# Patient Record
Sex: MALE | Race: BLACK OR AFRICAN AMERICAN | NOT HISPANIC OR LATINO | Employment: FULL TIME | URBAN - METROPOLITAN AREA
[De-identification: names, ages, dates, MRNs, and addresses within clinical notes are randomized per-mention and may not be internally consistent; named-entity substitution may affect disease eponyms.]

---

## 2019-09-05 ENCOUNTER — HOSPITAL ENCOUNTER (EMERGENCY)
Facility: HOSPITAL | Age: 24
Discharge: HOME | End: 2019-09-05
Attending: SURGERY
Payer: MEDICAID

## 2019-09-05 VITALS
OXYGEN SATURATION: 99 % | HEART RATE: 98 BPM | RESPIRATION RATE: 19 BRPM | TEMPERATURE: 98 F | WEIGHT: 130 LBS | HEIGHT: 66 IN | BODY MASS INDEX: 20.89 KG/M2 | SYSTOLIC BLOOD PRESSURE: 142 MMHG | DIASTOLIC BLOOD PRESSURE: 91 MMHG

## 2019-09-05 DIAGNOSIS — I47.10 SVT (SUPRAVENTRICULAR TACHYCARDIA): ICD-10-CM

## 2019-09-05 DIAGNOSIS — I45.6 WPW SYNDROME: Primary | ICD-10-CM

## 2019-09-05 DIAGNOSIS — R00.2 PALPITATION: ICD-10-CM

## 2019-09-05 LAB
ALBUMIN SERPL BCP-MCNC: 4.4 G/DL (ref 3.5–5.2)
ALP SERPL-CCNC: 53 U/L (ref 38–126)
ALT SERPL W/O P-5'-P-CCNC: 24 U/L (ref 10–44)
AMPHET+METHAMPHET UR QL: NEGATIVE
ANION GAP SERPL CALC-SCNC: 7 MMOL/L (ref 8–16)
AST SERPL-CCNC: 24 U/L (ref 15–46)
BARBITURATES UR QL SCN>200 NG/ML: NEGATIVE
BASOPHILS # BLD AUTO: 0.02 K/UL (ref 0–0.2)
BASOPHILS NFR BLD: 0.3 % (ref 0–1.9)
BENZODIAZ UR QL SCN>200 NG/ML: NEGATIVE
BILIRUB SERPL-MCNC: 0.7 MG/DL (ref 0.1–1)
BUN SERPL-MCNC: 9 MG/DL (ref 2–20)
BZE UR QL SCN: NEGATIVE
CALCIUM SERPL-MCNC: 9.2 MG/DL (ref 8.7–10.5)
CANNABINOIDS UR QL SCN: NEGATIVE
CHLORIDE SERPL-SCNC: 109 MMOL/L (ref 95–110)
CO2 SERPL-SCNC: 25 MMOL/L (ref 23–29)
CREAT SERPL-MCNC: 0.85 MG/DL (ref 0.5–1.4)
CREAT UR-MCNC: 137.1 MG/DL (ref 23–375)
DIFFERENTIAL METHOD: NORMAL
EOSINOPHIL # BLD AUTO: 0 K/UL (ref 0–0.5)
EOSINOPHIL NFR BLD: 0.3 % (ref 0–8)
ERYTHROCYTE [DISTWIDTH] IN BLOOD BY AUTOMATED COUNT: 14.5 % (ref 11.5–14.5)
EST. GFR  (AFRICAN AMERICAN): >60 ML/MIN/1.73 M^2
EST. GFR  (NON AFRICAN AMERICAN): >60 ML/MIN/1.73 M^2
GLUCOSE SERPL-MCNC: 121 MG/DL (ref 70–110)
HCT VFR BLD AUTO: 46.7 % (ref 40–54)
HGB BLD-MCNC: 15 G/DL (ref 14–18)
INR PPP: 1.2 (ref 0.8–1.2)
LYMPHOCYTES # BLD AUTO: 1.9 K/UL (ref 1–4.8)
LYMPHOCYTES NFR BLD: 32.6 % (ref 18–48)
MCH RBC QN AUTO: 29.6 PG (ref 27–31)
MCHC RBC AUTO-ENTMCNC: 32.1 G/DL (ref 32–36)
MCV RBC AUTO: 92 FL (ref 82–98)
METHADONE UR QL SCN>300 NG/ML: NEGATIVE
MONOCYTES # BLD AUTO: 0.5 K/UL (ref 0.3–1)
MONOCYTES NFR BLD: 8.7 % (ref 4–15)
NEUTROPHILS # BLD AUTO: 3.4 K/UL (ref 1.8–7.7)
NEUTROPHILS NFR BLD: 58.1 % (ref 38–73)
NT-PROBNP: 458 PG/ML (ref 5–450)
OPIATES UR QL SCN: NEGATIVE
PCP UR QL SCN>25 NG/ML: NEGATIVE
PLATELET # BLD AUTO: 152 K/UL (ref 150–350)
PMV BLD AUTO: 10.4 FL (ref 9.2–12.9)
POTASSIUM SERPL-SCNC: 3.8 MMOL/L (ref 3.5–5.1)
PROT SERPL-MCNC: 7.5 G/DL (ref 6–8.4)
PROTHROMBIN TIME: 12.5 SEC (ref 9–12.5)
RBC # BLD AUTO: 5.06 M/UL (ref 4.6–6.2)
SODIUM SERPL-SCNC: 141 MMOL/L (ref 136–145)
TOXICOLOGY INFORMATION: NORMAL
TROPONIN I SERPL DL<=0.01 NG/ML-MCNC: 0.04 NG/ML (ref 0.01–0.03)
WBC # BLD AUTO: 5.89 K/UL (ref 3.9–12.7)

## 2019-09-05 PROCEDURE — 80053 COMPREHEN METABOLIC PANEL: CPT | Mod: ER

## 2019-09-05 PROCEDURE — 93010 EKG 12-LEAD: ICD-10-PCS | Mod: ,,, | Performed by: INTERNAL MEDICINE

## 2019-09-05 PROCEDURE — 93010 ELECTROCARDIOGRAM REPORT: CPT | Mod: ,,, | Performed by: INTERNAL MEDICINE

## 2019-09-05 PROCEDURE — 93005 ELECTROCARDIOGRAM TRACING: CPT | Mod: ER

## 2019-09-05 PROCEDURE — 85610 PROTHROMBIN TIME: CPT | Mod: ER

## 2019-09-05 PROCEDURE — 80307 DRUG TEST PRSMV CHEM ANLYZR: CPT | Mod: ER

## 2019-09-05 PROCEDURE — 84484 ASSAY OF TROPONIN QUANT: CPT | Mod: ER

## 2019-09-05 PROCEDURE — 27000221 HC OXYGEN, UP TO 24 HOURS: Mod: ER

## 2019-09-05 PROCEDURE — 96361 HYDRATE IV INFUSION ADD-ON: CPT | Mod: ER

## 2019-09-05 PROCEDURE — 83880 ASSAY OF NATRIURETIC PEPTIDE: CPT | Mod: ER

## 2019-09-05 PROCEDURE — 63600175 PHARM REV CODE 636 W HCPCS: Mod: ER | Performed by: SURGERY

## 2019-09-05 PROCEDURE — 25000003 PHARM REV CODE 250: Mod: ER | Performed by: SURGERY

## 2019-09-05 PROCEDURE — 63600175 PHARM REV CODE 636 W HCPCS: Mod: ER

## 2019-09-05 PROCEDURE — 96374 THER/PROPH/DIAG INJ IV PUSH: CPT | Mod: ER

## 2019-09-05 PROCEDURE — 85025 COMPLETE CBC W/AUTO DIFF WBC: CPT | Mod: ER

## 2019-09-05 PROCEDURE — 99291 CRITICAL CARE FIRST HOUR: CPT | Mod: 25,ER

## 2019-09-05 PROCEDURE — 99284 EMERGENCY DEPT VISIT MOD MDM: CPT | Mod: 25,ER

## 2019-09-05 RX ORDER — METOPROLOL TARTRATE 25 MG/1
25 TABLET, FILM COATED ORAL 2 TIMES DAILY
Qty: 60 TABLET | Refills: 11 | Status: SHIPPED | OUTPATIENT
Start: 2019-09-05 | End: 2020-01-02

## 2019-09-05 RX ORDER — ADENOSINE 3 MG/ML
INJECTION, SOLUTION INTRAVENOUS
Status: COMPLETED
Start: 2019-09-05 | End: 2019-09-05

## 2019-09-05 RX ORDER — METOPROLOL TARTRATE 25 MG/1
25 TABLET, FILM COATED ORAL
Status: COMPLETED | OUTPATIENT
Start: 2019-09-05 | End: 2019-09-05

## 2019-09-05 RX ORDER — ONDANSETRON 2 MG/ML
4 INJECTION INTRAMUSCULAR; INTRAVENOUS
Status: DISCONTINUED | OUTPATIENT
Start: 2019-09-05 | End: 2019-09-05 | Stop reason: HOSPADM

## 2019-09-05 RX ORDER — SODIUM CHLORIDE 9 MG/ML
1000 INJECTION, SOLUTION INTRAVENOUS
Status: COMPLETED | OUTPATIENT
Start: 2019-09-05 | End: 2019-09-05

## 2019-09-05 RX ORDER — ADENOSINE 3 MG/ML
12 INJECTION, SOLUTION INTRAVENOUS
Status: COMPLETED | OUTPATIENT
Start: 2019-09-05 | End: 2019-09-05

## 2019-09-05 RX ADMIN — ADENOSINE 12 MG: 3 INJECTION, SOLUTION INTRAVENOUS at 02:09

## 2019-09-05 RX ADMIN — SODIUM CHLORIDE 1000 ML: 0.9 INJECTION, SOLUTION INTRAVENOUS at 02:09

## 2019-09-05 RX ADMIN — METOPROLOL TARTRATE 25 MG: 25 TABLET ORAL at 04:09

## 2019-09-05 NOTE — ED NOTES
Pt came in through lobby for heart palpitation. After placing pt on cardiac monitor and obtaining ekg shows pt in SVT. Dr. Artis in the room. 18g saline lock started to right forearm. Pt placed on zoll defribrillator and continued on heart monitor in room. 1L NS bolus started and 12mg adenosine iv push administered. Pt converted to normal sinus rhythm and vss repeat ekg performed. Pt remains on cardiac monitor and will continue to monitor.

## 2019-09-05 NOTE — ED PROVIDER NOTES
Encounter Date: 9/5/2019       History     Chief Complaint   Patient presents with    Palpitations     PT reports palpitations, sob and nause that started this morning     Sudden onset of SOB nausea and tachycardia 10 min prior to admission.  No chest pain. 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   Palpitations    This is a new problem. The current episode started today. The problem has been unchanged. On average, each episode lasts 15 minutes. Associated symptoms include chest pressure, irregular heartbeat, nausea and shortness of breath. Pertinent negatives include no diaphoresis, no chest pain and no back pain. He has tried nothing for the symptoms. The treatment provided no relief. His past medical history is significant for heart disease.     Review of patient's allergies indicates:  No Known Allergies  Past Medical History:   Diagnosis Date    History of heart surgery      History reviewed. No pertinent surgical history.  History reviewed. No pertinent family history.  Social History     Tobacco Use    Smoking status: Unknown If Ever Smoked    Smokeless tobacco: Never Used   Substance Use Topics    Alcohol use: No    Drug use: Yes     Types: Marijuana     Comment: Mojo today- pt reports a while back     Review of Systems   Constitutional: Negative.  Negative for diaphoresis.   HENT: Negative.    Eyes: Negative.    Respiratory: Positive for shortness of breath.    Cardiovascular: Positive for palpitations. Negative for chest pain.   Gastrointestinal: Positive for nausea.   Endocrine: Negative.    Genitourinary: Negative.    Musculoskeletal: Negative.  Negative for back pain.   Allergic/Immunologic: Negative.    Neurological: Negative.    Hematological: Negative.    Psychiatric/Behavioral: Negative.        Physical Exam     Initial Vitals [09/05/19 1414]   BP Pulse Resp Temp SpO2   (!) 141/90 (!) 188 18 97.6 °F (36.4 °C) (!) 94 %       MAP       --         Physical Exam    Nursing note and vitals reviewed.  Constitutional: He appears well-developed and well-nourished. He appears distressed.   Looks anxious   HENT:   Head: Normocephalic.   Eyes: Conjunctivae are normal.   Neck: Normal range of motion.   Cardiovascular: Intact distal pulses.   Tachycardia 180   Pulmonary/Chest: Breath sounds normal.   Abdominal: Soft.   Musculoskeletal: Normal range of motion.   Neurological: He is alert and oriented to person, place, and time. He has normal strength. GCS score is 15. GCS eye subscore is 4. GCS verbal subscore is 5. GCS motor subscore is 6.   Psychiatric: He has a normal mood and affect.         ED Course   Critical Care  Date/Time: 9/5/2019 4:43 PM  Performed by: LISA Artis III, MD  Authorized by: LISA Artis III, MD   Direct patient critical care time: 45 minutes  Total critical care time (exclusive of procedural time) : 45 minutes        Labs Reviewed   COMPREHENSIVE METABOLIC PANEL - Abnormal; Notable for the following components:       Result Value    Glucose 121 (*)     Anion Gap 7 (*)     All other components within normal limits   TROPONIN I - Abnormal; Notable for the following components:    Troponin I 0.036 (*)     All other components within normal limits   NT-PRO NATRIURETIC PEPTIDE - Abnormal; Notable for the following components:    NT-proBNP 458 (*)     All other components within normal limits   CBC W/ AUTO DIFFERENTIAL   DRUG SCREEN PANEL, URINE EMERGENCY   PROTIME-INR     EKG Readings: (Independently Interpreted)   Rhythm: Supraventricular Tachycardia (SVT). Heart Rate: 180.   Other EKG Interpretations: wpW with a rate of100     ECG Results          Repeat EKG 12-lead (In process)  Result time 09/05/19 15:17:49    In process by Interface, Lab In [facility] (09/05/19 15:17:49)                 Narrative:    Test Reason : I47.1,    Vent. Rate : 100 BPM     Atrial Rate : 100 BPM     P-R Int : 152 ms          QRS Dur :  100 ms      QT Int : 348 ms       P-R-T Axes : 056 015 108 degrees     QTc Int : 448 ms    Normal sinus rhythm  Otis-Parkinson-White  Abnormal ECG  When compared with ECG of 05-SEP-2019 14:20,  Vent. rate has decreased BY  87 BPM  Otis-Parkinson-White is now Present    Referred By: [email or web address]   SELF           Confirmed By:                              EKG 12-lead (In process)  Result time 09/05/19 15:17:41    In process by Interface, Lab In [facility] (09/05/19 15:17:41)                 Narrative:    Test Reason : R00.2,    Vent. Rate : 187 BPM     Atrial Rate : 192 BPM     P-R Int : 000 ms          QRS Dur : 098 ms      QT Int : 266 ms       P-R-T Axes : 000 011 186 degrees     QTc Int : 469 ms    Supraventricular tachycardia  LVH with repolarization abnormality  Abnormal ECG  No previous ECGs available    Referred By: [email or web address]   SELF           Confirmed By:                             Imaging Results    None          Medical Decision Making:   ED Management:  Sudden onset of .  Patient was given 12 mg of [place] with restoration  to a normal sinus rhythm and he has Otis-Parkinson-White syndrome.  Patient had mild bump in his troponin secondary to the tachycardia perfusion mismatch.  He was monitored in the ED for 90 min with no more tachycardia he was chest pain-free throughout his entire time discussion with cardiologist on call Dr. Stover and he recommends metoprolol 25 mg p.o. b.i.d. and referral to Dr. Ferguson at [facility] for catheter ablation.  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 Palpitation R00.2 785.1   3. SVT (supraventricular tachycardia) I47.1 427.89                                LISA Artis III, MD  09/05/19 1648       LISA Artis III, MD  09/05/19 1650

## 2019-09-26 ENCOUNTER — TELEPHONE (OUTPATIENT)
Dept: ELECTROPHYSIOLOGY | Facility: CLINIC | Age: 24
End: 2019-09-26

## 2019-09-26 DIAGNOSIS — I47.10 SVT (SUPRAVENTRICULAR TACHYCARDIA): Primary | ICD-10-CM

## 2019-09-26 NOTE — TELEPHONE ENCOUNTER
Received call from pt's family member requesting consult visit per Dr. Stover. Discussed with Dr Ferguson.     Called grandmother back and offered apt on 10/1/19 at 2 PM for EKG and MD visit. Pt's grandmother agrees and verbalized understanding. She denies patient has device or outside records. She states patient had fast heart rates when younger (1 y/o) but does not have records and has not been treated since then for SVT or cardiac issues.

## 2019-10-01 ENCOUNTER — INITIAL CONSULT (OUTPATIENT)
Dept: ELECTROPHYSIOLOGY | Facility: CLINIC | Age: 24
End: 2019-10-01
Payer: MEDICAID

## 2019-10-01 VITALS
WEIGHT: 127 LBS | SYSTOLIC BLOOD PRESSURE: 136 MMHG | DIASTOLIC BLOOD PRESSURE: 69 MMHG | HEART RATE: 75 BPM | BODY MASS INDEX: 19.25 KG/M2 | HEIGHT: 68 IN

## 2019-10-01 DIAGNOSIS — I45.6 WPW SYNDROME: Primary | ICD-10-CM

## 2019-10-01 DIAGNOSIS — I47.10 SVT (SUPRAVENTRICULAR TACHYCARDIA): ICD-10-CM

## 2019-10-01 DIAGNOSIS — Q24.9 CONGENITAL HEART DEFECT: Primary | ICD-10-CM

## 2019-10-01 DIAGNOSIS — I47.10 SVT (SUPRAVENTRICULAR TACHYCARDIA): Chronic | ICD-10-CM

## 2019-10-01 DIAGNOSIS — I45.6 WPW SYNDROME: Chronic | ICD-10-CM

## 2019-10-01 PROCEDURE — 99999 PR PBB SHADOW E&M-EST. PATIENT-LVL III: ICD-10-PCS | Mod: PBBFAC,,, | Performed by: INTERNAL MEDICINE

## 2019-10-01 PROCEDURE — 99213 OFFICE O/P EST LOW 20 MIN: CPT | Mod: PBBFAC | Performed by: INTERNAL MEDICINE

## 2019-10-01 PROCEDURE — 99204 PR OFFICE/OUTPT VISIT, NEW, LEVL IV, 45-59 MIN: ICD-10-PCS | Mod: S$PBB,,, | Performed by: INTERNAL MEDICINE

## 2019-10-01 PROCEDURE — 93010 ELECTROCARDIOGRAM REPORT: CPT | Mod: S$PBB,,, | Performed by: INTERNAL MEDICINE

## 2019-10-01 PROCEDURE — 99204 OFFICE O/P NEW MOD 45 MIN: CPT | Mod: S$PBB,,, | Performed by: INTERNAL MEDICINE

## 2019-10-01 PROCEDURE — 99999 PR PBB SHADOW E&M-EST. PATIENT-LVL III: CPT | Mod: PBBFAC,,, | Performed by: INTERNAL MEDICINE

## 2019-10-01 PROCEDURE — 93005 ELECTROCARDIOGRAM TRACING: CPT | Mod: PBBFAC | Performed by: INTERNAL MEDICINE

## 2019-10-01 PROCEDURE — 93010 EKG 12-LEAD: ICD-10-PCS | Mod: S$PBB,,, | Performed by: INTERNAL MEDICINE

## 2019-10-01 NOTE — LETTER
October 1, 2019      Dexter Stover MD  [address]  [address]  [place] [address]           [facility] - Arrhythmia  [address]  [facility] [address]  Phone: [phone]  Fax: [phone]          Patient: Juani Reilly   MR Number: [id number]   YOB: 1995   Date of Visit: 10/1/2019       Dear Dr. Dexter Stover:    Thank you for referring Juani Reilly to me for evaluation. Attached you will find relevant portions of my assessment and plan of care.    If you have questions, please do not hesitate to call me. I look forward to following Juani Reilly along with you.    Sincerely,    Abebe Ferguson MD    Enclosure  CC:  No Recipients    If you would like to receive this communication electronically, please contact [email or web address] or [phone] to request more information on [employer] Link access.    For providers and/or their staff who would like to refer a patient to [facility], please contact us through our one-stop-shop provider referral line, [facility], at [phone].    If you feel you have received this communication in error or would no longer like to receive these types of communications, please e-mail [email or web address]          Spoke with , notified of lab results.

## 2019-10-01 NOTE — PROGRESS NOTES
Subjective:    Patient ID:  Juani Reilly is a 24 y.o. male who presents for evaluation of SVT      HPI  I had the pleasure of seeing Mr. Reilly today in our electrophysiology clinic in consultation for his SVT. As you are aware he is a pleasant 24 year-old man with congenital heart disease. I have no records regarding this. 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 He was doing well until he presented to the [facility] ER on 9/5/2019 with palpitations. He was in a regular tachycardia that terminated with IV adenosine. He reports 1 month of shortness of breath at rest and with exertion. He denies any orthopnea or PND. No leg edema. He denies any history of syncope. He reports he has not seen a cardiologist ([facility]) for 7-8 years.    I reviewed all available ECGs in Epic which note sinus rhythm with mild pre-excitation (isoelectric V1, +V2=6, +inf leads). ECG from 9/5/2019 ER visit notes a regular long RP tachycardia with similar QRS morphology but not ART.    My interpretation of today's in clinic ECG is sinus rhythm with pre-excitation as described above.    Review of Systems   Constitution: Negative for fever and malaise/fatigue.   HENT: Negative for congestion and sore throat.    Eyes: Negative for blurred vision and visual disturbance.   Cardiovascular: Positive for palpitations. Negative for chest pain, dyspnea on exertion and irregular heartbeat.   Respiratory: Negative for cough and shortness of breath.    Hematologic/Lymphatic: Negative for bleeding problem. Does not bruise/bleed easily.   Skin: Negative.    Musculoskeletal: Negative.    Gastrointestinal: Negative for bloating, abdominal pain, dysphagia, heartburn, melena, nausea and vomiting.   Neurological: Negative for dizziness, focal weakness and weakness.        Objective:    Physical Exam    Constitutional: He is oriented to person, place, and time. He appears well-developed and well-nourished. No distress.   HENT:   Head: Normocephalic and atraumatic.   Eyes: Conjunctivae are normal. Right eye exhibits no discharge. Left eye exhibits no discharge.   Neck: Neck supple. No JVD present.   Cardiovascular: Normal rate and regular rhythm. Exam reveals no gallop and no friction rub.   No murmur heard.  Pulmonary/Chest: Effort normal and breath sounds normal. No respiratory distress. He has no wheezes. He has no rales.   Abdominal: Soft. Bowel sounds are normal. He exhibits no distension. There is no tenderness. There is no rebound.   Musculoskeletal: He exhibits no edema.   Neurological: He is alert and oriented to person, place, and time.   Skin: Skin is warm and dry. He is not diaphoretic.   Psychiatric: He has a normal mood and affect. His behavior is normal. Judgment and thought content normal.         Assessment:       1. Congenital heart defect    2. SVT (supraventricular tachycardia)    3. WPW syndrome         Plan:       In summary, Mr. 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 We discussed the etiology and pathophysiology of SVT. His likely involves an accessory pathway. Discussed treatment options including suppression with medical therapy versus EPS/ablation for more definitive therapy. For now recommend he be seen in Adult Congenital Heart Clinic and once he is assessed and deemed stable would recommend EPS/ablation. For now continue metoprolol 25mg bid. I spoke with Dr. Barrera who can see him Thursday.    RTC in 3 months, can see me or Edyta Washburn    Thank you for allowing me to participate in the care of this patient. Please do not hesitate to call me with any questions or concerns.    Abebe Ferguson MD, PhD  Cardiac Electrophysiology

## 2019-10-03 ENCOUNTER — CLINICAL SUPPORT (OUTPATIENT)
Dept: PEDIATRIC CARDIOLOGY | Facility: CLINIC | Age: 24
End: 2019-10-03
Attending: INTERNAL MEDICINE
Payer: MEDICAID

## 2019-10-03 ENCOUNTER — HOSPITAL ENCOUNTER (OUTPATIENT)
Dept: CARDIOLOGY | Facility: CLINIC | Age: 24
Discharge: HOME | End: 2019-10-03
Attending: INTERNAL MEDICINE
Payer: MEDICAID

## 2019-10-03 ENCOUNTER — INITIAL CONSULT (OUTPATIENT)
Dept: CARDIOLOGY | Facility: CLINIC | Age: 24
End: 2019-10-03
Payer: MEDICAID

## 2019-10-03 ENCOUNTER — HOSPITAL ENCOUNTER (OUTPATIENT)
Dept: RADIOLOGY | Facility: HOSPITAL | Age: 24
Discharge: HOME | End: 2019-10-03
Attending: INTERNAL MEDICINE
Payer: MEDICAID

## 2019-10-03 ENCOUNTER — HOSPITAL ENCOUNTER (OUTPATIENT)
Dept: CARDIOLOGY | Facility: CLINIC | Age: 24
Discharge: HOME | End: 2019-10-03
Payer: MEDICAID

## 2019-10-03 VITALS
HEIGHT: 68 IN | SYSTOLIC BLOOD PRESSURE: 156 MMHG | WEIGHT: 127 LBS | BODY MASS INDEX: 19.25 KG/M2 | OXYGEN SATURATION: 95 % | HEIGHT: 68 IN | HEART RATE: 74 BPM | HEART RATE: 85 BPM | DIASTOLIC BLOOD PRESSURE: 104 MMHG | WEIGHT: 127 LBS | BODY MASS INDEX: 19.25 KG/M2

## 2019-10-03 DIAGNOSIS — I47.10 SVT (SUPRAVENTRICULAR TACHYCARDIA): Chronic | ICD-10-CM

## 2019-10-03 DIAGNOSIS — I47.10 SVT (SUPRAVENTRICULAR TACHYCARDIA): ICD-10-CM

## 2019-10-03 DIAGNOSIS — I45.6 WPW SYNDROME: ICD-10-CM

## 2019-10-03 DIAGNOSIS — Q24.9 ADULT CONGENITAL HEART DISEASE: ICD-10-CM

## 2019-10-03 DIAGNOSIS — Q24.9 CONGENITAL HEART DISEASE: ICD-10-CM

## 2019-10-03 DIAGNOSIS — Q20.4 SINGLE VENTRICLE WITH TRICUSPID ATRESIA: ICD-10-CM

## 2019-10-03 DIAGNOSIS — Z98.890 S/P FONTAN PROCEDURE: Primary | ICD-10-CM

## 2019-10-03 DIAGNOSIS — Z98.890 S/P FONTAN PROCEDURE: ICD-10-CM

## 2019-10-03 DIAGNOSIS — Q22.4 SINGLE VENTRICLE WITH TRICUSPID ATRESIA: ICD-10-CM

## 2019-10-03 PROBLEM — Z87.74 S/P FONTAN PROCEDURE: Status: ACTIVE | Noted: 2019-10-03

## 2019-10-03 LAB
ASCENDING AORTA: 3.32 CM
AV INDEX (PROSTH): 0.76
AV MEAN GRADIENT: 1 MMHG
AV PEAK GRADIENT: 2 MMHG
AV VALVE AREA: 5.15 CM2
AV VELOCITY RATIO: 0.81
BSA FOR ECHO PROCEDURE: 1.66 M2
CV ECHO LV RWT: 0.27 CM
DOP CALC AO PEAK VEL: 0.77 M/S
DOP CALC AO VTI: 16.96 CM
DOP CALC LVOT AREA: 6.7 CM2
DOP CALC LVOT DIAMETER: 2.93 CM
DOP CALC LVOT PEAK VEL: 0.62 M/S
DOP CALC LVOT STROKE VOLUME: 87.41 CM3
DOP CALCLVOT PEAK VEL VTI: 12.97 CM
E WAVE DECELERATION TIME: 186.39 MSEC
E/A RATIO: 0.84
ECHO LV POSTERIOR WALL: 0.87 CM (ref 0.6–1.1)
FRACTIONAL SHORTENING: 15 % (ref 28–44)
INTERVENTRICULAR SEPTUM: 1.19 CM (ref 0.6–1.1)
LEFT ATRIUM SIZE: 3.2 CM
LEFT INTERNAL DIMENSION IN SYSTOLE: 5.49 CM (ref 2.1–4)
LEFT VENTRICLE DIASTOLIC VOLUME INDEX: 125.99 ML/M2
LEFT VENTRICLE DIASTOLIC VOLUME: 212.3 ML
LEFT VENTRICLE MASS INDEX: 172 G/M2
LEFT VENTRICLE SYSTOLIC VOLUME INDEX: 87.3 ML/M2
LEFT VENTRICLE SYSTOLIC VOLUME: 147.08 ML
LEFT VENTRICULAR INTERNAL DIMENSION IN DIASTOLE: 6.45 CM (ref 3.5–6)
LEFT VENTRICULAR MASS: 290.1 G
MV PEAK A VEL: 0.49 M/S
MV PEAK E VEL: 0.41 M/S
SINUS: 3.61 CM
STJ: 3.3 CM

## 2019-10-03 PROCEDURE — 99999 PR PBB SHADOW E&M-EST. PATIENT-LVL V: ICD-10-PCS | Mod: PBBFAC,,, | Performed by: INTERNAL MEDICINE

## 2019-10-03 PROCEDURE — 0298T: CPT | Mod: S$PBB,,, | Performed by: PEDIATRICS

## 2019-10-03 PROCEDURE — 71046 X-RAY EXAM CHEST 2 VIEWS: CPT | Mod: TC,FY

## 2019-10-03 PROCEDURE — 71046 XR CHEST PA AND LATERAL: ICD-10-PCS | Mod: 26,,, | Performed by: RADIOLOGY

## 2019-10-03 PROCEDURE — 93010 EKG 12-LEAD: ICD-10-PCS | Mod: S$PBB,,, | Performed by: INTERNAL MEDICINE

## 2019-10-03 PROCEDURE — 93303 ECHO (CUPID ONLY): ICD-10-PCS | Mod: 26,S$PBB,, | Performed by: PEDIATRICS

## 2019-10-03 PROCEDURE — 99999 PR PBB SHADOW E&M-EST. PATIENT-LVL V: CPT | Mod: PBBFAC,,, | Performed by: INTERNAL MEDICINE

## 2019-10-03 PROCEDURE — 71046 X-RAY EXAM CHEST 2 VIEWS: CPT | Mod: 26,,, | Performed by: RADIOLOGY

## 2019-10-03 PROCEDURE — 0298T: ICD-10-PCS | Mod: S$PBB,,, | Performed by: PEDIATRICS

## 2019-10-03 PROCEDURE — 99215 OFFICE O/P EST HI 40 MIN: CPT | Mod: PBBFAC,25 | Performed by: INTERNAL MEDICINE

## 2019-10-03 PROCEDURE — 99214 PR OFFICE/OUTPT VISIT, EST, LEVL IV, 30-39 MIN: ICD-10-PCS | Mod: S$PBB,,, | Performed by: INTERNAL MEDICINE

## 2019-10-03 PROCEDURE — 93303 ECHO TRANSTHORACIC: CPT | Mod: PBBFAC | Performed by: PEDIATRICS

## 2019-10-03 PROCEDURE — 93010 ELECTROCARDIOGRAM REPORT: CPT | Mod: S$PBB,,, | Performed by: INTERNAL MEDICINE

## 2019-10-03 PROCEDURE — 99214 OFFICE O/P EST MOD 30 MIN: CPT | Mod: S$PBB,,, | Performed by: INTERNAL MEDICINE

## 2019-10-03 PROCEDURE — 0297T: CPT | Mod: PBBFAC | Performed by: PEDIATRICS

## 2019-10-03 PROCEDURE — 93005 ELECTROCARDIOGRAM TRACING: CPT | Mod: PBBFAC,59 | Performed by: INTERNAL MEDICINE

## 2019-10-03 NOTE — LETTER
October 3, 2019      Abebe Ferguson MD  [address] [facility] [address]           [facility]  [address]  [place] [address]  Phone: [phone]          Patient: Juani Reilly   MR Number: [id number]   YOB: 1995   Date of Visit: 10/3/2019       Dear Dr. Abebe Ferguson:    Thank you for referring Juani Reilly to me for evaluation. Attached you will find relevant portions of my assessment and plan of care.    If you have questions, please do not hesitate to call me. I look forward to following Juani Reilly along with you.    Sincerely,    Meli Barrera MD    Enclosure  CC:  No Recipients    If you would like to receive this communication electronically, please contact [email or web address] or [phone] to request more information on [employer] Link access.    For providers and/or their staff who would like to refer a patient to [facility], please contact us through our one-stop-shop provider referral line, [facility], at [phone].    If you feel you have received this communication in error or would no longer like to receive these types of communications, please e-mail [email or web address]

## 2019-10-03 NOTE — PROGRESS NOTES
Referring Provider: Marty    Subjective:   Patient ID:  Juani Reilly is a 24 y.o. male who presents for evaluation of Adult congenital heart disease      HPIPatient is seen in our Adult Congenital Heart Defect Clinic.  He works at [employer] and does not have any limitations in his job. He had two surgeries within the first year of his life at [facility]. Mother and him do not know much about his follow up but he has not seen a cardiologist for many years.  He has chest pains occasionally, he had an episode in this chest 3 days ago, not too bad which last 1 minute, mid sternal. He becomes short of breath with moderate exertion.  He states he use to play football with his friends but was a little slower    He finished the 9th grade and is going to take his GED.    As per his records his name use to be TANK. Records from 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 modified Fontan age 1  On some notes there is a record of Baffle leak      Other Medical Problems  WPW  SVT    Cardiac Testing:  Echo cfd 10/3/2019  Imaging consistent with diagnosis of tricuspid atresia S/P extracardiac conduit Fontan-  Very difficult to demonstrate pulmonary circulation with significant chest deformity associated with median sternotomy.  Mildly dilated inferior vena cava connecting to extracardiac conduit passing posterior to the right atrium for completion of Fontan physiology.  Right superior vena cava identified with no obvious stenosis.  Unable to demonstrate Fontan or Jesus anastomosis to the pulmonary arteries.  At least two pulmonary veins demonstrated returning to the left atrium with no evidence for obstruction.  There is a small right atrium with tissue remaining in from eustachian valve and no obvious obstruction  of right atrial flow to the left atrium.  There is no obvious tricuspid valve tissue present and no right ventricular cavity could be demonstrated.  There is no evidence for pulmonary valve.  At least mild dilation of the left atrium.  Mildly dilated mitral valve with color Doppler demonstrating mild insufficiency.  Single ventricle consistent with left ventricular morphology.  There is at least mild dilation of the left ventricle.  Left ventricular systolic function qualitatively good with ejection fraction estimated 50-55%.  There is a large aortic annulus with trileaflet aortic valve and trivial central jet of insufficiency.  Qualitative impression of at least mild dilation of the ascending aorta.  Branchin/g pattern consistent with left aortic arch and no evidence for coarctation.  No obvious pericardial effusion.   Review of Systems   Constitution: Negative for weight gain and weight loss.   Eyes: Negative for blurred vision.   Cardiovascular: Positive for chest pain. Negative for claudication, cyanosis, dyspnea on exertion, irregular heartbeat, leg swelling, near-syncope, orthopnea, palpitations, paroxysmal nocturnal dyspnea and syncope.   Respiratory: Positive for shortness of breath. Negative for cough.    Endocrine: Negative for polydipsia and polyuria.   Hematologic/Lymphatic: Does not bruise/bleed easily.   Skin: Negative for color change and dry skin.   Musculoskeletal: Negative for falls, muscle cramps, muscle weakness and myalgias.   Gastrointestinal: Negative for abdominal pain, change in bowel habit, heartburn, hematemesis and hematochezia.   Neurological: Negative for brief paralysis, focal weakness, headaches, light-headedness and paresthesias.   Psychiatric/Behavioral: Negative for altered mental status.   Allergic/Immunologic: Negative for environmental allergies.         Allergies and current medications updated and reviewed:  Review of patient's allergies indicates:  No Known Allergies  Current  "Outpatient Medications   Medication Sig Dispense Refill    metoprolol tartrate (LOPRESSOR) 25 MG tablet Take 1 tablet (25 mg total) by mouth 2 (two) times daily. 60 tablet 11     No current facility-administered medications for this visit.        Objective:   Right Arm BP - Sittin/93 (10/03/19 1027)  Left Arm BP - Sittin/104 (10/03/19 1027)  BP (!) 156/104 (BP Location: Left arm, Patient Position: Sitting, BP Method: Large (Automatic))   Pulse 85   Ht 5' 8" (1.727 m)   Wt 57.6 kg (126 lb 15.8 oz)   SpO2 95%   BMI 19.31 kg/m²     Body surface area is 1.66 meters squared.  Physical Exam   Constitutional: He is oriented to person, place, and time. He appears well-developed and well-nourished.   HENT:   Mouth/Throat: Mucous membranes are normal.   Neck: No hepatojugular reflux and no JVD present.   Cardiovascular: Normal rate and regular rhythm.   Pulses:       Radial pulses are 2+ on the right side, and 2+ on the left side.        Posterior tibial pulses are 2+ on the right side, and 2+ on the left side.   Pulmonary/Chest: Breath sounds normal.   No Scoliosis or Kyphosis   Abdominal: Soft. Normal appearance and bowel sounds are normal. There is no hepatomegaly. There is no tenderness.   Musculoskeletal:        Right lower leg: He exhibits no edema.        Left lower leg: He exhibits no edema.   No limitations for exercise  No varicose veins   Neurological: He is alert and oriented to person, place, and time.   Skin: Skin is warm and dry. No cyanosis. Nails show no clubbing.        Psychiatric: He has a normal mood and affect. His behavior is normal. Judgment and thought content normal. His speech is delayed. Cognition and memory are impaired.       Chemistry        Component Value Date/Time     2019 1425    K 3.8 2019 1425     2019 1425    CO2 25 2019 1425    BUN 9 2019 1425    CREATININE 0.85 2019 1425     (H) 2019 1425        Component " Value Date/Time    CALCIUM 9.2 09/05/2019 1425    ALKPHOS 53 09/05/2019 1425    AST 24 09/05/2019 1425    ALT 24 09/05/2019 1425    BILITOT 0.7 09/05/2019 1425    ESTGFRAFRICA >60.0 09/05/2019 1425    EGFRNONAA >60.0 09/05/2019 1425            Recent Labs   Lab 09/05/19  1425   WBC 5.89   Hemoglobin 15.0   Hematocrit 46.7   Mean Corpuscular Volume 92   Platelets 152       Recent Labs   Lab 09/05/19  1425   INR 1.2          Test(s) Reviewed  I have reviewed the following in detail:  [] Stress test   [] Angiography   [x] Echocardiogram   [x] Labs   [] Other:         Assessment:   S/P Fontan procedure  Fontan Circulation: hemodynamic- none recently.     Nutritional status: Body mass index is 19.31 kg/m². 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 No weight loss.  A-1 aT level will order    Venous and lymphatic circulation: no varicosities    Liver function:normal     Anticoagulation:On no anticoagulation - he has had SVT ; will need coumadin given Fontan.      SVT (supraventricular tachycardia)  zio placed today    Adult congenital heart disease  Working on his GED  Has been to the dentist  SBEP prophylaxis    Single ventricle with tricuspid atresia  EF of 50-55%. No signs of heart failure. BP is elevated today- which would be unusual. We will need to reassess. Continue metoprolol for now. On follow up we will decide if an ACE/ARB/CCB is needed.     Plan:   Follow up in about 4 weeks (around 10/31/2019).   1. Need to evaluate PA, Fontan and Gabriel- by cardiac MRI  2. Follow up elevated BP -   3. Evaluation by hepatology for cirrohsis  4. Zio for arrhythmia  5. Stool for protein -losing enteropathy      Orders Placed This Encounter   Procedures    US Liver with Doppler (xpd)    X-Ray Chest PA And Lateral    MRI Cardiac Morphology Function W WO    Alpha-1-antitrypsin, stool    Hepatitis panel, acute    Ambulatory Referral to Hepatology    Cardiac Monitor - 3-14 Day Pediatrics  (Cupid Only)    POCT INR    Cardiac MRI Morphology

## 2019-10-03 NOTE — ASSESSMENT & PLAN NOTE
Fontan Circulation: hemodynamic- none recently.     Nutritional status: Body mass index is 19.31 kg/m². 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 No weight loss.  A-1 aT level will order    Venous and lymphatic circulation: no varicosities    Liver function:normal     Anticoagulation:On no anticoagulation - he has had SVT ; will need coumadin given Fontan.

## 2019-10-03 NOTE — ASSESSMENT & PLAN NOTE
EF of 50-55%. No signs of heart failure. BP is elevated today- which would be unusual. We will need to reassess. Continue metoprolol for now. On follow up we will decide if an ACE/ARB/CCB is needed.

## 2019-10-07 ENCOUNTER — TELEPHONE (OUTPATIENT)
Dept: CARDIOLOGY | Facility: CLINIC | Age: 24
End: 2019-10-07

## 2019-10-07 NOTE — TELEPHONE ENCOUNTER
----- Message from Meli Barrera MD sent at 10/6/2019  8:59 PM CDT -----  No hepatitis. CXR looks fine

## 2019-10-11 LAB
OHS CV EVENT MONITOR DAY: 3
OHS CV HOLTER LENGTH DECIMAL HOURS: 72
OHS CV HOLTER LENGTH HOURS: 0
OHS CV HOLTER LENGTH MINUTES: 0

## 2019-10-21 ENCOUNTER — TELEPHONE (OUTPATIENT)
Dept: CARDIOLOGY | Facility: CLINIC | Age: 24
End: 2019-10-21

## 2019-10-21 NOTE — TELEPHONE ENCOUNTER
Patient was sen by   10-.  I called Medical Records  Department and spoke to them about getting Patient Past Medical Records from [id number].  Patient old Medical records from that time  no longer exist.    Medical Records Dept. [phone]      Bri ROSADO

## 2019-11-05 ENCOUNTER — TELEPHONE (OUTPATIENT)
Dept: HEPATOLOGY | Facility: CLINIC | Age: 24
End: 2019-11-05

## 2019-11-05 NOTE — TELEPHONE ENCOUNTER
MA spoke to the pt. mother and schedule his consult with KANDIS Bryan on 1/2/2020. Mailed the reminder for all appointments that day.

## 2019-11-05 NOTE — TELEPHONE ENCOUNTER
----- Message from Lo Leal RN sent at 11/5/2019  2:00 PM CST -----  Regarding: Reschedule  Hello,  This patient has rescheduled his appointments from 10/31 to January 2nd.  Is it possible to add him to NP schedule on 1/2/20?  He is a Medicaid patient and we cannot schedule in any of the open slots.  Thanks  HILLARY Blanchard

## 2019-11-06 ENCOUNTER — HOSPITAL ENCOUNTER (EMERGENCY)
Facility: HOSPITAL | Age: 24
Discharge: HOME | End: 2019-11-07
Attending: EMERGENCY MEDICINE
Payer: MEDICAID

## 2019-11-06 ENCOUNTER — HOSPITAL ENCOUNTER (EMERGENCY)
Facility: HOSPITAL | Age: 24
Discharge: LEFT BEFORE DISCHARGE | End: 2019-11-06
Attending: EMERGENCY MEDICINE
Payer: MEDICAID

## 2019-11-06 VITALS
HEART RATE: 119 BPM | DIASTOLIC BLOOD PRESSURE: 109 MMHG | TEMPERATURE: 98 F | OXYGEN SATURATION: 94 % | WEIGHT: 140 LBS | BODY MASS INDEX: 20.73 KG/M2 | HEIGHT: 69 IN | SYSTOLIC BLOOD PRESSURE: 162 MMHG | RESPIRATION RATE: 22 BRPM

## 2019-11-06 DIAGNOSIS — R07.9 CHEST PAIN: ICD-10-CM

## 2019-11-06 DIAGNOSIS — R00.0 TACHYCARDIA: ICD-10-CM

## 2019-11-06 DIAGNOSIS — R07.9 CHEST PAIN: Primary | ICD-10-CM

## 2019-11-06 LAB
ALBUMIN SERPL BCP-MCNC: 5.1 G/DL (ref 3.5–5.2)
ALP SERPL-CCNC: 64 U/L (ref 38–126)
ALT SERPL W/O P-5'-P-CCNC: 29 U/L (ref 10–44)
ANION GAP SERPL CALC-SCNC: 14 MMOL/L (ref 8–16)
AST SERPL-CCNC: 28 U/L (ref 15–46)
BASOPHILS # BLD AUTO: 0.01 K/UL (ref 0–0.2)
BASOPHILS NFR BLD: 0.1 % (ref 0–1.9)
BILIRUB SERPL-MCNC: 1.3 MG/DL (ref 0.1–1)
BUN SERPL-MCNC: 17 MG/DL (ref 2–20)
CALCIUM SERPL-MCNC: 10.1 MG/DL (ref 8.7–10.5)
CHLORIDE SERPL-SCNC: 105 MMOL/L (ref 95–110)
CO2 SERPL-SCNC: 22 MMOL/L (ref 23–29)
CREAT SERPL-MCNC: 0.98 MG/DL (ref 0.5–1.4)
DIFFERENTIAL METHOD: NORMAL
EOSINOPHIL # BLD AUTO: 0 K/UL (ref 0–0.5)
EOSINOPHIL NFR BLD: 0.3 % (ref 0–8)
ERYTHROCYTE [DISTWIDTH] IN BLOOD BY AUTOMATED COUNT: 13 % (ref 11.5–14.5)
EST. GFR  (AFRICAN AMERICAN): >60 ML/MIN/1.73 M^2
EST. GFR  (NON AFRICAN AMERICAN): >60 ML/MIN/1.73 M^2
GLUCOSE SERPL-MCNC: 121 MG/DL (ref 70–110)
HCT VFR BLD AUTO: 51.7 % (ref 40–54)
HGB BLD-MCNC: 17.1 G/DL (ref 14–18)
INR PPP: 1.3 (ref 0.8–1.2)
LYMPHOCYTES # BLD AUTO: 2.5 K/UL (ref 1–4.8)
LYMPHOCYTES NFR BLD: 37.1 % (ref 18–48)
MCH RBC QN AUTO: 29 PG (ref 27–31)
MCHC RBC AUTO-ENTMCNC: 33.1 G/DL (ref 32–36)
MCV RBC AUTO: 88 FL (ref 82–98)
MONOCYTES # BLD AUTO: 0.6 K/UL (ref 0.3–1)
MONOCYTES NFR BLD: 8.9 % (ref 4–15)
NEUTROPHILS # BLD AUTO: 3.6 K/UL (ref 1.8–7.7)
NEUTROPHILS NFR BLD: 53.6 % (ref 38–73)
NT-PROBNP: 73 PG/ML (ref 5–450)
PLATELET # BLD AUTO: 165 K/UL (ref 150–350)
PMV BLD AUTO: 10.3 FL (ref 9.2–12.9)
POTASSIUM SERPL-SCNC: 3.2 MMOL/L (ref 3.5–5.1)
PROT SERPL-MCNC: 9 G/DL (ref 6–8.4)
PROTHROMBIN TIME: 14.4 SEC (ref 9–12.5)
RBC # BLD AUTO: 5.89 M/UL (ref 4.6–6.2)
SODIUM SERPL-SCNC: 141 MMOL/L (ref 136–145)
TROPONIN I SERPL DL<=0.01 NG/ML-MCNC: <0.012 NG/ML (ref 0.01–0.03)
WBC # BLD AUTO: 6.74 K/UL (ref 3.9–12.7)

## 2019-11-06 PROCEDURE — 93010 EKG 12-LEAD: ICD-10-PCS | Mod: ,,, | Performed by: INTERNAL MEDICINE

## 2019-11-06 PROCEDURE — 83880 ASSAY OF NATRIURETIC PEPTIDE: CPT | Mod: ER

## 2019-11-06 PROCEDURE — 96374 THER/PROPH/DIAG INJ IV PUSH: CPT

## 2019-11-06 PROCEDURE — 99285 EMERGENCY DEPT VISIT HI MDM: CPT | Mod: 25

## 2019-11-06 PROCEDURE — 93010 ELECTROCARDIOGRAM REPORT: CPT | Mod: ,,, | Performed by: INTERNAL MEDICINE

## 2019-11-06 PROCEDURE — 99900041 HC LEFT WITHOUT BEING SEEN- EMERGENCY: Mod: ER

## 2019-11-06 PROCEDURE — 84484 ASSAY OF TROPONIN QUANT: CPT | Mod: ER

## 2019-11-06 PROCEDURE — 85610 PROTHROMBIN TIME: CPT | Mod: ER

## 2019-11-06 PROCEDURE — 93005 ELECTROCARDIOGRAM TRACING: CPT

## 2019-11-06 PROCEDURE — 85025 COMPLETE CBC W/AUTO DIFF WBC: CPT | Mod: ER

## 2019-11-06 PROCEDURE — 99284 EMERGENCY DEPT VISIT MOD MDM: CPT | Mod: ,,, | Performed by: EMERGENCY MEDICINE

## 2019-11-06 PROCEDURE — 80053 COMPREHEN METABOLIC PANEL: CPT | Mod: ER

## 2019-11-06 PROCEDURE — 93005 ELECTROCARDIOGRAM TRACING: CPT | Mod: ER

## 2019-11-06 PROCEDURE — 99284 PR EMERGENCY DEPT VISIT,LEVEL IV: ICD-10-PCS | Mod: ,,, | Performed by: EMERGENCY MEDICINE

## 2019-11-06 RX ORDER — KETOROLAC TROMETHAMINE 30 MG/ML
10 INJECTION, SOLUTION INTRAMUSCULAR; INTRAVENOUS
Status: COMPLETED | OUTPATIENT
Start: 2019-11-07 | End: 2019-11-07

## 2019-11-06 RX ORDER — POTASSIUM CHLORIDE 20 MEQ/1
40 TABLET, EXTENDED RELEASE ORAL ONCE
Status: COMPLETED | OUTPATIENT
Start: 2019-11-07 | End: 2019-11-07

## 2019-11-07 VITALS
HEART RATE: 84 BPM | RESPIRATION RATE: 19 BRPM | BODY MASS INDEX: 21.97 KG/M2 | OXYGEN SATURATION: 94 % | SYSTOLIC BLOOD PRESSURE: 124 MMHG | DIASTOLIC BLOOD PRESSURE: 84 MMHG | TEMPERATURE: 97 F | HEIGHT: 67 IN | WEIGHT: 140 LBS

## 2019-11-07 LAB — D DIMER PPP IA.FEU-MCNC: 0.23 MG/L FEU

## 2019-11-07 PROCEDURE — 63600175 PHARM REV CODE 636 W HCPCS: Performed by: STUDENT IN AN ORGANIZED HEALTH CARE EDUCATION/TRAINING PROGRAM

## 2019-11-07 PROCEDURE — 93005 ELECTROCARDIOGRAM TRACING: CPT

## 2019-11-07 PROCEDURE — 93010 ELECTROCARDIOGRAM REPORT: CPT | Mod: ,,, | Performed by: INTERNAL MEDICINE

## 2019-11-07 PROCEDURE — 25000003 PHARM REV CODE 250: Performed by: STUDENT IN AN ORGANIZED HEALTH CARE EDUCATION/TRAINING PROGRAM

## 2019-11-07 PROCEDURE — 93010 EKG 12-LEAD: ICD-10-PCS | Mod: ,,, | Performed by: INTERNAL MEDICINE

## 2019-11-07 PROCEDURE — 85379 FIBRIN DEGRADATION QUANT: CPT

## 2019-11-07 RX ORDER — METOPROLOL SUCCINATE 25 MG/1
25 TABLET, EXTENDED RELEASE ORAL
Status: COMPLETED | OUTPATIENT
Start: 2019-11-07 | End: 2019-11-07

## 2019-11-07 RX ADMIN — METOPROLOL SUCCINATE 25 MG: 25 TABLET, EXTENDED RELEASE ORAL at 12:11

## 2019-11-07 RX ADMIN — POTASSIUM CHLORIDE 40 MEQ: 1500 TABLET, EXTENDED RELEASE ORAL at 12:11

## 2019-11-07 RX ADMIN — KETOROLAC TROMETHAMINE 10 MG: 30 INJECTION, SOLUTION INTRAMUSCULAR; INTRAVENOUS at 12:11

## 2019-11-07 NOTE — ED PROVIDER NOTES
Encounter Date: 11/6/2019       History     Chief Complaint   Patient presents with    Tachycardia     pt left [facility] AMA before test results came back for CP workup. HX SVT.     Juani Reilly is a 24 year old male with a history of hypoplastic left heart syndrome s/p Fontan, hx SVT, hx WPW, and HTN who presents to the ED for chest pain that started the afternoon of 11/6/19. Patient states that his chest pain began suddenly while he was at work and has not remitted since. Pain is associated with dyspnea that is unchanged with exertion. Pain does not change with respiration. He describes the chest pain as left-sided and better with sitting up and moving around, worse when laying flat. He denies any associated diaphoresis, nausea, vomiting, abdominal pain. Denies any recent fever, chills, night sweats, cough, nasal congestion, diarrhea, dysuria. Denies any history of heartburn. Denies any right sided chest or abdominal pain or post-prandial pain. Patient denies sick contacts however he works as a . Patient has had chest pain in this area of his chest before however it has never been this severe and usually resolves with tylenol. He did not take his metoprolol today. Patient recently saw outpatient cardiologist who recommended warfarin as it is indicated in patients s/p Fontan with atrial arrhythmias however he was not started on it.        Review of patient's allergies indicates:  No Known Allergies  Past Medical History:   Diagnosis Date    History of heart surgery     SVT (supraventricular tachycardia)     WPW syndrome      Past Surgical History:   Procedure Laterality Date    FONTAN PROCEDURE, EXTRACARDIAC       Family History   Problem Relation Age of Onset    No Known Problems Mother     No Known Problems Father     No Known Problems Sister     No Known Problems Brother     No Known Problems Maternal Aunt     No Known Problems Maternal Uncle     No Known Problems Paternal  Aunt     No Known Problems Paternal Uncle     No Known Problems Maternal Grandmother     Heart disease Maternal Grandfather     No Known Problems Paternal Grandmother     No Known Problems Paternal Grandfather     Anemia Neg Hx     Arrhythmia Neg Hx     Asthma Neg Hx     Clotting disorder Neg Hx     Fainting Neg Hx     Heart attack Neg Hx     Heart failure Neg Hx     Hyperlipidemia Neg Hx     Hypertension Neg Hx     Stroke Neg Hx     Atrial Septal Defect Neg Hx      Social History     Tobacco Use    Smoking status: Unknown If Ever Smoked    Smokeless tobacco: Never Used   Substance Use Topics    Alcohol use: No    Drug use: Yes     Types: Marijuana     Comment: Mojo today- pt reports a while back     Review of Systems   Constitutional: Negative for chills, fatigue and fever.   HENT: Negative for congestion, ear pain, sinus pain and sore throat.    Eyes: Negative for visual disturbance.   Respiratory: Positive for shortness of breath. Negative for cough.    Cardiovascular: Positive for chest pain. Negative for leg swelling.   Gastrointestinal: Negative for abdominal pain, constipation, diarrhea, nausea and vomiting.   Genitourinary: Negative for decreased urine volume, difficulty urinating and dysuria.   Musculoskeletal: Negative for back pain and neck pain.   Skin: Negative for rash.   Neurological: Negative for dizziness, syncope and headaches.   Psychiatric/Behavioral: Negative for confusion and decreased concentration.       Physical Exam     Initial Vitals [11/06/19 2307]   BP Pulse Resp Temp SpO2   139/89 (!) 119 16 97.4 °F (36.3 °C) (!) 93 %      MAP       --         Physical Exam    Nursing note and vitals reviewed.  Constitutional: He appears well-developed and well-nourished. He is not diaphoretic. No distress.   Anxious appearing   HENT:   Head: Normocephalic and atraumatic.   Eyes: Conjunctivae and EOM are normal.   Neck: Normal range of motion. No tracheal deviation present.    Cardiovascular: Regular rhythm. Exam reveals no friction rub.    No murmur heard.  Tachycardic   Pulmonary/Chest: Breath sounds normal. No stridor. No respiratory distress. He has no wheezes. He has no rales.   Left anterior chest wall tenderness   Abdominal: Soft. Bowel sounds are normal. He exhibits no distension. There is no tenderness. There is no rebound.   Musculoskeletal: Normal range of motion. He exhibits no edema or tenderness.   Neurological: He is alert and oriented to person, place, and time. He has normal strength. No cranial nerve deficit.   Skin: Skin is warm and dry. No erythema. No pallor.   Midline sternotomy scar         ED Course   Procedures  Labs Reviewed   D DIMER, QUANTITATIVE          Imaging Results          X-Ray Chest AP Portable (Final result)  Result time 11/07/19 01:15:41    Final result by Gómez Moser MD (11/07/19 01:15:41)                 Impression:      No acute findings.  No significant change from prior.    Electronically signed by resident: Tima Krishnamurthy  Date:    11/07/2019  Time:    01:09    Electronically signed by: Gómez Moser MD  Date:    11/07/2019  Time:    01:15             Narrative:    EXAMINATION:  XR CHEST AP PORTABLE    CLINICAL HISTORY:  Chest pain, unspecified    TECHNIQUE:  Single frontal view of the chest was performed.    COMPARISON:  Chest radiograph 10/03/2019..    FINDINGS:  Postsurgical changes of median sternotomy.  No consolidation, pleural effusion or pneumothorax.  Cardiomediastinal silhouette is unremarkable.                                 Medical Decision Making:   History:   Old Medical Records: I decided to obtain old medical records.  Old Records Summarized: records from clinic visits.       <> Summary of Records: Patient has history of likely hypoplastic left heart s/p Fontan procedure.  Initial Assessment:   Differential diagnosis includes but is not limited to ACS, musculoskeletal chest pain, GERD, SVT or other arrhythmia, PE,  gallbladder colic, anxiety, PNA. Unlikely PNA as he is afebrile, denies cough or infectious symptoms. Unlikely ACS considering his negative troponin and EKG without ST changes and overall atypical character of the pain. EKG not showing any overt arrhythmia at this time and patient still complaining of chest pain so unlikely from that. Unlikely GERD as patient does not have history of heartburn and patient denies association with onset of pain and eating. Unlikely cholelithiasis as patient denies right-sided pain associated with eating however he is being worked up outpatient for mildly elevated liver enzymes and has mildly elevated T bili. Most likely musculoskeletal chest pain (considering how patient was so tender to palpation) or anxiety, however PE must be ruled out in patient who is tachycardic, hypoxic (although this may be close to his baseline) and s/p Fontan with increased risk for thromboembolism.  Clinical Tests:   Lab Tests: Ordered and Reviewed  The following lab test(s) were unremarkable: CBC and CMP  Radiological Study: Ordered and Reviewed  Medical Tests: Ordered and Reviewed  ED Management:  12:36 AM - CBC unremarkable. CMP shows T bili 1.3 and K 3.2. Given KCl. Troponin #1 negative. Will get CXR and D-dimer and repeat troponin. Gave toradol for pain and patient's home dose of metoprolol. EKG shows some ST depressions in various leads however patient also tachycardic. Will repeat once pain and rate controlled.  1:29 AM - CXR unremarkable and unchanged from 11/3. D-dimer normal. So likely this is musculoskeletal chest pain with a possible anxiety component. Patient stating pain has resolved. Ordered repeat EKG. If unchanged or improved, will discharge home with outpatient follow up.  2:06 AM - Repeat EKG improved and no longer has ST depressions. Patient remains without pain. Patient was discharged home with counseling about musculoskeletal chest pain and told that if he has this reproducible pain  again, to take tylenol or ibuprofen. Patient was instructed that if pain doesn't improve with those medications, or if pain is associated with dizziness, diaphoresis, nausea or vomiting, or any other alarming symptoms then he should return to the ED for evaluation.                                 Clinical Impression:       ICD-10-CM ICD-9-CM   1. Chest pain R07.9 786.50   2. Tachycardia R00.0 785.0                             Marvin Mejia DO  Resident  11/07/19 0208

## 2019-11-07 NOTE — ED TRIAGE NOTES
Pt presents for chest pain since 1500. Pt states that it feels like his heart is hurting in the place that he has a shunt placed as a baby. Pt also dizzy. Denies n/v. Pt states that laying flat is painful and he must move upper body around in order to relieve the pain. Denies radiating pain. Pt reports that he did not take his Lopressor today because he was in a rush.

## 2019-11-07 NOTE — ED NOTES
Patient Identifiers for Juani Reilly checked and correct  LOC: The patient is awake, alert and aware of environment with an appropriate affect, the patient is oriented x 3 and speaking appropriate.  APPEARANCE: Patient resting comfortably and in no acute distress, patient is clean and well groomed, patient's clothing is properly fastened.  SKIN: The skin is warm and dry, patient has normal skin turgor and moist mucus membranes,no rashes or lesions.Skin Intact , No Breakdown Noted  Musculoskeletal :  Normal range of motion noted. Moves all extremeties well, No swelling or tenderness noted  RESPIRATORY: Airway is open and patent, respirations are spontaneous, patient has a normal effort and rate.  CARDIAC: Patient has a normal rate and rhythm, no periphreal edema noted, capillary refill < 3 seconds. Pt reporting chest pain that is positional.  ABDOMEN: Soft and non tender to palpation, no distention noted.   PULSES: 2+  And symmetrical in all extremeties  NEUROLOGIC: PERRL. facial expression is symmetrical, patient moving all extremities, normal sensation in all extremities when touched with a finger.The patient is awake, alert and cooperative with a calm affect, patient is aware of environment.    Will continue to monitor

## 2019-11-22 ENCOUNTER — TELEPHONE (OUTPATIENT)
Dept: CARDIOLOGY | Facility: CLINIC | Age: 24
End: 2019-11-22

## 2019-11-22 NOTE — TELEPHONE ENCOUNTER
Called patient and spoke with Mother regarding results of Zio monitor. Mother verbalized understanding. Confirmed f/u appt. With Dr. Barrera for 1/2/20 at 1pm.

## 2019-11-26 ENCOUNTER — HOSPITAL ENCOUNTER (EMERGENCY)
Facility: HOSPITAL | Age: 24
Discharge: HOME | End: 2019-11-26
Attending: SURGERY
Payer: MEDICAID

## 2019-11-26 VITALS
HEIGHT: 67 IN | HEART RATE: 85 BPM | SYSTOLIC BLOOD PRESSURE: 114 MMHG | DIASTOLIC BLOOD PRESSURE: 65 MMHG | OXYGEN SATURATION: 99 % | RESPIRATION RATE: 18 BRPM | WEIGHT: 140 LBS | TEMPERATURE: 98 F | BODY MASS INDEX: 21.97 KG/M2

## 2019-11-26 DIAGNOSIS — I45.6 WPW (WOLFF-PARKINSON-WHITE SYNDROME): Primary | ICD-10-CM

## 2019-11-26 DIAGNOSIS — R00.0 TACHYCARDIA: ICD-10-CM

## 2019-11-26 DIAGNOSIS — I47.10 SVT (SUPRAVENTRICULAR TACHYCARDIA): ICD-10-CM

## 2019-11-26 LAB
ALBUMIN SERPL BCP-MCNC: 4 G/DL (ref 3.5–5.2)
ALP SERPL-CCNC: 49 U/L (ref 38–126)
ALT SERPL W/O P-5'-P-CCNC: 23 U/L (ref 10–44)
ANION GAP SERPL CALC-SCNC: 10 MMOL/L (ref 8–16)
AST SERPL-CCNC: 22 U/L (ref 15–46)
BASOPHILS # BLD AUTO: 0.01 K/UL (ref 0–0.2)
BASOPHILS NFR BLD: 0.1 % (ref 0–1.9)
BILIRUB SERPL-MCNC: 0.9 MG/DL (ref 0.1–1)
BUN SERPL-MCNC: 14 MG/DL (ref 2–20)
CALCIUM SERPL-MCNC: 8.7 MG/DL (ref 8.7–10.5)
CHLORIDE SERPL-SCNC: 110 MMOL/L (ref 95–110)
CO2 SERPL-SCNC: 23 MMOL/L (ref 23–29)
CREAT SERPL-MCNC: 0.64 MG/DL (ref 0.5–1.4)
DIFFERENTIAL METHOD: ABNORMAL
EOSINOPHIL # BLD AUTO: 0 K/UL (ref 0–0.5)
EOSINOPHIL NFR BLD: 0 % (ref 0–8)
ERYTHROCYTE [DISTWIDTH] IN BLOOD BY AUTOMATED COUNT: 13.8 % (ref 11.5–14.5)
EST. GFR  (AFRICAN AMERICAN): >60 ML/MIN/1.73 M^2
EST. GFR  (NON AFRICAN AMERICAN): >60 ML/MIN/1.73 M^2
GLUCOSE SERPL-MCNC: 98 MG/DL (ref 70–110)
HCT VFR BLD AUTO: 46.9 % (ref 40–54)
HGB BLD-MCNC: 14.8 G/DL (ref 14–18)
LYMPHOCYTES # BLD AUTO: 1 K/UL (ref 1–4.8)
LYMPHOCYTES NFR BLD: 13.3 % (ref 18–48)
MCH RBC QN AUTO: 29.1 PG (ref 27–31)
MCHC RBC AUTO-ENTMCNC: 31.6 G/DL (ref 32–36)
MCV RBC AUTO: 92 FL (ref 82–98)
MONOCYTES # BLD AUTO: 0.7 K/UL (ref 0.3–1)
MONOCYTES NFR BLD: 9.2 % (ref 4–15)
NEUTROPHILS # BLD AUTO: 6 K/UL (ref 1.8–7.7)
NEUTROPHILS NFR BLD: 77.4 % (ref 38–73)
PLATELET # BLD AUTO: 242 K/UL (ref 150–350)
PMV BLD AUTO: 9.7 FL (ref 9.2–12.9)
POTASSIUM SERPL-SCNC: 3.7 MMOL/L (ref 3.5–5.1)
PROT SERPL-MCNC: 6.8 G/DL (ref 6–8.4)
RBC # BLD AUTO: 5.08 M/UL (ref 4.6–6.2)
SODIUM SERPL-SCNC: 143 MMOL/L (ref 136–145)
TROPONIN I SERPL DL<=0.01 NG/ML-MCNC: 0.03 NG/ML (ref 0.01–0.03)
WBC # BLD AUTO: 7.82 K/UL (ref 3.9–12.7)

## 2019-11-26 PROCEDURE — 96361 HYDRATE IV INFUSION ADD-ON: CPT | Mod: ER

## 2019-11-26 PROCEDURE — 93010 EKG 12-LEAD: ICD-10-PCS | Mod: ,,, | Performed by: INTERNAL MEDICINE

## 2019-11-26 PROCEDURE — 80053 COMPREHEN METABOLIC PANEL: CPT | Mod: ER

## 2019-11-26 PROCEDURE — 63600175 PHARM REV CODE 636 W HCPCS: Mod: ER

## 2019-11-26 PROCEDURE — 93005 ELECTROCARDIOGRAM TRACING: CPT | Mod: ER

## 2019-11-26 PROCEDURE — 99284 EMERGENCY DEPT VISIT MOD MDM: CPT | Mod: 25,ER

## 2019-11-26 PROCEDURE — 96375 TX/PRO/DX INJ NEW DRUG ADDON: CPT | Mod: ER

## 2019-11-26 PROCEDURE — 85025 COMPLETE CBC W/AUTO DIFF WBC: CPT | Mod: ER

## 2019-11-26 PROCEDURE — 93010 ELECTROCARDIOGRAM REPORT: CPT | Mod: ,,, | Performed by: INTERNAL MEDICINE

## 2019-11-26 PROCEDURE — 63600175 PHARM REV CODE 636 W HCPCS: Mod: ER | Performed by: SURGERY

## 2019-11-26 PROCEDURE — 96374 THER/PROPH/DIAG INJ IV PUSH: CPT | Mod: ER

## 2019-11-26 PROCEDURE — 84484 ASSAY OF TROPONIN QUANT: CPT | Mod: ER

## 2019-11-26 RX ORDER — ADENOSINE 3 MG/ML
INJECTION, SOLUTION INTRAVENOUS
Status: COMPLETED
Start: 2019-11-26 | End: 2019-11-26

## 2019-11-26 RX ORDER — ADENOSINE 3 MG/ML
12 INJECTION, SOLUTION INTRAVENOUS
Status: COMPLETED | OUTPATIENT
Start: 2019-11-26 | End: 2019-11-26

## 2019-11-26 RX ADMIN — LORAZEPAM 1 MG: 2 INJECTION INTRAMUSCULAR; INTRAVENOUS at 03:11

## 2019-11-26 RX ADMIN — SODIUM CHLORIDE 1000 ML: 0.9 INJECTION, SOLUTION INTRAVENOUS at 02:11

## 2019-11-26 RX ADMIN — ADENOSINE 12 MG: 3 INJECTION, SOLUTION INTRAVENOUS at 02:11

## 2019-11-26 NOTE — ED PROVIDER NOTES
"Encounter Date: 11/26/2019       History     Chief Complaint   Patient presents with    episgastric pain     Pt c/o generalized weakness, states woke up at 0400 feeling like "tube in chest" felt like was "sticking out."  States had heart surgery as a child and is referring to what was used to repair heart.       Presents with acute onset of shortness of breath and palpitations.  He has a history of SVT.  He does not have any chest pain.  He has some epigastric discomfort.  Has WPW and Garfield procedure as infant.  He has a history of taking  labetalol 25 mg p.o. b.i.d. he is under the care of cardiologist and he is to undergo an catheter ablation sometimes in future.  He comes in reeking of marijuana smoke    The history is provided by the patient.   Palpitations    This is a recurrent problem. The current episode started today. The problem occurs intermittently. The problem has been unchanged. The problem is associated with anxiety and stress. On average, each episode lasts 1 hour. Associated symptoms include diaphoresis and near-syncope. He has tried nothing for the symptoms. The treatment provided no relief. His past medical history is significant for heart disease.     Review of patient's allergies indicates:  No Known Allergies  Past Medical History:   Diagnosis Date    History of heart surgery     SVT (supraventricular tachycardia)     WPW syndrome      Past Surgical History:   Procedure Laterality Date    FONTAN PROCEDURE, EXTRACARDIAC       Family History   Problem Relation Age of Onset    No Known Problems Mother     No Known Problems Father     No Known Problems Sister     No Known Problems Brother     No Known Problems Maternal Aunt     No Known Problems Maternal Uncle     No Known Problems Paternal Aunt     No Known Problems Paternal Uncle     No Known Problems Maternal Grandmother     Heart disease Maternal Grandfather     No Known Problems Paternal Grandmother     No Known Problems " Paternal Grandfather     Anemia Neg Hx     Arrhythmia Neg Hx     Asthma Neg Hx     Clotting disorder Neg Hx     Fainting Neg Hx     Heart attack Neg Hx     Heart failure Neg Hx     Hyperlipidemia Neg Hx     Hypertension Neg Hx     Stroke Neg Hx     Atrial Septal Defect Neg Hx      Social History     Tobacco Use    Smoking status: Unknown If Ever Smoked    Smokeless tobacco: Never Used   Substance Use Topics    Alcohol use: No    Drug use: Yes     Types: Marijuana     Comment: Mojo today- pt reports a while back     Review of Systems   Constitutional: Positive for diaphoresis.   HENT: Negative.    Eyes: Negative.    Respiratory: Negative.    Cardiovascular: Positive for palpitations and near-syncope.   Gastrointestinal: Negative.    Endocrine: Negative.    Genitourinary: Negative.    Musculoskeletal: Negative.    Skin: Negative.    Allergic/Immunologic: Negative.    Neurological: Negative.    Hematological: Negative.    Psychiatric/Behavioral: Negative.        Physical Exam     Initial Vitals [11/26/19 1440]   BP Pulse Resp Temp SpO2   136/77 (!) 198 20 98.2 °F (36.8 °C) (!) 88 %      MAP       --         Physical Exam    Nursing note and vitals reviewed.  Constitutional: He appears well-developed and well-nourished.   Looks acutely ill   HENT:   Head: Normocephalic.   Eyes: Conjunctivae are normal.   Cardiovascular:   SVT with palpable pulses   Pulmonary/Chest:   Shallow breath sounds   Abdominal: Soft.   Neurological: He is alert and oriented to person, place, and time.   Skin: Capillary refill takes less than 2 seconds.   Psychiatric: He has a normal mood and affect.         ED Course   Procedures  Labs Reviewed   CBC W/ AUTO DIFFERENTIAL - Abnormal; Notable for the following components:       Result Value    Mean Corpuscular Hemoglobin Conc 31.6 (*)     Gran% 77.4 (*)     Lymph% 13.3 (*)     All other components within normal limits   TROPONIN I   COMPREHENSIVE METABOLIC PANEL     EKG Readings:  (Independently Interpreted)   Rhythm: Supraventricular Tachycardia (SVT).   Supra ventricular tachycardia with ST segment depression from rate related     ECG Results          EKG 12-lead (Final result)  Result time 11/26/19 16:02:34    Final result by Interface, Lab In [facility] (11/26/19 16:02:34)                 Narrative:    Test Reason : I47.1,    Vent. Rate : 106 BPM     Atrial Rate : 106 BPM     P-R Int : 154 ms          QRS Dur : 100 ms      QT Int : 342 ms       P-R-T Axes : 077 024 098 degrees     QTc Int : 454 ms    Sinus tachycardia  LVH with repolarization abnormality  Abnormal ECG      Confirmed by James Jiménez MD ([id number]) on 11/26/2019 4:02:16 PM    Referred By: JERRI   SELF           Confirmed By:James Jiménez MD                             EKG 12-lead (Final result)  Result time 11/26/19 16:01:56    Final result by Interface, Lab In [facility] (11/26/19 16:01:56)                 Narrative:    Test Reason : R00.0,    Vent. Rate : 193 BPM     Atrial Rate : 192 BPM     P-R Int : 000 ms          QRS Dur : 094 ms      QT Int : 252 ms       P-R-T Axes : 000 047 216 degrees     QTc Int : 451 ms    Supraventricular tachycardia with occasional Premature ventricular  complexes  Marked ST abnormality, possible inferior subendocardial injury  Marked ST abnormality, possible anterior subendocardial injury  Abnormal ECG  When compared with ECG of 07-NOV-2019 01:47,  Significant changes have occurred  Confirmed by James Jiménez MD ([id number]) on 11/26/2019 4:01:42 PM    Referred By: JERRI   SELF           Confirmed By:James Jiménez MD                            Imaging Results    None          Medical Decision Making:   Initial Assessment:   SVT with rate of 190, WPW  ED Management:  Patient presents with SVT over 190 SVT was addressed with 12 mg of adenosine ivp and converted to sinus rhythm.  Patient was monitored for 2 hr and had no more arrhythmias.  He is asymptomatic did not have any chest pain  his troponin was in normal limits. Recommend follow-up with cardiology for ablation                                 Clinical Impression:       ICD-10-CM ICD-9-CM   1. WPW (Otis-Parkinson-White syndrome) I45.6 426.7   2. Tachycardia R00.0 785.0   3. SVT (supraventricular tachycardia) I47.1 427.89         Disposition:   Disposition: Discharged  Condition: Stable                     CMark Artis III, MD  11/26/19 2800

## 2019-11-26 NOTE — ED NOTES
Pt resting with eyes closed, easy to arouse. O2 in progress and hear rate remains 87 NSR on cardiac monitor.

## 2019-12-26 DIAGNOSIS — Z98.890 S/P FONTAN PROCEDURE: Primary | ICD-10-CM

## 2019-12-26 NOTE — PROGRESS NOTES
HEPATOLOGY CLINIC VISIT NOTE     REFERRING PROVIDER: Dr. Meli Barrera    REASON FOR VISIT: post FONTAN     HISTORY: This is a 24 y.o. Black or  male here for evaluation. He is here with his mother.  H/o congenital heart disease, post Fontan. He recently established with cardiology here. He is doing well from cardiac standpoint.     Review of labs shows   Normal liver enzymes  Tbili 0.7-1.3  Albumin 4-5s  PLTs WNL  Negative acute hep panel    U/S with doppler today with no concerning findings    Fibroscan done prior to visit F4. Discussed that given imaging and labs, there is no evidence of portal hypertension or liver dysfunction. Discussed biopsy versus q6 month HCC screening. Pt and mother do not wish to be aggressive in work up and cirrhosis is known complication of Fontan.     Pt denies signs of hepatic decompensation including: jaundice, dark urine, abdominal distention, hematemesis, melena, slowed mentation.    Liver staging:  Fibroscan F4 at 23.7    Cirrhosis history:  - Well compensated  - MELD score   MELD-Na score: 10 at 11/6/2019  9:42 PM  MELD score: 10 at 11/6/2019  9:42 PM  Calculated from:  Serum Creatinine: 0.98 mg/dL (Rounded to 1 mg/dL) at 11/6/2019  9:42 PM  Serum Sodium: 141 mmol/L (Rounded to 137 mmol/L) at 11/6/2019  9:42 PM  Total Bilirubin: 1.3 mg/dL at 11/6/2019  9:42 PM  INR(ratio): 1.3 at 11/6/2019  9:42 PM  Age: 24 years    - HCC screening:   - U/S: due 07/2020   - AFP: ordered today     (-)Portal HTN:   - EGD: medically early given PLTs and spleen       Denies jaundice, dark urine, abdominal distention, hematemesis, melena, slowed mentation.     Past Medical History:   Diagnosis Date    History of heart surgery     SVT (supraventricular tachycardia)     WPW syndrome      Past Surgical History:   Procedure Laterality Date    FONTAN PROCEDURE, EXTRACARDIAC       FAMILY HISTORY: Negative for liver disease    SOCIAL HISTORY:        Social History     Tobacco Use    Smoking Status Unknown If Ever Smoked   Smokeless Tobacco Never Used     Social History     Substance and Sexual Activity   Alcohol Use No     Social History     Substance and Sexual Activity   Drug Use Yes    Types: Marijuana    Comment: Mojo today- pt reports a while back     ROS:   No fever, chills, weight loss, fatigue  No chest pain, palpitations, dyspnea, cough  No abdominal pain, nausea, vomiting  No headaches, visual changes  No lower extremity edema  No depression or anxiety    PHYSICAL EXAM:  Friendly Black or  male, in no acute distress; alert and oriented to person, place and time  VITALS: reviewed  HEENT: Sclerae anicteric.   NECK: Supple  CVS: Regular rate and rhythm. No murmurs  LUNGS: Normal respiratory effort. Clear bilaterally  ABDOMEN: Flat, soft, nontender. No organomegaly or masses  SKIN: Warm and dry. No jaundice, No obvious rashes.   EXTREMITIES: No lower extremity edema  NEURO/PSYCH: Normal gate. Memory intact. Thought and speech pattern appropriate. Behavior normal. No depression or anxiety noted.    RECENT LABS:  Lab Results   Component Value Date    WBC 7.82 11/26/2019    HGB 14.8 11/26/2019     11/26/2019     Lab Results   Component Value Date    INR 1.3 (H) 11/06/2019     Lab Results   Component Value Date    AST 22 11/26/2019    ALT 23 11/26/2019    BILITOT 0.9 11/26/2019    ALBUMIN 4.0 11/26/2019    ALKPHOS 49 11/26/2019    CREATININE 0.64 11/26/2019    BUN 14 11/26/2019     11/26/2019    K 3.7 11/26/2019     RECENT IMAGING:  US Liver with Doppler (xpd)   Order: [id number]   Status:  Final result   Visible to patient:  No (Not Released)   Next appt:  Today at 02:15 PM in Cardiology (EKG, APPT)   Dx:  S/P Fontan procedure   Details     Reading Physician Reading Date Result Priority   Nathaniel Pennington MD 1/2/2020 Routine      Narrative     EXAMINATION:  US LIVER WITH DOPPLER    CLINICAL HISTORY:  Other specified postprocedural states    TECHNIQUE:  Complete  abdominal ultrasound with doppler.  Color and spectral Doppler were performed.    COMPARISON:  None.    FINDINGS:  The visualized portion of the pancreas is unremarkable.    The liver is enlarged, measuring 16.3 cm and extending below the costal margin.  The liver demonstrates homogeneous echotexture.  Hepatic renal index measures 1.25 on today's exam.  No focal hepatic lesions are seen.    The gallbladder is unremarkable with no evidence of calculi.  The common duct is not dilated, measuring 2 mm.  The gallbladder wall is not thickened.  There is no sonographic Disla sign.  No dilated intrahepatic radicles are seen.    The spleen is normal in size measuring 9.0 x 3.2 cm with a homogeneous echotexture.    There is no evidence of ascites.    The main portal vein, right portal vein, left portal vein, middle hepatic vein, right hepatic vein, left hepatic vein, SMV, and IVC are patent with proper directional flow.  The main hepatic artery is patent. The umbilical vein is not patent.      Impression       Hepatomegaly.    Satisfactory Doppler evaluation of the liver.           ASSESSMENT  24 y.o. Black or  male with:  1. PROBABLE CIRRHOSIS  -- based on fibroscan; fibroscan of poor quality  -- pt doesn't wish to pursue more definitive staging with biopsy at this point  -- initiate q6 month HCC screening, next due 06/2020    - HCC screening:   - U/S: due 07/2020   - AFP: ordered today     (-)Portal HTN:   - EGD: medically early given PLTs and spleen     2. S/P FONTAN  -- followed by cards    3. HYPERTENSION  -- asymptomatic, recommended continued f/u with managing provider    EDUCATION:  Discussed evidence that indicates that pt has cirrhosis.   -- Discussed the basics about liver fibrosis /scarring and how that relates to liver function. Reviewed the significance of the MELD scoring system as it relates to indication for transplant.  -- Signs and symptoms of hepatic decompensation were reviewed, including  jaundice, ascites, and slowed mentation due to hepatic encephalopathy. The patient should seek medical attention if any of these things occur. We discussed the potential for bleeding from esophageal varices with symptoms of hematemesis and melena. The patient should report to the Emergency Department for these symptoms.   -- We discussed the increased risk of hepatocellular carcinoma due to cirrhosis.   Continued screening every six months with ultrasound and AFP is recommended.   -- Discussed portal hypertension, including potential development of esophageal varices. Role of EGD in screening for varices was reviewed.   Cirrhosis education booklet given to pt    Recommended patient avoid alcohol and raw seafood. Limit tylenol to 2000mg daily    PLAN:  1. Labs tomorrow  2. F/u in 6 months with HCC screening     Thank you for allowing me to participate in the care of Juani Bryan PA-C

## 2020-01-02 ENCOUNTER — HOSPITAL ENCOUNTER (OUTPATIENT)
Dept: CARDIOLOGY | Facility: CLINIC | Age: 25
Discharge: HOME | End: 2020-01-02
Payer: MEDICAID

## 2020-01-02 ENCOUNTER — OFFICE VISIT (OUTPATIENT)
Dept: HEPATOLOGY | Facility: CLINIC | Age: 25
End: 2020-01-02
Payer: MEDICAID

## 2020-01-02 ENCOUNTER — HOSPITAL ENCOUNTER (OUTPATIENT)
Dept: RADIOLOGY | Facility: HOSPITAL | Age: 25
Discharge: HOME | End: 2020-01-02
Attending: INTERNAL MEDICINE
Payer: MEDICAID

## 2020-01-02 ENCOUNTER — OFFICE VISIT (OUTPATIENT)
Dept: ELECTROPHYSIOLOGY | Facility: CLINIC | Age: 25
End: 2020-01-02
Payer: MEDICAID

## 2020-01-02 ENCOUNTER — PROCEDURE VISIT (OUTPATIENT)
Dept: HEPATOLOGY | Facility: CLINIC | Age: 25
End: 2020-01-02
Payer: MEDICAID

## 2020-01-02 ENCOUNTER — OFFICE VISIT (OUTPATIENT)
Dept: CARDIOLOGY | Facility: CLINIC | Age: 25
End: 2020-01-02
Payer: MEDICAID

## 2020-01-02 VITALS
HEIGHT: 67 IN | BODY MASS INDEX: 21.8 KG/M2 | WEIGHT: 138.88 LBS | TEMPERATURE: 97 F | HEART RATE: 79 BPM | OXYGEN SATURATION: 95 % | DIASTOLIC BLOOD PRESSURE: 100 MMHG | SYSTOLIC BLOOD PRESSURE: 162 MMHG

## 2020-01-02 VITALS — HEART RATE: 76 BPM | SYSTOLIC BLOOD PRESSURE: 162 MMHG | DIASTOLIC BLOOD PRESSURE: 100 MMHG

## 2020-01-02 VITALS
BODY MASS INDEX: 21.9 KG/M2 | DIASTOLIC BLOOD PRESSURE: 99 MMHG | HEART RATE: 76 BPM | WEIGHT: 139.56 LBS | SYSTOLIC BLOOD PRESSURE: 157 MMHG | OXYGEN SATURATION: 92 % | HEIGHT: 67 IN

## 2020-01-02 DIAGNOSIS — Z98.890 S/P FONTAN PROCEDURE: ICD-10-CM

## 2020-01-02 DIAGNOSIS — Q20.4 SINGLE VENTRICLE WITH TRICUSPID ATRESIA: ICD-10-CM

## 2020-01-02 DIAGNOSIS — Q24.9 ADULT CONGENITAL HEART DISEASE: ICD-10-CM

## 2020-01-02 DIAGNOSIS — Z98.890 S/P FONTAN PROCEDURE: Primary | ICD-10-CM

## 2020-01-02 DIAGNOSIS — I47.10 SVT (SUPRAVENTRICULAR TACHYCARDIA): ICD-10-CM

## 2020-01-02 DIAGNOSIS — I10 HYPERTENSION, UNSPECIFIED TYPE: ICD-10-CM

## 2020-01-02 DIAGNOSIS — I47.10 SVT (SUPRAVENTRICULAR TACHYCARDIA): Chronic | ICD-10-CM

## 2020-01-02 DIAGNOSIS — I45.6 WPW SYNDROME: Chronic | ICD-10-CM

## 2020-01-02 DIAGNOSIS — Q24.9 CONGENITAL HEART DISEASE: ICD-10-CM

## 2020-01-02 DIAGNOSIS — I45.6 WPW SYNDROME: Primary | Chronic | ICD-10-CM

## 2020-01-02 DIAGNOSIS — K74.00 LIVER FIBROSIS: ICD-10-CM

## 2020-01-02 DIAGNOSIS — I10 ESSENTIAL HYPERTENSION: ICD-10-CM

## 2020-01-02 DIAGNOSIS — Q22.4 SINGLE VENTRICLE WITH TRICUSPID ATRESIA: ICD-10-CM

## 2020-01-02 PROCEDURE — 75561 MRI CARDIAC MORPHOLOGY FUNCTION W WO: ICD-10-PCS | Mod: 26,,, | Performed by: INTERNAL MEDICINE

## 2020-01-02 PROCEDURE — 99214 OFFICE O/P EST MOD 30 MIN: CPT | Mod: S$PBB,,, | Performed by: INTERNAL MEDICINE

## 2020-01-02 PROCEDURE — 99999 PR PBB SHADOW E&M-EST. PATIENT-LVL II: CPT | Mod: PBBFAC,,, | Performed by: INTERNAL MEDICINE

## 2020-01-02 PROCEDURE — 99213 OFFICE O/P EST LOW 20 MIN: CPT | Mod: PBBFAC,25,27 | Performed by: INTERNAL MEDICINE

## 2020-01-02 PROCEDURE — 93975 US LIVER WITH DOPPLER: ICD-10-PCS | Mod: 26,,, | Performed by: RADIOLOGY

## 2020-01-02 PROCEDURE — 91200 LIVER ELASTOGRAPHY: CPT | Mod: 26,S$PBB,, | Performed by: PHYSICIAN ASSISTANT

## 2020-01-02 PROCEDURE — 99214 PR OFFICE/OUTPT VISIT, EST, LEVL IV, 30-39 MIN: ICD-10-PCS | Mod: S$PBB,,, | Performed by: INTERNAL MEDICINE

## 2020-01-02 PROCEDURE — 75561 CARDIAC MRI FOR MORPH W/DYE: CPT | Mod: TC

## 2020-01-02 PROCEDURE — A9577 INJ MULTIHANCE: HCPCS | Performed by: INTERNAL MEDICINE

## 2020-01-02 PROCEDURE — 93005 ELECTROCARDIOGRAM TRACING: CPT | Mod: PBBFAC | Performed by: INTERNAL MEDICINE

## 2020-01-02 PROCEDURE — 93975 VASCULAR STUDY: CPT | Mod: TC

## 2020-01-02 PROCEDURE — 99204 PR OFFICE/OUTPT VISIT, NEW, LEVL IV, 45-59 MIN: ICD-10-PCS | Mod: S$PBB,,, | Performed by: PHYSICIAN ASSISTANT

## 2020-01-02 PROCEDURE — 93010 RHYTHM STRIP: ICD-10-PCS | Mod: S$PBB,,, | Performed by: INTERNAL MEDICINE

## 2020-01-02 PROCEDURE — 91200 FIBROSCAN (VIBRATION CONTROLLED TRANSIENT ELASTOGRAPHY): ICD-10-PCS | Mod: 26,S$PBB,, | Performed by: PHYSICIAN ASSISTANT

## 2020-01-02 PROCEDURE — 99999 PR PBB SHADOW E&M-EST. PATIENT-LVL III: CPT | Mod: PBBFAC,,, | Performed by: INTERNAL MEDICINE

## 2020-01-02 PROCEDURE — 93010 ELECTROCARDIOGRAM REPORT: CPT | Mod: S$PBB,,, | Performed by: INTERNAL MEDICINE

## 2020-01-02 PROCEDURE — 99999 PR PBB SHADOW E&M-EST. PATIENT-LVL IV: ICD-10-PCS | Mod: PBBFAC,,, | Performed by: PHYSICIAN ASSISTANT

## 2020-01-02 PROCEDURE — 99999 PR PBB SHADOW E&M-EST. PATIENT-LVL IV: CPT | Mod: PBBFAC,,, | Performed by: PHYSICIAN ASSISTANT

## 2020-01-02 PROCEDURE — 91200 LIVER ELASTOGRAPHY: CPT | Mod: PBBFAC | Performed by: PHYSICIAN ASSISTANT

## 2020-01-02 PROCEDURE — 76705 ECHO EXAM OF ABDOMEN: CPT | Mod: 26,XS,, | Performed by: RADIOLOGY

## 2020-01-02 PROCEDURE — 99214 OFFICE O/P EST MOD 30 MIN: CPT | Mod: PBBFAC,25 | Performed by: PHYSICIAN ASSISTANT

## 2020-01-02 PROCEDURE — 93975 VASCULAR STUDY: CPT | Mod: 26,,, | Performed by: RADIOLOGY

## 2020-01-02 PROCEDURE — 99999 PR PBB SHADOW E&M-EST. PATIENT-LVL II: ICD-10-PCS | Mod: PBBFAC,,, | Performed by: INTERNAL MEDICINE

## 2020-01-02 PROCEDURE — 25500020 PHARM REV CODE 255: Performed by: INTERNAL MEDICINE

## 2020-01-02 PROCEDURE — 75561 CARDIAC MRI FOR MORPH W/DYE: CPT | Mod: 26,,, | Performed by: INTERNAL MEDICINE

## 2020-01-02 PROCEDURE — 99212 OFFICE O/P EST SF 10 MIN: CPT | Mod: PBBFAC,25,27 | Performed by: INTERNAL MEDICINE

## 2020-01-02 PROCEDURE — 76705 US LIVER WITH DOPPLER: ICD-10-PCS | Mod: 26,XS,, | Performed by: RADIOLOGY

## 2020-01-02 PROCEDURE — 99999 PR PBB SHADOW E&M-EST. PATIENT-LVL III: ICD-10-PCS | Mod: PBBFAC,,, | Performed by: INTERNAL MEDICINE

## 2020-01-02 PROCEDURE — 99204 OFFICE O/P NEW MOD 45 MIN: CPT | Mod: S$PBB,,, | Performed by: PHYSICIAN ASSISTANT

## 2020-01-02 RX ORDER — LOSARTAN POTASSIUM AND HYDROCHLOROTHIAZIDE 12.5; 1 MG/1; MG/1
1 TABLET ORAL DAILY
Qty: 90 TABLET | Refills: 3 | Status: ON HOLD | OUTPATIENT
Start: 2020-01-02 | End: 2021-01-10 | Stop reason: HOSPADM

## 2020-01-02 RX ADMIN — GADOBENATE DIMEGLUMINE 28 ML: 529 INJECTION, SOLUTION INTRAVENOUS at 11:01

## 2020-01-02 NOTE — PROGRESS NOTES
Referring Provider: Marty    Subjective:   Patient ID:  Juani Reilly is a 24 y.o. male who presents for evaluation of Adult congenital heart disease (tricuspid atresia/Fontan)      HPIPatient is seen in our Adult Congenital Heart Defect Clinic.  He is here with his mother. He provides most of the the history. He has not had any additional palpitations. He has not had any chest pains. He works at  [employer] and does not have any limitations in his job  He becomes short of breath with moderate exertion.    No GOVEA    He finished the 9th grade and is going to take his GED.    As per his records his name use to be TANK. Records from [facility] reviewed (no operative note- last notes between 7-9 years odl)    He is here for follow up the plan on first visit :     1. Need to evaluate PA, Fontan and Gabriel- by cardiac MRI - performed today  2. Follow up elevated BP - still elevated   3. Evaluation by hepatology for cirrohsis- see hepatology today  4. Zio for arrhythmia -No arrhythmias noted  5. 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 SVC to RPA unobstructed (Jesus)  2. Extracardiac Fontan with low flow but no evidence of thrombus.  3. Unobstructed ASD  4. Tricuspid Atresia  5. No obstruction to the right or left PA with Q flow of 21cc through each.  6. Increased LV mass and volume with systemic LVEF of 42%  7. Dilated sinus of Valsalva 49mm  8. Trace AI with regurgitation fraction of 8%  Review imaging tab for radiology portion of the read.  Echo cfd 10/3/2019  Imaging consistent with diagnosis of tricuspid atresia S/P extracardiac conduit Fontan-  Very difficult to demonstrate pulmonary  circulation with significant chest deformity associated with median sternotomy.  Mildly dilated inferior vena cava connecting to extracardiac conduit passing posterior to the right atrium for completion of Fontan physiology.  Right superior vena cava identified with no obvious stenosis.  Unable to demonstrate Fontan or Jesus anastomosis to the pulmonary arteries.  At least two pulmonary veins demonstrated returning to the left atrium with no evidence for obstruction.  There is a small right atrium with tissue remaining in from eustachian valve and no obvious obstruction of right atrial flow to the left atrium.  There is no obvious tricuspid valve tissue present and no right ventricular cavity could be demonstrated.  There is no evidence for pulmonary valve.  At least mild dilation of the left atrium.  Mildly dilated mitral valve with color Doppler demonstrating mild insufficiency.  Single ventricle consistent with left ventricular morphology.  There is at least mild dilation of the left ventricle.  Left ventricular systolic function qualitatively good with ejection fraction estimated 50-55%.  There is a large aortic annulus with trileaflet aortic valve and trivial central jet of insufficiency.  Qualitative impression of at least mild dilation of the ascending aorta.  Branchin/g pattern consistent with left aortic arch and no evidence for coarctation.  No obvious pericardial effusion.   Review of Systems   Constitution: Negative for weight gain and weight loss.   Eyes: Negative for blurred vision.   Cardiovascular: Positive for chest pain. Negative for claudication, cyanosis, dyspnea on exertion, irregular heartbeat, leg swelling, near-syncope, orthopnea, palpitations, paroxysmal nocturnal dyspnea and syncope.   Respiratory: Positive for shortness of breath. Negative for cough.    Endocrine: Negative for polydipsia and polyuria.   Hematologic/Lymphatic: Does not bruise/bleed easily.   Skin: Negative for color change  "and dry skin.   Musculoskeletal: Negative for falls, muscle cramps, muscle weakness and myalgias.   Gastrointestinal: Negative for abdominal pain, change in bowel habit, heartburn, hematemesis and hematochezia.   Neurological: Negative for brief paralysis, focal weakness, headaches, light-headedness and paresthesias.   Psychiatric/Behavioral: Negative for altered mental status.   Allergic/Immunologic: Negative for environmental allergies.         Allergies and current medications updated and reviewed:  Review of patient's allergies indicates:  No Known Allergies  Current Outpatient Medications   Medication Sig Dispense Refill    losartan-hydrochlorothiazide 100-12.5 mg (HYZAAR) 100-12.5 mg Tab Take 1 tablet by mouth once daily. 90 tablet 3    metoprolol tartrate (LOPRESSOR) 50 MG Tab Take 1 tablet (50 mg total) by mouth 2 (two) times daily. 60 tablet 11     No current facility-administered medications for this visit.        Objective:   Right Arm BP - Sittin/95 (20 1252)  Left Arm BP - Sittin/99 (20 1252)  BP (!) 157/99 (BP Location: Left arm, Patient Position: Sitting, BP Method: Large (Automatic))   Pulse 76   Ht 5' 7" (1.702 m)   Wt 63.3 kg (139 lb 8.8 oz)   SpO2 (!) 92%   BMI 21.86 kg/m²     Body surface area is 1.73 meters squared.  Physical Exam   Constitutional: He is oriented to person, place, and time. He appears well-developed and well-nourished.   HENT:   Mouth/Throat: Mucous membranes are normal.   Neck: No hepatojugular reflux and no JVD present.   Cardiovascular: Normal rate and regular rhythm.   Pulses:       Radial pulses are 2+ on the right side, and 2+ on the left side.        Posterior tibial pulses are 2+ on the right side, and 2+ on the left side.   Pulmonary/Chest: Breath sounds normal.   No Scoliosis or Kyphosis   Abdominal: Soft. Normal appearance and bowel sounds are normal. There is no hepatomegaly. There is no tenderness.   Musculoskeletal:        Right lower " leg: He exhibits no edema.        Left lower leg: He exhibits no edema.   No limitations for exercise  No varicose veins   Neurological: He is alert and oriented to person, place, and time.   Skin: Skin is warm and dry. No cyanosis. Nails show no clubbing.        Psychiatric: He has a normal mood and affect. His behavior is normal. Judgment and thought content normal. His speech is delayed. Cognition and memory are impaired.       Chemistry        Component Value Date/Time     11/26/2019 1539    K 3.7 11/26/2019 1539     11/26/2019 1539    CO2 23 11/26/2019 1539    BUN 14 11/26/2019 1539    CREATININE 0.64 11/26/2019 1539    GLU 98 11/26/2019 1539        Component Value Date/Time    CALCIUM 8.7 11/26/2019 1539    ALKPHOS 49 11/26/2019 1539    AST 22 11/26/2019 1539    ALT 23 11/26/2019 1539    BILITOT 0.9 11/26/2019 1539    ESTGFRAFRICA >60.0 11/26/2019 1539    EGFRNONAA >60.0 11/26/2019 1539            Recent Labs   Lab 11/26/19  1539   WBC 7.82   Hemoglobin 14.8   Hematocrit 46.9   Mean Corpuscular Volume 92   Platelets 242       Recent Labs   Lab 09/05/19  1425 11/06/19  2142   INR 1.2 1.3 H          Test(s) Reviewed  I have reviewed the following in detail:  [] Stress test   [] Angiography   [x] Echocardiogram   [x] Labs   [] Other:         Assessment:   Essential hypertension  Elevated today and will start lostaran/HCTZ. He will see him PCP in 4-6 weeks for evaluation with BMP.     Adult congenital heart disease  Working on his GED  Has been to the dentist  SBEP prophylaxis    SVT (supraventricular tachycardia)  No abnormalities noted on most recent ZIO. He saw Dr. Ferguson today and they have elective for medical control for now instead to ablation. The metoprolol was increased to 50 mg twice a day.    Single ventricle with tricuspid atresia  MRI EF today of 42% and Echo 50-55%; no coarctation. Will Start ARB with diuretic    WPW syndrome  Same plan as SVT.  Increased BB, then consider sotalol      S/P Fontan procedure  Fontan Circulation: hemodynamic- none recently. He will need a cardiac cath in the next 6-12 months. It will be challenging to schedule     Nutritional status: Body mass index is 21.86 kg/m². 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 No weight loss.  A-1 aT not performed    Venous and lymphatic circulation: no varicosities    Liver function:normal ; saw hematology; MELD 10 and Fibroscan F4 at 23.7     Anticoagulation:On no anticoagulation - he has had SVT ; will need coumadin given Fontan- however will discuss in [facility] conference. He is unlikely to a great candidate for coumadin. And there is no data on DOACs      Plan:   Follow up in about 6 months (around 7/2/2020) for Please make sure he gets stool samples done,CMP, BNP, CBC CPX.   1. Please set follow up with hepatology, EP(in [facility] clinic or [facility] team) and [facility] in 6 months to help patient and family  2. Discuss in [facility] conference; anticoagulation, HTN (unusual) and future cath.  3. Please send this note to PCP  4. Metoprolol increased to 50 mg twice a day.      Medications Ordered This Encounter   Medications    losartan-hydrochlorothiazide 100-12.5 mg (HYZAAR) 100-12.5 mg Tab

## 2020-01-02 NOTE — PROGRESS NOTES
I have personally performed a face to face diagnostic evaluation on this patient. I have reviewed and agree with today's findings and the care plan outlined by Sumanth Bryan PA-C      My findings are as follows:  Patient presents with likely cirrhosis (based on fibroscan result today) secondary to congenital heart disease (post Fontan).    - no signs of portal hypertension or decompensation  - explained the importance of HCC screening      he will return to Sumanth Bryan PA-C  for follow-up.

## 2020-01-02 NOTE — PROGRESS NOTES
"Subjective:    Patient ID:  Juani Reilly is a 24 y.o. male who presents for evaluation of No chief complaint on file.    Adult Congenital Heart Disease Specialist: Meli Barrera MD    HPI  Prior Hx:  I had the pleasure of seeing Mr. Reilly today in our electrophysiology clinic in follow-up for his SVT. As you are aware he is a pleasant 24 year-old man with congenital heart disease. I have no records regarding this. 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 He was doing well until he presented to the [facility] ER on 9/5/2019 with palpitations. He was in a regular tachycardia that terminated with IV adenosine. He reports 1 month of shortness of breath at rest and with exertion. He denies any orthopnea or PND. No leg edema. He denies any history of syncope. He reports he has not seen a cardiologist (was [facility]) for 7-8 years.    I reviewed all available ECGs in Epic which note sinus rhythm with mild pre-excitation (isoelectric V1, +V2=6, +inf leads). ECG from 9/5/2019 ER visit notes a regular long RP tachycardia with similar QRS morphology but not ART.    Interim Hx:  Mr. Reilly returns for follow-up. He is being seen by Dr. Barrera. He was found to have tricuspid atresia with a single ventricle and a Fontan. He was seen in the ER 11/2019 with recurrent SVT terminated with IV adenosine. ECG noted a regular narrow complex tachycardia with a "long" short RP interval. He is on low dose metoprolol.    My interpretation of today's in clinic ECG is sinus rhythm with pre-excitation as described above.    Review of Systems   Constitution: Negative for fever and malaise/fatigue.   HENT: Negative for congestion and sore throat.    Eyes: Negative for blurred vision and visual disturbance.   Cardiovascular: Positive for palpitations. Negative for chest pain, dyspnea on exertion and " irregular heartbeat.   Respiratory: Negative for cough and shortness of breath.    Hematologic/Lymphatic: Negative for bleeding problem. Does not bruise/bleed easily.   Skin: Negative.    Musculoskeletal: Negative.    Gastrointestinal: Negative for bloating, abdominal pain, dysphagia, heartburn, melena, nausea and vomiting.   Neurological: Negative for dizziness, focal weakness and weakness.        Objective:    Physical Exam   Constitutional: He is oriented to person, place, and time. He appears well-developed and well-nourished. No distress.   HENT:   Head: Normocephalic and atraumatic.   Eyes: Conjunctivae are normal. Right eye exhibits no discharge. Left eye exhibits no discharge.   Neck: Neck supple. No JVD present.   Cardiovascular: Normal rate and regular rhythm. Exam reveals no gallop and no friction rub.   No murmur heard.  Pulmonary/Chest: Effort normal and breath sounds normal. No respiratory distress. He has no wheezes. He has no rales.   Abdominal: Soft. Bowel sounds are normal. He exhibits no distension. There is no tenderness. There is no rebound.   Musculoskeletal: He exhibits no edema.   Neurological: He is alert and oriented to person, place, and time.   Skin: Skin is warm and dry. He is not diaphoretic.   Psychiatric: He has a normal mood and affect. His behavior is normal. Judgment and thought content normal.         Assessment:       1. WPW syndrome    2. SVT (supraventricular tachycardia)    3. Single ventricle with tricuspid atresia         Plan:       In summary, Mr. Reilly is a pleasant 24 year-old man with single ventricle and tricuspid atresia with subtle pre-excitation on his ECG and SVT. We discussed the etiology and pathophysiology of SVT. His likely involves an accessory pathway. Discussed treatment options including suppression with medical therapy versus EPS/ablation for more definitive therapy. I spent about a half hour discussing the nature of EP study and ablation, including  retrograde aortic approach. We discussed risks and benefits at length. Our discussion included, but was not limited to the risk of death, infection, bleeding, stroke, MI, cardiac perforation, vascular injury, embolism, cardiac tamponade, skin burns, and other organic injury including the possibility for need for surgery or pacemaker implantation. Discussed alternative by puncturing the Fontan however would prefer to avoid this if possible. He understood. He does not desire ablation at this point. He inquired about alternatives. Would increase metoprolol to 50mg bid. If this is ineffective would consider multaq (if affordable) or admission for sotalol.    RTC in 3 months, can see me or Edyta Washburn    Thank you for allowing me to participate in the care of this patient. Please do not hesitate to call me with any questions or concerns.    Abebe Ferguson MD, PhD  Cardiac Electrophysiology

## 2020-01-02 NOTE — PROCEDURES
FibroScan (Vibration Controlled Transient Elastography)  Date/Time: 1/2/2020 1:30 PM  Performed by: Sumanth Bryan PA-C  Authorized by: Sumanth Bryan PA-C     Diagnosis:  Other    Probe:  M    Universal Protocol: Patient's identity, procedure and site were verified, confirmatory pause was performed.  Discussed procedure including risks and potential complications.  Questions answered.  Patient verbalizes understanding and wishes to proceed with VCTE.     Procedure: After providing explanations of the procedure, patient was placed in the supine position with right arm in maximum abduction to allow optimal exposure of right lateral abdomen.  Patient was briefly assessed, Testing was performed in the mid-axillary location, 50Hz Shear Wave pulses were applied and the resulting Shear Wave and Propagation Speed detected with a 3.5 MHz ultrasonic signal, using the FibroScan probe, Skin to liver capsule distance and liver parenchyma were accessed during the entire examination with the FibroScan probe, Patient was instructed to breathe normally and to abstain from sudden movements during the procedure, allowing for random measurements of liver stiffness. At least 10 Shear Waves were produced, Individual measurements of each Shear Wave were calculated.  Patient tolerated the procedure well with no complications.  Meets discharge criteria as was dismissed.  Rates pain 0 out of 10.  Patient will follow up with ordering provider to review results.      Findings  Median liver stiffness score:  23.7  CAP Reading: dB/m:  275    IQR/med %:  6  Interpretation  Fibrosis interpretation is based on medial liver stiffness - Kilopascal (kPa).    Fibrosis Stage:  F4  Steatosis interpretation is based on controlled attenuation parameter - (dB/m).    Steatosis Grade:  S3 and S2

## 2020-01-02 NOTE — LETTER
January 2, 2020      Meli Barrera MD  [address] [facility] [address]           [facility] - Hepatology  [address]  [facility] [address]  Phone: [phone]  Fax: [phone]          Patient: Juani Reilly   MR Number: [id number]   YOB: 1995   Date of Visit: 1/2/2020       Dear Dr. Meli Barrera:    Thank you for referring Juani Reilly to me for evaluation. Attached you will find relevant portions of my assessment and plan of care.    If you have questions, please do not hesitate to call me. I look forward to following Juani Reilly along with you.    Sincerely,    Sumanth Bryan PA-C    Enclosure  CC:  No Recipients    If you would like to receive this communication electronically, please contact [email or web address] or [phone] to request more information on [employer] Link access.    For providers and/or their staff who would like to refer a patient to [facility], please contact us through our one-stop-shop provider referral line, [facility], at [phone].    If you feel you have received this communication in error or would no longer like to receive these types of communications, please e-mail [email or web address]

## 2020-01-04 PROBLEM — I10 ESSENTIAL HYPERTENSION: Status: ACTIVE | Noted: 2020-01-04

## 2020-01-04 NOTE — ASSESSMENT & PLAN NOTE
No abnormalities noted on most recent ZIO. He saw Dr. Ferguson today and they have elective for medical control for now instead to ablation. The metoprolol was increased to 50 mg twice a day.

## 2020-01-04 NOTE — ASSESSMENT & PLAN NOTE
Fontan Circulation: hemodynamic- none recently. He will need a cardiac cath in the next 6-12 months. It will be challenging to schedule     Nutritional status: Body mass index is 21.86 kg/m². 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 No weight loss.  A-1 aT not performed    Venous and lymphatic circulation: no varicosities    Liver function:normal ; saw hematology; MELD 10 and Fibroscan F4 at 23.7     Anticoagulation:On no anticoagulation - he has had SVT ; will need coumadin given Fontan- however will discuss in [facility] conference. He is unlikely to a great candidate for coumadin. And there is no data on DOACs

## 2020-01-04 NOTE — ASSESSMENT & PLAN NOTE
Elevated today and will start lostaran/HCTZ. He will see him PCP in 4-6 weeks for evaluation with BMP.

## 2020-07-08 ENCOUNTER — HOSPITAL ENCOUNTER (EMERGENCY)
Facility: HOSPITAL | Age: 25
Discharge: HOME | End: 2020-07-08
Attending: EMERGENCY MEDICINE

## 2020-07-08 VITALS
OXYGEN SATURATION: 95 % | DIASTOLIC BLOOD PRESSURE: 89 MMHG | WEIGHT: 135 LBS | HEART RATE: 84 BPM | RESPIRATION RATE: 18 BRPM | SYSTOLIC BLOOD PRESSURE: 121 MMHG | BODY MASS INDEX: 21.14 KG/M2 | TEMPERATURE: 98 F

## 2020-07-08 DIAGNOSIS — Z91.148 NON COMPLIANCE W MEDICATION REGIMEN: ICD-10-CM

## 2020-07-08 DIAGNOSIS — I49.9 CARDIAC RHYTHM DISORDER OR DISTURBANCE OR CHANGE: ICD-10-CM

## 2020-07-08 DIAGNOSIS — I47.10 SVT (SUPRAVENTRICULAR TACHYCARDIA): Primary | ICD-10-CM

## 2020-07-08 LAB
ALBUMIN SERPL BCP-MCNC: 4.6 G/DL (ref 3.5–5.2)
ALP SERPL-CCNC: 55 U/L (ref 38–126)
ALT SERPL W/O P-5'-P-CCNC: 38 U/L (ref 10–44)
ANION GAP SERPL CALC-SCNC: 9 MMOL/L (ref 8–16)
AST SERPL-CCNC: 37 U/L (ref 15–46)
BASOPHILS # BLD AUTO: 0.05 K/UL (ref 0–0.2)
BASOPHILS NFR BLD: 0.6 % (ref 0–1.9)
BILIRUB SERPL-MCNC: 0.6 MG/DL (ref 0.1–1)
BUN SERPL-MCNC: 17 MG/DL (ref 2–20)
CALCIUM SERPL-MCNC: 9.1 MG/DL (ref 8.7–10.5)
CHLORIDE SERPL-SCNC: 107 MMOL/L (ref 95–110)
CO2 SERPL-SCNC: 26 MMOL/L (ref 23–29)
CREAT SERPL-MCNC: 0.98 MG/DL (ref 0.5–1.4)
DIFFERENTIAL METHOD: NORMAL
EOSINOPHIL # BLD AUTO: 0 K/UL (ref 0–0.5)
EOSINOPHIL NFR BLD: 0.1 % (ref 0–8)
ERYTHROCYTE [DISTWIDTH] IN BLOOD BY AUTOMATED COUNT: 12.9 % (ref 11.5–14.5)
EST. GFR  (AFRICAN AMERICAN): >60 ML/MIN/1.73 M^2
EST. GFR  (NON AFRICAN AMERICAN): >60 ML/MIN/1.73 M^2
GLUCOSE SERPL-MCNC: 132 MG/DL (ref 70–110)
HCT VFR BLD AUTO: 52.4 % (ref 40–54)
HGB BLD-MCNC: 16.8 G/DL (ref 14–18)
IMM GRANULOCYTES # BLD AUTO: 0.02 K/UL (ref 0–0.04)
IMM GRANULOCYTES NFR BLD AUTO: 0.2 % (ref 0–0.5)
LYMPHOCYTES # BLD AUTO: 3.3 K/UL (ref 1–4.8)
LYMPHOCYTES NFR BLD: 38.2 % (ref 18–48)
MCH RBC QN AUTO: 29.7 PG (ref 27–31)
MCHC RBC AUTO-ENTMCNC: 32.1 G/DL (ref 32–36)
MCV RBC AUTO: 93 FL (ref 82–98)
MONOCYTES # BLD AUTO: 0.6 K/UL (ref 0.3–1)
MONOCYTES NFR BLD: 7.1 % (ref 4–15)
NEUTROPHILS # BLD AUTO: 4.6 K/UL (ref 1.8–7.7)
NEUTROPHILS NFR BLD: 53.8 % (ref 38–73)
NRBC BLD-RTO: 0 /100 WBC
NT-PROBNP: 581 PG/ML (ref 5–450)
PLATELET # BLD AUTO: 241 K/UL (ref 150–350)
PMV BLD AUTO: 10.3 FL (ref 9.2–12.9)
POCT GLUCOSE: 148 MG/DL (ref 70–110)
POTASSIUM SERPL-SCNC: 4.5 MMOL/L (ref 3.5–5.1)
PROT SERPL-MCNC: 8.1 G/DL (ref 6–8.4)
RBC # BLD AUTO: 5.66 M/UL (ref 4.6–6.2)
SODIUM SERPL-SCNC: 142 MMOL/L (ref 136–145)
TROPONIN I SERPL DL<=0.01 NG/ML-MCNC: 0.04 NG/ML (ref 0.01–0.03)
TROPONIN I SERPL DL<=0.01 NG/ML-MCNC: 0.06 NG/ML (ref 0.01–0.03)
WBC # BLD AUTO: 8.55 K/UL (ref 3.9–12.7)

## 2020-07-08 PROCEDURE — 27000221 HC OXYGEN, UP TO 24 HOURS: Mod: ER

## 2020-07-08 PROCEDURE — 83880 ASSAY OF NATRIURETIC PEPTIDE: CPT | Mod: ER

## 2020-07-08 PROCEDURE — 80053 COMPREHEN METABOLIC PANEL: CPT | Mod: ER

## 2020-07-08 PROCEDURE — 93005 ELECTROCARDIOGRAM TRACING: CPT | Mod: ER

## 2020-07-08 PROCEDURE — 93010 EKG 12-LEAD: ICD-10-PCS | Mod: 76,,, | Performed by: INTERNAL MEDICINE

## 2020-07-08 PROCEDURE — 96374 THER/PROPH/DIAG INJ IV PUSH: CPT | Mod: ER

## 2020-07-08 PROCEDURE — 93010 ELECTROCARDIOGRAM REPORT: CPT | Mod: ,,, | Performed by: INTERNAL MEDICINE

## 2020-07-08 PROCEDURE — 82962 GLUCOSE BLOOD TEST: CPT | Mod: ER

## 2020-07-08 PROCEDURE — 63600175 PHARM REV CODE 636 W HCPCS: Mod: ER | Performed by: EMERGENCY MEDICINE

## 2020-07-08 PROCEDURE — 84484 ASSAY OF TROPONIN QUANT: CPT | Mod: 91,ER

## 2020-07-08 PROCEDURE — 25000003 PHARM REV CODE 250: Mod: ER | Performed by: EMERGENCY MEDICINE

## 2020-07-08 PROCEDURE — 85025 COMPLETE CBC W/AUTO DIFF WBC: CPT | Mod: ER

## 2020-07-08 PROCEDURE — 99291 CRITICAL CARE FIRST HOUR: CPT | Mod: 25,ER

## 2020-07-08 PROCEDURE — 96375 TX/PRO/DX INJ NEW DRUG ADDON: CPT | Mod: ER

## 2020-07-08 RX ORDER — METOPROLOL TARTRATE 100 MG/1
50 TABLET ORAL 2 TIMES DAILY
Qty: 60 TABLET | Refills: 0 | Status: SHIPPED | OUTPATIENT
Start: 2020-07-08 | End: 2020-07-16

## 2020-07-08 RX ORDER — ADENOSINE 3 MG/ML
INJECTION, SOLUTION INTRAVENOUS
Status: DISCONTINUED
Start: 2020-07-08 | End: 2020-07-08 | Stop reason: HOSPADM

## 2020-07-08 RX ORDER — FENTANYL CITRATE 50 UG/ML
100 INJECTION, SOLUTION INTRAMUSCULAR; INTRAVENOUS
Status: COMPLETED | OUTPATIENT
Start: 2020-07-08 | End: 2020-07-08

## 2020-07-08 RX ORDER — ASPIRIN 325 MG
325 TABLET ORAL
Status: COMPLETED | OUTPATIENT
Start: 2020-07-08 | End: 2020-07-08

## 2020-07-08 RX ORDER — ADENOSINE 3 MG/ML
6 INJECTION, SOLUTION INTRAVENOUS
Status: COMPLETED | OUTPATIENT
Start: 2020-07-08 | End: 2020-07-08

## 2020-07-08 RX ORDER — ONDANSETRON 2 MG/ML
8 INJECTION INTRAMUSCULAR; INTRAVENOUS
Status: COMPLETED | OUTPATIENT
Start: 2020-07-08 | End: 2020-07-08

## 2020-07-08 RX ORDER — ADENOSINE 3 MG/ML
12 INJECTION, SOLUTION INTRAVENOUS
Status: COMPLETED | OUTPATIENT
Start: 2020-07-08 | End: 2020-07-08

## 2020-07-08 RX ORDER — METOPROLOL TARTRATE 50 MG/1
50 TABLET ORAL
Status: COMPLETED | OUTPATIENT
Start: 2020-07-08 | End: 2020-07-08

## 2020-07-08 RX ADMIN — ADENOSINE 6 MG: 3 INJECTION, SOLUTION INTRAVENOUS at 01:07

## 2020-07-08 RX ADMIN — ONDANSETRON 8 MG: 2 INJECTION INTRAMUSCULAR; INTRAVENOUS at 01:07

## 2020-07-08 RX ADMIN — ASPIRIN 325 MG ORAL TABLET 325 MG: 325 PILL ORAL at 01:07

## 2020-07-08 RX ADMIN — ADENOSINE 12 MG: 3 INJECTION, SOLUTION INTRAVENOUS at 01:07

## 2020-07-08 RX ADMIN — METOPROLOL TARTRATE 50 MG: 50 TABLET ORAL at 01:07

## 2020-07-08 RX ADMIN — FENTANYL CITRATE 100 MCG: 50 INJECTION INTRAMUSCULAR; INTRAVENOUS at 01:07

## 2020-07-08 NOTE — ED PROVIDER NOTES
Encounter Date: 7/8/2020       History     Chief Complaint   Patient presents with    SVT     pt brought in by EMS with HR of 160, nausea, and SOB. EMS reports RA sats in the 80's. Pt with hx of SVT. Pt refused adenosine in route. Pt on non rebreather on arrival with sats 89%. . Pt is AAOx3.     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  Symptoms started this morning 6 am and woke him up from sleep.  He took a metoprolol without relief so activated EMS.  En route he complained of nausea and epigastric pain that resolved after vomiting.         Review of patient's allergies indicates:  No Known Allergies  Past Medical History:   Diagnosis Date    History of heart surgery     SVT (supraventricular tachycardia)     WPW syndrome      Past Surgical History:   Procedure Laterality Date    FONTAN PROCEDURE, EXTRACARDIAC       Family History   Problem Relation Age of Onset    No Known Problems Mother     No Known Problems Father     No Known Problems Sister     No Known Problems Brother     No Known Problems Maternal Aunt     No Known Problems Maternal Uncle     No Known Problems Paternal Aunt     No Known Problems Paternal Uncle     No Known Problems Maternal Grandmother     Heart disease Maternal Grandfather     No Known Problems Paternal Grandmother     No Known Problems Paternal Grandfather     Anemia Neg Hx     Arrhythmia Neg Hx     Asthma Neg Hx     Clotting disorder Neg Hx     Fainting Neg Hx     Heart attack Neg Hx     Heart failure Neg Hx     Hyperlipidemia Neg Hx     Hypertension Neg Hx     Stroke Neg Hx     Atrial Septal Defect Neg Hx      Social History     Tobacco Use    Smoking status: Former Smoker    Smokeless tobacco: Never Used    Tobacco comment: 3-4 months    Substance Use Topics    Alcohol use: No    Drug use: Yes     Types: Marijuana     Comment: Mojo today- pt  reports a while back     Review of Systems   Constitutional: Negative for chills and fever.   Cardiovascular: Positive for palpitations. Negative for chest pain.   Gastrointestinal: Positive for abdominal pain and nausea.   Skin: Negative for wound.   All other systems reviewed and are negative.      Physical Exam     Initial Vitals   BP Pulse Resp Temp SpO2   07/08/20 1252 07/08/20 1252 07/08/20 1252 07/08/20 1335 07/08/20 1252   138/82 (!) 165 (!) 28 98.4 °F (36.9 °C) (!) 92 %      MAP       --                Physical Exam    Nursing note and vitals reviewed.  Constitutional: He appears well-developed.   Uncomfortable in appearance   HENT:   Head: Normocephalic and atraumatic.   Mouth/Throat: Oropharynx is clear and moist.   Eyes: EOM are normal. Pupils are equal, round, and reactive to light.   Neck: Normal range of motion. No JVD present.   Cardiovascular: Normal rate and intact distal pulses.   Pulmonary/Chest: Breath sounds normal. No stridor. No respiratory distress. He has no wheezes. He has no rhonchi. He has no rales. He exhibits no tenderness.   Coarse breath sounds   Abdominal: Soft. He exhibits no distension and no mass. There is no abdominal tenderness. There is no rebound and no guarding.   Musculoskeletal: Normal range of motion.   Neurological: He is alert and oriented to person, place, and time. GCS score is 15. GCS eye subscore is 4. GCS verbal subscore is 5. GCS motor subscore is 6.   Skin: Skin is warm. Capillary refill takes less than 2 seconds.         ED Course   Critical Care    Date/Time: 7/8/2020 4:49 PM  Performed by: Jade Mccoy MD  Authorized by: Jade Mccoy MD   Direct patient critical care time: 55 minutes  Total critical care time (exclusive of procedural time) : 55 minutes  Critical care was necessary to treat or prevent imminent or life-threatening deterioration of the following conditions: cardiac failure.  Critical care was time spent personally by me on the following  activities: review of old charts and re-evaluation of patient's condition.        Labs Reviewed   COMPREHENSIVE METABOLIC PANEL - Abnormal; Notable for the following components:       Result Value    Glucose 132 (*)     All other components within normal limits   TROPONIN I - Abnormal; Notable for the following components:    Troponin I 0.042 (*)     All other components within normal limits   TROPONIN I - Abnormal; Notable for the following components:    Troponin I 0.059 (*)     All other components within normal limits   NT-PRO NATRIURETIC PEPTIDE - Abnormal; Notable for the following components:    NT-proBNP 581 (*)     All other components within normal limits   POCT GLUCOSE - Abnormal; Notable for the following components:    POCT Glucose 148 (*)     All other components within normal limits   CBC W/ AUTO DIFFERENTIAL   POCT GLUCOSE MONITORING CONTINUOUS     EKG Readings: (Independently Interpreted)   Initial Reading: No STEMI. Rhythm: Supraventricular Tachycardia (SVT). Heart Rate: 167. Ectopy: No Ectopy. Conduction: Normal. ST Segments: Non-Specific ST Segment Depression. T Waves Flipped: AVL. Other Impression: QTc 487     ECG Results          EKG 12-lead (Final result)  Result time 07/08/20 17:46:04    Final result by Interface, Lab In [facility] (07/08/20 17:46:04)                 Narrative:    Test Reason : I49.9,    Vent. Rate : 103 BPM     Atrial Rate : 103 BPM     P-R Int : 128 ms          QRS Dur : 104 ms      QT Int : 360 ms       P-R-T Axes : 053 035 131 degrees     QTc Int : 471 ms    Sinus tachycardia  Otis-Parkinson-White  Abnormal ECG  When compared with ECG of 08-JUL-2020 12:56,  Vent. rate has decreased BY  64 BPM  Otis-Parkinson-White is now Present  Confirmed by Rayshawn BARRIGA MD, Samuel GRAHAM ([id number]) on 7/8/2020 5:45:56 PM    Referred By: [email or web address]   SELF           Confirmed By:Samuel Tabares III, MD                             EKG 12-lead (Final result)  Result time 07/08/20 17:45:54    Final  result by Interface, Lab In [facility] (07/08/20 17:45:54)                 Narrative:    Test Reason : I47.1,    Vent. Rate : 167 BPM     Atrial Rate : 174 BPM     P-R Int : 000 ms          QRS Dur : 100 ms      QT Int : 292 ms       P-R-T Axes : 000 031 153 degrees     QTc Int : 487 ms    Supraventricular tachycardia  LVH with repolarization abnormality  Abnormal ECG  When compared with ECG of 02-JAN-2020 14:39,  Vent. rate has increased BY  91 BPM  ST now depressed in Inferior leads  ST now depressed in Lateral leads  T wave inversion less evident in Inferior leads  Nonspecific T wave abnormality no longer evident in Anterior leads  T wave inversion now evident in Lateral leads  Confirmed by Rayshawn BARRIGA MD, Samuel GRAHAM ([id number]) on 7/8/2020 5:45:45 PM    Referred By: [email or web address]   SELF           Confirmed By:Samuel Tabares III, MD                            Imaging Results          X-Ray Chest AP Portable (Final result)  Result time 07/08/20 13:21:13    Final result by Basil Sepulveda MD (07/08/20 13:21:13)                 Impression:      1.  Negative for acute process involving the chest.    2.  Stable findings as noted above.      Electronically signed by: Basil Sepulveda MD  Date:    07/08/2020  Time:    13:21             Narrative:    EXAMINATION:  XR CHEST AP PORTABLE    CLINICAL HISTORY:  Chest Pain;    COMPARISON:  November 7, 2019.    FINDINGS:  EKG leads overlie the chest which is lordotic in position.  Other life-saving devices overlie the chest as well.  Low lung volumes on the current study.  The lungs are otherwise clear.  The cardiac silhouette size is enlarged.  The trachea is midline and the mediastinal width is normal. Negative for effusion or pneumothorax.  Pulmonary vasculature is normal. Negative for osseous abnormalities. Stable convex left curvature of the upper thoracic spine with marginal spondylosis.  Stable median sternotomy wires, which appears small, suggestive of surgery as an child.  Stable  deformity of the sternum with different levels of the sternoclavicular joints, likely postoperative or developmental.                                 Medical Decision Making:   Clinical Tests:   Lab Tests: Ordered and Reviewed  Radiological Study: Ordered and Reviewed  ED Management:  This is an emergent evaluation of a 25y AAM hx congenital heart disease s/p repair presenting via EMS with SVT.  Exam he is non toxic, afebrile, requiring supplemental oxygen and 's with narrow complex rhythm.  Discussed at length cardioversion vs adenosine as he initially refused adenosine.  SVT terminated after second dose of adenosine.  Given 50 mg metoprolol PO.    Initial troponin elevated, delta troponin rising to 0.059.  After walking to restroom patient states he feels unusually fatigued and short of breath.  Spoke with transfer center who discussed case with patient's cardiologist Dr. Ferguson.  Plan is to increase metoprolol and have patient follow up in clinic.  Patient endorsed non compliance and discussed with him importance of compliance.  He agrees and states he feels safe to go home.  Discharged with close follow up with Cardiologist.                                  Clinical Impression:       ICD-10-CM ICD-9-CM   1. SVT (supraventricular tachycardia)  I47.1 427.89   2. Cardiac rhythm disorder or disturbance or change  I49.9 427.9   3. Non compliance w medication regimen  Z91.14 V15.81             ED Disposition Condition    Discharge Stable        ED Prescriptions     Medication Sig Dispense Start Date End Date Auth. Provider    metoprolol tartrate (LOPRESSOR) 100 MG tablet Take 0.5 tablets (50 mg total) by mouth 2 (two) times daily. 60 tablet 7/8/2020 7/8/2021 Jade Mccoy MD        Follow-up Information     Follow up With Specialties Details Why Contact Info    Abebe Ferguson MD Electrophysiology, Cardiology Go in 1 week  [address] [facility] [address]  [phone]                                        Jade Mccoy MD  07/08/20 9399

## 2020-07-08 NOTE — DISCHARGE INSTRUCTIONS
Make sure to take your metoprolol   Return if your symptoms come back or if you develop chest pain

## 2020-07-08 NOTE — ED NOTES
2nd dose of adenosine administered. Rhythm converted to sinus tach at 108. Repeat EKG obtained. Pt tolerated without complication.

## 2020-07-08 NOTE — ED NOTES
Pt decided just before cardioversion that he wants to take adenosine. MD aware and verbal order given for adenosine 6mg.

## 2020-07-08 NOTE — ED NOTES
1st dose of adenosine administered. HR decreased to 145 with immediate return to 164. Verbal order given for 2nd dose of adenosine 12mg.

## 2020-07-08 NOTE — ED NOTES
Pt continues to refuse Adenosine. Pt being prepped for synchronized cardioversion. Pt on cardiac monitor and oxygen. Code cart at bedside. Pads placed on pt.

## 2020-07-10 ENCOUNTER — TELEPHONE (OUTPATIENT)
Dept: ELECTROPHYSIOLOGY | Facility: CLINIC | Age: 25
End: 2020-07-10

## 2020-07-10 DIAGNOSIS — I49.8 OTHER SPECIFIED CARDIAC ARRHYTHMIAS: Primary | ICD-10-CM

## 2020-07-10 NOTE — TELEPHONE ENCOUNTER
Spoke with  mother karlie to schedule a hospital follow-up with  office.  is now scheduled on 07/16 with EKG prior. Pt verbalized understanding.

## 2020-07-15 ENCOUNTER — TELEPHONE (OUTPATIENT)
Dept: ELECTROPHYSIOLOGY | Facility: CLINIC | Age: 25
End: 2020-07-15

## 2020-07-16 ENCOUNTER — TELEPHONE (OUTPATIENT)
Dept: ELECTROPHYSIOLOGY | Facility: CLINIC | Age: 25
End: 2020-07-16

## 2020-07-16 ENCOUNTER — OFFICE VISIT (OUTPATIENT)
Dept: ELECTROPHYSIOLOGY | Facility: CLINIC | Age: 25
End: 2020-07-16
Payer: MEDICAID

## 2020-07-16 ENCOUNTER — HOSPITAL ENCOUNTER (OUTPATIENT)
Dept: CARDIOLOGY | Facility: CLINIC | Age: 25
Discharge: HOME | End: 2020-07-16
Payer: MEDICAID

## 2020-07-16 VITALS
WEIGHT: 143.31 LBS | HEIGHT: 66 IN | DIASTOLIC BLOOD PRESSURE: 80 MMHG | SYSTOLIC BLOOD PRESSURE: 130 MMHG | BODY MASS INDEX: 23.03 KG/M2 | HEART RATE: 82 BPM

## 2020-07-16 DIAGNOSIS — I49.8 OTHER SPECIFIED CARDIAC ARRHYTHMIAS: ICD-10-CM

## 2020-07-16 DIAGNOSIS — I45.6 WPW SYNDROME: ICD-10-CM

## 2020-07-16 DIAGNOSIS — I47.10 SVT (SUPRAVENTRICULAR TACHYCARDIA): Chronic | ICD-10-CM

## 2020-07-16 DIAGNOSIS — I45.6 WPW SYNDROME: Primary | Chronic | ICD-10-CM

## 2020-07-16 DIAGNOSIS — I47.10 SVT (SUPRAVENTRICULAR TACHYCARDIA): Primary | ICD-10-CM

## 2020-07-16 DIAGNOSIS — I10 ESSENTIAL HYPERTENSION: ICD-10-CM

## 2020-07-16 PROCEDURE — 99212 OFFICE O/P EST SF 10 MIN: CPT | Mod: PBBFAC,25 | Performed by: INTERNAL MEDICINE

## 2020-07-16 PROCEDURE — 93010 ELECTROCARDIOGRAM REPORT: CPT | Mod: S$PBB,,, | Performed by: INTERNAL MEDICINE

## 2020-07-16 PROCEDURE — 99999 PR PBB SHADOW E&M-EST. PATIENT-LVL II: CPT | Mod: PBBFAC,,, | Performed by: INTERNAL MEDICINE

## 2020-07-16 PROCEDURE — 99215 PR OFFICE/OUTPT VISIT, EST, LEVL V, 40-54 MIN: ICD-10-PCS | Mod: S$PBB,,, | Performed by: INTERNAL MEDICINE

## 2020-07-16 PROCEDURE — 93010 RHYTHM STRIP: ICD-10-PCS | Mod: S$PBB,,, | Performed by: INTERNAL MEDICINE

## 2020-07-16 PROCEDURE — 99215 OFFICE O/P EST HI 40 MIN: CPT | Mod: S$PBB,,, | Performed by: INTERNAL MEDICINE

## 2020-07-16 PROCEDURE — 99999 PR PBB SHADOW E&M-EST. PATIENT-LVL II: ICD-10-PCS | Mod: PBBFAC,,, | Performed by: INTERNAL MEDICINE

## 2020-07-16 PROCEDURE — 93005 ELECTROCARDIOGRAM TRACING: CPT | Mod: PBBFAC | Performed by: INTERNAL MEDICINE

## 2020-07-16 RX ORDER — METOPROLOL TARTRATE 100 MG/1
100 TABLET ORAL 2 TIMES DAILY
Qty: 60 TABLET | Refills: 11 | Status: ON HOLD | OUTPATIENT
Start: 2020-07-16 | End: 2021-01-10 | Stop reason: SDUPTHER

## 2020-07-16 NOTE — TELEPHONE ENCOUNTER
Hi everyone,    Dr. Ferguson is requesting Razia Hurley, Dorian, Clayton, and Theron be present for a congential SVT RFA.  We are hoping to do this on Friday 9/16/2020 in the EP lab.      Would this date work for everyone?    Thanks in advance for your help!    Marcy Navas, SARAN, RN  Clinical Care Coordinator   Abebe Ferguson M.D., Ph.D.  Cardiac Electrophysiology  [phone] direct line

## 2020-07-16 NOTE — TELEPHONE ENCOUNTER
Spoke with  Tommie.  Let him know we will need to move SVT RFA to a date that we are able to have our pediatric team and Dr. Hurley present for case.  Let him know I am attempting to set up for 9/16/2020 and will confirm date with him as soon as I have confirmation from our peds team.  He verbalizes understanding and appreciates call.

## 2020-07-16 NOTE — PROGRESS NOTES
"Subjective:    Patient ID:  Juani Reilly is a 25 y.o. male who presents for evaluation of SVT    Adult Congenital Heart Disease Specialist: Meli Barrera MD    HPI  Prior Hx:  I had the pleasure of seeing Mr. Reilly today in our electrophysiology clinic in follow-up for his SVT. As you are aware he is a pleasant 24 year-old man with congenital heart disease. I have no records regarding this. 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 He was doing well until he presented to the [facility] ER on 9/5/2019 with palpitations. He was in a regular tachycardia that terminated with IV adenosine. He reports 1 month of shortness of breath at rest and with exertion. He denies any orthopnea or PND. No leg edema. He denies any history of syncope. He reports he has not seen a cardiologist (was being [facility]) for 7-8 years.    I reviewed all available ECGs in Epic which note sinus rhythm with mild pre-excitation (isoelectric V1, +V2=6, +inf leads). ECG from 9/5/2019 ER visit notes a regular long RP tachycardia with similar QRS morphology but not ART.    Mr. Reilly returned for follow-up 1/2020. He is being seen by Dr. Barrera. 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 He was seen in the ER 11/2019 with recurrent SVT terminated with IV adenosine. ECG noted a regular narrow complex tachycardia with a "long" short RP interval. He is on low dose metoprolol. We discussed EPS/ablation. He was not interested and desired medication alternatives.    Interim Hx:  Mr. Reilly returns for follow-up after a recent ER visit for SVT. He otherwise feels well.    My interpretation of today's in clinic ECG is sinus rhythm with pre-excitation as described above.    Review " of Systems   Constitution: Negative for fever and malaise/fatigue.   HENT: Negative for congestion and sore throat.    Eyes: Negative for blurred vision and visual disturbance.   Cardiovascular: Positive for palpitations. Negative for chest pain, dyspnea on exertion and irregular heartbeat.   Respiratory: Negative for cough and shortness of breath.    Hematologic/Lymphatic: Negative for bleeding problem. Does not bruise/bleed easily.   Skin: Negative.    Musculoskeletal: Negative.    Gastrointestinal: Negative for bloating, abdominal pain, dysphagia, heartburn, melena, nausea and vomiting.   Neurological: Negative for dizziness, focal weakness and weakness.        Objective:    Physical Exam   Constitutional: He is oriented to person, place, and time. He appears well-developed and well-nourished. No distress.   HENT:   Head: Normocephalic and atraumatic.   Eyes: Conjunctivae are normal. Right eye exhibits no discharge. Left eye exhibits no discharge.   Neck: Neck supple. No JVD present.   Cardiovascular: Normal rate and regular rhythm. Exam reveals no gallop and no friction rub.   No murmur heard.  Pulmonary/Chest: Effort normal and breath sounds normal. No respiratory distress. He has no wheezes. He has no rales.   Abdominal: Soft. Bowel sounds are normal. He exhibits no distension. There is no abdominal tenderness. There is no rebound.   Musculoskeletal:         General: No edema.   Neurological: He is alert and oriented to person, place, and time.   Skin: Skin is warm and dry. He is not diaphoretic.   Psychiatric: He has a normal mood and affect. His behavior is normal. Judgment and thought content normal.         Assessment:       1. WPW syndrome    2. SVT (supraventricular tachycardia)    3. Essential hypertension         Plan:       In summary, Mr. Reilly is a pleasant 25 year-old man with single ventricle and tricuspid atresia with subtle pre-excitation on his ECG and SVT. We discussed the etiology and  pathophysiology of SVT. His likely involves an accessory pathway. He is failing metoprolol. Discussed treatment options including suppression with medical therapy versus EPS/ablation for more definitive therapy. I spent about a half hour discussing the nature of EP study and ablation, including retrograde aortic approach vs puncture of extracardiac fontan (possibly both). We discussed risks and benefits at length. Our discussion included, but was not limited to the risk of death, infection, bleeding, stroke, MI, cardiac perforation, vascular injury, embolism, cardiac tamponade, skin burns, and other organic injury including the possibility for need for surgery or pacemaker implantation. Discussed need to coordinate with pediatric interventional cardiology and with my partner, Dr. Emeterio Hurley.    Plan  EPS/SVT ablation  Anesthesia  NICHOLAS  Hold metoprolol for 5 days prior    Thank you for allowing me to participate in the care of this patient. Please do not hesitate to call me with any questions or concerns.    Abebe Ferguson MD, PhD  Cardiac Electrophysiology    Addendum:  Discussed case with Dr. Alarcon with pediatric EP/ACHD clinic. Reviewed the case. She recommends the patient first see Pediatric Interventional Cards clinic for a hemodynamic work-up and consider anti-arrhythmic therapy first due to challenges with his particular anatomy. I spoke with the patient regarding this however could only reach his grandmother. I called the patient's mother to discuss but received no answer and left a message.    Ultimately our plan will be to refer to Pediatric Interventional Cardiology clinic for a hemodynamic study followed by admission for sotalol initiation (if patient agreeable).  **addendum #2, updated the patient's mother on the plan.

## 2020-07-16 NOTE — TELEPHONE ENCOUNTER
----- Message from Abebe Ferguson MD sent at 7/16/2020  1:59 PM CDT -----  Please look at my addendum for updated plan. Ablation on hold. I need to get in touch with them and discuss sotalol admission at some point.

## 2020-07-29 ENCOUNTER — INITIAL CONSULT (OUTPATIENT)
Dept: CARDIOLOGY | Facility: CLINIC | Age: 25
End: 2020-07-29
Payer: MEDICAID

## 2020-07-29 ENCOUNTER — OFFICE VISIT (OUTPATIENT)
Dept: CARDIOLOGY | Facility: CLINIC | Age: 25
End: 2020-07-29
Payer: MEDICAID

## 2020-07-29 VITALS
BODY MASS INDEX: 24.17 KG/M2 | OXYGEN SATURATION: 93 % | WEIGHT: 150.38 LBS | DIASTOLIC BLOOD PRESSURE: 58 MMHG | HEIGHT: 66 IN | BODY MASS INDEX: 24.17 KG/M2 | OXYGEN SATURATION: 93 % | HEART RATE: 95 BPM | SYSTOLIC BLOOD PRESSURE: 101 MMHG | HEART RATE: 95 BPM | DIASTOLIC BLOOD PRESSURE: 58 MMHG | SYSTOLIC BLOOD PRESSURE: 101 MMHG | WEIGHT: 150.38 LBS | HEIGHT: 66 IN

## 2020-07-29 DIAGNOSIS — Q24.9 CONGENITAL HEART DISEASE: Primary | ICD-10-CM

## 2020-07-29 DIAGNOSIS — Z98.890 S/P FONTAN PROCEDURE: ICD-10-CM

## 2020-07-29 DIAGNOSIS — I47.10 SVT (SUPRAVENTRICULAR TACHYCARDIA): Primary | Chronic | ICD-10-CM

## 2020-07-29 DIAGNOSIS — Q22.4 SINGLE VENTRICLE WITH TRICUSPID ATRESIA: ICD-10-CM

## 2020-07-29 DIAGNOSIS — Q24.9 ADULT CONGENITAL HEART DISEASE: ICD-10-CM

## 2020-07-29 DIAGNOSIS — Q20.4 SINGLE VENTRICLE WITH TRICUSPID ATRESIA: ICD-10-CM

## 2020-07-29 PROCEDURE — 99215 PR OFFICE/OUTPT VISIT, EST, LEVL V, 40-54 MIN: ICD-10-PCS | Mod: S$PBB,,, | Performed by: PEDIATRICS

## 2020-07-29 PROCEDURE — 99204 OFFICE O/P NEW MOD 45 MIN: CPT | Mod: S$PBB,,, | Performed by: INTERNAL MEDICINE

## 2020-07-29 PROCEDURE — 99999 PR PBB SHADOW E&M-EST. PATIENT-LVL III: CPT | Mod: PBBFAC,,, | Performed by: INTERNAL MEDICINE

## 2020-07-29 PROCEDURE — 99999 PR PBB SHADOW E&M-EST. PATIENT-LVL III: ICD-10-PCS | Mod: PBBFAC,,, | Performed by: PEDIATRICS

## 2020-07-29 PROCEDURE — 99999 PR PBB SHADOW E&M-EST. PATIENT-LVL III: ICD-10-PCS | Mod: PBBFAC,,, | Performed by: INTERNAL MEDICINE

## 2020-07-29 PROCEDURE — 99999 PR PBB SHADOW E&M-EST. PATIENT-LVL III: CPT | Mod: PBBFAC,,, | Performed by: PEDIATRICS

## 2020-07-29 PROCEDURE — 99204 PR OFFICE/OUTPT VISIT, NEW, LEVL IV, 45-59 MIN: ICD-10-PCS | Mod: S$PBB,,, | Performed by: INTERNAL MEDICINE

## 2020-07-29 PROCEDURE — 99213 OFFICE O/P EST LOW 20 MIN: CPT | Mod: PBBFAC | Performed by: PEDIATRICS

## 2020-07-29 PROCEDURE — 99215 OFFICE O/P EST HI 40 MIN: CPT | Mod: S$PBB,,, | Performed by: PEDIATRICS

## 2020-07-29 PROCEDURE — 99213 OFFICE O/P EST LOW 20 MIN: CPT | Mod: PBBFAC,27 | Performed by: INTERNAL MEDICINE

## 2020-07-29 NOTE — LETTER
July 31, 2020      Meli Barrera MD  [address]  [facility] [address]           [facility]  [address]  [facility] [address]  Phone: [phone]          Patient: Juani Reilly   MR Number: [id number]   YOB: 1995   Date of Visit: 7/29/2020       Dear Dr. Meli Barrera:    Thank you for referring Juani Reilly to me for evaluation. Attached you will find relevant portions of my assessment and plan of care.    If you have questions, please do not hesitate to call me. I look forward to following Juani Reilly along with you.    Sincerely,    Gus Caal MD    Enclosure  CC:  No Recipients    If you would like to receive this communication electronically, please contact [email or web address] or [phone] to request more information on [employer] access.    For providers and/or their staff who would like to refer a patient to [facility], please contact us through our one-stop-shop provider referral line, [facility] , at [phone].    If you feel you have received this communication in error or would no longer like to receive these types of communications, please e-mail [email or web address]

## 2020-07-29 NOTE — PROGRESS NOTES
Interventional Cardiology Clinic Note  Reason for Visit: [facility] [address]  Referring physician:  Dr. Barrera    HPI:   Mr. 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 Barrera. He was recently seen by EP (Dr. Xavi Barrera) where a EPS study/ablation was recommended for strong suspicion of AVRT/WPW in setting persistent symptoms and failed beta blocker  therapy. MRI 1/ 2020 showed low flow in extracardiac Fontan with no evidence of thrombus, unobstructed ASD and Jesus. EF 42% with increased LV mass (82 g/m2;  LVEDV: 173cc/m2 for BSA (nl ). Given his complex anatomy, the case was discussed with Dr. Alarcon with pediatric EP and she recommended referring our patient to pediatric interventional cardiology clinic prior to EPS for HD work up as well as consideration of Sotalol therapy.      Symptom history:  In terms of symptoms, he was doing relatively well until recently where he had multiple  ER visit (9/5/2019, [facility], 11/2019 [facility])  with cc of palpitations, ECG at that time with narrow complex regular tachycardia terminated with adenosine. Subsequently he reports persistent exertional dyspnea which started about 1 month ago and is getting worse however denies orthopnea, PND, early satiety or changes in weight or history of syncope.  He was previously being seen at [facility] but has never seen a cardiologist prior to transferring care to [facility].       Review of Systems   All other systems reviewed and are negative.      PMH:     Past Medical History:   Diagnosis Date    History of heart surgery     SVT (supraventricular tachycardia)     WPW syndrome      Past Surgical History:   Procedure Laterality Date  "   FONTAN PROCEDURE, EXTRACARDIAC       Allergies:   Review of patient's allergies indicates:  No Known Allergies  Medications:     Current Outpatient Medications on File Prior to Visit   Medication Sig Dispense Refill    metoprolol tartrate (LOPRESSOR) 100 MG tablet Take 1 tablet (100 mg total) by mouth 2 (two) times daily. (Patient taking differently: Take 50 mg by mouth 2 (two) times daily. ) 60 tablet 11    losartan-hydrochlorothiazide 100-12.5 mg (HYZAAR) 100-12.5 mg Tab Take 1 tablet by mouth once daily. 90 tablet 3     No current facility-administered medications on file prior to visit.      Social History:     Social History     Tobacco Use    Smoking status: Former Smoker    Smokeless tobacco: Never Used    Tobacco comment: 3-4 months    Substance Use Topics    Alcohol use: No     Family History:     Family History   Problem Relation Age of Onset    No Known Problems Mother     No Known Problems Father     No Known Problems Sister     No Known Problems Brother     No Known Problems Maternal Aunt     No Known Problems Maternal Uncle     No Known Problems Paternal Aunt     No Known Problems Paternal Uncle     No Known Problems Maternal Grandmother     Heart disease Maternal Grandfather     No Known Problems Paternal Grandmother     No Known Problems Paternal Grandfather     Anemia Neg Hx     Arrhythmia Neg Hx     Asthma Neg Hx     Clotting disorder Neg Hx     Fainting Neg Hx     Heart attack Neg Hx     Heart failure Neg Hx     Hyperlipidemia Neg Hx     Hypertension Neg Hx     Stroke Neg Hx     Atrial Septal Defect Neg Hx        Physical Exam  BP (!) 101/58 (BP Location: Left arm, Patient Position: Sitting, BP Method: Large (Automatic))   Pulse 95   Ht 5' 6" (1.676 m)   Wt 68.2 kg (150 lb 5.7 oz)   SpO2 (!) 93%   BMI 24.27 kg/m²    GEN: Alert and oriented in NAD  NECK: no JVD appreciated  CVS: RRR  PULM: CTAB no rales  ABD: NT/ND BS +  Extremities: warm and dry, palpable " pulses, no edema  NEURO: Alert and oriented x 3  PSYCH: appropriate affect.             Labs:     Lab Results   Component Value Date     07/08/2020    K 4.5 07/08/2020     07/08/2020    CO2 26 07/08/2020    BUN 17 07/08/2020    CREATININE 0.98 07/08/2020    ANIONGAP 9 07/08/2020     No results found for: HGBA1C  No results found for: BNP, BNPTRIAGEBLO Lab Results   Component Value Date    WBC 8.55 07/08/2020    HGB 16.8 07/08/2020    HCT 52.4 07/08/2020     07/08/2020    GRAN 4.6 07/08/2020    GRAN 53.8 07/08/2020     No results found for: CHOL, HDL, LDLCALC, TRIG       No results found for: EF    EKG   7/8/20: shortened NV interval with delta wave, WPW v rate 109    Cardiac MRI 1/4/20  Interpretation:   Cardiac position: Atrial situs is normal. Tricuspid atresia and Mitral valve appears normal in structure.. LV dilation. Ventriculoarterial concordance. Ventricular loop: D-loop. Cardiac position is levocardia. Left aortic arch branching     .Systemic venous anatomy:   SVC to connects to the right PA     Extracardiac Fontan- There is slow flow. No thrombus noted post gadolinium injection. There is unobstructive flow to the RPA.     Coronary sinus to right atrium.   Innominate vein present, no LSVC.   Pulmonary venous anatomy:   All pulmonary veins drain into the left atrium     Atria:  a. Right atrium: Normal with large ASD  b. Left atrium: Normal     Atrioventricular valves:  a.  Tricuspid:  The tricuspid valve atresia    b.  Mitral:  The mitral valve is structurally normal     Right Ventricle:    The right ventricle is hypoplastic.      Left ventricle:    The left ventricular volumes are dilated. There is no evidence of LV wall motion anomalies. The calculated LVEF is 42%.      LVEDV: 300cc  LVEDV: 173cc/m2 for BSA (nl )  LVESV:  174cc  LVESV: 101cc/m2 for BSA (nl 16-44)  LVEF: 42%  Stroke volume: 125cc by ventricular trace  LV mass 82 g/m2 (increased     Semilunar valves:     b.  Aortic:   The aortic valve is structurally normal. Trace AI.     Stroke volume:  38cc by Q-flow analysis  Regurgitant volume: 3cc by Q-flow analysis  Total volume through the aortic valve: 41 cc by Q-flow analysis  Regurgitation fraction: 8%        Pulmonic vasculature:      Axial  images  RPA: 15mm   LPA: 14mm     On angiogram  RPA: 83w30av  LPA: 12 x 15mm      Flow  RPA Q flow  Net antegrade flow: 21 cc  Regurgitant volume: 1 cc  Total volume: 21cc                                                                                                                                                                                                                                                                       LPA Q flow  Net antegrade flow: 21 cc  Regurgitant volume: 0 cc  Total volume: 21cc      Aorta:   Cine images of the 3chamber and paracoronal views.  LVOT: 35mm  Sinus of Valsalva: 43mm  Sinotubular junction: 35mm     Axial :  Ascending aorta: 35mm  Descending aorta: 16mm     On angiogram:  Sinuses of valsalva: 45by 45by 49mm   Sinotubular junction: 40 by 43 mm   Ascending aorta at the level of the RPA: 29 by 35mm   Ascending aorta prior the right brachiocephalic: 28 by 29mm  Proximal transverse arch: 26 by 29mm  Distal transverse arch: 22 by 27mm  Descending aortic arch just after the left subclavian (isthmus):22 by 19mm  Descending aorta at the left PA: 21 by 18 mm  Descending aorta at the diaphragm: 18 by 20mm    Aortic arch:  Head vessel branching: Normal  with left sided arch.              Conclusion:   1. SVC to RPA unobstructed (Jesus)  2. Extracardiac Fontan with low flow but no evidence of thrombus.  3. Unobstructed ASD  4. Tricuspid Atresia  5. No obstruction to the right or left PA with Q flow of 21cc through each.  6. Increased LV mass and volume with systemic LVEF of 42%  7. Dilated sinus of Valsalva 49mm  8. Trace AI with regurgitation fraction of 8%     TTE 10/2019  · Congenital history: History of  surgery as  and again later in childhood with limited follow-up in recent years- all surgeries performed at [facility] through median sternotomy.  · Abdominal situs is solitus. Atrial situs is normal. Imaging consistent with tricuspid atresia.. Tricuspid atresia and Mitral valve appears normal in structure.. LV dilation. Ventriculoarterial concordance. Ventricular loop: D-loop. Cardiac position is levocardia. Left aortic arch branching.     IMPRESSION:  Imaging consistent with diagnosis of tricuspid atresia S/P extracardiac conduit Fontan-  Very difficult to demonstrate pulmonary circulation with significant chest deformity associated with median sternotomy.  Mildly dilated inferior vena cava connecting to extracardiac conduit passing posterior to the right atrium for completion of Fontan physiology.  Right superior vena cava identified with no obvious stenosis.  Unable to demonstrate Fontan or Jesus anastomosis to the pulmonary arteries.  At least two pulmonary veins demonstrated returning to the left atrium with no evidence for obstruction.  There is a small right atrium with tissue remaining in from eustachian valve and no obvious obstruction of right atrial flow to the left atrium.  There is no obvious tricuspid valve tissue present and no right ventricular cavity could be demonstrated.  There is no evidence for pulmonary valve.  At least mild dilation of the left atrium.  Mildly dilated mitral valve with color Doppler demonstrating mild insufficiency.  Single ventricle consistent with left ventricular morphology.  There is at least mild dilation of the left ventricle.  Left ventricular systolic function qualitatively good with ejection fraction estimated 50-55%.  There is a large aortic annulus with trileaflet aortic valve and trivial central jet of insufficiency.  Qualitative impression of at least mild dilation of the ascending aorta.  Branching pattern consistent with left aortic arch and no evidence for  coarctation.  No obvious pericardial effusion.    Assessment/Plan:  Mr. 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 Barrera.    1. Congenital heart disease   Pt with single ventricle and extra cardiac Fontan here with SVT and increasing shortness of breath. He has been lost to follow up for many years. Case Request-Cath Lab: INSERTION, CATHETER, RIGHT HEART   1. Right heart catheterization and angiography  2. Access: Will attempt R CFV but will need to prep the right neck  3. The risks, benefits, and alternatives of right heart and possible intervention were discussed with the patient. All questions were answered and informed consent was obtained. I had a detailed discussion with the patient regarding risk of stroke, MI, bleeding access site complications including limb loss, allergy, kidney failure including dialysis and death.  4. The patient understands the risks and benefits and wishes to go ahead with the procedure.  5. All patient's questions were answered   Vital signs    Verify informed consent, place on front of chart    Void on call to Cath Lab    Diet NPO Except for: Medication    Place in Outpatient    CBC auto differential    Basic metabolic panel    Type & Screen    Case Request-Cath Lab: INSERTION, CATHETER, RIGHT HEART           Signed:        Marya Hawthorne MD  Cardiology Fellow  Pager [phone]
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
## 2020-07-31 ENCOUNTER — DOCUMENTATION ONLY (OUTPATIENT)
Dept: CARDIOLOGY | Facility: CLINIC | Age: 25
End: 2020-07-31

## 2020-07-31 ENCOUNTER — TELEPHONE (OUTPATIENT)
Dept: CARDIOLOGY | Facility: HOSPITAL | Age: 25
End: 2020-07-31

## 2020-07-31 DIAGNOSIS — Q24.9 ADULT CONGENITAL HEART DISEASE: Primary | ICD-10-CM

## 2020-07-31 NOTE — PROGRESS NOTES
"OUTPATIENT CATHETERIZATION INSTRUCTIONS    You have been scheduled for a procedure in the catheterization lab on Thursday, September 3, 2020.     Please report to the Cardiology Waiting Area on the Third floor of the hospital and check in at 8:30 AM.   You will then be taken to the SSCU (Short Stay Cardiac Unit) and prepared for your procedure. Please be aware that this is not the time of your procedure but the time you are to arrive. The procedures are scheduled on an hourly basis; however, emergency cases take precedence over all other cases.       You may not have anything to eat or drink after midnight the night before your test. You may take your regular morning medications with water. If there are any medications that you should not take you will be instructed to hold them that morning. If you are diabetic and on Metformin (Glucophage) do not take it the day before, the day of, and for 2 days after your procedure.      The procedure will take 1-2 hours to perform. After the procedure, you will return to SSCU on the third floor of the hospital. You will need to lie still (or keep your arm still) for the next 4 to 6 hours to minimize bleeding from the puncture site. Your family may remain in the room with you during this time.       You may be able to be discharged home that same afternoon if there is someone to drive you home and there were no complications. If you have one of the balloon, stent, or device procedures you may spend the night in the hospital. Your doctor will determine, based on your progress, the date and time of your discharge. The results of your procedure will be discussed with you before you are discharged. Any further testing or procedures will be scheduled for you either before you leave or you will be called with these appointments.       If you should have any questions, concerns, or need to change the date of your procedure, please call "HILLARY Maxwell @ [phone]    Special " Instructions:    Drink plenty of water the day before and after your procedure.     THE ABOVE INSTRUCTIONS WERE GIVEN TO THE PATIENT VERBALLY AND THEY VERBALIZED UNDERSTANDING.  THEY DO NOT REQUIRE ANY SPECIAL NEEDS AND DO NOT HAVE ANY LEARNING BARRIERS.          Directions for Reporting to Cardiology Waiting Area in the Hospital  If you park in the Parking Garage:  Take elevators to the1st floor of the parking garage.  Continue past the gift shop, coffee shop, and piano.  Take a right and go to the gold elevators. (Elevator B)  Take the elevator to the 3rd floor.  Follow the arrow on the sign on the wall that says Cath Lab Registration/EP Lab Registration.  Follow the long hallway all the way around until you come to a big open area.  This is the registration area.  Check in at Reception Desk.    OR    If family is dropping you off:  Have them drop you off at the front of the Hospital under the green overhang.  Enter through the doors and take a right.  Take the E elevators to the 3rd floor Cardiology Waiting Area.  Check in at the Reception Desk in the waiting room.

## 2020-07-31 NOTE — TELEPHONE ENCOUNTER
----- Message from Sylvia Goncalves, RN sent at 7/30/2020  3:50 PM CDT -----  Regarding: Coordinating cath with Dr. Gan and Ten  The above patient was seen in clinic yesterday and needs rhc/lhc. Dr. Gan saw him with Dr. Caal. I have the signed consents. Need to find a time for them to do the procedure. I will be here all day tomorrow. Thanks. Gisela. Ext. [phone]

## 2020-08-31 ENCOUNTER — LAB VISIT (OUTPATIENT)
Dept: LAB | Facility: HOSPITAL | Age: 25
End: 2020-08-31
Attending: INTERNAL MEDICINE
Payer: MEDICAID

## 2020-08-31 DIAGNOSIS — Q24.9 ADULT CONGENITAL HEART DISEASE: ICD-10-CM

## 2020-08-31 LAB
ANION GAP SERPL CALC-SCNC: 12 MMOL/L (ref 8–16)
BASOPHILS # BLD AUTO: 0.02 K/UL (ref 0–0.2)
BASOPHILS NFR BLD: 0.4 % (ref 0–1.9)
BUN SERPL-MCNC: 11 MG/DL (ref 2–20)
CALCIUM SERPL-MCNC: 9.8 MG/DL (ref 8.7–10.5)
CHLORIDE SERPL-SCNC: 107 MMOL/L (ref 95–110)
CO2 SERPL-SCNC: 23 MMOL/L (ref 23–29)
CREAT SERPL-MCNC: 0.93 MG/DL (ref 0.5–1.4)
DIFFERENTIAL METHOD: NORMAL
EOSINOPHIL # BLD AUTO: 0 K/UL (ref 0–0.5)
EOSINOPHIL NFR BLD: 0.4 % (ref 0–8)
ERYTHROCYTE [DISTWIDTH] IN BLOOD BY AUTOMATED COUNT: 13.1 % (ref 11.5–14.5)
EST. GFR  (AFRICAN AMERICAN): >60 ML/MIN/1.73 M^2
EST. GFR  (NON AFRICAN AMERICAN): >60 ML/MIN/1.73 M^2
GLUCOSE SERPL-MCNC: 106 MG/DL (ref 70–110)
HCT VFR BLD AUTO: 53.2 % (ref 40–54)
HGB BLD-MCNC: 17.4 G/DL (ref 14–18)
IMM GRANULOCYTES # BLD AUTO: 0.01 K/UL (ref 0–0.04)
IMM GRANULOCYTES NFR BLD AUTO: 0.2 % (ref 0–0.5)
LYMPHOCYTES # BLD AUTO: 1.8 K/UL (ref 1–4.8)
LYMPHOCYTES NFR BLD: 33.7 % (ref 18–48)
MCH RBC QN AUTO: 29.6 PG (ref 27–31)
MCHC RBC AUTO-ENTMCNC: 32.7 G/DL (ref 32–36)
MCV RBC AUTO: 91 FL (ref 82–98)
MONOCYTES # BLD AUTO: 0.4 K/UL (ref 0.3–1)
MONOCYTES NFR BLD: 6.9 % (ref 4–15)
NEUTROPHILS # BLD AUTO: 3.1 K/UL (ref 1.8–7.7)
NEUTROPHILS NFR BLD: 58.4 % (ref 38–73)
NRBC BLD-RTO: 0 /100 WBC
PLATELET # BLD AUTO: 168 K/UL (ref 150–350)
PMV BLD AUTO: 10.2 FL (ref 9.2–12.9)
POTASSIUM SERPL-SCNC: 3.9 MMOL/L (ref 3.5–5.1)
RBC # BLD AUTO: 5.87 M/UL (ref 4.6–6.2)
SODIUM SERPL-SCNC: 142 MMOL/L (ref 136–145)
WBC # BLD AUTO: 5.22 K/UL (ref 3.9–12.7)

## 2020-08-31 PROCEDURE — 36415 COLL VENOUS BLD VENIPUNCTURE: CPT | Mod: PO

## 2020-08-31 PROCEDURE — 80048 BASIC METABOLIC PNL TOTAL CA: CPT | Mod: PO

## 2020-08-31 PROCEDURE — 85025 COMPLETE CBC W/AUTO DIFF WBC: CPT | Mod: PO

## 2020-09-02 ENCOUNTER — ANESTHESIA EVENT (OUTPATIENT)
Dept: MEDSURG UNIT | Facility: HOSPITAL | Age: 25
End: 2020-09-02
Payer: MEDICAID

## 2020-09-02 NOTE — ANESTHESIA PREPROCEDURE EVALUATION
09/02/2020  Juani Reilly is a 25 y.o., male current anatomy is SV c extra cardiac Fontan c reduced EF ~40% by MRI.  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 Is being w/u by EP for possible ablation of accessory pathway but presents for hemodynamic with possible interventional cath prior to EP study.      No anesthetic records.    Cardiac MRI  Conclusion:   1.         SVC to RPA unobstructed (Jesus)  2.         Extracardiac Fontan with low flow but no evidence of thrombus.  3.         Unobstructed ASD  4.         Tricuspid Atresia  5.         No obstruction to the right or left PA with Q flow of 21cc through each.  6.         Increased LV mass and volume with systemic LVEF of 42%  7.         Dilated sinus of Valsalva 49mm  8.         Trace AI with regurgitation fraction of 8%     TTE 10/2019     IMPRESSION:  Imaging consistent with diagnosis of tricuspid atresia S/P extracardiac conduit Fontan-  Very difficult to demonstrate pulmonary circulation with significant chest deformity associated with median sternotomy.  Mildly dilated inferior vena cava connecting to extracardiac conduit passing posterior to the right atrium for completion of Fontan physiology.  Right superior vena cava identified with no obvious stenosis.  Unable to demonstrate Fontan or Jesus anastomosis to the pulmonary arteries.  At least two pulmonary veins demonstrated returning to the left atrium with no evidence for obstruction.  There is a small right atrium with tissue remaining in from eustachian valve and no obvious obstruction of right atrial flow to the left atrium.  There is no obvious tricuspid valve tissue present and no right ventricular cavity could be demonstrated.  There is no evidence  for pulmonary valve.  At least mild dilation of the left atrium.  Mildly dilated mitral valve with color Doppler demonstrating mild insufficiency.  Single ventricle consistent with left ventricular morphology.  There is at least mild dilation of the left ventricle.  Left ventricular systolic function qualitatively good with ejection fraction estimated 50-55%.  There is a large aortic annulus with trileaflet aortic valve and trivial central jet of insufficiency.  Qualitative impression of at least mild dilation of the ascending aorta.  Branching pattern consistent with left aortic arch and no evidence for coarctation.  No obvious pericardial effusion.    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 (Patient taking differently: Take 50 mg by mouth 2 (two) times daily. ) 60 tablet 11 9/2/2020 at 2000    losartan-hydrochlorothiazide 100-12.5 mg (HYZAAR) 100-12.5 mg Tab Take 1 tablet by mouth once daily. 90 tablet 3        Review of patient's allergies indicates:  No Known Allergies    Past Medical History:   Diagnosis Date    History of heart surgery     SVT (supraventricular tachycardia)     WPW syndrome      Past Surgical History:   Procedure Laterality Date    FONTAN PROCEDURE, EXTRACARDIAC       Tobacco Use    Smoking status: Former Smoker    Smokeless tobacco: Never Used    Tobacco comment: 3-4 months    Substance and Sexual Activity    Alcohol use: No    Drug use: Yes     Types: Marijuana     Comment: Mojo today-  pt reports a while back    Sexual activity: Not on file       Objective:     Vital Signs (Most Recent):  Temp: 37.2 °C (99 °F) (09/03/20 1113)  Pulse: 97 (09/03/20 1113)  Resp: 16 (09/03/20 1113)  BP: (!) 166/106 (09/03/20 1121)  SpO2: 95 % (09/03/20 1113) Vital Signs (24h Range):  Temp:  [37.2 °C (99 °F)] 37.2 °C (99 °F)  Pulse:  [97] 97  Resp:  [16] 16  SpO2:  [95 %] 95 %  BP: (161-166)/() 166/106     Weight: 63.5 kg (140 lb)  Body mass index is 22.6 kg/m².        Significant Labs:  All pertinent labs from the last 24 hours have been reviewed.    CBC:   Recent Labs     09/03/20  1106   WBC 4.23   RBC 5.74   HGB 17.1   HCT 52.7      MCV 92   MCH 29.8   MCHC 32.4       CMP:   Recent Labs     09/03/20  1106      K 3.8      CO2 24   BUN 9   CREATININE 1.0   GLU 99   CALCIUM 9.6       INR  Recent Labs     09/03/20  1353   INR 1.0         Anesthesia Evaluation    I have reviewed the Patient Summary Reports.    I have reviewed the Nursing Notes. I have reviewed the NPO Status.   I have reviewed the Medications.     Review of Systems  Anesthesia Hx:   Denies Personal Hx of Anesthesia complications.       Physical Exam  General:  Well nourished    Airway/Jaw/Neck:  Airway Findings: Mouth Opening: Normal Tongue: Normal  General Airway Assessment: Adult  Mallampati: I  TM Distance: Normal, at least 6 cm  Jaw/Neck Findings:     Neck ROM: Normal ROM      Dental:  Dental Findings:Nothing missing or loose but poor FPC    Chest/Lungs:  Chest/Lungs Findings: Clear to auscultation, Normal Respiratory Rate     Heart/Vascular:  Heart Findings: Rate: Normal  Rhythm: Regular Rhythm  Heart Murmur      Musculoskeletal:  Sternal scar    Mental Status:  Mental Status Findings:  Cooperative, Alert and Oriented         Anesthesia Plan  Type of Anesthesia, risks & benefits discussed:  Anesthesia Type:  general, MAC  Patient's Preference:   Intra-op Monitoring Plan: standard ASA monitors  Intra-op Monitoring  Plan Comments:   Post Op Pain Control Plan: IV/PO Opioids PRN, per primary service following discharge from PACU and multimodal analgesia  Post Op Pain Control Plan Comments:   Induction:   IV  Beta Blocker:  Patient is not currently on a Beta-Blocker (No further documentation required).       Informed Consent: Patient understands risks and agrees with Anesthesia plan.  Questions answered. Anesthesia consent signed with patient.  ASA Score: 3     Day of Surgery Review of History & Physical:    H&P update referred to the surgeon.         Ready For Surgery From Anesthesia Perspective.

## 2020-09-03 ENCOUNTER — HOSPITAL ENCOUNTER (OUTPATIENT)
Facility: HOSPITAL | Age: 25
Discharge: HOME | End: 2020-09-03
Attending: INTERNAL MEDICINE | Admitting: INTERNAL MEDICINE
Payer: MEDICAID

## 2020-09-03 ENCOUNTER — ANESTHESIA (OUTPATIENT)
Dept: MEDSURG UNIT | Facility: HOSPITAL | Age: 25
End: 2020-09-03
Payer: MEDICAID

## 2020-09-03 VITALS
TEMPERATURE: 99 F | OXYGEN SATURATION: 95 % | WEIGHT: 140 LBS | SYSTOLIC BLOOD PRESSURE: 131 MMHG | DIASTOLIC BLOOD PRESSURE: 92 MMHG | BODY MASS INDEX: 22.5 KG/M2 | HEART RATE: 89 BPM | RESPIRATION RATE: 16 BRPM | HEIGHT: 66 IN

## 2020-09-03 DIAGNOSIS — Q24.9 CONGENITAL HEART DISEASE: ICD-10-CM

## 2020-09-03 LAB
ABO + RH BLD: NORMAL
ANION GAP SERPL CALC-SCNC: 9 MMOL/L (ref 8–16)
BASOPHILS # BLD AUTO: 0.02 K/UL (ref 0–0.2)
BASOPHILS NFR BLD: 0.5 % (ref 0–1.9)
BLD GP AB SCN CELLS X3 SERPL QL: NORMAL
BUN SERPL-MCNC: 9 MG/DL (ref 6–20)
CALCIUM SERPL-MCNC: 9.6 MG/DL (ref 8.7–10.5)
CHLORIDE SERPL-SCNC: 108 MMOL/L (ref 95–110)
CO2 SERPL-SCNC: 24 MMOL/L (ref 23–29)
CREAT SERPL-MCNC: 1 MG/DL (ref 0.5–1.4)
DIFFERENTIAL METHOD: NORMAL
EOSINOPHIL # BLD AUTO: 0 K/UL (ref 0–0.5)
EOSINOPHIL NFR BLD: 0.2 % (ref 0–8)
ERYTHROCYTE [DISTWIDTH] IN BLOOD BY AUTOMATED COUNT: 13.3 % (ref 11.5–14.5)
EST. GFR  (AFRICAN AMERICAN): >60 ML/MIN/1.73 M^2
EST. GFR  (NON AFRICAN AMERICAN): >60 ML/MIN/1.73 M^2
GLUCOSE SERPL-MCNC: 99 MG/DL (ref 70–110)
HCT VFR BLD AUTO: 52.7 % (ref 40–54)
HGB BLD-MCNC: 17.1 G/DL (ref 14–18)
IMM GRANULOCYTES # BLD AUTO: 0.01 K/UL (ref 0–0.04)
IMM GRANULOCYTES NFR BLD AUTO: 0.2 % (ref 0–0.5)
INR PPP: 1 (ref 0.8–1.2)
LYMPHOCYTES # BLD AUTO: 1.4 K/UL (ref 1–4.8)
LYMPHOCYTES NFR BLD: 33.1 % (ref 18–48)
MCH RBC QN AUTO: 29.8 PG (ref 27–31)
MCHC RBC AUTO-ENTMCNC: 32.4 G/DL (ref 32–36)
MCV RBC AUTO: 92 FL (ref 82–98)
MONOCYTES # BLD AUTO: 0.3 K/UL (ref 0.3–1)
MONOCYTES NFR BLD: 6.1 % (ref 4–15)
NEUTROPHILS # BLD AUTO: 2.5 K/UL (ref 1.8–7.7)
NEUTROPHILS NFR BLD: 59.9 % (ref 38–73)
NRBC BLD-RTO: 0 /100 WBC
PLATELET # BLD AUTO: 158 K/UL (ref 150–350)
PMV BLD AUTO: 10.5 FL (ref 9.2–12.9)
POTASSIUM SERPL-SCNC: 3.8 MMOL/L (ref 3.5–5.1)
PROTHROMBIN TIME: 10.7 SEC (ref 9–12.5)
RBC # BLD AUTO: 5.74 M/UL (ref 4.6–6.2)
SODIUM SERPL-SCNC: 141 MMOL/L (ref 136–145)
WBC # BLD AUTO: 4.23 K/UL (ref 3.9–12.7)

## 2020-09-03 PROCEDURE — 93531 PR R/L RETRO HEART CATH, CONG HT ABNL: ICD-10-PCS | Mod: 26,,, | Performed by: PEDIATRICS

## 2020-09-03 PROCEDURE — 75827 VEIN X-RAY CHEST: CPT | Performed by: PEDIATRICS

## 2020-09-03 PROCEDURE — 01920 ANES CARDIAC CATHETERIZATION: CPT | Performed by: PEDIATRICS

## 2020-09-03 PROCEDURE — 82330 ASSAY OF CALCIUM: CPT | Performed by: INTERNAL MEDICINE

## 2020-09-03 PROCEDURE — 75827 PR  VENOGRAM SUPER VENA CAVA: ICD-10-PCS | Mod: 26,,, | Performed by: PEDIATRICS

## 2020-09-03 PROCEDURE — 63600175 PHARM REV CODE 636 W HCPCS: Performed by: NURSE ANESTHETIST, CERTIFIED REGISTERED

## 2020-09-03 PROCEDURE — D9220A PRA ANESTHESIA: ICD-10-PCS | Mod: ANES,,, | Performed by: ANESTHESIOLOGY

## 2020-09-03 PROCEDURE — 86850 RBC ANTIBODY SCREEN: CPT

## 2020-09-03 PROCEDURE — 93567 NJX CAR CTH SPRVLV AORTGRPHY: CPT | Mod: ,,, | Performed by: PEDIATRICS

## 2020-09-03 PROCEDURE — 36011 PR PLACE CATH IN VEIN,SELECT: ICD-10-PCS | Mod: LT,,, | Performed by: PEDIATRICS

## 2020-09-03 PROCEDURE — 82947 ASSAY GLUCOSE BLOOD QUANT: CPT | Performed by: INTERNAL MEDICINE

## 2020-09-03 PROCEDURE — 37000009 HC ANESTHESIA EA ADD 15 MINS: Performed by: PEDIATRICS

## 2020-09-03 PROCEDURE — 25500020 PHARM REV CODE 255: Performed by: PEDIATRICS

## 2020-09-03 PROCEDURE — 80048 BASIC METABOLIC PNL TOTAL CA: CPT

## 2020-09-03 PROCEDURE — D9220A PRA ANESTHESIA: Mod: ANES,,, | Performed by: ANESTHESIOLOGY

## 2020-09-03 PROCEDURE — 75710 PR  ANGIO EXTREMITY UNILAT: ICD-10-PCS | Mod: 26,59,, | Performed by: PEDIATRICS

## 2020-09-03 PROCEDURE — 36011 PLACE CATHETER IN VEIN: CPT | Mod: LT,,, | Performed by: PEDIATRICS

## 2020-09-03 PROCEDURE — C1769 GUIDE WIRE: HCPCS | Performed by: PEDIATRICS

## 2020-09-03 PROCEDURE — 83605 ASSAY OF LACTIC ACID: CPT | Performed by: INTERNAL MEDICINE

## 2020-09-03 PROCEDURE — 25000003 PHARM REV CODE 250: Performed by: NURSE ANESTHETIST, CERTIFIED REGISTERED

## 2020-09-03 PROCEDURE — 82805 BLOOD GASES W/O2 SATURATION: CPT | Performed by: INTERNAL MEDICINE

## 2020-09-03 PROCEDURE — D9220A PRA ANESTHESIA: Mod: CRNA,,, | Performed by: NURSE ANESTHETIST, CERTIFIED REGISTERED

## 2020-09-03 PROCEDURE — 93567 NJX CAR CTH SPRVLV AORTGRPHY: CPT | Performed by: PEDIATRICS

## 2020-09-03 PROCEDURE — 75820 VEIN X-RAY ARM/LEG: CPT | Mod: 26,59,, | Performed by: PEDIATRICS

## 2020-09-03 PROCEDURE — 93531 PR R/L RETRO HEART CATH, CONG HT ABNL: CPT | Mod: 26,,, | Performed by: PEDIATRICS

## 2020-09-03 PROCEDURE — C1751 CATH, INF, PER/CENT/MIDLINE: HCPCS | Performed by: PEDIATRICS

## 2020-09-03 PROCEDURE — 75820 PR VENOGRAM EXTREMITY UNILAT: ICD-10-PCS | Mod: 26,59,, | Performed by: PEDIATRICS

## 2020-09-03 PROCEDURE — 25000003 PHARM REV CODE 250: Performed by: STUDENT IN AN ORGANIZED HEALTH CARE EDUCATION/TRAINING PROGRAM

## 2020-09-03 PROCEDURE — C1894 INTRO/SHEATH, NON-LASER: HCPCS | Performed by: PEDIATRICS

## 2020-09-03 PROCEDURE — 75820 VEIN X-RAY ARM/LEG: CPT | Mod: 59 | Performed by: PEDIATRICS

## 2020-09-03 PROCEDURE — 75825 PR  VENOGRAM INFER VENA CAVA: ICD-10-PCS | Mod: 26,,, | Performed by: PEDIATRICS

## 2020-09-03 PROCEDURE — 84132 ASSAY OF SERUM POTASSIUM: CPT | Mod: 91 | Performed by: INTERNAL MEDICINE

## 2020-09-03 PROCEDURE — 85610 PROTHROMBIN TIME: CPT

## 2020-09-03 PROCEDURE — 93531 HC RHC W/RETRO LHC CONG. CARD ABN: CPT | Performed by: PEDIATRICS

## 2020-09-03 PROCEDURE — 75710 ARTERY X-RAYS ARM/LEG: CPT | Mod: 26,59,, | Performed by: PEDIATRICS

## 2020-09-03 PROCEDURE — 75825 VEIN X-RAY TRUNK: CPT | Mod: 26,,, | Performed by: PEDIATRICS

## 2020-09-03 PROCEDURE — D9220A PRA ANESTHESIA: ICD-10-PCS | Mod: CRNA,,, | Performed by: NURSE ANESTHETIST, CERTIFIED REGISTERED

## 2020-09-03 PROCEDURE — 36011 PLACE CATHETER IN VEIN: CPT | Performed by: PEDIATRICS

## 2020-09-03 PROCEDURE — 93567 PR INJECT SUPRAVALVULAR AORTOGRAPHY DURING HEART CATH: ICD-10-PCS | Mod: ,,, | Performed by: PEDIATRICS

## 2020-09-03 PROCEDURE — 99499 NO LOS: ICD-10-PCS | Mod: ,,, | Performed by: INTERNAL MEDICINE

## 2020-09-03 PROCEDURE — 99499 UNLISTED E&M SERVICE: CPT | Mod: ,,, | Performed by: INTERNAL MEDICINE

## 2020-09-03 PROCEDURE — 27201423 OPTIME MED/SURG SUP & DEVICES STERILE SUPPLY: Performed by: PEDIATRICS

## 2020-09-03 PROCEDURE — 75710 ARTERY X-RAYS ARM/LEG: CPT | Performed by: PEDIATRICS

## 2020-09-03 PROCEDURE — 37000008 HC ANESTHESIA 1ST 15 MINUTES: Performed by: PEDIATRICS

## 2020-09-03 PROCEDURE — 75827 VEIN X-RAY CHEST: CPT | Mod: 26,,, | Performed by: PEDIATRICS

## 2020-09-03 PROCEDURE — 75825 VEIN X-RAY TRUNK: CPT | Performed by: PEDIATRICS

## 2020-09-03 PROCEDURE — 85347 COAGULATION TIME ACTIVATED: CPT | Performed by: INTERNAL MEDICINE

## 2020-09-03 PROCEDURE — 85025 COMPLETE CBC W/AUTO DIFF WBC: CPT

## 2020-09-03 RX ORDER — ONDANSETRON 8 MG/1
8 TABLET, ORALLY DISINTEGRATING ORAL EVERY 8 HOURS PRN
Status: DISCONTINUED | OUTPATIENT
Start: 2020-09-03 | End: 2020-09-03 | Stop reason: HOSPADM

## 2020-09-03 RX ORDER — ACETAMINOPHEN 325 MG/1
650 TABLET ORAL EVERY 4 HOURS PRN
Status: DISCONTINUED | OUTPATIENT
Start: 2020-09-03 | End: 2020-09-03 | Stop reason: HOSPADM

## 2020-09-03 RX ORDER — HEPARIN SODIUM 1000 [USP'U]/ML
INJECTION, SOLUTION INTRAVENOUS; SUBCUTANEOUS
Status: DISCONTINUED | OUTPATIENT
Start: 2020-09-03 | End: 2020-09-03

## 2020-09-03 RX ORDER — MIDAZOLAM HYDROCHLORIDE 1 MG/ML
INJECTION, SOLUTION INTRAMUSCULAR; INTRAVENOUS
Status: DISCONTINUED | OUTPATIENT
Start: 2020-09-03 | End: 2020-09-03

## 2020-09-03 RX ORDER — SODIUM CHLORIDE 0.9 % (FLUSH) 0.9 %
10 SYRINGE (ML) INJECTION
Status: CANCELLED | OUTPATIENT
Start: 2020-09-03

## 2020-09-03 RX ORDER — FENTANYL CITRATE 50 UG/ML
INJECTION, SOLUTION INTRAMUSCULAR; INTRAVENOUS
Status: DISCONTINUED | OUTPATIENT
Start: 2020-09-03 | End: 2020-09-03

## 2020-09-03 RX ADMIN — SODIUM CHLORIDE 3 ML/KG/HR: 0.9 INJECTION, SOLUTION INTRAVENOUS at 05:09

## 2020-09-03 RX ADMIN — FENTANYL CITRATE 25 MCG: 50 INJECTION INTRAMUSCULAR; INTRAVENOUS at 04:09

## 2020-09-03 RX ADMIN — FENTANYL CITRATE 50 MCG: 50 INJECTION INTRAMUSCULAR; INTRAVENOUS at 04:09

## 2020-09-03 RX ADMIN — MIDAZOLAM 2 MG: 1 INJECTION INTRAMUSCULAR; INTRAVENOUS at 04:09

## 2020-09-03 RX ADMIN — FENTANYL CITRATE 25 MCG: 50 INJECTION INTRAMUSCULAR; INTRAVENOUS at 03:09

## 2020-09-03 RX ADMIN — SODIUM CHLORIDE, SODIUM GLUCONATE, SODIUM ACETATE, POTASSIUM CHLORIDE, MAGNESIUM CHLORIDE, SODIUM PHOSPHATE, DIBASIC, AND POTASSIUM PHOSPHATE: .53; .5; .37; .037; .03; .012; .00082 INJECTION, SOLUTION INTRAVENOUS at 03:09

## 2020-09-03 RX ADMIN — MIDAZOLAM 2 MG: 1 INJECTION INTRAMUSCULAR; INTRAVENOUS at 03:09

## 2020-09-03 RX ADMIN — HEPARIN SODIUM 5000 UNITS: 1000 INJECTION INTRAVENOUS; SUBCUTANEOUS at 04:09

## 2020-09-03 NOTE — Clinical Note
Catheter is repositioned to the superior vena cava. Hemodynamics performed. O2 saturation measured at 70%.

## 2020-09-03 NOTE — PLAN OF CARE
Pt is AAOx4 and denies pain.  VSS.  Admit questions completed. IV access obtained. Family member at bedside.

## 2020-09-03 NOTE — Clinical Note
Initial Nutrition Assessment:    INTERVENTIONS/RECOMMENDATIONS:   · Meals/Snacks: General/healthful diet: Continue current diet    ASSESSMENT:   Patient medically noted for DKA and nausea/vomiting. Patient reports an excellent appetite and ate all of his breakfast this morning except the english muffin. Eating well PTA. He reports a 12# weight loss recently but is unable to report his UBW; unsure what he weighed a month ago but says he weighed 205# at some point. Unsure of the last time he weighed this much. No weight history noted in EMR. Menu at bedside and explained room service. Discussed consistent carbohydrate diet and carb counts on menu. Discussed his typical meals at home. He reports baking the majority of his meals, eats lots of greens and other vegetables. He has potatoes, pasta, and other carbohydrates occasionally and sometimes goes back for seconds. Discussed balanced meals and myplate method. Encouraged use of menu and carb counts. No further questions or concerns from patient at this time. Diet Order: Consistent carb (2g Na)  % Eaten:  No data found. Pertinent Medications: [x]Reviewed []Other: Humalog   Pertinent Labs: [x]Reviewed []Other: [phone], A1c 14.8  Food Allergies: [x]None []Other   Last BM:    [x]Active     []Hyperactive  []Hypoactive       [] Absent BS  Skin:    [x] Intact   [] Incision  [] Breakdown  [] Other:    Anthropometrics:   Height: 6' 2\" (188 cm) Weight: 80.4 kg (177 lb 4 oz)   IBW (%IBW):   ( ) UBW (%UBW):   (  %)   Last Weight Metrics:  Weight Loss Metrics 5/28/2018   Today's Wt 177 lb 4 oz   BMI 22.76 kg/m2       BMI: Body mass index is 22.76 kg/(m^2). This BMI is indicative of:   []Underweight    [x]Normal    []Overweight    [] Obesity   [] Extreme Obesity (BMI>40)     Estimated Nutrition Needs (Based on):   2210 Kcals/day (BMR (1700) x 1. 3AF) , 64 g (-80g (0.8-1.0 g/kg bw)) Protein  Carbohydrate:  At Least 130 g/day  Fluids: 2200 mL/day (1ml/kcal)    Pt The sheath is removed from the right femoral artery.  expected to meet estimated nutrient needs: [x]Yes []No    NUTRITION DIAGNOSES:   Problem:  Altered nutrition-related lab values      Etiology: related to DM     Signs/Symptoms: as evidenced by A1c 14.8      NUTRITION INTERVENTIONS:  Meals/Snacks: General/healthful diet                  GOAL:   PO intake >70% of meals and BG <150 next 4-6 days    LEARNING NEEDS (Diet, Food/Nutrient-Drug Interaction):    [x] None Identified   [] Identified and Education Provided/Documented   [] Identified and Pt declined/was not appropriate     Cultureal, Yarsani, OR Ethnic Dietary Needs:    [x] None Identified   [] Identified and Addressed     [x] Interdisciplinary Care Plan Reviewed/Documented    [x] Discharge Planning: CCD      MONITORING /EVALUATION:   Behavioral-Environmental Outcomes: Behavior  Food/Nutrient Intake Outcomes:  Total energy intake  Physical Signs/Symptoms Outcomes: Weight/weight change, Glucose profile    NUTRITION RISK:    [] High              [] Moderate           [x]  Low  []  Minimal/Uncompromised    PT SEEN FOR:    []  MD Consult: []Calorie Count      []Diabetic Diet Education        []Diet Education     []Electrolyte Management     []General Nutrition Management and Supplements     []Management of Tube Feeding     []TPN Recommendations    [x]  RN Referral:  [x]MST score >=2     []Enteral/Parenteral Nutrition PTA     []Pregnant: Gestational DM or Multigestation     []Pressure Ulcer/Wound Care needs        []  Low BMI  []  LOS Caryle Meline  Pager [phone]                 Weekend Pager [phone]

## 2020-09-03 NOTE — Clinical Note
Catheter is repositioned to the superior vena cava.  Angiography performed via power injection. Injection was 30 mL contrast at 20 mL/s. Power injection PSI was 900..

## 2020-09-03 NOTE — Clinical Note
Catheter is repositioned to the right pulmonary artery. Hemodynamics performed. O2 saturation measured at 99%.

## 2020-09-03 NOTE — DISCHARGE SUMMARY
Discharge Summary  Interventional Cardiology      Admit Date: 9/3/2020    Discharge Date:  9/3/2020    Attending Physician: Gus Caal MD    Discharge Physician: Shamika Hilario MD    Principal Diagnoses: <principal problem not specified>  Indication for Admission: CATHETERIZATION, HEART, RIGHT, FOR CONGENITAL HEART DEFECT (N/A), AORTOGRAM, AORTIC ARCH (N/A), Venogram, Cath Lab, Left heart cath (Left)    Discharged Condition: Good    Hospital Course:   Patient presented for outpatient Left and right heart catheterization which went without complication. Tolerated the procedure well. See full cath report in Epic for details. Hemostasis of patient's Left CFA and left CFV access site was achieved with manual pressure. Patient was monitored per post-cath protocol, and his L groin access site was c/d/i with no hematoma. Patient was able to ambulate without difficulty. He was feeling well and anticipating discharge home today.     Outpatient Plan:  - There were no medication changes    Diet: Cardiac diet    Activity: as tolerated notify if persistent bleeding, Ad clarence, wound care instructions provided    Disposition: Home or Self Care    Discharge Medications:      Medication List      ASK your doctor about these medications    losartan-hydrochlorothiazide 100-12.5 mg 100-12.5 mg Tab  Commonly known as: HYZAAR  Take 1 tablet by mouth once daily.     metoprolol tartrate 100 MG tablet  Commonly known as: LOPRESSOR  Take 1 tablet (100 mg total) by mouth 2 (two) times daily.          Follow Up:    Per primary cardiologist.

## 2020-09-03 NOTE — BRIEF OP NOTE
Brief Operative Note:    : Gus Caal MD     Referring Physician: Meil Barrera     All Operators: Surgeon(s):  MD Brady Orozco MD Tyrone J. Collins, MD Ivory III. Crittendon, MD     Preoperative Diagnosis: Congenital heart disease [Q24.9]     Postop Diagnosis: Congenital heart disease [Q24.9]    Treatments/Procedures: Procedure(s) (LRB):  CATHETERIZATION, HEART, RIGHT, FOR CONGENITAL HEART DEFECT (N/A)  AORTOGRAM, AORTIC ARCH (N/A)  Venogram, Cath Lab  Left heart cath (Left)    Findings:Right and left heart cath performed and no significant abnormalities found. See catheterization report for full details.    Estimated Blood loss: 20 cc    Specimens removed: No        Shamika Hilario MD  Cardiovascular Fellow, PGY5  Pager: [phone]

## 2020-09-03 NOTE — Clinical Note
Catheter is inserted into the left pulmonary artery. Hemodynamics performed. O2 saturation measured at 69%.

## 2020-09-03 NOTE — TRANSFER OF CARE
"Anesthesia Transfer of Care Note    Patient: Juani Reilly    Procedure(s) Performed: Procedure(s) (LRB):  CATHETERIZATION, HEART, RIGHT, FOR CONGENITAL HEART DEFECT (N/A)  AORTOGRAM, AORTIC ARCH (N/A)  Venogram, Cath Lab  Left heart cath (Left)    Patient location: OPS    Anesthesia Type: MAC    Transport from OR: Transported from OR on 2-3 L/min O2 by NC with adequate spontaneous ventilation    Post pain: adequate analgesia    Post assessment: no apparent anesthetic complications and tolerated procedure well    Post vital signs: stable    Level of consciousness: awake and alert    Nausea/Vomiting: no nausea/vomiting    Complications: none    Transfer of care protocol was followed      Last vitals:   Visit Vitals  BP (!) 141/96 (BP Location: Right arm, Patient Position: Lying)   Pulse 96   Temp 37.2 °C (99 °F) (Oral)   Resp 16   Ht 5' 6" (1.676 m)   Wt 63.5 kg (140 lb)   SpO2 95%   BMI 22.60 kg/m²     "

## 2020-09-03 NOTE — Clinical Note
Angiography performed of the aorta. Angiography performed via power injection. Injection was 50 mL contrast at 20 mL/s. Power injection PSI was 900..

## 2020-09-03 NOTE — H&P
Interventional Cardiology   Reason for Visit: [facility]  Referring physician:  Dr. Barrera     HPI:   Mr. 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 Barrera. He was recently seen by EP (Dr. Xavi Barrera) where a EPS study/ablation was recommended for strong suspicion of AVRT/WPW in setting persistent symptoms and failed beta blocker  therapy. MRI 1/ 2020 showed low flow in extracardiac Fontan with no evidence of thrombus, unobstructed ASD and Jesus. EF 42% with increased LV mass (82 g/m2;  LVEDV: 173cc/m2 for BSA (nl ). Given his complex anatomy, the case was discussed with Dr. Alarcon with pediatric EP and she recommended referring our patient to pediatric interventional cardiology clinic prior to EPS for HD work up as well as consideration of Sotalol therapy.        Symptom history:  In terms of symptoms, he was doing relatively well until recently where he had multiple  ER visit (9/5/2019, [facility], 11/2019 [facility])  with cc of palpitations, ECG at that time with narrow complex regular tachycardia terminated with adenosine. Subsequently he reports persistent exertional dyspnea which started about 1 month ago and is getting worse however denies orthopnea, PND, early satiety or changes in weight or history of syncope.  He was previously being seen at [facility] but has never seen a cardiologist prior to transferring care to [facility].    Today he is here for right and left heart cath. No acute changes since his clinic visit.         Review of Systems   All other systems reviewed and are negative.        PMH:           Past Medical History:   Diagnosis Date    History of heart surgery      SVT  (supraventricular tachycardia)      WPW syndrome              Past Surgical History:   Procedure Laterality Date    FONTAN PROCEDURE, EXTRACARDIAC          Allergies:   Review of patient's allergies indicates:  No Known Allergies  Medications:             Current Outpatient Medications on File Prior to Visit   Medication Sig Dispense Refill    metoprolol tartrate (LOPRESSOR) 100 MG tablet Take 1 tablet (100 mg total) by mouth 2 (two) times daily. (Patient taking differently: Take 50 mg by mouth 2 (two) times daily. ) 60 tablet 11    losartan-hydrochlorothiazide 100-12.5 mg (HYZAAR) 100-12.5 mg Tab Take 1 tablet by mouth once daily. 90 tablet 3      No current facility-administered medications on file prior to visit.       Social History:      Social History           Tobacco Use    Smoking status: Former Smoker    Smokeless tobacco: Never Used    Tobacco comment: 3-4 months    Substance Use Topics    Alcohol use: No      Family History:      Family History   Problem Relation Age of Onset    No Known Problems Mother      No Known Problems Father      No Known Problems Sister      No Known Problems Brother      No Known Problems Maternal Aunt      No Known Problems Maternal Uncle      No Known Problems Paternal Aunt      No Known Problems Paternal Uncle      No Known Problems Maternal Grandmother      Heart disease Maternal Grandfather      No Known Problems Paternal Grandmother      No Known Problems Paternal Grandfather      Anemia Neg Hx      Arrhythmia Neg Hx      Asthma Neg Hx      Clotting disorder Neg Hx      Fainting Neg Hx      Heart attack Neg Hx      Heart failure Neg Hx      Hyperlipidemia Neg Hx      Hypertension Neg Hx      Stroke Neg Hx      Atrial Septal Defect Neg Hx           Physical Exam  GEN: Alert and oriented in NAD  NECK: no JVD appreciated  CVS: RRR  PULM: CTAB no rales  ABD: NT/ND BS +  Extremities: warm and dry, palpable pulses x 4 extremities, no edema  NEURO:  Alert and oriented x 3  PSYCH: anxious                 Labs:            Lab Results   Component Value Date      07/08/2020     K 4.5 07/08/2020      07/08/2020     CO2 26 07/08/2020     BUN 17 07/08/2020     CREATININE 0.98 07/08/2020     ANIONGAP 9 07/08/2020      No results found for: HGBA1C  No results found for: BNP, BNPTRIAGEBLO       Lab Results   Component Value Date     WBC 8.55 07/08/2020     HGB 16.8 07/08/2020     HCT 52.4 07/08/2020      07/08/2020     GRAN 4.6 07/08/2020     GRAN 53.8 07/08/2020      No results found for: CHOL, HDL, LDLCALC, TRIG         No results found for: EF     EKG   7/8/20: shortened OH interval with delta wave, WPW v rate 109     Cardiac MRI 1/4/20  Interpretation:   Cardiac position: Atrial situs is normal. Tricuspid atresia and Mitral valve appears normal in structure.. LV dilation. Ventriculoarterial concordance. Ventricular loop: D-loop. Cardiac position is levocardia. Left aortic arch branching     .Systemic venous anatomy:   SVC to connects to the right PA     Extracardiac Fontan- There is slow flow. No thrombus noted post gadolinium injection. There is unobstructive flow to the RPA.     Coronary sinus to right atrium.   Innominate vein present, no LSVC.   Pulmonary venous anatomy:   All pulmonary veins drain into the left atrium     Atria:  a. Right atrium: Normal with large ASD  b. Left atrium: Normal     Atrioventricular valves:  a.  Tricuspid:  The tricuspid valve atresia    b.  Mitral:  The mitral valve is structurally normal     Right Ventricle:    The right ventricle is hypoplastic.      Left ventricle:    The left ventricular volumes are dilated. There is no evidence of LV wall motion anomalies. The calculated LVEF is 42%.      LVEDV: 300cc  LVEDV: 173cc/m2 for BSA (nl )  LVESV:  174cc  LVESV: 101cc/m2 for BSA (nl 16-44)  LVEF: 42%  Stroke volume: 125cc by ventricular trace  LV mass 82 g/m2 (increased     Semilunar valves:     b.  Aortic:   The aortic valve is structurally normal. Trace AI.     Stroke volume:  38cc by Q-flow analysis  Regurgitant volume: 3cc by Q-flow analysis  Total volume through the aortic valve: 41 cc by Q-flow analysis  Regurgitation fraction: 8%        Pulmonic vasculature:      Axial  images  RPA: 15mm   LPA: 14mm     On angiogram  RPA: [id number]  LPA: 12 x 15mm      Flow  RPA Q flow  Net antegrade flow: 21 cc  Regurgitant volume: 1 cc  Total volume: 21cc                                                                                                                                                                                                                                                                       LPA Q flow  Net antegrade flow: 21 cc  Regurgitant volume: 0 cc  Total volume: 21cc      Aorta:   Cine images of the 3chamber and paracoronal views.  LVOT: 35mm  Sinus of Valsalva: 43mm  Sinotubular junction: 35mm     Axial :  Ascending aorta: 35mm  Descending aorta: 16mm     On angiogram:  Sinuses of valsalva: 45by 45by 49mm   Sinotubular junction: 40 by 43 mm   Ascending aorta at the level of the RPA: 29 by 35mm   Ascending aorta prior the right brachiocephalic: 28 by 29mm  Proximal transverse arch: 26 by 29mm  Distal transverse arch: 22 by 27mm  Descending aortic arch just after the left subclavian (isthmus):22 by 19mm  Descending aorta at the left PA: 21 by 18 mm  Descending aorta at the diaphragm: 18 by 20mm    Aortic arch:  Head vessel branching: Normal  with left sided arch.              Conclusion:   1.         SVC to RPA unobstructed (Jesus)  2.         Extracardiac Fontan with low flow but no evidence of thrombus.  3.         Unobstructed ASD  4.         Tricuspid Atresia  5.         No obstruction to the right or left PA with Q flow of 21cc through each.  6.         Increased LV mass and volume with systemic LVEF of 42%  7.         Dilated sinus of Valsalva 49mm  8.         Trace AI with regurgitation  fraction of 8%     TTE 10/2019  ·           Congenital history: History of surgery as  and again later in childhood with limited follow-up in recent years- [place] surgeries [place] through median sternotomy.  ·           Abdominal situs is solitus. Atrial situs is normal. Imaging consistent with tricuspid atresia.. Tricuspid atresia and Mitral valve appears normal in structure.. LV dilation. Ventriculoarterial concordance. Ventricular loop: D-loop. Cardiac position is levocardia. Left aortic arch branching.     IMPRESSION:  Imaging consistent with diagnosis of tricuspid atresia S/P extracardiac conduit Fontan-  Very difficult to demonstrate pulmonary circulation with significant chest deformity associated with median sternotomy.  Mildly dilated inferior vena cava connecting to extracardiac conduit passing posterior to the right atrium for completion of Fontan physiology.  Right superior vena cava identified with no obvious stenosis.  Unable to demonstrate Fontan or Jesus anastomosis to the pulmonary arteries.  At least two pulmonary veins demonstrated returning to the left atrium with no evidence for obstruction.  There is a small right atrium with tissue remaining in from eustachian valve and no obvious obstruction of right atrial flow to the left atrium.  There is no obvious tricuspid valve tissue present and no right ventricular cavity could be demonstrated.  There is no evidence for pulmonary valve.  At least mild dilation of the left atrium.  Mildly dilated mitral valve with color Doppler demonstrating mild insufficiency.  Single ventricle consistent with left ventricular morphology.  There is at least mild dilation of the left ventricle.  Left ventricular systolic function qualitatively good with ejection fraction estimated 50-55%.  There is a large aortic annulus with trileaflet aortic valve and trivial central jet of insufficiency.  Qualitative impression of at least mild dilation of the  ascending aorta.  Branching pattern consistent with left aortic arch and no evidence for coarctation.  No obvious pericardial effusion.     Assessment/Plan:  Mr. 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 Barrera. Here today for left and right heart cath     1. Congenital heart disease   Pt with single ventricle and extra cardiac Fontan here with SVT and increasing shortness of breath. He has been lost to follow up for many years. Case Request-Cath Lab: INSERTION, CATHETER, RIGHT HEART   1. Right heart catheterization and angiography  2. Access: Will attempt R CFV but will need to prep the right neck  3. The risks, benefits, and alternatives of right heart and possible intervention were discussed with the patient. All questions were answered and informed consent was obtained. I had a detailed discussion with the patient regarding risk of stroke, MI, bleeding access site complications including limb loss, allergy, kidney failure including dialysis and death.  4. The patient understands the risks and benefits and wishes to go ahead with the procedure.  5. All patient's questions were answered    Vital signs     Verify informed consent, place on front of chart     Void on call to Cath Lab     Diet NPO Except for: Medication     Place in Outpatient     CBC auto differential     Basic metabolic panel     Type & Screen     Case Request-Cath Lab: INSERTION, CATHETER, RIGHT HEART

## 2020-09-03 NOTE — Clinical Note
Catheter is repositioned to the LSVC.  Angiography performed via power injection. Injection was 20 mL contrast at 10 mL/s. Power injection PSI was 900..

## 2020-09-03 NOTE — Clinical Note
Catheter is inserted into the inferior vena cava.  Angiography performed via power injection. Injection was 30 mL contrast at 20 mL/s. Power injection PSI was 900..

## 2020-09-04 ENCOUNTER — CONFERENCE (OUTPATIENT)
Dept: CARDIOLOGY | Facility: CLINIC | Age: 25
End: 2020-09-04

## 2020-09-04 LAB
GLUCOSE SERPL-MCNC: 91 MG/DL (ref 70–110)
HCO3 UR-SCNC: 23.1 MMOL/L (ref 24–28)
HCT VFR BLD CALC: 48 %PCV (ref 36–54)
PCO2 BLDA: 40.5 MMHG (ref 35–45)
PH SMN: 7.36 [PH] (ref 7.35–7.45)
PO2 BLDA: 93 MMHG (ref 80–100)
POC ACTIVATED CLOTTING TIME K: 197 SEC (ref 74–137)
POC BE: -2 MMOL/L
POC IONIZED CALCIUM: 1.15 MMOL/L (ref 1.06–1.42)
POC SATURATED O2: 97 % (ref 95–100)
POC TCO2: 24 MMOL/L (ref 23–27)
POTASSIUM BLD-SCNC: 3.5 MMOL/L (ref 3.5–5.1)
SAMPLE: ABNORMAL
SAMPLE: ABNORMAL
SODIUM BLD-SCNC: 144 MMOL/L (ref 136–145)

## 2020-09-04 NOTE — CARE UPDATE
Ambulated with RN. Pt tolerated well. L groin dressing C/D/I. No active bleeding. No hematoma. Will continue to monitor.

## 2020-09-04 NOTE — ANESTHESIA POSTPROCEDURE EVALUATION
Anesthesia Post Evaluation    Patient: Juani Reilly    Procedure(s) Performed: Procedure(s) (LRB):  CATHETERIZATION, HEART, RIGHT, FOR CONGENITAL HEART DEFECT (N/A)  AORTOGRAM, AORTIC ARCH (N/A)  Venogram, Cath Lab  Left heart cath (Left)    Final Anesthesia Type: general    Patient location during evaluation: PACU  Patient participation: Yes- Able to Participate  Level of consciousness: awake and alert and oriented  Post-procedure vital signs: reviewed and stable  Pain management: adequate  Airway patency: patent    PONV status at discharge: No PONV  Anesthetic complications: no      Cardiovascular status: blood pressure returned to baseline and hemodynamically stable  Respiratory status: unassisted  Hydration status: euvolemic  Follow-up not needed.          Vitals Value Taken Time   /92 09/03/20 2030   Temp  09/04/20 1629   Pulse 89 09/03/20 2030   Resp 16 09/03/20 1745   SpO2 95 % 09/03/20 1830         No case tracking events are documented in the log.      Pain/Kristel Score: No data recorded

## 2020-09-04 NOTE — NURSING
Patient discharged per MD orders. Instructions given on medications, wound care, activity, signs of infection, when to call MD, and follow up appointments. Pt verbalized understanding.  Patient AAOx4, VSS, no c/o pain or discomfort at this time. Telemetry and PIV removed. Patient left unit via wheelchair with family.

## 2020-09-04 NOTE — NURSING
Received report from cath lab RN. Patient s/p C&C, AAOx4. VSS, no c/o pain or discomfort at this time, resp even and unlabored. Gauze/tegaderm dressing to L groin is CDI. No active bleeding. No hematoma noted. Post procedure protocol reviewed with patient and patient's family. Understanding verbalized. Family member at bedside. Nurse call bell within reach. Will continue to monitor per post procedure protocol.

## 2020-09-08 NOTE — PROGRESS NOTES
On 9/4/2020- pt recent cath and clinical data reviewed at Peds/[facility] Conference. Npting excellen hemodynamics and a small baffle leak- team agreed [facility] as [facility] ([facility]) . Will need [facility]/EP follow up related to atrial tach poss WPW with Dr Ferguson.

## 2020-09-16 ENCOUNTER — NURSE TRIAGE (OUTPATIENT)
Dept: ADMINISTRATIVE | Facility: CLINIC | Age: 25
End: 2020-09-16

## 2020-09-16 NOTE — TELEPHONE ENCOUNTER
Post procedure - Pt has been seen by MD since procedure. No contact required as per post procedure protocol.    Reason for Disposition   Caller has cancelled the call before the first contact    Protocols used: NO CONTACT OR DUPLICATE CONTACT CALL-A-AH

## 2020-11-12 ENCOUNTER — HOSPITAL ENCOUNTER (OUTPATIENT)
Dept: PEDIATRIC CARDIOLOGY | Facility: HOSPITAL | Age: 25
Discharge: HOME | End: 2020-11-12
Attending: PHYSICIAN ASSISTANT
Payer: MEDICAID

## 2020-11-12 ENCOUNTER — OFFICE VISIT (OUTPATIENT)
Dept: CARDIOLOGY | Facility: CLINIC | Age: 25
End: 2020-11-12
Payer: MEDICAID

## 2020-11-12 VITALS
HEIGHT: 66 IN | WEIGHT: 138.69 LBS | HEART RATE: 76 BPM | DIASTOLIC BLOOD PRESSURE: 88 MMHG | BODY MASS INDEX: 22.29 KG/M2 | OXYGEN SATURATION: 92 % | SYSTOLIC BLOOD PRESSURE: 148 MMHG

## 2020-11-12 DIAGNOSIS — Q24.9 CONGENITAL HEART DISEASE: ICD-10-CM

## 2020-11-12 DIAGNOSIS — I10 ESSENTIAL HYPERTENSION: ICD-10-CM

## 2020-11-12 DIAGNOSIS — Z98.890 S/P FONTAN PROCEDURE: ICD-10-CM

## 2020-11-12 DIAGNOSIS — I47.10 SVT (SUPRAVENTRICULAR TACHYCARDIA): Chronic | ICD-10-CM

## 2020-11-12 DIAGNOSIS — I47.10 SVT (SUPRAVENTRICULAR TACHYCARDIA): ICD-10-CM

## 2020-11-12 DIAGNOSIS — K74.69 OTHER CIRRHOSIS OF LIVER: ICD-10-CM

## 2020-11-12 PROCEDURE — 99214 OFFICE O/P EST MOD 30 MIN: CPT | Mod: S$PBB,,, | Performed by: PEDIATRICS

## 2020-11-12 PROCEDURE — 99999 PR PBB SHADOW E&M-EST. PATIENT-LVL III: ICD-10-PCS | Mod: PBBFAC,,, | Performed by: PEDIATRICS

## 2020-11-12 PROCEDURE — 0295T: ICD-10-PCS | Mod: ,,, | Performed by: PEDIATRICS

## 2020-11-12 PROCEDURE — 99213 OFFICE O/P EST LOW 20 MIN: CPT | Mod: PBBFAC | Performed by: PEDIATRICS

## 2020-11-12 PROCEDURE — 0295T: CPT | Mod: ,,, | Performed by: PEDIATRICS

## 2020-11-12 PROCEDURE — 99999 PR PBB SHADOW E&M-EST. PATIENT-LVL III: CPT | Mod: PBBFAC,,, | Performed by: PEDIATRICS

## 2020-11-12 PROCEDURE — 99214 PR OFFICE/OUTPT VISIT, EST, LEVL IV, 30-39 MIN: ICD-10-PCS | Mod: S$PBB,,, | Performed by: PEDIATRICS

## 2020-11-12 RX ORDER — ASPIRIN 81 MG/1
81 TABLET ORAL DAILY
Refills: 0
Start: 2020-11-12 | End: 2022-09-15

## 2020-11-12 RX ORDER — LISINOPRIL 5 MG/1
5 TABLET ORAL DAILY
Qty: 90 TABLET | Refills: 3 | Status: SHIPPED | OUTPATIENT
Start: 2020-11-12 | End: 2022-09-15

## 2020-11-12 NOTE — ASSESSMENT & PLAN NOTE
Normal Fontal pressures on last cath (11mmHg). 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

## 2020-11-12 NOTE — ASSESSMENT & PLAN NOTE
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 He was scheduled to see them back in July and did not show up. He needs to follow up with them given increased risk of HCC, portal hypertension, and esophageal varices.

## 2020-11-12 NOTE — ASSESSMENT & PLAN NOTE
Persistently elevated in clinic. Not monitored at home. Will start on lisinopril 5 mg daily today.

## 2020-11-12 NOTE — ASSESSMENT & PLAN NOTE
On Lopressor 100 mg BID. He continues to have recurrent SVT resistant to BB therapy. Followed by Dr. Ferguson/Dr. Alarcon. Needs to follow up with them for re discuss EPS/ablation vs initiation of antiarrhythmic therapy.

## 2020-11-13 ENCOUNTER — TELEPHONE (OUTPATIENT)
Dept: PEDIATRIC CARDIOLOGY | Facility: CLINIC | Age: 25
End: 2020-11-13

## 2020-11-13 DIAGNOSIS — I45.6 WPW SYNDROME: Primary | Chronic | ICD-10-CM

## 2020-11-13 NOTE — TELEPHONE ENCOUNTER
Spoke with patients grandmother to schedule follow up for SVT. Grandmother accepted appointment on Thursday 11/19, with EKG @ 3 and appointment at 3:30.

## 2020-11-19 ENCOUNTER — HOSPITAL ENCOUNTER (OUTPATIENT)
Dept: CARDIOLOGY | Facility: CLINIC | Age: 25
Discharge: HOME | End: 2020-11-19
Payer: MEDICAID

## 2020-11-19 ENCOUNTER — OFFICE VISIT (OUTPATIENT)
Dept: CARDIOLOGY | Facility: CLINIC | Age: 25
End: 2020-11-19
Payer: MEDICAID

## 2020-11-19 VITALS
OXYGEN SATURATION: 92 % | HEIGHT: 66 IN | DIASTOLIC BLOOD PRESSURE: 106 MMHG | WEIGHT: 141.56 LBS | SYSTOLIC BLOOD PRESSURE: 176 MMHG | HEART RATE: 78 BPM | BODY MASS INDEX: 22.75 KG/M2

## 2020-11-19 DIAGNOSIS — I47.10 SVT (SUPRAVENTRICULAR TACHYCARDIA): Chronic | ICD-10-CM

## 2020-11-19 DIAGNOSIS — Q20.4 SINGLE VENTRICLE WITH TRICUSPID ATRESIA: ICD-10-CM

## 2020-11-19 DIAGNOSIS — I45.6 WPW SYNDROME: Chronic | ICD-10-CM

## 2020-11-19 DIAGNOSIS — Q24.9 ADULT CONGENITAL HEART DISEASE: ICD-10-CM

## 2020-11-19 DIAGNOSIS — Z98.890 S/P FONTAN PROCEDURE: Primary | ICD-10-CM

## 2020-11-19 DIAGNOSIS — Q22.4 SINGLE VENTRICLE WITH TRICUSPID ATRESIA: ICD-10-CM

## 2020-11-19 PROCEDURE — 93010 EKG 12-LEAD: ICD-10-PCS | Mod: S$PBB,,, | Performed by: INTERNAL MEDICINE

## 2020-11-19 PROCEDURE — 99999 PR PBB SHADOW E&M-EST. PATIENT-LVL III: ICD-10-PCS | Mod: PBBFAC,,, | Performed by: PEDIATRICS

## 2020-11-19 PROCEDURE — 99215 OFFICE O/P EST HI 40 MIN: CPT | Mod: 25,S$PBB,, | Performed by: PEDIATRICS

## 2020-11-19 PROCEDURE — 93010 ELECTROCARDIOGRAM REPORT: CPT | Mod: S$PBB,,, | Performed by: INTERNAL MEDICINE

## 2020-11-19 PROCEDURE — 99213 OFFICE O/P EST LOW 20 MIN: CPT | Mod: PBBFAC,25 | Performed by: PEDIATRICS

## 2020-11-19 PROCEDURE — 99999 PR PBB SHADOW E&M-EST. PATIENT-LVL III: CPT | Mod: PBBFAC,,, | Performed by: PEDIATRICS

## 2020-11-19 PROCEDURE — 93005 ELECTROCARDIOGRAM TRACING: CPT | Mod: PBBFAC | Performed by: INTERNAL MEDICINE

## 2020-11-19 PROCEDURE — 99215 PR OFFICE/OUTPT VISIT, EST, LEVL V, 40-54 MIN: ICD-10-PCS | Mod: 25,S$PBB,, | Performed by: PEDIATRICS

## 2020-11-19 NOTE — PROGRESS NOTES
[facility]  Juani Reilly  1995    Subjective:     Juani is here today with his mother. He comes in for evaluation of the following concerns:   1. S/P Fontan procedure    2. WPW syndrome    3. Adult congenital heart disease    4. Single ventricle with tricuspid atresia    5. SVT (supraventricular tachycardia)          HPI:     Juani is a 25 y.o. male with history of tricuspia atresia with extracardiac Fontan and WPW/SVT.  He is referred here to discuss rhythm control.  He has had breakthrough SVT in past requiring adenosine (last episode July 2020).  His most recent Holter was in October 2019 with no arrhythmias.  He is complaining of chest pain today.  He took his metoprolol and lisinopril at same time this morning instead of taking metoprolol in AM and lisinopril around noon like usual.  His heart is racing right now but he does not think he's in SVT.  He just found out his paternal grandmother who has been in hospice passed away.    There are no reports of syncope. No other cardiovascular or medical concerns are reported.     LHC/RHC 9/3/20  IMPRESSION:  1) Tricuspid atresia s/p intra-atrial Fontan  2) Normal Fontan pressures (11mmHg) and wedge pressure (8mmHg).  3) Normal cardiac output and vascular resistance calculations  4) Small atrial baffle leak  5) No significant aorta-pulmonary or veno-venous collaterals  6) Occlusion of right common femoral artery     Cardiac MRI (January 2020)  Conclusion:   1. SVC to RPA unobstructed (Jesus)  2. Extracardiac Fontan with low flow but no evidence of thrombus.  3. Unobstructed ASD  4. Tricuspid Atresia  5. No obstruction to the right or left PA with Q flow of 21cc through each.  6. Increased LV mass and volume with systemic LVEF of 42%  7. Dilated sinus of Valsalva 49mm  8. Trace AI with regurgitation fraction of 8%    Medications:   Current Outpatient Medications on File Prior to Visit   Medication Sig    aspirin (ECOTRIN) 81 MG EC  tablet Take 1 tablet (81 mg total) by mouth once daily.    lisinopriL (PRINIVIL,ZESTRIL) 5 MG tablet Take 1 tablet (5 mg total) by mouth once daily.    losartan-hydrochlorothiazide 100-12.5 mg (HYZAAR) 100-12.5 mg Tab Take 1 tablet by mouth once daily. (Patient not taking: Reported on 11/12/2020)    metoprolol tartrate (LOPRESSOR) 100 MG tablet Take 1 tablet (100 mg total) by mouth 2 (two) times daily. (Patient taking differently: Take 50 mg by mouth 2 (two) times daily. )     No current facility-administered medications on file prior to visit.      Allergies: Review of patient's allergies indicates:  No Known Allergies  Immunization Status: stated as current, but no records available.     Family History   Problem Relation Age of Onset    No Known Problems Mother     No Known Problems Father     No Known Problems Sister     No Known Problems Brother     No Known Problems Maternal Aunt     No Known Problems Maternal Uncle     No Known Problems Paternal Aunt     No Known Problems Paternal Uncle     No Known Problems Maternal Grandmother     Heart disease Maternal Grandfather     No Known Problems Paternal Grandmother     No Known Problems Paternal Grandfather     Anemia Neg Hx     Arrhythmia Neg Hx     Asthma Neg Hx     Clotting disorder Neg Hx     Fainting Neg Hx     Heart attack Neg Hx     Heart failure Neg Hx     Hyperlipidemia Neg Hx     Hypertension Neg Hx     Stroke Neg Hx     Atrial Septal Defect Neg Hx      Past Medical History:   Diagnosis Date    History of heart surgery     Other cirrhosis of liver 11/12/2020    SVT (supraventricular tachycardia)     WPW syndrome      Family and past medical history reviewed and present in electronic medical record.     ROS:     Review of Systems   Constitutional: Negative for activity change, fatigue and unexpected weight change.   HENT: Negative for congestion, dental problem, ear discharge, facial swelling, hearing loss and nosebleeds.     Eyes: Negative for discharge and redness.   Respiratory: Negative for cough, shortness of breath and wheezing.    Cardiovascular: Negative for chest pain, palpitations and leg swelling.   Gastrointestinal: Negative for abdominal distention, abdominal pain, diarrhea, nausea and vomiting.   Musculoskeletal: Negative for arthralgias, joint swelling and neck pain.   Skin: Negative for color change and pallor.   Neurological: Negative for dizziness, syncope, facial asymmetry and light-headedness.   Hematological: Does not bruise/bleed easily.       Objective:     Physical Exam  Constitutional:       General: He is not in acute distress.     Appearance: He is well-developed.   HENT:      Head: Normocephalic and atraumatic.      Nose: Nose normal.   Eyes:      Conjunctiva/sclera: Conjunctivae normal.   Neck:      Musculoskeletal: Normal range of motion and neck supple.   Cardiovascular:      Rate and Rhythm: Normal rate and regular rhythm.      Heart sounds: No murmur. No friction rub. No gallop.       Comments: Single S2  Pulmonary:      Effort: Pulmonary effort is normal. No respiratory distress.      Breath sounds: Normal breath sounds. No rales.   Abdominal:      General: Bowel sounds are normal. There is no distension.      Palpations: Abdomen is soft. There is no mass.      Tenderness: There is no abdominal tenderness.   Musculoskeletal: Normal range of motion.   Skin:     General: Skin is warm and dry.      Coloration: Skin is not pale.   Neurological:      Mental Status: He is alert and oriented to person, place, and time.      Motor: No abnormal muscle tone.         Tests:     I evaluated the following studies:   EKG:  Sinus rhythm with short WV, NSTT      Assessment:     1. S/P Fontan procedure    2. WPW syndrome    3. Adult congenital heart disease    4. Single ventricle with tricuspid atresia    5. SVT (supraventricular tachycardia)          Impression:     It is my impression that Juani Reilly has a  history of TA s/p extracardiac Fontan and SVT (ECGs suspicious for WPW).  It seems as though he had not been taking his metoprolol when he had SVT and he has been doing fine since he started it. However, he is somewhat distraught about the death of his grandmother today.  I discussed my findings with Juani and his mother and answered all questions.  If he has breakthrough SVT on metoprolol we can consider transition to a different medication such as sotalol or an ablation.  He will notify us for any concerns.  Repeat BP today 150's over 90's.    Plan:     Activity:  Self limit    Medications:  No new    Endocarditis prophylaxis is recommended in this circumstance.     Follow-Up:     Follow-Up clinic visit  6  Months with ECG.

## 2021-01-09 ENCOUNTER — ANESTHESIA EVENT (OUTPATIENT)
Dept: CARDIOLOGY | Facility: OTHER | Age: 26
DRG: 308 | End: 2021-01-09
Payer: MEDICAID

## 2021-01-09 ENCOUNTER — HOSPITAL ENCOUNTER (EMERGENCY)
Facility: HOSPITAL | Age: 26
Discharge: ANOTHER HOSPITAL | End: 2021-01-09
Attending: FAMILY MEDICINE
Payer: MEDICAID

## 2021-01-09 ENCOUNTER — ANESTHESIA (OUTPATIENT)
Dept: CARDIOLOGY | Facility: OTHER | Age: 26
DRG: 308 | End: 2021-01-09
Payer: MEDICAID

## 2021-01-09 ENCOUNTER — HOSPITAL ENCOUNTER (INPATIENT)
Facility: OTHER | Age: 26
LOS: 1 days | Discharge: HOME | DRG: 308 | End: 2021-01-10
Attending: EMERGENCY MEDICINE | Admitting: INTERNAL MEDICINE
Payer: MEDICAID

## 2021-01-09 VITALS
TEMPERATURE: 97 F | HEIGHT: 66 IN | RESPIRATION RATE: 29 BRPM | WEIGHT: 146 LBS | BODY MASS INDEX: 23.46 KG/M2 | SYSTOLIC BLOOD PRESSURE: 119 MMHG | DIASTOLIC BLOOD PRESSURE: 86 MMHG | HEART RATE: 159 BPM | OXYGEN SATURATION: 99 %

## 2021-01-09 DIAGNOSIS — I95.9 HYPOTENSION, UNSPECIFIED HYPOTENSION TYPE: ICD-10-CM

## 2021-01-09 DIAGNOSIS — I48.91 ATRIAL FIBRILLATION, UNSPECIFIED TYPE: ICD-10-CM

## 2021-01-09 DIAGNOSIS — R00.0 TACHYCARDIA: Primary | ICD-10-CM

## 2021-01-09 DIAGNOSIS — I47.10 SVT (SUPRAVENTRICULAR TACHYCARDIA): Primary | ICD-10-CM

## 2021-01-09 DIAGNOSIS — R00.2 PALPITATIONS: ICD-10-CM

## 2021-01-09 DIAGNOSIS — R00.0 TACHYCARDIA: ICD-10-CM

## 2021-01-09 DIAGNOSIS — I47.10 SVT (SUPRAVENTRICULAR TACHYCARDIA): ICD-10-CM

## 2021-01-09 LAB
ALBUMIN SERPL BCP-MCNC: 5.5 G/DL (ref 3.5–5.2)
ALP SERPL-CCNC: 47 U/L (ref 38–126)
ALT SERPL W/O P-5'-P-CCNC: 33 U/L (ref 10–44)
ANION GAP SERPL CALC-SCNC: 12 MMOL/L (ref 8–16)
APTT BLDCRRT: 25.6 SEC (ref 21–32)
AST SERPL-CCNC: 31 U/L (ref 15–46)
BASOPHILS # BLD AUTO: 0.04 K/UL (ref 0–0.2)
BASOPHILS NFR BLD: 0.5 % (ref 0–1.9)
BILIRUB SERPL-MCNC: 2.5 MG/DL (ref 0.1–1)
CALCIUM SERPL-MCNC: 9.3 MG/DL (ref 8.7–10.5)
CHLORIDE SERPL-SCNC: 107 MMOL/L (ref 95–110)
CO2 SERPL-SCNC: 25 MMOL/L (ref 23–29)
CREAT SERPL-MCNC: 0.9 MG/DL (ref 0.5–1.4)
DIFFERENTIAL METHOD: ABNORMAL
EOSINOPHIL # BLD AUTO: 0 K/UL (ref 0–0.5)
EOSINOPHIL NFR BLD: 0.2 % (ref 0–8)
ERYTHROCYTE [DISTWIDTH] IN BLOOD BY AUTOMATED COUNT: 13.2 % (ref 11.5–14.5)
EST. GFR  (AFRICAN AMERICAN): >60 ML/MIN/1.73 M^2
EST. GFR  (NON AFRICAN AMERICAN): >60 ML/MIN/1.73 M^2
GLUCOSE SERPL-MCNC: 141 MG/DL (ref 70–110)
HCT VFR BLD AUTO: 54.7 % (ref 40–54)
HGB BLD-MCNC: 17.5 G/DL (ref 14–18)
IMM GRANULOCYTES # BLD AUTO: 0.06 K/UL (ref 0–0.04)
IMM GRANULOCYTES NFR BLD AUTO: 0.7 % (ref 0–0.5)
INR PPP: 1.2 (ref 0.8–1.2)
LYMPHOCYTES # BLD AUTO: 3.9 K/UL (ref 1–4.8)
LYMPHOCYTES NFR BLD: 47.4 % (ref 18–48)
MAGNESIUM SERPL-MCNC: 2.3 MG/DL (ref 1.6–2.6)
MCH RBC QN AUTO: 30 PG (ref 27–31)
MCHC RBC AUTO-ENTMCNC: 32 G/DL (ref 32–36)
MCV RBC AUTO: 94 FL (ref 82–98)
MONOCYTES # BLD AUTO: 0.7 K/UL (ref 0.3–1)
MONOCYTES NFR BLD: 7.9 % (ref 4–15)
NEUTROPHILS # BLD AUTO: 3.6 K/UL (ref 1.8–7.7)
NEUTROPHILS NFR BLD: 43.3 % (ref 38–73)
NRBC BLD-RTO: 0 /100 WBC
NT-PROBNP SERPL-MCNC: 152 PG/ML (ref 5–450)
PLATELET # BLD AUTO: 163 K/UL (ref 150–350)
PMV BLD AUTO: 10.5 FL (ref 9.2–12.9)
POTASSIUM SERPL-SCNC: 4.3 MMOL/L (ref 3.5–5.1)
PROT SERPL-MCNC: 9.5 G/DL (ref 6–8.4)
PROTHROMBIN TIME: 12.7 SEC (ref 9–12.5)
RBC # BLD AUTO: 5.84 M/UL (ref 4.6–6.2)
SARS-COV-2 RDRP RESP QL NAA+PROBE: NEGATIVE
SODIUM SERPL-SCNC: 144 MMOL/L (ref 136–145)
TROPONIN I SERPL DL<=0.01 NG/ML-MCNC: 0.04 NG/ML (ref 0–0.03)
TROPONIN I SERPL-MCNC: 0.02 NG/ML (ref 0.01–0.03)
UUN UR-MCNC: 7 MG/DL (ref 2–20)
WBC # BLD AUTO: 8.23 K/UL (ref 3.9–12.7)

## 2021-01-09 PROCEDURE — 25000003 PHARM REV CODE 250: Performed by: INTERNAL MEDICINE

## 2021-01-09 PROCEDURE — 99152 MOD SED SAME PHYS/QHP 5/>YRS: CPT

## 2021-01-09 PROCEDURE — 25000003 PHARM REV CODE 250: Mod: ER | Performed by: FAMILY MEDICINE

## 2021-01-09 PROCEDURE — 27100108: Mod: ER

## 2021-01-09 PROCEDURE — 99223 PR INITIAL HOSPITAL CARE,LEVL III: ICD-10-PCS | Mod: 25,,, | Performed by: INTERNAL MEDICINE

## 2021-01-09 PROCEDURE — 63600175 PHARM REV CODE 636 W HCPCS: Performed by: INTERNAL MEDICINE

## 2021-01-09 PROCEDURE — 96374 THER/PROPH/DIAG INJ IV PUSH: CPT | Mod: ER

## 2021-01-09 PROCEDURE — 92960 CARDIOVERSION ELECTRIC EXT: CPT | Performed by: INTERNAL MEDICINE

## 2021-01-09 PROCEDURE — 93010 ELECTROCARDIOGRAM REPORT: CPT | Mod: ,,, | Performed by: INTERNAL MEDICINE

## 2021-01-09 PROCEDURE — 27000221 HC OXYGEN, UP TO 24 HOURS: Mod: ER

## 2021-01-09 PROCEDURE — 99223 PR INITIAL HOSPITAL CARE,LEVL III: ICD-10-PCS | Mod: ,,, | Performed by: INTERNAL MEDICINE

## 2021-01-09 PROCEDURE — 96374 THER/PROPH/DIAG INJ IV PUSH: CPT

## 2021-01-09 PROCEDURE — 99900035 HC TECH TIME PER 15 MIN (STAT): Mod: ER

## 2021-01-09 PROCEDURE — 96376 TX/PRO/DX INJ SAME DRUG ADON: CPT | Mod: ER

## 2021-01-09 PROCEDURE — 20000000 HC ICU ROOM

## 2021-01-09 PROCEDURE — 83735 ASSAY OF MAGNESIUM: CPT | Mod: ER

## 2021-01-09 PROCEDURE — 99291 CRITICAL CARE FIRST HOUR: CPT | Mod: 25,ER

## 2021-01-09 PROCEDURE — 84484 ASSAY OF TROPONIN QUANT: CPT | Mod: 91

## 2021-01-09 PROCEDURE — 80053 COMPREHEN METABOLIC PANEL: CPT | Mod: ER

## 2021-01-09 PROCEDURE — 94761 N-INVAS EAR/PLS OXIMETRY MLT: CPT

## 2021-01-09 PROCEDURE — 99292 CRITICAL CARE ADDL 30 MIN: CPT | Mod: ER

## 2021-01-09 PROCEDURE — 63600175 PHARM REV CODE 636 W HCPCS: Mod: ER | Performed by: FAMILY MEDICINE

## 2021-01-09 PROCEDURE — 84484 ASSAY OF TROPONIN QUANT: CPT | Mod: ER

## 2021-01-09 PROCEDURE — 25000003 PHARM REV CODE 250: Performed by: REGISTERED NURSE

## 2021-01-09 PROCEDURE — 85025 COMPLETE CBC W/AUTO DIFF WBC: CPT | Mod: ER

## 2021-01-09 PROCEDURE — 96375 TX/PRO/DX INJ NEW DRUG ADDON: CPT | Mod: ER,59

## 2021-01-09 PROCEDURE — 93010 EKG 12-LEAD: ICD-10-PCS | Mod: ,,, | Performed by: INTERNAL MEDICINE

## 2021-01-09 PROCEDURE — 99223 1ST HOSP IP/OBS HIGH 75: CPT | Mod: ,,, | Performed by: INTERNAL MEDICINE

## 2021-01-09 PROCEDURE — 63600175 PHARM REV CODE 636 W HCPCS: Performed by: REGISTERED NURSE

## 2021-01-09 PROCEDURE — 93005 ELECTROCARDIOGRAM TRACING: CPT | Mod: ER

## 2021-01-09 PROCEDURE — 99152 MOD SED SAME PHYS/QHP 5/>YRS: CPT | Mod: ER

## 2021-01-09 PROCEDURE — 92960 PR CARDIOVERSION, ELECTIVE;EXTERN: ICD-10-PCS | Mod: ,,, | Performed by: INTERNAL MEDICINE

## 2021-01-09 PROCEDURE — 92960 CARDIOVERSION ELECTRIC EXT: CPT | Mod: ,,, | Performed by: INTERNAL MEDICINE

## 2021-01-09 PROCEDURE — 37000008 HC ANESTHESIA 1ST 15 MINUTES: Performed by: INTERNAL MEDICINE

## 2021-01-09 PROCEDURE — 83880 ASSAY OF NATRIURETIC PEPTIDE: CPT | Mod: ER

## 2021-01-09 PROCEDURE — 36415 COLL VENOUS BLD VENIPUNCTURE: CPT

## 2021-01-09 PROCEDURE — 85610 PROTHROMBIN TIME: CPT | Mod: ER

## 2021-01-09 PROCEDURE — U0002 COVID-19 LAB TEST NON-CDC: HCPCS | Mod: ER

## 2021-01-09 PROCEDURE — 37000009 HC ANESTHESIA EA ADD 15 MINS: Performed by: INTERNAL MEDICINE

## 2021-01-09 PROCEDURE — 99291 CRITICAL CARE FIRST HOUR: CPT | Mod: 25

## 2021-01-09 PROCEDURE — 99223 1ST HOSP IP/OBS HIGH 75: CPT | Mod: 25,,, | Performed by: INTERNAL MEDICINE

## 2021-01-09 PROCEDURE — 85730 THROMBOPLASTIN TIME PARTIAL: CPT | Mod: ER

## 2021-01-09 PROCEDURE — 99153 MOD SED SAME PHYS/QHP EA: CPT

## 2021-01-09 RX ORDER — ADENOSINE 3 MG/ML
6 INJECTION, SOLUTION INTRAVENOUS
Status: COMPLETED | OUTPATIENT
Start: 2021-01-09 | End: 2021-01-09

## 2021-01-09 RX ORDER — ONDANSETRON 2 MG/ML
4 INJECTION INTRAMUSCULAR; INTRAVENOUS DAILY PRN
Status: DISCONTINUED | OUTPATIENT
Start: 2021-01-09 | End: 2021-01-10 | Stop reason: HOSPADM

## 2021-01-09 RX ORDER — LIDOCAINE HCL/PF 100 MG/5ML
SYRINGE (ML) INTRAVENOUS
Status: DISCONTINUED | OUTPATIENT
Start: 2021-01-09 | End: 2021-01-09

## 2021-01-09 RX ORDER — SODIUM CHLORIDE 0.9 % (FLUSH) 0.9 %
10 SYRINGE (ML) INJECTION
Status: DISCONTINUED | OUTPATIENT
Start: 2021-01-09 | End: 2021-01-10 | Stop reason: HOSPADM

## 2021-01-09 RX ORDER — PHENYLEPHRINE HYDROCHLORIDE 10 MG/ML
INJECTION INTRAVENOUS
Status: DISCONTINUED | OUTPATIENT
Start: 2021-01-09 | End: 2021-01-09

## 2021-01-09 RX ORDER — SODIUM CHLORIDE, SODIUM LACTATE, POTASSIUM CHLORIDE, CALCIUM CHLORIDE 600; 310; 30; 20 MG/100ML; MG/100ML; MG/100ML; MG/100ML
INJECTION, SOLUTION INTRAVENOUS CONTINUOUS PRN
Status: DISCONTINUED | OUTPATIENT
Start: 2021-01-09 | End: 2021-01-09

## 2021-01-09 RX ORDER — METOPROLOL TARTRATE 50 MG/1
50 TABLET ORAL 2 TIMES DAILY
Status: DISCONTINUED | OUTPATIENT
Start: 2021-01-09 | End: 2021-01-10 | Stop reason: HOSPADM

## 2021-01-09 RX ORDER — MUPIROCIN 20 MG/G
OINTMENT TOPICAL 2 TIMES DAILY
Status: DISCONTINUED | OUTPATIENT
Start: 2021-01-09 | End: 2021-01-10 | Stop reason: HOSPADM

## 2021-01-09 RX ORDER — ETOMIDATE 2 MG/ML
10 INJECTION INTRAVENOUS
Status: COMPLETED | OUTPATIENT
Start: 2021-01-09 | End: 2021-01-09

## 2021-01-09 RX ORDER — LORAZEPAM 2 MG/ML
INJECTION INTRAMUSCULAR
Status: DISCONTINUED
Start: 2021-01-09 | End: 2021-01-09 | Stop reason: HOSPADM

## 2021-01-09 RX ORDER — ADENOSINE 3 MG/ML
12 INJECTION, SOLUTION INTRAVENOUS
Status: COMPLETED | OUTPATIENT
Start: 2021-01-09 | End: 2021-01-09

## 2021-01-09 RX ORDER — MEPERIDINE HYDROCHLORIDE 25 MG/ML
12.5 INJECTION INTRAMUSCULAR; INTRAVENOUS; SUBCUTANEOUS ONCE AS NEEDED
Status: DISCONTINUED | OUTPATIENT
Start: 2021-01-09 | End: 2021-01-10 | Stop reason: HOSPADM

## 2021-01-09 RX ORDER — HYDROMORPHONE HYDROCHLORIDE 2 MG/ML
0.4 INJECTION, SOLUTION INTRAMUSCULAR; INTRAVENOUS; SUBCUTANEOUS EVERY 5 MIN PRN
Status: DISCONTINUED | OUTPATIENT
Start: 2021-01-09 | End: 2021-01-10 | Stop reason: HOSPADM

## 2021-01-09 RX ORDER — AMIODARONE HYDROCHLORIDE 150 MG/3ML
150 INJECTION, SOLUTION INTRAVENOUS
Status: DISCONTINUED | OUTPATIENT
Start: 2021-01-09 | End: 2021-01-09

## 2021-01-09 RX ORDER — METOPROLOL TARTRATE 1 MG/ML
2.5 INJECTION, SOLUTION INTRAVENOUS
Status: COMPLETED | OUTPATIENT
Start: 2021-01-09 | End: 2021-01-09

## 2021-01-09 RX ORDER — METOPROLOL TARTRATE 1 MG/ML
INJECTION, SOLUTION INTRAVENOUS
Status: DISCONTINUED
Start: 2021-01-09 | End: 2021-01-09 | Stop reason: HOSPADM

## 2021-01-09 RX ORDER — ONDANSETRON 2 MG/ML
4 INJECTION INTRAMUSCULAR; INTRAVENOUS EVERY 8 HOURS PRN
Status: DISCONTINUED | OUTPATIENT
Start: 2021-01-09 | End: 2021-01-10 | Stop reason: HOSPADM

## 2021-01-09 RX ORDER — ADENOSINE 3 MG/ML
INJECTION, SOLUTION INTRAVENOUS
Status: DISCONTINUED
Start: 2021-01-09 | End: 2021-01-09 | Stop reason: HOSPADM

## 2021-01-09 RX ORDER — ETOMIDATE 2 MG/ML
10 INJECTION INTRAVENOUS
Status: DISCONTINUED | OUTPATIENT
Start: 2021-01-09 | End: 2021-01-09

## 2021-01-09 RX ORDER — TALC
6 POWDER (GRAM) TOPICAL NIGHTLY PRN
Status: DISCONTINUED | OUTPATIENT
Start: 2021-01-09 | End: 2021-01-10 | Stop reason: HOSPADM

## 2021-01-09 RX ORDER — PROPOFOL 10 MG/ML
INJECTION, EMULSION INTRAVENOUS
Status: DISCONTINUED | OUTPATIENT
Start: 2021-01-09 | End: 2021-01-09

## 2021-01-09 RX ORDER — AMIODARONE HYDROCHLORIDE 150 MG/3ML
150 INJECTION, SOLUTION INTRAVENOUS
Status: ACTIVE | OUTPATIENT
Start: 2021-01-09 | End: 2021-01-10

## 2021-01-09 RX ORDER — SODIUM CHLORIDE 0.9 % (FLUSH) 0.9 %
3 SYRINGE (ML) INJECTION
Status: DISCONTINUED | OUTPATIENT
Start: 2021-01-09 | End: 2021-01-10 | Stop reason: HOSPADM

## 2021-01-09 RX ORDER — OXYCODONE HYDROCHLORIDE 5 MG/1
5 TABLET ORAL
Status: DISCONTINUED | OUTPATIENT
Start: 2021-01-09 | End: 2021-01-10 | Stop reason: HOSPADM

## 2021-01-09 RX ADMIN — PROPOFOL 120 MG: 10 INJECTION, EMULSION INTRAVENOUS at 02:01

## 2021-01-09 RX ADMIN — PHENYLEPHRINE HYDROCHLORIDE 100 MCG: 10 INJECTION INTRAVENOUS at 02:01

## 2021-01-09 RX ADMIN — ADENOSINE 12 MG: 3 INJECTION, SOLUTION INTRAVENOUS at 09:01

## 2021-01-09 RX ADMIN — AMIODARONE HYDROCHLORIDE 150 MG: 1.5 INJECTION, SOLUTION INTRAVENOUS at 12:01

## 2021-01-09 RX ADMIN — ETOMIDATE 10 MG: 2 INJECTION INTRAVENOUS at 10:01

## 2021-01-09 RX ADMIN — METOROPROLOL TARTRATE 2.5 MG: 5 INJECTION, SOLUTION INTRAVENOUS at 09:01

## 2021-01-09 RX ADMIN — LORAZEPAM 1 MG: 2 INJECTION INTRAMUSCULAR; INTRAVENOUS at 08:01

## 2021-01-09 RX ADMIN — ADENOSINE 6 MG: 3 INJECTION, SOLUTION INTRAVENOUS at 08:01

## 2021-01-09 RX ADMIN — LORAZEPAM 1 MG: 2 INJECTION INTRAMUSCULAR; INTRAVENOUS at 10:01

## 2021-01-09 RX ADMIN — AMIODARONE HYDROCHLORIDE 1 MG/MIN: 1.8 INJECTION, SOLUTION INTRAVENOUS at 12:01

## 2021-01-09 RX ADMIN — PHENYLEPHRINE HYDROCHLORIDE 150 MCG: 10 INJECTION INTRAVENOUS at 02:01

## 2021-01-09 RX ADMIN — LORAZEPAM 1 MG: 2 INJECTION INTRAMUSCULAR; INTRAVENOUS at 09:01

## 2021-01-09 RX ADMIN — SODIUM CHLORIDE 1000 ML: 0.9 INJECTION, SOLUTION INTRAVENOUS at 08:01

## 2021-01-09 RX ADMIN — METOPROLOL TARTRATE 50 MG: 50 TABLET, FILM COATED ORAL at 09:01

## 2021-01-09 RX ADMIN — MUPIROCIN: 20 OINTMENT TOPICAL at 09:01

## 2021-01-09 RX ADMIN — SODIUM CHLORIDE, SODIUM LACTATE, POTASSIUM CHLORIDE, AND CALCIUM CHLORIDE: .6; .31; .03; .02 INJECTION, SOLUTION INTRAVENOUS at 02:01

## 2021-01-09 RX ADMIN — LIDOCAINE HYDROCHLORIDE 80 MG: 20 INJECTION, SOLUTION INTRAVENOUS at 02:01

## 2021-01-10 VITALS
WEIGHT: 146.38 LBS | DIASTOLIC BLOOD PRESSURE: 87 MMHG | HEART RATE: 84 BPM | BODY MASS INDEX: 22.98 KG/M2 | TEMPERATURE: 98 F | OXYGEN SATURATION: 95 % | SYSTOLIC BLOOD PRESSURE: 144 MMHG | HEIGHT: 67 IN | RESPIRATION RATE: 15 BRPM

## 2021-01-10 LAB
ALBUMIN SERPL BCP-MCNC: 3.6 G/DL (ref 3.5–5.2)
ALP SERPL-CCNC: 40 U/L (ref 55–135)
ALT SERPL W/O P-5'-P-CCNC: 28 U/L (ref 10–44)
ANION GAP SERPL CALC-SCNC: 9 MMOL/L (ref 8–16)
AST SERPL-CCNC: 26 U/L (ref 10–40)
BASOPHILS # BLD AUTO: 0.02 K/UL (ref 0–0.2)
BASOPHILS NFR BLD: 0.2 % (ref 0–1.9)
BILIRUB SERPL-MCNC: 1.1 MG/DL (ref 0.1–1)
BUN SERPL-MCNC: 9 MG/DL (ref 6–20)
CALCIUM SERPL-MCNC: 8.2 MG/DL (ref 8.7–10.5)
CHLORIDE SERPL-SCNC: 108 MMOL/L (ref 95–110)
CO2 SERPL-SCNC: 22 MMOL/L (ref 23–29)
CREAT SERPL-MCNC: 0.9 MG/DL (ref 0.5–1.4)
DIFFERENTIAL METHOD: ABNORMAL
EOSINOPHIL # BLD AUTO: 0 K/UL (ref 0–0.5)
EOSINOPHIL NFR BLD: 0 % (ref 0–8)
ERYTHROCYTE [DISTWIDTH] IN BLOOD BY AUTOMATED COUNT: 12.6 % (ref 11.5–14.5)
EST. GFR  (AFRICAN AMERICAN): >60 ML/MIN/1.73 M^2
EST. GFR  (NON AFRICAN AMERICAN): >60 ML/MIN/1.73 M^2
GLUCOSE SERPL-MCNC: 86 MG/DL (ref 70–110)
HCT VFR BLD AUTO: 44.6 % (ref 40–54)
HGB BLD-MCNC: 14.4 G/DL (ref 14–18)
IMM GRANULOCYTES # BLD AUTO: 0.02 K/UL (ref 0–0.04)
IMM GRANULOCYTES NFR BLD AUTO: 0.2 % (ref 0–0.5)
LYMPHOCYTES # BLD AUTO: 2 K/UL (ref 1–4.8)
LYMPHOCYTES NFR BLD: 23.3 % (ref 18–48)
MAGNESIUM SERPL-MCNC: 1.6 MG/DL (ref 1.6–2.6)
MCH RBC QN AUTO: 29.3 PG (ref 27–31)
MCHC RBC AUTO-ENTMCNC: 32.3 G/DL (ref 32–36)
MCV RBC AUTO: 91 FL (ref 82–98)
MONOCYTES # BLD AUTO: 0.6 K/UL (ref 0.3–1)
MONOCYTES NFR BLD: 6.8 % (ref 4–15)
NEUTROPHILS # BLD AUTO: 5.8 K/UL (ref 1.8–7.7)
NEUTROPHILS NFR BLD: 69.5 % (ref 38–73)
NRBC BLD-RTO: 0 /100 WBC
PHOSPHATE SERPL-MCNC: 2.9 MG/DL (ref 2.7–4.5)
PLATELET # BLD AUTO: 118 K/UL (ref 150–350)
PMV BLD AUTO: 10.1 FL (ref 9.2–12.9)
POTASSIUM SERPL-SCNC: 3.7 MMOL/L (ref 3.5–5.1)
PROT SERPL-MCNC: 5.9 G/DL (ref 6–8.4)
RBC # BLD AUTO: 4.91 M/UL (ref 4.6–6.2)
SODIUM SERPL-SCNC: 139 MMOL/L (ref 136–145)
WBC # BLD AUTO: 8.37 K/UL (ref 3.9–12.7)

## 2021-01-10 PROCEDURE — 84100 ASSAY OF PHOSPHORUS: CPT

## 2021-01-10 PROCEDURE — 99239 PR HOSPITAL DISCHARGE DAY,>30 MIN: ICD-10-PCS | Mod: ,,, | Performed by: INTERNAL MEDICINE

## 2021-01-10 PROCEDURE — 99233 SBSQ HOSP IP/OBS HIGH 50: CPT | Mod: ,,, | Performed by: INTERNAL MEDICINE

## 2021-01-10 PROCEDURE — 27000221 HC OXYGEN, UP TO 24 HOURS

## 2021-01-10 PROCEDURE — 99233 PR SUBSEQUENT HOSPITAL CARE,LEVL III: ICD-10-PCS | Mod: ,,, | Performed by: INTERNAL MEDICINE

## 2021-01-10 PROCEDURE — 94761 N-INVAS EAR/PLS OXIMETRY MLT: CPT

## 2021-01-10 PROCEDURE — 99239 HOSP IP/OBS DSCHRG MGMT >30: CPT | Mod: ,,, | Performed by: INTERNAL MEDICINE

## 2021-01-10 PROCEDURE — 83735 ASSAY OF MAGNESIUM: CPT

## 2021-01-10 PROCEDURE — 36415 COLL VENOUS BLD VENIPUNCTURE: CPT

## 2021-01-10 PROCEDURE — 85025 COMPLETE CBC W/AUTO DIFF WBC: CPT

## 2021-01-10 PROCEDURE — 80053 COMPREHEN METABOLIC PANEL: CPT

## 2021-01-10 PROCEDURE — 25000003 PHARM REV CODE 250: Performed by: INTERNAL MEDICINE

## 2021-01-10 RX ORDER — METOPROLOL TARTRATE 100 MG/1
50 TABLET ORAL 2 TIMES DAILY
Qty: 30 TABLET | Refills: 11 | Status: ON HOLD | OUTPATIENT
Start: 2021-01-10 | End: 2021-03-18 | Stop reason: HOSPADM

## 2021-01-10 RX ADMIN — MUPIROCIN: 20 OINTMENT TOPICAL at 08:01

## 2021-01-10 RX ADMIN — METOPROLOL TARTRATE 50 MG: 50 TABLET, FILM COATED ORAL at 08:01

## 2021-01-11 ENCOUNTER — TELEPHONE (OUTPATIENT)
Dept: PEDIATRIC CARDIOLOGY | Facility: CLINIC | Age: 26
End: 2021-01-11

## 2021-01-12 ENCOUNTER — TELEPHONE (OUTPATIENT)
Dept: ELECTROPHYSIOLOGY | Facility: CLINIC | Age: 26
End: 2021-01-12

## 2021-01-12 ENCOUNTER — PATIENT OUTREACH (OUTPATIENT)
Dept: ADMINISTRATIVE | Facility: CLINIC | Age: 26
End: 2021-01-12

## 2021-01-14 ENCOUNTER — CLINICAL SUPPORT (OUTPATIENT)
Dept: PEDIATRIC CARDIOLOGY | Facility: CLINIC | Age: 26
End: 2021-01-14
Payer: MEDICAID

## 2021-01-14 ENCOUNTER — OFFICE VISIT (OUTPATIENT)
Dept: PEDIATRIC CARDIOLOGY | Facility: CLINIC | Age: 26
End: 2021-01-14
Payer: MEDICAID

## 2021-01-14 VITALS
HEIGHT: 68 IN | OXYGEN SATURATION: 93 % | SYSTOLIC BLOOD PRESSURE: 143 MMHG | BODY MASS INDEX: 21.33 KG/M2 | WEIGHT: 140.75 LBS | DIASTOLIC BLOOD PRESSURE: 89 MMHG | HEART RATE: 74 BPM

## 2021-01-14 DIAGNOSIS — I45.6 WPW SYNDROME: Chronic | ICD-10-CM

## 2021-01-14 DIAGNOSIS — I47.10 SVT (SUPRAVENTRICULAR TACHYCARDIA): Chronic | ICD-10-CM

## 2021-01-14 DIAGNOSIS — Z98.890 S/P FONTAN PROCEDURE: Primary | ICD-10-CM

## 2021-01-14 DIAGNOSIS — R00.0 TACHYCARDIA: ICD-10-CM

## 2021-01-14 PROCEDURE — 99215 PR OFFICE/OUTPT VISIT, EST, LEVL V, 40-54 MIN: ICD-10-PCS | Mod: 25,S$PBB,, | Performed by: PEDIATRICS

## 2021-01-14 PROCEDURE — 99215 OFFICE O/P EST HI 40 MIN: CPT | Mod: 25,S$PBB,, | Performed by: PEDIATRICS

## 2021-01-14 PROCEDURE — 99999 PR PBB SHADOW E&M-EST. PATIENT-LVL III: ICD-10-PCS | Mod: PBBFAC,,, | Performed by: PEDIATRICS

## 2021-01-14 PROCEDURE — 93010 EKG 12-LEAD PEDIATRIC: ICD-10-PCS | Mod: S$PBB,,, | Performed by: PEDIATRICS

## 2021-01-14 PROCEDURE — 93010 ELECTROCARDIOGRAM REPORT: CPT | Mod: S$PBB,,, | Performed by: PEDIATRICS

## 2021-01-14 PROCEDURE — 93005 ELECTROCARDIOGRAM TRACING: CPT | Mod: PBBFAC | Performed by: PEDIATRICS

## 2021-01-14 PROCEDURE — 99999 PR PBB SHADOW E&M-EST. PATIENT-LVL III: CPT | Mod: PBBFAC,,, | Performed by: PEDIATRICS

## 2021-01-14 PROCEDURE — 99213 OFFICE O/P EST LOW 20 MIN: CPT | Mod: PBBFAC,25 | Performed by: PEDIATRICS

## 2021-01-15 ENCOUNTER — TELEPHONE (OUTPATIENT)
Dept: PEDIATRIC CARDIOLOGY | Facility: CLINIC | Age: 26
End: 2021-01-15

## 2021-01-15 DIAGNOSIS — I47.10 SVT (SUPRAVENTRICULAR TACHYCARDIA): Primary | Chronic | ICD-10-CM

## 2021-01-15 RX ORDER — FLECAINIDE ACETATE 50 MG/1
50 TABLET ORAL EVERY 12 HOURS
Qty: 60 TABLET | Refills: 11 | Status: ON HOLD | OUTPATIENT
Start: 2021-01-15 | End: 2021-03-18 | Stop reason: HOSPADM

## 2021-01-28 ENCOUNTER — HOSPITAL ENCOUNTER (EMERGENCY)
Facility: HOSPITAL | Age: 26
Discharge: HOME | End: 2021-01-28
Attending: EMERGENCY MEDICINE
Payer: MEDICAID

## 2021-01-28 VITALS
HEART RATE: 95 BPM | OXYGEN SATURATION: 95 % | TEMPERATURE: 98 F | BODY MASS INDEX: 21.22 KG/M2 | WEIGHT: 140 LBS | DIASTOLIC BLOOD PRESSURE: 94 MMHG | RESPIRATION RATE: 18 BRPM | SYSTOLIC BLOOD PRESSURE: 179 MMHG | HEIGHT: 68 IN

## 2021-01-28 DIAGNOSIS — R10.33 PERIUMBILICAL ABDOMINAL PAIN: Primary | ICD-10-CM

## 2021-01-28 DIAGNOSIS — R11.2 NON-INTRACTABLE VOMITING WITH NAUSEA, UNSPECIFIED VOMITING TYPE: ICD-10-CM

## 2021-01-28 DIAGNOSIS — R10.9 ABDOMINAL PAIN: ICD-10-CM

## 2021-01-28 PROCEDURE — 96372 THER/PROPH/DIAG INJ SC/IM: CPT | Mod: ER

## 2021-01-28 PROCEDURE — 99284 EMERGENCY DEPT VISIT MOD MDM: CPT | Mod: 25,ER

## 2021-01-28 PROCEDURE — 63600175 PHARM REV CODE 636 W HCPCS: Mod: ER | Performed by: EMERGENCY MEDICINE

## 2021-01-28 RX ORDER — METOCLOPRAMIDE 10 MG/1
10 TABLET ORAL EVERY 6 HOURS PRN
Qty: 14 TABLET | Refills: 0 | Status: SHIPPED | OUTPATIENT
Start: 2021-01-28 | End: 2022-09-15

## 2021-01-28 RX ORDER — LACTULOSE 10 G/15ML
10 SOLUTION ORAL 2 TIMES DAILY PRN
Qty: 150 ML | Refills: 0 | Status: ON HOLD | OUTPATIENT
Start: 2021-01-28 | End: 2021-03-18 | Stop reason: HOSPADM

## 2021-01-28 RX ORDER — KETOROLAC TROMETHAMINE 30 MG/ML
30 INJECTION, SOLUTION INTRAMUSCULAR; INTRAVENOUS
Status: COMPLETED | OUTPATIENT
Start: 2021-01-28 | End: 2021-01-28

## 2021-01-28 RX ORDER — PROCHLORPERAZINE EDISYLATE 5 MG/ML
10 INJECTION INTRAMUSCULAR; INTRAVENOUS
Status: COMPLETED | OUTPATIENT
Start: 2021-01-28 | End: 2021-01-28

## 2021-01-28 RX ORDER — DICYCLOMINE HYDROCHLORIDE 20 MG/1
20 TABLET ORAL 3 TIMES DAILY PRN
Qty: 14 TABLET | Refills: 0 | Status: SHIPPED | OUTPATIENT
Start: 2021-01-28 | End: 2021-02-27

## 2021-01-28 RX ADMIN — PROCHLORPERAZINE EDISYLATE 10 MG: 5 INJECTION INTRAMUSCULAR; INTRAVENOUS at 10:01

## 2021-01-28 RX ADMIN — KETOROLAC TROMETHAMINE 30 MG: 30 INJECTION, SOLUTION INTRAMUSCULAR at 10:01

## 2021-01-29 ENCOUNTER — TELEPHONE (OUTPATIENT)
Dept: ELECTROPHYSIOLOGY | Facility: CLINIC | Age: 26
End: 2021-01-29

## 2021-02-05 ENCOUNTER — OFFICE VISIT (OUTPATIENT)
Dept: CARDIOLOGY | Facility: CLINIC | Age: 26
End: 2021-02-05
Payer: MEDICAID

## 2021-02-05 VITALS
SYSTOLIC BLOOD PRESSURE: 138 MMHG | HEIGHT: 66 IN | BODY MASS INDEX: 22.5 KG/M2 | WEIGHT: 140 LBS | HEART RATE: 74 BPM | DIASTOLIC BLOOD PRESSURE: 84 MMHG

## 2021-02-05 DIAGNOSIS — Q22.4 SINGLE VENTRICLE WITH TRICUSPID ATRESIA: ICD-10-CM

## 2021-02-05 DIAGNOSIS — I45.6 WPW SYNDROME: Chronic | ICD-10-CM

## 2021-02-05 DIAGNOSIS — Q24.9 ADULT CONGENITAL HEART DISEASE: ICD-10-CM

## 2021-02-05 DIAGNOSIS — Q20.4 SINGLE VENTRICLE WITH TRICUSPID ATRESIA: ICD-10-CM

## 2021-02-05 DIAGNOSIS — I49.8 OTHER SPECIFIED CARDIAC ARRHYTHMIAS: ICD-10-CM

## 2021-02-05 DIAGNOSIS — I47.10 SVT (SUPRAVENTRICULAR TACHYCARDIA): Primary | Chronic | ICD-10-CM

## 2021-02-05 DIAGNOSIS — Z98.890 S/P FONTAN PROCEDURE: ICD-10-CM

## 2021-02-05 PROCEDURE — 93010 ELECTROCARDIOGRAM REPORT: CPT | Mod: S$PBB,,, | Performed by: INTERNAL MEDICINE

## 2021-02-05 PROCEDURE — 93010 RHYTHM STRIP: ICD-10-PCS | Mod: S$PBB,,, | Performed by: INTERNAL MEDICINE

## 2021-02-05 PROCEDURE — 93005 ELECTROCARDIOGRAM TRACING: CPT | Mod: PBBFAC,PO | Performed by: INTERNAL MEDICINE

## 2021-02-05 PROCEDURE — 99999 PR PBB SHADOW E&M-EST. PATIENT-LVL III: CPT | Mod: PBBFAC,,, | Performed by: INTERNAL MEDICINE

## 2021-02-05 PROCEDURE — 99999 PR PBB SHADOW E&M-EST. PATIENT-LVL III: ICD-10-PCS | Mod: PBBFAC,,, | Performed by: INTERNAL MEDICINE

## 2021-02-05 PROCEDURE — 99214 OFFICE O/P EST MOD 30 MIN: CPT | Mod: S$PBB,,, | Performed by: INTERNAL MEDICINE

## 2021-02-05 PROCEDURE — 99213 OFFICE O/P EST LOW 20 MIN: CPT | Mod: PBBFAC,PO | Performed by: INTERNAL MEDICINE

## 2021-02-05 PROCEDURE — 99214 PR OFFICE/OUTPT VISIT, EST, LEVL IV, 30-39 MIN: ICD-10-PCS | Mod: S$PBB,,, | Performed by: INTERNAL MEDICINE

## 2021-02-25 ENCOUNTER — TELEPHONE (OUTPATIENT)
Dept: ELECTROPHYSIOLOGY | Facility: CLINIC | Age: 26
End: 2021-02-25

## 2021-02-26 ENCOUNTER — TELEPHONE (OUTPATIENT)
Dept: ELECTROPHYSIOLOGY | Facility: CLINIC | Age: 26
End: 2021-02-26

## 2021-03-01 ENCOUNTER — TELEPHONE (OUTPATIENT)
Dept: ELECTROPHYSIOLOGY | Facility: CLINIC | Age: 26
End: 2021-03-01

## 2021-03-01 DIAGNOSIS — Z01.812 ENCOUNTER FOR PRE-OPERATIVE LABORATORY TESTING: Primary | ICD-10-CM

## 2021-03-12 ENCOUNTER — LAB VISIT (OUTPATIENT)
Dept: FAMILY MEDICINE | Facility: CLINIC | Age: 26
End: 2021-03-12
Payer: MEDICAID

## 2021-03-12 DIAGNOSIS — Z01.812 ENCOUNTER FOR PRE-OPERATIVE LABORATORY TESTING: ICD-10-CM

## 2021-03-12 PROCEDURE — U0005 INFEC AGEN DETEC AMPLI PROBE: HCPCS | Performed by: INTERNAL MEDICINE

## 2021-03-12 PROCEDURE — U0003 INFECTIOUS AGENT DETECTION BY NUCLEIC ACID (DNA OR RNA); SEVERE ACUTE RESPIRATORY SYNDROME CORONAVIRUS 2 (SARS-COV-2) (CORONAVIRUS DISEASE [COVID-19]), AMPLIFIED PROBE TECHNIQUE, MAKING USE OF HIGH THROUGHPUT TECHNOLOGIES AS DESCRIBED BY CMS-2020-01-R: HCPCS | Performed by: INTERNAL MEDICINE

## 2021-03-13 LAB — SARS-COV-2 RNA RESP QL NAA+PROBE: NOT DETECTED

## 2021-03-15 ENCOUNTER — TELEPHONE (OUTPATIENT)
Dept: ELECTROPHYSIOLOGY | Facility: CLINIC | Age: 26
End: 2021-03-15

## 2021-03-15 ENCOUNTER — HOSPITAL ENCOUNTER (INPATIENT)
Facility: HOSPITAL | Age: 26
LOS: 3 days | Discharge: HOME | DRG: 308 | End: 2021-03-18
Attending: INTERNAL MEDICINE | Admitting: INTERNAL MEDICINE
Payer: MEDICAID

## 2021-03-15 DIAGNOSIS — Z51.81 ENCOUNTER FOR MONITORING SOTALOL THERAPY: ICD-10-CM

## 2021-03-15 DIAGNOSIS — Z79.899 ENCOUNTER FOR MONITORING SOTALOL THERAPY: ICD-10-CM

## 2021-03-15 DIAGNOSIS — I10 ESSENTIAL HYPERTENSION: ICD-10-CM

## 2021-03-15 DIAGNOSIS — Q24.9 ADULT CONGENITAL HEART DISEASE: ICD-10-CM

## 2021-03-15 DIAGNOSIS — Z91.89 AT RISK FOR PROLONGED QT INTERVAL SYNDROME: ICD-10-CM

## 2021-03-15 DIAGNOSIS — E87.6 HYPOKALEMIA: ICD-10-CM

## 2021-03-15 DIAGNOSIS — I45.6 WPW SYNDROME: Primary | ICD-10-CM

## 2021-03-15 DIAGNOSIS — I47.10 SVT (SUPRAVENTRICULAR TACHYCARDIA): ICD-10-CM

## 2021-03-15 DIAGNOSIS — Q24.9 CONGENITAL HEART DISEASE IN ADULT: ICD-10-CM

## 2021-03-15 DIAGNOSIS — Z91.89 AT RISK FOR LONG QT SYNDROME: ICD-10-CM

## 2021-03-15 DIAGNOSIS — R94.31 QT PROLONGATION: ICD-10-CM

## 2021-03-15 DIAGNOSIS — R19.7 DIARRHEA, UNSPECIFIED TYPE: ICD-10-CM

## 2021-03-15 LAB
ALBUMIN SERPL BCP-MCNC: 4.3 G/DL (ref 3.5–5.2)
ALP SERPL-CCNC: 63 U/L (ref 55–135)
ALT SERPL W/O P-5'-P-CCNC: 19 U/L (ref 10–44)
ANION GAP SERPL CALC-SCNC: 14 MMOL/L (ref 8–16)
AST SERPL-CCNC: 15 U/L (ref 10–40)
BASOPHILS # BLD AUTO: 0.02 K/UL (ref 0–0.2)
BASOPHILS NFR BLD: 0.3 % (ref 0–1.9)
BILIRUB SERPL-MCNC: 0.9 MG/DL (ref 0.1–1)
BUN SERPL-MCNC: 4 MG/DL (ref 6–20)
CALCIUM SERPL-MCNC: 9 MG/DL (ref 8.7–10.5)
CHLORIDE SERPL-SCNC: 108 MMOL/L (ref 95–110)
CO2 SERPL-SCNC: 21 MMOL/L (ref 23–29)
CREAT SERPL-MCNC: 0.9 MG/DL (ref 0.5–1.4)
DIFFERENTIAL METHOD: ABNORMAL
EOSINOPHIL # BLD AUTO: 0 K/UL (ref 0–0.5)
EOSINOPHIL NFR BLD: 0.4 % (ref 0–8)
ERYTHROCYTE [DISTWIDTH] IN BLOOD BY AUTOMATED COUNT: 12.9 % (ref 11.5–14.5)
EST. GFR  (AFRICAN AMERICAN): >60 ML/MIN/1.73 M^2
EST. GFR  (NON AFRICAN AMERICAN): >60 ML/MIN/1.73 M^2
GLUCOSE SERPL-MCNC: 77 MG/DL (ref 70–110)
HCT VFR BLD AUTO: 48.9 % (ref 40–54)
HGB BLD-MCNC: 16.5 G/DL (ref 14–18)
IMM GRANULOCYTES # BLD AUTO: 0.02 K/UL (ref 0–0.04)
IMM GRANULOCYTES NFR BLD AUTO: 0.3 % (ref 0–0.5)
LYMPHOCYTES # BLD AUTO: 1.1 K/UL (ref 1–4.8)
LYMPHOCYTES NFR BLD: 15.4 % (ref 18–48)
MAGNESIUM SERPL-MCNC: 2 MG/DL (ref 1.6–2.6)
MCH RBC QN AUTO: 30.4 PG (ref 27–31)
MCHC RBC AUTO-ENTMCNC: 33.7 G/DL (ref 32–36)
MCV RBC AUTO: 90 FL (ref 82–98)
MONOCYTES # BLD AUTO: 0.6 K/UL (ref 0.3–1)
MONOCYTES NFR BLD: 9.2 % (ref 4–15)
NEUTROPHILS # BLD AUTO: 5.1 K/UL (ref 1.8–7.7)
NEUTROPHILS NFR BLD: 74.4 % (ref 38–73)
NRBC BLD-RTO: 0 /100 WBC
PHOSPHATE SERPL-MCNC: 3.3 MG/DL (ref 2.7–4.5)
PLATELET # BLD AUTO: 75 K/UL (ref 150–350)
PMV BLD AUTO: 11.6 FL (ref 9.2–12.9)
POTASSIUM SERPL-SCNC: 4 MMOL/L (ref 3.5–5.1)
PROT SERPL-MCNC: 7.7 G/DL (ref 6–8.4)
RBC # BLD AUTO: 5.42 M/UL (ref 4.6–6.2)
SODIUM SERPL-SCNC: 143 MMOL/L (ref 136–145)
WBC # BLD AUTO: 6.87 K/UL (ref 3.9–12.7)

## 2021-03-15 PROCEDURE — 93005 ELECTROCARDIOGRAM TRACING: CPT

## 2021-03-15 PROCEDURE — 99223 PR INITIAL HOSPITAL CARE,LEVL III: ICD-10-PCS | Mod: ,,, | Performed by: HOSPITALIST

## 2021-03-15 PROCEDURE — 84100 ASSAY OF PHOSPHORUS: CPT | Performed by: STUDENT IN AN ORGANIZED HEALTH CARE EDUCATION/TRAINING PROGRAM

## 2021-03-15 PROCEDURE — 80053 COMPREHEN METABOLIC PANEL: CPT | Performed by: STUDENT IN AN ORGANIZED HEALTH CARE EDUCATION/TRAINING PROGRAM

## 2021-03-15 PROCEDURE — 99223 1ST HOSP IP/OBS HIGH 75: CPT | Mod: ,,, | Performed by: HOSPITALIST

## 2021-03-15 PROCEDURE — 25000003 PHARM REV CODE 250: Performed by: PHYSICIAN ASSISTANT

## 2021-03-15 PROCEDURE — 36415 COLL VENOUS BLD VENIPUNCTURE: CPT | Performed by: STUDENT IN AN ORGANIZED HEALTH CARE EDUCATION/TRAINING PROGRAM

## 2021-03-15 PROCEDURE — 85025 COMPLETE CBC W/AUTO DIFF WBC: CPT | Performed by: STUDENT IN AN ORGANIZED HEALTH CARE EDUCATION/TRAINING PROGRAM

## 2021-03-15 PROCEDURE — 93010 EKG 12-LEAD: ICD-10-PCS | Mod: ,,, | Performed by: INTERNAL MEDICINE

## 2021-03-15 PROCEDURE — 83735 ASSAY OF MAGNESIUM: CPT | Performed by: STUDENT IN AN ORGANIZED HEALTH CARE EDUCATION/TRAINING PROGRAM

## 2021-03-15 PROCEDURE — 99233 SBSQ HOSP IP/OBS HIGH 50: CPT | Mod: ,,, | Performed by: INTERNAL MEDICINE

## 2021-03-15 PROCEDURE — 99233 PR SUBSEQUENT HOSPITAL CARE,LEVL III: ICD-10-PCS | Mod: ,,, | Performed by: INTERNAL MEDICINE

## 2021-03-15 PROCEDURE — 93010 ELECTROCARDIOGRAM REPORT: CPT | Mod: ,,, | Performed by: INTERNAL MEDICINE

## 2021-03-15 PROCEDURE — 20600001 HC STEP DOWN PRIVATE ROOM

## 2021-03-15 RX ORDER — ASPIRIN 81 MG/1
81 TABLET ORAL DAILY
Status: DISCONTINUED | OUTPATIENT
Start: 2021-03-16 | End: 2021-03-18 | Stop reason: HOSPADM

## 2021-03-15 RX ORDER — SODIUM CHLORIDE 0.9 % (FLUSH) 0.9 %
10 SYRINGE (ML) INJECTION
Status: DISCONTINUED | OUTPATIENT
Start: 2021-03-15 | End: 2021-03-18 | Stop reason: HOSPADM

## 2021-03-15 RX ORDER — IBUPROFEN 200 MG
24 TABLET ORAL
Status: DISCONTINUED | OUTPATIENT
Start: 2021-03-15 | End: 2021-03-18 | Stop reason: HOSPADM

## 2021-03-15 RX ORDER — LISINOPRIL 5 MG/1
5 TABLET ORAL DAILY
Status: DISCONTINUED | OUTPATIENT
Start: 2021-03-16 | End: 2021-03-18 | Stop reason: HOSPADM

## 2021-03-15 RX ORDER — GLUCAGON 1 MG
1 KIT INJECTION
Status: DISCONTINUED | OUTPATIENT
Start: 2021-03-15 | End: 2021-03-18 | Stop reason: HOSPADM

## 2021-03-15 RX ORDER — SOTALOL HYDROCHLORIDE 80 MG/1
80 TABLET ORAL 2 TIMES DAILY
Status: DISCONTINUED | OUTPATIENT
Start: 2021-03-15 | End: 2021-03-18 | Stop reason: HOSPADM

## 2021-03-15 RX ORDER — METOCLOPRAMIDE 10 MG/1
10 TABLET ORAL EVERY 6 HOURS PRN
Status: DISCONTINUED | OUTPATIENT
Start: 2021-03-15 | End: 2021-03-18 | Stop reason: HOSPADM

## 2021-03-15 RX ORDER — IBUPROFEN 200 MG
16 TABLET ORAL
Status: DISCONTINUED | OUTPATIENT
Start: 2021-03-15 | End: 2021-03-18 | Stop reason: HOSPADM

## 2021-03-15 RX ADMIN — SOTALOL HYDROCHLORIDE 80 MG: 80 TABLET ORAL at 11:03

## 2021-03-16 LAB
ALBUMIN SERPL BCP-MCNC: 4.3 G/DL (ref 3.5–5.2)
ALP SERPL-CCNC: 61 U/L (ref 55–135)
ALT SERPL W/O P-5'-P-CCNC: 19 U/L (ref 10–44)
ANION GAP SERPL CALC-SCNC: 11 MMOL/L (ref 8–16)
AST SERPL-CCNC: 16 U/L (ref 10–40)
BASOPHILS # BLD AUTO: 0.03 K/UL (ref 0–0.2)
BASOPHILS NFR BLD: 0.5 % (ref 0–1.9)
BILIRUB SERPL-MCNC: 1.5 MG/DL (ref 0.1–1)
BUN SERPL-MCNC: 6 MG/DL (ref 6–20)
CALCIUM SERPL-MCNC: 9.4 MG/DL (ref 8.7–10.5)
CHLORIDE SERPL-SCNC: 107 MMOL/L (ref 95–110)
CO2 SERPL-SCNC: 24 MMOL/L (ref 23–29)
CREAT SERPL-MCNC: 1 MG/DL (ref 0.5–1.4)
DIFFERENTIAL METHOD: NORMAL
EOSINOPHIL # BLD AUTO: 0 K/UL (ref 0–0.5)
EOSINOPHIL NFR BLD: 0.3 % (ref 0–8)
ERYTHROCYTE [DISTWIDTH] IN BLOOD BY AUTOMATED COUNT: 12.9 % (ref 11.5–14.5)
EST. GFR  (AFRICAN AMERICAN): >60 ML/MIN/1.73 M^2
EST. GFR  (NON AFRICAN AMERICAN): >60 ML/MIN/1.73 M^2
GLUCOSE SERPL-MCNC: 81 MG/DL (ref 70–110)
HCT VFR BLD AUTO: 50.4 % (ref 40–54)
HGB BLD-MCNC: 16.4 G/DL (ref 14–18)
IMM GRANULOCYTES # BLD AUTO: 0.01 K/UL (ref 0–0.04)
IMM GRANULOCYTES NFR BLD AUTO: 0.2 % (ref 0–0.5)
LYMPHOCYTES # BLD AUTO: 1.5 K/UL (ref 1–4.8)
LYMPHOCYTES NFR BLD: 24.6 % (ref 18–48)
MAGNESIUM SERPL-MCNC: 1.9 MG/DL (ref 1.6–2.6)
MCH RBC QN AUTO: 29.4 PG (ref 27–31)
MCHC RBC AUTO-ENTMCNC: 32.5 G/DL (ref 32–36)
MCV RBC AUTO: 90 FL (ref 82–98)
MONOCYTES # BLD AUTO: 0.6 K/UL (ref 0.3–1)
MONOCYTES NFR BLD: 9.3 % (ref 4–15)
NEUTROPHILS # BLD AUTO: 4 K/UL (ref 1.8–7.7)
NEUTROPHILS NFR BLD: 65.1 % (ref 38–73)
NRBC BLD-RTO: 0 /100 WBC
PHOSPHATE SERPL-MCNC: 3.3 MG/DL (ref 2.7–4.5)
PLATELET # BLD AUTO: 157 K/UL (ref 150–350)
PMV BLD AUTO: 10.6 FL (ref 9.2–12.9)
POTASSIUM SERPL-SCNC: 3.5 MMOL/L (ref 3.5–5.1)
PROT SERPL-MCNC: 7.9 G/DL (ref 6–8.4)
RBC # BLD AUTO: 5.58 M/UL (ref 4.6–6.2)
SODIUM SERPL-SCNC: 142 MMOL/L (ref 136–145)
WBC # BLD AUTO: 6.21 K/UL (ref 3.9–12.7)

## 2021-03-16 PROCEDURE — 99233 PR SUBSEQUENT HOSPITAL CARE,LEVL III: ICD-10-PCS | Mod: ,,, | Performed by: HOSPITALIST

## 2021-03-16 PROCEDURE — 93010 ELECTROCARDIOGRAM REPORT: CPT | Mod: 59,,, | Performed by: INTERNAL MEDICINE

## 2021-03-16 PROCEDURE — 93010 EKG 12-LEAD: ICD-10-PCS | Mod: 59,,, | Performed by: INTERNAL MEDICINE

## 2021-03-16 PROCEDURE — 83735 ASSAY OF MAGNESIUM: CPT | Performed by: STUDENT IN AN ORGANIZED HEALTH CARE EDUCATION/TRAINING PROGRAM

## 2021-03-16 PROCEDURE — 84100 ASSAY OF PHOSPHORUS: CPT | Performed by: STUDENT IN AN ORGANIZED HEALTH CARE EDUCATION/TRAINING PROGRAM

## 2021-03-16 PROCEDURE — 93005 ELECTROCARDIOGRAM TRACING: CPT

## 2021-03-16 PROCEDURE — 93010 ELECTROCARDIOGRAM REPORT: CPT | Mod: 77,S$PBB,, | Performed by: INTERNAL MEDICINE

## 2021-03-16 PROCEDURE — 93010 EKG 12-LEAD PEDIATRIC: ICD-10-PCS | Mod: 77,S$PBB,, | Performed by: INTERNAL MEDICINE

## 2021-03-16 PROCEDURE — 93010 EKG 12-LEAD: ICD-10-PCS | Mod: 77,,, | Performed by: INTERNAL MEDICINE

## 2021-03-16 PROCEDURE — 80053 COMPREHEN METABOLIC PANEL: CPT | Performed by: STUDENT IN AN ORGANIZED HEALTH CARE EDUCATION/TRAINING PROGRAM

## 2021-03-16 PROCEDURE — 25000003 PHARM REV CODE 250: Performed by: STUDENT IN AN ORGANIZED HEALTH CARE EDUCATION/TRAINING PROGRAM

## 2021-03-16 PROCEDURE — 85025 COMPLETE CBC W/AUTO DIFF WBC: CPT | Performed by: STUDENT IN AN ORGANIZED HEALTH CARE EDUCATION/TRAINING PROGRAM

## 2021-03-16 PROCEDURE — 36415 COLL VENOUS BLD VENIPUNCTURE: CPT | Performed by: STUDENT IN AN ORGANIZED HEALTH CARE EDUCATION/TRAINING PROGRAM

## 2021-03-16 PROCEDURE — 93010 ELECTROCARDIOGRAM REPORT: CPT | Mod: 77,,, | Performed by: INTERNAL MEDICINE

## 2021-03-16 PROCEDURE — 99233 SBSQ HOSP IP/OBS HIGH 50: CPT | Mod: ,,, | Performed by: HOSPITALIST

## 2021-03-16 PROCEDURE — 20600001 HC STEP DOWN PRIVATE ROOM

## 2021-03-16 PROCEDURE — 25000003 PHARM REV CODE 250: Performed by: PHYSICIAN ASSISTANT

## 2021-03-16 RX ORDER — POTASSIUM CHLORIDE 750 MG/1
30 CAPSULE, EXTENDED RELEASE ORAL EVERY 4 HOURS
Status: COMPLETED | OUTPATIENT
Start: 2021-03-16 | End: 2021-03-16

## 2021-03-16 RX ADMIN — SOTALOL HYDROCHLORIDE 80 MG: 80 TABLET ORAL at 09:03

## 2021-03-16 RX ADMIN — POTASSIUM CHLORIDE 30 MEQ: 10 CAPSULE, COATED, EXTENDED RELEASE ORAL at 10:03

## 2021-03-16 RX ADMIN — ASPIRIN 81 MG: 81 TABLET, COATED ORAL at 09:03

## 2021-03-16 RX ADMIN — POTASSIUM CHLORIDE 30 MEQ: 10 CAPSULE, COATED, EXTENDED RELEASE ORAL at 01:03

## 2021-03-16 RX ADMIN — LISINOPRIL 5 MG: 5 TABLET ORAL at 09:03

## 2021-03-17 LAB
ALBUMIN SERPL BCP-MCNC: 4.6 G/DL (ref 3.5–5.2)
ALP SERPL-CCNC: 67 U/L (ref 55–135)
ALT SERPL W/O P-5'-P-CCNC: 25 U/L (ref 10–44)
ANION GAP SERPL CALC-SCNC: 12 MMOL/L (ref 8–16)
AST SERPL-CCNC: 24 U/L (ref 10–40)
BASOPHILS # BLD AUTO: 0.03 K/UL (ref 0–0.2)
BASOPHILS NFR BLD: 0.5 % (ref 0–1.9)
BILIRUB SERPL-MCNC: 1.6 MG/DL (ref 0.1–1)
BUN SERPL-MCNC: 10 MG/DL (ref 6–20)
CALCIUM SERPL-MCNC: 9.9 MG/DL (ref 8.7–10.5)
CHLORIDE SERPL-SCNC: 104 MMOL/L (ref 95–110)
CO2 SERPL-SCNC: 23 MMOL/L (ref 23–29)
CREAT SERPL-MCNC: 1 MG/DL (ref 0.5–1.4)
DIFFERENTIAL METHOD: ABNORMAL
EOSINOPHIL # BLD AUTO: 0 K/UL (ref 0–0.5)
EOSINOPHIL NFR BLD: 0.3 % (ref 0–8)
ERYTHROCYTE [DISTWIDTH] IN BLOOD BY AUTOMATED COUNT: 12.9 % (ref 11.5–14.5)
EST. GFR  (AFRICAN AMERICAN): >60 ML/MIN/1.73 M^2
EST. GFR  (NON AFRICAN AMERICAN): >60 ML/MIN/1.73 M^2
GLUCOSE SERPL-MCNC: 86 MG/DL (ref 70–110)
HCT VFR BLD AUTO: 56.7 % (ref 40–54)
HGB BLD-MCNC: 18.7 G/DL (ref 14–18)
IMM GRANULOCYTES # BLD AUTO: 0.01 K/UL (ref 0–0.04)
IMM GRANULOCYTES NFR BLD AUTO: 0.2 % (ref 0–0.5)
LYMPHOCYTES # BLD AUTO: 1.8 K/UL (ref 1–4.8)
LYMPHOCYTES NFR BLD: 29.1 % (ref 18–48)
MAGNESIUM SERPL-MCNC: 1.9 MG/DL (ref 1.6–2.6)
MCH RBC QN AUTO: 30.4 PG (ref 27–31)
MCHC RBC AUTO-ENTMCNC: 33 G/DL (ref 32–36)
MCV RBC AUTO: 92 FL (ref 82–98)
MONOCYTES # BLD AUTO: 0.6 K/UL (ref 0.3–1)
MONOCYTES NFR BLD: 9.4 % (ref 4–15)
NEUTROPHILS # BLD AUTO: 3.7 K/UL (ref 1.8–7.7)
NEUTROPHILS NFR BLD: 60.5 % (ref 38–73)
NRBC BLD-RTO: 0 /100 WBC
PHOSPHATE SERPL-MCNC: 4.1 MG/DL (ref 2.7–4.5)
PLATELET # BLD AUTO: 181 K/UL (ref 150–350)
PMV BLD AUTO: 10.1 FL (ref 9.2–12.9)
POTASSIUM SERPL-SCNC: 4.4 MMOL/L (ref 3.5–5.1)
PROT SERPL-MCNC: 8.8 G/DL (ref 6–8.4)
RBC # BLD AUTO: 6.16 M/UL (ref 4.6–6.2)
SODIUM SERPL-SCNC: 139 MMOL/L (ref 136–145)
WBC # BLD AUTO: 6.05 K/UL (ref 3.9–12.7)

## 2021-03-17 PROCEDURE — 99232 PR SUBSEQUENT HOSPITAL CARE,LEVL II: ICD-10-PCS | Mod: 95,,, | Performed by: INTERNAL MEDICINE

## 2021-03-17 PROCEDURE — 93010 EKG 12-LEAD: ICD-10-PCS | Mod: 59,,, | Performed by: INTERNAL MEDICINE

## 2021-03-17 PROCEDURE — 20600001 HC STEP DOWN PRIVATE ROOM

## 2021-03-17 PROCEDURE — 85025 COMPLETE CBC W/AUTO DIFF WBC: CPT | Performed by: STUDENT IN AN ORGANIZED HEALTH CARE EDUCATION/TRAINING PROGRAM

## 2021-03-17 PROCEDURE — 25000003 PHARM REV CODE 250: Performed by: HOSPITALIST

## 2021-03-17 PROCEDURE — 84100 ASSAY OF PHOSPHORUS: CPT | Performed by: STUDENT IN AN ORGANIZED HEALTH CARE EDUCATION/TRAINING PROGRAM

## 2021-03-17 PROCEDURE — 99231 SBSQ HOSP IP/OBS SF/LOW 25: CPT | Mod: ,,, | Performed by: INTERNAL MEDICINE

## 2021-03-17 PROCEDURE — 93010 ELECTROCARDIOGRAM REPORT: CPT | Mod: ,,, | Performed by: INTERNAL MEDICINE

## 2021-03-17 PROCEDURE — 36415 COLL VENOUS BLD VENIPUNCTURE: CPT | Performed by: STUDENT IN AN ORGANIZED HEALTH CARE EDUCATION/TRAINING PROGRAM

## 2021-03-17 PROCEDURE — 99231 PR SUBSEQUENT HOSPITAL CARE,LEVL I: ICD-10-PCS | Mod: ,,, | Performed by: INTERNAL MEDICINE

## 2021-03-17 PROCEDURE — 25000003 PHARM REV CODE 250: Performed by: STUDENT IN AN ORGANIZED HEALTH CARE EDUCATION/TRAINING PROGRAM

## 2021-03-17 PROCEDURE — 93005 ELECTROCARDIOGRAM TRACING: CPT

## 2021-03-17 PROCEDURE — 25000003 PHARM REV CODE 250: Performed by: PHYSICIAN ASSISTANT

## 2021-03-17 PROCEDURE — 80053 COMPREHEN METABOLIC PANEL: CPT | Performed by: STUDENT IN AN ORGANIZED HEALTH CARE EDUCATION/TRAINING PROGRAM

## 2021-03-17 PROCEDURE — 99232 SBSQ HOSP IP/OBS MODERATE 35: CPT | Mod: 95,,, | Performed by: INTERNAL MEDICINE

## 2021-03-17 PROCEDURE — 83735 ASSAY OF MAGNESIUM: CPT | Performed by: STUDENT IN AN ORGANIZED HEALTH CARE EDUCATION/TRAINING PROGRAM

## 2021-03-17 RX ORDER — ACETAMINOPHEN 325 MG/1
650 TABLET ORAL EVERY 6 HOURS PRN
Status: DISCONTINUED | OUTPATIENT
Start: 2021-03-17 | End: 2021-03-18 | Stop reason: HOSPADM

## 2021-03-17 RX ADMIN — LISINOPRIL 5 MG: 5 TABLET ORAL at 09:03

## 2021-03-17 RX ADMIN — SOTALOL HYDROCHLORIDE 80 MG: 80 TABLET ORAL at 09:03

## 2021-03-17 RX ADMIN — ACETAMINOPHEN 650 MG: 325 TABLET ORAL at 09:03

## 2021-03-17 RX ADMIN — ASPIRIN 81 MG: 81 TABLET, COATED ORAL at 09:03

## 2021-03-18 VITALS
OXYGEN SATURATION: 90 % | HEIGHT: 66 IN | BODY MASS INDEX: 21.56 KG/M2 | HEART RATE: 82 BPM | RESPIRATION RATE: 20 BRPM | SYSTOLIC BLOOD PRESSURE: 137 MMHG | WEIGHT: 134.13 LBS | TEMPERATURE: 99 F | DIASTOLIC BLOOD PRESSURE: 85 MMHG

## 2021-03-18 LAB
ALBUMIN SERPL BCP-MCNC: 4 G/DL (ref 3.5–5.2)
ALP SERPL-CCNC: 61 U/L (ref 55–135)
ALT SERPL W/O P-5'-P-CCNC: 22 U/L (ref 10–44)
ANION GAP SERPL CALC-SCNC: 12 MMOL/L (ref 8–16)
AST SERPL-CCNC: 18 U/L (ref 10–40)
BASOPHILS # BLD AUTO: 0.02 K/UL (ref 0–0.2)
BASOPHILS NFR BLD: 0.3 % (ref 0–1.9)
BILIRUB SERPL-MCNC: 0.9 MG/DL (ref 0.1–1)
BUN SERPL-MCNC: 14 MG/DL (ref 6–20)
CALCIUM SERPL-MCNC: 9 MG/DL (ref 8.7–10.5)
CHLORIDE SERPL-SCNC: 105 MMOL/L (ref 95–110)
CO2 SERPL-SCNC: 21 MMOL/L (ref 23–29)
CREAT SERPL-MCNC: 1 MG/DL (ref 0.5–1.4)
DIFFERENTIAL METHOD: NORMAL
EOSINOPHIL # BLD AUTO: 0 K/UL (ref 0–0.5)
EOSINOPHIL NFR BLD: 0.5 % (ref 0–8)
ERYTHROCYTE [DISTWIDTH] IN BLOOD BY AUTOMATED COUNT: 12.5 % (ref 11.5–14.5)
EST. GFR  (AFRICAN AMERICAN): >60 ML/MIN/1.73 M^2
EST. GFR  (NON AFRICAN AMERICAN): >60 ML/MIN/1.73 M^2
GLUCOSE SERPL-MCNC: 97 MG/DL (ref 70–110)
HCT VFR BLD AUTO: 51.7 % (ref 40–54)
HGB BLD-MCNC: 17.3 G/DL (ref 14–18)
IMM GRANULOCYTES # BLD AUTO: 0.01 K/UL (ref 0–0.04)
IMM GRANULOCYTES NFR BLD AUTO: 0.2 % (ref 0–0.5)
LYMPHOCYTES # BLD AUTO: 2.5 K/UL (ref 1–4.8)
LYMPHOCYTES NFR BLD: 42.2 % (ref 18–48)
MAGNESIUM SERPL-MCNC: 1.8 MG/DL (ref 1.6–2.6)
MCH RBC QN AUTO: 30.1 PG (ref 27–31)
MCHC RBC AUTO-ENTMCNC: 33.5 G/DL (ref 32–36)
MCV RBC AUTO: 90 FL (ref 82–98)
MONOCYTES # BLD AUTO: 0.6 K/UL (ref 0.3–1)
MONOCYTES NFR BLD: 10 % (ref 4–15)
NEUTROPHILS # BLD AUTO: 2.8 K/UL (ref 1.8–7.7)
NEUTROPHILS NFR BLD: 46.8 % (ref 38–73)
NRBC BLD-RTO: 0 /100 WBC
PHOSPHATE SERPL-MCNC: 4.9 MG/DL (ref 2.7–4.5)
PLATELET # BLD AUTO: 177 K/UL (ref 150–350)
PMV BLD AUTO: 10.5 FL (ref 9.2–12.9)
POTASSIUM SERPL-SCNC: 3.6 MMOL/L (ref 3.5–5.1)
PROT SERPL-MCNC: 7.5 G/DL (ref 6–8.4)
RBC # BLD AUTO: 5.74 M/UL (ref 4.6–6.2)
SODIUM SERPL-SCNC: 138 MMOL/L (ref 136–145)
WBC # BLD AUTO: 6.02 K/UL (ref 3.9–12.7)

## 2021-03-18 PROCEDURE — 25000003 PHARM REV CODE 250: Performed by: STUDENT IN AN ORGANIZED HEALTH CARE EDUCATION/TRAINING PROGRAM

## 2021-03-18 PROCEDURE — 85025 COMPLETE CBC W/AUTO DIFF WBC: CPT | Performed by: STUDENT IN AN ORGANIZED HEALTH CARE EDUCATION/TRAINING PROGRAM

## 2021-03-18 PROCEDURE — 25000003 PHARM REV CODE 250: Performed by: INTERNAL MEDICINE

## 2021-03-18 PROCEDURE — 99239 PR HOSPITAL DISCHARGE DAY,>30 MIN: ICD-10-PCS | Mod: 95,,, | Performed by: INTERNAL MEDICINE

## 2021-03-18 PROCEDURE — 93010 EKG 12-LEAD: ICD-10-PCS | Mod: ,,, | Performed by: INTERNAL MEDICINE

## 2021-03-18 PROCEDURE — 80053 COMPREHEN METABOLIC PANEL: CPT | Performed by: STUDENT IN AN ORGANIZED HEALTH CARE EDUCATION/TRAINING PROGRAM

## 2021-03-18 PROCEDURE — 84100 ASSAY OF PHOSPHORUS: CPT | Performed by: STUDENT IN AN ORGANIZED HEALTH CARE EDUCATION/TRAINING PROGRAM

## 2021-03-18 PROCEDURE — 83735 ASSAY OF MAGNESIUM: CPT | Performed by: STUDENT IN AN ORGANIZED HEALTH CARE EDUCATION/TRAINING PROGRAM

## 2021-03-18 PROCEDURE — 93010 ELECTROCARDIOGRAM REPORT: CPT | Mod: ,,, | Performed by: INTERNAL MEDICINE

## 2021-03-18 PROCEDURE — 99231 SBSQ HOSP IP/OBS SF/LOW 25: CPT | Mod: ,,, | Performed by: INTERNAL MEDICINE

## 2021-03-18 PROCEDURE — 36415 COLL VENOUS BLD VENIPUNCTURE: CPT | Performed by: STUDENT IN AN ORGANIZED HEALTH CARE EDUCATION/TRAINING PROGRAM

## 2021-03-18 PROCEDURE — 99239 HOSP IP/OBS DSCHRG MGMT >30: CPT | Mod: 95,,, | Performed by: INTERNAL MEDICINE

## 2021-03-18 PROCEDURE — 99231 PR SUBSEQUENT HOSPITAL CARE,LEVL I: ICD-10-PCS | Mod: ,,, | Performed by: INTERNAL MEDICINE

## 2021-03-18 PROCEDURE — 93005 ELECTROCARDIOGRAM TRACING: CPT

## 2021-03-18 PROCEDURE — 25000003 PHARM REV CODE 250: Performed by: PHYSICIAN ASSISTANT

## 2021-03-18 RX ORDER — SOTALOL HYDROCHLORIDE 80 MG/1
80 TABLET ORAL 2 TIMES DAILY
Qty: 60 TABLET | Refills: 11 | Status: SHIPPED | OUTPATIENT
Start: 2021-03-18 | End: 2022-09-15

## 2021-03-18 RX ADMIN — LISINOPRIL 5 MG: 5 TABLET ORAL at 08:03

## 2021-03-18 RX ADMIN — ASPIRIN 81 MG: 81 TABLET, COATED ORAL at 08:03

## 2021-03-18 RX ADMIN — SOTALOL HYDROCHLORIDE 80 MG: 80 TABLET ORAL at 08:03

## 2021-03-18 RX ADMIN — Medication 1 CAPSULE: at 11:03

## 2021-03-23 ENCOUNTER — TELEPHONE (OUTPATIENT)
Dept: ELECTROPHYSIOLOGY | Facility: CLINIC | Age: 26
End: 2021-03-23

## 2021-03-25 PROBLEM — Z86.16 HISTORY OF 2019 NOVEL CORONAVIRUS DISEASE (COVID-19): Status: ACTIVE | Noted: 2021-03-25

## 2021-07-28 NOTE — LETTER
July 16, 2020      Meli Barrera MD  [address]  [facility] [address]           [facility]  [address]  [facility] [address]  Phone: [phone]  Fax: [phone]          Patient: Juani Reilly   MR Number: [id number]   YOB: 1995   Date of Visit: 7/16/2020       Dear Dr. Meli Barrera:    Thank you for referring Juani Reilly to me for evaluation. Attached you will find relevant portions of my assessment and plan of care.    If you have questions, please do not hesitate to call me. I look forward to following Juani Reilly along with you.    Sincerely,    Abebe Ferguson MD    Enclosure  CC:  No Recipients    If you would like to receive this communication electronically, please contact [email or web address] or [phone] to request more information on [employer] access.    For providers and/or their staff who would like to refer a patient to [facility], please contact us through our one-stop-shop provider referral line, [facility], at [phone].    If you feel you have received this communication in error or would no longer like to receive these types of communications, please e-mail [email or web address]          [phone]

## 2022-06-29 ENCOUNTER — TELEPHONE (OUTPATIENT)
Dept: ELECTROPHYSIOLOGY | Facility: CLINIC | Age: 27
End: 2022-06-29
Payer: MEDICAID

## 2022-06-29 NOTE — TELEPHONE ENCOUNTER
Called and spoke to patient mother  to confirm appointment on 7-1-22 with  in [place] Patient mother verbalized understanding

## 2022-09-12 NOTE — PROGRESS NOTES
"Mr. Reilly is a patient of Dr. Ferguson and was last seen in clinic 7/17/2020.      Subjective:   Patient ID:  Juani Reilly is a 27 y.o. male who presents for follow-up of Palpitations  .     HPI:    Mr. Reilly is a 27 y.o. 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     Background:    Adult Congenital Heart Disease Specialist: Meli Barrera MD    Mr. Reilly today has a history of congenital heart disease. 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 He was doing well until he presented to the [facility] ER on 9/5/2019 with palpitations. He was in a regular tachycardia that terminated with IV adenosine. He reports 1 month of shortness of breath at rest and with exertion. He denies any orthopnea or PND. No leg edema. He denies any history of syncope. He reports he has not seen a cardiologist (was being seen at [facility]) for 7-8 years.    ECGs in Epic which note sinus rhythm with mild pre-excitation (isoelectric V1, +V2=6, +inf leads). ECG from 9/5/2019 ER visit notes a regular long RP tachycardia with similar QRS morphology but not ART.    Mr. Reilly returned for follow-up 1/2020. He is being seen by Dr. Barrera. 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 He was seen in the ER 11/2019 with recurrent SVT terminated with IV adenosine. ECG noted a regular narrow complex tachycardia with a "long" short RP interval. He is on low dose metoprolol. We discussed EPS/ablation. He was not interested and desired medication alternatives.    7/17/2020:  Recent ER visit for SVT. He otherwise feels well.  ECG is sinus rhythm with pre-excitation as described above.    In summary, Mr. Reilly is a pleasant 25 year-old man with single ventricle and tricuspid atresia with " subtle pre-excitation on his ECG and SVT. We discussed the etiology and pathophysiology of SVT. His likely involves an accessory pathway. He is failing metoprolol. Discussed treatment options including suppression with medical therapy versus EPS/ablation for more definitive therapy. Discussed need to coordinate with pediatric interventional cardiology and with my partner, Dr. Emeterio Hurley.    Discussed case with Dr. Alarcon with pediatric EP/ACHD clinic. Reviewed the case. She recommends the patient first see Pediatric Interventional Cards clinic for a hemodynamic work-up and consider anti-arrhythmic therapy first due to challenges with his particular anatomy. I spoke with the patient regarding this however could only reach his grandmother. I called the patient's mother to discuss but received no answer and left a message.  Ultimately our plan will be to refer to Pediatric Interventional Cardiology clinic for a hemodynamic study followed by admission for sotalol initiation (if patient agreeable).    Update (09/15/2022):    [facility] 9/3/2020:  IMPRESSION:  1) Tricuspid atresia s/p intra-atrial Fontan  2) Normal Fontan pressures (11mmHg) and wedge pressure (8mmHg).  3) Normal cardiac output and vascular resistance calculations  4) Small atrial baffle leak  5) No significant aorta-pulmonary or veno-venous collaterals  6) Occlusion of right common femoral artery     Holter 11/24/2020:  Sinus rhythm with short [place]  Rare PVCs  One episode of SVT (2 minutes 38 seconds max rate 214 bpm)  No diary symptoms    1/9/2021: Successful Electrical Cardioversion. Amio stopped    3/15/2021: Sotalol admission    Today he says he is no longer taking sotalol consistently as it was difficult to continue with his working schedule and fatigue.   About a month ago had an SVT episode at outside facility ([facility]) and was treated with adenosine. 3-4 earlier had similar visit. Has lived in that area for a year.   Otherwise no cardiac  complaints.     I have personally reviewed the patient's EKG today, which shows sinus rhythm at 96bpm. NC interval is 140. QRS is 100. QTc is 459.    Relevant Cardiac Test Results:    2D Echo (10/3/2019):  Imaging consistent with diagnosis of tricuspid atresia S/P extracardiac conduit Fontan-  Very difficult to demonstrate pulmonary circulation with significant chest deformity associated with median sternotomy.  Mildly dilated inferior vena cava connecting to extracardiac conduit passing posterior to the right atrium for completion of Fontan physiology.  Right superior vena cava identified with no obvious stenosis.  Unable to demonstrate Fontan or Jesus anastomosis to the pulmonary arteries.  At least two pulmonary veins demonstrated returning to the left atrium with no evidence for obstruction.  There is a small right atrium with tissue remaining in from eustachian valve and no obvious obstruction of right atrial flow to the left atrium.  There is no obvious tricuspid valve tissue present and no right ventricular cavity could be demonstrated.  There is no evidence for pulmonary valve.  At least mild dilation of the left atrium.  Mildly dilated mitral valve with color Doppler demonstrating mild insufficiency.  Single ventricle consistent with left ventricular morphology.  There is at least mild dilation of the left ventricle.  Left ventricular systolic function qualitatively good with ejection fraction estimated 50-55%.  There is a large aortic annulus with trileaflet aortic valve and trivial central jet of insufficiency.  Qualitative impression of at least mild dilation of the ascending aorta.  Branching pattern consistent with left aortic arch and no evidence for coarctation.  No obvious pericardial effusion.     No current outpatient medications on file.     No current facility-administered medications for this visit.       Review of Systems   Constitutional: Negative for malaise/fatigue.   Cardiovascular:   "Positive for palpitations. Negative for chest pain, dyspnea on exertion, irregular heartbeat and leg swelling.   Respiratory:  Negative for shortness of breath.    Hematologic/Lymphatic: Negative for bleeding problem.   Skin:  Negative for rash.   Musculoskeletal:  Negative for myalgias.   Gastrointestinal:  Negative for hematemesis, hematochezia and nausea.   Genitourinary:  Negative for hematuria.   Neurological:  Negative for light-headedness.   Psychiatric/Behavioral:  Negative for altered mental status.    Allergic/Immunologic: Negative for persistent infections.     Objective:          /76   Pulse 96   Ht 5' 6" (1.676 m)   Wt 69.4 kg (153 lb)   BMI 24.69 kg/m²     Physical Exam  Vitals and nursing note reviewed.   Constitutional:       Appearance: Normal appearance. He is well-developed.   HENT:      Head: Normocephalic.      Nose: Nose normal.   Eyes:      Pupils: Pupils are equal, round, and reactive to light.   Cardiovascular:      Rate and Rhythm: Normal rate and regular rhythm.   Pulmonary:      Effort: No respiratory distress.      Breath sounds: Normal breath sounds.   Musculoskeletal:         General: Normal range of motion.   Skin:     General: Skin is warm and dry.      Findings: No erythema.   Neurological:      Mental Status: He is alert and oriented to person, place, and time.   Psychiatric:         Speech: Speech normal.         Behavior: Behavior normal.         Lab Results   Component Value Date     03/18/2021    K 3.6 03/18/2021    MG 1.8 03/18/2021    BUN 14 03/18/2021    CREATININE 1.0 03/18/2021    ALT 22 03/18/2021    AST 18 03/18/2021    HGB 17.3 03/18/2021    HCT 51.7 03/18/2021    HCT 48 09/03/2020       Recent Labs   Lab 11/06/19  2142 09/03/20  1353 01/09/21  0858   INR 1.3 H 1.0 1.2         Assessment:     1. WPW syndrome    2. Essential hypertension    3. Encounter for monitoring sotalol therapy      Plan:     In summary, Mr. Reilly is a 27 y.o. male with CHD and WPW " here for follow up.   He is s/p sotalol initiation 3/2021. He has stopped sotalol (months ago) due to schedule and fatigue and has had 2 breakthrough SVT episodes requiring adenosine.   Case discussed with Dr. Ferguson. He will consult with Dr. Santoyo and Dr. Xavi castillo: feasibility of ablation given patient's complex anatomy. Advised patient that it may not be possible to perform ablation and he understood.    Plan TBD  RTC as scheduled    *A copy of this note has been sent to Dr. Ferguson*    Follow up as scheduled.    ------------------------------------------------------------------    CARMENCITA Wagner, NP-C  Cardiac Electrophysiology

## 2022-09-13 DIAGNOSIS — I49.8 OTHER CARDIAC ARRHYTHMIA: Primary | ICD-10-CM

## 2022-09-15 ENCOUNTER — OFFICE VISIT (OUTPATIENT)
Dept: ELECTROPHYSIOLOGY | Facility: CLINIC | Age: 27
End: 2022-09-15
Payer: MEDICAID

## 2022-09-15 ENCOUNTER — HOSPITAL ENCOUNTER (OUTPATIENT)
Dept: CARDIOLOGY | Facility: CLINIC | Age: 27
Discharge: HOME | End: 2022-09-15
Payer: MEDICAID

## 2022-09-15 VITALS
SYSTOLIC BLOOD PRESSURE: 102 MMHG | DIASTOLIC BLOOD PRESSURE: 76 MMHG | WEIGHT: 153 LBS | BODY MASS INDEX: 24.59 KG/M2 | HEIGHT: 66 IN | HEART RATE: 96 BPM

## 2022-09-15 DIAGNOSIS — Z51.81 ENCOUNTER FOR MONITORING SOTALOL THERAPY: ICD-10-CM

## 2022-09-15 DIAGNOSIS — I45.6 WPW SYNDROME: Primary | Chronic | ICD-10-CM

## 2022-09-15 DIAGNOSIS — I10 ESSENTIAL HYPERTENSION: ICD-10-CM

## 2022-09-15 DIAGNOSIS — Z79.899 ENCOUNTER FOR MONITORING SOTALOL THERAPY: ICD-10-CM

## 2022-09-15 DIAGNOSIS — I49.8 OTHER CARDIAC ARRHYTHMIA: ICD-10-CM

## 2022-09-15 PROCEDURE — 3008F PR BODY MASS INDEX (BMI) DOCUMENTED: ICD-10-PCS | Mod: CPTII,,, | Performed by: NURSE PRACTITIONER

## 2022-09-15 PROCEDURE — 3078F DIAST BP <80 MM HG: CPT | Mod: CPTII,,, | Performed by: NURSE PRACTITIONER

## 2022-09-15 PROCEDURE — 99999 PR PBB SHADOW E&M-EST. PATIENT-LVL III: ICD-10-PCS | Mod: PBBFAC,,, | Performed by: NURSE PRACTITIONER

## 2022-09-15 PROCEDURE — 1159F MED LIST DOCD IN RCRD: CPT | Mod: CPTII,,, | Performed by: NURSE PRACTITIONER

## 2022-09-15 PROCEDURE — 3074F SYST BP LT 130 MM HG: CPT | Mod: CPTII,,, | Performed by: NURSE PRACTITIONER

## 2022-09-15 PROCEDURE — 3074F PR MOST RECENT SYSTOLIC BLOOD PRESSURE < 130 MM HG: ICD-10-PCS | Mod: CPTII,,, | Performed by: NURSE PRACTITIONER

## 2022-09-15 PROCEDURE — 99999 PR PBB SHADOW E&M-EST. PATIENT-LVL III: CPT | Mod: PBBFAC,,, | Performed by: NURSE PRACTITIONER

## 2022-09-15 PROCEDURE — 99214 PR OFFICE/OUTPT VISIT, EST, LEVL IV, 30-39 MIN: ICD-10-PCS | Mod: S$PBB,,, | Performed by: NURSE PRACTITIONER

## 2022-09-15 PROCEDURE — 93010 RHYTHM STRIP: ICD-10-PCS | Mod: S$PBB,,, | Performed by: INTERNAL MEDICINE

## 2022-09-15 PROCEDURE — 3008F BODY MASS INDEX DOCD: CPT | Mod: CPTII,,, | Performed by: NURSE PRACTITIONER

## 2022-09-15 PROCEDURE — 93010 ELECTROCARDIOGRAM REPORT: CPT | Mod: S$PBB,,, | Performed by: INTERNAL MEDICINE

## 2022-09-15 PROCEDURE — 1159F PR MEDICATION LIST DOCUMENTED IN MEDICAL RECORD: ICD-10-PCS | Mod: CPTII,,, | Performed by: NURSE PRACTITIONER

## 2022-09-15 PROCEDURE — 1160F PR REVIEW ALL MEDS BY PRESCRIBER/CLIN PHARMACIST DOCUMENTED: ICD-10-PCS | Mod: CPTII,,, | Performed by: NURSE PRACTITIONER

## 2022-09-15 PROCEDURE — 99213 OFFICE O/P EST LOW 20 MIN: CPT | Mod: PBBFAC | Performed by: NURSE PRACTITIONER

## 2022-09-15 PROCEDURE — 1160F RVW MEDS BY RX/DR IN RCRD: CPT | Mod: CPTII,,, | Performed by: NURSE PRACTITIONER

## 2022-09-15 PROCEDURE — 3078F PR MOST RECENT DIASTOLIC BLOOD PRESSURE < 80 MM HG: ICD-10-PCS | Mod: CPTII,,, | Performed by: NURSE PRACTITIONER

## 2022-09-15 PROCEDURE — 99214 OFFICE O/P EST MOD 30 MIN: CPT | Mod: S$PBB,,, | Performed by: NURSE PRACTITIONER

## 2022-09-15 PROCEDURE — 93005 ELECTROCARDIOGRAM TRACING: CPT | Mod: PBBFAC | Performed by: INTERNAL MEDICINE

## 2022-09-22 ENCOUNTER — TELEPHONE (OUTPATIENT)
Dept: ELECTROPHYSIOLOGY | Facility: CLINIC | Age: 27
End: 2022-09-22
Payer: MEDICAID

## 2022-09-22 NOTE — TELEPHONE ENCOUNTER
Attempted to contact patient to to discuss procedure scheduling but no answer received. At the number listed. Voicemail left requesting return call. Spoke with patient's mother who provided an alternate contact number for there patient, and agrees to ask patient to contact our office. Attempted to contact alternate number provided by patient's mom, but no answer received. Voicemail left requesting return call to discuss procedure scheduling.

## 2022-09-23 ENCOUNTER — TELEPHONE (OUTPATIENT)
Dept: ELECTROPHYSIOLOGY | Facility: CLINIC | Age: 27
End: 2022-09-23
Payer: MEDICAID

## 2022-09-23 NOTE — TELEPHONE ENCOUNTER
----- Message from Alissa Weber RN sent at 9/23/2022  8:52 AM CDT -----  Regarding: FW: returning a call    ----- Message -----  From: Hailee Reed  Sent: 9/23/2022   8:34 AM CDT  To: Marty PARKER Staff  Subject: returning a call                                 The pt's grandmother is returning a  call. Please call her back @ [phone]. Thanks, Hailee

## 2022-09-23 NOTE — TELEPHONE ENCOUNTER
Attempted to return call to grandmother , but no answer received. Voicemail left.      Patient contacted and scheduled for EPS/AP ablation procedure with Dr Ferguson, Dr Hurley and Eva STONE  on 11/10/2022 . Procedure details reviewed and advised that pre procedure patient instructions will be sent via email  as requested. Patient states that he is currently living in [place] and will come in town the day prior to the procedure to complete pre op lab work. Advised to call the office for any questions or concerns prior to scheduled procedure. Understanding verbalized.

## 2022-09-26 DIAGNOSIS — Q22.4 SINGLE VENTRICLE WITH TRICUSPID ATRESIA: ICD-10-CM

## 2022-09-26 DIAGNOSIS — I47.10 SVT (SUPRAVENTRICULAR TACHYCARDIA): Primary | Chronic | ICD-10-CM

## 2022-09-26 DIAGNOSIS — Q24.9 ADULT CONGENITAL HEART DISEASE: ICD-10-CM

## 2022-09-26 DIAGNOSIS — Q20.4 SINGLE VENTRICLE WITH TRICUSPID ATRESIA: ICD-10-CM

## 2022-09-26 NOTE — PROGRESS NOTES
[facility]  Juani Reilly  1995    Juani Reilly is a 27 y.o. male presenting for {PED CAR EVAL FU:62581} of No chief complaint on file.  .     Subjective:     Juani is here today with his {PEDS CARD, HERE TODAY WITH:24174}. He comes in for evaluation of the following concerns:   No diagnosis found.      HPI:     ***    There are no reports of {Symptoms; cardiac peds w/o none:63662993}. No other cardiovascular or medical concerns are reported.     Medications:   No current outpatient medications on file prior to visit.     No current facility-administered medications on file prior to visit.     Allergies: Review of patient's allergies indicates:  No Known Allergies  Immunization Status: {PED CAR imuniz status:59974}.     Family History   Problem Relation Age of Onset    No Known Problems Mother     No Known Problems Father     No Known Problems Sister     No Known Problems Brother     No Known Problems Maternal Aunt     No Known Problems Maternal Uncle     No Known Problems Paternal Aunt     No Known Problems Paternal Uncle     No Known Problems Maternal Grandmother     Heart disease Maternal Grandfather     No Known Problems Paternal Grandmother     No Known Problems Paternal Grandfather     Anemia Neg Hx     Arrhythmia Neg Hx     Asthma Neg Hx     Clotting disorder Neg Hx     Fainting Neg Hx     Heart attack Neg Hx     Heart failure Neg Hx     Hyperlipidemia Neg Hx     Hypertension Neg Hx     Stroke Neg Hx     Atrial Septal Defect Neg Hx      Past Medical History:   Diagnosis Date    History of heart surgery     Other cirrhosis of liver 11/12/2020    SVT (supraventricular tachycardia)     WPW syndrome      Family and past medical history reviewed and present in electronic medical record.     ROS:     Review of Systems    Objective:     Physical Exam    Tests:     I evaluated the following studies:   EKG:  ***    Echocardiogram:   ***  (Full report in electronic medical  record)    Treadmill/Stress:   ***    Assessment:     No diagnosis found.    ***    Impression:     It is my impression that Juani Reilly has a ***. I discussed my findings with *** and answered all questions.     Plan:     Activity:  ***    Medications:  ***    Endocarditis prophylaxis {IS/IS NOT:64601} recommended in this circumstance.     Follow-Up:     Follow-Up clinic visit in{ped car plan fu:50169}.

## 2022-09-29 DIAGNOSIS — I45.6 WPW SYNDROME: Chronic | ICD-10-CM

## 2022-09-29 DIAGNOSIS — Z98.890 S/P FONTAN PROCEDURE: ICD-10-CM

## 2022-09-29 DIAGNOSIS — Q24.9 ADULT CONGENITAL HEART DISEASE: ICD-10-CM

## 2022-09-29 DIAGNOSIS — I47.10 SVT (SUPRAVENTRICULAR TACHYCARDIA): Primary | Chronic | ICD-10-CM

## 2022-09-29 DIAGNOSIS — Z01.818 PRE-OP TESTING: ICD-10-CM

## 2022-09-29 DIAGNOSIS — I49.9 CARDIAC ARRHYTHMIA, UNSPECIFIED CARDIAC ARRHYTHMIA TYPE: ICD-10-CM

## 2022-10-25 ENCOUNTER — TELEPHONE (OUTPATIENT)
Dept: ELECTROPHYSIOLOGY | Facility: CLINIC | Age: 27
End: 2022-10-25
Payer: MEDICAID

## 2022-10-25 NOTE — TELEPHONE ENCOUNTER
----- Message from Regino Burroughs MA sent at 10/25/2022  4:04 PM CDT -----  Regarding: FW: please call  Did you receive a fax for this patient  ----- Message -----  From: Hailee Reed  Sent: 10/25/2022   4:01 PM CDT  To: Marty PARKER Staff  Subject: please call                                      The pt's mother is calling to see if the fax came over? Please call her back @ [phone]. Thanks, Hailee

## 2022-10-25 NOTE — TELEPHONE ENCOUNTER
Returned Mothers call. We did receive a fax for [facility] paperwork. I told her that we received the fax and that I faxed the paperwork down to our disability office to fill out. She verbalized understanding.

## 2022-11-01 ENCOUNTER — TELEPHONE (OUTPATIENT)
Dept: ELECTROPHYSIOLOGY | Facility: CLINIC | Age: 27
End: 2022-11-01
Payer: MEDICAID

## 2022-11-01 NOTE — TELEPHONE ENCOUNTER
----- Message from Regino Burroughs MA sent at 10/31/2022  4:13 PM CDT -----  Regarding: FW: please call    ----- Message -----  From: Hailee Reed  Sent: 10/31/2022   4:10 PM CDT  To: Marty PARKER Staff  Subject: please call                                      The pt's mother is calling to see if his paperwork is done. Please call her back @ [phone]. Thanks, Hailee

## 2022-11-01 NOTE — TELEPHONE ENCOUNTER
Spoke with patient's mother Amita and advised that the [employer] forms have been received and completed and will be faxed to the number provided. Understanding verbalized.

## 2022-11-08 ENCOUNTER — TELEPHONE (OUTPATIENT)
Dept: ELECTROPHYSIOLOGY | Facility: CLINIC | Age: 27
End: 2022-11-08
Payer: MEDICAID

## 2022-11-08 NOTE — TELEPHONE ENCOUNTER
Spoke to patient.     CONFIRMED procedure arrival time of 5:15 am on 11/10/2022 for AP ablation with Dr Ferguson.       Reiterated instructions including:  -Directions to check in desk.    -NPO after midnight night prior to procedure.    -Pre-procedure LABS to be obtained on admit as patient lives in [place].    -Confirmed absence or presence of implanted device/stimulator N/A.    -Confirmed no fever, cough, or shortness of breath in the past 30 days.    -Confirmed no redness, rash, irritation, or yeast infection to groin area.       Patient verbalized understanding of above and appreciated the call.

## 2022-11-08 NOTE — TELEPHONE ENCOUNTER
Spoke with patient at his request going over again the procedure including the objectives, how we are planning on doing it and the risks of the procedure. We discussed risks and benefits at length including trans-baffle access. Our discussion included, but was not limited to the risk of death, infection, bleeding, stroke, MI, cardiac perforation, embolism, cardiac tamponade, vascular injury, valvular injury, and other organic injury including the possibility for need for surgery or pacemaker implantation.  He understood.

## 2022-11-09 ENCOUNTER — ANESTHESIA EVENT (OUTPATIENT)
Dept: MEDSURG UNIT | Facility: HOSPITAL | Age: 27
End: 2022-11-09
Payer: MEDICAID

## 2022-11-09 ENCOUNTER — LAB VISIT (OUTPATIENT)
Dept: LAB | Facility: HOSPITAL | Age: 27
End: 2022-11-09
Attending: INTERNAL MEDICINE
Payer: MEDICAID

## 2022-11-09 DIAGNOSIS — Z01.818 PRE-OP TESTING: ICD-10-CM

## 2022-11-09 DIAGNOSIS — I47.10 SVT (SUPRAVENTRICULAR TACHYCARDIA): Chronic | ICD-10-CM

## 2022-11-09 DIAGNOSIS — Z98.890 S/P FONTAN PROCEDURE: ICD-10-CM

## 2022-11-09 DIAGNOSIS — I49.9 CARDIAC ARRHYTHMIA, UNSPECIFIED CARDIAC ARRHYTHMIA TYPE: ICD-10-CM

## 2022-11-09 DIAGNOSIS — Q24.9 ADULT CONGENITAL HEART DISEASE: ICD-10-CM

## 2022-11-09 DIAGNOSIS — I45.6 WPW SYNDROME: Chronic | ICD-10-CM

## 2022-11-09 LAB
ANION GAP SERPL CALC-SCNC: 12 MMOL/L (ref 8–16)
APTT BLDCRRT: 28.6 SEC (ref 21–32)
BASOPHILS # BLD AUTO: 0.02 K/UL (ref 0–0.2)
BASOPHILS NFR BLD: 0.5 % (ref 0–1.9)
CALCIUM SERPL-MCNC: 9.5 MG/DL (ref 8.7–10.5)
CHLORIDE SERPL-SCNC: 103 MMOL/L (ref 95–110)
CO2 SERPL-SCNC: 28 MMOL/L (ref 23–29)
CREAT SERPL-MCNC: 1.06 MG/DL (ref 0.5–1.4)
DIFFERENTIAL METHOD: ABNORMAL
EOSINOPHIL # BLD AUTO: 0 K/UL (ref 0–0.5)
EOSINOPHIL NFR BLD: 0.3 % (ref 0–8)
ERYTHROCYTE [DISTWIDTH] IN BLOOD BY AUTOMATED COUNT: 12.7 % (ref 11.5–14.5)
EST. GFR  (NO RACE VARIABLE): >60 ML/MIN/1.73 M^2
GLUCOSE SERPL-MCNC: 116 MG/DL (ref 70–110)
HCT VFR BLD AUTO: 55.4 % (ref 40–54)
HGB BLD-MCNC: 18 G/DL (ref 14–18)
IMM GRANULOCYTES # BLD AUTO: 0.01 K/UL (ref 0–0.04)
IMM GRANULOCYTES NFR BLD AUTO: 0.3 % (ref 0–0.5)
INR PPP: 1 (ref 0.8–1.2)
LYMPHOCYTES # BLD AUTO: 1.6 K/UL (ref 1–4.8)
LYMPHOCYTES NFR BLD: 40.3 % (ref 18–48)
MCH RBC QN AUTO: 29.7 PG (ref 27–31)
MCHC RBC AUTO-ENTMCNC: 32.5 G/DL (ref 32–36)
MCV RBC AUTO: 91 FL (ref 82–98)
MONOCYTES # BLD AUTO: 0.5 K/UL (ref 0.3–1)
MONOCYTES NFR BLD: 13.7 % (ref 4–15)
NEUTROPHILS # BLD AUTO: 1.7 K/UL (ref 1.8–7.7)
NEUTROPHILS NFR BLD: 44.9 % (ref 38–73)
NRBC BLD-RTO: 0 /100 WBC
PLATELET # BLD AUTO: 175 K/UL (ref 150–450)
PMV BLD AUTO: 9.9 FL (ref 9.2–12.9)
POTASSIUM SERPL-SCNC: 4.2 MMOL/L (ref 3.5–5.1)
PROTHROMBIN TIME: 11.3 SEC (ref 9–12.5)
RBC # BLD AUTO: 6.07 M/UL (ref 4.6–6.2)
SODIUM SERPL-SCNC: 143 MMOL/L (ref 136–145)
UUN UR-MCNC: 12 MG/DL (ref 2–20)
WBC # BLD AUTO: 3.87 K/UL (ref 3.9–12.7)

## 2022-11-09 PROCEDURE — 85730 THROMBOPLASTIN TIME PARTIAL: CPT | Mod: PO | Performed by: INTERNAL MEDICINE

## 2022-11-09 PROCEDURE — 85610 PROTHROMBIN TIME: CPT | Mod: PO | Performed by: INTERNAL MEDICINE

## 2022-11-09 PROCEDURE — 80048 BASIC METABOLIC PNL TOTAL CA: CPT | Mod: PO | Performed by: INTERNAL MEDICINE

## 2022-11-09 PROCEDURE — 36415 COLL VENOUS BLD VENIPUNCTURE: CPT | Mod: PO | Performed by: INTERNAL MEDICINE

## 2022-11-09 PROCEDURE — 85025 COMPLETE CBC W/AUTO DIFF WBC: CPT | Mod: PO | Performed by: INTERNAL MEDICINE

## 2022-11-10 ENCOUNTER — HOSPITAL ENCOUNTER (OUTPATIENT)
Dept: CARDIOLOGY | Facility: HOSPITAL | Age: 27
Discharge: HOME | End: 2022-11-10
Attending: INTERNAL MEDICINE | Admitting: INTERNAL MEDICINE
Payer: MEDICAID

## 2022-11-10 ENCOUNTER — ANESTHESIA (OUTPATIENT)
Dept: MEDSURG UNIT | Facility: HOSPITAL | Age: 27
End: 2022-11-10
Payer: MEDICAID

## 2022-11-10 ENCOUNTER — HOSPITAL ENCOUNTER (OUTPATIENT)
Facility: HOSPITAL | Age: 27
Discharge: HOME | End: 2022-11-11
Attending: INTERNAL MEDICINE | Admitting: INTERNAL MEDICINE
Payer: MEDICAID

## 2022-11-10 VITALS
HEIGHT: 66 IN | SYSTOLIC BLOOD PRESSURE: 109 MMHG | DIASTOLIC BLOOD PRESSURE: 61 MMHG | BODY MASS INDEX: 22.5 KG/M2 | WEIGHT: 140 LBS

## 2022-11-10 DIAGNOSIS — I47.10 SVT (SUPRAVENTRICULAR TACHYCARDIA): ICD-10-CM

## 2022-11-10 DIAGNOSIS — I45.6 WPW SYNDROME: ICD-10-CM

## 2022-11-10 DIAGNOSIS — Z98.890 S/P FONTAN PROCEDURE: ICD-10-CM

## 2022-11-10 DIAGNOSIS — Z86.79 S/P ABLATION OPERATION FOR ARRHYTHMIA: ICD-10-CM

## 2022-11-10 DIAGNOSIS — Q24.9 ADULT CONGENITAL HEART DISEASE: ICD-10-CM

## 2022-11-10 DIAGNOSIS — I10 ESSENTIAL HYPERTENSION: ICD-10-CM

## 2022-11-10 DIAGNOSIS — I49.9 ARRHYTHMIA: ICD-10-CM

## 2022-11-10 DIAGNOSIS — Z98.890 S/P ABLATION OPERATION FOR ARRHYTHMIA: ICD-10-CM

## 2022-11-10 DIAGNOSIS — Z01.818 PRE-OP TESTING: Primary | ICD-10-CM

## 2022-11-10 PROBLEM — I49.8: Status: ACTIVE | Noted: 2019-10-01

## 2022-11-10 LAB
APTT BLDCRRT: 24.6 SEC (ref 21–32)
BASOPHILS # BLD AUTO: 0.02 K/UL (ref 0–0.2)
BASOPHILS NFR BLD: 0.4 % (ref 0–1.9)
BSA FOR ECHO PROCEDURE: 1.72 M2
DIFFERENTIAL METHOD: NORMAL
EOSINOPHIL # BLD AUTO: 0 K/UL (ref 0–0.5)
EOSINOPHIL NFR BLD: 0.4 % (ref 0–8)
ERYTHROCYTE [DISTWIDTH] IN BLOOD BY AUTOMATED COUNT: 13 % (ref 11.5–14.5)
HCT VFR BLD AUTO: 50.6 % (ref 40–54)
HGB BLD-MCNC: 16.5 G/DL (ref 14–18)
IMM GRANULOCYTES # BLD AUTO: 0.01 K/UL (ref 0–0.04)
IMM GRANULOCYTES NFR BLD AUTO: 0.2 % (ref 0–0.5)
INR PPP: 1 (ref 0.8–1.2)
LYMPHOCYTES # BLD AUTO: 1.1 K/UL (ref 1–4.8)
LYMPHOCYTES NFR BLD: 22.3 % (ref 18–48)
MCH RBC QN AUTO: 30.3 PG (ref 27–31)
MCHC RBC AUTO-ENTMCNC: 32.6 G/DL (ref 32–36)
MCV RBC AUTO: 93 FL (ref 82–98)
MONOCYTES # BLD AUTO: 0.4 K/UL (ref 0.3–1)
MONOCYTES NFR BLD: 6.8 % (ref 4–15)
NEUTROPHILS # BLD AUTO: 3.6 K/UL (ref 1.8–7.7)
NEUTROPHILS NFR BLD: 69.9 % (ref 38–73)
NRBC BLD-RTO: 0 /100 WBC
PLATELET # BLD AUTO: 173 K/UL (ref 150–450)
PMV BLD AUTO: 10.7 FL (ref 9.2–12.9)
PROTHROMBIN TIME: 10.3 SEC (ref 9–12.5)
RBC # BLD AUTO: 5.44 M/UL (ref 4.6–6.2)
WBC # BLD AUTO: 5.12 K/UL (ref 3.9–12.7)

## 2022-11-10 PROCEDURE — 27200677 HC TRANSDUCER MONITOR KIT SINGLE: Performed by: ANESTHESIOLOGY

## 2022-11-10 PROCEDURE — 63600175 PHARM REV CODE 636 W HCPCS: Performed by: ANESTHESIOLOGY

## 2022-11-10 PROCEDURE — 93622 COMP EP EVAL L VENTR PAC&REC: CPT | Performed by: INTERNAL MEDICINE

## 2022-11-10 PROCEDURE — C1769 GUIDE WIRE: HCPCS | Performed by: INTERNAL MEDICINE

## 2022-11-10 PROCEDURE — 37000008 HC ANESTHESIA 1ST 15 MINUTES: Performed by: INTERNAL MEDICINE

## 2022-11-10 PROCEDURE — 93320 DOPPLER ECHO COMPLETE: CPT | Mod: 26,,, | Performed by: PEDIATRICS

## 2022-11-10 PROCEDURE — 36620 INSERTION CATHETER ARTERY: CPT | Mod: 59,,, | Performed by: ANESTHESIOLOGY

## 2022-11-10 PROCEDURE — D9220A PRA ANESTHESIA: ICD-10-PCS | Mod: CRNA,,, | Performed by: NURSE ANESTHETIST, CERTIFIED REGISTERED

## 2022-11-10 PROCEDURE — D9220A PRA ANESTHESIA: Mod: CRNA,,, | Performed by: NURSE ANESTHETIST, CERTIFIED REGISTERED

## 2022-11-10 PROCEDURE — 99499 NO LOS: ICD-10-PCS | Mod: ,,, | Performed by: INTERNAL MEDICINE

## 2022-11-10 PROCEDURE — 93005 ELECTROCARDIOGRAM TRACING: CPT | Mod: 59

## 2022-11-10 PROCEDURE — 93315 TRANSESOPHAGEAL ECHO (TEE) (CUPID ONLY): ICD-10-PCS | Mod: 26,,, | Performed by: PEDIATRICS

## 2022-11-10 PROCEDURE — 85025 COMPLETE CBC W/AUTO DIFF WBC: CPT | Performed by: INTERNAL MEDICINE

## 2022-11-10 PROCEDURE — C1894 INTRO/SHEATH, NON-LASER: HCPCS | Performed by: INTERNAL MEDICINE

## 2022-11-10 PROCEDURE — 93320 TRANSESOPHAGEAL ECHO (TEE) (CUPID ONLY): ICD-10-PCS | Mod: 26,,, | Performed by: PEDIATRICS

## 2022-11-10 PROCEDURE — D9220A PRA ANESTHESIA: Mod: ANES,,, | Performed by: ANESTHESIOLOGY

## 2022-11-10 PROCEDURE — 63600175 PHARM REV CODE 636 W HCPCS: Performed by: INTERNAL MEDICINE

## 2022-11-10 PROCEDURE — 93622 COMP EP EVAL L VENTR PAC&REC: CPT | Mod: 26,,, | Performed by: INTERNAL MEDICINE

## 2022-11-10 PROCEDURE — 76937 US GUIDE VASCULAR ACCESS: CPT | Mod: 26,,, | Performed by: ANESTHESIOLOGY

## 2022-11-10 PROCEDURE — C1887 CATHETER, GUIDING: HCPCS | Performed by: INTERNAL MEDICINE

## 2022-11-10 PROCEDURE — 93622: ICD-10-PCS | Mod: 26,,, | Performed by: INTERNAL MEDICINE

## 2022-11-10 PROCEDURE — 37000009 HC ANESTHESIA EA ADD 15 MINS: Performed by: INTERNAL MEDICINE

## 2022-11-10 PROCEDURE — 63600175 PHARM REV CODE 636 W HCPCS: Performed by: NURSE ANESTHETIST, CERTIFIED REGISTERED

## 2022-11-10 PROCEDURE — 85610 PROTHROMBIN TIME: CPT | Performed by: INTERNAL MEDICINE

## 2022-11-10 PROCEDURE — 36620 PR INSERT CATH,ART,PERCUT,SHORTTERM: ICD-10-PCS | Mod: 59,,, | Performed by: ANESTHESIOLOGY

## 2022-11-10 PROCEDURE — 00537 ANES CARDIAC EP PROCEDURES: CPT | Performed by: INTERNAL MEDICINE

## 2022-11-10 PROCEDURE — 93653 COMPRE EP EVAL TX SVT: CPT | Performed by: INTERNAL MEDICINE

## 2022-11-10 PROCEDURE — 25000003 PHARM REV CODE 250: Performed by: NURSE ANESTHETIST, CERTIFIED REGISTERED

## 2022-11-10 PROCEDURE — 93010 EKG 12-LEAD: ICD-10-PCS | Mod: 77,,, | Performed by: INTERNAL MEDICINE

## 2022-11-10 PROCEDURE — 27201423 OPTIME MED/SURG SUP & DEVICES STERILE SUPPLY: Performed by: INTERNAL MEDICINE

## 2022-11-10 PROCEDURE — 25000003 PHARM REV CODE 250: Performed by: INTERNAL MEDICINE

## 2022-11-10 PROCEDURE — 99499 UNLISTED E&M SERVICE: CPT | Mod: ,,, | Performed by: INTERNAL MEDICINE

## 2022-11-10 PROCEDURE — 93315 ECHO TRANSESOPHAGEAL: CPT | Mod: 26,,, | Performed by: PEDIATRICS

## 2022-11-10 PROCEDURE — 93010 EKG 12-LEAD: ICD-10-PCS | Mod: ,,, | Performed by: INTERNAL MEDICINE

## 2022-11-10 PROCEDURE — 93325 TRANSESOPHAGEAL ECHO (TEE) (CUPID ONLY): ICD-10-PCS | Mod: 26,,, | Performed by: PEDIATRICS

## 2022-11-10 PROCEDURE — 93653 COMPRE EP EVAL TX SVT: CPT | Mod: ,,, | Performed by: INTERNAL MEDICINE

## 2022-11-10 PROCEDURE — 27800505 HC CATH, RADIAL ARTERY KIT: Performed by: ANESTHESIOLOGY

## 2022-11-10 PROCEDURE — C1725 CATH, TRANSLUMIN NON-LASER: HCPCS | Performed by: INTERNAL MEDICINE

## 2022-11-10 PROCEDURE — D9220A PRA ANESTHESIA: ICD-10-PCS | Mod: ANES,,, | Performed by: ANESTHESIOLOGY

## 2022-11-10 PROCEDURE — 93010 ELECTROCARDIOGRAM REPORT: CPT | Mod: ,,, | Performed by: INTERNAL MEDICINE

## 2022-11-10 PROCEDURE — 25000003 PHARM REV CODE 250: Performed by: NURSE PRACTITIONER

## 2022-11-10 PROCEDURE — 93653 PR ELECTROPHYS EVAL, COMPREHEN, W/SUPRAVENT TACHYCARD TRMT: ICD-10-PCS | Mod: ,,, | Performed by: INTERNAL MEDICINE

## 2022-11-10 PROCEDURE — C2630 CATH, EP, COOL-TIP: HCPCS | Performed by: INTERNAL MEDICINE

## 2022-11-10 PROCEDURE — 93312 ECHO TRANSESOPHAGEAL: CPT

## 2022-11-10 PROCEDURE — 85730 THROMBOPLASTIN TIME PARTIAL: CPT | Performed by: INTERNAL MEDICINE

## 2022-11-10 PROCEDURE — 93010 ELECTROCARDIOGRAM REPORT: CPT | Mod: 77,,, | Performed by: INTERNAL MEDICINE

## 2022-11-10 PROCEDURE — C1730 CATH, EP, 19 OR FEW ELECT: HCPCS | Performed by: INTERNAL MEDICINE

## 2022-11-10 PROCEDURE — 93325 DOPPLER ECHO COLOR FLOW MAPG: CPT | Mod: 26,,, | Performed by: PEDIATRICS

## 2022-11-10 PROCEDURE — 76937 PR  US GUIDE, VASCULAR ACCESS: ICD-10-PCS | Mod: 26,,, | Performed by: ANESTHESIOLOGY

## 2022-11-10 RX ORDER — MIDAZOLAM HYDROCHLORIDE 1 MG/ML
INJECTION INTRAMUSCULAR; INTRAVENOUS
Status: DISCONTINUED | OUTPATIENT
Start: 2022-11-10 | End: 2022-11-10

## 2022-11-10 RX ORDER — CEFAZOLIN SODIUM 1 G/3ML
INJECTION, POWDER, FOR SOLUTION INTRAMUSCULAR; INTRAVENOUS
Status: DISCONTINUED | OUTPATIENT
Start: 2022-11-10 | End: 2022-11-10

## 2022-11-10 RX ORDER — ROCURONIUM BROMIDE 10 MG/ML
INJECTION, SOLUTION INTRAVENOUS
Status: DISCONTINUED | OUTPATIENT
Start: 2022-11-10 | End: 2022-11-10

## 2022-11-10 RX ORDER — ACETAMINOPHEN 325 MG/1
650 TABLET ORAL EVERY 4 HOURS PRN
Status: DISCONTINUED | OUTPATIENT
Start: 2022-11-10 | End: 2022-11-11 | Stop reason: HOSPADM

## 2022-11-10 RX ORDER — FENTANYL CITRATE 50 UG/ML
25 INJECTION, SOLUTION INTRAMUSCULAR; INTRAVENOUS EVERY 5 MIN PRN
Status: DISCONTINUED | OUTPATIENT
Start: 2022-11-10 | End: 2022-11-11 | Stop reason: HOSPADM

## 2022-11-10 RX ORDER — LIDOCAINE HYDROCHLORIDE 10 MG/ML
INJECTION INFILTRATION; PERINEURAL
Status: DISCONTINUED | OUTPATIENT
Start: 2022-11-10 | End: 2022-11-11 | Stop reason: HOSPADM

## 2022-11-10 RX ORDER — DEXAMETHASONE SODIUM PHOSPHATE 4 MG/ML
INJECTION, SOLUTION INTRA-ARTICULAR; INTRALESIONAL; INTRAMUSCULAR; INTRAVENOUS; SOFT TISSUE
Status: DISCONTINUED | OUTPATIENT
Start: 2022-11-10 | End: 2022-11-10

## 2022-11-10 RX ORDER — PROTAMINE SULFATE 10 MG/ML
INJECTION, SOLUTION INTRAVENOUS
Status: DISCONTINUED | OUTPATIENT
Start: 2022-11-10 | End: 2022-11-10

## 2022-11-10 RX ORDER — ONDANSETRON 2 MG/ML
INJECTION INTRAMUSCULAR; INTRAVENOUS
Status: DISCONTINUED | OUTPATIENT
Start: 2022-11-10 | End: 2022-11-10

## 2022-11-10 RX ORDER — PROPOFOL 10 MG/ML
VIAL (ML) INTRAVENOUS
Status: DISCONTINUED | OUTPATIENT
Start: 2022-11-10 | End: 2022-11-10

## 2022-11-10 RX ORDER — NEOSTIGMINE METHYLSULFATE 0.5 MG/ML
INJECTION, SOLUTION INTRAVENOUS
Status: DISCONTINUED | OUTPATIENT
Start: 2022-11-10 | End: 2022-11-10

## 2022-11-10 RX ORDER — HEPARIN SODIUM 1000 [USP'U]/ML
INJECTION, SOLUTION INTRAVENOUS; SUBCUTANEOUS
Status: DISCONTINUED | OUTPATIENT
Start: 2022-11-10 | End: 2022-11-10

## 2022-11-10 RX ORDER — PHENYLEPHRINE HYDROCHLORIDE 10 MG/ML
INJECTION INTRAVENOUS
Status: DISCONTINUED | OUTPATIENT
Start: 2022-11-10 | End: 2022-11-10

## 2022-11-10 RX ORDER — HEPARIN SODIUM 10000 [USP'U]/100ML
INJECTION, SOLUTION INTRAVENOUS CONTINUOUS PRN
Status: DISCONTINUED | OUTPATIENT
Start: 2022-11-10 | End: 2022-11-10

## 2022-11-10 RX ORDER — FENTANYL CITRATE 50 UG/ML
INJECTION, SOLUTION INTRAMUSCULAR; INTRAVENOUS
Status: DISCONTINUED | OUTPATIENT
Start: 2022-11-10 | End: 2022-11-10

## 2022-11-10 RX ORDER — DOPAMINE HYDROCHLORIDE 160 MG/100ML
INJECTION, SOLUTION INTRAVENOUS CONTINUOUS PRN
Status: DISCONTINUED | OUTPATIENT
Start: 2022-11-10 | End: 2022-11-10

## 2022-11-10 RX ORDER — ADENOSINE 3 MG/ML
INJECTION, SOLUTION INTRAVENOUS
Status: DISCONTINUED | OUTPATIENT
Start: 2022-11-10 | End: 2022-11-11 | Stop reason: HOSPADM

## 2022-11-10 RX ORDER — HEPARIN SOD,PORCINE/0.9 % NACL 1000/500ML
INTRAVENOUS SOLUTION INTRAVENOUS
Status: DISCONTINUED | OUTPATIENT
Start: 2022-11-10 | End: 2022-11-11 | Stop reason: HOSPADM

## 2022-11-10 RX ORDER — ETOMIDATE 2 MG/ML
INJECTION INTRAVENOUS
Status: DISCONTINUED | OUTPATIENT
Start: 2022-11-10 | End: 2022-11-10

## 2022-11-10 RX ORDER — LIDOCAINE HCL/PF 100 MG/5ML
SYRINGE (ML) INTRAVENOUS
Status: DISCONTINUED | OUTPATIENT
Start: 2022-11-10 | End: 2022-11-10

## 2022-11-10 RX ORDER — CEFAZOLIN SODIUM/WATER 2 G/20 ML
2 SYRINGE (ML) INTRAVENOUS
Status: COMPLETED | OUTPATIENT
Start: 2022-11-10 | End: 2022-11-10

## 2022-11-10 RX ORDER — SODIUM CHLORIDE 0.9 % (FLUSH) 0.9 %
10 SYRINGE (ML) INJECTION
Status: DISCONTINUED | OUTPATIENT
Start: 2022-11-10 | End: 2022-11-11 | Stop reason: HOSPADM

## 2022-11-10 RX ADMIN — HEPARIN SODIUM 13000 UNITS: 1000 INJECTION, SOLUTION INTRAVENOUS; SUBCUTANEOUS at 08:11

## 2022-11-10 RX ADMIN — ONDANSETRON 4 MG: 2 INJECTION INTRAMUSCULAR; INTRAVENOUS at 11:11

## 2022-11-10 RX ADMIN — DOPAMINE HYDROCHLORIDE 5 MCG/KG/MIN: 160 INJECTION, SOLUTION INTRAVENOUS at 08:11

## 2022-11-10 RX ADMIN — PHENYLEPHRINE HYDROCHLORIDE 20 MCG: 10 INJECTION INTRAVENOUS at 09:11

## 2022-11-10 RX ADMIN — LIDOCAINE HYDROCHLORIDE 100 MG: 20 INJECTION, SOLUTION INTRAVENOUS at 07:11

## 2022-11-10 RX ADMIN — ROCURONIUM BROMIDE 20 MG: 10 INJECTION INTRAVENOUS at 09:11

## 2022-11-10 RX ADMIN — SODIUM CHLORIDE: 0.9 INJECTION, SOLUTION INTRAVENOUS at 07:11

## 2022-11-10 RX ADMIN — FENTANYL CITRATE 25 MCG: 50 INJECTION INTRAMUSCULAR; INTRAVENOUS at 01:11

## 2022-11-10 RX ADMIN — HEPARIN SODIUM 18 UNITS/KG/HR: 10000 INJECTION, SOLUTION INTRAVENOUS at 08:11

## 2022-11-10 RX ADMIN — NEOSTIGMINE METHYLSULFATE 5 MG: 0.5 INJECTION, SOLUTION INTRAVENOUS at 11:11

## 2022-11-10 RX ADMIN — PHENYLEPHRINE HYDROCHLORIDE 80 MCG: 10 INJECTION INTRAVENOUS at 07:11

## 2022-11-10 RX ADMIN — ROCURONIUM BROMIDE 10 MG: 10 INJECTION INTRAVENOUS at 10:11

## 2022-11-10 RX ADMIN — SODIUM CHLORIDE, SODIUM GLUCONATE, SODIUM ACETATE, POTASSIUM CHLORIDE, MAGNESIUM CHLORIDE, SODIUM PHOSPHATE, DIBASIC, AND POTASSIUM PHOSPHATE: .53; .5; .37; .037; .03; .012; .00082 INJECTION, SOLUTION INTRAVENOUS at 07:11

## 2022-11-10 RX ADMIN — Medication 2 G: at 08:11

## 2022-11-10 RX ADMIN — PROPOFOL 10 MG: 10 INJECTION, EMULSION INTRAVENOUS at 09:11

## 2022-11-10 RX ADMIN — HEPARIN SODIUM 4000 UNITS: 1000 INJECTION, SOLUTION INTRAVENOUS; SUBCUTANEOUS at 08:11

## 2022-11-10 RX ADMIN — GLYCOPYRROLATE 0.4 MG: 0.2 INJECTION, SOLUTION INTRAMUSCULAR; INTRAVENOUS at 11:11

## 2022-11-10 RX ADMIN — ACETAMINOPHEN 650 MG: 325 TABLET ORAL at 01:11

## 2022-11-10 RX ADMIN — FENTANYL CITRATE 50 MCG: 0.05 INJECTION, SOLUTION INTRAMUSCULAR; INTRAVENOUS at 09:11

## 2022-11-10 RX ADMIN — ACETAMINOPHEN 650 MG: 325 TABLET ORAL at 05:11

## 2022-11-10 RX ADMIN — ROCURONIUM BROMIDE 20 MG: 10 INJECTION INTRAVENOUS at 08:11

## 2022-11-10 RX ADMIN — FENTANYL CITRATE 50 MCG: 0.05 INJECTION, SOLUTION INTRAMUSCULAR; INTRAVENOUS at 07:11

## 2022-11-10 RX ADMIN — MIDAZOLAM HYDROCHLORIDE 2 MG: 1 INJECTION, SOLUTION INTRAMUSCULAR; INTRAVENOUS at 07:11

## 2022-11-10 RX ADMIN — HEPARIN SODIUM 2000 UNITS: 1000 INJECTION, SOLUTION INTRAVENOUS; SUBCUTANEOUS at 09:11

## 2022-11-10 RX ADMIN — RIVAROXABAN 20 MG: 20 TABLET, FILM COATED ORAL at 06:11

## 2022-11-10 RX ADMIN — DEXAMETHASONE SODIUM PHOSPHATE 4 MG: 4 INJECTION, SOLUTION INTRAMUSCULAR; INTRAVENOUS at 11:11

## 2022-11-10 RX ADMIN — HEPARIN SODIUM 1000 UNITS: 1000 INJECTION, SOLUTION INTRAVENOUS; SUBCUTANEOUS at 09:11

## 2022-11-10 RX ADMIN — PROTAMINE SULFATE 70 MG: 10 INJECTION, SOLUTION INTRAVENOUS at 11:11

## 2022-11-10 RX ADMIN — ROCURONIUM BROMIDE 30 MG: 10 INJECTION INTRAVENOUS at 07:11

## 2022-11-10 RX ADMIN — PROPOFOL 100 MG: 10 INJECTION, EMULSION INTRAVENOUS at 07:11

## 2022-11-10 RX ADMIN — SODIUM CHLORIDE 0.1 MCG/KG/MIN: 9 INJECTION, SOLUTION INTRAVENOUS at 08:11

## 2022-11-10 RX ADMIN — ETOMIDATE 10 MG: 2 INJECTION INTRAVENOUS at 07:11

## 2022-11-10 NOTE — ANESTHESIA PROCEDURE NOTES
Arterial    Diagnosis: Hyploplast RV, Tricuspid atresia  Doctor requesting consult: Marty    Patient location during procedure: done in OR  Procedure start time: 11/10/2022 7:48 AM  Timeout: 11/10/2022 7:48 AM  Procedure end time: 11/10/2022 8:02 AM    Staffing  Authorizing Provider: Shubham Seymour MD  Performing Provider: Shubham Seymour MD    Anesthesiologist was present at the time of the procedure.    Preanesthetic Checklist  Completed: patient identified, IV checked, site marked, risks and benefits discussed, surgical consent, monitors and equipment checked, pre-op evaluation, timeout performed and anesthesia consent givenArterial  Skin Prep: chlorhexidine gluconate  Local Infiltration: none  Orientation: right  Location: radial    Catheter Size: 20 G  Catheter placement by Ultrasound guidance. Heme positive aspiration all ports.   Vessel Caliber: small, patent, compressibility normal  Vascular Doppler:  not done  Needle advanced into vessel with real time Ultrasound guidance.  Guidewire confirmed in vessel.  Sterile sheath used.  Image recorded and saved.Insertion Attempts: 1  Assessment  Dressing: secured with tape and tegaderm and tegaderm  Patient: Tolerated well

## 2022-11-10 NOTE — ASSESSMENT & PLAN NOTE
1. OLIVIA today prior to procedure   -No absolute contraindications of esophageal stricture, tumor, perforation, laceration,or diverticulum and/or active GI bleed  -The risks, benefits & alternatives of the procedure were explained to the patient.   -The risks of transesophageal echo include but are not limited to:  Dental trauma, esophageal trauma/perforation, bleeding, laryngospasm/brochospasm, aspiration, sore throat/hoarseness, & dislodgement of the endotracheal tube/nasogastric tube (where applicable).    -The risks of moderate sedation include hypotension, respiratory depression, arrhythmias, bronchospasm, & death.    -Informed consent was obtained. The patient is agreeable to proceed with the procedure and all questions and concerns addressed.    Case discussed with an attending in echocardiography lab.     Further recommendations per attending addendum

## 2022-11-10 NOTE — CONSULTS
[facility]  Cardiology  Consult Note    Patient Name: Juani Reilly Jr.  MRN: [id number]  Admission Date: 11/10/2022  Hospital Length of Stay: 0 days  Code Status: Prior   Attending Provider: Abebe Ferguson MD   Consulting Provider: Juliano Mcfarlane MD  Primary Care Physician: Primary Doctor No  Principal Problem:<principal problem not specified>    Patient information was obtained from patient and ER records.     Consults  Subjective:     Chief Complaint:  SVT      HPI:   Mr. Reilly is a pleasant 25 year-old man with WPW, CHD, single ventricle and tricuspid atresia with subtle pre-excitation on his ECG and SVT. His likely involves an accessory pathway. He is failing metoprolol. Discussed treatment options including suppression with medical therapy versus EPS/ablation for more definitive therapy.      Patient here for SVT EPS & RFA with Dr. Ferguson for SVT     Anticoagulation: none  AAD/AVN agents: previously on sotalol    Pt doing well today and denies any issues     Dysphagia or odynophagia:  No  Liver Disease, esophageal disease, or known varices:  No  Upper GI Bleeding: No  Snoring:  No  Sleep Apnea:  No  Prior neck surgery or radiation:  No  History of anesthetic difficulties:  No  Family history of anesthetic difficulties:  No  Last oral intake:  24 hours ago  Able to move neck in all directions:  Yes    2019 TTE    Congenital history: History of surgery as  and again later in childhood with limited follow-up in recent years- all surgeries performed at [facility] through median sternotomy.   Abdominal situs is solitus. Atrial situs is normal. Imaging consistent with tricuspid atresia.. Tricuspid atresia and Mitral valve appears normal in structure.. LV dilation. Ventriculoarterial concordance. Ventricular loop: D-loop. Cardiac position is levocardia. Left aortic arch branching.     IMPRESSION:  Imaging consistent with diagnosis of tricuspid atresia S/P extracardiac conduit  Fontan-  Very difficult to demonstrate pulmonary circulation with significant chest deformity associated with median sternotomy.  Mildly dilated inferior vena cava connecting to extracardiac conduit passing posterior to the right atrium for completion of Fontan physiology.  Right superior vena cava identified with no obvious stenosis.  Unable to demonstrate Fontan or Jesus anastomosis to the pulmonary arteries.  At least two pulmonary veins demonstrated returning to the left atrium with no evidence for obstruction.  There is a small right atrium with tissue remaining in from eustachian valve and no obvious obstruction of right atrial flow to the left atrium.  There is no obvious tricuspid valve tissue present and no right ventricular cavity could be demonstrated.  There is no evidence for pulmonary valve.  At least mild dilation of the left atrium.  Mildly dilated mitral valve with color Doppler demonstrating mild insufficiency.  Single ventricle consistent with left ventricular morphology.  There is at least mild dilation of the left ventricle.  Left ventricular systolic function qualitatively good with ejection fraction estimated 50-55%.  There is a large aortic annulus with trileaflet aortic valve and trivial central jet of insufficiency.  Qualitative impression of at least mild dilation of the ascending aorta.  Branching pattern consistent with left aortic arch and no evidence for coarctation.  No obvious pericardial effusion.          Past Medical History:   Diagnosis Date    History of heart surgery     Other cirrhosis of liver 11/12/2020    SVT (supraventricular tachycardia)     WPW syndrome        Past Surgical History:   Procedure Laterality Date    ANGIOGRAPHY OF AORTIC ARCH N/A 9/3/2020    Procedure: AORTOGRAM, AORTIC ARCH;  Surgeon: Stephania Crump MD;  Location: [facility] CATH LAB;  Service: Cardiology;  Laterality: N/A;    FONTAN PROCEDURE, EXTRACARDIAC      LEFT HEART CATHETERIZATION Left  9/3/2020    Procedure: Left heart cath;  Surgeon: Stephania Crump MD;  Location: [facility] CATH LAB;  Service: Cardiology;  Laterality: Left;    RIGHT HEART CATHETERIZATION FOR CONGENITAL HEART DEFECT N/A 9/3/2020    Procedure: CATHETERIZATION, HEART, RIGHT, FOR CONGENITAL HEART DEFECT;  Surgeon: Stephania Crump MD;  Location: [facility] CATH LAB;  Service: Cardiology;  Laterality: N/A;  [facility] - needs covid test morning of. Extenuating circumstances    TREATMENT OF CARDIAC ARRHYTHMIA N/A 2021    Procedure: CARDIOVERSION;  Surgeon: Jim Mcginnis MD;  Location: [facility] CATH LAB;  Service: Cardiology;  Laterality: N/A;       Review of patient's allergies indicates:  No Known Allergies    No current facility-administered medications on file prior to encounter.     No current outpatient medications on file prior to encounter.     Family History       Problem Relation (Age of Onset)    Heart disease Maternal Grandfather    No Known Problems Mother, Father, Sister, Brother, Maternal Aunt, Maternal Uncle, Paternal Aunt, Paternal Uncle, Maternal Grandmother, Paternal Grandmother, Paternal Grandfather          Tobacco Use    Smoking status: Former     Types: Cigarettes     Quit date:      Years since quittin.8     Passive exposure: Past    Smokeless tobacco: Never    Tobacco comments:     3-4 months    Substance and Sexual Activity    Alcohol use: No    Drug use: Yes     Types: Marijuana     Comment: Mojo today- pt reports a while back    Sexual activity: Yes     Review of Systems   Constitutional: Negative for fever and malaise/fatigue.   Eyes:  Negative for blurred vision and pain.   Cardiovascular:  Negative for chest pain, dyspnea on exertion, leg swelling, orthopnea, palpitations and paroxysmal nocturnal dyspnea.   Respiratory:  Negative for cough, shortness of breath, sputum production and wheezing.    Hematologic/Lymphatic: Negative for adenopathy and bleeding problem.   Skin:  Negative for  rash.   Musculoskeletal:  Negative for back pain and neck pain.   Gastrointestinal:  Negative for abdominal pain, constipation, diarrhea, nausea and vomiting.   Genitourinary:  Negative for dysuria.   Neurological:  Negative for dizziness, headaches, light-headedness and weakness.   Objective:     Vital Signs (Most Recent):  Temp: 97.9 °F (36.6 °C) (11/10/22 0619)  Pulse: 85 (11/10/22 0619)  BP: 109/61 (11/10/22 0620)  SpO2: (!) 93 % (11/10/22 0619)   Vital Signs (24h Range):  Temp:  [97.9 °F (36.6 °C)] 97.9 °F (36.6 °C)  Pulse:  [85] 85  SpO2:  [93 %] 93 %  BP: (109-140)/(61-77) 109/61     Weight: 63.5 kg (140 lb)  Body mass index is 22.6 kg/m².    SpO2: (!) 93 %       No intake or output data in the 24 hours ending 11/10/22 0716    Lines/Drains/Airways       Peripheral Intravenous Line  Duration                  Peripheral IV - Single Lumen 11/10/22 0647 20 G Left;Posterior Hand <1 day         Peripheral IV - Single Lumen 11/10/22 0707 20 G Anterior;Distal;Right Upper Arm <1 day                    Physical Exam  Constitutional:       General: He is not in acute distress.     Appearance: Normal appearance. He is not ill-appearing, toxic-appearing or diaphoretic.   HENT:      Head: Normocephalic and atraumatic.      Nose: Nose normal.   Eyes:      Extraocular Movements: Extraocular movements intact.      Pupils: Pupils are equal, round, and reactive to light.   Cardiovascular:      Rate and Rhythm: Normal rate and regular rhythm.      Heart sounds: No murmur heard.    No friction rub. No gallop.   Pulmonary:      Effort: Pulmonary effort is normal. No respiratory distress.      Breath sounds: Normal breath sounds. No wheezing or rales.   Abdominal:      General: Abdomen is flat. There is no distension.      Palpations: Abdomen is soft. There is no mass.      Tenderness: There is no abdominal tenderness.   Musculoskeletal:         General: No swelling. Normal range of motion.      Cervical back: Normal range of  motion. No rigidity.      Right lower leg: No edema.      Left lower leg: No edema.   Skin:     General: Skin is warm and dry.      Coloration: Skin is not jaundiced.      Findings: No bruising.   Neurological:      General: No focal deficit present.      Mental Status: He is alert and oriented to person, place, and time.      Cranial Nerves: No cranial nerve deficit.      Motor: No weakness.       Significant Labs: BMP:   Recent Labs   Lab 11/09/22  1131   *      K 4.2      CO2 28   BUN 12   CREATININE 1.06   CALCIUM 9.5    and CBC   Recent Labs   Lab 11/09/22  1131   WBC 3.87*   HGB 18.0   HCT 55.4*          Significant Imaging: Reviewed     Assessment and Plan:     SVT (supraventricular tachycardia)  1. OLIVAI today prior to procedure   -No absolute contraindications of esophageal stricture, tumor, perforation, laceration,or diverticulum and/or active GI bleed  -The risks, benefits & alternatives of the procedure were explained to the patient.   -The risks of transesophageal echo include but are not limited to:  Dental trauma, esophageal trauma/perforation, bleeding, laryngospasm/brochospasm, aspiration, sore throat/hoarseness, & dislodgement of the endotracheal tube/nasogastric tube (where applicable).    -The risks of moderate sedation include hypotension, respiratory depression, arrhythmias, bronchospasm, & death.    -Informed consent was obtained. The patient is agreeable to proceed with the procedure and all questions and concerns addressed.    Case discussed with an attending in echocardiography lab.     Further recommendations per attending addendum          VTE Risk Mitigation (From admission, onward)    None          Thank you for your consult. I will follow-up with patient. Please contact us if you have any additional questions.    Juliano Mcfarlane MD  Cardiology   [facility] Short [facility]

## 2022-11-10 NOTE — SUBJECTIVE & OBJECTIVE
Past Medical History:   Diagnosis Date    History of heart surgery     Other cirrhosis of liver 2020    SVT (supraventricular tachycardia)     WPW syndrome        Past Surgical History:   Procedure Laterality Date    ANGIOGRAPHY OF AORTIC ARCH N/A 9/3/2020    Procedure: AORTOGRAM, AORTIC ARCH;  Surgeon: Stephania Crump MD;  Location: [facility] CATH LAB;  Service: Cardiology;  Laterality: N/A;    FONTAN PROCEDURE, EXTRACARDIAC      LEFT HEART CATHETERIZATION Left 9/3/2020    Procedure: Left heart cath;  Surgeon: Stephania Crump MD;  Location: [facility] CATH LAB;  Service: Cardiology;  Laterality: Left;    RIGHT HEART CATHETERIZATION FOR CONGENITAL HEART DEFECT N/A 9/3/2020    Procedure: CATHETERIZATION, HEART, RIGHT, FOR CONGENITAL HEART DEFECT;  Surgeon: Stephania Crump MD;  Location: [facility] CATH LAB;  Service: Cardiology;  Laterality: N/A;  [facility] - needs covid test morning of. Extenuating circumstances    TREATMENT OF CARDIAC ARRHYTHMIA N/A 2021    Procedure: CARDIOVERSION;  Surgeon: Jim Mcginnis MD;  Location: [facility] CATH LAB;  Service: Cardiology;  Laterality: N/A;       Review of patient's allergies indicates:  No Known Allergies    No current facility-administered medications on file prior to encounter.     No current outpatient medications on file prior to encounter.     Family History       Problem Relation (Age of Onset)    Heart disease Maternal Grandfather    No Known Problems Mother, Father, Sister, Brother, Maternal Aunt, Maternal Uncle, Paternal Aunt, Paternal Uncle, Maternal Grandmother, Paternal Grandmother, Paternal Grandfather          Tobacco Use    Smoking status: Former     Types: Cigarettes     Quit date:      Years since quittin.8     Passive exposure: Past    Smokeless tobacco: Never    Tobacco comments:     3-4 months    Substance and Sexual Activity    Alcohol use: No    Drug use: Yes     Types: Marijuana     Comment: Mojo today- pt reports a while back     Sexual activity: Yes     Review of Systems   All other systems reviewed and are negative.  Objective:     Vital Signs (Most Recent):  Temp: 97.9 °F (36.6 °C) (11/10/22 0619)  Pulse: 85 (11/10/22 0619)  BP: 109/61 (11/10/22 0620)  SpO2: (!) 93 % (11/10/22 0619)   Vital Signs (24h Range):  Temp:  [97.9 °F (36.6 °C)] 97.9 °F (36.6 °C)  Pulse:  [85] 85  SpO2:  [93 %] 93 %  BP: (109-140)/(61-77) 109/61       Weight: 63.5 kg (140 lb)  Body mass index is 22.6 kg/m².    SpO2: (!) 93 %       Physical Exam  General: NAD. AAO  HENT: EOMI  Neck: supple. No JVD  CV: RRR. Normal S1/S2. No gallops, rubs, or murmurs. 2+ radial pulses  Resp: CTAB. No increased work of breathing  Ext: warm. No edema.      Significant Labs: All pertinent lab results from the last 24 hours have been reviewed.    Significant Imaging:  Reviewed

## 2022-11-10 NOTE — HPI
Mr. Reilly is a pleasant 25 year-old man with WPW, CHD, single ventricle and tricuspid atresia with subtle pre-excitation on his ECG and SVT. His likely involves an accessory pathway. He is failing metoprolol. Discussed treatment options including suppression with medical therapy versus EPS/ablation for more definitive therapy.      Patient here for SVT EPS & RFA with Dr. Ferguson for SVT     Anticoagulation: none  AAD/AVN agents: previously on sotalol    Pt doing well today and denies any issues     Dysphagia or odynophagia:  No  Liver Disease, esophageal disease, or known varices:  No  Upper GI Bleeding: No  Snoring:  No  Sleep Apnea:  No  Prior neck surgery or radiation:  No  History of anesthetic difficulties:  No  Family history of anesthetic difficulties:  No  Last oral intake:  24 hours ago  Able to move neck in all directions:  Yes    2019 TTE   Congenital history: History of surgery as  and again later in childhood with limited follow-up in recent years- all surgeries performed at [facility] through median sternotomy.  Abdominal situs is solitus. Atrial situs is normal. Imaging consistent with tricuspid atresia.. Tricuspid atresia and Mitral valve appears normal in structure.. LV dilation. Ventriculoarterial concordance. Ventricular loop: D-loop. Cardiac position is levocardia. Left aortic arch branching.     IMPRESSION:  Imaging consistent with diagnosis of tricuspid atresia S/P extracardiac conduit Fontan-  Very difficult to demonstrate pulmonary circulation with significant chest deformity associated with median sternotomy.  Mildly dilated inferior vena cava connecting to extracardiac conduit passing posterior to the right atrium for completion of Fontan physiology.  Right superior vena cava identified with no obvious stenosis.  Unable to demonstrate Fontan or Jesus anastomosis to the pulmonary arteries.  At least two pulmonary veins demonstrated returning to the left atrium with no evidence for  obstruction.  There is a small right atrium with tissue remaining in from eustachian valve and no obvious obstruction of right atrial flow to the left atrium.  There is no obvious tricuspid valve tissue present and no right ventricular cavity could be demonstrated.  There is no evidence for pulmonary valve.  At least mild dilation of the left atrium.  Mildly dilated mitral valve with color Doppler demonstrating mild insufficiency.  Single ventricle consistent with left ventricular morphology.  There is at least mild dilation of the left ventricle.  Left ventricular systolic function qualitatively good with ejection fraction estimated 50-55%.  There is a large aortic annulus with trileaflet aortic valve and trivial central jet of insufficiency.  Qualitative impression of at least mild dilation of the ascending aorta.  Branching pattern consistent with left aortic arch and no evidence for coarctation.  No obvious pericardial effusion.

## 2022-11-10 NOTE — HPI
"Mr. Reilly is a 27 y.o. 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      Background:     Adult Congenital Heart Disease Specialist: Meli Barrera MD  Mr. Reilly presents today for EPS and RFA of suspected AP. No complaints. Not on OAC. ECG shows pre-excitation. Not on any AVN blocking agents currently.    Mr. Reilly today has a history of congenital heart disease. 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 He was doing well until he presented to the [facility] ER on 9/5/2019 with palpitations. He was in a regular tachycardia that terminated with IV adenosine. He reports 1 month of shortness of breath at rest and with exertion. He denies any orthopnea or PND. No leg edema. He denies any history of syncope. He reports he has not seen a cardiologist ([facility]) for 7-8 years.     ECGs in Epic which note sinus rhythm with mild pre-excitation (isoelectric V1, +V2=6, +inf leads). ECG from 9/5/2019 ER visit notes a regular long RP tachycardia with similar QRS morphology but not ART.     Mr. Reilly returned for follow-up 1/2020. He is being seen by Dr. Barrera. 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 He was seen in the ER 11/2019 with recurrent SVT terminated with IV adenosine. ECG noted a regular narrow complex tachycardia with a "long" short RP interval. He is on low dose metoprolol. We discussed EPS/ablation. He was not interested and desired medication alternatives.     7/17/2020:  Recent ER visit for SVT. He otherwise feels well.  ECG is sinus rhythm with pre-excitation as described above.     In summary, Mr. Reilly is a pleasant 25 year-old man with single ventricle and tricuspid atresia with subtle pre-excitation on his ECG and SVT. " We discussed the etiology and pathophysiology of SVT. His likely involves an accessory pathway. He is failing metoprolol. Discussed treatment options including suppression with medical therapy versus EPS/ablation for more definitive therapy. Discussed need to coordinate with pediatric interventional cardiology and with my partner, Dr. Emeterio Hurley.     Discussed case with Dr. Alarcon with pediatric EP/ACHD clinic. Reviewed the case. She recommends the patient first see Pediatric Interventional Cards clinic for a hemodynamic work-up and consider anti-arrhythmic therapy first due to challenges with his particular anatomy. I spoke with the patient regarding this however could only reach his grandmother. I called the patient's mother to discuss but received no answer and left a message.  Ultimately our plan will be to refer to Pediatric Interventional Cardiology clinic for a hemodynamic study followed by admission for sotalol initiation (if patient agreeable).

## 2022-11-10 NOTE — Clinical Note
An angiography was performed of the IVC. The angiography was performed via power injection. The injected amount was 40 mL contrast at 20 mL/s. The PSI from the power injection was 1000.

## 2022-11-10 NOTE — PROCEDURE NOTE ADDENDUM
Certification of Assistant at Surgery       Surgery Date: 11/10/2022     Participating Surgeons:  Surgeon(s) and Role:  Panel 1:     * Abebe Ferguson MD - Primary     * Corey Hsu MD - Fellow     * Emeterio Hurley MD  Panel 2:     * Stephania Crump MD - Primary     * Mando Arnold Jr., MD  Panel 3:     * Emeterio Hall MD - Primary     * Juliano Mcfarlane MD - Fellow    Procedures:  Procedure(s) (LRB):  Ablation, SVT, Accessory Pathway (N/A)  CATHETERIZATION, HEART, BOTH LEFT AND RIGHT, WITH TRANSSEPTAL LEFT HEART CATHETERIZATION THROUGH INTACT SEPTUM, FOR CONGENITAL ANOMALY (N/A)  ECHOCARDIOGRAM, TRANSESOPHAGEAL (N/A)    Assistant Surgeon's Certification of Necessity:  I understand that section 1842 (b) (6) (d) of the Social Security Act generally prohibits Medicare Part B reasonable charge payment for the services of assistants at surgery in teaching hospitals when qualified residents are available to furnish such services. I certify that the services for which payment is claimed were medically necessary, and that no qualified resident was available to perform the services. I further understand that these services are subject to post-payment review by the Medicare carrier.      Stephania Crump MD    11/10/2022  1:44 PM

## 2022-11-10 NOTE — ANESTHESIA PREPROCEDURE EVALUATION
11/10/2022  Juani Reilly Jr. is a 27 y.o., male with single ventricle and tricuspid atresia with subtle pre-excitation on his ECG and SVT.     Cath Report:  1) Tricuspid atresia s/p intra-atrial Fontan  2) Normal Fontan pressures (11mmHg) and wedge pressure (8mmHg).  3) Normal cardiac output and vascular resistance calculations  4) Small atrial baffle leak  5) No significant aorta-pulmonary or veno-venous collaterals  6) Occlusion of right common femoral artery             Pre-op Assessment    I have reviewed the Patient Summary Reports.     I have reviewed the Nursing Notes. I have reviewed the NPO Status.   I have reviewed the Medications.     Review of Systems  Anesthesia Hx:  No previous Anesthesia  Neg history of prior surgery. Denies Family Hx of Anesthesia complications.   Denies Personal Hx of Anesthesia complications.   Social:  Non-Smoker, No Alcohol Use    Hematology/Oncology:  Hematology Normal   Oncology Normal     EENT/Dental:EENT/Dental Normal   Cardiovascular:   Exercise tolerance: poor Hypertension, well controlled Dysrhythmias ECG has been reviewed. ECHO and cardiologist notes reviewed.   Pulmonary:  Pulmonary Normal    Renal/:  Renal/ Normal     Hepatic/GI:   Liver Disease,    Musculoskeletal:  Musculoskeletal Normal    Neurological:  Neurology Normal    Endocrine:  Endocrine Normal    Dermatological:  Skin Normal    Psych:  Psychiatric Normal           Physical Exam  General: Well nourished, Cooperative, Alert and Oriented    Airway:  Mallampati: II / II  Mouth Opening: Normal  TM Distance: Normal  Tongue: Normal  Neck ROM: Normal ROM    Dental:  Intact    Chest/Lungs:  Clear to auscultation, Normal Respiratory Rate    Heart:  Rate: Normal  Rhythm: Regular Rhythm  Murmur: Systolic;  Systolic: Holosystolic, Grade II;        Anesthesia Plan  Type of Anesthesia, risks &  benefits discussed:    Anesthesia Type: Gen ETT  Intra-op Monitoring Plan: Standard ASA Monitors, Art Line and OLIVIA  Post Op Pain Control Plan: multimodal analgesia and IV/PO Opioids PRN  Induction:  IV  Airway Plan: Direct, Post-Induction  Informed Consent: Informed consent signed with the Patient and all parties understand the risks and agree with anesthesia plan.  All questions answered. Patient consented to blood products? Yes  ASA Score: 3  Day of Surgery Review of History & Physical: H&P Update referred to the surgeon/provider.    Ready For Surgery From Anesthesia Perspective.     .

## 2022-11-10 NOTE — NURSING TRANSFER
Nursing Transfer Note      11/10/2022     Reason patient is being transferred: d/c criteria met     Transfer To: [id number]    Transfer via stretcher    Transfer with cardiac monitoring and called and registered box     Transported by [facility] pct     Medicines sent: none    Any special needs or follow-up needed: groin checks and vital signs and suture removal time 330pm     Chart send with patient: Yes    Notified: reported to [facility]ri friend [facility]     Patient reassessed at: see epic  (date, time)    Upon arrival to floor: to room no complaints no distress noted. Notified patient 's mom room number and updated on patient status and condition while in recovery patient's belongings from [facility] sent with patient to [facility]

## 2022-11-10 NOTE — PROGRESS NOTES
Patient admitted to recovery see [facility] for complete assessment pacu bcg's maintained safety measures verified patient instructed on pain scale. Ekg done and in chart. Called patient's mom and updated on patient location with the permission of ramiro.

## 2022-11-10 NOTE — ASSESSMENT & PLAN NOTE
Patient here for SVT EPS & RFA with Dr. Ferguson for SVT    Anticoagulation: none  AAD/AVN agents: previously on sotalol    The risks, benefits and alternatives of the procedure were explained to the patient, patient's family and/or surrogate decision maker. Risks include (but not limited to) bleeding, hematoma, infection, pain, vascular damage, damage to structures surrounding the vasculature, myocardial damage [perforation, valvular damage], cardiac tamponade, phrenic nerve damage, injury to conduction system which may require permanent pacemaker implantation, CVA, MI, and death. All questions were answered. Patient is understanding of these risks, and would like to proceed. Consents signed.

## 2022-11-10 NOTE — ANESTHESIA POSTPROCEDURE EVALUATION
Anesthesia Post Evaluation    Patient: Juani Reilly Jr.    Procedure(s) Performed: Procedure(s) (LRB):  Ablation, SVT, Accessory Pathway (N/A)  CATHETERIZATION, HEART, BOTH LEFT AND RIGHT, WITH TRANSSEPTAL LEFT HEART CATHETERIZATION THROUGH INTACT SEPTUM, FOR CONGENITAL ANOMALY (N/A)  ECHOCARDIOGRAM, TRANSESOPHAGEAL (N/A)    Final Anesthesia Type: general      Patient location during evaluation: PACU  Patient participation: Yes- Able to Participate  Level of consciousness: awake and alert, awake and oriented  Post-procedure vital signs: reviewed and stable  Pain management: adequate  Airway patency: patent    PONV status at discharge: No PONV  Anesthetic complications: no      Cardiovascular status: blood pressure returned to baseline, stable and hemodynamically stable  Respiratory status: unassisted, spontaneous ventilation and room air  Hydration status: euvolemic  Follow-up not needed.          Vitals Value Taken Time   /76 11/10/22 1401   Temp 37 °C (98.6 °F) 11/10/22 1400   Pulse 93 11/10/22 1411   Resp 18 11/10/22 1400   SpO2 90 % 11/10/22 1411   Vitals shown include unvalidated device data.      No case tracking events are documented in the log.      Pain/Kristel Score: Pain Rating Prior to Med Admin: 7 (11/10/2022  1:42 PM)  Kristel Score: 8 (11/10/2022 12:45 PM)

## 2022-11-10 NOTE — H&P
"[facility] - Short Stay Cardiac Unit  Cardiac Electrophysiology  History and Physical     Admission Date: 11/10/2022  Code Status: Prior   Attending Provider: Abebe Ferguson MD   Principal Problem:<principal problem not specified>    Subjective:     Chief Complaint:  SVT     HPI:  Mr. Reilly is a 27 y.o. 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      Background:     Adult Congenital Heart Disease Specialist: Meli Barrera MD  Mr. Reilly presents today for EPS and RFA of suspected AP. No complaints. Not on OAC. ECG shows pre-excitation. Not on any AVN blocking agents currently.    Mr. Reilly today has a history of congenital heart disease. 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 He was doing well until he presented to the [facility] ER on 9/5/2019 with palpitations. He was in a regular tachycardia that terminated with IV adenosine. He reports 1 month of shortness of breath at rest and with exertion. He denies any orthopnea or PND. No leg edema. He denies any history of syncope. He reports he has not seen a cardiologist ([facility]) for 7-8 years.     ECGs in Epic which note sinus rhythm with mild pre-excitation (isoelectric V1, +V2=6, +inf leads). ECG from 9/5/2019 ER visit notes a regular long RP tachycardia with similar QRS morphology but not ART.     Mr. eRilly returned for follow-up 1/2020. He is being seen by Dr. Barrera. 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 He was seen in the ER 11/2019 with recurrent SVT terminated with IV adenosine. ECG noted a regular narrow complex tachycardia with a "long" short RP interval. He is on low dose metoprolol. We discussed EPS/ablation. He was not interested and desired medication " alternatives.     7/17/2020:  Recent ER visit for SVT. He otherwise feels well.  ECG is sinus rhythm with pre-excitation as described above.     In summary, Mr. Reilly is a pleasant 25 year-old man with single ventricle and tricuspid atresia with subtle pre-excitation on his ECG and SVT. We discussed the etiology and pathophysiology of SVT. His likely involves an accessory pathway. He is failing metoprolol. Discussed treatment options including suppression with medical therapy versus EPS/ablation for more definitive therapy. Discussed need to coordinate with pediatric interventional cardiology and with my partner, Dr. Emeterio Hurley.     Discussed case with Dr. Alarcon with pediatric EP/ACHD clinic. Reviewed the case. She recommends the patient first see Pediatric Interventional Cards clinic for a hemodynamic work-up and consider anti-arrhythmic therapy first due to challenges with his particular anatomy. I spoke with the patient regarding this however could only reach his grandmother. I called the patient's mother to discuss but received no answer and left a message.  Ultimately our plan will be to refer to Pediatric Interventional Cardiology clinic for a hemodynamic study followed by admission for sotalol initiation (if patient agreeable).      Past Medical History:   Diagnosis Date    History of heart surgery     Other cirrhosis of liver 11/12/2020    SVT (supraventricular tachycardia)     WPW syndrome        Past Surgical History:   Procedure Laterality Date    ANGIOGRAPHY OF AORTIC ARCH N/A 9/3/2020    Procedure: AORTOGRAM, AORTIC ARCH;  Surgeon: Stephania Crump MD;  Location: [facility] CATH LAB;  Service: Cardiology;  Laterality: N/A;    FONTAN PROCEDURE, EXTRACARDIAC      LEFT HEART CATHETERIZATION Left 9/3/2020    Procedure: Left heart cath;  Surgeon: Stephania Crump MD;  Location: [facility] CATH LAB;  Service: Cardiology;  Laterality: Left;    RIGHT HEART CATHETERIZATION FOR CONGENITAL HEART  DEFECT N/A 9/3/2020    Procedure: CATHETERIZATION, HEART, RIGHT, FOR CONGENITAL HEART DEFECT;  Surgeon: Stephania Crump MD;  Location: [facility] CATH LAB;  Service: Cardiology;  Laterality: N/A;  [facility] - needs covid test morning of. Extenuating circumstances    TREATMENT OF CARDIAC ARRHYTHMIA N/A 2021    Procedure: CARDIOVERSION;  Surgeon: Jim Mcginnis MD;  Location: [facility] CATH LAB;  Service: Cardiology;  Laterality: N/A;       Review of patient's allergies indicates:  No Known Allergies    No current facility-administered medications on file prior to encounter.     No current outpatient medications on file prior to encounter.     Family History       Problem Relation (Age of Onset)    Heart disease Maternal Grandfather    No Known Problems Mother, Father, Sister, Brother, Maternal Aunt, Maternal Uncle, Paternal Aunt, Paternal Uncle, Maternal Grandmother, Paternal Grandmother, Paternal Grandfather          Tobacco Use    Smoking status: Former     Types: Cigarettes     Quit date:      Years since quittin.8     Passive exposure: Past    Smokeless tobacco: Never    Tobacco comments:     3-4 months    Substance and Sexual Activity    Alcohol use: No    Drug use: Yes     Types: Marijuana     Comment: Mojo today- pt reports a while back    Sexual activity: Yes     Review of Systems   All other systems reviewed and are negative.  Objective:     Vital Signs (Most Recent):  Temp: 97.9 °F (36.6 °C) (11/10/22 0619)  Pulse: 85 (11/10/22 0619)  BP: 109/61 (11/10/22 0620)  SpO2: (!) 93 % (11/10/22 0619)   Vital Signs (24h Range):  Temp:  [97.9 °F (36.6 °C)] 97.9 °F (36.6 °C)  Pulse:  [85] 85  SpO2:  [93 %] 93 %  BP: (109-140)/(61-77) 109/61       Weight: 63.5 kg (140 lb)  Body mass index is 22.6 kg/m².    SpO2: (!) 93 %       Physical Exam  General: NAD. AAO  HENT: EOMI  Neck: supple. No JVD  CV: RRR. Normal S1/S2. No gallops, rubs, or murmurs. 2+ radial pulses  Resp: CTAB. No increased work of  breathing  Ext: warm. No edema.      Significant Labs: All pertinent lab results from the last 24 hours have been reviewed.    Significant Imaging:  Reviewed    Assessment and Plan:     SVT (supraventricular tachycardia)  Patient here for SVT EPS & RFA with Dr. Ferguson for SVT    Anticoagulation: none  AAD/AVN agents: previously on sotalol    The risks, benefits and alternatives of the procedure were explained to the patient, patient's family and/or surrogate decision maker. Risks include (but not limited to) bleeding, hematoma, infection, pain, vascular damage, damage to structures surrounding the vasculature, myocardial damage [perforation, valvular damage], cardiac tamponade, phrenic nerve damage, injury to conduction system which may require permanent pacemaker implantation, CVA, MI, and death. All questions were answered. Patient is understanding of these risks, and would like to proceed. Consents signed.        Corey Hsu MD  Cardiac Electrophysiology  [facility] - Short Stay Cardiac Unit

## 2022-11-10 NOTE — ANESTHESIA PROCEDURE NOTES
Intubation    Date/Time: 11/10/2022 7:43 AM  Performed by: Shaq Bellamy CRNA  Authorized by: Shubham Seymour MD     Intubation:     Induction:  Intravenous    Intubated:  Postinduction    Mask Ventilation:  Easy mask    Attempts:  1    Attempted By:  CRNA    Method of Intubation:  Video laryngoscopy    Blade:  Andrade 3    Laryngeal View Grade: Grade I - full view of cords      Difficult Airway Encountered?: No      Complications:  None    Airway Device:  Oral endotracheal tube    Airway Device Size:  7.5    Style/Cuff Inflation:  Cuffed (inflated to minimal occlusive pressure)    Tube secured:  23    Secured at:  The lips    Placement Verified By:  Capnometry    Complicating Factors:  None    Findings Post-Intubation:  BS equal bilateral and atraumatic/condition of teeth unchanged

## 2022-11-10 NOTE — BRIEF OP NOTE
: Abebe Ferguson MD  Date of Procedure: 11/10/2022    Post-operative Diagnosis: AVRT    Procedure Performed: RFA to the lateral MA accessory pathway for treatment of ORT     Description of Procedure: The patient was brought to the EP lab in the fasting state. Prepped and draped in sterile fashion. Safety timeout was performed. Sedation administered by anesthesia staff. Ultrasound guided venous access of the bilateral femoral veins was performed. RA and baffle mapped. With the assistance of pediatric IC the baffle was cross to gain access to the LA/LV. The baffle was dilated to allow for 2 Agilis sheaths for which an RV Sandra and HD Grid were placed. The halo was placed into the RA. The AP was mapped antegrade and retrograde. Tachycardia (ORT) was easily induced, but the patient did not tolerated SVT which was pace terminated each time. The AP was localized and successfully ablated. Tachycardia was non-inducible following ablation. ADO was given which showed both antegrade and retrograde block.     EBL: <10 mL    Specimens Removed: None  Complications: no immediate    Plan:  Bedrest x 4 hours  ECG on upon arrival to PACU  Medication changes: start Xarelot 20 mg QHS with dinner as the patient has a shunt from R>L secondary to baffle access  Admit overnight for observation  If patient has desaturation (<85%) would consider closing with ASD closure device per pediatric IC    The attending physician was present for entire duration of the procedure    Dawood Hsu MD, PGY7  Electrophysiology

## 2022-11-10 NOTE — SUBJECTIVE & OBJECTIVE
Past Medical History:   Diagnosis Date    History of heart surgery     Other cirrhosis of liver 2020    SVT (supraventricular tachycardia)     WPW syndrome        Past Surgical History:   Procedure Laterality Date    ANGIOGRAPHY OF AORTIC ARCH N/A 9/3/2020    Procedure: AORTOGRAM, AORTIC ARCH;  Surgeon: Stephania Crump MD;  Location: [facility] CATH LAB;  Service: Cardiology;  Laterality: N/A;    FONTAN PROCEDURE, EXTRACARDIAC      LEFT HEART CATHETERIZATION Left 9/3/2020    Procedure: Left heart cath;  Surgeon: Stephania Crump MD;  Location: [facility] CATH LAB;  Service: Cardiology;  Laterality: Left;    RIGHT HEART CATHETERIZATION FOR CONGENITAL HEART DEFECT N/A 9/3/2020    Procedure: CATHETERIZATION, HEART, RIGHT, FOR CONGENITAL HEART DEFECT;  Surgeon: Stephania rCump MD;  Location: [facility] CATH LAB;  Service: Cardiology;  Laterality: N/A;  Pedi Heart - needs covid test morning of. Extenuating circumstances    TREATMENT OF CARDIAC ARRHYTHMIA N/A 2021    Procedure: CARDIOVERSION;  Surgeon: Jim Mcginnis MD;  Location: [facility] CATH LAB;  Service: Cardiology;  Laterality: N/A;       Review of patient's allergies indicates:  No Known Allergies    No current facility-administered medications on file prior to encounter.     No current outpatient medications on file prior to encounter.     Family History       Problem Relation (Age of Onset)    Heart disease Maternal Grandfather    No Known Problems Mother, Father, Sister, Brother, Maternal Aunt, Maternal Uncle, Paternal Aunt, Paternal Uncle, Maternal Grandmother, Paternal Grandmother, Paternal Grandfather          Tobacco Use    Smoking status: Former     Types: Cigarettes     Quit date:      Years since quittin.8     Passive exposure: Past    Smokeless tobacco: Never    Tobacco comments:     3-4 months    Substance and Sexual Activity    Alcohol use: No    Drug use: Yes     Types: Marijuana     Comment: Mojo today- pt reports a while back     Sexual activity: Yes     Review of Systems   Constitutional: Negative for fever and malaise/fatigue.   Eyes:  Negative for blurred vision and pain.   Cardiovascular:  Negative for chest pain, dyspnea on exertion, leg swelling, orthopnea, palpitations and paroxysmal nocturnal dyspnea.   Respiratory:  Negative for cough, shortness of breath, sputum production and wheezing.    Hematologic/Lymphatic: Negative for adenopathy and bleeding problem.   Skin:  Negative for rash.   Musculoskeletal:  Negative for back pain and neck pain.   Gastrointestinal:  Negative for abdominal pain, constipation, diarrhea, nausea and vomiting.   Genitourinary:  Negative for dysuria.   Neurological:  Negative for dizziness, headaches, light-headedness and weakness.   Objective:     Vital Signs (Most Recent):  Temp: 97.9 °F (36.6 °C) (11/10/22 0619)  Pulse: 85 (11/10/22 0619)  BP: 109/61 (11/10/22 0620)  SpO2: (!) 93 % (11/10/22 0619)   Vital Signs (24h Range):  Temp:  [97.9 °F (36.6 °C)] 97.9 °F (36.6 °C)  Pulse:  [85] 85  SpO2:  [93 %] 93 %  BP: (109-140)/(61-77) 109/61     Weight: 63.5 kg (140 lb)  Body mass index is 22.6 kg/m².    SpO2: (!) 93 %       No intake or output data in the 24 hours ending 11/10/22 0716    Lines/Drains/Airways       Peripheral Intravenous Line  Duration                  Peripheral IV - Single Lumen 11/10/22 0647 20 G Left;Posterior Hand <1 day         Peripheral IV - Single Lumen 11/10/22 0707 20 G Anterior;Distal;Right Upper Arm <1 day                    Physical Exam  Constitutional:       General: He is not in acute distress.     Appearance: Normal appearance. He is not ill-appearing, toxic-appearing or diaphoretic.   HENT:      Head: Normocephalic and atraumatic.      Nose: Nose normal.   Eyes:      Extraocular Movements: Extraocular movements intact.      Pupils: Pupils are equal, round, and reactive to light.   Cardiovascular:      Rate and Rhythm: Normal rate and regular rhythm.      Heart sounds: No  murmur heard.    No friction rub. No gallop.   Pulmonary:      Effort: Pulmonary effort is normal. No respiratory distress.      Breath sounds: Normal breath sounds. No wheezing or rales.   Abdominal:      General: Abdomen is flat. There is no distension.      Palpations: Abdomen is soft. There is no mass.      Tenderness: There is no abdominal tenderness.   Musculoskeletal:         General: No swelling. Normal range of motion.      Cervical back: Normal range of motion. No rigidity.      Right lower leg: No edema.      Left lower leg: No edema.   Skin:     General: Skin is warm and dry.      Coloration: Skin is not jaundiced.      Findings: No bruising.   Neurological:      General: No focal deficit present.      Mental Status: He is alert and oriented to person, place, and time.      Cranial Nerves: No cranial nerve deficit.      Motor: No weakness.       Significant Labs: BMP:   Recent Labs   Lab 11/09/22  1131   *      K 4.2      CO2 28   BUN 12   CREATININE 1.06   CALCIUM 9.5    and CBC   Recent Labs   Lab 11/09/22  1131   WBC 3.87*   HGB 18.0   HCT 55.4*          Significant Imaging: Reviewed

## 2022-11-10 NOTE — TRANSFER OF CARE
"Anesthesia Transfer of Care Note    Patient: Juani Reilly Jr.    Procedure(s) Performed: Procedure(s) (LRB):  Ablation, SVT, Accessory Pathway (N/A)  CATHETERIZATION, HEART, BOTH LEFT AND RIGHT, WITH TRANSSEPTAL LEFT HEART CATHETERIZATION THROUGH INTACT SEPTUM, FOR CONGENITAL ANOMALY (N/A)  ECHOCARDIOGRAM, TRANSESOPHAGEAL (N/A)    Patient location: PACU    Anesthesia Type: general    Transport from OR: Transported from OR on 6-10 L/min O2 by face mask with adequate spontaneous ventilation    Post pain: adequate analgesia    Post assessment: no apparent anesthetic complications and tolerated procedure well    Post vital signs: stable    Level of consciousness: awake    Nausea/Vomiting: no nausea/vomiting    Complications: none    Transfer of care protocol was followed      Last vitals:   Visit Vitals  /64 (BP Location: Left arm, Patient Position: Lying)   Pulse 93   Temp 36.5 °C (97.7 °F) (Temporal)   Resp 16   Ht 5' 6" (1.676 m)   Wt 63.5 kg (140 lb)   SpO2 100%   BMI 22.60 kg/m²     "

## 2022-11-11 ENCOUNTER — PATIENT MESSAGE (OUTPATIENT)
Dept: CARDIOLOGY | Facility: CLINIC | Age: 27
End: 2022-11-11
Payer: MEDICAID

## 2022-11-11 VITALS
BODY MASS INDEX: 22.5 KG/M2 | HEIGHT: 66 IN | SYSTOLIC BLOOD PRESSURE: 111 MMHG | DIASTOLIC BLOOD PRESSURE: 63 MMHG | WEIGHT: 140 LBS | OXYGEN SATURATION: 93 % | TEMPERATURE: 98 F | RESPIRATION RATE: 18 BRPM | HEART RATE: 83 BPM

## 2022-11-11 LAB
POC ACTIVATED CLOTTING TIME K: 109 SEC (ref 74–137)
POC ACTIVATED CLOTTING TIME K: 121 SEC (ref 74–137)
POC ACTIVATED CLOTTING TIME K: 283 SEC (ref 74–137)
POC ACTIVATED CLOTTING TIME K: 335 SEC (ref 74–137)
POC ACTIVATED CLOTTING TIME K: 347 SEC (ref 74–137)
POC ACTIVATED CLOTTING TIME K: 358 SEC (ref 74–137)
POC ACTIVATED CLOTTING TIME K: 364 SEC (ref 74–137)
POC ACTIVATED CLOTTING TIME K: 370 SEC (ref 74–137)
POC ACTIVATED CLOTTING TIME K: 370 SEC (ref 74–137)
SAMPLE: ABNORMAL
SAMPLE: NORMAL
SAMPLE: NORMAL

## 2022-11-11 NOTE — DISCHARGE INSTRUCTIONS
Do not take baths or submerge your groin area or at least 1 week or when the puncture sites in your groin have completely healed  Take Xarelto 20 mg nightly with dinner  If oozing from groin site occurs, apply pressure without letting up for 15 minutes and lay flat for 1 hour. If bleeding has resolved, you can continue to monitor. If the bleeding continues, please visit the nearest ER for evaluation and treatment  Do not lift anything over 10 lbs for the first week after your procedure, and avoid strenuous activity during this time to allow for the groin sites to heal.  After 1 week, there are no activity restrictions  Please contact the electrophysiology clinic or go to the ER if you experience: severe chest pain, shortness of breath, bleeding that does not stop after pressure applied, or swelling of the groin sites, or any other concerns.

## 2022-11-11 NOTE — HOSPITAL COURSE
Successful mapping and ablation of left lateral AP. Underwent a single trans-baffle access with dilation to allow for sheaths by pediatric IC. They recommended indefinite OAC at this time. He was initiated on Xarelto 20 mg QHS with dinner. Ped IC did not recommend closure of baffle unless he experiences desaturation issues. No complications encountered.

## 2022-11-11 NOTE — PLAN OF CARE
Patient Calm and cooperative, On 2 liters O2 to maintain saturations >  90% . Patient saturation is 87% on room air, 93% on 2 liters  nasal cannula.   Problem: Adult Inpatient Plan of Care  Goal: Plan of Care Review  Outcome: Ongoing, Progressing  Goal: Patient-Specific Goal (Individualized)  Outcome: Ongoing, Progressing  Goal: Absence of Hospital-Acquired Illness or Injury  Outcome: Ongoing, Progressing  Goal: Optimal Comfort and Wellbeing  Outcome: Ongoing, Progressing  Goal: Readiness for Transition of Care  Outcome: Ongoing, Progressing     Problem: Fall Injury Risk  Goal: Absence of Fall and Fall-Related Injury  Outcome: Ongoing, Progressing

## 2022-11-11 NOTE — DISCHARGE SUMMARY
"[facility] - Cardiology Stepdown  Cardiac Electrophysiology  Discharge Summary      Patient Name: Juani Reilly Jr.  MRN: [id number]  Admission Date: 11/10/2022  Hospital Length of Stay: 0 days  Discharge Date and Time:  11/10/2022 11:22 PM  Attending Physician: Abebe Ferguson MD    Discharging Provider: Corey Hsu MD  Primary Care Physician: Primary Doctor No    HPI:   Mr. Reilly is a 27 y.o. 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      Background:     Adult Congenital Heart Disease Specialist: Meli Barrera MD  Mr. Reilly presents today for EPS and RFA of suspected AP. No complaints. Not on OAC. ECG shows pre-excitation. Not on any AVN blocking agents currently.    Mr. Reilly today has a history of congenital heart disease. 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 He was doing well until he presented to the [facility] ER on 9/5/2019 with palpitations. He was in a regular tachycardia that terminated with IV adenosine. He reports 1 month of shortness of breath at rest and with exertion. He denies any orthopnea or PND. No leg edema. He denies any history of syncope. He reports he has not seen a cardiologist ([facility]) for 7-8 years.     ECGs in Epic which note sinus rhythm with mild pre-excitation (isoelectric V1, +V2=6, +inf leads). ECG from 9/5/2019 ER visit notes a regular long RP tachycardia with similar QRS morphology but not ART.     Mr. Reilly returned for follow-up 1/2020. He is being seen by Dr. Barrera. 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 He was seen in the ER 11/2019 with recurrent SVT terminated with IV adenosine. ECG noted a regular narrow complex tachycardia with a "long" short RP interval. He is " on low dose metoprolol. We discussed EPS/ablation. He was not interested and desired medication alternatives.     7/17/2020:  Recent ER visit for SVT. He otherwise feels well.  ECG is sinus rhythm with pre-excitation as described above.     In summary, Mr. Reilly is a pleasant 25 year-old man with single ventricle and tricuspid atresia with subtle pre-excitation on his ECG and SVT. We discussed the etiology and pathophysiology of SVT. His likely involves an accessory pathway. He is failing metoprolol. Discussed treatment options including suppression with medical therapy versus EPS/ablation for more definitive therapy. Discussed need to coordinate with pediatric interventional cardiology and with my partner, Dr. Emeterio Hurley.     Discussed case with Dr. Alarcon with pediatric EP/ACHD clinic. Reviewed the case. She recommends the patient first see Pediatric Interventional Cards clinic for a hemodynamic work-up and consider anti-arrhythmic therapy first due to challenges with his particular anatomy. I spoke with the patient regarding this however could only reach his grandmother. I called the patient's mother to discuss but received no answer and left a message.  Ultimately our plan will be to refer to Pediatric Interventional Cardiology clinic for a hemodynamic study followed by admission for sotalol initiation (if patient agreeable).      Procedure(s) (LRB):  Ablation, SVT, Accessory Pathway (N/A)  CATHETERIZATION, HEART, BOTH LEFT AND RIGHT, WITH TRANSSEPTAL LEFT HEART CATHETERIZATION THROUGH INTACT SEPTUM, FOR CONGENITAL ANOMALY (N/A)  ECHOCARDIOGRAM, TRANSESOPHAGEAL (N/A)     Indwelling Lines/Drains at time of discharge:  Lines/Drains/Airways     Drain  Duration           Male External Urinary Catheter 11/10/22  <1 day                Hospital Course:  Successful mapping and ablation of left lateral AP. Underwent a single trans-baffle access with dilation to allow for sheaths by pediatric IC. They recommended  indefinite OAC at this time. He was initiated on Xarelto 20 mg QHS with dinner. Ped IC did not recommend closure of baffle unless he experiences desaturation issues. No complications encountered.       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  Service: Arrhythmia      Discharged Condition: stable    Disposition:     Follow Up:   Follow-up Information     Abebe Ferguson MD Follow up in 1 month(s).    Specialties: Electrophysiology, Cardiology  Contact information:  [facility] [address]  [phone]                       Patient Instructions:    Do not take baths or submerge your groin area or at least 1 week or when the puncture sites in your groin have completely healed   Take Xarelto 20 mg nightly with dinner   If oozing from groin site occurs, apply pressure without letting up for 15 minutes and lay flat for 1 hour. If bleeding has resolved, you can continue to monitor. If the bleeding continues, please visit the nearest ER for evaluation and treatment   Do not lift anything over 10 lbs for the first week after your procedure, and avoid strenuous activity during this time to allow for the groin sites to heal.   After 1 week, there are no activity restrictions   Please contact the electrophysiology clinic or go to the ER if you experience: severe chest pain, shortness of breath, bleeding that does not stop after  pressure applied, or swelling of the groin sites, or any other concerns.     Medications:  Reconciled Home Medications:      Medication List      START taking these medications    rivaroxaban 20 mg Tab  Commonly known as: XARELTO  Take 1 tablet (20 mg total) by mouth daily with dinner or evening meal.  Start taking on: November 11, 2022            Time spent on the discharge of patient: 15 minutes    Corey Hsu MD  Cardiac Electrophysiology  [facility] - Cardiology Stepdown

## 2022-11-17 ENCOUNTER — TELEPHONE (OUTPATIENT)
Dept: ELECTROPHYSIOLOGY | Facility: CLINIC | Age: 27
End: 2022-11-17
Payer: MEDICAID

## 2022-11-17 NOTE — TELEPHONE ENCOUNTER
----- Message from Regino Burroughs MA sent at 11/17/2022 10:29 AM CST -----  Regarding: FW: Excuse    ----- Message -----  From: Shama Loza  Sent: 11/17/2022  10:18 AM CST  To: Marty PARKER Staff  Subject: Excuse                                           Pt needs an excuse.  Please call pt @ [phone] to explain.      Thanks

## 2022-11-17 NOTE — TELEPHONE ENCOUNTER
----- Message from Regino Burroughs MA sent at 11/17/2022 12:56 PM CST -----  Contact: Ab [phone]    ----- Message -----  From: Dominga Valderrama MA  Sent: 11/17/2022  12:54 PM CST  To: Marty PARKER Staff    Pt called this morning,this is a 2nd call,she wants to extend returning to work slip to Tues 11/22 to Cj Please fax to [phone]. Please call Amita

## 2022-11-17 NOTE — TELEPHONE ENCOUNTER
Call received from patient 's mother requesting an extension on the previous return to work note that was given to them with a return to work date of 11/18/2022 to extend to 11/22/2022. Patient's mother is also I need of extension for work as she is here assisting with patient's care. Patient currently resides in [place] and was planning to travel back by now , however mother states that the patient started having a headache on Monday, 11/14/2022 and didn't feel ready to travel or return to work. Patient's mother describes patient's headache persistent with pain of 7 out of 10, unchanged with position change, denies any other symptoms including visual changes, dizziness, nausea. Patient's mother states that the patient has been ambulating, eating and drinking normally however has been sleeping throughout the day due tot he headaches and  has not tried to take any medication for headache pain relief to date. I requested to speak with the patient to clarify and discuss symptoms, however patient's mother refused, stating patient was asleep, despite advising of the importance of patient clarifying symptoms with the patient to rule out any post op symptoms/concerns that may require further evaluation. Advised that I will discuss information received with Dr Ferguson and contact her back with further recommendations once received. Mother verbalized understanding.

## 2022-11-18 ENCOUNTER — TELEPHONE (OUTPATIENT)
Dept: PEDIATRIC CARDIOLOGY | Facility: CLINIC | Age: 27
End: 2022-11-18
Payer: MEDICAID

## 2022-11-18 ENCOUNTER — TELEPHONE (OUTPATIENT)
Dept: ELECTROPHYSIOLOGY | Facility: CLINIC | Age: 27
End: 2022-11-18
Payer: MEDICAID

## 2022-11-18 DIAGNOSIS — Q24.9 ADULT CONGENITAL HEART DISEASE: ICD-10-CM

## 2022-11-18 DIAGNOSIS — Q24.9 CONGENITAL HEART DISEASE IN ADULT: ICD-10-CM

## 2022-11-18 DIAGNOSIS — Z98.890 S/P FONTAN PROCEDURE: Primary | ICD-10-CM

## 2022-11-18 NOTE — TELEPHONE ENCOUNTER
Appt scheduled for next Wednesday 11/23 start time 8 AM, patient verbalized understanding all information provided

## 2022-11-18 NOTE — TELEPHONE ENCOUNTER
Attempted to contact the patient's mother, but no answer received. Patient has been scheduled for an appointment for Echo, EKG and follow up with Dr Santoyo on 11/23/2022 for further evaluation of patient's reported symptoms. Voicemail left to advise that a return to work note extension with a return to work date of 11/28/2022 has been faxed to her employer as requested. Instructed to discuss return to work with Dr Santoyo at follow up appointment and to obtain a note from Dr Santoyo if unable to return to work by 11/28/2022.

## 2022-11-21 NOTE — PROGRESS NOTES
2022     re:Juani Reilly JrMark  :1995    Dr. Abebe Ferguson    [facility]     Dear Dr. Ferguson:    Juani Reilly Jr. is a 27 y.o. male seen in my ACHD clinic today for evaluation of complex congenital heart disease.  To summarize his diagnoses are as follow:  1. 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 Decreased left ventricular function  - no evidence of PLE  3. History of Fdcpm-Jrkntpycr-Tynjb, SVT  - status post successful ablation of a left lateral accessory pathway 2022 by Dr. Ferguson  4. Cirrhosis  - last seen by hepatology 2020  5. Improved headaches  6. History of medication noncompliance    To summarize, my recommendations are as follows:  1. SBE prophylaxis is definitely indicated  2. Continue Xarelto for now.  Long-term, he needs to at least be on an aspirin daily.  3. Return to clinic in 3 months with an echocardiogram.  If ventricular function is still decreased at that time, I will likely start him on Xarelto and refer him to the Heart failure Team.  4. Patient is overdue to follow up with the hepatology team.  I will have them set up an appointment.      Discussion:  1. I do not think his headaches are secondary to the Fontan puncture at the time of his EP study.  He already has a fenestration, and I would not expect him to suddenly develop headaches due to a 2nd small Fontan baffle leak.  His headaches are already getting better.  2. I am concerned about his ventricular function which is certainly decreased.  He has a systemic tricuspid valve, and I would not expect such significant dysfunction at  this age although heart failure is common in Fontan patients.  I want to reassess his ventricular function about 3 months after his ablation.  Maybe if he is having less arrhythmia his function will improve although I am. not optimistic.  I anticipate starting him on Entresto and referring to the heart failure team.  I do have concerns about his medication compliance.  It sounds like he has a hard time taking medications more than once a day.  We will need to reassess this if we start Entresto.  3. He has a history of liver disease which is pretty universal in Adult Fontan patients.  He is overdue to follow up with hepatology, and I will get them to contact him to make an appointment after he moved back from [place].    History of present illness:  He had his ablation a few weeks ago.  He initially was having significant headaches after that procedure.  He now attributes it to the Xarelto as he feels like the headaches would start after he took that medication.  There has been significant improvement in the headaches, however, and they are now very mild an not very common.  He continues on the Xarelto.  He has had no bleeding.  He otherwise feels well.  His palpitations are definitely better after the procedure.  He does occasionally have rare brief palpitations.  No syncope or near syncope.  No edema.  No orthopnea.  He was having some generalized chest tightness at night, but that is also improving.  No orthopnea.  No shortness of breath.  No diarrhea or fever.  No productive cough.    The review of systems is as noted above. It is otherwise negative for other symptoms related to the general, neurological, psychiatric, endocrine, gastrointestinal, genitourinary, respiratory, dermatologic, musculoskeletal, hematologic, and immunologic systems.    Past Medical History:   Diagnosis Date    History of heart surgery     Other cirrhosis of liver 11/12/2020    SVT (supraventricular tachycardia)     WPW syndrome      Past  Surgical History:   Procedure Laterality Date    ANGIOGRAPHY OF AORTIC ARCH N/A 9/3/2020    Procedure: AORTOGRAM, AORTIC ARCH;  Surgeon: Stephania Crump MD;  Location: [facility] CATH LAB;  Service: Cardiology;  Laterality: N/A;    FONTAN PROCEDURE, EXTRACARDIAC      LEFT HEART CATHETERIZATION Left 9/3/2020    Procedure: Left heart cath;  Surgeon: Stephania Crump MD;  Location: [facility] CATH LAB;  Service: Cardiology;  Laterality: Left;    RIGHT HEART CATHETERIZATION FOR CONGENITAL HEART DEFECT N/A 9/3/2020    Procedure: CATHETERIZATION, HEART, RIGHT, FOR CONGENITAL HEART DEFECT;  Surgeon: Stephania Crump MD;  Location: [facility] CATH LAB;  Service: Cardiology;  Laterality: N/A;  [facility] - needs covid test morning of. Extenuating circumstances    TRANSESOPHAGEAL ECHOCARDIOGRAPHY N/A 11/10/2022    Procedure: ECHOCARDIOGRAM, TRANSESOPHAGEAL;  Surgeon: Emeterio Hall MD;  Location: [facility] EP LAB;  Service: Cardiology;  Laterality: N/A;    TREATMENT OF CARDIAC ARRHYTHMIA N/A 1/9/2021    Procedure: CARDIOVERSION;  Surgeon: Jim Mcginnis MD;  Location: [facility] CATH LAB;  Service: Cardiology;  Laterality: N/A;     Family History   Problem Relation Age of Onset    No Known Problems Mother     No Known Problems Father     No Known Problems Sister     No Known Problems Brother     No Known Problems Maternal Aunt     No Known Problems Maternal Uncle     No Known Problems Paternal Aunt     No Known Problems Paternal Uncle     No Known Problems Maternal Grandmother     Heart disease Maternal Grandfather     No Known Problems Paternal Grandmother     No Known Problems Paternal Grandfather     Anemia Neg Hx     Arrhythmia Neg Hx     Asthma Neg Hx     Clotting disorder Neg Hx     Fainting Neg Hx     Heart attack Neg Hx     Heart failure Neg Hx     Hyperlipidemia Neg Hx     Hypertension Neg Hx     Stroke Neg Hx     Atrial Septal Defect Neg Hx      Social History     Socioeconomic History    Marital status: Single   Tobacco  "Use    Smoking status: Former     Types: Cigarettes     Quit date:      Years since quittin.8     Passive exposure: Past    Smokeless tobacco: Never    Tobacco comments:     3-4 months    Substance and Sexual Activity    Alcohol use: No    Drug use: Yes     Types: Marijuana     Comment: Odalys today- pt reports a while back    Sexual activity: Yes     Current Outpatient Medications on File Prior to Visit   Medication Sig Dispense Refill    rivaroxaban (XARELTO) 20 mg Tab Take 1 tablet (20 mg total) by mouth daily with dinner or evening meal. 30 tablet 11     No current facility-administered medications on file prior to visit.     Review of patient's allergies indicates:  No Known Allergies     Vitals:    22 1250 22 1253   BP: (!) 105/57 (!) 105/59   BP Location: Right arm Left arm   Patient Position: Sitting Sitting   BP Method: Large (Automatic) Large (Automatic)   Pulse: 100 100   SpO2: (!) 91%    Weight: 69.9 kg (154 lb 1.6 oz)    Height: 5' 6" (1.676 m)      GENERAL: Awake, well-developed well-nourished, no apparent distress. Non-cyanotic.  HEENT: Mucous membranes moist and pink, normocephalic atraumatic, no cranial or carotid bruits, sclera anicteric, EOMI  NECK: No jugular venous distention, no thyromegaly, no lymphadenopathy  CHEST: Good air movement, clear to auscultation bilaterally. Well healed sternotomy.  CARDIOVASCULAR: Quiet precordium, regular rate and rhythm, S1 with single S2, no murmurs rubs or gallops  ABDOMEN: Soft, nontender nondistended, no hepatosplenomegaly, no aortic bruits  EXTREMITIES: Warm well perfused, 2+ radial/femoral/pedal pulses, capillary refill 2 seconds, no cyanosis or edema  NEURO: Alert and oriented, cooperative with exam, face symmetric, moves all extremities well.    I personally reviewed the following tests performed today and my interpretation follows:  No tests in clinic.    OLIVIA 11/10/22:  Imaging consistent with fenestrated lateral tunnel Fontan with " laminar flow from dilated inferior vena cava and hepatic veins through Fontan circuit to right pulmonary artery with no evidence of stenosis or thrombus.  There is a Fontan fenestration with continuous shunt from conduit to atrium positioned very anterior and just below the level of the aortic valve.  Fenestration is in a position not suitable for passage of transeptal sheath to assist with EP study.  Small right atrium with unrestricted flow to at least mildly dilated left atrium.  Pulmonary veins return normally to the left atrium.  Left atrial appendage was identified with no evidence of thrombus and normal Doppler velocities.  Structurally normal mitral valve with large mitral annulus.  Normal inflow velocity across the mitral valve with trivial to mild central jet of insufficiency.  Qualitatively at least mildly dilated left ventricle with mild-to-moderately reduced systolic function.  Centrally positioned trileaflet aortic valve with large aortic annulus.  There is no aortic stenosis with a trivial jet of insufficiency.  There is no pericardial effusion.     PROCEDURE:  Transesophageal echocardiogram was utilized to assist with transseptal puncture for access to the left atrium necessary for EP study and potential ablation.  Wire was eventually past across the intra-atrial conduit into the left atrium in good position.  A balloon was inflated in order to enlarge the atrial communication.  A transseptal sheath was passed across the atrial communication in good position.  Postprocedure evaluation following transseptal puncture demonstrated no pericardial effusion    Cath 9/3/20:  IMPRESSION:  1) Tricuspid atresia s/p intra-atrial Fontan  2) Normal Fontan pressures (11mmHg) and wedge pressure (8mmHg).  3) Normal cardiac output and vascular resistance calculations  4) Small atrial baffle leak  5) No significant aorta-pulmonary or veno-venous collaterals  6) Occlusion of right common femoral artery       Cardiac  MRI 1/2/2020  Conclusion:   SVC to RPA unobstructed (Jesus)  Extracardiac Fontan with low flow but no evidence of thrombus.  Unobstructed ASD  Tricuspid Atresia  No obstruction to the right or left PA with Q flow of 21cc through each.  Increased LV mass and volume with systemic LVEF of 42%  Dilated sinus of Valsalva 49mm  Trace AI with regurgitation fraction of 8%    Lab Results   Component Value Date    WBC 5.12 11/10/2022    HGB 16.5 11/10/2022    HCT 50.6 11/10/2022    MCV 93 11/10/2022     11/10/2022       CMP  Sodium   Date Value Ref Range Status   11/09/2022 143 136 - 145 mmol/L Final     Potassium   Date Value Ref Range Status   11/09/2022 4.2 3.5 - 5.1 mmol/L Final     Chloride   Date Value Ref Range Status   11/09/2022 103 95 - 110 mmol/L Final     CO2   Date Value Ref Range Status   11/09/2022 28 23 - 29 mmol/L Final     Glucose   Date Value Ref Range Status   11/09/2022 116 (H) 70 - 110 mg/dL Final     BUN   Date Value Ref Range Status   11/09/2022 12 2 - 20 mg/dL Final     Creatinine   Date Value Ref Range Status   11/09/2022 1.06 0.50 - 1.40 mg/dL Final     Calcium   Date Value Ref Range Status   11/09/2022 9.5 8.7 - 10.5 mg/dL Final     Total Protein   Date Value Ref Range Status   03/18/2021 7.5 6.0 - 8.4 g/dL Final     Albumin   Date Value Ref Range Status   03/18/2021 4.0 3.5 - 5.2 g/dL Final     Total Bilirubin   Date Value Ref Range Status   03/18/2021 0.9 0.1 - 1.0 mg/dL Final     Comment:     For infants and newborns, interpretation of results should be based  on gestational age, weight and in agreement with clinical  observations.    Premature Infant recommended reference ranges:  Up to 24 hours.............<8.0 mg/dL  Up to 48 hours............<12.0 mg/dL  3-5 days..................<15.0 mg/dL  6-29 days.................<15.0 mg/dL       Alkaline Phosphatase   Date Value Ref Range Status   03/18/2021 61 55 - 135 U/L Final     AST   Date Value Ref Range Status   03/18/2021 18 10 - 40 U/L  Final     ALT   Date Value Ref Range Status   03/18/2021 22 10 - 44 U/L Final     Anion Gap   Date Value Ref Range Status   11/09/2022 12 8 - 16 mmol/L Final     eGFR   Date Value Ref Range Status   11/09/2022 >60.0 >60 mL/min/1.73 m^2 Final     Lab Results   Component Value Date    INR 1.0 11/10/2022    INR 1.0 11/09/2022    INR 1.2 01/09/2021      Latest Reference Range & Units Most Recent   NT-proBNP 5 - 450 pg/mL 152  1/9/21 08:58   Troponin I 0.000 - 0.026 ng/mL 0.039 (H)  1/9/21 16:28   (H): Data is abnormally high    Last liver ultrasound 2020.    Thank you for referring this patient to our clinic.  Please call with any questions.    Sincerely,        Dorian Santoyo MD  Pediatric Cardiology  Adult Congenital Heart Disease  Pediatric Heart Failure and Transplantation  [facility] [address] [place]  [address]  [phone]

## 2022-11-23 ENCOUNTER — OFFICE VISIT (OUTPATIENT)
Dept: CARDIOLOGY | Facility: CLINIC | Age: 27
End: 2022-11-23
Attending: PEDIATRICS
Payer: MEDICAID

## 2022-11-23 VITALS
SYSTOLIC BLOOD PRESSURE: 105 MMHG | BODY MASS INDEX: 24.77 KG/M2 | HEART RATE: 100 BPM | DIASTOLIC BLOOD PRESSURE: 59 MMHG | HEIGHT: 66 IN | OXYGEN SATURATION: 91 % | WEIGHT: 154.13 LBS

## 2022-11-23 DIAGNOSIS — K74.69 OTHER CIRRHOSIS OF LIVER: ICD-10-CM

## 2022-11-23 DIAGNOSIS — Z98.890 S/P FONTAN PROCEDURE: ICD-10-CM

## 2022-11-23 DIAGNOSIS — Q24.9 ADULT CONGENITAL HEART DISEASE: ICD-10-CM

## 2022-11-23 DIAGNOSIS — Q24.9 CONGENITAL HEART DISEASE IN ADULT: ICD-10-CM

## 2022-11-23 DIAGNOSIS — Q20.4 SINGLE VENTRICLE WITH TRICUSPID ATRESIA: Primary | ICD-10-CM

## 2022-11-23 DIAGNOSIS — Q22.4 SINGLE VENTRICLE WITH TRICUSPID ATRESIA: Primary | ICD-10-CM

## 2022-11-23 PROCEDURE — 3074F PR MOST RECENT SYSTOLIC BLOOD PRESSURE < 130 MM HG: ICD-10-PCS | Mod: CPTII,,, | Performed by: PEDIATRICS

## 2022-11-23 PROCEDURE — 99999 PR PBB SHADOW E&M-EST. PATIENT-LVL III: CPT | Mod: PBBFAC,,, | Performed by: PEDIATRICS

## 2022-11-23 PROCEDURE — 99215 OFFICE O/P EST HI 40 MIN: CPT | Mod: 25,S$PBB,, | Performed by: PEDIATRICS

## 2022-11-23 PROCEDURE — 3078F DIAST BP <80 MM HG: CPT | Mod: CPTII,,, | Performed by: PEDIATRICS

## 2022-11-23 PROCEDURE — 1159F MED LIST DOCD IN RCRD: CPT | Mod: CPTII,,, | Performed by: PEDIATRICS

## 2022-11-23 PROCEDURE — 99215 PR OFFICE/OUTPT VISIT, EST, LEVL V, 40-54 MIN: ICD-10-PCS | Mod: 25,S$PBB,, | Performed by: PEDIATRICS

## 2022-11-23 PROCEDURE — 1159F PR MEDICATION LIST DOCUMENTED IN MEDICAL RECORD: ICD-10-PCS | Mod: CPTII,,, | Performed by: PEDIATRICS

## 2022-11-23 PROCEDURE — 99213 OFFICE O/P EST LOW 20 MIN: CPT | Mod: PBBFAC | Performed by: PEDIATRICS

## 2022-11-23 PROCEDURE — 3078F PR MOST RECENT DIASTOLIC BLOOD PRESSURE < 80 MM HG: ICD-10-PCS | Mod: CPTII,,, | Performed by: PEDIATRICS

## 2022-11-23 PROCEDURE — 3074F SYST BP LT 130 MM HG: CPT | Mod: CPTII,,, | Performed by: PEDIATRICS

## 2022-11-23 PROCEDURE — 3008F PR BODY MASS INDEX (BMI) DOCUMENTED: ICD-10-PCS | Mod: CPTII,,, | Performed by: PEDIATRICS

## 2022-11-23 PROCEDURE — 3008F BODY MASS INDEX DOCD: CPT | Mod: CPTII,,, | Performed by: PEDIATRICS

## 2022-11-23 PROCEDURE — 99999 PR PBB SHADOW E&M-EST. PATIENT-LVL III: ICD-10-PCS | Mod: PBBFAC,,, | Performed by: PEDIATRICS

## 2022-11-25 ENCOUNTER — TELEPHONE (OUTPATIENT)
Dept: HEPATOLOGY | Facility: CLINIC | Age: 27
End: 2022-11-25
Payer: MEDICAID

## 2022-11-25 NOTE — TELEPHONE ENCOUNTER
----- Message from Dorian Santoyo MD sent at 11/23/2022  5:46 PM CST -----  Regarding: overdue for follow up  He is way overdue to see hepatology.  Could you get him in?  They were displaced with [place] but should be back in [place] by January.  He is coming to see me in clinic 2/8/23 if that would work for you guys as well.

## 2022-12-02 DIAGNOSIS — K74.00 HEPATIC FIBROSIS: ICD-10-CM

## 2022-12-02 DIAGNOSIS — Z98.890 S/P FONTAN PROCEDURE: Primary | ICD-10-CM

## 2022-12-19 ENCOUNTER — TELEPHONE (OUTPATIENT)
Dept: ELECTROPHYSIOLOGY | Facility: CLINIC | Age: 27
End: 2022-12-19
Payer: MEDICAID

## 2023-02-06 ENCOUNTER — HOSPITAL ENCOUNTER (EMERGENCY)
Facility: HOSPITAL | Age: 28
Discharge: HOME | End: 2023-02-06
Attending: EMERGENCY MEDICINE
Payer: MEDICAID

## 2023-02-06 VITALS
HEIGHT: 63 IN | RESPIRATION RATE: 18 BRPM | TEMPERATURE: 98 F | DIASTOLIC BLOOD PRESSURE: 86 MMHG | BODY MASS INDEX: 24.8 KG/M2 | HEART RATE: 98 BPM | SYSTOLIC BLOOD PRESSURE: 158 MMHG | WEIGHT: 140 LBS | OXYGEN SATURATION: 98 %

## 2023-02-06 DIAGNOSIS — R05.9 COUGH: ICD-10-CM

## 2023-02-06 DIAGNOSIS — J40 BRONCHITIS: Primary | ICD-10-CM

## 2023-02-06 LAB
INFLUENZA A, MOLECULAR: NEGATIVE
INFLUENZA B, MOLECULAR: NEGATIVE
SARS-COV-2 RDRP RESP QL NAA+PROBE: NEGATIVE
SPECIMEN SOURCE: NORMAL

## 2023-02-06 PROCEDURE — 93010 ELECTROCARDIOGRAM REPORT: CPT | Mod: ,,, | Performed by: INTERNAL MEDICINE

## 2023-02-06 PROCEDURE — 25000242 PHARM REV CODE 250 ALT 637 W/ HCPCS: Mod: ER | Performed by: EMERGENCY MEDICINE

## 2023-02-06 PROCEDURE — 99285 EMERGENCY DEPT VISIT HI MDM: CPT | Mod: 25,ER

## 2023-02-06 PROCEDURE — 99900035 HC TECH TIME PER 15 MIN (STAT): Mod: ER

## 2023-02-06 PROCEDURE — 94640 AIRWAY INHALATION TREATMENT: CPT | Mod: ER

## 2023-02-06 PROCEDURE — 87502 INFLUENZA DNA AMP PROBE: CPT | Mod: ER | Performed by: EMERGENCY MEDICINE

## 2023-02-06 PROCEDURE — 63700000 PHARM REV CODE 250 ALT 637 W/O HCPCS: Mod: ER | Performed by: EMERGENCY MEDICINE

## 2023-02-06 PROCEDURE — 94760 N-INVAS EAR/PLS OXIMETRY 1: CPT | Mod: ER

## 2023-02-06 PROCEDURE — 93010 EKG 12-LEAD: ICD-10-PCS | Mod: ,,, | Performed by: INTERNAL MEDICINE

## 2023-02-06 PROCEDURE — 25000003 PHARM REV CODE 250: Mod: ER | Performed by: EMERGENCY MEDICINE

## 2023-02-06 PROCEDURE — U0002 COVID-19 LAB TEST NON-CDC: HCPCS | Mod: ER | Performed by: EMERGENCY MEDICINE

## 2023-02-06 PROCEDURE — 93005 ELECTROCARDIOGRAM TRACING: CPT | Mod: ER

## 2023-02-06 PROCEDURE — 27100098 HC SPACER: Mod: ER

## 2023-02-06 RX ORDER — ALBUTEROL SULFATE 90 UG/1
2 AEROSOL, METERED RESPIRATORY (INHALATION)
Status: COMPLETED | OUTPATIENT
Start: 2023-02-06 | End: 2023-02-06

## 2023-02-06 RX ORDER — AZITHROMYCIN 250 MG/1
250 TABLET, FILM COATED ORAL DAILY
Qty: 6 TABLET | Refills: 0 | Status: SHIPPED | OUTPATIENT
Start: 2023-02-06 | End: 2023-02-08

## 2023-02-06 RX ORDER — BENZONATATE 200 MG/1
200 CAPSULE ORAL 3 TIMES DAILY PRN
Qty: 20 CAPSULE | Refills: 0 | Status: SHIPPED | OUTPATIENT
Start: 2023-02-06 | End: 2023-02-08

## 2023-02-06 RX ORDER — AZITHROMYCIN 250 MG/1
500 TABLET, FILM COATED ORAL
Status: COMPLETED | OUTPATIENT
Start: 2023-02-06 | End: 2023-02-06

## 2023-02-06 RX ORDER — BENZONATATE 100 MG/1
200 CAPSULE ORAL
Status: COMPLETED | OUTPATIENT
Start: 2023-02-06 | End: 2023-02-06

## 2023-02-06 RX ADMIN — ALBUTEROL SULFATE 2 PUFF: 90 AEROSOL, METERED RESPIRATORY (INHALATION) at 11:02

## 2023-02-06 RX ADMIN — AZITHROMYCIN MONOHYDRATE 500 MG: 250 TABLET ORAL at 11:02

## 2023-02-06 RX ADMIN — BENZONATATE 200 MG: 100 CAPSULE ORAL at 11:02

## 2023-02-06 NOTE — Clinical Note
"Juani Melo" Tommie was seen and treated in our emergency department on 2/6/2023.  He may return to work on 02/07/2023.       If you have any questions or concerns, please don't hesitate to call.      Allison Ridley RN    "

## 2023-02-07 NOTE — ED PROVIDER NOTES
Encounter Date: 2/6/2023       History     Chief Complaint   Patient presents with    Nasal Congestion     Reports nasal congeation  x 3 days. +chest congestion +productive cough Denies sore throat or  fever     HPI  27 y.o.   3 days of some nasal congestion and cough, productive  No leg pain nor swelling  H/o congenital heart surgery  No cp, sob  Compliant with meds  No tobacco, uses marijuana    Review of patient's allergies indicates:  No Known Allergies  Past Medical History:   Diagnosis Date    History of heart surgery     Other cirrhosis of liver 11/12/2020    SVT (supraventricular tachycardia)     WPW syndrome      Past Surgical History:   Procedure Laterality Date    ANGIOGRAPHY OF AORTIC ARCH N/A 9/3/2020    Procedure: AORTOGRAM, AORTIC ARCH;  Surgeon: Stephania Crump MD;  Location: [facility] CATH LAB;  Service: Cardiology;  Laterality: N/A;    FONTAN PROCEDURE, EXTRACARDIAC      LEFT HEART CATHETERIZATION Left 9/3/2020    Procedure: Left heart cath;  Surgeon: Stephania Crump MD;  Location: [facility] CATH LAB;  Service: Cardiology;  Laterality: Left;    RIGHT HEART CATHETERIZATION FOR CONGENITAL HEART DEFECT N/A 9/3/2020    Procedure: CATHETERIZATION, HEART, RIGHT, FOR CONGENITAL HEART DEFECT;  Surgeon: Stephania Crump MD;  Location: [facility] CATH LAB;  Service: Cardiology;  Laterality: N/A;  [facility] - needs covid test morning of. Extenuating circumstances    TRANSESOPHAGEAL ECHOCARDIOGRAPHY N/A 11/10/2022    Procedure: ECHOCARDIOGRAM, TRANSESOPHAGEAL;  Surgeon: Emeterio Hall MD;  Location: [facility] EP LAB;  Service: Cardiology;  Laterality: N/A;    TREATMENT OF CARDIAC ARRHYTHMIA N/A 1/9/2021    Procedure: CARDIOVERSION;  Surgeon: Jim Mcginnis MD;  Location: [facility] CATH LAB;  Service: Cardiology;  Laterality: N/A;     Family History   Problem Relation Age of Onset    No Known Problems Mother     No Known Problems Father     No Known Problems Sister     No Known Problems Brother     No Known  Problems Maternal Aunt     No Known Problems Maternal Uncle     No Known Problems Paternal Aunt     No Known Problems Paternal Uncle     No Known Problems Maternal Grandmother     Heart disease Maternal Grandfather     No Known Problems Paternal Grandmother     No Known Problems Paternal Grandfather     Anemia Neg Hx     Arrhythmia Neg Hx     Asthma Neg Hx     Clotting disorder Neg Hx     Fainting Neg Hx     Heart attack Neg Hx     Heart failure Neg Hx     Hyperlipidemia Neg Hx     Hypertension Neg Hx     Stroke Neg Hx     Atrial Septal Defect Neg Hx      Social History     Tobacco Use    Smoking status: Former     Types: Cigarettes     Quit date:      Years since quittin.1     Passive exposure: Past    Smokeless tobacco: Never    Tobacco comments:     3-4 months    Substance Use Topics    Alcohol use: No    Drug use: Yes     Types: Marijuana     Comment: Mojo today- pt reports a while back     Review of Systems  All systems were reviewed/examined and were negative except as noted in the HPI.    Physical Exam     Initial Vitals [23]   BP Pulse Resp Temp SpO2   (!) 164/108 (!) 119 18 98.3 °F (36.8 °C) 95 %      MAP       --         Physical Exam    General: the patient is awake, alert, and in no apparent distress.  Head: normocephalic and atraumatic, sclera are clear  OP wnl  Neck: supple without meningismus  Chest:  no respiratory distress  Heart: regular rate and rhythm borderline tachy  Extremities: warm and well perfused    No leg pain nor swelling  Skin: warm and dry  Psych conversant  Neuro: awake, alert, moving all extremities    ED Course   Procedures  Labs Reviewed   INFLUENZA A & B BY MOLECULAR   SARS-COV-2 RNA AMPLIFICATION, QUAL    Narrative:     Is the patient symptomatic?->Yes        ECG Results              EKG 12-lead (Final result)  Result time 23 20:09:59      Final result by Interface, Lab In [facility] (23 20:09:59)                   Narrative:    Test Reason :  R05.9,    Vent. Rate : 092 BPM     Atrial Rate : 092 BPM     P-R Int : 148 ms          QRS Dur : 098 ms      QT Int : 370 ms       P-R-T Axes : 065 041 -57 degrees     QTc Int : 457 ms    Normal sinus rhythm with sinus arrhythmia  LVH with repolarization abnormality  Abnormal ECG    Confirmed by James Jiménez MD ([id number]) on 2/7/2023 8:09:48 PM    Referred By: [email or web address]   SELF           Confirmed By:James Jiménez MD                                  Imaging Results              X-Ray Chest AP Portable (Final result)  Result time 02/06/23 22:54:12      Final result by Grupo Nick MD (02/06/23 22:54:12)                   Impression:      No acute abnormality.      Electronically signed by: Grupo Nick  Date:    02/06/2023  Time:    22:54               Narrative:    EXAMINATION:  XR CHEST AP PORTABLE    CLINICAL HISTORY:  Cough, unspecified    TECHNIQUE:  Single frontal view of the chest was performed.    COMPARISON:  Multiple priors.    FINDINGS:  The lungs are clear, with normal appearance of pulmonary vasculature and no pleural effusion or pneumothorax.    The cardiac silhouette is normal in size. The hilar and mediastinal contours are unremarkable.    Bones are intact.                                       Medications   benzonatate capsule 200 mg (200 mg Oral Given 2/6/23 2307)   albuterol inhaler 2 puff (2 puffs Inhalation Given 2/6/23 2308)   azithromycin tablet 500 mg (500 mg Oral Given 2/6/23 2307)                    Medical Decision Making:    This is an emergent evaluation of a patient presenting to the ED.  Nursing notes were reviewed.  I personally reviewed, read, and interpreted the ECG and any monitoring strips.  ECG: normal EKG, normal sinus rhythm, unchanged from previous tracings Compared with prior if available.  Read and interpreted by me independently.      I reviewed radiology images personally along with interpretations.  I personally reviewed and interpreted the laboratory  results.  Swabs neg  Imaging reviewed by me (chest x-ray), personally and independently, and prelims if available.  No acute/emergent pathologic findings noted on radiologic studies ordered.  I decided to obtain and review old medical records, which showed: well care    Evaluation for Emergency Medical Condition  The patient received a medical screening exam and within a reasonable degree of clinical confidence an emergency medical condition has not been identified.  The patient is instructed on proper follow up and return precautions to the ED.    The patient was encouraged strongly to get the COVID-19 vaccine either after asymptomatic (if COVID positive) or offered it here in the ED is COVID negative.  The patient was also encouraged to obtain an influenza vaccination if available once asymptomatic (if positive) or if testing negative in the ED.      Rivas Chavez MD, SHYLA              Clinical Impression:   Final diagnoses:  [R05.9] Cough  [J40] Bronchitis (Primary)        ED Disposition Condition    Discharge Stable          ED Prescriptions       Medication Sig Dispense Start Date End Date Auth. Provider    azithromycin (Z-LAXMI) 250 MG tablet () Take 1 tablet (250 mg total) by mouth once daily. Take first 2 tablets together, then 1 every day until finished. 6 tablet 2023 Emeterio Chavez MD    benzonatate (TESSALON) 200 MG capsule () Take 1 capsule (200 mg total) by mouth 3 (three) times daily as needed for Cough. 20 capsule 2023 Emeterio Chavez MD          Follow-up Information       Follow up With Specialties Details Why Contact Info Additional Information    [facility] Family [facility] Schedule an appointment as soon as possible for a visit   [address], [address]  [facility] [address] [phone] Please park in [address] or [address] and use [address]. Take Medical Office Bldg. elevators.          Discharged to home in stable  condition, return to ED warnings given, follow up and patient care instructions given.      Rivas Chavez MD, SHYLA, [facility]  Department of Emergency Medicine       Emeterio Chavez MD  02/09/23 0876

## 2023-02-08 ENCOUNTER — HOSPITAL ENCOUNTER (OUTPATIENT)
Dept: CARDIOLOGY | Facility: HOSPITAL | Age: 28
Discharge: HOME | End: 2023-02-08
Attending: PEDIATRICS
Payer: MEDICAID

## 2023-02-08 ENCOUNTER — OFFICE VISIT (OUTPATIENT)
Dept: CARDIOLOGY | Facility: CLINIC | Age: 28
End: 2023-02-08
Payer: MEDICAID

## 2023-02-08 VITALS
WEIGHT: 148.56 LBS | SYSTOLIC BLOOD PRESSURE: 143 MMHG | OXYGEN SATURATION: 90 % | HEIGHT: 66 IN | DIASTOLIC BLOOD PRESSURE: 90 MMHG | BODY MASS INDEX: 23.88 KG/M2 | HEART RATE: 95 BPM

## 2023-02-08 VITALS — HEART RATE: 88 BPM | HEIGHT: 66 IN | BODY MASS INDEX: 24.75 KG/M2 | WEIGHT: 154 LBS

## 2023-02-08 DIAGNOSIS — Q20.4 SINGLE VENTRICLE WITH TRICUSPID ATRESIA: Primary | ICD-10-CM

## 2023-02-08 DIAGNOSIS — Z98.890 S/P FONTAN PROCEDURE: ICD-10-CM

## 2023-02-08 DIAGNOSIS — Q24.9 CONGENITAL HEART DISEASE IN ADULT: ICD-10-CM

## 2023-02-08 DIAGNOSIS — Q24.9 ADULT CONGENITAL HEART DISEASE: ICD-10-CM

## 2023-02-08 DIAGNOSIS — Q22.4 SINGLE VENTRICLE WITH TRICUSPID ATRESIA: Primary | ICD-10-CM

## 2023-02-08 LAB
ASCENDING AORTA: 3.53 CM
AV INDEX (PROSTH): 0.8
AV MEAN GRADIENT: 1 MMHG
AV PEAK GRADIENT: 2 MMHG
AV VALVE AREA: 4.64 CM2
AV VELOCITY RATIO: 0.8
BSA FOR ECHO PROCEDURE: 1.8 M2
CV ECHO LV RWT: 0.26 CM
DOP CALC AO PEAK VEL: 0.65 M/S
DOP CALC AO VTI: 11.56 CM
DOP CALC LVOT AREA: 5.8 CM2
DOP CALC LVOT DIAMETER: 2.72 CM
DOP CALC LVOT PEAK VEL: 0.52 M/S
DOP CALC LVOT STROKE VOLUME: 53.61 CM3
DOP CALCLVOT PEAK VEL VTI: 9.23 CM
E WAVE DECELERATION TIME: 118.92 MSEC
E/A RATIO: 1.33
ECHO LV POSTERIOR WALL: 0.9 CM (ref 0.6–1.1)
FRACTIONAL SHORTENING: 17 % (ref 28–44)
INTERVENTRICULAR SEPTUM: 0.98 CM (ref 0.6–1.1)
LA MAJOR: 4.48 CM
LA WIDTH: 3.26 CM
LEFT ATRIUM SIZE: 3.2 CM
LEFT ATRIUM VOLUME INDEX MOD: 21.8 ML/M2
LEFT ATRIUM VOLUME MOD: 38.97 CM3
LEFT INTERNAL DIMENSION IN SYSTOLE: 5.7 CM (ref 2.1–4)
LEFT VENTRICLE DIASTOLIC VOLUME INDEX: 135.46 ML/M2
LEFT VENTRICLE DIASTOLIC VOLUME: 242.47 ML
LEFT VENTRICLE MASS INDEX: 160 G/M2
LEFT VENTRICLE SYSTOLIC VOLUME INDEX: 89.4 ML/M2
LEFT VENTRICLE SYSTOLIC VOLUME: 160.1 ML
LEFT VENTRICULAR INTERNAL DIMENSION IN DIASTOLE: 6.84 CM (ref 3.5–6)
LEFT VENTRICULAR MASS: 286.01 G
MV PEAK A VEL: 0.33 M/S
MV PEAK E VEL: 0.44 M/S
MV STENOSIS PRESSURE HALF TIME: 34.49 MS
MV VALVE AREA P 1/2 METHOD: 6.38 CM2
SINUS: 4.53 CM
STJ: 3.93 CM

## 2023-02-08 PROCEDURE — 93320 ECHO (CUPID ONLY): ICD-10-PCS | Mod: 26,,, | Performed by: PEDIATRICS

## 2023-02-08 PROCEDURE — 93325 ECHO (CUPID ONLY): ICD-10-PCS | Mod: 26,,, | Performed by: PEDIATRICS

## 2023-02-08 PROCEDURE — 4010F PR ACE/ARB THEARPY RXD/TAKEN: ICD-10-PCS | Mod: CPTII,,, | Performed by: PEDIATRICS

## 2023-02-08 PROCEDURE — 99214 OFFICE O/P EST MOD 30 MIN: CPT | Mod: 25,S$PBB,, | Performed by: PEDIATRICS

## 2023-02-08 PROCEDURE — 3080F PR MOST RECENT DIASTOLIC BLOOD PRESSURE >= 90 MM HG: ICD-10-PCS | Mod: CPTII,,, | Performed by: PEDIATRICS

## 2023-02-08 PROCEDURE — 99214 PR OFFICE/OUTPT VISIT, EST, LEVL IV, 30-39 MIN: ICD-10-PCS | Mod: 25,S$PBB,, | Performed by: PEDIATRICS

## 2023-02-08 PROCEDURE — 3008F PR BODY MASS INDEX (BMI) DOCUMENTED: ICD-10-PCS | Mod: CPTII,,, | Performed by: PEDIATRICS

## 2023-02-08 PROCEDURE — 99213 OFFICE O/P EST LOW 20 MIN: CPT | Mod: PBBFAC,25 | Performed by: PEDIATRICS

## 2023-02-08 PROCEDURE — 3080F DIAST BP >= 90 MM HG: CPT | Mod: CPTII,,, | Performed by: PEDIATRICS

## 2023-02-08 PROCEDURE — 93303 ECHO TRANSTHORACIC: CPT | Mod: 26,,, | Performed by: PEDIATRICS

## 2023-02-08 PROCEDURE — 93303 ECHO TRANSTHORACIC: CPT

## 2023-02-08 PROCEDURE — 99999 PR PBB SHADOW E&M-EST. PATIENT-LVL III: ICD-10-PCS | Mod: PBBFAC,,, | Performed by: PEDIATRICS

## 2023-02-08 PROCEDURE — 93325 DOPPLER ECHO COLOR FLOW MAPG: CPT | Mod: 26,,, | Performed by: PEDIATRICS

## 2023-02-08 PROCEDURE — 1159F PR MEDICATION LIST DOCUMENTED IN MEDICAL RECORD: ICD-10-PCS | Mod: CPTII,,, | Performed by: PEDIATRICS

## 2023-02-08 PROCEDURE — 93320 DOPPLER ECHO COMPLETE: CPT | Mod: 26,,, | Performed by: PEDIATRICS

## 2023-02-08 PROCEDURE — 3077F PR MOST RECENT SYSTOLIC BLOOD PRESSURE >= 140 MM HG: ICD-10-PCS | Mod: CPTII,,, | Performed by: PEDIATRICS

## 2023-02-08 PROCEDURE — 99999 PR PBB SHADOW E&M-EST. PATIENT-LVL III: CPT | Mod: PBBFAC,,, | Performed by: PEDIATRICS

## 2023-02-08 PROCEDURE — 93303 ECHO (CUPID ONLY): ICD-10-PCS | Mod: 26,,, | Performed by: PEDIATRICS

## 2023-02-08 PROCEDURE — 4010F ACE/ARB THERAPY RXD/TAKEN: CPT | Mod: CPTII,,, | Performed by: PEDIATRICS

## 2023-02-08 PROCEDURE — 3008F BODY MASS INDEX DOCD: CPT | Mod: CPTII,,, | Performed by: PEDIATRICS

## 2023-02-08 PROCEDURE — 1159F MED LIST DOCD IN RCRD: CPT | Mod: CPTII,,, | Performed by: PEDIATRICS

## 2023-02-08 PROCEDURE — 3077F SYST BP >= 140 MM HG: CPT | Mod: CPTII,,, | Performed by: PEDIATRICS

## 2023-02-08 RX ORDER — BENAZEPRIL HYDROCHLORIDE 5 MG/1
5 TABLET ORAL DAILY
Qty: 30 TABLET | Refills: 11 | Status: SHIPPED | OUTPATIENT
Start: 2023-02-08 | End: 2023-05-10 | Stop reason: SDUPTHER

## 2023-02-08 RX ORDER — BENZONATATE 200 MG/1
200 CAPSULE ORAL 3 TIMES DAILY PRN
COMMUNITY

## 2023-02-08 NOTE — PROGRESS NOTES
2023     re:Juani Reilly Jr.  :1995    Dr. Abebe Ferguson    [facility]     Dear Dr. Ferguson:    Juani Reilly Jr. is a 27 y.o. male seen in my [facility] clinic today for evaluation of complex congenital heart disease.  To summarize his diagnoses are as follow:  1. 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 Decreased left ventricular function  - no evidence of PLE  3. History of Sqmpc-Nnieprzhj-Uzqpt, SVT  - status post successful ablation of a left lateral accessory pathway 2022 by Dr. Ferguson  4. Cirrhosis  - last seen by hepatology 2020  5. Medication noncompliance  6. marijuana use daily    To summarize, my recommendations are as follows:  1. SBE prophylaxis is definitely indicated  2. Continue Xarelto for now.  I stressed the importance of him taking this medication.  3. Start benazepril 5mg daily (chosen for insurance reasons)  4. Follow up as planned with hepatology.  5. Follow up no tests in 3 months   6. cut back on marijuana    Discussion:  I have very significant concerns about him.  His systemic left ventricular function is significantly decreased despite presumed improved arrhythmia burden.  Clinically, he looks good.  That said, his echocardiogram is concerning.  His blood pressure is elevated.  I considered starting Entresto today, but it is very clear that his medical compliance is a major issue.  I am going to start an ACE-inhibitor so we can use once a day medication dosing for now.  I also stressed the importance of taking his Xarelto.  He is at increased risk for thromboembolic events.    History of  present illness:  Overall, he tells me he has done well since he last saw me in clinic.  No syncope.  No edema.  No orthopnea.  Unchanged baseline dyspnea on exertion.  He works at night at [employer].  this is a new job.  He is able to perform all of his functions without significant difficulty.  No significant palpitations.  He has been noncompliant with his Xarelto.  I just forget to take it, I may have taken it 2 times.  He is smoking marijuana on a daily basis.    The review of systems is as noted above. It is otherwise negative for other symptoms related to the general, neurological, psychiatric, endocrine, gastrointestinal, genitourinary, respiratory, dermatologic, musculoskeletal, hematologic, and immunologic systems.    Past Medical History:   Diagnosis Date    History of heart surgery     Other cirrhosis of liver 11/12/2020    SVT (supraventricular tachycardia)     WPW syndrome      Past Surgical History:   Procedure Laterality Date    ANGIOGRAPHY OF AORTIC ARCH N/A 9/3/2020    Procedure: AORTOGRAM, AORTIC ARCH;  Surgeon: Stephania Crump MD;  Location: [facility] CATH LAB;  Service: Cardiology;  Laterality: N/A;    FONTAN PROCEDURE, EXTRACARDIAC      LEFT HEART CATHETERIZATION Left 9/3/2020    Procedure: Left heart cath;  Surgeon: Stephania Crump MD;  Location: [facility] CATH LAB;  Service: Cardiology;  Laterality: Left;    RIGHT HEART CATHETERIZATION FOR CONGENITAL HEART DEFECT N/A 9/3/2020    Procedure: CATHETERIZATION, HEART, RIGHT, FOR CONGENITAL HEART DEFECT;  Surgeon: Stephania Crump MD;  Location: [facility] CATH LAB;  Service: Cardiology;  Laterality: N/A;  [facility] - needs covid test morning of. Extenuating circumstances    TRANSESOPHAGEAL ECHOCARDIOGRAPHY N/A 11/10/2022    Procedure: ECHOCARDIOGRAM, TRANSESOPHAGEAL;  Surgeon: Emeterio Hall MD;  Location: [facility] EP LAB;  Service: Cardiology;  Laterality: N/A;    TREATMENT OF CARDIAC ARRHYTHMIA N/A 1/9/2021    Procedure: CARDIOVERSION;   Surgeon: Jim Mcginnis MD;  Location: [facility] CATH LAB;  Service: Cardiology;  Laterality: N/A;     Family History   Problem Relation Age of Onset    No Known Problems Mother     No Known Problems Father     No Known Problems Sister     No Known Problems Brother     No Known Problems Maternal Aunt     No Known Problems Maternal Uncle     No Known Problems Paternal Aunt     No Known Problems Paternal Uncle     No Known Problems Maternal Grandmother     Heart disease Maternal Grandfather     No Known Problems Paternal Grandmother     No Known Problems Paternal Grandfather     Anemia Neg Hx     Arrhythmia Neg Hx     Asthma Neg Hx     Clotting disorder Neg Hx     Fainting Neg Hx     Heart attack Neg Hx     Heart failure Neg Hx     Hyperlipidemia Neg Hx     Hypertension Neg Hx     Stroke Neg Hx     Atrial Septal Defect Neg Hx      Social History     Socioeconomic History    Marital status: Single   Tobacco Use    Smoking status: Former     Types: Cigarettes     Quit date:      Years since quittin.1     Passive exposure: Past    Smokeless tobacco: Never    Tobacco comments:     3-4 months    Substance and Sexual Activity    Alcohol use: No    Drug use: Yes     Types: Marijuana     Comment: Odalys today- pt reports a while back    Sexual activity: Yes       Current Outpatient Medications:     azithromycin (Z-LAXMI) 250 MG tablet, Take 1 tablet (250 mg total) by mouth once daily. Take first 2 tablets together, then 1 every day until finished., Disp: 6 tablet, Rfl: 0    benzonatate (TESSALON) 200 MG capsule, Take 1 capsule (200 mg total) by mouth 3 (three) times daily as needed for Cough., Disp: 20 capsule, Rfl: 0    benzonatate (TESSALON) 200 MG capsule, Take 200 mg by mouth 3 (three) times daily as needed for Cough., Disp: , Rfl:     rivaroxaban (XARELTO) 20 mg Tab, Take 1 tablet (20 mg total) by mouth daily with dinner or evening meal. (Patient not taking: Reported on 2023), Disp: 30 tablet, Rfl: 11      Review of  "patient's allergies indicates:  No Known Allergies     Vitals:    02/08/23 1107 02/08/23 1109   BP: 138/87 (!) 143/90   BP Location: Right arm Left arm   Patient Position: Sitting Sitting   BP Method: Large (Automatic) Large (Automatic)   Pulse: 95 95   SpO2: (!) 90%    Weight: 67.4 kg (148 lb 9.4 oz)    Height: 5' 6" (1.676 m)      GENERAL: Awake, well-developed well-nourished, no apparent distress. Non-cyanotic.  HEENT: Mucous membranes moist and pink, normocephalic atraumatic, no cranial or carotid bruits, sclera anicteric, EOMI  NECK: No jugular venous distention, no thyromegaly, no lymphadenopathy  CHEST: Good air movement, clear to auscultation bilaterally. Well healed sternotomy.  CARDIOVASCULAR: Quiet precordium, regular rate and rhythm, S1 with single S2, no murmurs rubs or gallops  ABDOMEN: Soft, nontender nondistended, no hepatosplenomegaly, no aortic bruits  EXTREMITIES: Warm well perfused, 2+ radial/femoral/pedal pulses, capillary refill 2 seconds, no cyanosis or edema  NEURO: Alert and oriented, cooperative with exam, face symmetric, moves all extremities well.    I personally reviewed the following tests performed today and my interpretation follows:    Echo today:  Results for orders placed during the hospital encounter of 02/08/23    IMPRESSION:  Imaging consistent with diagnosis of tricuspid atresia S/P extracardiac conduit Fontan- study remarkable for decreased systolic function of the single ventricle compared to previous.  Very difficult to demonstrate pulmonary circulation with significant chest deformity due to median sternotomy.  Mildly dilated inferior vena cava connecting to extracardiac conduit passing posterior to the right atrium for completion of Fontan physiology.  Right superior vena cava identified with no obvious stenosis.  No evidence of obstruction at Fontan or Jesus anastomosis to the pulmonary arteries.  Pulmonary confluence and proximal pulmonary branches not demonstrated.  At " least two pulmonary veins demonstrated returning to the left atrium with no evidence for obstruction.  There is a small right atrium with tissue remaining in from eustachian valve and no obvious obstruction of right atrial flow to the left atrium.  There is no obvious tricuspid valve tissue present and no right ventricular cavity could be demonstrated.  There is no evidence for pulmonary valve.  At least mild dilation of the left atrium.  Mildly dilated mitral valve with color Doppler demonstrating at least mild insufficiency.  Single ventricle consistent with left ventricular morphology.  There is at least mild dilation of the left ventricle.  Left ventricular systolic function qualitatively severely compromised with ejection fraction estimated 30 -35%.  There is a large aortic annulus with trileaflet aortic valve, no stenosis and trivial central jet of insufficiency.  Aortic dimensions:  Sinuses of Valsalva = 51 mm.  ST junction             = 39 mm.  Ascending aorta     = 43 mm (not well demonstrated).  Qualitative impression of at least mild dilation of the ascending aorta.  Branching pattern consistent with left aortic arch and no evidence for coarctation.  No obvious pericardial effusion.      OLIVIA 11/10/22:  Imaging consistent with fenestrated lateral tunnel Fontan with laminar flow from dilated inferior vena cava and hepatic veins through Fontan circuit to right pulmonary artery with no evidence of stenosis or thrombus.  There is a Fontan fenestration with continuous shunt from conduit to atrium positioned very anterior and just below the level of the aortic valve.  Fenestration is in a position not suitable for passage of transeptal sheath to assist with EP study.  Small right atrium with unrestricted flow to at least mildly dilated left atrium.  Pulmonary veins return normally to the left atrium.  Left atrial appendage was identified with no evidence of thrombus and normal Doppler velocities.  Structurally  normal mitral valve with large mitral annulus.  Normal inflow velocity across the mitral valve with trivial to mild central jet of insufficiency.  Qualitatively at least mildly dilated left ventricle with mild-to-moderately reduced systolic function.  Centrally positioned trileaflet aortic valve with large aortic annulus.  There is no aortic stenosis with a trivial jet of insufficiency.  There is no pericardial effusion.     PROCEDURE:  Transesophageal echocardiogram was utilized to assist with transseptal puncture for access to the left atrium necessary for EP study and potential ablation.  Wire was eventually past across the intra-atrial conduit into the left atrium in good position.  A balloon was inflated in order to enlarge the atrial communication.  A transseptal sheath was passed across the atrial communication in good position.  Postprocedure evaluation following transseptal puncture demonstrated no pericardial effusion    Cath 9/3/20:  IMPRESSION:  1) Tricuspid atresia s/p intra-atrial Fontan  2) Normal Fontan pressures (11mmHg) and wedge pressure (8mmHg).  3) Normal cardiac output and vascular resistance calculations  4) Small atrial baffle leak  5) No significant aorta-pulmonary or veno-venous collaterals  6) Occlusion of right common femoral artery       Cardiac MRI 1/2/2020  Conclusion:   SVC to RPA unobstructed (Jesus)  Extracardiac Fontan with low flow but no evidence of thrombus.  Unobstructed ASD  Tricuspid Atresia  No obstruction to the right or left PA with Q flow of 21cc through each.  Increased LV mass and volume with systemic LVEF of 42%  Dilated sinus of Valsalva 49mm  Trace AI with regurgitation fraction of 8%    Lab Results   Component Value Date    WBC 5.12 11/10/2022    HGB 16.5 11/10/2022    HCT 50.6 11/10/2022    MCV 93 11/10/2022     11/10/2022       CMP  Sodium   Date Value Ref Range Status   11/09/2022 143 136 - 145 mmol/L Final     Potassium   Date Value Ref Range Status    11/09/2022 4.2 3.5 - 5.1 mmol/L Final     Chloride   Date Value Ref Range Status   11/09/2022 103 95 - 110 mmol/L Final     CO2   Date Value Ref Range Status   11/09/2022 28 23 - 29 mmol/L Final     Glucose   Date Value Ref Range Status   11/09/2022 116 (H) 70 - 110 mg/dL Final     BUN   Date Value Ref Range Status   11/09/2022 12 2 - 20 mg/dL Final     Creatinine   Date Value Ref Range Status   11/09/2022 1.06 0.50 - 1.40 mg/dL Final     Calcium   Date Value Ref Range Status   11/09/2022 9.5 8.7 - 10.5 mg/dL Final     Total Protein   Date Value Ref Range Status   03/18/2021 7.5 6.0 - 8.4 g/dL Final     Albumin   Date Value Ref Range Status   03/18/2021 4.0 3.5 - 5.2 g/dL Final     Total Bilirubin   Date Value Ref Range Status   03/18/2021 0.9 0.1 - 1.0 mg/dL Final     Comment:     For infants and newborns, interpretation of results should be based  on gestational age, weight and in agreement with clinical  observations.    Premature Infant recommended reference ranges:  Up to 24 hours.............<8.0 mg/dL  Up to 48 hours............<12.0 mg/dL  3-5 days..................<15.0 mg/dL  6-29 days.................<15.0 mg/dL       Alkaline Phosphatase   Date Value Ref Range Status   03/18/2021 61 55 - 135 U/L Final     AST   Date Value Ref Range Status   03/18/2021 18 10 - 40 U/L Final     ALT   Date Value Ref Range Status   03/18/2021 22 10 - 44 U/L Final     Anion Gap   Date Value Ref Range Status   11/09/2022 12 8 - 16 mmol/L Final     eGFR   Date Value Ref Range Status   11/09/2022 >60.0 >60 mL/min/1.73 m^2 Final     Lab Results   Component Value Date    INR 1.0 11/10/2022    INR 1.0 11/09/2022    INR 1.2 01/09/2021      Latest Reference Range & Units Most Recent   NT-proBNP 5 - 450 pg/mL 152  1/9/21 08:58   Troponin I 0.000 - 0.026 ng/mL 0.039 (H)  1/9/21 16:28   (H): Data is abnormally high    Last liver ultrasound 2020.    Thank you for referring this patient to our clinic.  Please call with any  questions.    Sincerely,        Dorian Santoyo MD  Pediatric Cardiology  Adult Congenital Heart Disease  Pediatric Heart Failure and Transplantation  [facility] [address] [place]  [address]  [phone]

## 2023-04-24 NOTE — PROGRESS NOTES
"Subjective:    Patient ID:  Juani Reilly is a 25 y.o. male who presents for follow-up of Follow-up (1 month post cath )      Referring Physician: Dr. Barrera  EP: Dr. Ferguson/Dr. Alarcon  Hepatology: Elliott Bryan PA-C (SP Dr. Lucero)    HPI  Juani Reilly is a 25 y.o. 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 He underwent RHC/LHC 2 months ago secondary to increasing SOB at rest and with exertion. Cath, outlined below, showed normal Fontan pressures with a small baffle leak. Today he reports persistent SOB which is not getting worse or better. Both at rest and with exertion. He also reports daily chest pain which feels like "his heart stops" lasting for a few seconds. CP also occurs both at rest and with exertion. He has not experiences palpitations since his last ER visit 7/2020.  Regarding his current social situation, he was working as a  at [employer], but has not been working since his last angiogram in July. He currently lives with his grandmother and is inactive. He does not exercise and is sedentary for the most part. He admits to an uncontrolled diet with mainly fried food intake.     He is followed by EP (Dr. Ferguson/Dr. Alarcon) for SVT in the setting of persistent symptoms and failed beta blocker therapy. He has a history of recurrent SVT terminated on IV adenosine. He was last seen by Dr. Ferguson on 7/2020. The decision was originally made for EPS/ablation. After further discussion with Dr. Alarcon in pediatric EP, the decision was made to first start anti-arrhythmic therapy due to challenges with his particular anatomy. Ultimately the plan was for admission for sotalol initiation (if patient agreeable) after hemodynamic study with interventional cardiology. He has not followed up " with them since.     [facility] 9/3/20  IMPRESSION:  1) Tricuspid atresia s/p intra-atrial Fontan  2) Normal Fontan pressures (11mmHg) and wedge pressure (8mmHg).  3) Normal cardiac output and vascular resistance calculations  4) Small atrial baffle leak  5) No significant aorta-pulmonary or veno-venous collaterals  6) Occlusion of right common femoral artery     Review of Systems   Constitution: Negative for chills and fever.   HENT: Negative for sore throat.    Eyes: Negative for blurred vision.   Cardiovascular: Positive for chest pain and dyspnea on exertion. Negative for claudication, cyanosis, irregular heartbeat, leg swelling, near-syncope, orthopnea, palpitations, paroxysmal nocturnal dyspnea and syncope.   Respiratory: Negative for cough and sputum production.    Hematologic/Lymphatic: Does not bruise/bleed easily.   Skin: Negative for itching, rash and suspicious lesions.   Musculoskeletal: Negative for falls.   Gastrointestinal: Negative for abdominal pain and change in bowel habit.   Genitourinary: Negative for dysuria.   Neurological: Negative for disturbances in coordination, dizziness and loss of balance.   Psychiatric/Behavioral: Negative for altered mental status.          Past Medical History:   Diagnosis Date    History of heart surgery     Other cirrhosis of liver 11/12/2020    SVT (supraventricular tachycardia)     WPW syndrome        Current Outpatient Medications:     metoprolol tartrate (LOPRESSOR) 100 MG tablet, Take 1 tablet (100 mg total) by mouth 2 (two) times daily. (Patient taking differently: Take 50 mg by mouth 2 (two) times daily. ), Disp: 60 tablet, Rfl: 11    aspirin (ECOTRIN) 81 MG EC tablet, Take 1 tablet (81 mg total) by mouth once daily., Disp: , Rfl: 0    lisinopriL (PRINIVIL,ZESTRIL) 5 MG tablet, Take 1 tablet (5 mg total) by mouth once daily., Disp: 90 tablet, Rfl: 3    losartan-hydrochlorothiazide 100-12.5 mg (HYZAAR) 100-12.5 mg Tab, Take 1 tablet by mouth once daily.  "(Patient not taking: Reported on 11/12/2020), Disp: 90 tablet, Rfl: 3    Objective:    Physical Exam   Constitutional: He is oriented to person, place, and time. He appears well-developed and well-nourished. No distress.   HENT:   Head: Normocephalic and atraumatic.   Eyes: EOM are normal.   Neck: Normal range of motion. No JVD present.   Cardiovascular: Normal rate, regular rhythm and intact distal pulses.   No murmur heard.  Pulmonary/Chest: Effort normal and breath sounds normal. No respiratory distress.   Abdominal: Soft. He exhibits no distension.   Musculoskeletal:         General: No edema.   Neurological: He is alert and oriented to person, place, and time.   Skin: Skin is warm and dry. He is not diaphoretic.   Vitals reviewed.          Vitals:    11/12/20 1005 11/12/20 1008   BP: (!) 157/90 (!) 148/88   BP Location: Right arm Left arm   Patient Position: Sitting Sitting   BP Method: Large (Automatic) Large (Automatic)   Pulse: 76 76   SpO2: (!) 92%    Weight: 62.9 kg (138 lb 10.7 oz)    Height: 5' 6" (1.676 m)      Body mass index is 22.38 kg/m².    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    S/P Fontan procedure  Stable. Normal Fontal pressures on last cath (11mmHg). Persistent SOB and CP unlikely related. Will consider further evaluation with CPX at his next appointment.     SVT (supraventricular tachycardia)  On Lopressor 100 mg BID. He continues to have recurrent SVT resistant to BB therapy. Followed by Dr. Ferguson/Dr. Alarcon. Needs to follow up with them for re discuss EPS/ablation vs initiation of antiarrhythmic therapy.     Essential hypertension  Persistently elevated in clinic. Not monitored at home. " Will start on lisinopril 5 mg daily today.     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 He was scheduled to see them back in July and did not show up. He needs to follow up with them given increased risk of HCC, portal hypertension, and esophageal varices.     Plan:     1. Zio for 3 days  2. Start lisinopril 5 mg and ASA daily. Continue Lopressor.   3. Follow up with [facility] in 6 months with TTE and EKG.   4. CPX deferred for now. He is not willing to have a COVID test. Will consider at next appt.   5. Needs to see Dr. Ferguson/Dr Alarcon for evaluation of anti arrhythmic initiation vs ablation  6. Needs to follow up with Elliott Bryan PA-C/ Dr Lucero in Hepatology, please forward this note to her.   7. SBE prophylaxis for life.       Orders Placed This Encounter   Procedures    COVID-19 Routine Screening    CPX Study    Holter Monitor - 3-14 Day Pediatrics     Medications Ordered This Encounter   Medications    aspirin (ECOTRIN) 81 MG EC tablet    lisinopriL (PRINIVIL,ZESTRIL) 5 MG tablet       Blanka Calix PA-C  Adult Congenital Heart Disease  [facility]           Detail Level: Zone General Sunscreen Counseling: I recommended a broad spectrum sunscreen with a SPF of 30 or higher.  I explained that SPF 30 sunscreens block approximately 97 percent of the sun's harmful rays.  Sunscreens should be applied at least 15 minutes prior to expected sun exposure and then every 2 hours after that as long as sun exposure continues. If swimming or exercising sunscreen should be reapplied every 45 minutes to an hour after getting wet or sweating.  One ounce, or the equivalent of a shot glass full of sunscreen, is adequate to protect the skin not covered by a bathing suit. I also recommended a lip balm with a sunscreen as well. Sun protective clothing can be used in lieu of sunscreen but must be worn the entire time you are exposed to the sun's rays.

## 2023-05-10 ENCOUNTER — OFFICE VISIT (OUTPATIENT)
Dept: CARDIOLOGY | Facility: CLINIC | Age: 28
End: 2023-05-10
Payer: MEDICAID

## 2023-05-10 VITALS
HEART RATE: 94 BPM | HEIGHT: 69 IN | BODY MASS INDEX: 21.06 KG/M2 | WEIGHT: 142.19 LBS | SYSTOLIC BLOOD PRESSURE: 141 MMHG | OXYGEN SATURATION: 99 % | DIASTOLIC BLOOD PRESSURE: 91 MMHG

## 2023-05-10 DIAGNOSIS — I10 ESSENTIAL HYPERTENSION: ICD-10-CM

## 2023-05-10 DIAGNOSIS — Q20.4 SINGLE VENTRICLE WITH TRICUSPID ATRESIA: ICD-10-CM

## 2023-05-10 DIAGNOSIS — Q22.4 SINGLE VENTRICLE WITH TRICUSPID ATRESIA: ICD-10-CM

## 2023-05-10 DIAGNOSIS — Z98.890 S/P FONTAN PROCEDURE: Primary | ICD-10-CM

## 2023-05-10 DIAGNOSIS — Q24.9 ADULT CONGENITAL HEART DISEASE: ICD-10-CM

## 2023-05-10 PROCEDURE — 99999 PR PBB SHADOW E&M-EST. PATIENT-LVL III: CPT | Mod: PBBFAC,,, | Performed by: PEDIATRICS

## 2023-05-10 PROCEDURE — 3008F BODY MASS INDEX DOCD: CPT | Mod: CPTII,,, | Performed by: PEDIATRICS

## 2023-05-10 PROCEDURE — 1159F PR MEDICATION LIST DOCUMENTED IN MEDICAL RECORD: ICD-10-PCS | Mod: CPTII,,, | Performed by: PEDIATRICS

## 2023-05-10 PROCEDURE — 99999 PR PBB SHADOW E&M-EST. PATIENT-LVL III: ICD-10-PCS | Mod: PBBFAC,,, | Performed by: PEDIATRICS

## 2023-05-10 PROCEDURE — 99213 OFFICE O/P EST LOW 20 MIN: CPT | Mod: PBBFAC | Performed by: PEDIATRICS

## 2023-05-10 PROCEDURE — 99213 PR OFFICE/OUTPT VISIT, EST, LEVL III, 20-29 MIN: ICD-10-PCS | Mod: S$PBB,,, | Performed by: PEDIATRICS

## 2023-05-10 PROCEDURE — 3077F PR MOST RECENT SYSTOLIC BLOOD PRESSURE >= 140 MM HG: ICD-10-PCS | Mod: CPTII,,, | Performed by: PEDIATRICS

## 2023-05-10 PROCEDURE — 3080F DIAST BP >= 90 MM HG: CPT | Mod: CPTII,,, | Performed by: PEDIATRICS

## 2023-05-10 PROCEDURE — 3008F PR BODY MASS INDEX (BMI) DOCUMENTED: ICD-10-PCS | Mod: CPTII,,, | Performed by: PEDIATRICS

## 2023-05-10 PROCEDURE — 4010F PR ACE/ARB THEARPY RXD/TAKEN: ICD-10-PCS | Mod: CPTII,,, | Performed by: PEDIATRICS

## 2023-05-10 PROCEDURE — 3077F SYST BP >= 140 MM HG: CPT | Mod: CPTII,,, | Performed by: PEDIATRICS

## 2023-05-10 PROCEDURE — 4010F ACE/ARB THERAPY RXD/TAKEN: CPT | Mod: CPTII,,, | Performed by: PEDIATRICS

## 2023-05-10 PROCEDURE — 3080F PR MOST RECENT DIASTOLIC BLOOD PRESSURE >= 90 MM HG: ICD-10-PCS | Mod: CPTII,,, | Performed by: PEDIATRICS

## 2023-05-10 PROCEDURE — 99213 OFFICE O/P EST LOW 20 MIN: CPT | Mod: S$PBB,,, | Performed by: PEDIATRICS

## 2023-05-10 PROCEDURE — 1159F MED LIST DOCD IN RCRD: CPT | Mod: CPTII,,, | Performed by: PEDIATRICS

## 2023-05-10 RX ORDER — BENAZEPRIL HYDROCHLORIDE 5 MG/1
5 TABLET ORAL DAILY
Qty: 30 TABLET | Refills: 11 | Status: SHIPPED | OUTPATIENT
Start: 2023-05-10 | End: 2023-10-26

## 2023-05-10 NOTE — PROGRESS NOTES
05/10/2023     re:Juani Reilly Jr.  :1995    Dr. Abebe Ferguson    [facility]     Dear Dr. Ferguson:    Juani Reilly Jr. is a 28 y.o. male seen in my [facility] clinic today for evaluation of complex congenital heart disease.  To summarize his diagnoses are as follow:  1. 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 Decreased left ventricular function  - no evidence of PLE  3. History of [id number], SVT  - status post successful ablation of a left lateral accessory pathway 2022 by Dr. Ferguson  4. Cirrhosis  - last seen by hepatology 2020  5. Medication noncompliance - continued  6. marijuana use     To summarize, my recommendations are as follows:  1. SBE prophylaxis is definitely indicated  2. restart Xarelto and Start benazepril 5mg daily (chosen for insurance reasons)  4. Follow up as planned with hepatology later this month  5. Follow up no tests in 6 months with echo, holter  6. cut back on marijuana    Discussion:  I continue to have have very significant concerns about him.  His systemic left ventricular function is significantly decreased despite presumed improved arrhythmia burden.  Clinically, he looks good.  That said, his echocardiogram is concerning.  His blood pressure is elevated.  I considered starting Entresto in the past, but it is very clear that his medical compliance is a major issue, and a twice a day drug won't be tolerated.  He never started his benazepril or restarted his Xarelto despite me discussing the importance of those medications 3 months ago.  Once again, we discussed those  medications.  Is hypertensive and has decreased heart function, and an ACE-inhibitor is clearly indicated.  He is at increased risk for thromboembolic events, and his Xarelto was also indicated.  I explained to him the difference between those medications and the metoprolol that he used to be on.  I strongly encouraged him to fill those prescriptions and get on those medications.  He will call with any issues.    He is seen hepatology in the near future.  He is at significant risk for hepatocellular carcinoma and cirrhosis.  Regular follow-up with them is important.    If he does move to [place], I would want to who came in with the [place] [facility]'s Adult Congenital [facility] Disease program.    To be clear, his long-term prognosis is not good.  He has a single ventricle status post Fontan with significantly decreased heart function.  Typically, we would be considering a transplant in this situation.  However, he is clearly not a transplant candidate given his very poor compliance with medications.    History of present illness:  I saw him in February.  At that time, he was not taking any medications.  I encouraged him to restart his Xarelto and to start benazepril.  He never started those medications.  In the past, when he was on metoprolol it made me lazy and I got fired.  He was worried about starting new medications for this reason.  He continues to smoke marijuana on a regular basis.  He continues with dyspnea on exertion and episodes of upper abdominal pain with a lot of tenderness.  This is unchanged.  No syncope.  No edema.  No diarrhea.  No significant palpitations.    The review of systems is as noted above. It is otherwise negative for other symptoms related to the general, neurological, psychiatric, endocrine, gastrointestinal, genitourinary, respiratory, dermatologic, musculoskeletal, hematologic, and immunologic systems.    Past Medical History:   Diagnosis Date    History of heart surgery     Other  cirrhosis of liver 11/12/2020    SVT (supraventricular tachycardia)     WPW syndrome      Past Surgical History:   Procedure Laterality Date    ANGIOGRAPHY OF AORTIC ARCH N/A 9/3/2020    Procedure: AORTOGRAM, AORTIC ARCH;  Surgeon: Stephania Crump MD;  Location: [facility] CATH LAB;  Service: Cardiology;  Laterality: N/A;    FONTAN PROCEDURE, EXTRACARDIAC      LEFT HEART CATHETERIZATION Left 9/3/2020    Procedure: Left heart cath;  Surgeon: Stephania Crump MD;  Location: [facility] CATH LAB;  Service: Cardiology;  Laterality: Left;    RIGHT HEART CATHETERIZATION FOR CONGENITAL HEART DEFECT N/A 9/3/2020    Procedure: CATHETERIZATION, HEART, RIGHT, FOR CONGENITAL HEART DEFECT;  Surgeon: Stephania Crump MD;  Location: [facility] CATH LAB;  Service: Cardiology;  Laterality: N/A;  [facility] - needs covid test morning of. Extenuating circumstances    TRANSESOPHAGEAL ECHOCARDIOGRAPHY N/A 11/10/2022    Procedure: ECHOCARDIOGRAM, TRANSESOPHAGEAL;  Surgeon: Emeterio Hall MD;  Location: [facility] EP LAB;  Service: Cardiology;  Laterality: N/A;    TREATMENT OF CARDIAC ARRHYTHMIA N/A 1/9/2021    Procedure: CARDIOVERSION;  Surgeon: Jim Mcginnis MD;  Location: [facility] LAB;  Service: Cardiology;  Laterality: N/A;     Family History   Problem Relation Age of Onset    No Known Problems Mother     No Known Problems Father     No Known Problems Sister     No Known Problems Brother     No Known Problems Maternal Aunt     No Known Problems Maternal Uncle     No Known Problems Paternal Aunt     No Known Problems Paternal Uncle     No Known Problems Maternal Grandmother     Heart disease Maternal Grandfather     No Known Problems Paternal Grandmother     No Known Problems Paternal Grandfather     Anemia Neg Hx     Arrhythmia Neg Hx     Asthma Neg Hx     Clotting disorder Neg Hx     Fainting Neg Hx     Heart attack Neg Hx     Heart failure Neg Hx     Hyperlipidemia Neg Hx     Hypertension Neg Hx     Stroke Neg Hx     Atrial Septal  "Defect Neg Hx      Social History     Socioeconomic History    Marital status: Single   Tobacco Use    Smoking status: Former     Types: Cigarettes     Quit date:      Years since quittin.3     Passive exposure: Past    Smokeless tobacco: Never    Tobacco comments:     3-4 months    Substance and Sexual Activity    Alcohol use: No    Drug use: Yes     Types: Marijuana     Comment: Odalys today- pt reports a while back    Sexual activity: Yes       Current Outpatient Medications:     benazepriL (LOTENSIN) 5 MG tablet, Take 1 tablet (5 mg total) by mouth once daily. (Patient not taking: Reported on 5/10/2023), Disp: 30 tablet, Rfl: 11    benzonatate (TESSALON) 200 MG capsule, Take 200 mg by mouth 3 (three) times daily as needed for Cough., Disp: , Rfl:     rivaroxaban (XARELTO) 20 mg Tab, Take 1 tablet (20 mg total) by mouth daily with dinner or evening meal. (Patient not taking: Reported on 5/10/2023), Disp: 30 tablet, Rfl: 11      Review of patient's allergies indicates:  No Known Allergies     Vitals:    05/10/23 1034 05/10/23 1036   BP: (!) 141/90 (!) 141/91   BP Location: Left arm Right arm   Patient Position: Sitting Sitting   BP Method: Large (Automatic) Large (Automatic)   Pulse: 93 94   SpO2: 99%    Weight: 64.5 kg (142 lb 3.2 oz)    Height: 5' 8.5" (1.74 m)      GENERAL: Awake, well-developed well-nourished, no apparent distress. Non-cyanotic.  HEENT: Mucous membranes moist and pink, normocephalic atraumatic, no cranial or carotid bruits, sclera anicteric, EOMI  NECK: No jugular venous distention, no thyromegaly, no lymphadenopathy  CHEST: Good air movement, clear to auscultation bilaterally. Well healed sternotomy.  CARDIOVASCULAR: Quiet precordium, regular rate and rhythm, S1 with single S2, no murmurs rubs or gallops  ABDOMEN: Soft, nontender nondistended, no hepatosplenomegaly, no aortic bruits.  Tenderness in RUQ and LUQ - mild.    EXTREMITIES: Warm well perfused, 2+ radial/femoral/pedal pulses, " capillary refill 2 seconds, no cyanosis or edema  NEURO: Alert and oriented, cooperative with exam, face symmetric, moves all extremities well.    I personally reviewed the following tests performed today and my interpretation follows:    Results for orders placed during the hospital encounter of 02/08/23    IMPRESSION:  Imaging consistent with diagnosis of tricuspid atresia S/P extracardiac conduit Fontan- study remarkable for decreased systolic function of the single ventricle compared to previous.  Very difficult to demonstrate pulmonary circulation with significant chest deformity due to median sternotomy.  Mildly dilated inferior vena cava connecting to extracardiac conduit passing posterior to the right atrium for completion of Fontan physiology.  Right superior vena cava identified with no obvious stenosis.  No evidence of obstruction at Fontan or Jesus anastomosis to the pulmonary arteries.  Pulmonary confluence and proximal pulmonary branches not demonstrated.  At least two pulmonary veins demonstrated returning to the left atrium with no evidence for obstruction.  There is a small right atrium with tissue remaining in from eustachian valve and no obvious obstruction of right atrial flow to the left atrium.  There is no obvious tricuspid valve tissue present and no right ventricular cavity could be demonstrated.  There is no evidence for pulmonary valve.  At least mild dilation of the left atrium.  Mildly dilated mitral valve with color Doppler demonstrating at least mild insufficiency.  Single ventricle consistent with left ventricular morphology.  There is at least mild dilation of the left ventricle.  Left ventricular systolic function qualitatively severely compromised with ejection fraction estimated 30 -35%.  There is a large aortic annulus with trileaflet aortic valve, no stenosis and trivial central jet of insufficiency.  Aortic dimensions:  Sinuses of Valsalva = 51 mm.  ST junction             = 39  mm.  Ascending aorta     = 43 mm (not well demonstrated).  Qualitative impression of at least mild dilation of the ascending aorta.  Branching pattern consistent with left aortic arch and no evidence for coarctation.  No obvious pericardial effusion.      OLIVIA 11/10/22:  Imaging consistent with fenestrated lateral tunnel Fontan with laminar flow from dilated inferior vena cava and hepatic veins through Fontan circuit to right pulmonary artery with no evidence of stenosis or thrombus.  There is a Fontan fenestration with continuous shunt from conduit to atrium positioned very anterior and just below the level of the aortic valve.  Fenestration is in a position not suitable for passage of transeptal sheath to assist with EP study.  Small right atrium with unrestricted flow to at least mildly dilated left atrium.  Pulmonary veins return normally to the left atrium.  Left atrial appendage was identified with no evidence of thrombus and normal Doppler velocities.  Structurally normal mitral valve with large mitral annulus.  Normal inflow velocity across the mitral valve with trivial to mild central jet of insufficiency.  Qualitatively at least mildly dilated left ventricle with mild-to-moderately reduced systolic function.  Centrally positioned trileaflet aortic valve with large aortic annulus.  There is no aortic stenosis with a trivial jet of insufficiency.  There is no pericardial effusion.     PROCEDURE:  Transesophageal echocardiogram was utilized to assist with transseptal puncture for access to the left atrium necessary for EP study and potential ablation.  Wire was eventually past across the intra-atrial conduit into the left atrium in good position.  A balloon was inflated in order to enlarge the atrial communication.  A transseptal sheath was passed across the atrial communication in good position.  Postprocedure evaluation following transseptal puncture demonstrated no pericardial effusion    Cath  9/3/20:  IMPRESSION:  1) Tricuspid atresia s/p intra-atrial Fontan  2) Normal Fontan pressures (11mmHg) and wedge pressure (8mmHg).  3) Normal cardiac output and vascular resistance calculations  4) Small atrial baffle leak  5) No significant aorta-pulmonary or veno-venous collaterals  6) Occlusion of right common femoral artery       Cardiac MRI 1/2/2020  Conclusion:   SVC to RPA unobstructed (Jesus)  Extracardiac Fontan with low flow but no evidence of thrombus.  Unobstructed ASD  Tricuspid Atresia  No obstruction to the right or left PA with Q flow of 21cc through each.  Increased LV mass and volume with systemic LVEF of 42%  Dilated sinus of Valsalva 49mm  Trace AI with regurgitation fraction of 8%    Lab Results   Component Value Date    WBC 5.12 11/10/2022    HGB 16.5 11/10/2022    HCT 50.6 11/10/2022    MCV 93 11/10/2022     11/10/2022       CMP  Sodium   Date Value Ref Range Status   11/09/2022 143 136 - 145 mmol/L Final     Potassium   Date Value Ref Range Status   11/09/2022 4.2 3.5 - 5.1 mmol/L Final     Chloride   Date Value Ref Range Status   11/09/2022 103 95 - 110 mmol/L Final     CO2   Date Value Ref Range Status   11/09/2022 28 23 - 29 mmol/L Final     Glucose   Date Value Ref Range Status   11/09/2022 116 (H) 70 - 110 mg/dL Final     BUN   Date Value Ref Range Status   11/09/2022 12 2 - 20 mg/dL Final     Creatinine   Date Value Ref Range Status   11/09/2022 1.06 0.50 - 1.40 mg/dL Final     Calcium   Date Value Ref Range Status   11/09/2022 9.5 8.7 - 10.5 mg/dL Final     Total Protein   Date Value Ref Range Status   03/18/2021 7.5 6.0 - 8.4 g/dL Final     Albumin   Date Value Ref Range Status   03/18/2021 4.0 3.5 - 5.2 g/dL Final     Total Bilirubin   Date Value Ref Range Status   03/18/2021 0.9 0.1 - 1.0 mg/dL Final     Comment:     For infants and newborns, interpretation of results should be based  on gestational age, weight and in agreement with clinical  observations.    Premature  Infant recommended reference ranges:  Up to 24 hours.............<8.0 mg/dL  Up to 48 hours............<12.0 mg/dL  3-5 days..................<15.0 mg/dL  6-29 days.................<15.0 mg/dL       Alkaline Phosphatase   Date Value Ref Range Status   03/18/2021 61 55 - 135 U/L Final     AST   Date Value Ref Range Status   03/18/2021 18 10 - 40 U/L Final     ALT   Date Value Ref Range Status   03/18/2021 22 10 - 44 U/L Final     Anion Gap   Date Value Ref Range Status   11/09/2022 12 8 - 16 mmol/L Final     eGFR   Date Value Ref Range Status   11/09/2022 >60.0 >60 mL/min/1.73 m^2 Final     Lab Results   Component Value Date    INR 1.0 11/10/2022    INR 1.0 11/09/2022    INR 1.2 01/09/2021      Latest Reference Range & Units Most Recent   NT-proBNP 5 - 450 pg/mL 152  1/9/21 08:58   Troponin I 0.000 - 0.026 ng/mL 0.039 (H)  1/9/21 16:28   (H): Data is abnormally high    Last liver ultrasound 2020.    Thank you for referring this patient to our clinic.  Please call with any questions.    Sincerely,        Dorian Santoyo MD  Pediatric Cardiology  Adult Congenital Heart Disease  Pediatric Heart Failure and Transplantation  [facility] [address] [place]  [address]  [phone]

## 2023-05-16 ENCOUNTER — OFFICE VISIT (OUTPATIENT)
Dept: HEPATOLOGY | Facility: CLINIC | Age: 28
End: 2023-05-16
Payer: MEDICAID

## 2023-05-16 DIAGNOSIS — K74.69 OTHER CIRRHOSIS OF LIVER: ICD-10-CM

## 2023-05-16 DIAGNOSIS — R14.0 ABDOMINAL DISTENTION: Primary | ICD-10-CM

## 2023-05-16 PROCEDURE — 99205 OFFICE O/P NEW HI 60 MIN: CPT | Mod: 95,,, | Performed by: INTERNAL MEDICINE

## 2023-05-16 PROCEDURE — 99205 PR OFFICE/OUTPT VISIT, NEW, LEVL V, 60-74 MIN: ICD-10-PCS | Mod: 95,,, | Performed by: INTERNAL MEDICINE

## 2023-05-16 PROCEDURE — 4010F PR ACE/ARB THEARPY RXD/TAKEN: ICD-10-PCS | Mod: CPTII,95,, | Performed by: INTERNAL MEDICINE

## 2023-05-16 PROCEDURE — 4010F ACE/ARB THERAPY RXD/TAKEN: CPT | Mod: CPTII,95,, | Performed by: INTERNAL MEDICINE

## 2023-05-16 NOTE — PROGRESS NOTES
HEPATOLOGY CLINIC VISIT NOTE     REFERRING PROVIDER: No ref. provider found    REASON FOR VISIT: post FONTAN     HISTORY: This is a 28 y.o. Black or  male here for evaluation. He is here with his mother.  H/o congenital heart disease, post Fontan. He recently established with cardiology here. He is doing well from cardiac standpoint.     Review of labs shows   Normal liver enzymes  Tbili 0.7-1.3  Albumin 4-5s  PLTs WNL  Negative acute hep panel    U/S with doppler today with no concerning findings    Fibroscan done prior to visit F4. Discussed that given imaging and labs, there is no evidence of portal hypertension or liver dysfunction. Discussed biopsy versus q6 month HCC screening. Pt and mother do not wish to be aggressive in work up and cirrhosis is known complication of Fontan.     Pt denies signs of hepatic decompensation including: jaundice, dark urine, abdominal distention, hematemesis, melena, slowed mentation.    Liver staging:  Fibroscan F4 at 23.7    Cirrhosis history:  - Well compensated  - MELD score   Computed MELD-Na unavailable. Necessary lab results were not found in the last year.  Computed MELD unavailable. Necessary lab results were not found in the last year.    - HCC screening:   - U/S: due 07/2020   - AFP: ordered today     (-)Portal HTN:   - EGD: medically early given PLTs and spleen       Denies jaundice, dark urine, abdominal distention, hematemesis, melena, slowed mentation.     Past Medical History:   Diagnosis Date    History of heart surgery     Other cirrhosis of liver 11/12/2020    SVT (supraventricular tachycardia)     WPW syndrome      Past Surgical History:   Procedure Laterality Date    ANGIOGRAPHY OF AORTIC ARCH N/A 9/3/2020    Procedure: AORTOGRAM, AORTIC ARCH;  Surgeon: Stephania Crump MD;  Location: [facility] CATH LAB;  Service: Cardiology;  Laterality: N/A;    FONTAN PROCEDURE, EXTRACARDIAC      LEFT HEART CATHETERIZATION Left 9/3/2020    Procedure: Left  heart cath;  Surgeon: Stpehania Crump MD;  Location: [facility] CATH LAB;  Service: Cardiology;  Laterality: Left;    RIGHT HEART CATHETERIZATION FOR CONGENITAL HEART DEFECT N/A 9/3/2020    Procedure: CATHETERIZATION, HEART, RIGHT, FOR CONGENITAL HEART DEFECT;  Surgeon: Stephania Crump MD;  Location: [facility] CATH LAB;  Service: Cardiology;  Laterality: N/A;  [facility] - needs covid test morning of. Extenuating circumstances    TRANSESOPHAGEAL ECHOCARDIOGRAPHY N/A 11/10/2022    Procedure: ECHOCARDIOGRAM, TRANSESOPHAGEAL;  Surgeon: Emeterio Hall MD;  Location: [facility] EP LAB;  Service: Cardiology;  Laterality: N/A;    TREATMENT OF CARDIAC ARRHYTHMIA N/A 2021    Procedure: CARDIOVERSION;  Surgeon: Jim Mcginnis MD;  Location: [facility] CATH LAB;  Service: Cardiology;  Laterality: N/A;     FAMILY HISTORY: Negative for liver disease    SOCIAL HISTORY:   [facility]     Social History     Tobacco Use   Smoking Status Former    Types: Cigarettes    Quit date:     Years since quittin.3    Passive exposure: Past   Smokeless Tobacco Never   Tobacco Comments    3-4 months      Social History     Substance and Sexual Activity   Alcohol Use No     Social History     Substance and Sexual Activity   Drug Use Yes    Types: Marijuana    Comment: Mojo today- pt reports a while back     ROS:   No fever, chills, weight loss, fatigue  No chest pain, palpitations, dyspnea, cough  No abdominal pain, nausea, vomiting  No headaches, visual changes  No lower extremity edema  No depression or anxiety    PHYSICAL EXAM:  Friendly Black or  male, in no acute distress; alert and oriented to person, place and time  VITALS: reviewed  HEENT: Sclerae anicteric.   NECK: Supple  CVS: Regular rate and rhythm. No murmurs  LUNGS: Normal respiratory effort. Clear bilaterally  ABDOMEN: Flat, soft, nontender. No organomegaly or masses  SKIN: Warm and dry. No jaundice, No obvious rashes.   EXTREMITIES: No lower extremity  edema  NEURO/PSYCH: Normal gate. Memory intact. Thought and speech pattern appropriate. Behavior normal. No depression or anxiety noted.    RECENT LABS:  Lab Results   Component Value Date    WBC 5.12 11/10/2022    HGB 16.5 11/10/2022     11/10/2022     Lab Results   Component Value Date    INR 1.0 11/10/2022     Lab Results   Component Value Date    AST 18 03/18/2021    ALT 22 03/18/2021    BILITOT 0.9 03/18/2021    ALBUMIN 4.0 03/18/2021    ALKPHOS 61 03/18/2021    CREATININE 1.06 11/09/2022    BUN 12 11/09/2022     11/09/2022    K 4.2 11/09/2022     RECENT IMAGING:  US Liver with Doppler (xpd)   Order: [id number]   Status:  Final result   Visible to patient:  No (Not Released)   Next appt:  Today at 02:15 PM in Cardiology (EKG, APPT)   Dx:  S/P Fontan procedure   Details     Reading Physician Reading Date Result Priority   Nathaniel Pennington MD 1/2/2020 Routine      Narrative     EXAMINATION:  US LIVER WITH DOPPLER    CLINICAL HISTORY:  Other specified postprocedural states    TECHNIQUE:  Complete abdominal ultrasound with doppler.  Color and spectral Doppler were performed.    COMPARISON:  None.    FINDINGS:  The visualized portion of the pancreas is unremarkable.    The liver is enlarged, measuring 16.3 cm and extending below the costal margin.  The liver demonstrates homogeneous echotexture.  Hepatic renal index measures 1.25 on today's exam.  No focal hepatic lesions are seen.    The gallbladder is unremarkable with no evidence of calculi.  The common duct is not dilated, measuring 2 mm.  The gallbladder wall is not thickened.  There is no sonographic Disla sign.  No dilated intrahepatic radicles are seen.    The spleen is normal in size measuring 9.0 x 3.2 cm with a homogeneous echotexture.    There is no evidence of ascites.    The main portal vein, right portal vein, left portal vein, middle hepatic vein, right hepatic vein, left hepatic vein, SMV, and IVC are patent with proper directional  flow.  The main hepatic artery is patent. The umbilical vein is not patent.      Impression       Hepatomegaly.    Satisfactory Doppler evaluation of the liver.           ASSESSMENT  28 y.o. Black or  male with:  1. PROBABLE CIRRHOSIS  -- based on fibroscan; fibroscan of poor quality  -- pt doesn't wish to pursue more definitive staging with biopsy at this point  -- initiate q6 month HCC screening, next due 06/2020  Fibroscan 1/2/2020:  Findings  Median liver stiffness score:  23.7  CAP Reading: dB/m:  275   IQR/med %:  6  Interpretation  Fibrosis interpretation is based on medial liver stiffness - Kilopascal (kPa).   Fibrosis Stage:  F4  Steatosis interpretation is based on controlled attenuation parameter - (dB/m).   Steatosis Grade:  S3 and S2     -  Ascites x last 2 months, has not had a paracentesis. Not preceded by any infection, bleeding, or no problems with the kidneys.  No change in urine output. No change in diet or amount of fluid he drank.  Drinks 1800 mL of fluid/day. Does not drink alcohol.  US 2 yrs ago did not show any significant abnormality except for hepatomegaly.  Veins were patent.  Used to drink 1800 mL then also.   Needs to be on low sodium diet (no more than 2000 mg sodium/24 day), avoid sauces and preserved food.     US with doppler on 1/2/2020:  Hepatomegaly.  Satisfactory Doppler evaluation of the liver.    - HCC screening:   - U/S: due 07/2020   - AFP: ordered today     (-)Portal HTN:   - EGD: medically early given PLTs and spleen     2. S/P FONTAN  -- followed by cards    3. HYPERTENSION  -- asymptomatic, recommended continued f/u with managing provider    EDUCATION:  Discussed evidence that indicates that pt has cirrhosis.   -- Discussed the basics about liver fibrosis /scarring and how that relates to liver function. Reviewed the significance of the MELD scoring system as it relates to indication for transplant.  -- Signs and symptoms of hepatic decompensation were  reviewed, including jaundice, ascites, and slowed mentation due to hepatic encephalopathy. The patient should seek medical attention if any of these things occur. We discussed the potential for bleeding from esophageal varices with symptoms of hematemesis and melena. The patient should report to the Emergency Department for these symptoms.   -- We discussed the increased risk of hepatocellular carcinoma due to cirrhosis.   Continued screening every six months with ultrasound and AFP is recommended.   -- Discussed portal hypertension, including potential development of esophageal varices. Role of EGD in screening for varices was reviewed.   Cirrhosis education booklet given to pt    Recommended patient avoid alcohol and raw seafood. Limit tylenol to 2000mg daily    PLAN:  1. Labs Every 3 months: CBC, CMP, PT INR, start tomorrow  2. Ultrasound doppler of abdomen tomorrow  3. HCC screening: U/s limited and AFP every 6 months.   3. F/u in 1 month with HCC screening starting November 2023.       Rona Hitchcock MD  Hepatology [facility]

## 2023-05-26 ENCOUNTER — HOSPITAL ENCOUNTER (OUTPATIENT)
Dept: RADIOLOGY | Facility: HOSPITAL | Age: 28
Discharge: HOME | End: 2023-05-26
Attending: INTERNAL MEDICINE
Payer: MEDICAID

## 2023-05-26 ENCOUNTER — PATIENT MESSAGE (OUTPATIENT)
Dept: HEPATOLOGY | Facility: CLINIC | Age: 28
End: 2023-05-26
Payer: MEDICAID

## 2023-05-26 DIAGNOSIS — K74.69 OTHER CIRRHOSIS OF LIVER: ICD-10-CM

## 2023-05-26 PROCEDURE — 76705 ECHO EXAM OF ABDOMEN: CPT | Mod: TC

## 2023-05-26 PROCEDURE — 76705 ECHO EXAM OF ABDOMEN: CPT | Mod: 26,,, | Performed by: RADIOLOGY

## 2023-05-26 PROCEDURE — 76705 US ABDOMEN LIMITED: ICD-10-PCS | Mod: 26,,, | Performed by: RADIOLOGY

## 2023-05-28 ENCOUNTER — HOSPITAL ENCOUNTER (EMERGENCY)
Facility: HOSPITAL | Age: 28
Discharge: HOME | End: 2023-05-28
Attending: EMERGENCY MEDICINE
Payer: MEDICAID

## 2023-05-28 VITALS
BODY MASS INDEX: 22.22 KG/M2 | SYSTOLIC BLOOD PRESSURE: 177 MMHG | OXYGEN SATURATION: 98 % | RESPIRATION RATE: 16 BRPM | TEMPERATURE: 99 F | WEIGHT: 150 LBS | DIASTOLIC BLOOD PRESSURE: 107 MMHG | HEIGHT: 69 IN | HEART RATE: 110 BPM

## 2023-05-28 DIAGNOSIS — M79.606 LEG PAIN: ICD-10-CM

## 2023-05-28 DIAGNOSIS — M79.605 LEFT LEG PAIN: Primary | ICD-10-CM

## 2023-05-28 PROCEDURE — 99284 EMERGENCY DEPT VISIT MOD MDM: CPT | Mod: 25,ER

## 2023-05-28 RX ORDER — CEPHALEXIN 500 MG/1
500 CAPSULE ORAL 4 TIMES DAILY
Qty: 20 CAPSULE | Refills: 0 | Status: SHIPPED | OUTPATIENT
Start: 2023-05-28 | End: 2023-06-02

## 2023-05-28 NOTE — ED PROVIDER NOTES
Encounter Date: 5/28/2023       History     Chief Complaint   Patient presents with    Leg Pain     Pt C/O LLE pain X 2 days.  Pt denies injury.  Pt reports pain begins @ incision site from 11/22 and radiates to hip and down leg.      28-year-old male with a history of congenital heart disease, SVT, WPW status post ablation November presents with left leg pain for the past 2 days.  No fever.  Patient has not treated his symptoms.  Patient thought that his symptoms were in the site where they entered his groin to do a procedure last year.  No drainage from area.  No trauma.    Review of patient's allergies indicates:  No Known Allergies  Past Medical History:   Diagnosis Date    History of heart surgery     Other cirrhosis of liver 11/12/2020    SVT (supraventricular tachycardia)     WPW syndrome      Past Surgical History:   Procedure Laterality Date    ANGIOGRAPHY OF AORTIC ARCH N/A 9/3/2020    Procedure: AORTOGRAM, AORTIC ARCH;  Surgeon: Stephania Crump MD;  Location: [facility] CATH LAB;  Service: Cardiology;  Laterality: N/A;    FONTAN PROCEDURE, EXTRACARDIAC      LEFT HEART CATHETERIZATION Left 9/3/2020    Procedure: Left heart cath;  Surgeon: Stephania Crump MD;  Location: [facility] CATH LAB;  Service: Cardiology;  Laterality: Left;    RIGHT HEART CATHETERIZATION FOR CONGENITAL HEART DEFECT N/A 9/3/2020    Procedure: CATHETERIZATION, HEART, RIGHT, FOR CONGENITAL HEART DEFECT;  Surgeon: Stephania Crump MD;  Location: [facility] CATH LAB;  Service: Cardiology;  Laterality: N/A;  [facility] - needs covid test morning of. Extenuating circumstances    TRANSESOPHAGEAL ECHOCARDIOGRAPHY N/A 11/10/2022    Procedure: ECHOCARDIOGRAM, TRANSESOPHAGEAL;  Surgeon: Emeterio Hall MD;  Location: [facility] EP LAB;  Service: Cardiology;  Laterality: N/A;    TREATMENT OF CARDIAC ARRHYTHMIA N/A 1/9/2021    Procedure: CARDIOVERSION;  Surgeon: Jim Mcginnis MD;  Location: [facility] CATH LAB;  Service: Cardiology;  Laterality: N/A;      Family History   Problem Relation Age of Onset    No Known Problems Mother     No Known Problems Father     No Known Problems Sister     No Known Problems Brother     No Known Problems Maternal Aunt     No Known Problems Maternal Uncle     No Known Problems Paternal Aunt     No Known Problems Paternal Uncle     No Known Problems Maternal Grandmother     Heart disease Maternal Grandfather     No Known Problems Paternal Grandmother     No Known Problems Paternal Grandfather     Anemia Neg Hx     Arrhythmia Neg Hx     Asthma Neg Hx     Clotting disorder Neg Hx     Fainting Neg Hx     Heart attack Neg Hx     Heart failure Neg Hx     Hyperlipidemia Neg Hx     Hypertension Neg Hx     Stroke Neg Hx     Atrial Septal Defect Neg Hx      Social History     Tobacco Use    Smoking status: Former     Types: Cigarettes     Quit date:      Years since quittin.4     Passive exposure: Past    Smokeless tobacco: Never    Tobacco comments:     3-4 months    Substance Use Topics    Alcohol use: No    Drug use: Yes     Types: Marijuana     Comment: Odalys today- pt reports a while back     Review of Systems   Constitutional:  Negative for appetite change.   Eyes:  Negative for pain.   Respiratory:  Negative for shortness of breath.    Cardiovascular:  Negative for chest pain.   Gastrointestinal:  Negative for abdominal pain, nausea and vomiting.   Genitourinary:  Negative for frequency.   Musculoskeletal:  Negative for arthralgias and neck pain.        Left leg pain   Neurological:  Negative for headaches.   Psychiatric/Behavioral:  Negative for confusion.      Physical Exam     Initial Vitals [23 0935]   BP Pulse Resp Temp SpO2   (!) 162/104 109 19 98.7 °F (37.1 °C) 95 %      MAP       --         Physical Exam    Nursing note and vitals reviewed.  HENT:   Head: Atraumatic.   Eyes: Conjunctivae and EOM are normal.   Neck:   Normal range of motion.  Pulmonary/Chest: Breath sounds normal.   Abdominal: Abdomen is soft.  There is no abdominal tenderness.   Genitourinary:    Genitourinary Comments: Shotty inguinal lymphadenopathy on left     Musculoskeletal:      Cervical back: Normal range of motion.     Neurological: He is alert and oriented to person, place, and time.   Skin:   There is a 1 x 2 cm shallow fluctuance in the left proximal medial thigh without overlying skin changes.  No induration.  It is tender to touch.       ED Course   Procedures  Labs Reviewed - No data to display       Imaging Results              US Lower Extremity Veins Left (Final result)  Result time 05/28/23 11:00:31      Final result by Tunde Miller MD (05/28/23 11:00:31)                   Impression:      Negative for DVT.    See above.      Electronically signed by: Tunde Miller MD  Date:    05/28/2023  Time:    11:00               Narrative:    EXAMINATION:  US LOWER EXTREMITY VEINS LEFT    CLINICAL HISTORY:  Pain in leg, unspecified    COMPARISON:  None    FINDINGS:  Duplex and color flow imaging with spectral wave analysis performed.    The veins demonstrate normal compressibility and phasicity. No evidence of filling defect.    Assessment of the left groin region demonstrates an irregular complex hypoechoic collection which measures approximately 6 x 8 x 17 mm.  Possible small postprocedural seroma or hematoma.                                       Medications - No data to display  Medical Decision Making:   Initial Assessment:   28-year-old male presenting with pain to the left leg for 2 days.  On exam he has a fluctuant mass in the proximal left medial thigh.  Tender to touch.  Patient has a history of congenital heart disease in his supposed to be on Xarelto however recent cardiology notes state that the patient has problems with noncompliance.  Will obtain ultrasound to rule out DVT.           ED Course as of 05/28/23 1106   Sun May 28, 2023   1105 No evidence of DVT on ultrasound.  Plan for conservative treatment for possible  early abscess with warm compresses and antibiotics.  Wound recheck later this week.  Explained to patient that despite these measures, swelling may increase and may need to be drained at a later date.  Return instructions given [SN]      ED Course User Index  [SN] Houston Avitia MD                 Clinical Impression:   Final diagnoses:  [M79.606] Leg pain  [M79.605] Left leg pain (Primary)        ED Disposition Condition    Discharge Stable          ED Prescriptions       Medication Sig Dispense Start Date End Date Auth. Provider    cephALEXin (KEFLEX) 500 MG capsule Take 1 capsule (500 mg total) by mouth 4 (four) times daily. for 5 days 20 capsule 5/28/2023 6/2/2023 Houston Avitia MD          Follow-up Information       Follow up With Specialties Details Why Contact Info    Primary care  Schedule an appointment as soon as possible for a visit in 3 days For wound re-check              Houston Avitia MD  05/28/23 4540

## 2023-07-26 ENCOUNTER — PATIENT MESSAGE (OUTPATIENT)
Dept: CARDIOLOGY | Facility: CLINIC | Age: 28
End: 2023-07-26
Payer: MEDICAID

## 2023-07-26 DIAGNOSIS — Z98.890 S/P FONTAN PROCEDURE: Primary | ICD-10-CM

## 2023-07-26 DIAGNOSIS — Q20.4 SINGLE VENTRICLE WITH TRICUSPID ATRESIA: ICD-10-CM

## 2023-07-26 DIAGNOSIS — Q22.4 SINGLE VENTRICLE WITH TRICUSPID ATRESIA: ICD-10-CM

## 2023-07-26 DIAGNOSIS — Q24.9 ADULT CONGENITAL HEART DISEASE: ICD-10-CM

## 2023-10-26 ENCOUNTER — OFFICE VISIT (OUTPATIENT)
Dept: CARDIOLOGY | Facility: CLINIC | Age: 28
End: 2023-10-26
Payer: MEDICAID

## 2023-10-26 ENCOUNTER — PATIENT MESSAGE (OUTPATIENT)
Dept: CARDIOLOGY | Facility: CLINIC | Age: 28
End: 2023-10-26

## 2023-10-26 ENCOUNTER — HOSPITAL ENCOUNTER (OUTPATIENT)
Dept: CARDIOLOGY | Facility: HOSPITAL | Age: 28
Discharge: HOME | End: 2023-10-26
Attending: PEDIATRICS
Payer: MEDICAID

## 2023-10-26 ENCOUNTER — DOCUMENTATION ONLY (OUTPATIENT)
Dept: PEDIATRIC CARDIOLOGY | Facility: CLINIC | Age: 28
End: 2023-10-26
Payer: MEDICAID

## 2023-10-26 ENCOUNTER — HOSPITAL ENCOUNTER (OUTPATIENT)
Dept: CARDIOLOGY | Facility: CLINIC | Age: 28
Discharge: HOME | End: 2023-10-26
Payer: MEDICAID

## 2023-10-26 VITALS
HEIGHT: 69 IN | HEIGHT: 69 IN | HEART RATE: 91 BPM | WEIGHT: 150 LBS | OXYGEN SATURATION: 94 % | SYSTOLIC BLOOD PRESSURE: 174 MMHG | DIASTOLIC BLOOD PRESSURE: 103 MMHG | BODY MASS INDEX: 22.22 KG/M2 | WEIGHT: 134.5 LBS | BODY MASS INDEX: 19.92 KG/M2

## 2023-10-26 DIAGNOSIS — Z98.890 S/P FONTAN PROCEDURE: Primary | ICD-10-CM

## 2023-10-26 DIAGNOSIS — Q20.4 SINGLE VENTRICLE WITH TRICUSPID ATRESIA: ICD-10-CM

## 2023-10-26 DIAGNOSIS — Q24.9 ADULT CONGENITAL HEART DISEASE: ICD-10-CM

## 2023-10-26 DIAGNOSIS — Q22.4 SINGLE VENTRICLE WITH TRICUSPID ATRESIA: ICD-10-CM

## 2023-10-26 DIAGNOSIS — Z98.890 S/P FONTAN PROCEDURE: ICD-10-CM

## 2023-10-26 DIAGNOSIS — K74.69 OTHER CIRRHOSIS OF LIVER: ICD-10-CM

## 2023-10-26 DIAGNOSIS — I10 ESSENTIAL HYPERTENSION: ICD-10-CM

## 2023-10-26 LAB
BSA FOR ECHO PROCEDURE: 1.82 M2
DOP CALC LVOT AREA: 21 CM2
DOP CALC LVOT DIAMETER: 5.17 CM
E WAVE DECELERATION TIME: 141.54 MSEC
E/A RATIO: 0.87
MV PEAK A VEL: 0.52 M/S
MV PEAK E VEL: 0.45 M/S
MV STENOSIS PRESSURE HALF TIME: 41.05 MS
MV VALVE AREA P 1/2 METHOD: 5.36 CM2
SINUS: 4.24 CM

## 2023-10-26 PROCEDURE — 4010F ACE/ARB THERAPY RXD/TAKEN: CPT | Mod: CPTII,,, | Performed by: PEDIATRICS

## 2023-10-26 PROCEDURE — 93320 ECHO (CUPID ONLY): ICD-10-PCS | Mod: 26,,, | Performed by: PEDIATRICS

## 2023-10-26 PROCEDURE — 99999 PR PBB SHADOW E&M-EST. PATIENT-LVL II: CPT | Mod: PBBFAC,,, | Performed by: PEDIATRICS

## 2023-10-26 PROCEDURE — 3077F PR MOST RECENT SYSTOLIC BLOOD PRESSURE >= 140 MM HG: ICD-10-PCS | Mod: CPTII,,, | Performed by: PEDIATRICS

## 2023-10-26 PROCEDURE — 3080F DIAST BP >= 90 MM HG: CPT | Mod: CPTII,,, | Performed by: PEDIATRICS

## 2023-10-26 PROCEDURE — 93303 ECHO TRANSTHORACIC: CPT | Mod: 26,,, | Performed by: PEDIATRICS

## 2023-10-26 PROCEDURE — 3008F BODY MASS INDEX DOCD: CPT | Mod: CPTII,,, | Performed by: PEDIATRICS

## 2023-10-26 PROCEDURE — 93303 ECHO TRANSTHORACIC: CPT

## 2023-10-26 PROCEDURE — 3077F SYST BP >= 140 MM HG: CPT | Mod: CPTII,,, | Performed by: PEDIATRICS

## 2023-10-26 PROCEDURE — 93303 ECHO (CUPID ONLY): ICD-10-PCS | Mod: 26,,, | Performed by: PEDIATRICS

## 2023-10-26 PROCEDURE — 3080F PR MOST RECENT DIASTOLIC BLOOD PRESSURE >= 90 MM HG: ICD-10-PCS | Mod: CPTII,,, | Performed by: PEDIATRICS

## 2023-10-26 PROCEDURE — 93320 DOPPLER ECHO COMPLETE: CPT | Mod: 26,,, | Performed by: PEDIATRICS

## 2023-10-26 PROCEDURE — 99999 PR PBB SHADOW E&M-EST. PATIENT-LVL II: ICD-10-PCS | Mod: PBBFAC,,, | Performed by: PEDIATRICS

## 2023-10-26 PROCEDURE — 93325 DOPPLER ECHO COLOR FLOW MAPG: CPT | Mod: 26,,, | Performed by: PEDIATRICS

## 2023-10-26 PROCEDURE — 99214 PR OFFICE/OUTPT VISIT, EST, LEVL IV, 30-39 MIN: ICD-10-PCS | Mod: S$PBB,,, | Performed by: PEDIATRICS

## 2023-10-26 PROCEDURE — 99214 OFFICE O/P EST MOD 30 MIN: CPT | Mod: S$PBB,,, | Performed by: PEDIATRICS

## 2023-10-26 PROCEDURE — 4010F PR ACE/ARB THEARPY RXD/TAKEN: ICD-10-PCS | Mod: CPTII,,, | Performed by: PEDIATRICS

## 2023-10-26 PROCEDURE — 93010 ELECTROCARDIOGRAM REPORT: CPT | Mod: S$PBB,,, | Performed by: INTERNAL MEDICINE

## 2023-10-26 PROCEDURE — 99212 OFFICE O/P EST SF 10 MIN: CPT | Mod: PBBFAC,25 | Performed by: PEDIATRICS

## 2023-10-26 PROCEDURE — 93325 ECHO (CUPID ONLY): ICD-10-PCS | Mod: 26,,, | Performed by: PEDIATRICS

## 2023-10-26 PROCEDURE — 93010 EKG 12-LEAD: ICD-10-PCS | Mod: S$PBB,,, | Performed by: INTERNAL MEDICINE

## 2023-10-26 PROCEDURE — 3008F PR BODY MASS INDEX (BMI) DOCUMENTED: ICD-10-PCS | Mod: CPTII,,, | Performed by: PEDIATRICS

## 2023-10-26 PROCEDURE — 93005 ELECTROCARDIOGRAM TRACING: CPT | Mod: PBBFAC | Performed by: INTERNAL MEDICINE

## 2023-10-26 RX ORDER — BENAZEPRIL HYDROCHLORIDE 10 MG/1
10 TABLET ORAL DAILY
Qty: 30 TABLET | Refills: 11 | Status: SHIPPED | OUTPATIENT
Start: 2023-10-26 | End: 2024-10-25

## 2023-10-26 NOTE — PROGRESS NOTES
10/26/2023     re:Juani Reilly Jr.  :1995    Dr. Abebe Ferguson    [facility]     Dear Dr. Ferguson:    Juani Reilly Jr. is a 28 y.o. male seen in my [facility] clinic today for evaluation of complex congenital heart disease.  To summarize his diagnoses are as follow:  1. 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 Decreased left ventricular function  - no evidence of PLE  3. History of Rwjkq-Vtdkhybpw-Vuuag, SVT  - status post successful ablation of a left lateral accessory pathway 2022 by Dr. Ferguson  4. Cirrhosis  - last seen by hepatology May 2023  5. Medication noncompliance   6. marijuana use     To summarize, my recommendations are as follows:  1. SBE prophylaxis is definitely indicated  2. I stressed the importance of taking his medications.  Continue Xarelto and increase benazepril to 10mg daily (chosen for insurance reasons).  I left a message with his pharmacy to confirm compliance.  4. Follow up as planned with hepatology later this month  5. Follow up no tests in 1 month  6. Check baseline blood work today    Discussion:  He continues with significantly decreased systolic function of his systemic ventricle.  It is very unclear to me whether he is compliant with his medication.  I bumped his ACE inhibitor dose up, and we are going to see him again in a month.  I left a message at his pharmacy to ask about when he picked up his last refill to try to gauge compliance.    To be clear, his long-term prognosis is not good.  He has a single ventricle status post Fontan with significantly decreased heart function.  Typically, we  would be considering a transplant in this situation.  However, he is clearly not a transplant candidate given his very poor compliance with medications.    History of present illness:  I last saw him 6 months ago.  At that time, I restarted medications as he had been noncompliant.  He denies chest pain, diarrhea, and productive cough.  No edema.  No syncope.  Sometimes he does feel like his heart races.  He tells me he is compliant with his medications, but he really could not tell me what he is currently taking.    The review of systems is as noted above. It is otherwise negative for other symptoms related to the general, neurological, psychiatric, endocrine, gastrointestinal, genitourinary, respiratory, dermatologic, musculoskeletal, hematologic, and immunologic systems.    Past Medical History:   Diagnosis Date    History of heart surgery     Other cirrhosis of liver 11/12/2020    SVT (supraventricular tachycardia)     WPW syndrome      Past Surgical History:   Procedure Laterality Date    ANGIOGRAPHY OF AORTIC ARCH N/A 9/3/2020    Procedure: AORTOGRAM, AORTIC ARCH;  Surgeon: Stephania Crump MD;  Location: [facility] CATH LAB;  Service: Cardiology;  Laterality: N/A;    FONTAN PROCEDURE, EXTRACARDIAC      LEFT HEART CATHETERIZATION Left 9/3/2020    Procedure: Left heart cath;  Surgeon: Stephania Crump MD;  Location: [facility] CATH LAB;  Service: Cardiology;  Laterality: Left;    RIGHT HEART CATHETERIZATION FOR CONGENITAL HEART DEFECT N/A 9/3/2020    Procedure: CATHETERIZATION, HEART, RIGHT, FOR CONGENITAL HEART DEFECT;  Surgeon: Stephania Crump MD;  Location: [facility] CATH LAB;  Service: Cardiology;  Laterality: N/A;  [facility] - needs covid test morning of. Extenuating circumstances    TRANSESOPHAGEAL ECHOCARDIOGRAPHY N/A 11/10/2022    Procedure: ECHOCARDIOGRAM, TRANSESOPHAGEAL;  Surgeon: Emeterio Hall MD;  Location: [facility] EP LAB;  Service: Cardiology;  Laterality: N/A;    TREATMENT OF CARDIAC  ARRHYTHMIA N/A 2021    Procedure: CARDIOVERSION;  Surgeon: Jim Mcginnis MD;  Location: [facility] CATH LAB;  Service: Cardiology;  Laterality: N/A;     Family History   Problem Relation Age of Onset    No Known Problems Mother     No Known Problems Father     No Known Problems Sister     No Known Problems Brother     No Known Problems Maternal Aunt     No Known Problems Maternal Uncle     No Known Problems Paternal Aunt     No Known Problems Paternal Uncle     No Known Problems Maternal Grandmother     Heart disease Maternal Grandfather     No Known Problems Paternal Grandmother     No Known Problems Paternal Grandfather     Anemia Neg Hx     Arrhythmia Neg Hx     Asthma Neg Hx     Clotting disorder Neg Hx     Fainting Neg Hx     Heart attack Neg Hx     Heart failure Neg Hx     Hyperlipidemia Neg Hx     Hypertension Neg Hx     Stroke Neg Hx     Atrial Septal Defect Neg Hx      Social History     Socioeconomic History    Marital status: Single   Tobacco Use    Smoking status: Former     Current packs/day: 0.00     Types: Cigarettes     Quit date:      Years since quittin.8     Passive exposure: Past    Smokeless tobacco: Never    Tobacco comments:     3-4 months    Substance and Sexual Activity    Alcohol use: No    Drug use: Yes     Types: Marijuana     Comment: Odalys today- pt reports a while back    Sexual activity: Yes       Current Outpatient Medications:     benazepriL (LOTENSIN) 5 MG tablet, Take 1 tablet (5 mg total) by mouth once daily., Disp: 30 tablet, Rfl: 11    benzonatate (TESSALON) 200 MG capsule, Take 200 mg by mouth 3 (three) times daily as needed for Cough., Disp: , Rfl:     rivaroxaban (XARELTO) 20 mg Tab, Take 1 tablet (20 mg total) by mouth daily with dinner or evening meal., Disp: 30 tablet, Rfl: 11      Review of patient's allergies indicates:  No Known Allergies     Vitals:    10/26/23 1546   BP: (!) 174/103   BP Location: Left arm   Patient Position: Sitting   Pulse: 91   SpO2: (!)  "94%   Weight: 61 kg (134 lb 7.7 oz)   Height: 5' 8.5" (1.74 m)     GENERAL: Awake, well-developed well-nourished, no apparent distress. Non-cyanotic.  HEENT: Mucous membranes moist and pink, normocephalic atraumatic, no cranial or carotid bruits, sclera anicteric, EOMI  NECK: No jugular venous distention, no thyromegaly, no lymphadenopathy  CHEST: Good air movement, clear to auscultation bilaterally. Well healed sternotomy.  CARDIOVASCULAR: Quiet precordium, regular rate and rhythm, S1 with single S2, no murmurs rubs or gallops  ABDOMEN: Soft, nontender nondistended, no hepatosplenomegaly, no aortic bruits.  No tenderness.  EXTREMITIES: Warm well perfused, 2+ radial/femoral/pedal pulses, capillary refill 2 seconds, no cyanosis or edema  NEURO: Alert and oriented, cooperative with exam, face symmetric, moves all extremities well.    I personally reviewed the following tests performed today and my interpretation follows:  EKG with normal sinus rhythm with LAE, LVH.      Results for orders placed during the hospital encounter of 10/26/23    Echo    Interpretation Summary  CONGENITAL CARDIAC HISTORY:  Tricuspid atresia, pulmonary atresia  and WPW.  S/P Systemic to pulmonary artery shunt - Shaun.  S/P Bidirectional Jesus -- Shaun.  S/P Lateral tunnel Fontan procedure - Shaun.  S/P EP study with trans-septal puncture November 2022  S/P Ablation of a left lateral accessory pathway November 2022 by Dr. Ferguson      SEGMENTAL CARDIAC CONNECTIONS (previously demonstrated):  Abdominal situs is solitus.  Atrial situs is normal.  Imaging consistent with tricuspid atresia.  Tricuspid atresia.  Mitral valve appears normal in structure.  LV dilation.  Ventriculoarterial concordance.  Ventricular loop: D-loop.  Cardiac position is levocardia.  Left aortic arch branching.      IMPRESSION:  Imaging consistent with diagnosis of tricuspid atresia S/P lateral tunnel Fontan - study remarkable for decreased systolic function of the " single ventricle not significantly changed.  Very difficult to demonstrate pulmonary circulation with significant chest deformity due to median sternotomy.  Mildly dilated inferior vena cava connecting to through lateral for completion of Fontan physiology with no obvious obstruction or thrombus.  Right superior vena cava identified with no obvious stenosis.  No evidence of obstruction at Fontan or Jesus anastomosis to the pulmonary arteries.  Pulmonary confluence and proximal pulmonary branches not demonstrated.  At least two pulmonary veins demonstrated returning to the left atrium with no evidence for obstruction.  There is a small right atrium with no obvious obstruction of right atrial flow to the left atrium.  There is no obvious tricuspid valve tissue present and no right ventricular cavity could be demonstrated.  There is no evidence for pulmonary valve.  At least mild dilation of the left atrium.  Mildly dilated mitral valve with color Doppler demonstrating mild insufficiency.  Single ventricle consistent with left ventricular morphology.  There is at least mild dilation of the left ventricle.  Left ventricular systolic function qualitatively severely compromised with ejection fraction estimated 30 -35%.  Global left ventricular longitudinal strain abnormal - peak average measured -10.5.  There is a large aortic annulus with trileaflet aortic valve, no stenosis and trivial central jet of insufficiency.  Aortic dimensions:  Sinuses of Valsalva  = 52 mm.  ST junction               = 42 mm.  Ascending aorta       = not well demonstrated.  Qualitative impression of at least mild dilation of the ascending aorta.  No evidence for coarctation.  No obvious pericardial effusion.      Cath 9/3/20:  IMPRESSION:  1) Tricuspid atresia s/p intra-atrial Fontan  2) Normal Fontan pressures (11mmHg) and wedge pressure (8mmHg).  3) Normal cardiac output and vascular resistance calculations  4) Small atrial baffle leak  5) No  significant aorta-pulmonary or veno-venous collaterals  6) Occlusion of right common femoral artery       Cardiac MRI 1/2/2020  Conclusion:   SVC to RPA unobstructed (Jesus)  Extracardiac Fontan with low flow but no evidence of thrombus.  Unobstructed ASD  Tricuspid Atresia  No obstruction to the right or left PA with Q flow of 21cc through each.  Increased LV mass and volume with systemic LVEF of 42%  Dilated sinus of Valsalva 49mm  Trace AI with regurgitation fraction of 8%    Lab Results   Component Value Date    WBC 5.12 11/10/2022    HGB 16.5 11/10/2022    HCT 50.6 11/10/2022    MCV 93 11/10/2022     11/10/2022       CMP  Sodium   Date Value Ref Range Status   11/09/2022 143 136 - 145 mmol/L Final     Potassium   Date Value Ref Range Status   11/09/2022 4.2 3.5 - 5.1 mmol/L Final     Chloride   Date Value Ref Range Status   11/09/2022 103 95 - 110 mmol/L Final     CO2   Date Value Ref Range Status   11/09/2022 28 23 - 29 mmol/L Final     Glucose   Date Value Ref Range Status   11/09/2022 116 (H) 70 - 110 mg/dL Final     BUN   Date Value Ref Range Status   11/09/2022 12 2 - 20 mg/dL Final     Creatinine   Date Value Ref Range Status   11/09/2022 1.06 0.50 - 1.40 mg/dL Final     Calcium   Date Value Ref Range Status   11/09/2022 9.5 8.7 - 10.5 mg/dL Final     Total Protein   Date Value Ref Range Status   03/18/2021 7.5 6.0 - 8.4 g/dL Final     Albumin   Date Value Ref Range Status   03/18/2021 4.0 3.5 - 5.2 g/dL Final     Total Bilirubin   Date Value Ref Range Status   03/18/2021 0.9 0.1 - 1.0 mg/dL Final     Comment:     For infants and newborns, interpretation of results should be based  on gestational age, weight and in agreement with clinical  observations.    Premature Infant recommended reference ranges:  Up to 24 hours.............<8.0 mg/dL  Up to 48 hours............<12.0 mg/dL  3-5 days..................<15.0 mg/dL  6-29 days.................<15.0 mg/dL       Alkaline Phosphatase   Date Value Ref  Range Status   03/18/2021 61 55 - 135 U/L Final     AST   Date Value Ref Range Status   03/18/2021 18 10 - 40 U/L Final     ALT   Date Value Ref Range Status   03/18/2021 22 10 - 44 U/L Final     Anion Gap   Date Value Ref Range Status   11/09/2022 12 8 - 16 mmol/L Final     eGFR   Date Value Ref Range Status   11/09/2022 >60.0 >60 mL/min/1.73 m^2 Final     Lab Results   Component Value Date    INR 1.0 11/10/2022    INR 1.0 11/09/2022    INR 1.2 01/09/2021      Latest Reference Range & Units Most Recent   NT-proBNP 5 - 450 pg/mL 152  1/9/21 08:58   Troponin I 0.000 - 0.026 ng/mL 0.039 (H)  1/9/21 16:28   (H): Data is abnormally high    Last liver ultrasound 2020.    Thank you for referring this patient to our clinic.  Please call with any questions.    Sincerely,        Dorian Santoyo MD  Pediatric Cardiology  Adult Congenital Heart Disease  Pediatric Heart Failure and Transplantation  [facility] [address] [place]  [address]  [phone]

## 2023-10-27 NOTE — PROGRESS NOTES
Confirmed with the pharmacy.  He filled a 90 day supply of meds in May, has not picked up a refill since.

## 2023-11-08 ENCOUNTER — HOSPITAL ENCOUNTER (EMERGENCY)
Facility: HOSPITAL | Age: 28
Discharge: HOME | End: 2023-11-08
Attending: EMERGENCY MEDICINE
Payer: MEDICAID

## 2023-11-08 VITALS
TEMPERATURE: 98 F | HEART RATE: 86 BPM | BODY MASS INDEX: 20.89 KG/M2 | WEIGHT: 130 LBS | SYSTOLIC BLOOD PRESSURE: 154 MMHG | HEIGHT: 66 IN | DIASTOLIC BLOOD PRESSURE: 83 MMHG | OXYGEN SATURATION: 95 % | RESPIRATION RATE: 16 BRPM

## 2023-11-08 DIAGNOSIS — R07.9 CHEST PAIN: ICD-10-CM

## 2023-11-08 DIAGNOSIS — R11.10 VOMITING, UNSPECIFIED VOMITING TYPE, UNSPECIFIED WHETHER NAUSEA PRESENT: ICD-10-CM

## 2023-11-08 DIAGNOSIS — R10.10 UPPER ABDOMINAL PAIN: ICD-10-CM

## 2023-11-08 DIAGNOSIS — R07.9 CHEST PAIN, UNSPECIFIED TYPE: Primary | ICD-10-CM

## 2023-11-08 LAB
ALBUMIN SERPL BCP-MCNC: 4.3 G/DL (ref 3.5–5.2)
ALP SERPL-CCNC: 61 U/L (ref 38–126)
ALT SERPL W/O P-5'-P-CCNC: 26 U/L (ref 10–44)
AMPHET+METHAMPHET UR QL: NEGATIVE
ANION GAP SERPL CALC-SCNC: 15 MMOL/L (ref 8–16)
AST SERPL-CCNC: 34 U/L (ref 15–46)
BARBITURATES UR QL SCN>200 NG/ML: NEGATIVE
BASOPHILS # BLD AUTO: 0.02 K/UL (ref 0–0.2)
BASOPHILS NFR BLD: 0.3 % (ref 0–1.9)
BENZODIAZ UR QL SCN>200 NG/ML: NEGATIVE
BILIRUB SERPL-MCNC: 1.1 MG/DL (ref 0.1–1)
BILIRUB UR QL STRIP: NEGATIVE
BZE UR QL SCN: NEGATIVE
CALCIUM SERPL-MCNC: 9.1 MG/DL (ref 8.7–10.5)
CANNABINOIDS UR QL SCN: NEGATIVE
CHLORIDE SERPL-SCNC: 110 MMOL/L (ref 95–110)
CLARITY UR REFRACT.AUTO: CLEAR
CO2 SERPL-SCNC: 19 MMOL/L (ref 23–29)
COLOR UR AUTO: YELLOW
CREAT SERPL-MCNC: 0.82 MG/DL (ref 0.5–1.4)
CREAT UR-MCNC: 97.5 MG/DL (ref 23–375)
DIFFERENTIAL METHOD: NORMAL
EOSINOPHIL # BLD AUTO: 0 K/UL (ref 0–0.5)
EOSINOPHIL NFR BLD: 0.1 % (ref 0–8)
ERYTHROCYTE [DISTWIDTH] IN BLOOD BY AUTOMATED COUNT: 13.1 % (ref 11.5–14.5)
EST. GFR  (NO RACE VARIABLE): >60 ML/MIN/1.73 M^2
GLUCOSE SERPL-MCNC: 104 MG/DL (ref 70–110)
GLUCOSE UR QL STRIP: NEGATIVE
HCT VFR BLD AUTO: 47.1 % (ref 40–54)
HGB BLD-MCNC: 15.7 G/DL (ref 14–18)
HGB UR QL STRIP: NEGATIVE
IMM GRANULOCYTES # BLD AUTO: 0.01 K/UL (ref 0–0.04)
IMM GRANULOCYTES NFR BLD AUTO: 0.1 % (ref 0–0.5)
INFLUENZA A, MOLECULAR: NEGATIVE
INFLUENZA B, MOLECULAR: NEGATIVE
KETONES UR QL STRIP: ABNORMAL
LEUKOCYTE ESTERASE UR QL STRIP: NEGATIVE
LIPASE SERPL-CCNC: 139 U/L (ref 23–300)
LYMPHOCYTES # BLD AUTO: 1.6 K/UL (ref 1–4.8)
LYMPHOCYTES NFR BLD: 23 % (ref 18–48)
MCH RBC QN AUTO: 30.6 PG (ref 27–31)
MCHC RBC AUTO-ENTMCNC: 33.3 G/DL (ref 32–36)
MCV RBC AUTO: 92 FL (ref 82–98)
METHADONE UR QL SCN>300 NG/ML: NEGATIVE
MONOCYTES # BLD AUTO: 0.4 K/UL (ref 0.3–1)
MONOCYTES NFR BLD: 5.8 % (ref 4–15)
NEUTROPHILS # BLD AUTO: 5 K/UL (ref 1.8–7.7)
NEUTROPHILS NFR BLD: 70.7 % (ref 38–73)
NITRITE UR QL STRIP: NEGATIVE
NRBC BLD-RTO: 0 /100 WBC
OPIATES UR QL SCN: NEGATIVE
PCP UR QL SCN>25 NG/ML: NEGATIVE
PH UR STRIP: 8 [PH] (ref 5–8)
PLATELET # BLD AUTO: 152 K/UL (ref 150–450)
PMV BLD AUTO: 9.8 FL (ref 9.2–12.9)
POTASSIUM SERPL-SCNC: 3.7 MMOL/L (ref 3.5–5.1)
PROT SERPL-MCNC: 7.7 G/DL (ref 6–8.4)
PROT UR QL STRIP: NEGATIVE
RBC # BLD AUTO: 5.13 M/UL (ref 4.6–6.2)
SARS-COV-2 RDRP RESP QL NAA+PROBE: NEGATIVE
SODIUM SERPL-SCNC: 144 MMOL/L (ref 136–145)
SP GR UR STRIP: 1.02 (ref 1–1.03)
SPECIMEN SOURCE: NORMAL
TOXICOLOGY INFORMATION: NORMAL
TROPONIN I SERPL-MCNC: <0.012 NG/ML (ref 0.01–0.03)
URN SPEC COLLECT METH UR: ABNORMAL
UROBILINOGEN UR STRIP-ACNC: 1 EU/DL
UUN UR-MCNC: 8 MG/DL (ref 2–20)
WBC # BLD AUTO: 7.05 K/UL (ref 3.9–12.7)

## 2023-11-08 PROCEDURE — 87502 INFLUENZA DNA AMP PROBE: CPT | Mod: ER | Performed by: EMERGENCY MEDICINE

## 2023-11-08 PROCEDURE — 80307 DRUG TEST PRSMV CHEM ANLYZR: CPT | Mod: ER | Performed by: EMERGENCY MEDICINE

## 2023-11-08 PROCEDURE — 83690 ASSAY OF LIPASE: CPT | Mod: ER | Performed by: EMERGENCY MEDICINE

## 2023-11-08 PROCEDURE — 63600175 PHARM REV CODE 636 W HCPCS: Mod: ER | Performed by: EMERGENCY MEDICINE

## 2023-11-08 PROCEDURE — 96375 TX/PRO/DX INJ NEW DRUG ADDON: CPT | Mod: ER

## 2023-11-08 PROCEDURE — 80053 COMPREHEN METABOLIC PANEL: CPT | Mod: ER | Performed by: EMERGENCY MEDICINE

## 2023-11-08 PROCEDURE — 81003 URINALYSIS AUTO W/O SCOPE: CPT | Mod: 59,ER | Performed by: EMERGENCY MEDICINE

## 2023-11-08 PROCEDURE — 85025 COMPLETE CBC W/AUTO DIFF WBC: CPT | Mod: ER | Performed by: EMERGENCY MEDICINE

## 2023-11-08 PROCEDURE — 93010 EKG 12-LEAD: ICD-10-PCS | Mod: ,,, | Performed by: INTERNAL MEDICINE

## 2023-11-08 PROCEDURE — 84484 ASSAY OF TROPONIN QUANT: CPT | Mod: ER | Performed by: EMERGENCY MEDICINE

## 2023-11-08 PROCEDURE — 99285 EMERGENCY DEPT VISIT HI MDM: CPT | Mod: 25,ER

## 2023-11-08 PROCEDURE — 96372 THER/PROPH/DIAG INJ SC/IM: CPT | Mod: 59 | Performed by: EMERGENCY MEDICINE

## 2023-11-08 PROCEDURE — 93005 ELECTROCARDIOGRAM TRACING: CPT | Mod: ER

## 2023-11-08 PROCEDURE — 93010 ELECTROCARDIOGRAM REPORT: CPT | Mod: ,,, | Performed by: INTERNAL MEDICINE

## 2023-11-08 PROCEDURE — 25000003 PHARM REV CODE 250: Mod: ER | Performed by: EMERGENCY MEDICINE

## 2023-11-08 PROCEDURE — 96374 THER/PROPH/DIAG INJ IV PUSH: CPT | Mod: ER

## 2023-11-08 PROCEDURE — U0002 COVID-19 LAB TEST NON-CDC: HCPCS | Mod: ER | Performed by: EMERGENCY MEDICINE

## 2023-11-08 RX ORDER — MAG HYDROX/ALUMINUM HYD/SIMETH 200-200-20
30 SUSPENSION, ORAL (FINAL DOSE FORM) ORAL
Status: COMPLETED | OUTPATIENT
Start: 2023-11-08 | End: 2023-11-08

## 2023-11-08 RX ORDER — ONDANSETRON 4 MG/1
4 TABLET, FILM COATED ORAL EVERY 6 HOURS
Qty: 12 TABLET | Refills: 0 | Status: SHIPPED | OUTPATIENT
Start: 2023-11-08

## 2023-11-08 RX ORDER — FAMOTIDINE 20 MG/1
20 TABLET, FILM COATED ORAL 2 TIMES DAILY
Qty: 30 TABLET | Refills: 0 | Status: SHIPPED | OUTPATIENT
Start: 2023-11-08 | End: 2024-11-07

## 2023-11-08 RX ORDER — HALOPERIDOL 5 MG/ML
5 INJECTION INTRAMUSCULAR
Status: COMPLETED | OUTPATIENT
Start: 2023-11-08 | End: 2023-11-08

## 2023-11-08 RX ORDER — PROCHLORPERAZINE EDISYLATE 5 MG/ML
5 INJECTION INTRAMUSCULAR; INTRAVENOUS
Status: COMPLETED | OUTPATIENT
Start: 2023-11-08 | End: 2023-11-08

## 2023-11-08 RX ORDER — ONDANSETRON 2 MG/ML
4 INJECTION INTRAMUSCULAR; INTRAVENOUS
Status: COMPLETED | OUTPATIENT
Start: 2023-11-08 | End: 2023-11-08

## 2023-11-08 RX ADMIN — ALUMINUM HYDROXIDE, MAGNESIUM HYDROXIDE, AND SIMETHICONE 30 ML: 200; 200; 20 SUSPENSION ORAL at 03:11

## 2023-11-08 RX ADMIN — PROCHLORPERAZINE EDISYLATE 5 MG: 5 INJECTION INTRAMUSCULAR; INTRAVENOUS at 05:11

## 2023-11-08 RX ADMIN — HALOPERIDOL LACTATE 5 MG: 5 INJECTION, SOLUTION INTRAMUSCULAR at 05:11

## 2023-11-08 RX ADMIN — ONDANSETRON 4 MG: 2 INJECTION INTRAMUSCULAR; INTRAVENOUS at 03:11

## 2023-11-08 NOTE — PROVIDER PROGRESS NOTES - EMERGENCY DEPT.
Encounter Date: 11/8/2023    ED Physician Progress Notes        Physician Note:   This is an assumption of care note.     Upon shift change, the patient was transferred to me from Dr. Chavez @ 6 AM in stable condition.     Patient presented to ED with chief complaint of chest, abdominal pain, n/v    Update:   0632 No acute events during shift.  Plan at shift change was to follow-up on CT scan which was pending disposition per CT scan.  At this time patient is sleeping comfortably.  No distress noted.    0658: patient reports resolution of symptoms, grandmother at bedside. Awaiting CT scan.     0731:  CT imaging reviewed.  No acute findings.  On reassessment patient is resting comfortably, no distress noted.  Chest abdominal pain and nausea have resolved.  Patient is requesting discharge.  No emergent process was identified today, perc negative, ACS unlikely given current available information and response to treatment.  Symptoms felt to be GI in nature. .  Will plan to discharge with antiemetics and famotidine as prescribed by previous physician.  Discussed return precautions for worsening symptoms or any new symptoms of concern.    After taking into careful account the historical factors and physical exam findings of the patient's presentation today, in conjunction with the empirical and objective data obtained on ED workup, no acute emergent medical condition has been identified. The patient appears to be low risk for an emergent medical condition and I feel it is safe and appropriate at this time for the patient to be discharged to follow-up as detailed in their discharge instructions for reevaluation and possible continued outpatient workup and management. I have discussed the specifics of the workup with the patient and the patient has verbalized understanding of the details of the workup, the diagnosis, the treatment plan, and the need for outpatient follow-up.  Although the patient has no emergent etiology  today this does not preclude the development of an emergent condition so in addition, I have advised the patient that they can return to the ED and/or activate EMS at any time with worsening of their symptoms, change of their symptoms, or with any other medical complaint.  The patient remained comfortable and stable during their visit in the ED.  Discharge and follow-up instructions discussed with the patient who expressed understanding and willingness to comply with my recommendations.    EKG:  Rate: 81. NSR. No STEMI. No ectopy.       IMAGING:  Imaging Results          CT Abdomen Pelvis  Without Contrast (In process)                X-Ray Chest AP Portable (In process)                 LABS:  Labs Reviewed  COMPREHENSIVE METABOLIC PANEL - Abnormal; Notable for the following components:     CO2                           19 (*)                 Total Bilirubin               1.1 (*)             All other components within normal limits  URINALYSIS, REFLEX TO URINE CULTURE - Abnormal; Notable for the following components:     Ketones, UA                   Trace (*)            All other components within normal limits       Narrative: Preferred Collection Type->Urine, Clean Catch                Specimen Source->Urine  INFLUENZA A & B BY MOLECULAR  CBC W/ AUTO DIFFERENTIAL  TROPONIN I  LIPASE  DRUG SCREEN PANEL, URINE EMERGENCY       Narrative: Preferred Collection Type->Urine, Clean Catch                Specimen Source->Urine  SARS-COV-2 RNA AMPLIFICATION, QUAL      MEDICATIONS:  Medications  ondansetron injection 4 mg (4 mg Intravenous Given 11/8/23 0342)  aluminum-magnesium hydroxide-simethicone 200-200-20 mg/5 mL suspension 30 mL (30 mLs Oral Given 11/8/23 0343)  haloperidol lactate injection 5 mg (5 mg Intramuscular Given 11/8/23 0524)  prochlorperazine injection Soln 5 mg (5 mg Intravenous Given 11/8/23 0516)      IMPRESSION:  Chest pain, unspecified type  (primary encounter diagnosis)  Chest pain       DISCHARGE  MEDICATIONS:  New Prescriptions  FAMOTIDINE (PEPCID) 20 MG TABLET     Take 1 tablet (20 mg total) by mouth 2 (two) times daily.  ONDANSETRON (ZOFRAN) 4 MG TABLET     Take 1 tablet (4 mg total) by mouth every 6 (six) hours.      DISPOSITION:  Discharge    Portions of this note were dictated using voice recognition software and may contain dictation related errors in spelling/grammar/syntax not found on text review

## 2023-11-08 NOTE — ED NOTES
Pt self reports is suppose to take BP medication but not sure last time he took medication or name of medication

## 2023-11-08 NOTE — ED PROVIDER NOTES
Encounter Date: 11/8/2023       History     Chief Complaint   Patient presents with    Vomiting    Abdominal Pain    Chest Pain     Pt c/o midsternal chest pain starting tonight approx midnight, reports vomiting began after and generalized abdominal pain. Reports chest pain as throbbing, pain 0/10= 7, denies medication for pain pta. Denies diarrhea     HPI  28 y.o.   H/o CHD s/p multple surgeries, co SS chest pain and upper abd pain x few hrs, woke him up, went to bed feeling fine  Some vomiting nbnb  No diarrhea  No leg pain nor swelling    Review of patient's allergies indicates:  No Known Allergies  Past Medical History:   Diagnosis Date    History of heart surgery     Other cirrhosis of liver 11/12/2020    SVT (supraventricular tachycardia)     WPW syndrome      Past Surgical History:   Procedure Laterality Date    ANGIOGRAPHY OF AORTIC ARCH N/A 9/3/2020    Procedure: AORTOGRAM, AORTIC ARCH;  Surgeon: Stephania Crump MD;  Location: [facility] CATH LAB;  Service: Cardiology;  Laterality: N/A;    FONTAN PROCEDURE, EXTRACARDIAC      LEFT HEART CATHETERIZATION Left 9/3/2020    Procedure: Left heart cath;  Surgeon: Stephania Crump MD;  Location: [facility] CATH LAB;  Service: Cardiology;  Laterality: Left;    RIGHT HEART CATHETERIZATION FOR CONGENITAL HEART DEFECT N/A 9/3/2020    Procedure: CATHETERIZATION, HEART, RIGHT, FOR CONGENITAL HEART DEFECT;  Surgeon: Stephania Crump MD;  Location: [facility] CATH LAB;  Service: Cardiology;  Laterality: N/A;  [facility] - needs covid test morning of. Extenuating circumstances    TRANSESOPHAGEAL ECHOCARDIOGRAPHY N/A 11/10/2022    Procedure: ECHOCARDIOGRAM, TRANSESOPHAGEAL;  Surgeon: Emeterio Hall MD;  Location: [facility] EP LAB;  Service: Cardiology;  Laterality: N/A;    TREATMENT OF CARDIAC ARRHYTHMIA N/A 1/9/2021    Procedure: CARDIOVERSION;  Surgeon: Jim Mcginnis MD;  Location: [facility] CATH LAB;  Service: Cardiology;  Laterality: N/A;     Family History   Problem Relation  Age of Onset    No Known Problems Mother     No Known Problems Father     No Known Problems Sister     No Known Problems Brother     No Known Problems Maternal Aunt     No Known Problems Maternal Uncle     No Known Problems Paternal Aunt     No Known Problems Paternal Uncle     No Known Problems Maternal Grandmother     Heart disease Maternal Grandfather     No Known Problems Paternal Grandmother     No Known Problems Paternal Grandfather     Anemia Neg Hx     Arrhythmia Neg Hx     Asthma Neg Hx     Clotting disorder Neg Hx     Fainting Neg Hx     Heart attack Neg Hx     Heart failure Neg Hx     Hyperlipidemia Neg Hx     Hypertension Neg Hx     Stroke Neg Hx     Atrial Septal Defect Neg Hx      Social History     Tobacco Use    Smoking status: Former     Current packs/day: 0.00     Types: Cigarettes     Quit date:      Years since quittin.8     Passive exposure: Past    Smokeless tobacco: Never    Tobacco comments:     3-4 months    Substance Use Topics    Alcohol use: No    Drug use: Yes     Types: Marijuana     Comment: Mojo today- pt reports a while back     Review of Systems  All systems were reviewed/examined and were negative except as noted in the HPI.    Physical Exam     Initial Vitals   BP Pulse Resp Temp SpO2   23 0325 23 0324 23 0324 23 0324 23 0324   (!) 182/109 91 18 98.4 °F (36.9 °C) 96 %      MAP       --                Physical Exam    General: the patient is awake, alert, and in no apparent distress.  Head: normocephalic and atraumatic, sclera are clear  Neck: supple without meningismus  Chest: clear to auscultation bilaterally, no respiratory distress  Heart: regular rate and rhythm  ABD soft, nontender, nondistended, no peritoneal signs  No calf t no edema  Extremities: warm and well perfused  Skin: warm and dry  Psych conversant  Neuro: awake, alert, moving all extremities    ED Course   Procedures  Labs Reviewed   COMPREHENSIVE METABOLIC PANEL - Abnormal;  Notable for the following components:       Result Value    CO2 19 (*)     Total Bilirubin 1.1 (*)     All other components within normal limits   URINALYSIS, REFLEX TO URINE CULTURE - Abnormal; Notable for the following components:    Ketones, UA Trace (*)     All other components within normal limits    Narrative:     Preferred Collection Type->Urine, Clean Catch  Specimen Source->Urine   INFLUENZA A & B BY MOLECULAR   CBC W/ AUTO DIFFERENTIAL   TROPONIN I   LIPASE   DRUG SCREEN PANEL, URINE EMERGENCY    Narrative:     Preferred Collection Type->Urine, Clean Catch  Specimen Source->Urine   SARS-COV-2 RNA AMPLIFICATION, QUAL    Narrative:     Is the patient symptomatic?->No          Imaging Results              X-Ray Chest AP Portable (In process)                      Medications   ondansetron injection 4 mg (4 mg Intravenous Given 11/8/23 0342)   aluminum-magnesium hydroxide-simethicone 200-200-20 mg/5 mL suspension 30 mL (30 mLs Oral Given 11/8/23 0343)   haloperidol lactate injection 5 mg (5 mg Intramuscular Given 11/8/23 0524)   prochlorperazine injection Soln 5 mg (5 mg Intravenous Given 11/8/23 0552)     Medical Decision Making  Amount and/or Complexity of Data Reviewed  Labs: ordered.  Radiology: ordered.    Risk  OTC drugs.  Prescription drug management.       Medical Decision Making:    This is an emergent evaluation of a patient presenting to the ED.  Nursing notes were reviewed.  I personally reviewed, read, and interpreted the ECG and any monitoring strips.  ECG: normal EKG, normal sinus rhythm, unchanged from previous tracings Compared with prior if available.  Read and interpreted by me independently.      I reviewed radiology images personally along with interpretations.  Imaging reviewed by me (chest x-ray), personally and independently, and prelims if available.  No acute/emergent pathologic findings noted on radiologic studies ordered.    I personally reviewed and interpreted the laboratory  results.  Lipase, cbc, cmp non critical  UDS neg  I decided to obtain and review old medical records, which showed: h/o CHD    Antiemetics  Continued nv  Will  CT abd pelvis and redose antiemetics  So to day MD Rivas Chavez MD, SHYLA                          Clinical Impression:   Final diagnoses:  [R07.9] Chest pain  [R07.9] Chest pain, unspecified type (Primary)         Rivas Chavez MD, SHYLA, FACEP  Department of Emergency Medicine          Emeterio Chavez MD  11/08/23 2750

## 2023-11-08 NOTE — DISCHARGE INSTRUCTIONS

## 2023-11-09 ENCOUNTER — OFFICE VISIT (OUTPATIENT)
Dept: FAMILY MEDICINE | Facility: HOSPITAL | Age: 28
End: 2023-11-09
Payer: MEDICAID

## 2023-11-09 VITALS
HEIGHT: 66 IN | BODY MASS INDEX: 22.04 KG/M2 | WEIGHT: 137.13 LBS | SYSTOLIC BLOOD PRESSURE: 98 MMHG | HEART RATE: 98 BPM | DIASTOLIC BLOOD PRESSURE: 61 MMHG

## 2023-11-09 DIAGNOSIS — Z13.1 ENCOUNTER FOR SCREENING FOR DIABETES MELLITUS: ICD-10-CM

## 2023-11-09 DIAGNOSIS — Z13.6 ENCOUNTER FOR SCREENING FOR CARDIOVASCULAR DISORDERS: ICD-10-CM

## 2023-11-09 DIAGNOSIS — Z11.3 ROUTINE SCREENING FOR STI (SEXUALLY TRANSMITTED INFECTION): Primary | ICD-10-CM

## 2023-11-09 DIAGNOSIS — Z11.4 ENCOUNTER FOR SCREENING FOR HIV: ICD-10-CM

## 2023-11-09 PROCEDURE — 99213 OFFICE O/P EST LOW 20 MIN: CPT | Performed by: STUDENT IN AN ORGANIZED HEALTH CARE EDUCATION/TRAINING PROGRAM

## 2023-11-09 NOTE — PROGRESS NOTES
History & Physical  [facility]      SUBJECTIVE:     Patient is a 28-year-old male with past medical history of tricuspid atresia status post Fontan procedure, LV with reduced ejection fraction, hypertension, who presents to clinic today to establish care and for ER follow-up.  Patient was recently seen in the ER for substernal chest pain, shortness breast, nausea, vomiting.  Patient states that he believes it was due to food poisoning.  Patient states he ate bad Gupta's and 3 hours later he had violent emesis.  Patient feels back to baseline at this time.  Has regular follow-up with Cardiology for his multiple cardiac issues status post congenital heart surgery.  Otherwise feels well.  Has also recently seen hepatology for screening given his increased risk of liver dysfunction status post Fontan procedure.           - Colonoscopy: N/A   (Grade A: adults 45-75; colonoscopy q10y, fecal immunochemical testing (FIT) q1y, FIT-fecal DNA testing (Cologuard) q3y)  - DM: Last A1c: DUE  (Grade B: adults 40-70 with BMI ?25; if normal, repeat q3y)  - Statin: NO  (Grade B: adults 40-75 years with no history of CVD, ?1 CVD risk factors (dyslipidemia, DM, HTN, or smoking), and ASCVD ?10%)  Last lipid panel-  ordered  - Flu: COMPLETED  (All patients ?6 months, qyear)  - Pneumococcal Vaccination: DUE - will admister at next visit  (All patients >18 w/ risk factors or all patients ?65: 1 dose PCV15 followed by PPSV23 or 1 dose PCV20)  - Shingles: N/A  (adults ?50 years)  - HCV: COMPLETED - NEGATIVE  (Grade B: screen all patients age 18-79)  - HIV: DUE - ORDERED  (Grade A: ages 15-65 years; ?66 if high-risk)  Last STI testing-  Ordered  - Depression: NO  (All adults)  - Fall risk: NO  Get Up and Go Test (positive >30 seconds, or negative <20; get up, walk 10 feet, turn around and sit; adults ?65 years).  - Tobacco abuse: FORMER SMOKER - quit 10 + yrs ago, pack years 1  (Grade A: all adults)    EtOH use-  no longer   Drug  use-  THC, everyday, smokes and edibles  Diet-  powerade   Exercise-  ok  Sleep-  adequate  Stress-  well controlled  Sexual history-  active 3 partners uses protextion women only    DUE AT NEXT/FUTURE VISIT:  PCV 20       Review of patient's allergies indicates:  No Known Allergies    Past Medical History:   Diagnosis Date    History of heart surgery     Other cirrhosis of liver 11/12/2020    SVT (supraventricular tachycardia)     WPW syndrome      Past Surgical History:   Procedure Laterality Date    ANGIOGRAPHY OF AORTIC ARCH N/A 9/3/2020    Procedure: AORTOGRAM, AORTIC ARCH;  Surgeon: Stephania Crump MD;  Location: [facility] CATH LAB;  Service: Cardiology;  Laterality: N/A;    FONTAN PROCEDURE, EXTRACARDIAC      LEFT HEART CATHETERIZATION Left 9/3/2020    Procedure: Left heart cath;  Surgeon: Stephania Crump MD;  Location: [facility] CATH LAB;  Service: Cardiology;  Laterality: Left;    RIGHT HEART CATHETERIZATION FOR CONGENITAL HEART DEFECT N/A 9/3/2020    Procedure: CATHETERIZATION, HEART, RIGHT, FOR CONGENITAL HEART DEFECT;  Surgeon: Stephania Crump MD;  Location: [facility] CATH LAB;  Service: Cardiology;  Laterality: N/A;  [facility] - needs covid test morning of. Extenuating circumstances    TRANSESOPHAGEAL ECHOCARDIOGRAPHY N/A 11/10/2022    Procedure: ECHOCARDIOGRAM, TRANSESOPHAGEAL;  Surgeon: Emeterio Hall MD;  Location: [facility] EP LAB;  Service: Cardiology;  Laterality: N/A;    TREATMENT OF CARDIAC ARRHYTHMIA N/A 1/9/2021    Procedure: CARDIOVERSION;  Surgeon: Jim Mcginnis MD;  Location: [facility] CATH LAB;  Service: Cardiology;  Laterality: N/A;     Family History   Problem Relation Age of Onset    No Known Problems Mother     No Known Problems Father     No Known Problems Sister     No Known Problems Brother     No Known Problems Maternal Aunt     No Known Problems Maternal Uncle     No Known Problems Paternal Aunt     No Known Problems Paternal Uncle     No Known Problems Maternal Grandmother      "Heart disease Maternal Grandfather     No Known Problems Paternal Grandmother     No Known Problems Paternal Grandfather     Anemia Neg Hx     Arrhythmia Neg Hx     Asthma Neg Hx     Clotting disorder Neg Hx     Fainting Neg Hx     Heart attack Neg Hx     Heart failure Neg Hx     Hyperlipidemia Neg Hx     Hypertension Neg Hx     Stroke Neg Hx     Atrial Septal Defect Neg Hx      Social History     Tobacco Use    Smoking status: Former     Current packs/day: 0.00     Types: Cigarettes     Quit date:      Years since quittin.8     Passive exposure: Past    Smokeless tobacco: Never    Tobacco comments:     3-4 months    Substance Use Topics    Alcohol use: No    Drug use: Yes     Types: Marijuana     Comment: Mojo today- pt reports a while back        OBJECTIVE:     Vital Signs (Most Recent)  Vitals:    23 1501   BP: 98/61   Pulse: 98   Weight: 62.2 kg (137 lb 2 oz)   Height: 5' 6" (1.676 m)     Body mass index is 22.13 kg/m².     Review of Systems   Constitutional:  Negative for chills, fever and malaise/fatigue.   HENT:  Negative for congestion, ear pain, hearing loss and sore throat.    Eyes:  Negative for blurred vision, pain and redness.   Respiratory:  Negative for cough and shortness of breath.    Cardiovascular:  Negative for chest pain, palpitations and leg swelling.   Gastrointestinal:  Negative for abdominal pain, constipation, diarrhea, heartburn, nausea and vomiting.   Genitourinary:  Negative for dysuria, frequency and urgency.   Musculoskeletal:  Negative for back pain, joint pain and myalgias.   Skin:  Negative for rash.   Neurological:  Negative for focal weakness, weakness and headaches.   Psychiatric/Behavioral:  Negative for depression and substance abuse.         Physical Exam  Vitals and nursing note reviewed.   Constitutional:       Appearance: Normal appearance.   HENT:      Head: Normocephalic and atraumatic.      Nose: Nose normal.      Mouth/Throat:      Mouth: Mucous membranes " are moist.      Pharynx: Oropharynx is clear.   Eyes:      Extraocular Movements: Extraocular movements intact.      Conjunctiva/sclera: Conjunctivae normal.      Pupils: Pupils are equal, round, and reactive to light.   Cardiovascular:      Rate and Rhythm: Normal rate and regular rhythm.      Pulses: Normal pulses.      Heart sounds: Murmur heard.      Comments: Soft holosystolic murmur consistent with pts pathology  Pulmonary:      Effort: Pulmonary effort is normal.      Breath sounds: Normal breath sounds.   Abdominal:      General: Abdomen is flat.      Palpations: Abdomen is soft.   Musculoskeletal:         General: Normal range of motion.      Cervical back: Normal range of motion.   Skin:     General: Skin is warm and dry.   Neurological:      General: No focal deficit present.      Mental Status: He is alert and oriented to person, place, and time.   Psychiatric:         Mood and Affect: Mood normal.         Behavior: Behavior normal.         Thought Content: Thought content normal.              ASSESSMENT/PLAN:   28 y.o.male presents to clinic to establish care. Pt has no acute complaints, nausea, vomiting, chest pain from ED visit has completely resolved.  Is up-to-date on surveillance labs other than STI screening, lipid panel, hemoglobin A1c.  Patient will continue to have follow up Cardiology for his chief concerns.  Encouraged patient to continue to take his Lotensin and Xarelto can also consider repeating hepatitis-B titers as patient is high-risk for hep B given his Fontan procedure.  Will need PCV 20 at next appointment.    Routine screening for STI (sexually transmitted infection)  -     RPR; Future; Expected date: 11/09/2023  -     C. trachomatis/N. gonorrhoeae by AMP DNA; Future; Expected date: 11/09/2023    Encounter for screening for HIV  -     HIV 1/2 Ag/Ab (4th Gen); Future; Expected date: 11/09/2023    Encounter for screening for cardiovascular disorders  -     Lipid Panel; Future;  Expected date: 11/09/2023    Encounter for screening for diabetes mellitus  -     HEMOGLOBIN A1C; Future; Expected date: 11/09/2023        Follow-up: 3 months    Nestor Siu MD, MPH  [facility], PGY-3    This note was partially created using [employer] Voice Recognition software. Typographical and content errors may occur with this process. While efforts are made to detect and correct such errors, in some cases errors will persist. For this reason, wording in this document should be considered in the proper context and not strictly verbatim.

## 2023-11-14 NOTE — PROGRESS NOTES
I assume primary medical responsibility for this patient. I have reviewed the history, physical, and assessment & treatment plan with the resident and agree that the care is reasonable and necessary. This service has been performed by a resident without the presence of a teaching physician under the primary care exception. If necessary, an addendum of additional findings or evaluation beyond the resident documentation will be noted below.        Mike Isbell Jr., DO    [facility]

## 2024-03-09 ENCOUNTER — HOSPITAL ENCOUNTER (EMERGENCY)
Facility: HOSPITAL | Age: 29
Discharge: HOME | End: 2024-03-09
Attending: FAMILY MEDICINE
Payer: MEDICAID

## 2024-03-09 VITALS
BODY MASS INDEX: 22.11 KG/M2 | DIASTOLIC BLOOD PRESSURE: 81 MMHG | TEMPERATURE: 98 F | SYSTOLIC BLOOD PRESSURE: 157 MMHG | OXYGEN SATURATION: 95 % | HEART RATE: 99 BPM | RESPIRATION RATE: 16 BRPM | WEIGHT: 137 LBS

## 2024-03-09 DIAGNOSIS — K52.9 GASTROENTERITIS: Primary | ICD-10-CM

## 2024-03-09 DIAGNOSIS — R11.10 VOMITING: ICD-10-CM

## 2024-03-09 DIAGNOSIS — R06.02 SOB (SHORTNESS OF BREATH): ICD-10-CM

## 2024-03-09 LAB
ALBUMIN SERPL BCP-MCNC: 5.2 G/DL (ref 3.5–5.2)
ALP SERPL-CCNC: 81 U/L (ref 38–126)
ALT SERPL W/O P-5'-P-CCNC: 41 U/L (ref 10–44)
AMPHET+METHAMPHET UR QL: NEGATIVE
ANION GAP SERPL CALC-SCNC: 17 MMOL/L (ref 8–16)
APTT PPP: 24.9 SEC (ref 21–32)
AST SERPL-CCNC: 38 U/L (ref 15–46)
BACTERIA #/AREA URNS AUTO: NORMAL /HPF
BARBITURATES UR QL SCN>200 NG/ML: NEGATIVE
BASOPHILS # BLD AUTO: 0.02 K/UL (ref 0–0.2)
BASOPHILS NFR BLD: 0.3 % (ref 0–1.9)
BENZODIAZ UR QL SCN>200 NG/ML: NEGATIVE
BILIRUB SERPL-MCNC: 1.4 MG/DL (ref 0.1–1)
BILIRUB UR QL STRIP: NEGATIVE
BZE UR QL SCN: NEGATIVE
CALCIUM SERPL-MCNC: 10.4 MG/DL (ref 8.7–10.5)
CANNABINOIDS UR QL SCN: NEGATIVE
CHLORIDE SERPL-SCNC: 109 MMOL/L (ref 95–110)
CLARITY UR REFRACT.AUTO: CLEAR
CO2 SERPL-SCNC: 16 MMOL/L (ref 23–29)
COLOR UR AUTO: YELLOW
CREAT SERPL-MCNC: 0.8 MG/DL (ref 0.5–1.4)
CREAT UR-MCNC: 192.4 MG/DL (ref 23–375)
DIFFERENTIAL METHOD BLD: ABNORMAL
EOSINOPHIL # BLD AUTO: 0 K/UL (ref 0–0.5)
EOSINOPHIL NFR BLD: 0 % (ref 0–8)
ERYTHROCYTE [DISTWIDTH] IN BLOOD BY AUTOMATED COUNT: 13.5 % (ref 11.5–14.5)
EST. GFR  (NO RACE VARIABLE): >60 ML/MIN/1.73 M^2
GLUCOSE SERPL-MCNC: 131 MG/DL (ref 70–110)
GLUCOSE UR QL STRIP: NEGATIVE
HCT VFR BLD AUTO: 55 % (ref 40–54)
HGB BLD-MCNC: 18.3 G/DL (ref 14–18)
HGB UR QL STRIP: NEGATIVE
HYALINE CASTS UR QL AUTO: 0 /LPF
IMM GRANULOCYTES # BLD AUTO: 0.01 K/UL (ref 0–0.04)
IMM GRANULOCYTES NFR BLD AUTO: 0.2 % (ref 0–0.5)
INR PPP: 1.1 (ref 0.8–1.2)
KETONES UR QL STRIP: ABNORMAL
LACTATE SERPL-SCNC: 2.2 MMOL/L (ref 0.5–2.2)
LEUKOCYTE ESTERASE UR QL STRIP: NEGATIVE
LIPASE SERPL-CCNC: 113 U/L (ref 23–300)
LYMPHOCYTES # BLD AUTO: 1.1 K/UL (ref 1–4.8)
LYMPHOCYTES NFR BLD: 18.3 % (ref 18–48)
MAGNESIUM SERPL-MCNC: 2 MG/DL (ref 1.6–2.6)
MCH RBC QN AUTO: 30.6 PG (ref 27–31)
MCHC RBC AUTO-ENTMCNC: 33.3 G/DL (ref 32–36)
MCV RBC AUTO: 92 FL (ref 82–98)
METHADONE UR QL SCN>300 NG/ML: NEGATIVE
MICROSCOPIC COMMENT: NORMAL
MONOCYTES # BLD AUTO: 0.2 K/UL (ref 0.3–1)
MONOCYTES NFR BLD: 3.8 % (ref 4–15)
NEUTROPHILS # BLD AUTO: 4.6 K/UL (ref 1.8–7.7)
NEUTROPHILS NFR BLD: 77.4 % (ref 38–73)
NITRITE UR QL STRIP: NEGATIVE
NRBC BLD-RTO: 0 /100 WBC
NT-PROBNP SERPL-MCNC: 243 PG/ML (ref 5–450)
OPIATES UR QL SCN: NEGATIVE
PCP UR QL SCN>25 NG/ML: NEGATIVE
PH UR STRIP: 6 [PH] (ref 5–8)
PLATELET # BLD AUTO: 158 K/UL (ref 150–450)
PMV BLD AUTO: 10.2 FL (ref 9.2–12.9)
POTASSIUM SERPL-SCNC: 4.5 MMOL/L (ref 3.5–5.1)
PROT SERPL-MCNC: 9.8 G/DL (ref 6–8.4)
PROT UR QL STRIP: ABNORMAL
PROTHROMBIN TIME: 11.7 SEC (ref 9–12.5)
RBC # BLD AUTO: 5.99 M/UL (ref 4.6–6.2)
RBC #/AREA URNS AUTO: 2 /HPF (ref 0–4)
SODIUM SERPL-SCNC: 142 MMOL/L (ref 136–145)
SP GR UR STRIP: >=1.03 (ref 1–1.03)
TOXICOLOGY INFORMATION: NORMAL
TROPONIN I SERPL-MCNC: <0.012 NG/ML (ref 0.01–0.03)
URN SPEC COLLECT METH UR: ABNORMAL
UROBILINOGEN UR STRIP-ACNC: NEGATIVE EU/DL
UUN UR-MCNC: 11 MG/DL (ref 2–20)
WBC # BLD AUTO: 6 K/UL (ref 3.9–12.7)
WBC #/AREA URNS AUTO: 0 /HPF (ref 0–5)

## 2024-03-09 PROCEDURE — 85610 PROTHROMBIN TIME: CPT | Mod: ER | Performed by: FAMILY MEDICINE

## 2024-03-09 PROCEDURE — 84484 ASSAY OF TROPONIN QUANT: CPT | Mod: ER | Performed by: FAMILY MEDICINE

## 2024-03-09 PROCEDURE — 94761 N-INVAS EAR/PLS OXIMETRY MLT: CPT | Mod: ER

## 2024-03-09 PROCEDURE — 99900035 HC TECH TIME PER 15 MIN (STAT): Mod: ER

## 2024-03-09 PROCEDURE — 99285 EMERGENCY DEPT VISIT HI MDM: CPT | Mod: 25,ER

## 2024-03-09 PROCEDURE — 80307 DRUG TEST PRSMV CHEM ANLYZR: CPT | Mod: ER | Performed by: FAMILY MEDICINE

## 2024-03-09 PROCEDURE — 63600175 PHARM REV CODE 636 W HCPCS: Mod: ER | Performed by: FAMILY MEDICINE

## 2024-03-09 PROCEDURE — 80053 COMPREHEN METABOLIC PANEL: CPT | Mod: ER | Performed by: FAMILY MEDICINE

## 2024-03-09 PROCEDURE — 83605 ASSAY OF LACTIC ACID: CPT | Mod: ER | Performed by: FAMILY MEDICINE

## 2024-03-09 PROCEDURE — 93005 ELECTROCARDIOGRAM TRACING: CPT | Mod: ER

## 2024-03-09 PROCEDURE — 81000 URINALYSIS NONAUTO W/SCOPE: CPT | Mod: ER,59 | Performed by: FAMILY MEDICINE

## 2024-03-09 PROCEDURE — 83880 ASSAY OF NATRIURETIC PEPTIDE: CPT | Mod: ER | Performed by: FAMILY MEDICINE

## 2024-03-09 PROCEDURE — 87040 BLOOD CULTURE FOR BACTERIA: CPT | Mod: ER | Performed by: FAMILY MEDICINE

## 2024-03-09 PROCEDURE — 85025 COMPLETE CBC W/AUTO DIFF WBC: CPT | Mod: ER | Performed by: FAMILY MEDICINE

## 2024-03-09 PROCEDURE — 25000003 PHARM REV CODE 250: Mod: ER | Performed by: FAMILY MEDICINE

## 2024-03-09 PROCEDURE — 85730 THROMBOPLASTIN TIME PARTIAL: CPT | Mod: ER | Performed by: FAMILY MEDICINE

## 2024-03-09 PROCEDURE — 93010 ELECTROCARDIOGRAM REPORT: CPT | Mod: ,,, | Performed by: INTERNAL MEDICINE

## 2024-03-09 PROCEDURE — 83735 ASSAY OF MAGNESIUM: CPT | Mod: ER | Performed by: FAMILY MEDICINE

## 2024-03-09 PROCEDURE — 96375 TX/PRO/DX INJ NEW DRUG ADDON: CPT | Mod: ER

## 2024-03-09 PROCEDURE — 83690 ASSAY OF LIPASE: CPT | Mod: ER | Performed by: FAMILY MEDICINE

## 2024-03-09 PROCEDURE — 96365 THER/PROPH/DIAG IV INF INIT: CPT | Mod: ER

## 2024-03-09 RX ORDER — CIPROFLOXACIN 500 MG/1
500 TABLET ORAL 2 TIMES DAILY
Qty: 20 TABLET | Refills: 0 | Status: SHIPPED | OUTPATIENT
Start: 2024-03-09 | End: 2024-03-19

## 2024-03-09 RX ORDER — ALUMINUM HYDROXIDE, MAGNESIUM HYDROXIDE, AND SIMETHICONE 1200; 120; 1200 MG/30ML; MG/30ML; MG/30ML
15 SUSPENSION ORAL
Status: COMPLETED | OUTPATIENT
Start: 2024-03-09 | End: 2024-03-09

## 2024-03-09 RX ORDER — ONDANSETRON 4 MG/1
4 TABLET, ORALLY DISINTEGRATING ORAL EVERY 8 HOURS PRN
Qty: 15 TABLET | Refills: 0 | Status: SHIPPED | OUTPATIENT
Start: 2024-03-09

## 2024-03-09 RX ORDER — ONDANSETRON HYDROCHLORIDE 2 MG/ML
4 INJECTION, SOLUTION INTRAVENOUS
Status: COMPLETED | OUTPATIENT
Start: 2024-03-09 | End: 2024-03-09

## 2024-03-09 RX ORDER — FAMOTIDINE 10 MG/ML
20 INJECTION INTRAVENOUS
Status: COMPLETED | OUTPATIENT
Start: 2024-03-09 | End: 2024-03-09

## 2024-03-09 RX ADMIN — ONDANSETRON 4 MG: 2 INJECTION INTRAMUSCULAR; INTRAVENOUS at 02:03

## 2024-03-09 RX ADMIN — PIPERACILLIN AND TAZOBACTAM 4.5 G: 4; .5 INJECTION, POWDER, LYOPHILIZED, FOR SOLUTION INTRAVENOUS; PARENTERAL at 02:03

## 2024-03-09 RX ADMIN — ALUMINUM HYDROXIDE, MAGNESIUM HYDROXIDE, AND DIMETHICONE 15 ML: 200; 20; 200 SUSPENSION ORAL at 04:03

## 2024-03-09 RX ADMIN — FAMOTIDINE 20 MG: 10 INJECTION, SOLUTION INTRAVENOUS at 02:03

## 2024-03-09 NOTE — ED NOTES
Discharge education provided. Pt and mother verbalized understanding. No questions or concerns at this time. IV removed. AVS provided. Pt discharged.

## 2024-03-09 NOTE — ED PROVIDER NOTES
Encounter Date: 3/9/2024       History     Chief Complaint   Patient presents with    Emesis     Emesis, generalized abd pain and SOB that started this morning. PT noncomplaint with meds     Male complains of epigastric pain with vomiting started this morning.  He was unable to tolerate anything orally.  Denies eating anything before vomiting.  He woke up earlier with no problems and went back to sleep with no pain but woke up back with vomiting.  He denies chest pain or shortness of breath.  Patient diaphoretic on arrival to ED. he denies blood in vomiting.  No coffee-ground color in vomit.  Only yellow fluid.  Patient claims he had serial last night.  Passing gas from bottom.  No diarrhea.  Denies having fever.  To be noncompliant to his medication.    He has history of:  1. Tricuspid and pulmonary atresia initially palliated with a systemic to pulmonary artery shunt followed by a bidirectional Jesus and subsequent lateral tunnel Fontan procedure   - small atrial baffle leak along with new Fontan baffle puncture for EP study  - s/p EP study with trans-septal puncture November 2022  - reassuring hemodynamics on 2020 catheterization  2. Decreased left ventricular function  - no evidence of PLE  3. History of Flnhg-Ffloatdfa-Uotql, SVT  - status post successful ablation of a left lateral accessory pathway November 2022 by Dr. Ferguson  4. Cirrhosis  - last seen by hepatology May 2023  5. Medication noncompliance   6. marijuana use   ory of:       The history is provided by the patient.     Review of patient's allergies indicates:  No Known Allergies  Past Medical History:   Diagnosis Date    History of heart surgery     Other cirrhosis of liver 11/12/2020    SVT (supraventricular tachycardia)     WPW syndrome      Past Surgical History:   Procedure Laterality Date    ANGIOGRAPHY OF AORTIC ARCH N/A 9/3/2020    Procedure: AORTOGRAM, AORTIC ARCH;  Surgeon: Stephania Crump MD;  Location: [facility] CATH LAB;  Service:  Cardiology;  Laterality: N/A;    FONTAN PROCEDURE, EXTRACARDIAC      LEFT HEART CATHETERIZATION Left 9/3/2020    Procedure: Left heart cath;  Surgeon: Stephania Crump MD;  Location: [facility] CATH LAB;  Service: Cardiology;  Laterality: Left;    RIGHT HEART CATHETERIZATION FOR CONGENITAL HEART DEFECT N/A 9/3/2020    Procedure: CATHETERIZATION, HEART, RIGHT, FOR CONGENITAL HEART DEFECT;  Surgeon: Stephania Crump MD;  Location: [facility] CATH LAB;  Service: Cardiology;  Laterality: N/A;  [facility] - needs covid test morning of. Extenuating circumstances    TRANSESOPHAGEAL ECHOCARDIOGRAPHY N/A 11/10/2022    Procedure: ECHOCARDIOGRAM, TRANSESOPHAGEAL;  Surgeon: Emeterio Hall MD;  Location: [facility] EP LAB;  Service: Cardiology;  Laterality: N/A;    TREATMENT OF CARDIAC ARRHYTHMIA N/A 1/9/2021    Procedure: CARDIOVERSION;  Surgeon: Jim Mcginnis MD;  Location: [facility] CATH LAB;  Service: Cardiology;  Laterality: N/A;     Family History   Problem Relation Age of Onset    No Known Problems Mother     No Known Problems Father     No Known Problems Sister     No Known Problems Brother     No Known Problems Maternal Aunt     No Known Problems Maternal Uncle     No Known Problems Paternal Aunt     No Known Problems Paternal Uncle     No Known Problems Maternal Grandmother     Heart disease Maternal Grandfather     No Known Problems Paternal Grandmother     No Known Problems Paternal Grandfather     Anemia Neg Hx     Arrhythmia Neg Hx     Asthma Neg Hx     Clotting disorder Neg Hx     Fainting Neg Hx     Heart attack Neg Hx     Heart failure Neg Hx     Hyperlipidemia Neg Hx     Hypertension Neg Hx     Stroke Neg Hx     Atrial Septal Defect Neg Hx      Social History     Tobacco Use    Smoking status: Former     Current packs/day: 0.00     Types: Cigarettes     Quit date: 2014     Years since quitting: 10.1     Passive exposure: Past    Smokeless tobacco: Never    Tobacco comments:     3-4 months    Substance Use Topics     Alcohol use: No    Drug use: Yes     Types: Marijuana     Comment: Mojo today- pt reports a while back     Review of Systems    Physical Exam     Initial Vitals [03/09/24 1311]   BP Pulse Resp Temp SpO2   (!) 141/91 (!) 123 (!) 22 98.2 °F (36.8 °C) (!) 92 %      MAP       --         Physical Exam    ED Course   Procedures  Labs Reviewed   CBC W/ AUTO DIFFERENTIAL - Abnormal; Notable for the following components:       Result Value    Hemoglobin 18.3 (*)     Hematocrit 55.0 (*)     Mono # 0.2 (*)     Gran % 77.4 (*)     Mono % 3.8 (*)     All other components within normal limits   COMPREHENSIVE METABOLIC PANEL - Abnormal; Notable for the following components:    CO2 16 (*)     Glucose 131 (*)     Total Protein 9.8 (*)     Total Bilirubin 1.4 (*)     Anion Gap 17 (*)     All other components within normal limits   URINALYSIS, REFLEX TO URINE CULTURE - Abnormal; Notable for the following components:    Specific Gravity, UA >=1.030 (*)     Protein, UA 1+ (*)     Ketones, UA Trace (*)     All other components within normal limits    Narrative:     Preferred Collection Type->Urine, Clean Catch  Specimen Source->Urine   CULTURE, BLOOD   CULTURE, BLOOD   NT-PRO NATRIURETIC PEPTIDE   TROPONIN I   PROTIME-INR   APTT   DRUG SCREEN PANEL, URINE EMERGENCY    Narrative:     Preferred Collection Type->Urine, Clean Catch  Specimen Source->Urine   LACTIC ACID, PLASMA   LIPASE   MAGNESIUM   URINALYSIS MICROSCOPIC    Narrative:     Preferred Collection Type->Urine, Clean Catch  Specimen Source->Urine   LACTIC ACID, PLASMA     EKG Readings: (Independently Interpreted)   Rhythm: Sinus Tachycardia. Heart Rate: 110. T Waves Flipped: II, III, V2, V3, V4, V5 and V6.   Left ventricular with repolarization and T-wave inversion in inferior and lateral leads       Imaging Results              X-Ray Abdomen Flat And Erect (Final result)  Result time 03/09/24 14:58:14      Final result by Dmitriy Handy MD (03/09/24 14:58:14)                    Impression:      No evidence for bowel obstruction.      Electronically signed by: Dmitriy Handy MD  Date:    03/09/2024  Time:    14:58               Narrative:    EXAMINATION:  XR ABDOMEN FLAT AND ERECT    CLINICAL HISTORY:  Vomiting, unspecified    TECHNIQUE:  Flat and erect AP views of the abdomen were performed.    COMPARISON:  Abdominal x-ray, 01/28/2021    FINDINGS:  No free air in the peritoneal cavity.  No dilated small bowel loops.  Cardiomegaly.                                       X-Ray Chest PA And Lateral (Final result)  Result time 03/09/24 15:01:38      Final result by Dmitriy Handy MD (03/09/24 15:01:38)                   Impression:      No acute cardiopulmonary process.      Electronically signed by: Dmitriy Handy MD  Date:    03/09/2024  Time:    15:01               Narrative:    EXAMINATION:  XR CHEST PA AND LATERAL    CLINICAL HISTORY:  Vomiting, unspecified    COMPARISON:  Chest x-ray, 11/08/2023    FINDINGS:  The lungs are clear. Cardiomegaly.  Sternotomy wires present.  No pleural effusion or pneumothorax. Levoscoliosis of the upper thoracic spine.                                       Medications   ondansetron injection 4 mg (4 mg Intravenous Given 3/9/24 1402)   famotidine (PF) injection 20 mg (20 mg Intravenous Given 3/9/24 1403)   piperacillin-tazobactam (ZOSYN) 4.5 g in dextrose 5 % in water (D5W) 100 mL IVPB (MB+) (0 g Intravenous Stopped 3/9/24 1515)   aluminum-magnesium hydroxide-simethicone 200-200-20 mg/5 mL suspension 15 mL (15 mLs Oral Given 3/9/24 1633)     Medical Decision Making  28-year-old male with significant congenital cardiac history presents to ER with epigastric pain and vomiting clear fluids.  Patient denies drinking alcohol.  No fever chills or rigors.  No cough.  No shortness of breath.  No diarrhea.  Differential diagnosis include and not limited to gastroenteritis viral versus bacterial versus food poisoning, intestinal obstruction, dehydration, gastritis.  Labs,  EKG and x-rays ordered.  On review of labs patient white cell count is normal at 6, hemoglobin 18.3, hematocrit 55.  Lactic acid 2.2, blood culture sent out.  Lipase normal, troponin normal range, BNP normal range, urinalysis trace ketones UDS negative.    Patient resolved after giving Zofran and Pepcid.  Tolerated Maalox orally.  Possible acute gastritis.  With symptomatic relief.  Patient is given Zosyn in ED for bacterial gastroenteritis along with prescription for Cipro.  Advised to follow-up ED immediately with any worsening of abdominal pain or vomiting.    Amount and/or Complexity of Data Reviewed  Labs: ordered. Decision-making details documented in ED Course.     Details: WBC 6,hemoglobin 18.3, hematocrit 55.  Lactic acid 2.2, blood culture sent out.  Lipase normal, troponin normal range, BNP normal range, urinalysis trace ketones UDS negative.      Radiology: ordered and independent interpretation performed. Decision-making details documented in ED Course.     Details: Chest x-ray sternotomy wires but no acute findings.  Cardiomegaly.  Abdominal x-ray flat and erect moderate stool noted.  No air-fluid levels.    Risk  OTC drugs.  Prescription drug management.                                      Clinical Impression:  Final diagnoses:  [R06.02] SOB (shortness of breath)  [R11.10] Vomiting  [K52.9] Gastroenteritis (Primary)          ED Disposition Condition    Discharge Stable          ED Prescriptions       Medication Sig Dispense Start Date End Date Auth. Provider    ondansetron (ZOFRAN-ODT) 4 MG TbDL Take 1 tablet (4 mg total) by mouth every 8 (eight) hours as needed (nausea, vomiting). 15 tablet 3/9/2024 -- Delonte Wang MD    ciprofloxacin HCl (CIPRO) 500 MG tablet Take 1 tablet (500 mg total) by mouth 2 (two) times daily. for 10 days 20 tablet 3/9/2024 3/19/2024 Delonte Wang MD          Follow-up Information       Follow up With Specialties Details Why Contact Info    Nestor Siu MD  Family Medicine Schedule an appointment as soon as possible for a visit in 2 days For  re-check [address], [address] [address] [phone]               Delonte Wang MD  03/09/24 1722

## 2024-03-12 LAB
OHS QRS DURATION: 94 MS
OHS QTC CALCULATION: 435 MS

## 2024-03-14 LAB
BACTERIA BLD CULT: NORMAL
BACTERIA BLD CULT: NORMAL

## 2024-09-09 NOTE — ASSESSMENT & PLAN NOTE
9/9/2024      RE: Chavez Ca  [id number] [address] [facility] [id number]     Dear Colleague,    Thank you for referring your patient, Chavez Ca, to the [facility] [place]. Please see a copy of my visit note below.    [facility]  SPORTS & ORTHOPEDIC CLINIC VISIT     Sep 9, 2024      Ultrasound-guided bilateral knee injection    Date/Time: 9/9/2024 8:32 AM    Performed by: Antione Abrams MD  Authorized by: Antione Abrams MD    Indications:  Osteoarthritis  Needle Size:  21 G  Guidance: ultrasound    Approach:  Lateral  Location:  Knee  Laterality:  Bilateral      Medications (Right):  48 mg hylan 48 MG/6ML; 3 mL lidocaine (PF) 1 %  Medications (Left):  48 mg hylan 48 MG/6ML; 3 mL lidocaine (PF) 1 %  Outcome:  Tolerated well, no immediate complications  Procedure discussed: discussed risks, benefits, and alternatives    Consent Given by:  Patient  Timeout: timeout called immediately prior to procedure    Prep: patient was prepped and draped in usual sterile fashion      PROCEDURE: Ultrasound-guided bilateral knee Synvisc One injection   The patient was apprised of the risks and the benefits of the procedure written consent was signed by the patient.   The patient was positioned lying supine on exam table with knee resting in a slightly flexed position.   The area of the superiolateral right knee was cleaned with a chlorhexadine swab.  Ultrasound was necessary to localized the joint space due to body habitus due to obesity and lymphedema.  Using sterile technique the skin was anesthetized with 3.0 mL 1% lidocaine, a 22-gauge 1.5 in needle was introduced into the suprapatellar pouch under direct and continuous ultrasound guidance.  A solution of 48 mg of hyaluronic acid was injected easily and seen flowing into the joint space on ultrasound.  Images were captured and saved to the permanent record.  The identical procedure was  Tricuspid atresia with single ventricle and an extra cardiac Fontan (IVC bypass to lungs) and  bidirectional Jesus procedure (head and neck venous return to PA).   then repeated on the left side.  There were no complications. The patient tolerated the procedure well. There was minimal bleeding.   The patient was instructed to ice and provided gentle compression to the knee upon leaving clinic and refrain from overuse over the next 2 days.   The patient was instructed to go to the emergency room with any unusual pain, swelling, or redness occurred in the injected area.     Antione Abrams MD            Again, thank you for allowing me to participate in the care of your patient.      Sincerely,    Antione Abrams MD

## 2024-09-19 ENCOUNTER — HOSPITAL ENCOUNTER (EMERGENCY)
Facility: HOSPITAL | Age: 29
Discharge: HOME | End: 2024-09-20
Attending: EMERGENCY MEDICINE
Payer: MEDICAID

## 2024-09-19 ENCOUNTER — HOSPITAL ENCOUNTER (EMERGENCY)
Facility: HOSPITAL | Age: 29
Discharge: ANOTHER HOSPITAL | End: 2024-09-19
Attending: EMERGENCY MEDICINE
Payer: MEDICAID

## 2024-09-19 ENCOUNTER — DOCUMENTATION ONLY (OUTPATIENT)
Dept: PEDIATRIC CARDIOLOGY | Facility: CLINIC | Age: 29
End: 2024-09-19
Payer: MEDICAID

## 2024-09-19 VITALS
RESPIRATION RATE: 20 BRPM | HEIGHT: 66 IN | BODY MASS INDEX: 24.11 KG/M2 | DIASTOLIC BLOOD PRESSURE: 101 MMHG | OXYGEN SATURATION: 97 % | WEIGHT: 150 LBS | HEART RATE: 92 BPM | SYSTOLIC BLOOD PRESSURE: 164 MMHG | TEMPERATURE: 98 F

## 2024-09-19 DIAGNOSIS — R10.84 GENERALIZED ABDOMINAL PAIN: ICD-10-CM

## 2024-09-19 DIAGNOSIS — R10.9 ABDOMINAL PAIN, UNSPECIFIED ABDOMINAL LOCATION: Primary | ICD-10-CM

## 2024-09-19 DIAGNOSIS — R93.41 ABNORMAL RADIOLOGIC FINDINGS ON DIAGNOSTIC IMAGING OF RENAL PELVIS, URETER, OR BLADDER: ICD-10-CM

## 2024-09-19 DIAGNOSIS — R11.10 VOMITING: ICD-10-CM

## 2024-09-19 DIAGNOSIS — Z98.890 S/P FONTAN PROCEDURE: Primary | ICD-10-CM

## 2024-09-19 DIAGNOSIS — R10.9 ABDOMINAL PAIN: ICD-10-CM

## 2024-09-19 DIAGNOSIS — I10 ESSENTIAL HYPERTENSION: ICD-10-CM

## 2024-09-19 DIAGNOSIS — Q20.4 SINGLE VENTRICLE WITH TRICUSPID ATRESIA: ICD-10-CM

## 2024-09-19 DIAGNOSIS — Q22.4 SINGLE VENTRICLE WITH TRICUSPID ATRESIA: ICD-10-CM

## 2024-09-19 DIAGNOSIS — Q24.9 ADULT CONGENITAL HEART DISEASE: ICD-10-CM

## 2024-09-19 LAB
ALBUMIN SERPL BCP-MCNC: 5.4 G/DL (ref 3.5–5.2)
ALP SERPL-CCNC: 65 U/L (ref 38–126)
ALT SERPL W/O P-5'-P-CCNC: 24 U/L (ref 10–44)
AMPHET+METHAMPHET UR QL: NEGATIVE
ANION GAP SERPL CALC-SCNC: 14 MMOL/L (ref 8–16)
AST SERPL-CCNC: 27 U/L (ref 15–46)
BACTERIA #/AREA URNS AUTO: NORMAL /HPF
BARBITURATES UR QL SCN>200 NG/ML: NEGATIVE
BASOPHILS # BLD AUTO: 0.01 K/UL (ref 0–0.2)
BASOPHILS NFR BLD: 0.2 % (ref 0–1.9)
BENZODIAZ UR QL SCN>200 NG/ML: NEGATIVE
BILIRUB SERPL-MCNC: 1.3 MG/DL (ref 0.1–1)
BILIRUB UR QL STRIP: NEGATIVE
BZE UR QL SCN: NEGATIVE
CALCIUM SERPL-MCNC: 10.5 MG/DL (ref 8.7–10.5)
CANNABINOIDS UR QL SCN: NEGATIVE
CHLORIDE SERPL-SCNC: 109 MMOL/L (ref 95–110)
CLARITY UR REFRACT.AUTO: CLEAR
CO2 SERPL-SCNC: 18 MMOL/L (ref 23–29)
COLOR UR AUTO: YELLOW
CREAT SERPL-MCNC: 0.87 MG/DL (ref 0.5–1.4)
CREAT UR-MCNC: 128.5 MG/DL (ref 23–375)
DIFFERENTIAL METHOD BLD: ABNORMAL
EOSINOPHIL # BLD AUTO: 0 K/UL (ref 0–0.5)
EOSINOPHIL NFR BLD: 0 % (ref 0–8)
ERYTHROCYTE [DISTWIDTH] IN BLOOD BY AUTOMATED COUNT: 13.3 % (ref 11.5–14.5)
EST. GFR  (NO RACE VARIABLE): >60 ML/MIN/1.73 M^2
GLUCOSE SERPL-MCNC: 168 MG/DL (ref 70–110)
GLUCOSE UR QL STRIP: NEGATIVE
HCT VFR BLD AUTO: 56 % (ref 40–54)
HCV AB SERPL QL IA: NORMAL
HGB BLD-MCNC: 18.8 G/DL (ref 14–18)
HGB UR QL STRIP: NEGATIVE
HYALINE CASTS UR QL AUTO: 0 /LPF
IMM GRANULOCYTES # BLD AUTO: 0.02 K/UL (ref 0–0.04)
IMM GRANULOCYTES NFR BLD AUTO: 0.3 % (ref 0–0.5)
KETONES UR QL STRIP: NEGATIVE
LACTATE SERPL-SCNC: 2.2 MMOL/L (ref 0.5–2.2)
LACTATE SERPL-SCNC: 2.9 MMOL/L (ref 0.5–2.2)
LACTATE SERPL-SCNC: 3.4 MMOL/L (ref 0.5–2.2)
LEUKOCYTE ESTERASE UR QL STRIP: NEGATIVE
LIPASE SERPL-CCNC: 101 U/L (ref 23–300)
LYMPHOCYTES # BLD AUTO: 1 K/UL (ref 1–4.8)
LYMPHOCYTES NFR BLD: 15.3 % (ref 18–48)
MCH RBC QN AUTO: 31.2 PG (ref 27–31)
MCHC RBC AUTO-ENTMCNC: 33.6 G/DL (ref 32–36)
MCV RBC AUTO: 93 FL (ref 82–98)
METHADONE UR QL SCN>300 NG/ML: NEGATIVE
MICROSCOPIC COMMENT: NORMAL
MONOCYTES # BLD AUTO: 0.2 K/UL (ref 0.3–1)
MONOCYTES NFR BLD: 3.4 % (ref 4–15)
NEUTROPHILS # BLD AUTO: 5.2 K/UL (ref 1.8–7.7)
NEUTROPHILS NFR BLD: 80.8 % (ref 38–73)
NITRITE UR QL STRIP: NEGATIVE
NRBC BLD-RTO: 0 /100 WBC
NT-PROBNP SERPL-MCNC: 624 PG/ML (ref 5–450)
OHS QRS DURATION: 94 MS
OHS QTC CALCULATION: 441 MS
OPIATES UR QL SCN: NEGATIVE
PCP UR QL SCN>25 NG/ML: NEGATIVE
PH UR STRIP: 6 [PH] (ref 5–8)
PLATELET # BLD AUTO: 171 K/UL (ref 150–450)
PMV BLD AUTO: 9.9 FL (ref 9.2–12.9)
POTASSIUM SERPL-SCNC: 4.6 MMOL/L (ref 3.5–5.1)
PROT SERPL-MCNC: 9.2 G/DL (ref 6–8.4)
PROT UR QL STRIP: ABNORMAL
RBC # BLD AUTO: 6.02 M/UL (ref 4.6–6.2)
RBC #/AREA URNS AUTO: 0 /HPF (ref 0–4)
SARS-COV-2 RDRP RESP QL NAA+PROBE: NEGATIVE
SODIUM SERPL-SCNC: 141 MMOL/L (ref 136–145)
SP GR UR STRIP: 1.02 (ref 1–1.03)
TOXICOLOGY INFORMATION: NORMAL
TROPONIN I SERPL-MCNC: <0.012 NG/ML (ref 0.01–0.03)
TROPONIN I SERPL-MCNC: <0.012 NG/ML (ref 0.01–0.03)
URN SPEC COLLECT METH UR: ABNORMAL
UROBILINOGEN UR STRIP-ACNC: NEGATIVE EU/DL
UUN UR-MCNC: 9 MG/DL (ref 2–20)
WBC # BLD AUTO: 6.46 K/UL (ref 3.9–12.7)
WBC #/AREA URNS AUTO: 0 /HPF (ref 0–5)

## 2024-09-19 PROCEDURE — 83880 ASSAY OF NATRIURETIC PEPTIDE: CPT | Mod: ER | Performed by: EMERGENCY MEDICINE

## 2024-09-19 PROCEDURE — 80053 COMPREHEN METABOLIC PANEL: CPT | Mod: ER | Performed by: EMERGENCY MEDICINE

## 2024-09-19 PROCEDURE — 99285 EMERGENCY DEPT VISIT HI MDM: CPT | Mod: 25,27,ER

## 2024-09-19 PROCEDURE — 96360 HYDRATION IV INFUSION INIT: CPT

## 2024-09-19 PROCEDURE — 93005 ELECTROCARDIOGRAM TRACING: CPT | Mod: ER

## 2024-09-19 PROCEDURE — 25500020 PHARM REV CODE 255: Performed by: EMERGENCY MEDICINE

## 2024-09-19 PROCEDURE — 83690 ASSAY OF LIPASE: CPT | Mod: ER | Performed by: EMERGENCY MEDICINE

## 2024-09-19 PROCEDURE — 25000003 PHARM REV CODE 250: Mod: ER | Performed by: EMERGENCY MEDICINE

## 2024-09-19 PROCEDURE — 96375 TX/PRO/DX INJ NEW DRUG ADDON: CPT | Mod: ER

## 2024-09-19 PROCEDURE — 96374 THER/PROPH/DIAG INJ IV PUSH: CPT | Mod: ER

## 2024-09-19 PROCEDURE — 93010 ELECTROCARDIOGRAM REPORT: CPT | Mod: ,,, | Performed by: STUDENT IN AN ORGANIZED HEALTH CARE EDUCATION/TRAINING PROGRAM

## 2024-09-19 PROCEDURE — 99900035 HC TECH TIME PER 15 MIN (STAT): Mod: ER

## 2024-09-19 PROCEDURE — 25000003 PHARM REV CODE 250: Performed by: EMERGENCY MEDICINE

## 2024-09-19 PROCEDURE — 63600175 PHARM REV CODE 636 W HCPCS: Mod: ER | Performed by: EMERGENCY MEDICINE

## 2024-09-19 PROCEDURE — 83605 ASSAY OF LACTIC ACID: CPT | Mod: ER | Performed by: EMERGENCY MEDICINE

## 2024-09-19 PROCEDURE — 99285 EMERGENCY DEPT VISIT HI MDM: CPT | Mod: 25

## 2024-09-19 PROCEDURE — 81000 URINALYSIS NONAUTO W/SCOPE: CPT | Mod: ER,59 | Performed by: EMERGENCY MEDICINE

## 2024-09-19 PROCEDURE — 96361 HYDRATE IV INFUSION ADD-ON: CPT

## 2024-09-19 PROCEDURE — 86803 HEPATITIS C AB TEST: CPT | Performed by: PHYSICIAN ASSISTANT

## 2024-09-19 PROCEDURE — 85025 COMPLETE CBC W/AUTO DIFF WBC: CPT | Mod: ER | Performed by: EMERGENCY MEDICINE

## 2024-09-19 PROCEDURE — 80307 DRUG TEST PRSMV CHEM ANLYZR: CPT | Mod: ER | Performed by: EMERGENCY MEDICINE

## 2024-09-19 PROCEDURE — 96361 HYDRATE IV INFUSION ADD-ON: CPT | Mod: ER

## 2024-09-19 PROCEDURE — 83605 ASSAY OF LACTIC ACID: CPT | Mod: 91 | Performed by: EMERGENCY MEDICINE

## 2024-09-19 PROCEDURE — 84484 ASSAY OF TROPONIN QUANT: CPT | Mod: 91,ER | Performed by: EMERGENCY MEDICINE

## 2024-09-19 PROCEDURE — U0002 COVID-19 LAB TEST NON-CDC: HCPCS | Mod: ER | Performed by: EMERGENCY MEDICINE

## 2024-09-19 RX ORDER — ALUMINUM HYDROXIDE, MAGNESIUM HYDROXIDE, AND SIMETHICONE 1200; 120; 1200 MG/30ML; MG/30ML; MG/30ML
30 SUSPENSION ORAL ONCE
Status: COMPLETED | OUTPATIENT
Start: 2024-09-19 | End: 2024-09-19

## 2024-09-19 RX ORDER — ONDANSETRON HYDROCHLORIDE 2 MG/ML
4 INJECTION, SOLUTION INTRAVENOUS
Status: COMPLETED | OUTPATIENT
Start: 2024-09-19 | End: 2024-09-19

## 2024-09-19 RX ORDER — FAMOTIDINE 10 MG/ML
40 INJECTION INTRAVENOUS ONCE
Status: COMPLETED | OUTPATIENT
Start: 2024-09-19 | End: 2024-09-19

## 2024-09-19 RX ORDER — LISINOPRIL 20 MG/1
20 TABLET ORAL
Status: COMPLETED | OUTPATIENT
Start: 2024-09-19 | End: 2024-09-19

## 2024-09-19 RX ORDER — LIDOCAINE HYDROCHLORIDE 20 MG/ML
15 SOLUTION OROPHARYNGEAL ONCE
Status: COMPLETED | OUTPATIENT
Start: 2024-09-19 | End: 2024-09-19

## 2024-09-19 RX ORDER — ONDANSETRON 4 MG/1
4 TABLET, ORALLY DISINTEGRATING ORAL EVERY 6 HOURS PRN
Qty: 15 TABLET | Refills: 0 | Status: SHIPPED | OUTPATIENT
Start: 2024-09-19

## 2024-09-19 RX ADMIN — FAMOTIDINE 40 MG: 10 INJECTION, SOLUTION INTRAVENOUS at 09:09

## 2024-09-19 RX ADMIN — LISINOPRIL 20 MG: 20 TABLET ORAL at 12:09

## 2024-09-19 RX ADMIN — LIDOCAINE HYDROCHLORIDE 15 ML: 20 SOLUTION ORAL at 10:09

## 2024-09-19 RX ADMIN — SODIUM CHLORIDE, POTASSIUM CHLORIDE, SODIUM LACTATE AND CALCIUM CHLORIDE 500 ML: 600; 310; 30; 20 INJECTION, SOLUTION INTRAVENOUS at 09:09

## 2024-09-19 RX ADMIN — IOHEXOL 75 ML: 350 INJECTION, SOLUTION INTRAVENOUS at 07:09

## 2024-09-19 RX ADMIN — ONDANSETRON 4 MG: 2 INJECTION INTRAMUSCULAR; INTRAVENOUS at 09:09

## 2024-09-19 RX ADMIN — SODIUM CHLORIDE 500 ML: 9 INJECTION, SOLUTION INTRAVENOUS at 04:09

## 2024-09-19 RX ADMIN — ALUMINUM HYDROXIDE, MAGNESIUM HYDROXIDE, AND SIMETHICONE 30 ML: 1200; 120; 1200 SUSPENSION ORAL at 10:09

## 2024-09-19 NOTE — ED TRIAGE NOTES
Epigastric pain and nvx5 since 1am this morning. Denies dysuria. Pt is also reporting chills.md is at the bedside

## 2024-09-19 NOTE — ED PROVIDER NOTES
Encounter Date: 9/19/2024       History     Chief Complaint   Patient presents with    Abdominal Pain     Nausea/vomiting, epigastric pain starting at 0100 this morning. Cardiac hx.      29-year-old male with history of congenital heart defect and chronic hypoxia was transferred here for CT scanning.  He has been having abdominal pain to his upper abdomen since last night around 1:00 a.m..  He had vomiting.  He has labs that showed an elevated lactic acid but otherwise reassuring.  He was given IV fluids and antiemetics and states he feels much better.  He had x-rays and ultrasound of his abdomen there were reassuring.  Referring ED did not have CT scan capabilities at this time and transferred him here for further evaluation.  His congenital heart cardiologist was not as concerned about the hypoxia since this is a baseline for him.  Patient states he does not have any shortness of breath or chest pain.  Patient denies any vomit        Review of patient's allergies indicates:  No Known Allergies  Past Medical History:   Diagnosis Date    History of heart surgery     Other cirrhosis of liver 11/12/2020    SVT (supraventricular tachycardia)     WPW syndrome      Past Surgical History:   Procedure Laterality Date    ANGIOGRAPHY OF AORTIC ARCH N/A 9/3/2020    Procedure: AORTOGRAM, AORTIC ARCH;  Surgeon: Stephania Crump MD;  Location: [facility] CATH LAB;  Service: Cardiology;  Laterality: N/A;    FONTAN PROCEDURE, EXTRACARDIAC      LEFT HEART CATHETERIZATION Left 9/3/2020    Procedure: Left heart cath;  Surgeon: Stephania Crump MD;  Location: [facility] CATH LAB;  Service: Cardiology;  Laterality: Left;    RIGHT HEART CATHETERIZATION FOR CONGENITAL HEART DEFECT N/A 9/3/2020    Procedure: CATHETERIZATION, HEART, RIGHT, FOR CONGENITAL HEART DEFECT;  Surgeon: Stephania Crump MD;  Location: [facility] CATH LAB;  Service: Cardiology;  Laterality: N/A;  [facility] - needs covid test morning of. Extenuating circumstances     TRANSESOPHAGEAL ECHOCARDIOGRAPHY N/A 11/10/2022    Procedure: ECHOCARDIOGRAM, TRANSESOPHAGEAL;  Surgeon: Emeterio Hall MD;  Location: [facility] EP LAB;  Service: Cardiology;  Laterality: N/A;    TREATMENT OF CARDIAC ARRHYTHMIA N/A 1/9/2021    Procedure: CARDIOVERSION;  Surgeon: Jim Mcginnis MD;  Location: [facility] CATH LAB;  Service: Cardiology;  Laterality: N/A;     Family History   Problem Relation Name Age of Onset    No Known Problems Mother      No Known Problems Father      No Known Problems Sister 3     No Known Problems Brother      No Known Problems Maternal Aunt      No Known Problems Maternal Uncle      No Known Problems Paternal Aunt      No Known Problems Paternal Uncle      No Known Problems Maternal Grandmother      Heart disease Maternal Grandfather      No Known Problems Paternal Grandmother      No Known Problems Paternal Grandfather      Anemia Neg Hx      Arrhythmia Neg Hx      Asthma Neg Hx      Clotting disorder Neg Hx      Fainting Neg Hx      Heart attack Neg Hx      Heart failure Neg Hx      Hyperlipidemia Neg Hx      Hypertension Neg Hx      Stroke Neg Hx      Atrial Septal Defect Neg Hx       Social History     Tobacco Use    Smoking status: Former     Current packs/day: 0.00     Types: Cigarettes     Quit date: 2014     Years since quitting: 10.7     Passive exposure: Past    Smokeless tobacco: Never    Tobacco comments:     3-4 months    Substance Use Topics    Alcohol use: No    Drug use: Yes     Types: Marijuana     Comment: Mojo today- pt reports a while back     Review of Systems    Physical Exam     Initial Vitals [09/19/24 1412]   BP Pulse Resp Temp SpO2   (!) 146/99 96 18 98.1 °F (36.7 °C) 98 %      MAP       --         Physical Exam    Constitutional: He appears well-developed and well-nourished. No distress.   HENT:   Head: Normocephalic and atraumatic.   Mouth/Throat: Oropharynx is clear and moist.   Eyes: Conjunctivae and EOM are normal. Pupils are equal, round, and reactive to  light. Right eye exhibits no discharge. Left eye exhibits no discharge. No scleral icterus.   Neck: Neck supple. No JVD present.   Normal range of motion.  Cardiovascular:  Normal rate, regular rhythm, normal heart sounds and intact distal pulses.     Exam reveals no friction rub.       No murmur heard.  Pulmonary/Chest: Breath sounds normal. No stridor. No respiratory distress. He has no wheezes. He has no rhonchi. He has no rales.   Abdominal: Abdomen is soft. Bowel sounds are normal. He exhibits no distension and no mass. There is no abdominal tenderness. There is no rebound and no guarding.   Musculoskeletal:         General: No tenderness or edema. Normal range of motion.      Cervical back: Normal range of motion and neck supple.     Lymphadenopathy:     He has no cervical adenopathy.   Neurological: He is alert. He has normal strength. No cranial nerve deficit or sensory deficit. GCS score is 15. GCS eye subscore is 4. GCS verbal subscore is 5. GCS motor subscore is 6.   Skin: Skin is warm. Capillary refill takes less than 2 seconds. No rash noted.   Psychiatric: He has a normal mood and affect.         ED Course   Procedures  Labs Reviewed   HEPATITIS C ANTIBODY       Result Value    Hepatitis C Ab Non-reactive      Narrative:     Release to patient->Immediate   LACTIC ACID, PLASMA    Lactate (Lactic Acid) 2.2            Imaging Results              CT Abdomen Pelvis With IV Contrast NO Oral Contrast (In process)  Result time 09/19/24 21:20:23                     Medications   sodium chloride 0.9% bolus 500 mL 500 mL (0 mLs Intravenous Stopped 9/19/24 1844)   iohexoL (OMNIPAQUE 350) injection 75 mL (75 mLs Intravenous Given 9/19/24 1900)     Medical Decision Making  29-year-old male with congenital heart disease presents with epigastric pain vomiting.  Seems like his symptoms are doing much better now.  He did have an elevated lactate.  Will give him IV fluids and repeat his lactate.  I think infarcted bowel  his less likely given his symptoms.  Will obtain a CT to look for bowel obstruction or other surgical emergencies.  Patient is very motivated to be at home.  His mother would also like to bring him home if things checkout well.  Will give him clear liquids while we evaluate.  I made contact with his cardiologist who would see him in follow-up and is arranging for follow-up appointment      Case signed out at 9:00 p.m. to Dr. Rae with CT pending.  Patient has been tolerating p.o..  Anticipate able to discharge home.    Amount and/or Complexity of Data Reviewed  Radiology: ordered.    Risk  Prescription drug management.                                      Clinical Impression:  Final diagnoses:  [R10.9] Abdominal pain, unspecified abdominal location (Primary)                 Bandar Brannon MD  09/19/24 8045

## 2024-09-19 NOTE — PROGRESS NOTES
I was called by the transfer center about this patient.  He is currently in a freestanding Emergency room with complaints of abdominal pain, nausea, and vomiting since early this morning.  In the emergency room, saturations were initially 89% on room air.  He did ambulate with saturations around 92%.  No complaints of shortness of breath.  No significant cough.  Chest x-ray reassuring.  There CT scanners currently inoperable, in the emergency room doctor there thought that a scan to rule out pulmonary embolism may be appropriate given his desaturation and noncompliance.    I reviewed his labs.  Comprehensive metabolic panel is basically unchanged.  Lactate is mildly elevated.  NT pro BNP elevated.  EKG is unchanged with sinus rhythm.  Chest x-ray looks good.  Blood pressure was initially elevated at 160/95 in the emergency room.  Vital signs otherwise stable.    Patient has a long history of medicine noncompliance and, by report, reports noncompliance with all of his medicines.  I last saw him in clinic almost a year ago with plans to follow-up in a month, but he did not show.  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    I think this is unlikely to be a pulmonary embolism given the chest x-ray finding and the lack of shortness of breath.  His baseline saturations run in the low 90s, and CT scan to rule out pulmonary embolism in Fontan physiology is notoriously inaccurate.  I would recommend against a CTA to look for a pulmonary embolism.  His symptoms sound more consistent with an acute abdominal issue like a viral gastroenteritis.  He is preload dependent, and we do not want him getting dehydrated.    Recommendations:  1. Transfer to our emergency room for further evaluation of his abdominal pain and, likely, an abdominal CT scan.  He definitely has cirrhosis.    2. When at a minimum start him on maintenance fluids since he is not able to keep down  fluids right now.  We do not want him getting dehydrated.    3. Provided he looks stable in our emergency room, if needed he can be admitted to the adult hospitalist service.  We could get an adult congenital echocardiogram on him tomorrow.  If his ventricular function is still significantly decreased, as I suspect, could get him restarted on heart failure medications.    Patient has a long history of marijuana abuse and noncompliance with both clinic visits and medications.  He is definitely not a heart transplant candidate.

## 2024-09-19 NOTE — ED TRIAGE NOTES
30 y/o M presents to ER with c/c 6/10 abd pain and N with V starting at 0100. Endorses c/p and SOB at 0100, but denies any at this time. Describes emesis as yellow turning to green quality. Has cardiac surgery and history.

## 2024-09-19 NOTE — ED NOTES
Patient identifiers for Juani Reilly Jr. 29 y.o. male checked and correct.  Chief Complaint   Patient presents with    Abdominal Pain     Nausea/vomiting, epigastric pain starting at 0100 this morning. Cardiac hx.      Past Medical History:   Diagnosis Date    History of heart surgery     Other cirrhosis of liver 11/12/2020    SVT (supraventricular tachycardia)     WPW syndrome      Allergies reported: Review of patient's allergies indicates:  No Known Allergies      LOC: Patient is awake, alert, and aware of environment with an appropriate affect. Patient is oriented x 4 and speaking appropriately.  APPEARANCE: Patient resting comfortably and in no acute distress. Patient is clean and well groomed, patient's clothing is properly fastened.  HEENT: - JVD, + midline trach  SKIN: The skin is warm and dry. Patient has normal skin turgor and moist mucus membranes.   MUSKULOSKELETAL: Patient is moving all extremities well, no obvious deformities noted. Pulses intact. PMS x 4  RESPIRATORY: Airway is open and patent. Respirations are spontaneous and non-labored with normal effort and rate. = CBBS  CARDIAC: Patient has a normal rate and rhythm. 96 on cardiac monitor. No peripheral edema noted.   ABDOMEN: No distention noted. Soft and tender to touch, endorses 6/10 epigastric pain called pressure quality. Denies nausea at this time.  NEUROLOGICAL: pupils 4 mm, PERRL. Facial expression is symmetrical. Hand grasps are equal bilaterally. Normal sensation in all extremities when touched with finger.

## 2024-09-19 NOTE — ED PROVIDER NOTES
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 is a 29 y.o. male who  has a past medical history of History of heart surgery, Other cirrhosis of liver (11/12/2020), SVT (supraventricular tachycardia), and WPW syndrome.    The patient presents to the ED due to abdominal pain and vomiting.  Patient's symptoms started this morning approximately 8-1/2 hours ago.  He reports 5 bouts of nonbloody vomiting.  No diarrhea.  No urinary complaints.  He reports some shortness of breath which occurred after vomiting.  Denies any fever chest pain or additional complaints or concerns.  He is noncompliant with his medications including Xarelto.    The history is provided by the patient.     Review of patient's allergies indicates:  No Known Allergies  Past Medical History:   Diagnosis Date    History of heart surgery     Other cirrhosis of liver 11/12/2020    SVT (supraventricular tachycardia)     WPW syndrome      Past Surgical History:   Procedure Laterality Date    ANGIOGRAPHY OF AORTIC ARCH N/A 9/3/2020    Procedure: AORTOGRAM, AORTIC ARCH;  Surgeon: Stephania Crump MD;  Location: [facility] CATH LAB;  Service: Cardiology;  Laterality: N/A;    FONTAN PROCEDURE, EXTRACARDIAC      LEFT HEART CATHETERIZATION Left 9/3/2020    Procedure: Left heart cath;  Surgeon: Stephania Crump MD;  Location: [facility] CATH LAB;  Service: Cardiology;  Laterality: Left;    RIGHT HEART CATHETERIZATION FOR CONGENITAL HEART DEFECT N/A 9/3/2020    Procedure: CATHETERIZATION, HEART, RIGHT, FOR CONGENITAL HEART DEFECT;  Surgeon: Stephania Crump MD;  Location: [facility] CATH LAB;  Service: Cardiology;  Laterality: N/A;  [facility] - needs covid test morning of. Extenuating circumstances    TRANSESOPHAGEAL ECHOCARDIOGRAPHY N/A 11/10/2022    Procedure: ECHOCARDIOGRAM,  TRANSESOPHAGEAL;  Surgeon: Emeterio Hall MD;  Location: [facility] EP LAB;  Service: Cardiology;  Laterality: N/A;    TREATMENT OF CARDIAC ARRHYTHMIA N/A 1/9/2021    Procedure: CARDIOVERSION;  Surgeon: Jim Mcginnis MD;  Location: [facility] CATH LAB;  Service: Cardiology;  Laterality: N/A;     Family History   Problem Relation Name Age of Onset    No Known Problems Mother      No Known Problems Father      No Known Problems Sister 3     No Known Problems Brother      No Known Problems Maternal Aunt      No Known Problems Maternal Uncle      No Known Problems Paternal Aunt      No Known Problems Paternal Uncle      No Known Problems Maternal Grandmother      Heart disease Maternal Grandfather      No Known Problems Paternal Grandmother      No Known Problems Paternal Grandfather      Anemia Neg Hx      Arrhythmia Neg Hx      Asthma Neg Hx      Clotting disorder Neg Hx      Fainting Neg Hx      Heart attack Neg Hx      Heart failure Neg Hx      Hyperlipidemia Neg Hx      Hypertension Neg Hx      Stroke Neg Hx      Atrial Septal Defect Neg Hx       Social History     Tobacco Use    Smoking status: Former     Current packs/day: 0.00     Types: Cigarettes     Quit date: 2014     Years since quitting: 10.7     Passive exposure: Past    Smokeless tobacco: Never    Tobacco comments:     3-4 months    Substance Use Topics    Alcohol use: No    Drug use: Yes     Types: Marijuana     Comment: Mojo today- pt reports a while back     Review of Systems   Constitutional:  Positive for chills. Negative for fever.   HENT:  Negative for sore throat.    Respiratory:  Positive for shortness of breath.    Cardiovascular:  Negative for chest pain.   Gastrointestinal:  Positive for abdominal pain and vomiting. Negative for nausea.   Genitourinary:  Negative for dysuria.   Musculoskeletal:  Negative for back pain.   Skin:  Negative for rash.   Neurological:  Negative for weakness.   Hematological:  Does not bruise/bleed easily.       Physical  Exam     Initial Vitals [09/19/24 0911]   BP Pulse Resp Temp SpO2   (!) 160/95 105 18 98 °F (36.7 °C) 97 %      MAP       --         Physical Exam    Nursing note and vitals reviewed.  Constitutional: He appears well-developed and well-nourished. He is not diaphoretic. No distress.   HENT:   Head: Normocephalic and atraumatic.   Mouth/Throat: Oropharynx is clear and moist.   Eyes: EOM are normal. Pupils are equal, round, and reactive to light.   Neck: No tracheal deviation present.   Cardiovascular:  Normal rate and intact distal pulses.           Pulmonary/Chest: Breath sounds normal. No stridor. No respiratory distress.   Abdominal: Abdomen is soft. He exhibits no distension and no mass. There is abdominal tenderness.   Diffuse tenderness to the epigastrium without rebound or guarding   Musculoskeletal:         General: No edema. Normal range of motion.     Neurological: He is alert and oriented to person, place, and time. No cranial nerve deficit or sensory deficit.   Skin: Skin is warm and dry. Capillary refill takes less than 2 seconds. No rash noted.   Psychiatric: He has a normal mood and affect.         ED Course   Procedures  Labs Reviewed   CBC W/ AUTO DIFFERENTIAL - Abnormal       Result Value    WBC 6.46      RBC 6.02      Hemoglobin 18.8 (*)     Hematocrit 56.0 (*)     MCV 93      MCH 31.2 (*)     MCHC 33.6      RDW 13.3      Platelets 171      MPV 9.9      Immature Granulocytes 0.3      Gran # (ANC) 5.2      Immature Grans (Abs) 0.02      Lymph # 1.0      Mono # 0.2 (*)     Eos # 0.0      Baso # 0.01      nRBC 0      Gran % 80.8 (*)     Lymph % 15.3 (*)     Mono % 3.4 (*)     Eosinophil % 0.0      Basophil % 0.2      Differential Method Automated     COMPREHENSIVE METABOLIC PANEL - Abnormal    Sodium 141      Potassium 4.6      Chloride 109      CO2 18 (*)     Glucose 168 (*)     BUN 9      Creatinine 0.87      Calcium 10.5      Total Protein 9.2 (*)     Albumin 5.4 (*)     Total Bilirubin 1.3 (*)      Alkaline Phosphatase 65      AST 27      ALT 24      Anion Gap 14      eGFR >60.0     LACTIC ACID, PLASMA - Abnormal    Lactate (Lactic Acid) 2.9 (*)    LACTIC ACID, PLASMA - Abnormal    Lactate (Lactic Acid) 3.4 (*)    NT-PRO NATRIURETIC PEPTIDE - Abnormal    NT-proBNP 624 (*)    LIPASE    Lipase Result 101     TROPONIN I    Troponin I <0.012     TROPONIN I    Troponin I <0.012     NT-PRO NATRIURETIC PEPTIDE   SARS-COV-2 RNA AMPLIFICATION, QUAL    SARS-CoV-2 RNA, Amplification, Qual Negative     URINALYSIS   DRUG SCREEN PANEL, URINE EMERGENCY        ECG Results              EKG 12-lead (Final result)        Collection Time Result Time QRS Duration OHS QTC Calculation    09/19/24 09:20:09 09/19/24 10:56:47 94 441                     Final result by Interface, Lab In [facility] (09/19/24 10:56:52)                   Narrative:    Test Reason : R10.9,    Vent. Rate : 098 BPM     Atrial Rate : 098 BPM     P-R Int : 140 ms          QRS Dur : 094 ms      QT Int : 346 ms       P-R-T Axes : 051 024 227 degrees     QTc Int : 441 ms    Normal sinus rhythm  LVH with repolarization abnormality  Abnormal ECG  When compared with ECG of 09-MAR-2024 13:28,  No significant change was found  Confirmed by Pita Villela MD, Kai ([id number]) on 9/19/2024 10:56:42 AM    Referred By: [email or web address]   SELF           Confirmed By:Kai Villela,                                  Imaging Results              US Abdomen Complete (Final result)  Result time 09/19/24 13:05:03      Final result by Grupo Nick MD (09/19/24 13:05:03)                   Impression:      No acute abnormality identified.      Electronically signed by: Grupo Nick  Date:    09/19/2024  Time:    13:05               Narrative:    EXAMINATION:  US ABDOMEN COMPLETE    CLINICAL HISTORY:  Generalized abdominal pain    TECHNIQUE:  Complete abdominal ultrasound (including pancreas, aorta, liver, gallbladder, common bile duct, IVC, kidneys, and spleen) was  performed.    COMPARISON:  None    FINDINGS:  Pancreas: Obscured by overlying bowel gas.    Aorta: Obscured by overlying bowel gas.    Liver: 16.5 cm, normal in size. Homogeneous parenchymal echotexture. No focal lesions.    Gallbladder: No calculi, wall thickening, or pericholecystic fluid.  Negative sonographic Disla's sign.    Biliary system: 3 mm common bile duct.  No intrahepatic ductal dilatation.    Inferior vena cava: Normal in appearance.    Right kidney: 10.6 cm. No hydronephrosis.    Left kidney: 9.8 cm. No hydronephrosis.    Spleen: 8.3 cm.  Normal in size with homogeneous echotexture.    Miscellaneous: No ascites.                                       X-Ray Abdomen Flat And Erect (Final result)  Result time 09/19/24 10:24:52      Final result by Grupo Nick MD (09/19/24 10:24:52)                   Impression:      No acute abnormality identified.      Electronically signed by: Grupo Nick  Date:    09/19/2024  Time:    10:24               Narrative:    EXAMINATION:  XR ABDOMEN FLAT AND ERECT    CLINICAL HISTORY:  Vomiting, unspecified    TECHNIQUE:  Flat and erect AP views of the abdomen were performed.    COMPARISON:  None    FINDINGS:  No air-fluid levels on upright radiograph.  No dilated loops of small bowel on supine radiograph.    Stool burden within the colon is within normal limits.    No calculi overlie the renal shadows.    Osseous structures are grossly intact.  Lung bases are clear.                                       X-Ray Chest 1 View (Final result)  Result time 09/19/24 10:25:51      Final result by Grupo Nick MD (09/19/24 10:25:51)                   Impression:      No acute abnormality.      Electronically signed by: Grupo Nick  Date:    09/19/2024  Time:    10:25               Narrative:    EXAMINATION:  XR CHEST 1 VIEW    CLINICAL HISTORY:  Vomiting, unspecified    TECHNIQUE:  Single frontal view of the chest was performed.    COMPARISON:  None    FINDINGS:  The  lungs are clear, with normal appearance of pulmonary vasculature and no pleural effusion or pneumothorax.    The cardiac silhouette is normal in size. The hilar and mediastinal contours are unremarkable.    Bones are intact.                                       Medications   famotidine (PF) injection 40 mg (40 mg Intravenous Given 9/19/24 0934)   aluminum-magnesium hydroxide-simethicone 200-200-20 mg/5 mL suspension 30 mL (30 mLs Oral Given 9/19/24 1005)     And   LIDOcaine viscous HCl 2% oral solution 15 mL (15 mLs Oral Given 9/19/24 1005)   ondansetron injection 4 mg (4 mg Intravenous Given 9/19/24 0934)   lactated ringers bolus 500 mL (0 mLs Intravenous Stopped 9/19/24 1031)   lisinopriL tablet 20 mg (20 mg Oral Given 9/19/24 1209)     Medical Decision Making  Differential Diagnosis includes, but is not limited to:  AAA, aortic dissection, mesenteric ischemia, perforated viscous, MI/ACS, SBO/volvulus, incarcerated/strangulated hernia, intussusception, ileus, appendicitis, cholecystitis, cholangitis, diverticulitis, esophagitis, hepatitis, nephrolithiasis, pancreatitis, gastroenteritis, colitis, IBD/IBS, biliary colic, GERD, PUD, constipation, UTI/pyelonephritis,  disorder.      Amount and/or Complexity of Data Reviewed  Labs: ordered. Decision-making details documented in ED Course.  Radiology: ordered. Decision-making details documented in ED Course.  Discussion of management or test interpretation with external provider(s): Spoke with Dr. Santoyo with Cardiology who follows this patient due to his history of congenital heart disease.  Patient will require transfer for CT imaging due to elevation of lactate and presentation of symptoms of his abdomen and pelvis.  He notes that patient's oxygen saturations here today are near his baseline.  He has accepted the patient for transfer at [facility] for further evaluation by Cardiology and of his symptoms.    Risk  OTC drugs.  Prescription  drug management.  Decision regarding hospitalization.  Risk Details: Patient with intermittent sats below 90%.  Ambulatory sat 92% without difficulty breathing.  Repeat lactic up trending.  Abdominal ultrasound ordered due to an ability to obtain CT imaging in this emergency department at this time.  No significant findings.  Patient will be transferred for further evaluation of his symptoms including with CT imaging and evaluation by Cardiology as mentioned.  Patient was informed of this plan risks and benefits discussed.  Patient verbalized understanding               ED Course as of 09/19/24 1333   Thu Sep 19, 2024   0925 EKG: Rate 98.  Sinus rhythm.  LVH criteria met.  Diffuse T-wave inversions.  No STEMI.  Similar to EKG from 03/09/2024 [RN]   0951 Lactic acid, plasma(!)  Mild elevation [RN]   1007 CBC auto differential(!)  Mild hemoconcentration, no significant abnormality.    [RN]   1007 Lipase  No significant abnormality.    [RN]   1007 Troponin I  No significant abnormality.    [RN]   1007 Comprehensive metabolic panel(!)  Consistent with previous results no significant abnormality [RN]   1016 X-Ray Abdomen Flat And Erect  Nonobstructive bowel gas pattern by my independent interpretation [RN]   1017 X-Ray Chest 1 View  No acute findings per my independent interpretation [RN]   1027 X-Ray Abdomen Flat And Erect [RN]   1123 NT-Pro Natriuretic Peptide(!)  Elevated   [RN]   1123 Lactic acid, plasma(!)  Worsening lactic acidosis  [RN]      ED Course User Index  [RN] Vtialy Avitia Jr., MD                             Clinical Impression:  Final diagnoses:  [R10.9] Abdominal pain  [R11.10] Vomiting  [R10.84] Generalized abdominal pain          ED Disposition Condition    Transfer to Another Facility Stable               Portions of this note were dictated using voice recognition software and may contain dictation related errors in spelling/grammar/syntax not found on text review       Vitaly Avitia Jr.,  MD  09/19/24 [id number]

## 2024-09-20 VITALS
BODY MASS INDEX: 24.11 KG/M2 | RESPIRATION RATE: 15 BRPM | WEIGHT: 150 LBS | SYSTOLIC BLOOD PRESSURE: 154 MMHG | TEMPERATURE: 98 F | HEIGHT: 66 IN | HEART RATE: 99 BPM | OXYGEN SATURATION: 95 % | DIASTOLIC BLOOD PRESSURE: 85 MMHG

## 2024-09-20 NOTE — DISCHARGE INSTRUCTIONS
Imaging Results              CT Abdomen Pelvis With IV Contrast NO Oral Contrast (Final result)  Result time 09/19/24 23:53:42      Final result by Musa Allan MD (09/19/24 23:53:42)                   Impression:      Abdomen CT and Pelvis CT:    Wedge-shaped focus of parenchymal hypoenhancement parenchymal in the right lower renal pole (series 601, image 45).  The leading differential diagnostic considerations include focal segmental pyelonephritis or a small renal infarct.  Other underlying pathology, such as neoplasm, considered less likely but not fully excluded.  Correlate clinically and with follow-up outpatient renal imaging.    Otherwise, no acute abdominal or pelvic abnormality is demonstrated.    Electronically signed by resident: Kendell Torres  Date:    09/19/2024  Time:    21:20    Electronically signed by: Musa Allan  Date:    09/19/2024  Time:    23:53               Narrative:    EXAMINATION:  CT ABDOMEN PELVIS WITH IV CONTRAST    CLINICAL HISTORY:  Abdominal pain, acute, nonlocalized;    TECHNIQUE:  Low dose axial images, sagittal and coronal reformations were obtained from the lung bases to the pubic symphysis following the IV administration of 75 mL of Omnipaque 350 .  Oral contrast was not administered.    COMPARISON:  Ultrasound 09/19/2024, chest x-ray 09/19/2024, CT [id number]    FINDINGS:  LUNG BASES & MEDIASTINUM (limited):  Heart is incompletely visualized with appearance consistent with congenital heart defect.  No pericardial effusion.  No pleural effusion.  No consolidation.    HEPATOBILIARY:  No focal hepatic lesions. No biliary ductal dilatation. Normal gallbladder.    SPLEEN:  No splenomegaly.    PANCREAS:  No focal masses. No ductal dilatation.    ADRENALS:  No adrenal nodules.    KIDNEYS/URETERS: Kidneys enhance symmetrically, except for small wedge-shaped region of abnormally decreased parenchymal enhancement in the right lower renal pole..  No hydronephrosis, stones or  solid mass lesions. Ureters are unremarkable.    PELVIC ORGANS/BLADDER:  Unremarkable.    PERITONEUM / RETROPERITONEUM:  No free air. No free fluid.    LYMPH NODES:  No lymphadenopathy.    VESSELS:  Portal veins patent.  No portal venous gas.  Aorta tapers normally.    GI TRACT: Scattered intraluminal gas in the stomach, small bowel, and large bowel, without evidence of abnormal gastric or intestinal dilatation.  No pneumatosis intestinalis or abnormal intestinal wall thickening. Normal appendix.    BONES AND SOFT TISSUES:  1.2 cm fat-containing umbilical hernia.   No fractures or focal osseous lesions.

## 2024-09-20 NOTE — PROVIDER PROGRESS NOTES - EMERGENCY DEPT.
Imaging Results              CT Abdomen Pelvis With IV Contrast NO Oral Contrast (Final result)  Result time 09/19/24 23:53:42      Final result by Musa Allan MD (09/19/24 23:53:42)                   Impression:      Abdomen CT and Pelvis CT:    Wedge-shaped focus of parenchymal hypoenhancement parenchymal in the right lower renal pole (series 601, image 45).  The leading differential diagnostic considerations include focal segmental pyelonephritis or a small renal infarct.  Other underlying pathology, such as neoplasm, considered less likely but not fully excluded.  Correlate clinically and with follow-up outpatient renal imaging.    Otherwise, no acute abdominal or pelvic abnormality is demonstrated.    Electronically signed by resident: Kendell Torres  Date:    09/19/2024  Time:    21:20    Electronically signed by: Musa Allan  Date:    09/19/2024  Time:    23:53               Narrative:    EXAMINATION:  CT ABDOMEN PELVIS WITH IV CONTRAST    CLINICAL HISTORY:  Abdominal pain, acute, nonlocalized;    TECHNIQUE:  Low dose axial images, sagittal and coronal reformations were obtained from the lung bases to the pubic symphysis following the IV administration of 75 mL of Omnipaque 350 .  Oral contrast was not administered.    COMPARISON:  Ultrasound 09/19/2024, chest x-ray 09/19/2024, CT [id number]    FINDINGS:  LUNG BASES & MEDIASTINUM (limited):  Heart is incompletely visualized with appearance consistent with congenital heart defect.  No pericardial effusion.  No pleural effusion.  No consolidation.    HEPATOBILIARY:  No focal hepatic lesions. No biliary ductal dilatation. Normal gallbladder.    SPLEEN:  No splenomegaly.    PANCREAS:  No focal masses. No ductal dilatation.    ADRENALS:  No adrenal nodules.    KIDNEYS/URETERS: Kidneys enhance symmetrically, except for small wedge-shaped region of abnormally decreased parenchymal enhancement in the right lower renal pole..  No hydronephrosis, stones or  solid mass lesions. Ureters are unremarkable.    PELVIC ORGANS/BLADDER:  Unremarkable.    PERITONEUM / RETROPERITONEUM:  No free air. No free fluid.    LYMPH NODES:  No lymphadenopathy.    VESSELS:  Portal veins patent.  No portal venous gas.  Aorta tapers normally.    GI TRACT: Scattered intraluminal gas in the stomach, small bowel, and large bowel, without evidence of abnormal gastric or intestinal dilatation.  No pneumatosis intestinalis or abnormal intestinal wall thickening. Normal appendix.    BONES AND SOFT TISSUES:  1.2 cm fat-containing umbilical hernia.   No fractures or focal osseous lesions.                                     Patient was signed out pending CT abdomen pelvis.  This incidentally shows a hypo enhancement in his kidney, the ultrasound from earlier was reassuring, no signs of UTI on UA.  His symptoms resolved, he only endorsed hunger pains because he has been waiting so long and would like to be discharged home.  He has been tolerating p.o., it was stable for discharge with outpatient follow up.

## 2024-09-20 NOTE — ED NOTES
APPEARANCE: awake and alert in NAD.  SKIN: warm, dry and intact. No breakdown or bruising.  MUSCULOSKELETAL: Patient moving all extremities spontaneously, no obvious swelling or deformities noted. Ambulates independently.  RESPIRATORY: Denies shortness of breath.Respirations unlabored. 2L NC.   CARDIAC: Denies CP, 2+ distal pulses; no peripheral edema  ABDOMEN: S/ND/NT, Denies nausea, denies abdominal pain at this time.   : voids spontaneously, denies difficulty  Neurologic: AAO x 4; follows commands equal strength in all extremities; denies numbness/tingling. Denies dizziness, NED, pupils 3mm.

## 2024-09-20 NOTE — ED NOTES
Report received from HILLARY Canas. Assume care of pt. Pt resting comfortably and independently repositioned in stretcher with bed locked in lowest position for safety. NAD noted at this time. Respirations even and unlabored and visible chest rise noted.  Patient offered bathroom assistance and denies need at this time. Pt instructed to call if assistance is needed. Pt on continuous cardiac, BP, and O2 monitoring. Call light within reach. Family at bedside. No needs at this time. Will continue to monitor.

## 2024-10-30 RX ORDER — BENAZEPRIL HYDROCHLORIDE 10 MG/1
10 TABLET ORAL DAILY
Qty: 90 TABLET | Refills: 3 | Status: SHIPPED | OUTPATIENT
Start: 2024-10-30 | End: 2025-10-30

## 2024-10-30 RX ORDER — RIVAROXABAN 20 MG/1
20 TABLET, FILM COATED ORAL
Qty: 30 TABLET | Refills: 11 | Status: SHIPPED | OUTPATIENT
Start: 2024-10-30

## 2024-10-30 RX ORDER — BENAZEPRIL HYDROCHLORIDE 10 MG/1
10 TABLET ORAL DAILY
Qty: 30 TABLET | Refills: 11 | Status: SHIPPED | OUTPATIENT
Start: 2024-10-30 | End: 2025-10-30

## 2024-12-26 ENCOUNTER — NURSE TRIAGE (OUTPATIENT)
Dept: ADMINISTRATIVE | Facility: CLINIC | Age: 29
End: 2024-12-26
Payer: MEDICAID

## 2024-12-26 NOTE — TELEPHONE ENCOUNTER
C/o SOB and chest pain. Advised per protocol to go to the nearest ED now. Patient verbalizes understanding.  Advised the patient to call back with any further questions or if symptoms worsen.    Reason for Disposition   [1] MODERATE difficulty breathing (e.g., speaks in phrases, SOB even at rest, pulse 100-120) AND [2] NEW-onset or WORSE than normal    Additional Information   Negative: SEVERE difficulty breathing (e.g., struggling for each breath, speaks in single words)   Negative: [1] Breathing stopped AND [2] hasn't returned   Negative: Choking on something   Negative: Bluish (or gray) lips or face now   Negative: Difficult to awaken or acting confused (e.g., disoriented, slurred speech)   Negative: Passed out (e.g., fainted, lost consciousness, blacked out and was not responding)   Negative: Wheezing started suddenly after medicine, an allergic food or bee sting   Negative: Stridor (harsh sound while breathing in)   Negative: Slow, shallow and weak breathing   Negative: Sounds like a life-threatening emergency to the triager    Protocols used: Breathing Difficulty-A-AH

## 2025-01-01 ENCOUNTER — HOSPITAL ENCOUNTER (EMERGENCY)
Facility: HOSPITAL | Age: 30
Discharge: ANOTHER HOSPITAL | End: 2025-07-12
Attending: EMERGENCY MEDICINE
Payer: MEDICAID

## 2025-01-01 ENCOUNTER — LAB VISIT (OUTPATIENT)
Dept: LAB | Facility: HOSPITAL | Age: 30
End: 2025-01-01
Attending: INTERNAL MEDICINE
Payer: MEDICAID

## 2025-01-01 ENCOUNTER — RESULTS FOLLOW-UP (OUTPATIENT)
Dept: CARDIOLOGY | Facility: HOSPITAL | Age: 30
End: 2025-01-01

## 2025-01-01 ENCOUNTER — HOSPITAL ENCOUNTER (INPATIENT)
Facility: HOSPITAL | Age: 30
LOS: 6 days | DRG: 306 | End: 2025-07-18
Attending: INTERNAL MEDICINE | Admitting: INTERNAL MEDICINE
Payer: MEDICAID

## 2025-01-01 VITALS
HEART RATE: 121 BPM | SYSTOLIC BLOOD PRESSURE: 122 MMHG | HEIGHT: 66 IN | DIASTOLIC BLOOD PRESSURE: 78 MMHG | BODY MASS INDEX: 23.3 KG/M2 | TEMPERATURE: 94 F | WEIGHT: 145 LBS | OXYGEN SATURATION: 71 % | RESPIRATION RATE: 24 BRPM

## 2025-01-01 VITALS
BODY MASS INDEX: 19.77 KG/M2 | TEMPERATURE: 99 F | DIASTOLIC BLOOD PRESSURE: 54 MMHG | HEIGHT: 66 IN | SYSTOLIC BLOOD PRESSURE: 85 MMHG | WEIGHT: 123 LBS | OXYGEN SATURATION: 89 % | RESPIRATION RATE: 26 BRPM

## 2025-01-01 DIAGNOSIS — I46.9 CARDIAC ARREST: ICD-10-CM

## 2025-01-01 DIAGNOSIS — I26.99 PE (PULMONARY THROMBOEMBOLISM): ICD-10-CM

## 2025-01-01 DIAGNOSIS — I50.9 CONGESTIVE HEART FAILURE, UNSPECIFIED HF CHRONICITY, UNSPECIFIED HEART FAILURE TYPE: ICD-10-CM

## 2025-01-01 DIAGNOSIS — T82.9XXA CENTRAL LINE COMPLICATION: ICD-10-CM

## 2025-01-01 DIAGNOSIS — R57.0 CARDIOGENIC SHOCK: ICD-10-CM

## 2025-01-01 DIAGNOSIS — I50.9 CHF (CONGESTIVE HEART FAILURE): ICD-10-CM

## 2025-01-01 DIAGNOSIS — I26.99 ACUTE PULMONARY EMBOLISM, UNSPECIFIED PULMONARY EMBOLISM TYPE, UNSPECIFIED WHETHER ACUTE COR PULMONALE PRESENT: Primary | ICD-10-CM

## 2025-01-01 LAB
ABSOLUTE EOSINOPHIL (OHS): 0 K/UL
ABSOLUTE EOSINOPHIL (OHS): 0.01 K/UL
ABSOLUTE EOSINOPHIL (OHS): 0.02 K/UL
ABSOLUTE EOSINOPHIL (OHS): 0.03 K/UL
ABSOLUTE MONOCYTE (OHS): 0.25 K/UL (ref 0.3–1)
ABSOLUTE MONOCYTE (OHS): 0.26 K/UL (ref 0.3–1)
ABSOLUTE MONOCYTE (OHS): 0.46 K/UL (ref 0.3–1)
ABSOLUTE MONOCYTE (OHS): 0.47 K/UL (ref 0.3–1)
ABSOLUTE MONOCYTE (OHS): 0.55 K/UL (ref 0.3–1)
ABSOLUTE MONOCYTE (OHS): 0.59 K/UL (ref 0.3–1)
ABSOLUTE MONOCYTE (OHS): 0.65 K/UL (ref 0.3–1)
ABSOLUTE MONOCYTE (OHS): 0.66 K/UL (ref 0.3–1)
ABSOLUTE MONOCYTE (OHS): 0.67 K/UL (ref 0.3–1)
ABSOLUTE MONOCYTE (OHS): 0.89 K/UL (ref 0.3–1)
ABSOLUTE NEUTROPHIL COUNT (OHS): 10.07 K/UL (ref 1.8–7.7)
ABSOLUTE NEUTROPHIL COUNT (OHS): 11.61 K/UL (ref 1.8–7.7)
ABSOLUTE NEUTROPHIL COUNT (OHS): 12.69 K/UL (ref 1.8–7.7)
ABSOLUTE NEUTROPHIL COUNT (OHS): 13.41 K/UL (ref 1.8–7.7)
ABSOLUTE NEUTROPHIL COUNT (OHS): 5.09 K/UL (ref 1.8–7.7)
ABSOLUTE NEUTROPHIL COUNT (OHS): 5.71 K/UL (ref 1.8–7.7)
ABSOLUTE NEUTROPHIL COUNT (OHS): 7.25 K/UL (ref 1.8–7.7)
ABSOLUTE NEUTROPHIL COUNT (OHS): 7.27 K/UL (ref 1.8–7.7)
ABSOLUTE NEUTROPHIL COUNT (OHS): 7.89 K/UL (ref 1.8–7.7)
ABSOLUTE NEUTROPHIL COUNT (OHS): 9.55 K/UL (ref 1.8–7.7)
ALBUMIN SERPL BCP-MCNC: 2.5 G/DL (ref 3.5–5.2)
ALBUMIN SERPL BCP-MCNC: 2.8 G/DL (ref 3.5–5.2)
ALBUMIN SERPL BCP-MCNC: 2.8 G/DL (ref 3.5–5.2)
ALBUMIN SERPL BCP-MCNC: 2.9 G/DL (ref 3.5–5.2)
ALBUMIN SERPL BCP-MCNC: 2.9 G/DL (ref 3.5–5.2)
ALBUMIN SERPL BCP-MCNC: 3 G/DL (ref 3.5–5.2)
ALBUMIN SERPL BCP-MCNC: 3.1 G/DL (ref 3.5–5.2)
ALBUMIN SERPL BCP-MCNC: 3.2 G/DL (ref 3.5–5.2)
ALBUMIN SERPL BCP-MCNC: 3.7 G/DL (ref 3.5–5.2)
ALLENS TEST: ABNORMAL
ALLENS TEST: YES
ALP SERPL-CCNC: 69 UNIT/L (ref 40–150)
ALP SERPL-CCNC: 74 UNIT/L (ref 40–150)
ALP SERPL-CCNC: 78 UNIT/L (ref 40–150)
ALP SERPL-CCNC: 79 UNIT/L (ref 40–150)
ALP SERPL-CCNC: 79 UNIT/L (ref 40–150)
ALP SERPL-CCNC: 80 UNIT/L (ref 40–150)
ALP SERPL-CCNC: 81 UNIT/L (ref 40–150)
ALP SERPL-CCNC: 81 UNIT/L (ref 40–150)
ALP SERPL-CCNC: 95 UNIT/L (ref 40–150)
ALT SERPL W/O P-5'-P-CCNC: 100 UNIT/L (ref 10–44)
ALT SERPL W/O P-5'-P-CCNC: 120 UNIT/L (ref 10–44)
ALT SERPL W/O P-5'-P-CCNC: 144 UNIT/L (ref 10–44)
ALT SERPL W/O P-5'-P-CCNC: 174 UNIT/L (ref 10–44)
ALT SERPL W/O P-5'-P-CCNC: 174 UNIT/L (ref 10–44)
ALT SERPL W/O P-5'-P-CCNC: 180 UNIT/L (ref 10–44)
ALT SERPL W/O P-5'-P-CCNC: 26 UNIT/L (ref 10–44)
ALT SERPL W/O P-5'-P-CCNC: 45 UNIT/L (ref 10–44)
ALT SERPL W/O P-5'-P-CCNC: 87 UNIT/L (ref 10–44)
AMMONIA PLAS-SCNC: 48 UMOL/L (ref 10–50)
AMPHET UR QL SCN: NEGATIVE
ANION GAP (OHS): 10 MMOL/L (ref 8–16)
ANION GAP (OHS): 10 MMOL/L (ref 8–16)
ANION GAP (OHS): 11 MMOL/L (ref 8–16)
ANION GAP (OHS): 12 MMOL/L (ref 8–16)
ANION GAP (OHS): 12 MMOL/L (ref 8–16)
ANION GAP (OHS): 13 MMOL/L (ref 8–16)
ANION GAP (OHS): 14 MMOL/L (ref 8–16)
ANION GAP (OHS): 14 MMOL/L (ref 8–16)
ANION GAP (OHS): 15 MMOL/L (ref 8–16)
ANION GAP (OHS): 16 MMOL/L (ref 8–16)
ANION GAP (OHS): 17 MMOL/L (ref 8–16)
ANION GAP (OHS): 17 MMOL/L (ref 8–16)
ANION GAP (OHS): 18 MMOL/L (ref 8–16)
ANION GAP (OHS): 19 MMOL/L (ref 8–16)
ANION GAP (OHS): 19 MMOL/L (ref 8–16)
ANION GAP (OHS): 20 MMOL/L (ref 8–16)
ANION GAP (OHS): 21 MMOL/L (ref 8–16)
ANION GAP (OHS): 23 MMOL/L (ref 8–16)
ANISOCYTOSIS BLD QL SMEAR: SLIGHT
AORTIC SIZE INDEX (SOV): 2.5 CM/M2
APTT PPP: 38.7 SECONDS (ref 21–32)
APTT PPP: 43.8 SECONDS (ref 21–32)
APTT PPP: 44.1 SECONDS (ref 21–32)
APTT PPP: 44.3 SECONDS (ref 21–32)
APTT PPP: 45 SECONDS (ref 21–32)
APTT PPP: 45.3 SECONDS (ref 21–32)
APTT PPP: 47.6 SECONDS (ref 21–32)
APTT PPP: 48.7 SECONDS (ref 21–32)
APTT PPP: 55.2 SECONDS (ref 21–32)
APTT PPP: 55.9 SECONDS (ref 21–32)
APTT PPP: 73.6 SECONDS (ref 21–32)
APTT PPP: 79.7 SECONDS (ref 21–32)
APTT PPP: >150 SECONDS (ref 21–32)
AST SERPL-CCNC: 131 UNIT/L (ref 11–45)
AST SERPL-CCNC: 143 UNIT/L (ref 11–45)
AST SERPL-CCNC: 157 UNIT/L (ref 11–45)
AST SERPL-CCNC: 161 UNIT/L (ref 11–45)
AST SERPL-CCNC: 23 UNIT/L (ref 11–45)
AST SERPL-CCNC: 249 UNIT/L (ref 11–45)
AST SERPL-CCNC: 44 UNIT/L (ref 11–45)
AST SERPL-CCNC: 94 UNIT/L (ref 11–45)
AST SERPL-CCNC: 99 UNIT/L (ref 11–45)
AV LVOT MEAN GRADIENT: 0 MMHG
AV LVOT PEAK GRADIENT: 1 MMHG
BACTERIA #/AREA URNS AUTO: ABNORMAL /HPF
BACTERIA BLD CULT: NORMAL
BARBITURATE SCN PRESENT UR: NEGATIVE
BASOPHILS # BLD AUTO: 0.01 K/UL
BASOPHILS # BLD AUTO: 0.01 K/UL
BASOPHILS # BLD AUTO: 0.02 K/UL
BASOPHILS # BLD AUTO: 0.03 K/UL
BASOPHILS # BLD AUTO: 0.05 K/UL
BASOPHILS # BLD AUTO: 0.05 K/UL
BASOPHILS # BLD AUTO: 0.06 K/UL
BASOPHILS # BLD AUTO: 0.07 K/UL
BASOPHILS NFR BLD AUTO: 0.1 %
BASOPHILS NFR BLD AUTO: 0.2 %
BASOPHILS NFR BLD AUTO: 0.4 %
BASOPHILS NFR BLD AUTO: 0.4 %
BASOPHILS NFR BLD AUTO: 0.6 %
BASOPHILS NFR BLD AUTO: 0.7 %
BENZODIAZ UR QL SCN: NEGATIVE
BILIRUB SERPL-MCNC: 2.5 MG/DL (ref 0.1–1)
BILIRUB SERPL-MCNC: 2.5 MG/DL (ref 0.1–1)
BILIRUB SERPL-MCNC: 3 MG/DL (ref 0.1–1)
BILIRUB SERPL-MCNC: 3 MG/DL (ref 0.1–1)
BILIRUB SERPL-MCNC: 3.2 MG/DL (ref 0.1–1)
BILIRUB SERPL-MCNC: 3.5 MG/DL (ref 0.1–1)
BILIRUB SERPL-MCNC: 3.6 MG/DL (ref 0.1–1)
BILIRUB SERPL-MCNC: 4.8 MG/DL (ref 0.1–1)
BILIRUB SERPL-MCNC: 5.5 MG/DL (ref 0.1–1)
BILIRUB UR QL STRIP.AUTO: NEGATIVE
BILIRUB UR QL STRIP.AUTO: NEGATIVE
BIPAP: 0
BNP SERPL-MCNC: 1802 PG/ML (ref 0–99)
BNP SERPL-MCNC: 2461 PG/ML (ref 0–99)
BNP SERPL-MCNC: 2903 PG/ML (ref 0–99)
BSA FOR ECHO PROCEDURE: 1.75 M2
BUN SERPL-MCNC: 13 MG/DL (ref 6–20)
BUN SERPL-MCNC: 14 MG/DL (ref 6–20)
BUN SERPL-MCNC: 20 MG/DL (ref 6–20)
BUN SERPL-MCNC: 21 MG/DL (ref 6–20)
BUN SERPL-MCNC: 22 MG/DL (ref 6–20)
BUN SERPL-MCNC: 23 MG/DL (ref 6–20)
BUN SERPL-MCNC: 24 MG/DL (ref 6–20)
BUN SERPL-MCNC: 26 MG/DL (ref 6–20)
BUN SERPL-MCNC: 29 MG/DL (ref 6–20)
BUN SERPL-MCNC: 34 MG/DL (ref 6–20)
BUN SERPL-MCNC: 37 MG/DL (ref 6–20)
BUN SERPL-MCNC: 44 MG/DL (ref 6–20)
CA-I BLD-SCNC: 1.05 MMOL/L (ref 1.06–1.42)
CALCIUM SERPL-MCNC: 7.8 MG/DL (ref 8.7–10.5)
CALCIUM SERPL-MCNC: 7.9 MG/DL (ref 8.7–10.5)
CALCIUM SERPL-MCNC: 8 MG/DL (ref 8.7–10.5)
CALCIUM SERPL-MCNC: 8 MG/DL (ref 8.7–10.5)
CALCIUM SERPL-MCNC: 8.1 MG/DL (ref 8.7–10.5)
CALCIUM SERPL-MCNC: 8.3 MG/DL (ref 8.7–10.5)
CALCIUM SERPL-MCNC: 8.4 MG/DL (ref 8.7–10.5)
CALCIUM SERPL-MCNC: 8.5 MG/DL (ref 8.7–10.5)
CALCIUM SERPL-MCNC: 8.7 MG/DL (ref 8.7–10.5)
CALCIUM SERPL-MCNC: 9 MG/DL (ref 8.7–10.5)
CALCIUM SERPL-MCNC: 9.2 MG/DL (ref 8.7–10.5)
CALCIUM SERPL-MCNC: 9.3 MG/DL (ref 8.7–10.5)
CALCIUM SERPL-MCNC: 9.3 MG/DL (ref 8.7–10.5)
CALCIUM SERPL-MCNC: 9.5 MG/DL (ref 8.7–10.5)
CALCIUM SERPL-MCNC: 9.5 MG/DL (ref 8.7–10.5)
CALCIUM SERPL-MCNC: 9.6 MG/DL (ref 8.7–10.5)
CALCIUM SERPL-MCNC: 9.7 MG/DL (ref 8.7–10.5)
CANNABINOIDS UR QL SCN: NEGATIVE
CHLORIDE SERPL-SCNC: 100 MMOL/L (ref 95–110)
CHLORIDE SERPL-SCNC: 100 MMOL/L (ref 95–110)
CHLORIDE SERPL-SCNC: 89 MMOL/L (ref 95–110)
CHLORIDE SERPL-SCNC: 89 MMOL/L (ref 95–110)
CHLORIDE SERPL-SCNC: 90 MMOL/L (ref 95–110)
CHLORIDE SERPL-SCNC: 91 MMOL/L (ref 95–110)
CHLORIDE SERPL-SCNC: 91 MMOL/L (ref 95–110)
CHLORIDE SERPL-SCNC: 92 MMOL/L (ref 95–110)
CHLORIDE SERPL-SCNC: 93 MMOL/L (ref 95–110)
CHLORIDE SERPL-SCNC: 94 MMOL/L (ref 95–110)
CHLORIDE SERPL-SCNC: 95 MMOL/L (ref 95–110)
CHLORIDE SERPL-SCNC: 96 MMOL/L (ref 95–110)
CHLORIDE SERPL-SCNC: 97 MMOL/L (ref 95–110)
CHLORIDE SERPL-SCNC: 98 MMOL/L (ref 95–110)
CHLORIDE SERPL-SCNC: 98 MMOL/L (ref 95–110)
CHLORIDE SERPL-SCNC: 99 MMOL/L (ref 95–110)
CK SERPL-CCNC: 299 U/L (ref 20–200)
CK SERPL-CCNC: 405 U/L (ref 20–200)
CLARITY UR: CLEAR
CLARITY UR: CLEAR
CO2 SERPL-SCNC: 12 MMOL/L (ref 23–29)
CO2 SERPL-SCNC: 13 MMOL/L (ref 23–29)
CO2 SERPL-SCNC: 16 MMOL/L (ref 23–29)
CO2 SERPL-SCNC: 17 MMOL/L (ref 23–29)
CO2 SERPL-SCNC: 20 MMOL/L (ref 23–29)
CO2 SERPL-SCNC: 20 MMOL/L (ref 23–29)
CO2 SERPL-SCNC: 21 MMOL/L (ref 23–29)
CO2 SERPL-SCNC: 22 MMOL/L (ref 23–29)
CO2 SERPL-SCNC: 24 MMOL/L (ref 23–29)
CO2 SERPL-SCNC: 25 MMOL/L (ref 23–29)
CO2 SERPL-SCNC: 26 MMOL/L (ref 23–29)
CO2 SERPL-SCNC: 26 MMOL/L (ref 23–29)
CO2 SERPL-SCNC: 27 MMOL/L (ref 23–29)
CO2 SERPL-SCNC: 28 MMOL/L (ref 23–29)
CO2 SERPL-SCNC: 29 MMOL/L (ref 23–29)
CO2 SERPL-SCNC: 30 MMOL/L (ref 23–29)
CO2 SERPL-SCNC: 31 MMOL/L (ref 23–29)
COCAINE UR QL SCN: NEGATIVE
COLOR UR AUTO: YELLOW
COLOR UR AUTO: YELLOW
CORRECTED TEMPERATURE (PCO2): 38.8 MMHG
CORRECTED TEMPERATURE (PH): 7.18
CORRECTED TEMPERATURE (PO2): 49.9 MMHG
CREAT SERPL-MCNC: 0.9 MG/DL (ref 0.5–1.4)
CREAT SERPL-MCNC: 1 MG/DL (ref 0.5–1.4)
CREAT SERPL-MCNC: 1.1 MG/DL (ref 0.5–1.4)
CREAT SERPL-MCNC: 1.2 MG/DL (ref 0.5–1.4)
CREAT SERPL-MCNC: 1.3 MG/DL (ref 0.5–1.4)
CREAT SERPL-MCNC: 1.4 MG/DL (ref 0.5–1.4)
CREAT SERPL-MCNC: 1.5 MG/DL (ref 0.5–1.4)
CREAT SERPL-MCNC: 1.6 MG/DL (ref 0.5–1.4)
CREAT SERPL-MCNC: 1.6 MG/DL (ref 0.5–1.4)
CREAT SERPL-MCNC: 1.7 MG/DL (ref 0.5–1.4)
CREAT UR-MCNC: 244.1 MG/DL (ref 23–375)
CV ECHO LV RWT: 0.28 CM
DELSYS: ABNORMAL
DOP CALC LVOT AREA: 7.1 CM2
DOP CALC LVOT DIAMETER: 3 CM
DOP CALC LVOT PEAK VEL: 0.4 M/S
DOP CALC LVOT STROKE VOLUME: 35.3 CM3
DOP CALCLVOT PEAK VEL VTI: 5 CM
E/E' RATIO: 18 M/S
ECHO EF ESTIMATED: 28 %
ECHO LV POSTERIOR WALL: 1 CM (ref 0.6–1.1)
ERYTHROCYTE [DISTWIDTH] IN BLOOD BY AUTOMATED COUNT: 18.7 % (ref 11.5–14.5)
ERYTHROCYTE [DISTWIDTH] IN BLOOD BY AUTOMATED COUNT: 19 % (ref 11.5–14.5)
ERYTHROCYTE [DISTWIDTH] IN BLOOD BY AUTOMATED COUNT: 19 % (ref 11.5–14.5)
ERYTHROCYTE [DISTWIDTH] IN BLOOD BY AUTOMATED COUNT: 19.3 % (ref 11.5–14.5)
ERYTHROCYTE [DISTWIDTH] IN BLOOD BY AUTOMATED COUNT: 19.3 % (ref 11.5–14.5)
ERYTHROCYTE [DISTWIDTH] IN BLOOD BY AUTOMATED COUNT: 19.9 % (ref 11.5–14.5)
ERYTHROCYTE [DISTWIDTH] IN BLOOD BY AUTOMATED COUNT: 20 % (ref 11.5–14.5)
ERYTHROCYTE [DISTWIDTH] IN BLOOD BY AUTOMATED COUNT: 20.1 % (ref 11.5–14.5)
ERYTHROCYTE [DISTWIDTH] IN BLOOD BY AUTOMATED COUNT: 20.2 % (ref 11.5–14.5)
ERYTHROCYTE [DISTWIDTH] IN BLOOD BY AUTOMATED COUNT: ABNORMAL %
ERYTHROCYTE [SEDIMENTATION RATE] IN BLOOD BY WESTERGREN METHOD: 18 MM/H
ERYTHROCYTE [SEDIMENTATION RATE] IN BLOOD BY WESTERGREN METHOD: 18 MM/H
ERYTHROCYTE [SEDIMENTATION RATE] IN BLOOD BY WESTERGREN METHOD: 20 MM/H
ERYTHROCYTE [SEDIMENTATION RATE] IN BLOOD BY WESTERGREN METHOD: 24 MM/H
ERYTHROCYTE [SEDIMENTATION RATE] IN BLOOD BY WESTERGREN METHOD: 249 MM/H
FIO2: 100
FIO2: 100 %
FIO2: 100 %
FIO2: 21 %
FIO2: 70
FIO2: 90
FRACTIONAL SHORTENING: 13.9 % (ref 28–44)
GFR SERPLBLD CREATININE-BSD FMLA CKD-EPI: 55 ML/MIN/1.73/M2
GFR SERPLBLD CREATININE-BSD FMLA CKD-EPI: 59 ML/MIN/1.73/M2
GFR SERPLBLD CREATININE-BSD FMLA CKD-EPI: 59 ML/MIN/1.73/M2
GFR SERPLBLD CREATININE-BSD FMLA CKD-EPI: >60 ML/MIN/1.73/M2
GLUCOSE SERPL-MCNC: 101 MG/DL (ref 70–110)
GLUCOSE SERPL-MCNC: 102 MG/DL (ref 70–110)
GLUCOSE SERPL-MCNC: 102 MG/DL (ref 70–110)
GLUCOSE SERPL-MCNC: 111 MG/DL (ref 70–110)
GLUCOSE SERPL-MCNC: 112 MG/DL (ref 70–110)
GLUCOSE SERPL-MCNC: 114 MG/DL (ref 70–110)
GLUCOSE SERPL-MCNC: 115 MG/DL (ref 70–110)
GLUCOSE SERPL-MCNC: 121 MG/DL (ref 70–110)
GLUCOSE SERPL-MCNC: 122 MG/DL (ref 70–110)
GLUCOSE SERPL-MCNC: 123 MG/DL (ref 70–110)
GLUCOSE SERPL-MCNC: 124 MG/DL (ref 70–110)
GLUCOSE SERPL-MCNC: 127 MG/DL (ref 70–110)
GLUCOSE SERPL-MCNC: 128 MG/DL (ref 70–110)
GLUCOSE SERPL-MCNC: 138 MG/DL (ref 70–110)
GLUCOSE SERPL-MCNC: 143 MG/DL (ref 70–110)
GLUCOSE SERPL-MCNC: 156 MG/DL (ref 70–110)
GLUCOSE SERPL-MCNC: 160 MG/DL (ref 70–110)
GLUCOSE SERPL-MCNC: 162 MG/DL (ref 70–110)
GLUCOSE SERPL-MCNC: 164 MG/DL (ref 70–110)
GLUCOSE SERPL-MCNC: 164 MG/DL (ref 70–110)
GLUCOSE SERPL-MCNC: 178 MG/DL (ref 70–110)
GLUCOSE SERPL-MCNC: 183 MG/DL (ref 70–110)
GLUCOSE SERPL-MCNC: 217 MG/DL (ref 70–110)
GLUCOSE SERPL-MCNC: 230 MG/DL (ref 70–110)
GLUCOSE SERPL-MCNC: 253 MG/DL (ref 70–110)
GLUCOSE SERPL-MCNC: 86 MG/DL (ref 70–110)
GLUCOSE SERPL-MCNC: 95 MG/DL (ref 70–110)
GLUCOSE SERPL-MCNC: 95 MG/DL (ref 70–110)
GLUCOSE SERPL-MCNC: 98 MG/DL (ref 70–110)
GLUCOSE SERPL-MCNC: 99 MG/DL (ref 70–110)
GLUCOSE UR QL STRIP: NEGATIVE
GLUCOSE UR QL STRIP: NEGATIVE
HCO3 UR-SCNC: 14.1 MMOL/L (ref 24–28)
HCO3 UR-SCNC: 17.5 MMOL/L (ref 24–28)
HCO3 UR-SCNC: 18.5 MMOL/L (ref 24–28)
HCO3 UR-SCNC: 21.4 MMOL/L (ref 24–28)
HCO3 UR-SCNC: 23.1 MMOL/L (ref 24–28)
HCO3 UR-SCNC: 25.7 MMOL/L (ref 24–28)
HCO3 UR-SCNC: 26 MMOL/L (ref 24–28)
HCO3 UR-SCNC: 26.2 MMOL/L (ref 24–28)
HCO3 UR-SCNC: 26.5 MMOL/L (ref 24–28)
HCO3 UR-SCNC: 26.7 MMOL/L (ref 24–28)
HCO3 UR-SCNC: 27.7 MMOL/L (ref 24–28)
HCO3 UR-SCNC: 28.5 MMOL/L (ref 24–28)
HCO3 UR-SCNC: 29.1 MMOL/L (ref 24–28)
HCO3 UR-SCNC: 30.6 MMOL/L (ref 24–28)
HCO3 UR-SCNC: 31.5 MMOL/L (ref 24–28)
HCO3 UR-SCNC: 32.9 MMOL/L (ref 24–28)
HCO3 UR-SCNC: 33.5 MMOL/L (ref 24–28)
HCO3 UR-SCNC: 33.6 MMOL/L (ref 24–28)
HCO3 UR-SCNC: 36.1 MMOL/L (ref 24–28)
HCT VFR BLD AUTO: 35.1 % (ref 40–54)
HCT VFR BLD AUTO: 35.4 % (ref 40–54)
HCT VFR BLD AUTO: 35.6 % (ref 40–54)
HCT VFR BLD AUTO: 36.6 % (ref 40–54)
HCT VFR BLD AUTO: 37.1 % (ref 40–54)
HCT VFR BLD AUTO: 40 % (ref 40–54)
HCT VFR BLD AUTO: 40.6 % (ref 40–54)
HCT VFR BLD AUTO: 41.5 % (ref 40–54)
HCT VFR BLD AUTO: 44.3 % (ref 40–54)
HCT VFR BLD AUTO: 9.2 % (ref 40–54)
HCT VFR BLD CALC: 41 % (ref 36–54)
HCT VFR BLD CALC: 42 %PCV (ref 36–54)
HGB BLD-MCNC: 11.8 GM/DL (ref 14–18)
HGB BLD-MCNC: 12.1 GM/DL (ref 14–18)
HGB BLD-MCNC: 12.3 GM/DL (ref 14–18)
HGB BLD-MCNC: 12.4 GM/DL (ref 14–18)
HGB BLD-MCNC: 12.6 GM/DL (ref 14–18)
HGB BLD-MCNC: 13.3 GM/DL (ref 14–18)
HGB BLD-MCNC: 13.4 G/DL (ref 9–18)
HGB BLD-MCNC: 13.4 GM/DL (ref 14–18)
HGB BLD-MCNC: 13.6 GM/DL (ref 14–18)
HGB BLD-MCNC: 13.9 G/DL (ref 14–18)
HGB UR QL STRIP: ABNORMAL
HGB UR QL STRIP: ABNORMAL
HYALINE CASTS UR QL AUTO: 6 /LPF (ref 0–1)
IMM GRANULOCYTES # BLD AUTO: 0.05 K/UL (ref 0–0.04)
IMM GRANULOCYTES # BLD AUTO: 0.06 K/UL (ref 0–0.04)
IMM GRANULOCYTES # BLD AUTO: 0.1 K/UL (ref 0–0.04)
IMM GRANULOCYTES # BLD AUTO: 0.15 K/UL (ref 0–0.04)
IMM GRANULOCYTES # BLD AUTO: 0.18 K/UL (ref 0–0.04)
IMM GRANULOCYTES # BLD AUTO: 0.2 K/UL (ref 0–0.04)
IMM GRANULOCYTES # BLD AUTO: 0.2 K/UL (ref 0–0.04)
IMM GRANULOCYTES # BLD AUTO: 0.21 K/UL (ref 0–0.04)
IMM GRANULOCYTES # BLD AUTO: 0.28 K/UL (ref 0–0.04)
IMM GRANULOCYTES # BLD AUTO: 0.31 K/UL (ref 0–0.04)
IMM GRANULOCYTES NFR BLD AUTO: 0.5 % (ref 0–0.5)
IMM GRANULOCYTES NFR BLD AUTO: 0.7 % (ref 0–0.5)
IMM GRANULOCYTES NFR BLD AUTO: 0.7 % (ref 0–0.5)
IMM GRANULOCYTES NFR BLD AUTO: 1.2 % (ref 0–0.5)
IMM GRANULOCYTES NFR BLD AUTO: 1.3 % (ref 0–0.5)
IMM GRANULOCYTES NFR BLD AUTO: 2 % (ref 0–0.5)
IMM GRANULOCYTES NFR BLD AUTO: 2.1 % (ref 0–0.5)
IMM GRANULOCYTES NFR BLD AUTO: 2.2 % (ref 0–0.5)
IMM GRANULOCYTES NFR BLD AUTO: 2.3 % (ref 0–0.5)
IMM GRANULOCYTES NFR BLD AUTO: 3.3 % (ref 0–0.5)
INR PPP: 1.1 (ref 0.8–1.2)
INR PPP: 1.2 (ref 0.8–1.2)
INR PPP: 1.2 (ref 0.8–1.2)
INR PPP: 1.6 (ref 0.8–1.2)
INR PPP: 1.7 (ref 0.8–1.2)
INR PPP: 1.9 (ref 0.8–1.2)
INR PPP: 2.3 (ref 0.8–1.2)
INTERVENTRICULAR SEPTUM: 0.9 CM (ref 0.6–1.1)
IVRT: 76 MS
KETONES UR QL STRIP: NEGATIVE
KETONES UR QL STRIP: NEGATIVE
LACTATE SERPL-SCNC: 1.7 MMOL/L (ref 0.5–2.2)
LACTATE SERPL-SCNC: 1.8 MMOL/L (ref 0.5–2.2)
LACTATE SERPL-SCNC: 1.9 MMOL/L (ref 0.5–2.2)
LACTATE SERPL-SCNC: 10.9 MMOL/L (ref 0.5–2.2)
LACTATE SERPL-SCNC: 3.8 MMOL/L (ref 0.5–2.2)
LACTATE SERPL-SCNC: 4.8 MMOL/L (ref 0.5–2.2)
LACTATE SERPL-SCNC: 8.8 MMOL/L (ref 0.5–2.2)
LACTATE SERPL-SCNC: 9.1 MMOL/L (ref 0.5–2.2)
LACTATE SERPL-SCNC: 9.2 MMOL/L (ref 0.5–2.2)
LDH SERPL L TO P-CCNC: 11.9 MMOL/L (ref 0.5–2.2)
LDH SERPL L TO P-CCNC: 9.4 MMOL/L (ref 0.4–1.3)
LEFT ANT TIBIAL SYS PSV: 16 CM/S
LEFT ATRIUM SIZE: 4.2 CM
LEFT CFA PSV: 50 CM/S
LEFT DORSALIS PEDIS PSV: 16 CM/S
LEFT EXTERNAL ILIAC PSV: 46 CM/S
LEFT INTERNAL DIMENSION IN SYSTOLE: 6.2 CM (ref 2.1–4)
LEFT PERONEAL SYS PSV: 10 CM/S
LEFT POPLITEAL PSV: 35 CM/S
LEFT POST TIBIAL SYS PSV: 10 CM/S
LEFT PROFUNDA SYS PSV: 101 CM/S
LEFT SUPER FEMORAL OSTIAL SYS PSV: 58 CM/S
LEFT SUPER FEMORAL PROX SYS PSV: 66 CM/S
LEFT TIB/PER TRUNK SYS PSV: 61 CM/S
LEFT VENTRICLE DIASTOLIC VOLUME INDEX: 158.24 ML/M2
LEFT VENTRICLE DIASTOLIC VOLUME: 269 ML
LEFT VENTRICLE MASS INDEX: 186.5 G/M2
LEFT VENTRICLE SYSTOLIC VOLUME INDEX: 113.5 ML/M2
LEFT VENTRICLE SYSTOLIC VOLUME: 193 ML
LEFT VENTRICULAR INTERNAL DIMENSION IN DIASTOLE: 7.2 CM (ref 3.5–6)
LEFT VENTRICULAR MASS: 317 G
LEUKOCYTE ESTERASE UR QL STRIP: NEGATIVE
LEUKOCYTE ESTERASE UR QL STRIP: NEGATIVE
LV LATERAL E/E' RATIO: 24 M/S
LV SEPTAL E/E' RATIO: 14.4 M/S
LYMPHOCYTES # BLD AUTO: 0.69 K/UL (ref 1–4.8)
LYMPHOCYTES # BLD AUTO: 0.7 K/UL (ref 1–4.8)
LYMPHOCYTES # BLD AUTO: 0.78 K/UL (ref 1–4.8)
LYMPHOCYTES # BLD AUTO: 0.84 K/UL (ref 1–4.8)
LYMPHOCYTES # BLD AUTO: 1.01 K/UL (ref 1–4.8)
LYMPHOCYTES # BLD AUTO: 1.17 K/UL (ref 1–4.8)
LYMPHOCYTES # BLD AUTO: 1.18 K/UL (ref 1–4.8)
LYMPHOCYTES # BLD AUTO: 1.69 K/UL (ref 1–4.8)
LYMPHOCYTES # BLD AUTO: 2.09 K/UL (ref 1–4.8)
LYMPHOCYTES # BLD AUTO: 3.44 K/UL (ref 1–4.8)
MAGNESIUM SERPL-MCNC: 1.8 MG/DL (ref 1.6–2.6)
MAGNESIUM SERPL-MCNC: 1.9 MG/DL (ref 1.6–2.6)
MAGNESIUM SERPL-MCNC: 2 MG/DL (ref 1.6–2.6)
MAGNESIUM SERPL-MCNC: 2.1 MG/DL (ref 1.6–2.6)
MAGNESIUM SERPL-MCNC: 2.2 MG/DL (ref 1.6–2.6)
MAGNESIUM SERPL-MCNC: 2.3 MG/DL (ref 1.6–2.6)
MAGNESIUM SERPL-MCNC: 2.3 MG/DL (ref 1.6–2.6)
MAGNESIUM SERPL-MCNC: 2.4 MG/DL (ref 1.6–2.6)
MAGNESIUM SERPL-MCNC: 2.5 MG/DL (ref 1.6–2.6)
MAGNESIUM SERPL-MCNC: 2.6 MG/DL (ref 1.6–2.6)
MAGNESIUM SERPL-MCNC: 2.7 MG/DL (ref 1.6–2.6)
MAGNESIUM SERPL-MCNC: 2.8 MG/DL (ref 1.6–2.6)
MCH RBC QN AUTO: 144.7 PG (ref 27–31)
MCH RBC QN AUTO: 27.9 PG (ref 27–31)
MCH RBC QN AUTO: 27.9 PG (ref 27–31)
MCH RBC QN AUTO: 28.1 PG (ref 27–31)
MCH RBC QN AUTO: 28.2 PG (ref 27–31)
MCH RBC QN AUTO: 28.3 PG (ref 27–31)
MCH RBC QN AUTO: 28.4 PG (ref 27–31)
MCH RBC QN AUTO: 29.6 PG (ref 27–31)
MCHC RBC AUTO-ENTMCNC: 30 G/DL (ref 32–36)
MCHC RBC AUTO-ENTMCNC: 31 G/DL (ref 32–36)
MCHC RBC AUTO-ENTMCNC: 31 G/DL (ref 32–36)
MCHC RBC AUTO-ENTMCNC: 32.3 G/DL (ref 32–36)
MCHC RBC AUTO-ENTMCNC: 32.6 G/DL (ref 32–36)
MCHC RBC AUTO-ENTMCNC: 33.1 G/DL (ref 32–36)
MCHC RBC AUTO-ENTMCNC: 33.3 G/DL (ref 32–36)
MCHC RBC AUTO-ENTMCNC: 34.5 G/DL (ref 32–36)
MCHC RBC AUTO-ENTMCNC: 34.6 G/DL (ref 32–36)
MCHC RBC AUTO-ENTMCNC: >40 G/DL (ref 32–36)
MCV RBC AUTO: 82 FL (ref 82–98)
MCV RBC AUTO: 82 FL (ref 82–98)
MCV RBC AUTO: 84 FL (ref 82–98)
MCV RBC AUTO: 85 FL (ref 82–98)
MCV RBC AUTO: 86 FL (ref 82–98)
MCV RBC AUTO: 91 FL (ref 82–98)
MCV RBC AUTO: 92 FL (ref 82–98)
MCV RBC AUTO: 92 FL (ref 82–98)
MCV RBC AUTO: 94 FL (ref 82–98)
MCV RBC AUTO: 98 FL (ref 82–98)
METHADONE UR QL SCN: NEGATIVE
METHEMOGLOBIN: 0 % (ref 0–3)
METHEMOGLOBIN: 0.2 % (ref 0–3)
METHEMOGLOBIN: 0.4 % (ref 0–3)
MICROSCOPIC COMMENT: ABNORMAL
MIN VOL: 6.68
MIN VOL: 8.54
MIN VOL: 8.54
MODE: ABNORMAL
MV PEAK E VEL: 0.72 M/S
NITRITE UR QL STRIP: NEGATIVE
NITRITE UR QL STRIP: NEGATIVE
NSE SERPL-MCNC: 120 NG/ML
NSE SERPL-MCNC: 225 NG/ML
NT-PROBNP SERPL IA-MCNC: 3498 PG/ML
NUCLEATED RBC (/100WBC) (OHS): 0 /100 WBC
NUCLEATED RBC (/100WBC) (OHS): 1 /100 WBC
NUCLEATED RBC (/100WBC) (OHS): 11 /100 WBC
NUCLEATED RBC (/100WBC) (OHS): 2 /100 WBC
OHS QRS DURATION: 100 MS
OHS QRS DURATION: 92 MS
OHS QTC CALCULATION: 481 MS
OHS QTC CALCULATION: 513 MS
OPIATES UR QL SCN: NEGATIVE
PCO2 BLDA: 28 MMHG (ref 35–45)
PCO2 BLDA: 28 MMHG (ref 35–45)
PCO2 BLDA: 31.4 MMHG (ref 35–45)
PCO2 BLDA: 32.6 MMHG (ref 35–45)
PCO2 BLDA: 32.6 MMHG (ref 35–45)
PCO2 BLDA: 33.1 MMHG (ref 35–45)
PCO2 BLDA: 33.6 MMHG (ref 35–45)
PCO2 BLDA: 34.6 MMHG (ref 35–45)
PCO2 BLDA: 36.2 MMHG (ref 35–45)
PCO2 BLDA: 36.4 MMHG (ref 35–45)
PCO2 BLDA: 37.2 MMHG (ref 35–45)
PCO2 BLDA: 37.7 MMHG (ref 35–45)
PCO2 BLDA: 38.8 MMHG (ref 35–45)
PCO2 BLDA: 39.6 MMHG (ref 35–45)
PCO2 BLDA: 40.7 MMHG (ref 35–45)
PCO2 BLDA: 41.1 MMHG (ref 35–45)
PCO2 BLDA: 42.8 MMHG (ref 35–45)
PCO2 BLDA: 45.7 MMHG (ref 35–45)
PCO2 BLDA: 45.8 MMHG (ref 35–45)
PCO2 BLDA: 46.4 MMHG (ref 35–45)
PCP UR QL: NEGATIVE
PEEP: 10
PEEP: 5
PEEP: 6
PEEP: 8
PH SMN: 7.18 [PH] (ref 7.35–7.45)
PH SMN: 7.21 [PH] (ref 7.35–7.45)
PH SMN: 7.22 [PH] (ref 7.35–7.45)
PH SMN: 7.25 [PH] (ref 7.35–7.45)
PH SMN: 7.36 [PH] (ref 7.35–7.45)
PH SMN: 7.41 [PH] (ref 7.35–7.45)
PH SMN: 7.41 [PH] (ref 7.35–7.45)
PH SMN: 7.43 [PH] (ref 7.35–7.45)
PH SMN: 7.44 [PH] (ref 7.35–7.45)
PH SMN: 7.45 [PH] (ref 7.35–7.45)
PH SMN: 7.46 [PH] (ref 7.35–7.45)
PH SMN: 7.47 [PH] (ref 7.35–7.45)
PH SMN: 7.5 [PH] (ref 7.35–7.45)
PH SMN: 7.5 [PH] (ref 7.35–7.45)
PH SMN: 7.52 [PH] (ref 7.35–7.45)
PH SMN: 7.52 [PH] (ref 7.35–7.45)
PH SMN: 7.53 [PH] (ref 7.35–7.45)
PH SMN: 7.53 [PH] (ref 7.35–7.45)
PH SMN: 7.55 [PH] (ref 7.35–7.45)
PH SMN: 7.59 [PH] (ref 7.35–7.45)
PH UR STRIP: 5 [PH]
PH UR STRIP: 6 [PH]
PHOSPHATE SERPL-MCNC: 3.3 MG/DL (ref 2.7–4.5)
PHOSPHATE SERPL-MCNC: 3.4 MG/DL (ref 2.7–4.5)
PHOSPHATE SERPL-MCNC: 3.5 MG/DL (ref 2.7–4.5)
PHOSPHATE SERPL-MCNC: 3.6 MG/DL (ref 2.7–4.5)
PHOSPHATE SERPL-MCNC: 3.6 MG/DL (ref 2.7–4.5)
PHOSPHATE SERPL-MCNC: 3.9 MG/DL (ref 2.7–4.5)
PHOSPHATE SERPL-MCNC: 4.2 MG/DL (ref 2.7–4.5)
PHOSPHATE SERPL-MCNC: 4.4 MG/DL (ref 2.7–4.5)
PHOSPHATE SERPL-MCNC: 4.4 MG/DL (ref 2.7–4.5)
PHOSPHATE SERPL-MCNC: 5 MG/DL (ref 2.7–4.5)
PHOSPHATE SERPL-MCNC: 5 MG/DL (ref 2.7–4.5)
PHOSPHATE SERPL-MCNC: 5.1 MG/DL (ref 2.7–4.5)
PHOSPHATE SERPL-MCNC: 5.2 MG/DL (ref 2.7–4.5)
PHOSPHATE SERPL-MCNC: 5.4 MG/DL (ref 2.7–4.5)
PHOSPHATE SERPL-MCNC: 5.5 MG/DL (ref 2.7–4.5)
PHOSPHATE SERPL-MCNC: 5.7 MG/DL (ref 2.7–4.5)
PHOSPHATE SERPL-MCNC: 5.7 MG/DL (ref 2.7–4.5)
PHOSPHATE SERPL-MCNC: 5.9 MG/DL (ref 2.7–4.5)
PHOSPHATE SERPL-MCNC: 6.1 MG/DL (ref 2.7–4.5)
PHOSPHATE SERPL-MCNC: 6.3 MG/DL (ref 2.7–4.5)
PHOSPHATE SERPL-MCNC: 6.5 MG/DL (ref 2.7–4.5)
PIP: 22
PIP: 24
PIP: 24
PLATELET # BLD AUTO: 100 K/UL (ref 150–450)
PLATELET # BLD AUTO: 110 K/UL (ref 150–450)
PLATELET # BLD AUTO: 111 K/UL (ref 150–450)
PLATELET # BLD AUTO: 114 K/UL (ref 150–450)
PLATELET # BLD AUTO: 116 K/UL (ref 150–450)
PLATELET # BLD AUTO: 130 K/UL (ref 150–450)
PLATELET # BLD AUTO: 131 K/UL (ref 150–450)
PLATELET # BLD AUTO: 134 K/UL (ref 150–450)
PLATELET # BLD AUTO: 140 K/UL (ref 150–450)
PLATELET # BLD AUTO: 99 K/UL (ref 150–450)
PLATELET BLD QL SMEAR: ABNORMAL
PMV BLD AUTO: 11.4 FL (ref 9.2–12.9)
PMV BLD AUTO: 11.4 FL (ref 9.2–12.9)
PMV BLD AUTO: 11.6 FL (ref 9.2–12.9)
PMV BLD AUTO: 11.7 FL (ref 9.2–12.9)
PMV BLD AUTO: 12 FL (ref 9.2–12.9)
PMV BLD AUTO: 12.1 FL (ref 9.2–12.9)
PMV BLD AUTO: 12.2 FL (ref 9.2–12.9)
PMV BLD AUTO: 12.2 FL (ref 9.2–12.9)
PO2 BLDA: 18 MMHG (ref 40–60)
PO2 BLDA: 20 MMHG (ref 40–60)
PO2 BLDA: 23 MMHG (ref 40–60)
PO2 BLDA: 24 MMHG (ref 40–60)
PO2 BLDA: 24 MMHG (ref 40–60)
PO2 BLDA: 27 MMHG (ref 40–60)
PO2 BLDA: 29 MMHG (ref 40–60)
PO2 BLDA: 31 MMHG (ref 40–60)
PO2 BLDA: 32 MMHG (ref 40–60)
PO2 BLDA: 32 MMHG (ref 40–60)
PO2 BLDA: 44 MMHG (ref 80–100)
PO2 BLDA: 45 MMHG (ref 80–100)
PO2 BLDA: 47.8 MMHG (ref 80–100)
PO2 BLDA: 48 MMHG (ref 80–100)
PO2 BLDA: 48 MMHG (ref 80–100)
PO2 BLDA: 49.9 MMHG (ref 80–100)
PO2 BLDA: 51 MMHG (ref 80–100)
PO2 BLDA: 53.2 MMHG (ref 80–100)
PO2 BLDA: 55 MMHG (ref 80–100)
PO2 BLDA: 55 MMHG (ref 80–100)
PO2 BLDA: 57 MMHG (ref 80–100)
PO2 BLDA: 57 MMHG (ref 80–100)
PO2 BLDA: 60 MMHG (ref 80–100)
PO2 BLDA: 60 MMHG (ref 80–100)
PO2 BLDA: 82 MMHG (ref 80–100)
POC BASE DEFICIT: -12.3 MMOL/L (ref -2–2)
POC BASE DEFICIT: -13 MMOL/L (ref -2–2)
POC BASE DEFICIT: -13.2 MMOL/L
POC BE: -13 MMOL/L (ref -2–2)
POC BE: -3 MMOL/L (ref -2–2)
POC BE: -7 MMOL/L (ref -2–2)
POC BE: -7 MMOL/L (ref -2–2)
POC BE: 0 MMOL/L (ref -2–2)
POC BE: 1 MMOL/L (ref -2–2)
POC BE: 10 MMOL/L (ref -2–2)
POC BE: 11 MMOL/L (ref -2–2)
POC BE: 11 MMOL/L (ref -2–2)
POC BE: 13 MMOL/L (ref -2–2)
POC BE: 3 MMOL/L (ref -2–2)
POC BE: 4 MMOL/L (ref -2–2)
POC BE: 5 MMOL/L (ref -2–2)
POC BE: 6 MMOL/L (ref -2–2)
POC BE: 7 MMOL/L (ref -2–2)
POC BE: 8 MMOL/L (ref -2–2)
POC COHB: 1.1 % (ref 0–9)
POC HCO3: 13.6 MMOL/L (ref 24–28)
POC HCO3: 14 MMOL/L (ref 24–28)
POC HCO3: 14.8 MMOL/L (ref 24–28)
POC IONIZED CALCIUM: 1.05 MMOL/L (ref 1.06–1.42)
POC IONIZED CALCIUM: 1.06 MMOL/L (ref 1.06–1.42)
POC IONIZED CALCIUM: 1.07 MMOL/L (ref 1.06–1.42)
POC METHB: 0 % (ref 0–1.5)
POC METHB: 0.2 % (ref 0–3)
POC METHB: 0.4 % (ref 0–3)
POC METHB: 0.7 % (ref 0–3)
POC O2HB: 48.9 % (ref 94–100)
POC PERFORMED BY: ABNORMAL
POC PERFORMED BY: NORMAL
POC SATURATED O2: 30 % (ref 95–100)
POC SATURATED O2: 31 % (ref 95–100)
POC SATURATED O2: 44 % (ref 95–100)
POC SATURATED O2: 46 % (ref 95–100)
POC SATURATED O2: 48 % (ref 95–100)
POC SATURATED O2: 49.8 %
POC SATURATED O2: 56 % (ref 95–100)
POC SATURATED O2: 56 % (ref 95–100)
POC SATURATED O2: 59 % (ref 95–100)
POC SATURATED O2: 60 % (ref 95–100)
POC SATURATED O2: 60 % (ref 95–100)
POC SATURATED O2: 61 % (ref 95–100)
POC SATURATED O2: 69 % (ref 95–100)
POC SATURATED O2: 69.7 % (ref 95–100)
POC SATURATED O2: 70.3 % (ref 95–100)
POC SATURATED O2: 76.1 % (ref 95–100)
POC SATURATED O2: 82 % (ref 95–100)
POC SATURATED O2: 86 % (ref 95–100)
POC SATURATED O2: 86 % (ref 95–100)
POC SATURATED O2: 87 % (ref 95–100)
POC SATURATED O2: 88 % (ref 95–100)
POC SATURATED O2: 89 % (ref 95–100)
POC SATURATED O2: 90 % (ref 95–100)
POC SATURATED O2: 91 % (ref 95–100)
POC SATURATED O2: 91 % (ref 95–100)
POC SATURATED O2: 93 % (ref 95–100)
POC SATURATED O2: 93 % (ref 95–100)
POC SATURATED O2: 94 % (ref 95–100)
POC SET RR: 20
POC SVO2: 31 %
POC TCO2: 15 MMOL/L (ref 23–27)
POC TCO2: 18 MMOL/L (ref 23–27)
POC TCO2: 20 MMOL/L (ref 24–29)
POC TCO2: 22 MMOL/L (ref 24–29)
POC TCO2: 24 MMOL/L (ref 23–27)
POC TCO2: 27 MMOL/L (ref 23–27)
POC TCO2: 27 MMOL/L (ref 23–27)
POC TCO2: 27 MMOL/L (ref 24–29)
POC TCO2: 28 MMOL/L (ref 23–27)
POC TCO2: 28 MMOL/L (ref 23–27)
POC TCO2: 29 MMOL/L (ref 23–27)
POC TCO2: 30 MMOL/L (ref 24–29)
POC TCO2: 30 MMOL/L (ref 24–29)
POC TCO2: 32 MMOL/L (ref 23–27)
POC TCO2: 33 MMOL/L (ref 23–27)
POC TCO2: 34 MMOL/L (ref 23–27)
POC TCO2: 35 MMOL/L (ref 23–27)
POC TCO2: 35 MMOL/L (ref 24–29)
POC TCO2: 38 MMOL/L (ref 24–29)
POC TEMPERATURE: 37 C
POCT GLUCOSE: 107 MG/DL (ref 70–110)
POCT GLUCOSE: 108 MG/DL (ref 70–110)
POCT GLUCOSE: 119 MG/DL (ref 70–110)
POCT GLUCOSE: 125 MG/DL (ref 70–110)
POCT GLUCOSE: 126 MG/DL (ref 70–110)
POCT GLUCOSE: 127 MG/DL (ref 70–110)
POCT GLUCOSE: 129 MG/DL (ref 70–110)
POCT GLUCOSE: 131 MG/DL (ref 70–110)
POCT GLUCOSE: 132 MG/DL (ref 70–110)
POCT GLUCOSE: 141 MG/DL (ref 70–110)
POCT GLUCOSE: 142 MG/DL (ref 70–110)
POCT GLUCOSE: 145 MG/DL (ref 70–110)
POCT GLUCOSE: 146 MG/DL (ref 70–110)
POCT GLUCOSE: 147 MG/DL (ref 70–110)
POCT GLUCOSE: 154 MG/DL (ref 70–110)
POCT GLUCOSE: 160 MG/DL (ref 70–110)
POCT GLUCOSE: 170 MG/DL (ref 70–110)
POCT GLUCOSE: 172 MG/DL (ref 70–110)
POCT GLUCOSE: 241 MG/DL (ref 70–110)
POCT GLUCOSE: 256 MG/DL (ref 70–110)
POTASSIUM BLD-SCNC: 3.6 MMOL/L (ref 3.5–5.1)
POTASSIUM BLD-SCNC: 4 MMOL/L (ref 3.5–5.1)
POTASSIUM BLD-SCNC: 4.3 MMOL/L (ref 3.5–5.1)
POTASSIUM BLD-SCNC: 4.3 MMOL/L (ref 3.5–5.1)
POTASSIUM SERPL-SCNC: 3.2 MMOL/L (ref 3.5–5.1)
POTASSIUM SERPL-SCNC: 3.3 MMOL/L (ref 3.5–5.1)
POTASSIUM SERPL-SCNC: 3.4 MMOL/L (ref 3.5–5.1)
POTASSIUM SERPL-SCNC: 3.5 MMOL/L (ref 3.5–5.1)
POTASSIUM SERPL-SCNC: 3.7 MMOL/L (ref 3.5–5.1)
POTASSIUM SERPL-SCNC: 3.8 MMOL/L (ref 3.5–5.1)
POTASSIUM SERPL-SCNC: 3.8 MMOL/L (ref 3.5–5.1)
POTASSIUM SERPL-SCNC: 3.9 MMOL/L (ref 3.5–5.1)
POTASSIUM SERPL-SCNC: 3.9 MMOL/L (ref 3.5–5.1)
POTASSIUM SERPL-SCNC: 4 MMOL/L (ref 3.5–5.1)
POTASSIUM SERPL-SCNC: 4.1 MMOL/L (ref 3.5–5.1)
POTASSIUM SERPL-SCNC: 4.1 MMOL/L (ref 3.5–5.1)
POTASSIUM SERPL-SCNC: 4.2 MMOL/L (ref 3.5–5.1)
POTASSIUM SERPL-SCNC: 4.3 MMOL/L (ref 3.5–5.1)
POTASSIUM SERPL-SCNC: 4.4 MMOL/L (ref 3.5–5.1)
POTASSIUM SERPL-SCNC: 4.4 MMOL/L (ref 3.5–5.1)
POTASSIUM SERPL-SCNC: 4.5 MMOL/L (ref 3.5–5.1)
POTASSIUM SERPL-SCNC: 4.6 MMOL/L (ref 3.5–5.1)
POTASSIUM SERPL-SCNC: 4.7 MMOL/L (ref 3.5–5.1)
PROT SERPL-MCNC: 5.9 GM/DL (ref 6–8.4)
PROT SERPL-MCNC: 6.5 GM/DL (ref 6–8.4)
PROT SERPL-MCNC: 6.8 GM/DL (ref 6–8.4)
PROT SERPL-MCNC: 7 GM/DL (ref 6–8.4)
PROT SERPL-MCNC: 7.1 GM/DL (ref 6–8.4)
PROT SERPL-MCNC: 7.5 GM/DL (ref 6–8.4)
PROT SERPL-MCNC: 7.5 GM/DL (ref 6–8.4)
PROT SERPL-MCNC: 8 GM/DL (ref 6–8.4)
PROT SERPL-MCNC: 8.4 GM/DL (ref 6–8.4)
PROT UR QL STRIP: ABNORMAL
PROT UR QL STRIP: NEGATIVE
PROTHROMBIN TIME: 12 SECONDS (ref 9–12.5)
PROTHROMBIN TIME: 12.9 SECONDS (ref 9–12.5)
PROTHROMBIN TIME: 13 SECONDS (ref 9–12.5)
PROTHROMBIN TIME: 16.7 SECONDS (ref 9–12.5)
PROTHROMBIN TIME: 17.4 SECONDS (ref 9–12.5)
PROTHROMBIN TIME: 20 SECONDS (ref 9–12.5)
PROTHROMBIN TIME: 23.4 SECONDS (ref 9–12.5)
QEF: 3 %
RBC # BLD AUTO: 0.94 M/UL (ref 4.6–6.2)
RBC # BLD AUTO: 4.23 M/UL (ref 4.6–6.2)
RBC # BLD AUTO: 4.26 M/UL (ref 4.6–6.2)
RBC # BLD AUTO: 4.3 M/UL (ref 4.6–6.2)
RBC # BLD AUTO: 4.33 M/UL (ref 4.6–6.2)
RBC # BLD AUTO: 4.35 M/UL (ref 4.6–6.2)
RBC # BLD AUTO: 4.37 M/UL (ref 4.6–6.2)
RBC # BLD AUTO: 4.44 M/UL (ref 4.6–6.2)
RBC # BLD AUTO: 4.53 M/UL (ref 4.6–6.2)
RBC # BLD AUTO: 4.72 M/UL (ref 4.6–6.2)
RBC #/AREA URNS AUTO: 22 /HPF (ref 0–4)
RBC AGGLUT BLD QL: PRESENT
RELATIVE EOSINOPHIL (OHS): 0 %
RELATIVE EOSINOPHIL (OHS): 0.1 %
RELATIVE EOSINOPHIL (OHS): 0.3 %
RELATIVE LYMPHOCYTE (OHS): 11.7 % (ref 18–48)
RELATIVE LYMPHOCYTE (OHS): 11.9 % (ref 18–48)
RELATIVE LYMPHOCYTE (OHS): 17.4 % (ref 18–48)
RELATIVE LYMPHOCYTE (OHS): 24.4 % (ref 18–48)
RELATIVE LYMPHOCYTE (OHS): 36.3 % (ref 18–48)
RELATIVE LYMPHOCYTE (OHS): 5.2 % (ref 18–48)
RELATIVE LYMPHOCYTE (OHS): 5.6 % (ref 18–48)
RELATIVE LYMPHOCYTE (OHS): 6 % (ref 18–48)
RELATIVE LYMPHOCYTE (OHS): 7.3 % (ref 18–48)
RELATIVE LYMPHOCYTE (OHS): 7.9 % (ref 18–48)
RELATIVE MONOCYTE (OHS): 2.4 % (ref 4–15)
RELATIVE MONOCYTE (OHS): 2.9 % (ref 4–15)
RELATIVE MONOCYTE (OHS): 4.5 % (ref 4–15)
RELATIVE MONOCYTE (OHS): 4.8 % (ref 4–15)
RELATIVE MONOCYTE (OHS): 4.9 % (ref 4–15)
RELATIVE MONOCYTE (OHS): 5.1 % (ref 4–15)
RELATIVE MONOCYTE (OHS): 5.4 % (ref 4–15)
RELATIVE MONOCYTE (OHS): 5.7 % (ref 4–15)
RELATIVE MONOCYTE (OHS): 5.9 % (ref 4–15)
RELATIVE MONOCYTE (OHS): 6.9 % (ref 4–15)
RELATIVE NEUTROPHIL (OHS): 53.8 % (ref 38–73)
RELATIVE NEUTROPHIL (OHS): 66.7 % (ref 38–73)
RELATIVE NEUTROPHIL (OHS): 74.6 % (ref 38–73)
RELATIVE NEUTROPHIL (OHS): 80.4 % (ref 38–73)
RELATIVE NEUTROPHIL (OHS): 84.2 % (ref 38–73)
RELATIVE NEUTROPHIL (OHS): 84.8 % (ref 38–73)
RELATIVE NEUTROPHIL (OHS): 86.9 % (ref 38–73)
RELATIVE NEUTROPHIL (OHS): 87.1 % (ref 38–73)
RELATIVE NEUTROPHIL (OHS): 89.1 % (ref 38–73)
RELATIVE NEUTROPHIL (OHS): 89.6 % (ref 38–73)
RIGHT ANT TIBIAL SYS PSV: 14 CM/S
RIGHT CFA PSV: 78 CM/S
RIGHT DORSALIS PEDIS PSV: 18 CM/S
RIGHT EXTERNAL ILLIAC PSV: 72 CM/S
RIGHT PERONEAL SYS PSV: 13 CM/S
RIGHT POPLITEAL PSV: 46 CM/S
RIGHT POST TIBIAL SYS PSV: 16 CM/S
RIGHT PROFUNDA SYS PSV: 43 CM/S
RIGHT SUPER FEMORAL OSTIAL SYS PSV: 33 CM/S
RIGHT SUPER FEMORAL PROX SYS PSV: 50 CM/S
RIGHT TIB/PER TRUNK SYS PSV: 29 CM/S
SAMPLE: ABNORMAL
SAMPLE: NORMAL
SINUS: 4.3 CM
SITE: ABNORMAL
SODIUM BLD-SCNC: 130 MMOL/L (ref 136–145)
SODIUM BLD-SCNC: 131 MMOL/L (ref 136–145)
SODIUM BLD-SCNC: 133 MMOL/L (ref 136–145)
SODIUM BLD-SCNC: 135 MMOL/L (ref 136–145)
SODIUM SERPL-SCNC: 128 MMOL/L (ref 136–145)
SODIUM SERPL-SCNC: 129 MMOL/L (ref 136–145)
SODIUM SERPL-SCNC: 130 MMOL/L (ref 136–145)
SODIUM SERPL-SCNC: 131 MMOL/L (ref 136–145)
SODIUM SERPL-SCNC: 132 MMOL/L (ref 136–145)
SODIUM SERPL-SCNC: 133 MMOL/L (ref 136–145)
SODIUM SERPL-SCNC: 134 MMOL/L (ref 136–145)
SODIUM SERPL-SCNC: 134 MMOL/L (ref 136–145)
SODIUM SERPL-SCNC: 135 MMOL/L (ref 136–145)
SODIUM SERPL-SCNC: 136 MMOL/L (ref 136–145)
SODIUM SERPL-SCNC: 137 MMOL/L (ref 136–145)
SODIUM SERPL-SCNC: 139 MMOL/L (ref 136–145)
SODIUM SERPL-SCNC: 141 MMOL/L (ref 136–145)
SP GR UR STRIP: 1.01
SP GR UR STRIP: 1.02
SP02: 88
SP02: 88
SP02: 91
SP02: 91
SPECIMEN SOURCE: ABNORMAL
SPECIMEN SOURCE: NORMAL
SQUAMOUS #/AREA URNS AUTO: 1 /HPF
STJ: 4.4 CM
TDI LATERAL: 0.03 M/S
TDI SEPTAL: 0.05 M/S
TDI: 0.04 M/S
TROPONIN I SERPL DL<=0.01 NG/ML-MCNC: 0.15 NG/ML
TSH SERPL-ACNC: 2.24 UIU/ML (ref 0.4–4)
UROBILINOGEN UR STRIP-ACNC: NEGATIVE EU/DL
UROBILINOGEN UR STRIP-ACNC: NEGATIVE EU/DL
VANCOMYCIN SERPL-MCNC: 14.3 UG/ML (ref ?–80)
VANCOMYCIN TROUGH SERPL-MCNC: 29.2 UG/ML (ref 10–22)
VT: 400
VT: 470
VT: 470
VT: 500
WBC # BLD AUTO: 10.66 K/UL (ref 3.9–12.7)
WBC # BLD AUTO: 11.59 K/UL (ref 3.9–12.7)
WBC # BLD AUTO: 13.34 K/UL (ref 3.9–12.7)
WBC # BLD AUTO: 14.97 K/UL (ref 3.9–12.7)
WBC # BLD AUTO: 15.04 K/UL (ref 3.9–12.7)
WBC # BLD AUTO: 8.56 K/UL (ref 3.9–12.7)
WBC # BLD AUTO: 8.62 K/UL (ref 3.9–12.7)
WBC # BLD AUTO: 9.47 K/UL (ref 3.9–12.7)
WBC # BLD AUTO: 9.73 K/UL (ref 3.9–12.7)
WBC # BLD AUTO: 9.82 K/UL (ref 3.9–12.7)
WBC #/AREA URNS AUTO: 6 /HPF (ref 0–5)
Z-SCORE OF LEFT VENTRICULAR DIMENSION IN END DIASTOLE: 4.21
Z-SCORE OF LEFT VENTRICULAR DIMENSION IN END SYSTOLE: 5.93

## 2025-01-01 PROCEDURE — 84520 ASSAY OF UREA NITROGEN: CPT

## 2025-01-01 PROCEDURE — 85730 THROMBOPLASTIN TIME PARTIAL: CPT | Performed by: INTERNAL MEDICINE

## 2025-01-01 PROCEDURE — 85610 PROTHROMBIN TIME: CPT | Performed by: STUDENT IN AN ORGANIZED HEALTH CARE EDUCATION/TRAINING PROGRAM

## 2025-01-01 PROCEDURE — 94761 N-INVAS EAR/PLS OXIMETRY MLT: CPT

## 2025-01-01 PROCEDURE — 99900035 HC TECH TIME PER 15 MIN (STAT)

## 2025-01-01 PROCEDURE — 80053 COMPREHEN METABOLIC PANEL: CPT | Performed by: INTERNAL MEDICINE

## 2025-01-01 PROCEDURE — 20000000 HC ICU ROOM

## 2025-01-01 PROCEDURE — 63600175 PHARM REV CODE 636 W HCPCS

## 2025-01-01 PROCEDURE — 81003 URINALYSIS AUTO W/O SCOPE: CPT | Performed by: EMERGENCY MEDICINE

## 2025-01-01 PROCEDURE — 82947 ASSAY GLUCOSE BLOOD QUANT: CPT | Performed by: STUDENT IN AN ORGANIZED HEALTH CARE EDUCATION/TRAINING PROGRAM

## 2025-01-01 PROCEDURE — 99233 SBSQ HOSP IP/OBS HIGH 50: CPT | Mod: 25,,, | Performed by: STUDENT IN AN ORGANIZED HEALTH CARE EDUCATION/TRAINING PROGRAM

## 2025-01-01 PROCEDURE — 85730 THROMBOPLASTIN TIME PARTIAL: CPT | Performed by: STUDENT IN AN ORGANIZED HEALTH CARE EDUCATION/TRAINING PROGRAM

## 2025-01-01 PROCEDURE — 99900026 HC AIRWAY MAINTENANCE (STAT)

## 2025-01-01 PROCEDURE — 96374 THER/PROPH/DIAG INJ IV PUSH: CPT | Mod: 59

## 2025-01-01 PROCEDURE — 99233 SBSQ HOSP IP/OBS HIGH 50: CPT | Mod: ,,, | Performed by: INTERNAL MEDICINE

## 2025-01-01 PROCEDURE — 25000003 PHARM REV CODE 250: Performed by: INTERNAL MEDICINE

## 2025-01-01 PROCEDURE — 82374 ASSAY BLOOD CARBON DIOXIDE: CPT | Performed by: INTERNAL MEDICINE

## 2025-01-01 PROCEDURE — 83520 IMMUNOASSAY QUANT NOS NONAB: CPT | Performed by: STUDENT IN AN ORGANIZED HEALTH CARE EDUCATION/TRAINING PROGRAM

## 2025-01-01 PROCEDURE — 84100 ASSAY OF PHOSPHORUS: CPT | Performed by: STUDENT IN AN ORGANIZED HEALTH CARE EDUCATION/TRAINING PROGRAM

## 2025-01-01 PROCEDURE — 80053 COMPREHEN METABOLIC PANEL: CPT | Performed by: STUDENT IN AN ORGANIZED HEALTH CARE EDUCATION/TRAINING PROGRAM

## 2025-01-01 PROCEDURE — 82550 ASSAY OF CK (CPK): CPT | Performed by: EMERGENCY MEDICINE

## 2025-01-01 PROCEDURE — 27100171 HC OXYGEN HIGH FLOW UP TO 24 HOURS

## 2025-01-01 PROCEDURE — 25000003 PHARM REV CODE 250: Performed by: STUDENT IN AN ORGANIZED HEALTH CARE EDUCATION/TRAINING PROGRAM

## 2025-01-01 PROCEDURE — 84100 ASSAY OF PHOSPHORUS: CPT

## 2025-01-01 PROCEDURE — 63600367 HC NITRIC OXIDE PER HOUR

## 2025-01-01 PROCEDURE — 82800 BLOOD PH: CPT

## 2025-01-01 PROCEDURE — 27201640 HC PAD, ARTICGEL

## 2025-01-01 PROCEDURE — 80048 BASIC METABOLIC PNL TOTAL CA: CPT | Performed by: STUDENT IN AN ORGANIZED HEALTH CARE EDUCATION/TRAINING PROGRAM

## 2025-01-01 PROCEDURE — 99231 SBSQ HOSP IP/OBS SF/LOW 25: CPT | Mod: ,,,

## 2025-01-01 PROCEDURE — 99223 1ST HOSP IP/OBS HIGH 75: CPT | Mod: 25,,, | Performed by: STUDENT IN AN ORGANIZED HEALTH CARE EDUCATION/TRAINING PROGRAM

## 2025-01-01 PROCEDURE — 63600175 PHARM REV CODE 636 W HCPCS: Performed by: EMERGENCY MEDICINE

## 2025-01-01 PROCEDURE — 82330 ASSAY OF CALCIUM: CPT | Performed by: INTERNAL MEDICINE

## 2025-01-01 PROCEDURE — 99291 CRITICAL CARE FIRST HOUR: CPT | Mod: ,,, | Performed by: STUDENT IN AN ORGANIZED HEALTH CARE EDUCATION/TRAINING PROGRAM

## 2025-01-01 PROCEDURE — 83735 ASSAY OF MAGNESIUM: CPT | Performed by: EMERGENCY MEDICINE

## 2025-01-01 PROCEDURE — 80202 ASSAY OF VANCOMYCIN: CPT | Performed by: INTERNAL MEDICINE

## 2025-01-01 PROCEDURE — 63600175 PHARM REV CODE 636 W HCPCS: Performed by: STUDENT IN AN ORGANIZED HEALTH CARE EDUCATION/TRAINING PROGRAM

## 2025-01-01 PROCEDURE — 84132 ASSAY OF SERUM POTASSIUM: CPT

## 2025-01-01 PROCEDURE — 02HV33Z INSERTION OF INFUSION DEVICE INTO SUPERIOR VENA CAVA, PERCUTANEOUS APPROACH: ICD-10-PCS | Performed by: INTERNAL MEDICINE

## 2025-01-01 PROCEDURE — 82803 BLOOD GASES ANY COMBINATION: CPT

## 2025-01-01 PROCEDURE — 83605 ASSAY OF LACTIC ACID: CPT

## 2025-01-01 PROCEDURE — 85025 COMPLETE CBC W/AUTO DIFF WBC: CPT | Performed by: EMERGENCY MEDICINE

## 2025-01-01 PROCEDURE — 25000003 PHARM REV CODE 250

## 2025-01-01 PROCEDURE — 99292 CRITICAL CARE ADDL 30 MIN: CPT

## 2025-01-01 PROCEDURE — 85025 COMPLETE CBC W/AUTO DIFF WBC: CPT | Performed by: STUDENT IN AN ORGANIZED HEALTH CARE EDUCATION/TRAINING PROGRAM

## 2025-01-01 PROCEDURE — 85610 PROTHROMBIN TIME: CPT | Performed by: EMERGENCY MEDICINE

## 2025-01-01 PROCEDURE — 83050 HGB METHEMOGLOBIN QUAN: CPT

## 2025-01-01 PROCEDURE — 94003 VENT MGMT INPAT SUBQ DAY: CPT

## 2025-01-01 PROCEDURE — 83605 ASSAY OF LACTIC ACID: CPT | Performed by: STUDENT IN AN ORGANIZED HEALTH CARE EDUCATION/TRAINING PROGRAM

## 2025-01-01 PROCEDURE — 99233 SBSQ HOSP IP/OBS HIGH 50: CPT | Mod: ,,, | Performed by: PEDIATRICS

## 2025-01-01 PROCEDURE — 83735 ASSAY OF MAGNESIUM: CPT

## 2025-01-01 PROCEDURE — 82962 GLUCOSE BLOOD TEST: CPT

## 2025-01-01 PROCEDURE — 25000003 PHARM REV CODE 250: Performed by: EMERGENCY MEDICINE

## 2025-01-01 PROCEDURE — 80053 COMPREHEN METABOLIC PANEL: CPT | Performed by: EMERGENCY MEDICINE

## 2025-01-01 PROCEDURE — 99285 EMERGENCY DEPT VISIT HI MDM: CPT | Mod: 25

## 2025-01-01 PROCEDURE — 83735 ASSAY OF MAGNESIUM: CPT | Performed by: STUDENT IN AN ORGANIZED HEALTH CARE EDUCATION/TRAINING PROGRAM

## 2025-01-01 PROCEDURE — 99291 CRITICAL CARE FIRST HOUR: CPT | Mod: ,,, | Performed by: INTERNAL MEDICINE

## 2025-01-01 PROCEDURE — 94799 UNLISTED PULMONARY SVC/PX: CPT

## 2025-01-01 PROCEDURE — 99223 1ST HOSP IP/OBS HIGH 75: CPT | Mod: ,,, | Performed by: PSYCHIATRY & NEUROLOGY

## 2025-01-01 PROCEDURE — 37799 UNLISTED PX VASCULAR SURGERY: CPT

## 2025-01-01 PROCEDURE — 95700 EEG CONT REC W/VID EEG TECH: CPT

## 2025-01-01 PROCEDURE — 84100 ASSAY OF PHOSPHORUS: CPT | Performed by: INTERNAL MEDICINE

## 2025-01-01 PROCEDURE — 80307 DRUG TEST PRSMV CHEM ANLYZR: CPT | Performed by: EMERGENCY MEDICINE

## 2025-01-01 PROCEDURE — 36620 INSERTION CATHETER ARTERY: CPT | Mod: GC,,, | Performed by: INTERNAL MEDICINE

## 2025-01-01 PROCEDURE — 99291 CRITICAL CARE FIRST HOUR: CPT

## 2025-01-01 PROCEDURE — 85025 COMPLETE CBC W/AUTO DIFF WBC: CPT | Performed by: INTERNAL MEDICINE

## 2025-01-01 PROCEDURE — 27000221 HC OXYGEN, UP TO 24 HOURS

## 2025-01-01 PROCEDURE — 99497 ADVNCD CARE PLAN 30 MIN: CPT | Mod: 25,,, | Performed by: STUDENT IN AN ORGANIZED HEALTH CARE EDUCATION/TRAINING PROGRAM

## 2025-01-01 PROCEDURE — 63600175 PHARM REV CODE 636 W HCPCS: Performed by: INTERNAL MEDICINE

## 2025-01-01 PROCEDURE — 03HY32Z INSERTION OF MONITORING DEVICE INTO UPPER ARTERY, PERCUTANEOUS APPROACH: ICD-10-PCS | Performed by: INTERNAL MEDICINE

## 2025-01-01 PROCEDURE — 82550 ASSAY OF CK (CPK): CPT | Performed by: STUDENT IN AN ORGANIZED HEALTH CARE EDUCATION/TRAINING PROGRAM

## 2025-01-01 PROCEDURE — 81003 URINALYSIS AUTO W/O SCOPE: CPT | Mod: 59 | Performed by: STUDENT IN AN ORGANIZED HEALTH CARE EDUCATION/TRAINING PROGRAM

## 2025-01-01 PROCEDURE — 94002 VENT MGMT INPAT INIT DAY: CPT

## 2025-01-01 PROCEDURE — 99232 SBSQ HOSP IP/OBS MODERATE 35: CPT | Mod: ,,, | Performed by: PEDIATRICS

## 2025-01-01 PROCEDURE — 95714 VEEG EA 12-26 HR UNMNTR: CPT

## 2025-01-01 PROCEDURE — 83880 ASSAY OF NATRIURETIC PEPTIDE: CPT | Performed by: EMERGENCY MEDICINE

## 2025-01-01 PROCEDURE — 93010 ELECTROCARDIOGRAM REPORT: CPT | Mod: ,,, | Performed by: INTERNAL MEDICINE

## 2025-01-01 PROCEDURE — 85018 HEMOGLOBIN: CPT

## 2025-01-01 PROCEDURE — 0BH17EZ INSERTION OF ENDOTRACHEAL AIRWAY INTO TRACHEA, VIA NATURAL OR ARTIFICIAL OPENING: ICD-10-PCS | Performed by: INTERNAL MEDICINE

## 2025-01-01 PROCEDURE — 82140 ASSAY OF AMMONIA: CPT | Performed by: EMERGENCY MEDICINE

## 2025-01-01 PROCEDURE — 83735 ASSAY OF MAGNESIUM: CPT | Performed by: INTERNAL MEDICINE

## 2025-01-01 PROCEDURE — 83605 ASSAY OF LACTIC ACID: CPT | Performed by: EMERGENCY MEDICINE

## 2025-01-01 PROCEDURE — 83880 ASSAY OF NATRIURETIC PEPTIDE: CPT | Performed by: STUDENT IN AN ORGANIZED HEALTH CARE EDUCATION/TRAINING PROGRAM

## 2025-01-01 PROCEDURE — 31500 INSERT EMERGENCY AIRWAY: CPT

## 2025-01-01 PROCEDURE — 84484 ASSAY OF TROPONIN QUANT: CPT | Performed by: EMERGENCY MEDICINE

## 2025-01-01 PROCEDURE — 84295 ASSAY OF SERUM SODIUM: CPT

## 2025-01-01 PROCEDURE — 87040 BLOOD CULTURE FOR BACTERIA: CPT | Performed by: STUDENT IN AN ORGANIZED HEALTH CARE EDUCATION/TRAINING PROGRAM

## 2025-01-01 PROCEDURE — 95720 EEG PHY/QHP EA INCR W/VEEG: CPT | Mod: ,,, | Performed by: PSYCHIATRY & NEUROLOGY

## 2025-01-01 PROCEDURE — 82310 ASSAY OF CALCIUM: CPT

## 2025-01-01 PROCEDURE — 5A1955Z RESPIRATORY VENTILATION, GREATER THAN 96 CONSECUTIVE HOURS: ICD-10-PCS | Performed by: INTERNAL MEDICINE

## 2025-01-01 PROCEDURE — 82330 ASSAY OF CALCIUM: CPT

## 2025-01-01 PROCEDURE — 94761 N-INVAS EAR/PLS OXIMETRY MLT: CPT | Mod: XB

## 2025-01-01 PROCEDURE — 82947 ASSAY GLUCOSE BLOOD QUANT: CPT | Performed by: INTERNAL MEDICINE

## 2025-01-01 PROCEDURE — 25500020 PHARM REV CODE 255: Performed by: EMERGENCY MEDICINE

## 2025-01-01 PROCEDURE — 96375 TX/PRO/DX INJ NEW DRUG ADDON: CPT

## 2025-01-01 PROCEDURE — 85014 HEMATOCRIT: CPT

## 2025-01-01 PROCEDURE — 36415 COLL VENOUS BLD VENIPUNCTURE: CPT

## 2025-01-01 PROCEDURE — 85610 PROTHROMBIN TIME: CPT

## 2025-01-01 PROCEDURE — 36556 INSERT NON-TUNNEL CV CATH: CPT

## 2025-01-01 PROCEDURE — 36600 WITHDRAWAL OF ARTERIAL BLOOD: CPT

## 2025-01-01 PROCEDURE — 93005 ELECTROCARDIOGRAM TRACING: CPT

## 2025-01-01 PROCEDURE — 99223 1ST HOSP IP/OBS HIGH 75: CPT | Mod: ,,, | Performed by: PEDIATRICS

## 2025-01-01 PROCEDURE — 82565 ASSAY OF CREATININE: CPT

## 2025-01-01 RX ORDER — ROCURONIUM BROMIDE 10 MG/ML
70 INJECTION, SOLUTION INTRAVENOUS ONCE
Status: COMPLETED | OUTPATIENT
Start: 2025-01-01 | End: 2025-01-01

## 2025-01-01 RX ORDER — FENTANYL CITRATE-0.9 % NACL/PF 10 MCG/ML
0-250 PLASTIC BAG, INJECTION (ML) INTRAVENOUS CONTINUOUS
Refills: 0 | Status: DISCONTINUED | OUTPATIENT
Start: 2025-01-01 | End: 2025-01-01 | Stop reason: HOSPADM

## 2025-01-01 RX ORDER — FUROSEMIDE 10 MG/ML
80 INJECTION INTRAMUSCULAR; INTRAVENOUS ONCE
Status: COMPLETED | OUTPATIENT
Start: 2025-01-01 | End: 2025-01-01

## 2025-01-01 RX ORDER — FENTANYL CITRATE 50 UG/ML
50 INJECTION, SOLUTION INTRAMUSCULAR; INTRAVENOUS EVERY 30 MIN PRN
Status: DISCONTINUED | OUTPATIENT
Start: 2025-01-01 | End: 2025-01-01 | Stop reason: HOSPADM

## 2025-01-01 RX ORDER — MIDAZOLAM HYDROCHLORIDE 1 MG/ML
2 INJECTION, SOLUTION INTRAMUSCULAR; INTRAVENOUS
Status: DISCONTINUED | OUTPATIENT
Start: 2025-01-01 | End: 2025-01-01 | Stop reason: HOSPADM

## 2025-01-01 RX ORDER — ETOMIDATE 2 MG/ML
15 INJECTION INTRAVENOUS ONCE
Status: COMPLETED | OUTPATIENT
Start: 2025-01-01 | End: 2025-01-01

## 2025-01-01 RX ORDER — DOBUTAMINE HYDROCHLORIDE 400 MG/100ML
5 INJECTION INTRAVENOUS CONTINUOUS
Status: DISCONTINUED | OUTPATIENT
Start: 2025-01-01 | End: 2025-01-01 | Stop reason: HOSPADM

## 2025-01-01 RX ORDER — PROPOFOL 10 MG/ML
0-50 INJECTION, EMULSION INTRAVENOUS CONTINUOUS
Status: DISCONTINUED | OUTPATIENT
Start: 2025-01-01 | End: 2025-01-01 | Stop reason: HOSPADM

## 2025-01-01 RX ORDER — NOREPINEPHRINE BITARTRATE/D5W 4MG/250ML
0.1 PLASTIC BAG, INJECTION (ML) INTRAVENOUS CONTINUOUS
Status: DISCONTINUED | OUTPATIENT
Start: 2025-01-01 | End: 2025-01-01

## 2025-01-01 RX ORDER — POTASSIUM CHLORIDE 750 MG/1
50 CAPSULE, EXTENDED RELEASE ORAL ONCE
Status: DISCONTINUED | OUTPATIENT
Start: 2025-01-01 | End: 2025-01-01

## 2025-01-01 RX ORDER — POTASSIUM CHLORIDE 29.8 MG/ML
40 INJECTION INTRAVENOUS ONCE
Status: DISCONTINUED | OUTPATIENT
Start: 2025-01-01 | End: 2025-01-01 | Stop reason: HOSPADM

## 2025-01-01 RX ORDER — NOREPINEPHRINE BITARTRATE/D5W 4MG/250ML
0-3 PLASTIC BAG, INJECTION (ML) INTRAVENOUS CONTINUOUS
Status: DISCONTINUED | OUTPATIENT
Start: 2025-01-01 | End: 2025-01-01

## 2025-01-01 RX ORDER — MUPIROCIN 20 MG/G
OINTMENT TOPICAL 2 TIMES DAILY
Status: COMPLETED | OUTPATIENT
Start: 2025-01-01 | End: 2025-01-01

## 2025-01-01 RX ORDER — MIDAZOLAM HYDROCHLORIDE 1 MG/ML
0-5 INJECTION, SOLUTION INTRAVENOUS CONTINUOUS
Status: DISCONTINUED | OUTPATIENT
Start: 2025-01-01 | End: 2025-01-01

## 2025-01-01 RX ORDER — MORPHINE SULFATE 2 MG/ML
4 INJECTION, SOLUTION INTRAMUSCULAR; INTRAVENOUS
Refills: 0 | Status: DISCONTINUED | OUTPATIENT
Start: 2025-01-01 | End: 2025-01-01 | Stop reason: HOSPADM

## 2025-01-01 RX ORDER — SODIUM CHLORIDE 0.9 % (FLUSH) 0.9 %
10 SYRINGE (ML) INJECTION
Status: DISCONTINUED | OUTPATIENT
Start: 2025-01-01 | End: 2025-01-01 | Stop reason: HOSPADM

## 2025-01-01 RX ORDER — HYDRALAZINE HYDROCHLORIDE 25 MG/1
25 TABLET, FILM COATED ORAL ONCE
Status: COMPLETED | OUTPATIENT
Start: 2025-01-01 | End: 2025-01-01

## 2025-01-01 RX ORDER — NOREPINEPHRINE BIT/0.9 % NACL 32MG/250ML
0-3 PLASTIC BAG, INJECTION (ML) INTRAVENOUS CONTINUOUS
Status: DISCONTINUED | OUTPATIENT
Start: 2025-01-01 | End: 2025-01-01 | Stop reason: HOSPADM

## 2025-01-01 RX ORDER — VASOPRESSIN 20 [USP'U]/ML
INJECTION, SOLUTION INTRAMUSCULAR; SUBCUTANEOUS
Status: DISPENSED
Start: 2025-01-01 | End: 2025-01-01

## 2025-01-01 RX ORDER — POTASSIUM CHLORIDE 750 MG/1
30 CAPSULE, EXTENDED RELEASE ORAL ONCE
Status: DISCONTINUED | OUTPATIENT
Start: 2025-01-01 | End: 2025-01-01

## 2025-01-01 RX ORDER — PROPOFOL 10 MG/ML
0-50 INJECTION, EMULSION INTRAVENOUS CONTINUOUS
Status: DISCONTINUED | OUTPATIENT
Start: 2025-01-01 | End: 2025-01-01

## 2025-01-01 RX ORDER — HEPARIN SODIUM,PORCINE/D5W 25000/250
0-40 INTRAVENOUS SOLUTION INTRAVENOUS CONTINUOUS
Status: DISCONTINUED | OUTPATIENT
Start: 2025-01-01 | End: 2025-01-01 | Stop reason: HOSPADM

## 2025-01-01 RX ORDER — LIDOCAINE HYDROCHLORIDE 10 MG/ML
INJECTION, SOLUTION INFILTRATION; PERINEURAL
Status: DISPENSED
Start: 2025-01-01 | End: 2025-01-01

## 2025-01-01 RX ORDER — POTASSIUM CHLORIDE 29.8 MG/ML
40 INJECTION INTRAVENOUS ONCE
Status: COMPLETED | OUTPATIENT
Start: 2025-01-01 | End: 2025-01-01

## 2025-01-01 RX ORDER — SODIUM CHLORIDE 9 MG/ML
1000 INJECTION, SOLUTION INTRAVENOUS
Status: DISCONTINUED | OUTPATIENT
Start: 2025-01-01 | End: 2025-01-01

## 2025-01-01 RX ORDER — CISATRACURIUM BESYLATE 2 MG/ML
0.2 INJECTION, SOLUTION INTRAVENOUS ONCE
Status: DISCONTINUED | OUTPATIENT
Start: 2025-01-01 | End: 2025-01-01

## 2025-01-01 RX ORDER — SODIUM CHLORIDE 9 MG/ML
INJECTION, SOLUTION INTRAVENOUS CONTINUOUS
Status: DISCONTINUED | OUTPATIENT
Start: 2025-01-01 | End: 2025-01-01 | Stop reason: HOSPADM

## 2025-01-01 RX ORDER — GLYCOPYRROLATE 0.2 MG/ML
0.4 INJECTION INTRAMUSCULAR; INTRAVENOUS EVERY 4 HOURS PRN
Status: DISCONTINUED | OUTPATIENT
Start: 2025-01-01 | End: 2025-01-01 | Stop reason: HOSPADM

## 2025-01-01 RX ORDER — MAGNESIUM SULFATE HEPTAHYDRATE 40 MG/ML
2 INJECTION, SOLUTION INTRAVENOUS ONCE
Status: COMPLETED | OUTPATIENT
Start: 2025-01-01 | End: 2025-01-01

## 2025-01-01 RX ORDER — ROCURONIUM BROMIDE 10 MG/ML
1 INJECTION, SOLUTION INTRAVENOUS
Status: DISCONTINUED | OUTPATIENT
Start: 2025-01-01 | End: 2025-01-01 | Stop reason: HOSPADM

## 2025-01-01 RX ORDER — CALCIUM GLUCONATE 20 MG/ML
1 INJECTION, SOLUTION INTRAVENOUS ONCE
Status: COMPLETED | OUTPATIENT
Start: 2025-01-01 | End: 2025-01-01

## 2025-01-01 RX ORDER — NOREPINEPHRINE BITARTRATE/D5W 4MG/250ML
PLASTIC BAG, INJECTION (ML) INTRAVENOUS
Status: COMPLETED
Start: 2025-01-01 | End: 2025-01-01

## 2025-01-01 RX ORDER — LEVETIRACETAM 500 MG/5ML
1500 INJECTION, SOLUTION, CONCENTRATE INTRAVENOUS EVERY 12 HOURS
Status: DISCONTINUED | OUTPATIENT
Start: 2025-01-01 | End: 2025-01-01 | Stop reason: HOSPADM

## 2025-01-01 RX ORDER — HEPARIN SODIUM,PORCINE/D5W 25000/250
0-40 INTRAVENOUS SOLUTION INTRAVENOUS CONTINUOUS
Status: DISCONTINUED | OUTPATIENT
Start: 2025-01-01 | End: 2025-01-01

## 2025-01-01 RX ORDER — FENTANYL CITRATE 50 UG/ML
50 INJECTION, SOLUTION INTRAMUSCULAR; INTRAVENOUS
Refills: 0 | Status: COMPLETED | OUTPATIENT
Start: 2025-01-01 | End: 2025-01-01

## 2025-01-01 RX ORDER — PANTOPRAZOLE SODIUM 40 MG/10ML
40 INJECTION, POWDER, LYOPHILIZED, FOR SOLUTION INTRAVENOUS DAILY
Status: DISCONTINUED | OUTPATIENT
Start: 2025-01-01 | End: 2025-01-01 | Stop reason: HOSPADM

## 2025-01-01 RX ADMIN — POTASSIUM CHLORIDE 40 MEQ: 29.8 INJECTION, SOLUTION INTRAVENOUS at 10:07

## 2025-01-01 RX ADMIN — PIPERACILLIN SODIUM AND TAZOBACTAM SODIUM 4.5 G: 4; .5 INJECTION, POWDER, FOR SOLUTION INTRAVENOUS at 04:07

## 2025-01-01 RX ADMIN — MINERAL OIL AND WHITE PETROLATUM: 30; 940 OINTMENT OPHTHALMIC at 01:07

## 2025-01-01 RX ADMIN — FUROSEMIDE 80 MG: 10 INJECTION, SOLUTION INTRAVENOUS at 09:07

## 2025-01-01 RX ADMIN — PANTOPRAZOLE SODIUM 40 MG: 40 INJECTION, POWDER, LYOPHILIZED, FOR SOLUTION INTRAVENOUS at 08:07

## 2025-01-01 RX ADMIN — PIPERACILLIN SODIUM AND TAZOBACTAM SODIUM 4.5 G: 4; .5 INJECTION, POWDER, FOR SOLUTION INTRAVENOUS at 05:07

## 2025-01-01 RX ADMIN — VASOPRESSIN 0.04 UNITS/MIN: 20 INJECTION INTRAVENOUS at 02:07

## 2025-01-01 RX ADMIN — CALCIUM GLUCONATE 1 G: 20 INJECTION, SOLUTION INTRAVENOUS at 04:07

## 2025-01-01 RX ADMIN — Medication 25 MCG/HR: at 05:07

## 2025-01-01 RX ADMIN — MIDAZOLAM 2 MG: 1 INJECTION INTRAMUSCULAR; INTRAVENOUS at 06:07

## 2025-01-01 RX ADMIN — MUPIROCIN: 20 OINTMENT TOPICAL at 08:07

## 2025-01-01 RX ADMIN — FENTANYL CITRATE 50 MCG: 50 INJECTION INTRAMUSCULAR; INTRAVENOUS at 03:07

## 2025-01-01 RX ADMIN — MUPIROCIN: 20 OINTMENT TOPICAL at 09:07

## 2025-01-01 RX ADMIN — Medication 175 MCG/HR: at 11:07

## 2025-01-01 RX ADMIN — PIPERACILLIN SODIUM AND TAZOBACTAM SODIUM 4.5 G: 4; .5 INJECTION, POWDER, FOR SOLUTION INTRAVENOUS at 01:07

## 2025-01-01 RX ADMIN — LEVETIRACETAM 1500 MG: 500 INJECTION, SOLUTION, CONCENTRATE INTRAVENOUS at 08:07

## 2025-01-01 RX ADMIN — HEPARIN SODIUM 9 UNITS/KG/HR: 10000 INJECTION, SOLUTION INTRAVENOUS at 04:07

## 2025-01-01 RX ADMIN — FUROSEMIDE 80 MG: 10 INJECTION, SOLUTION INTRAVENOUS at 04:07

## 2025-01-01 RX ADMIN — PROPOFOL 50 MCG/KG/MIN: 10 INJECTION, EMULSION INTRAVENOUS at 07:07

## 2025-01-01 RX ADMIN — MINERAL OIL AND WHITE PETROLATUM: 30; 940 OINTMENT OPHTHALMIC at 09:07

## 2025-01-01 RX ADMIN — DOBUTAMINE HYDROCHLORIDE 5 MCG/KG/MIN: 400 INJECTION INTRAVENOUS at 09:07

## 2025-01-01 RX ADMIN — PROPOFOL 50 MCG/KG/MIN: 10 INJECTION, EMULSION INTRAVENOUS at 03:07

## 2025-01-01 RX ADMIN — PROPOFOL 50 MCG/KG/MIN: 10 INJECTION, EMULSION INTRAVENOUS at 05:07

## 2025-01-01 RX ADMIN — CISATRACURIUM BESYLATE 2 MCG/KG/MIN: 10 INJECTION, SOLUTION INTRAVENOUS at 06:07

## 2025-01-01 RX ADMIN — PIPERACILLIN SODIUM AND TAZOBACTAM SODIUM 4.5 G: 4; .5 INJECTION, POWDER, FOR SOLUTION INTRAVENOUS at 10:07

## 2025-01-01 RX ADMIN — PANTOPRAZOLE SODIUM 40 MG: 40 INJECTION, POWDER, LYOPHILIZED, FOR SOLUTION INTRAVENOUS at 09:07

## 2025-01-01 RX ADMIN — MAGNESIUM SULFATE HEPTAHYDRATE 2 G: 40 INJECTION, SOLUTION INTRAVENOUS at 06:07

## 2025-01-01 RX ADMIN — VANCOMYCIN HYDROCHLORIDE 1250 MG: 1.25 INJECTION, POWDER, LYOPHILIZED, FOR SOLUTION INTRAVENOUS at 10:07

## 2025-01-01 RX ADMIN — PROPOFOL 10 MCG/KG/MIN: 10 INJECTION, EMULSION INTRAVENOUS at 01:07

## 2025-01-01 RX ADMIN — EPINEPHRINE 0.05 MCG/KG/MIN: 1 INJECTION INTRAMUSCULAR; INTRAVENOUS; SUBCUTANEOUS at 10:07

## 2025-01-01 RX ADMIN — FUROSEMIDE 20 MG/HR: 10 INJECTION, SOLUTION INTRAMUSCULAR; INTRAVENOUS at 01:07

## 2025-01-01 RX ADMIN — SODIUM CHLORIDE: 9 INJECTION, SOLUTION INTRAVENOUS at 10:07

## 2025-01-01 RX ADMIN — Medication 175 MCG/HR: at 02:07

## 2025-01-01 RX ADMIN — ROCURONIUM BROMIDE 70 MG: 10 INJECTION, SOLUTION INTRAVENOUS at 09:07

## 2025-01-01 RX ADMIN — PROPOFOL 20 MCG/KG/MIN: 10 INJECTION, EMULSION INTRAVENOUS at 10:07

## 2025-01-01 RX ADMIN — VASOPRESSIN 0.04 UNITS/MIN: 20 INJECTION INTRAVENOUS at 04:07

## 2025-01-01 RX ADMIN — PIPERACILLIN SODIUM AND TAZOBACTAM SODIUM 4.5 G: 4; .5 INJECTION, POWDER, FOR SOLUTION INTRAVENOUS at 08:07

## 2025-01-01 RX ADMIN — PROPOFOL 25 MCG/KG/MIN: 10 INJECTION, EMULSION INTRAVENOUS at 02:07

## 2025-01-01 RX ADMIN — FUROSEMIDE 10 MG/HR: 10 INJECTION, SOLUTION INTRAMUSCULAR; INTRAVENOUS at 09:07

## 2025-01-01 RX ADMIN — ETOMIDATE 15 MG: 2 INJECTION INTRAVENOUS at 09:07

## 2025-01-01 RX ADMIN — IOHEXOL 100 ML: 350 INJECTION, SOLUTION INTRAVENOUS at 02:07

## 2025-01-01 RX ADMIN — NOREPINEPHRINE BITARTRATE 0.05 MCG/KG/MIN: 4 INJECTION, SOLUTION INTRAVENOUS at 04:07

## 2025-01-01 RX ADMIN — PROPOFOL 50 MCG/KG/MIN: 10 INJECTION, EMULSION INTRAVENOUS at 04:07

## 2025-01-01 RX ADMIN — VASOPRESSIN 0.04 UNITS/MIN: 20 INJECTION INTRAVENOUS at 09:07

## 2025-01-01 RX ADMIN — FENTANYL CITRATE 50 MCG: 50 INJECTION INTRAMUSCULAR; INTRAVENOUS at 06:07

## 2025-01-01 RX ADMIN — PROPOFOL 20 MCG/KG/MIN: 10 INJECTION, EMULSION INTRAVENOUS at 08:07

## 2025-01-01 RX ADMIN — VASOPRESSIN 0.04 UNITS/MIN: 20 INJECTION INTRAVENOUS at 11:07

## 2025-01-01 RX ADMIN — MINERAL OIL AND WHITE PETROLATUM: 30; 940 OINTMENT OPHTHALMIC at 03:07

## 2025-01-01 RX ADMIN — GLYCOPYRROLATE 0.4 MG: 0.2 INJECTION INTRAMUSCULAR; INTRAVENOUS at 06:07

## 2025-01-01 RX ADMIN — CISATRACURIUM BESYLATE 1 MCG/KG/MIN: 10 INJECTION, SOLUTION INTRAVENOUS at 09:07

## 2025-01-01 RX ADMIN — MINERAL OIL AND WHITE PETROLATUM: 30; 940 OINTMENT OPHTHALMIC at 05:07

## 2025-01-01 RX ADMIN — PROPOFOL 50 MCG/KG/MIN: 10 INJECTION, EMULSION INTRAVENOUS at 01:07

## 2025-01-01 RX ADMIN — VASOPRESSIN 0.04 UNITS/MIN: 20 INJECTION INTRAVENOUS at 01:07

## 2025-01-01 RX ADMIN — DOBUTAMINE HYDROCHLORIDE 5 MCG/KG/MIN: 400 INJECTION INTRAVENOUS at 02:07

## 2025-01-01 RX ADMIN — PROPOFOL 50 MCG/KG/MIN: 10 INJECTION, EMULSION INTRAVENOUS at 09:07

## 2025-01-01 RX ADMIN — PROPOFOL 10 MCG/KG/MIN: 10 INJECTION, EMULSION INTRAVENOUS at 09:07

## 2025-01-01 RX ADMIN — MINERAL OIL AND WHITE PETROLATUM: 30; 940 OINTMENT OPHTHALMIC at 08:07

## 2025-01-01 RX ADMIN — PROPOFOL 5 MCG/KG/MIN: 10 INJECTION, EMULSION INTRAVENOUS at 10:07

## 2025-01-01 RX ADMIN — HYDRALAZINE HYDROCHLORIDE 25 MG: 25 TABLET ORAL at 04:07

## 2025-01-01 RX ADMIN — HEPARIN SODIUM 9 UNITS/KG/HR: 10000 INJECTION, SOLUTION INTRAVENOUS at 03:07

## 2025-01-01 RX ADMIN — Medication 105 MCG/HR: at 04:07

## 2025-01-01 RX ADMIN — VANCOMYCIN HYDROCHLORIDE 1000 MG: 1 INJECTION, POWDER, LYOPHILIZED, FOR SOLUTION INTRAVENOUS at 10:07

## 2025-01-01 RX ADMIN — FUROSEMIDE 20 MG/HR: 10 INJECTION, SOLUTION INTRAMUSCULAR; INTRAVENOUS at 08:07

## 2025-01-01 RX ADMIN — VASOPRESSIN 0.04 UNITS/MIN: 20 INJECTION INTRAVENOUS at 06:07

## 2025-01-01 RX ADMIN — SODIUM CHLORIDE 1120 MG: 9 INJECTION, SOLUTION INTRAVENOUS at 10:07

## 2025-01-01 RX ADMIN — HEPARIN SODIUM 12 UNITS/KG/HR: 10000 INJECTION, SOLUTION INTRAVENOUS at 04:07

## 2025-01-01 RX ADMIN — PROPOFOL 50 MCG/KG/MIN: 10 INJECTION, EMULSION INTRAVENOUS at 10:07

## 2025-01-01 RX ADMIN — PROPOFOL 50 MCG/KG/MIN: 10 INJECTION, EMULSION INTRAVENOUS at 08:07

## 2025-01-01 RX ADMIN — NOREPINEPHRINE BITARTRATE 0.04 MCG/KG/MIN: 1 INJECTION, SOLUTION, CONCENTRATE INTRAVENOUS at 02:07

## 2025-01-01 RX ADMIN — EPINEPHRINE 0.02 MCG/KG/MIN: 1 INJECTION INTRAMUSCULAR; INTRAVENOUS; SUBCUTANEOUS at 09:07

## 2025-01-01 RX ADMIN — MINERAL OIL AND WHITE PETROLATUM: 30; 940 OINTMENT OPHTHALMIC at 06:07

## 2025-01-01 RX ADMIN — NOREPINEPHRINE BITARTRATE 0.18 MCG/KG/MIN: 1 INJECTION, SOLUTION, CONCENTRATE INTRAVENOUS at 04:07

## 2025-01-01 RX ADMIN — POTASSIUM CHLORIDE 40 MEQ: 29.8 INJECTION, SOLUTION INTRAVENOUS at 04:07

## 2025-01-01 RX ADMIN — PROPOFOL 45 MCG/KG/MIN: 10 INJECTION, EMULSION INTRAVENOUS at 01:07

## 2025-01-01 RX ADMIN — FUROSEMIDE 80 MG: 10 INJECTION, SOLUTION INTRAVENOUS at 10:07

## 2025-01-01 RX ADMIN — Medication 130 MCG/HR: at 09:07

## 2025-01-01 RX ADMIN — PIPERACILLIN SODIUM AND TAZOBACTAM SODIUM 4.5 G: 4; .5 INJECTION, POWDER, FOR SOLUTION INTRAVENOUS at 09:07

## 2025-01-01 RX ADMIN — PIPERACILLIN SODIUM AND TAZOBACTAM SODIUM 4.5 G: 4; .5 INJECTION, POWDER, FOR SOLUTION INTRAVENOUS at 12:07

## 2025-01-01 RX ADMIN — VASOPRESSIN 0.04 UNITS/MIN: 20 INJECTION INTRAVENOUS at 08:07

## 2025-01-01 RX ADMIN — VASOPRESSIN 0.04 UNITS/MIN: 20 INJECTION INTRAVENOUS at 12:07

## 2025-01-01 RX ADMIN — SODIUM CHLORIDE: 9 INJECTION, SOLUTION INTRAVENOUS at 01:07

## 2025-01-01 RX ADMIN — NOREPINEPHRINE BITARTRATE 0.1 MCG/KG/MIN: 4 INJECTION, SOLUTION INTRAVENOUS at 08:07

## 2025-01-01 RX ADMIN — HEPARIN SODIUM 18 UNITS/KG/HR: 10000 INJECTION, SOLUTION INTRAVENOUS at 08:07

## 2025-01-01 RX ADMIN — FUROSEMIDE 20 MG/HR: 10 INJECTION, SOLUTION INTRAMUSCULAR; INTRAVENOUS at 05:07

## 2025-01-01 RX ADMIN — POTASSIUM BICARBONATE 50 MEQ: 977.5 TABLET, EFFERVESCENT ORAL at 01:07

## 2025-01-01 RX ADMIN — ROCURONIUM BROMIDE 5 MCG/KG/MIN: 10 INJECTION INTRAVENOUS at 03:07

## 2025-01-01 RX ADMIN — HEPARIN SODIUM 18 UNITS/KG/HR: 10000 INJECTION, SOLUTION INTRAVENOUS at 03:07

## 2025-01-01 RX ADMIN — MAGNESIUM SULFATE HEPTAHYDRATE 2 G: 40 INJECTION, SOLUTION INTRAVENOUS at 11:07

## 2025-01-01 RX ADMIN — LEVETIRACETAM 3000 MG: 500 INJECTION, SOLUTION, CONCENTRATE INTRAVENOUS at 03:07

## 2025-01-01 RX ADMIN — Medication 175 MCG/HR: at 06:07

## 2025-01-01 RX ADMIN — PROPOFOL 50 MCG/KG/MIN: 10 INJECTION, EMULSION INTRAVENOUS at 12:07

## 2025-01-01 RX ADMIN — FUROSEMIDE 80 MG: 10 INJECTION, SOLUTION INTRAVENOUS at 08:07

## 2025-01-01 RX ADMIN — DOBUTAMINE HYDROCHLORIDE 5 MCG/KG/MIN: 400 INJECTION INTRAVENOUS at 12:07

## 2025-01-01 RX ADMIN — HEPARIN SODIUM 11 UNITS/KG/HR: 10000 INJECTION, SOLUTION INTRAVENOUS at 01:07

## 2025-01-01 RX ADMIN — Medication 25 MCG/HR: at 08:07

## 2025-01-01 RX ADMIN — EPINEPHRINE 0.04 MCG/KG/MIN: 1 INJECTION INTRAMUSCULAR; INTRAVENOUS; SUBCUTANEOUS at 06:07

## 2025-01-01 RX ADMIN — CISATRACURIUM BESYLATE 1 MCG/KG/MIN: 10 INJECTION, SOLUTION INTRAVENOUS at 05:07

## 2025-01-01 RX ADMIN — POTASSIUM CHLORIDE 40 MEQ: 29.8 INJECTION, SOLUTION INTRAVENOUS at 01:07

## 2025-01-01 RX ADMIN — PROPOFOL 50 MCG/KG/MIN: 10 INJECTION, EMULSION INTRAVENOUS at 11:07

## 2025-01-01 RX ADMIN — NOREPINEPHRINE BITARTRATE 0.06 MCG/KG/MIN: 4 INJECTION, SOLUTION INTRAVENOUS at 02:07

## 2025-01-01 RX ADMIN — VANCOMYCIN HYDROCHLORIDE 750 MG: 750 INJECTION, POWDER, LYOPHILIZED, FOR SOLUTION INTRAVENOUS at 11:07

## 2025-02-05 ENCOUNTER — HOSPITAL ENCOUNTER (INPATIENT)
Facility: HOSPITAL | Age: 30
LOS: 3 days | Discharge: HOSPICE | DRG: 291 | End: 2025-02-08
Attending: STUDENT IN AN ORGANIZED HEALTH CARE EDUCATION/TRAINING PROGRAM | Admitting: STUDENT IN AN ORGANIZED HEALTH CARE EDUCATION/TRAINING PROGRAM
Payer: MEDICAID

## 2025-02-05 DIAGNOSIS — I50.9 CONGESTIVE HEART FAILURE, UNSPECIFIED HF CHRONICITY, UNSPECIFIED HEART FAILURE TYPE: ICD-10-CM

## 2025-02-05 DIAGNOSIS — I50.9 HEART FAILURE, UNSPECIFIED HF CHRONICITY, UNSPECIFIED HEART FAILURE TYPE: ICD-10-CM

## 2025-02-05 DIAGNOSIS — Q20.4 SINGLE VENTRICLE WITH TRICUSPID ATRESIA: ICD-10-CM

## 2025-02-05 DIAGNOSIS — R17 ELEVATED BILIRUBIN: ICD-10-CM

## 2025-02-05 DIAGNOSIS — Q24.9 ADULT CONGENITAL HEART DISEASE: ICD-10-CM

## 2025-02-05 DIAGNOSIS — R09.02 HYPOXIA: Primary | ICD-10-CM

## 2025-02-05 DIAGNOSIS — R06.02 SOB (SHORTNESS OF BREATH): ICD-10-CM

## 2025-02-05 DIAGNOSIS — E87.6 HYPOKALEMIA: ICD-10-CM

## 2025-02-05 DIAGNOSIS — E87.3 RESPIRATORY ALKALOSIS: ICD-10-CM

## 2025-02-05 DIAGNOSIS — Z98.890 S/P FONTAN PROCEDURE: ICD-10-CM

## 2025-02-05 DIAGNOSIS — I50.9 ACUTE DECOMPENSATED HEART FAILURE: ICD-10-CM

## 2025-02-05 DIAGNOSIS — I50.9 HEART FAILURE: ICD-10-CM

## 2025-02-05 DIAGNOSIS — Q22.4 SINGLE VENTRICLE WITH TRICUSPID ATRESIA: ICD-10-CM

## 2025-02-05 LAB
ALBUMIN SERPL BCP-MCNC: 4.1 G/DL (ref 3.5–5.2)
ALLENS TEST: ABNORMAL
ALP SERPL-CCNC: 51 U/L (ref 38–126)
ALT SERPL W/O P-5'-P-CCNC: 26 U/L (ref 10–44)
ANION GAP SERPL CALC-SCNC: 10 MMOL/L (ref 8–16)
APTT PPP: 22.8 SEC (ref 21–32)
AST SERPL-CCNC: 32 U/L (ref 15–46)
BACTERIA #/AREA URNS AUTO: NORMAL /HPF
BASOPHILS # BLD AUTO: 0.01 K/UL (ref 0–0.2)
BASOPHILS NFR BLD: 0.2 % (ref 0–1.9)
BILIRUB SERPL-MCNC: 2.3 MG/DL (ref 0.1–1)
BILIRUB UR QL STRIP: NEGATIVE
CALCIUM SERPL-MCNC: 9.1 MG/DL (ref 8.7–10.5)
CHLORIDE SERPL-SCNC: 110 MMOL/L (ref 95–110)
CLARITY UR REFRACT.AUTO: CLEAR
CO2 SERPL-SCNC: 19 MMOL/L (ref 23–29)
COLOR UR AUTO: YELLOW
CREAT SERPL-MCNC: 0.83 MG/DL (ref 0.5–1.4)
D DIMER PPP IA.FEU-MCNC: 0.86 MG/L FEU
DELSYS: ABNORMAL
DIFFERENTIAL METHOD BLD: ABNORMAL
EOSINOPHIL # BLD AUTO: 0 K/UL (ref 0–0.5)
EOSINOPHIL NFR BLD: 0 % (ref 0–8)
ERYTHROCYTE [DISTWIDTH] IN BLOOD BY AUTOMATED COUNT: 13.8 % (ref 11.5–14.5)
EST. GFR  (NO RACE VARIABLE): >60 ML/MIN/1.73 M^2
GLUCOSE SERPL-MCNC: 127 MG/DL (ref 70–110)
GLUCOSE UR QL STRIP: NEGATIVE
HCO3 UR-SCNC: 20.6 MMOL/L (ref 24–28)
HCT VFR BLD AUTO: 46.8 % (ref 40–54)
HCT VFR BLD CALC: 49 %PCV (ref 36–54)
HGB BLD-MCNC: 15.7 G/DL (ref 14–18)
HGB BLD-MCNC: 17 G/DL
HGB UR QL STRIP: ABNORMAL
HYALINE CASTS UR QL AUTO: 0 /LPF
IMM GRANULOCYTES # BLD AUTO: 0.01 K/UL (ref 0–0.04)
IMM GRANULOCYTES NFR BLD AUTO: 0.2 % (ref 0–0.5)
INR PPP: 1.1 (ref 0.8–1.2)
KETONES UR QL STRIP: NEGATIVE
LACTATE SERPL-SCNC: 1.5 MMOL/L (ref 0.5–2.2)
LEUKOCYTE ESTERASE UR QL STRIP: NEGATIVE
LIPASE SERPL-CCNC: 79 U/L (ref 23–300)
LYMPHOCYTES # BLD AUTO: 1.7 K/UL (ref 1–4.8)
LYMPHOCYTES NFR BLD: 32.2 % (ref 18–48)
MCH RBC QN AUTO: 30.3 PG (ref 27–31)
MCHC RBC AUTO-ENTMCNC: 33.5 G/DL (ref 32–36)
MCV RBC AUTO: 90 FL (ref 82–98)
MICROSCOPIC COMMENT: NORMAL
MONOCYTES # BLD AUTO: 0.4 K/UL (ref 0.3–1)
MONOCYTES NFR BLD: 7.1 % (ref 4–15)
NEUTROPHILS # BLD AUTO: 3.1 K/UL (ref 1.8–7.7)
NEUTROPHILS NFR BLD: 60.3 % (ref 38–73)
NITRITE UR QL STRIP: NEGATIVE
NRBC BLD-RTO: 0 /100 WBC
NT-PROBNP SERPL-MCNC: 4400 PG/ML (ref 5–450)
OHS QRS DURATION: 94 MS
OHS QTC CALCULATION: 488 MS
PCO2 BLDA: 28.2 MMHG (ref 35–45)
PH SMN: 7.47 [PH] (ref 7.35–7.45)
PH UR STRIP: 6 [PH] (ref 5–8)
PLATELET # BLD AUTO: 118 K/UL (ref 150–450)
PMV BLD AUTO: 11.6 FL (ref 9.2–12.9)
PO2 BLDA: 40 MMHG (ref 40–60)
POC BE: -3 MMOL/L
POC SATURATED O2: 79 % (ref 95–100)
POC TCO2: 21 MMOL/L (ref 24–29)
POTASSIUM BLD-SCNC: 3.4 MMOL/L (ref 3.5–5.1)
POTASSIUM SERPL-SCNC: 3.3 MMOL/L (ref 3.5–5.1)
PROT SERPL-MCNC: 7.2 G/DL (ref 6–8.4)
PROT UR QL STRIP: ABNORMAL
PROTHROMBIN TIME: 12.2 SEC (ref 9–12.5)
RBC # BLD AUTO: 5.18 M/UL (ref 4.6–6.2)
RBC #/AREA URNS AUTO: 1 /HPF (ref 0–4)
SAMPLE: ABNORMAL
SITE: ABNORMAL
SODIUM BLD-SCNC: 145 MMOL/L (ref 136–145)
SODIUM SERPL-SCNC: 139 MMOL/L (ref 136–145)
SP GR UR STRIP: 1.01 (ref 1–1.03)
TROPONIN I SERPL-MCNC: 0.1 NG/ML (ref 0.01–0.03)
URN SPEC COLLECT METH UR: ABNORMAL
UROBILINOGEN UR STRIP-ACNC: NEGATIVE EU/DL
UUN UR-MCNC: 6 MG/DL (ref 2–20)
WBC # BLD AUTO: 5.21 K/UL (ref 3.9–12.7)
WBC #/AREA URNS AUTO: 0 /HPF (ref 0–5)

## 2025-02-05 PROCEDURE — 93010 ELECTROCARDIOGRAM REPORT: CPT | Mod: ,,, | Performed by: INTERNAL MEDICINE

## 2025-02-05 PROCEDURE — 83605 ASSAY OF LACTIC ACID: CPT | Mod: ER

## 2025-02-05 PROCEDURE — 63600175 PHARM REV CODE 636 W HCPCS: Mod: JZ,TB,ER

## 2025-02-05 PROCEDURE — 84484 ASSAY OF TROPONIN QUANT: CPT | Mod: ER | Performed by: STUDENT IN AN ORGANIZED HEALTH CARE EDUCATION/TRAINING PROGRAM

## 2025-02-05 PROCEDURE — 99900035 HC TECH TIME PER 15 MIN (STAT): Mod: ER

## 2025-02-05 PROCEDURE — 82803 BLOOD GASES ANY COMBINATION: CPT | Mod: ER

## 2025-02-05 PROCEDURE — 85610 PROTHROMBIN TIME: CPT | Mod: ER

## 2025-02-05 PROCEDURE — 25000003 PHARM REV CODE 250: Mod: ER | Performed by: EMERGENCY MEDICINE

## 2025-02-05 PROCEDURE — 63600175 PHARM REV CODE 636 W HCPCS: Mod: ER | Performed by: EMERGENCY MEDICINE

## 2025-02-05 PROCEDURE — 85730 THROMBOPLASTIN TIME PARTIAL: CPT | Mod: ER

## 2025-02-05 PROCEDURE — 99291 CRITICAL CARE FIRST HOUR: CPT | Mod: ER

## 2025-02-05 PROCEDURE — 25000003 PHARM REV CODE 250: Mod: ER | Performed by: STUDENT IN AN ORGANIZED HEALTH CARE EDUCATION/TRAINING PROGRAM

## 2025-02-05 PROCEDURE — 83880 ASSAY OF NATRIURETIC PEPTIDE: CPT | Mod: ER | Performed by: STUDENT IN AN ORGANIZED HEALTH CARE EDUCATION/TRAINING PROGRAM

## 2025-02-05 PROCEDURE — 96365 THER/PROPH/DIAG IV INF INIT: CPT | Mod: ER

## 2025-02-05 PROCEDURE — 80053 COMPREHEN METABOLIC PANEL: CPT | Mod: ER

## 2025-02-05 PROCEDURE — 96366 THER/PROPH/DIAG IV INF ADDON: CPT | Mod: ER

## 2025-02-05 PROCEDURE — 87040 BLOOD CULTURE FOR BACTERIA: CPT | Mod: 59,ER

## 2025-02-05 PROCEDURE — 20000000 HC ICU ROOM

## 2025-02-05 PROCEDURE — 83690 ASSAY OF LIPASE: CPT | Mod: ER

## 2025-02-05 PROCEDURE — 85379 FIBRIN DEGRADATION QUANT: CPT | Mod: ER | Performed by: STUDENT IN AN ORGANIZED HEALTH CARE EDUCATION/TRAINING PROGRAM

## 2025-02-05 PROCEDURE — 81000 URINALYSIS NONAUTO W/SCOPE: CPT | Mod: ER

## 2025-02-05 PROCEDURE — 63600175 PHARM REV CODE 636 W HCPCS: Mod: ER

## 2025-02-05 PROCEDURE — 25500020 PHARM REV CODE 255: Mod: ER | Performed by: STUDENT IN AN ORGANIZED HEALTH CARE EDUCATION/TRAINING PROGRAM

## 2025-02-05 PROCEDURE — 96375 TX/PRO/DX INJ NEW DRUG ADDON: CPT | Mod: ER

## 2025-02-05 PROCEDURE — 85025 COMPLETE CBC W/AUTO DIFF WBC: CPT | Mod: ER

## 2025-02-05 PROCEDURE — 93005 ELECTROCARDIOGRAM TRACING: CPT | Mod: ER

## 2025-02-05 RX ORDER — OXYCODONE HYDROCHLORIDE 5 MG/1
10 TABLET ORAL
Status: COMPLETED | OUTPATIENT
Start: 2025-02-05 | End: 2025-02-05

## 2025-02-05 RX ORDER — KETOROLAC TROMETHAMINE 30 MG/ML
15 INJECTION, SOLUTION INTRAMUSCULAR; INTRAVENOUS
Status: COMPLETED | OUTPATIENT
Start: 2025-02-05 | End: 2025-02-05

## 2025-02-05 RX ORDER — FUROSEMIDE 10 MG/ML
20 INJECTION INTRAMUSCULAR; INTRAVENOUS
Status: COMPLETED | OUTPATIENT
Start: 2025-02-05 | End: 2025-02-05

## 2025-02-05 RX ORDER — ONDANSETRON HYDROCHLORIDE 2 MG/ML
4 INJECTION, SOLUTION INTRAVENOUS
Status: COMPLETED | OUTPATIENT
Start: 2025-02-05 | End: 2025-02-05

## 2025-02-05 RX ORDER — HEPARIN SODIUM,PORCINE/D5W 25000/250
0-40 INTRAVENOUS SOLUTION INTRAVENOUS CONTINUOUS
Status: DISCONTINUED | OUTPATIENT
Start: 2025-02-05 | End: 2025-02-06

## 2025-02-05 RX ORDER — HEPARIN SODIUM,PORCINE/D5W 25000/250
0-40 INTRAVENOUS SOLUTION INTRAVENOUS CONTINUOUS
Status: DISCONTINUED | OUTPATIENT
Start: 2025-02-05 | End: 2025-02-05

## 2025-02-05 RX ADMIN — IOHEXOL 100 ML: 350 INJECTION, SOLUTION INTRAVENOUS at 04:02

## 2025-02-05 RX ADMIN — OXYCODONE HYDROCHLORIDE 10 MG: 5 TABLET ORAL at 08:02

## 2025-02-05 RX ADMIN — FUROSEMIDE 20 MG: 10 INJECTION, SOLUTION INTRAMUSCULAR; INTRAVENOUS at 08:02

## 2025-02-05 RX ADMIN — KETOROLAC TROMETHAMINE 15 MG: 30 INJECTION, SOLUTION INTRAMUSCULAR at 03:02

## 2025-02-05 RX ADMIN — ONDANSETRON 4 MG: 2 INJECTION INTRAMUSCULAR; INTRAVENOUS at 02:02

## 2025-02-05 RX ADMIN — SODIUM CHLORIDE 1000 ML: 9 INJECTION, SOLUTION INTRAVENOUS at 02:02

## 2025-02-05 NOTE — ED PROVIDER NOTES
Encounter Date: 2/5/2025       History     Chief Complaint   Patient presents with    Abdominal Pain    Emesis     Abd pain and emesis for weeks but it is worse today     Juani Reilly Jr. is a 29 y.o. male  has a past medical history of History of heart surgery, Other cirrhosis of liver, SVT (supraventricular tachycardia), and WPW syndrome. presenting to the Emergency Department for abdominal pain, nausea, vomiting, shortness of breath that got worse yesterday.  Reports abdominal pain is in the upper abdomen.  Patient's denies any fevers, chills, chest pain.  Patient states he has not been taking his Xarelto and does not know when the last time he took it was.            Review of patient's allergies indicates:  No Known Allergies  Past Medical History:   Diagnosis Date    History of heart surgery     Other cirrhosis of liver 11/12/2020    SVT (supraventricular tachycardia)     WPW syndrome      Past Surgical History:   Procedure Laterality Date    ANGIOGRAPHY OF AORTIC ARCH N/A 9/3/2020    Procedure: AORTOGRAM, AORTIC ARCH;  Surgeon: Stehpania Crump MD;  Location: [facility] CATH LAB;  Service: Cardiology;  Laterality: N/A;    FONTAN PROCEDURE, EXTRACARDIAC      LEFT HEART CATHETERIZATION Left 9/3/2020    Procedure: Left heart cath;  Surgeon: Stephania Crump MD;  Location: [facility] CATH LAB;  Service: Cardiology;  Laterality: Left;    RIGHT HEART CATHETERIZATION FOR CONGENITAL HEART DEFECT N/A 9/3/2020    Procedure: CATHETERIZATION, HEART, RIGHT, FOR CONGENITAL HEART DEFECT;  Surgeon: Stephania Crump MD;  Location: [facility] CATH LAB;  Service: Cardiology;  Laterality: N/A;  [facility] - needs covid test morning of. Extenuating circumstances    TRANSESOPHAGEAL ECHOCARDIOGRAPHY N/A 11/10/2022    Procedure: ECHOCARDIOGRAM, TRANSESOPHAGEAL;  Surgeon: Emeterio Hall MD;  Location: [facility] EP LAB;  Service: Cardiology;  Laterality: N/A;    TREATMENT OF CARDIAC ARRHYTHMIA N/A 1/9/2021    Procedure:  CARDIOVERSION;  Surgeon: Jim Mcginnis MD;  Location: [facility] CATH LAB;  Service: Cardiology;  Laterality: N/A;     Family History   Problem Relation Name Age of Onset    No Known Problems Mother      No Known Problems Father      No Known Problems Sister 3     No Known Problems Brother      No Known Problems Maternal Aunt      No Known Problems Maternal Uncle      No Known Problems Paternal Aunt      No Known Problems Paternal Uncle      No Known Problems Maternal Grandmother      Heart disease Maternal Grandfather      No Known Problems Paternal Grandmother      No Known Problems Paternal Grandfather      Anemia Neg Hx      Arrhythmia Neg Hx      Asthma Neg Hx      Clotting disorder Neg Hx      Fainting Neg Hx      Heart attack Neg Hx      Heart failure Neg Hx      Hyperlipidemia Neg Hx      Hypertension Neg Hx      Stroke Neg Hx      Atrial Septal Defect Neg Hx       Social History     Tobacco Use    Smoking status: Former     Current packs/day: 0.00     Types: Cigarettes     Quit date:      Years since quittin.     Passive exposure: Past    Smokeless tobacco: Never    Tobacco comments:     3-4 months    Substance Use Topics    Alcohol use: No    Drug use: Yes     Types: Marijuana     Comment: Mojo today- pt reports a while back     Review of Systems   Constitutional:  Negative for fever.   HENT:  Negative for sore throat.    Respiratory:  Positive for shortness of breath. Negative for chest tightness.    Cardiovascular:  Negative for chest pain.   Gastrointestinal:  Positive for abdominal pain, nausea and vomiting.   Genitourinary:  Negative for dysuria.   Musculoskeletal:  Negative for back pain.   Skin:  Negative for rash.   Neurological:  Negative for weakness.   Hematological:  Does not bruise/bleed easily.   All other systems reviewed and are negative.      Physical Exam     Initial Vitals [25 1357]   BP Pulse Resp Temp SpO2   (!) 175/122 (!) 125 18 98.5 °F (36.9 °C) (!) 88 %      MAP        --         Physical Exam    Nursing note and vitals reviewed.  Constitutional: He appears well-developed and well-nourished. He is not diaphoretic.  Non-toxic appearance. No distress.   HENT:   Head: Normocephalic and atraumatic.   Right Ear: External ear normal.   Left Ear: External ear normal.   Nose: Nose normal.   Eyes: EOM are normal.   Neck: Neck supple.   Normal range of motion.  Cardiovascular:  Normal rate, regular rhythm, normal heart sounds and intact distal pulses.           Pulmonary/Chest: Accessory muscle usage present. Tachypnea noted. No respiratory distress. He has no wheezes. He has no rhonchi.   On re-evaluation patient's oxygen saturation is at upper 70s to 80%.  Placed on oxygen.   Abdominal: Abdomen is soft. Bowel sounds are normal. He exhibits no distension. A surgical scar is present. There is abdominal tenderness in the epigastric area. There is no rebound and no guarding.   Musculoskeletal:         General: Normal range of motion.      Cervical back: Normal range of motion and neck supple.     Neurological: He is alert and oriented to person, place, and time. GCS score is 15. GCS eye subscore is 4. GCS verbal subscore is 5. GCS motor subscore is 6.   Skin: Skin is dry.   Psychiatric: He has a normal mood and affect. His behavior is normal. Judgment and thought content normal.         ED Course   Critical Care    Date/Time: 2/5/2025 4:23 PM    Performed by: Yeny Aguero PA-C  Authorized by: Caleb Momin MD  Direct patient critical care time: 20 minutes  Additional history critical care time: 10 minutes  Ordering / reviewing critical care time: 10 minutes  Documentation critical care time: 15 minutes  Consulting other physicians critical care time: 15 minutes  Total critical care time (exclusive of procedural time) : 70 minutes  Critical care was necessary to treat or prevent imminent or life-threatening deterioration of the following conditions: cardiac failure, circulatory failure and  respiratory failure.  Critical care was time spent personally by me on the following activities: development of treatment plan with patient or surrogate, discussions with consultants, interpretation of cardiac output measurements, evaluation of patient's response to treatment, examination of patient, obtaining history from patient or surrogate, ordering and performing treatments and interventions, ordering and review of laboratory studies, ordering and review of radiographic studies, pulse oximetry, re-evaluation of patient's condition and review of old charts.        Labs Reviewed   CBC W/ AUTO DIFFERENTIAL - Abnormal       Result Value    WBC 5.21      RBC 5.18      Hemoglobin 15.7      Hematocrit 46.8      MCV 90      MCH 30.3      MCHC 33.5      RDW 13.8      Platelets 118 (*)     MPV 11.6      Immature Granulocytes 0.2      Gran # (ANC) 3.1      Immature Grans (Abs) 0.01      Lymph # 1.7      Mono # 0.4      Eos # 0.0      Baso # 0.01      nRBC 0      Gran % 60.3      Lymph % 32.2      Mono % 7.1      Eosinophil % 0.0      Basophil % 0.2      Differential Method Automated     COMPREHENSIVE METABOLIC PANEL - Abnormal    Sodium 139      Potassium 3.3 (*)     Chloride 110      CO2 19 (*)     Glucose 127 (*)     BUN 6      Creatinine 0.83      Calcium 9.1      Total Protein 7.2      Albumin 4.1      Total Bilirubin 2.3 (*)     Alkaline Phosphatase 51      AST 32      ALT 26      Anion Gap 10      eGFR >60.0     NT-PRO NATRIURETIC PEPTIDE - Abnormal    NT-proBNP 4400 (*)    D DIMER, QUANTITATIVE - Abnormal    D-Dimer 0.86 (*)    TROPONIN I - Abnormal    Troponin I 0.100 (*)    ISTAT PROCEDURE - Abnormal    POC PH 7.473 (*)     POC PCO2 28.2 (*)     POC PO2 40      POC HCO3 20.6 (*)     POC BE -3 (*)     POC SATURATED O2 79      POC Sodium 145      POC Potassium 3.4 (*)     POC TCO2 21 (*)     POC Hematocrit 49      POC HEMOGLOBIN 17      Sample VENOUS      Site Other      Allens Test N/A      DelSys Venti Mask      CULTURE, BLOOD   CULTURE, BLOOD   LACTIC ACID, PLASMA    Lactate (Lactic Acid) 1.5     LIPASE    Lipase Result 79     APTT   PROTIME-INR   APTT    aPTT 22.8     PROTIME-INR    Prothrombin Time 12.2      INR 1.1     URINALYSIS, REFLEX TO URINE CULTURE   LACTIC ACID, PLASMA        ECG Results              EKG 12-lead (In process)        Collection Time Result Time QRS Duration [facility] QTC Calculation    02/05/25 14:13:35 02/05/25 14:36:49 94 488                     In process by Interface, Lab In [facility] (02/05/25 14:36:52)                   Narrative:    Test Reason : Z13.6,    Vent. Rate : 117 BPM     Atrial Rate : 117 BPM     P-R Int : 152 ms          QRS Dur :  94 ms      QT Int : 350 ms       P-R-T Axes :  74  37 211 degrees    QTcB Int : 488 ms    Sinus tachycardia  LVH with repolarization abnormality  Abnormal ECG  When compared with ECG of 19-Sep-2024 09:20,  No significant change was found    Referred By: AAAREFERRAL SELF           Confirmed By:                                   Imaging Results              CT Chest Abdomen Pelvis With IV Contrast (XPD) NO Oral Contrast (In process)                      X-Ray Chest AP Portable (Final result)  Result time 02/05/25 14:39:19      Final result by Grupo Nick MD (02/05/25 14:39:19)                   Impression:      No acute abnormality.      Electronically signed by: Grupo Nick  Date:    02/05/2025  Time:    14:39               Narrative:    EXAMINATION:  XR CHEST AP PORTABLE    CLINICAL HISTORY:  Sepsis;    TECHNIQUE:  Single frontal view of the chest was performed.    COMPARISON:  Multiple    FINDINGS:  The lungs are clear, with normal appearance of pulmonary vasculature and no pleural effusion or pneumothorax.    The cardiac silhouette is normal in size. The hilar and mediastinal contours are unremarkable.    Bones are intact.                                       Medications   heparin 25,000 units in dextrose 5% 250 mL (100 units/mL) infusion LOW  INTENSITY nomogram - [facility] (12 Units/kg/hr × 59 kg Intravenous Rate/Dose Change 2/5/25 1808)   heparin 25,000 units in dextrose 5% (100 units/ml) IV bolus from bag LOW INTENSITY nomogram - [facility] (has no administration in time range)   heparin 25,000 units in dextrose 5% (100 units/ml) IV bolus from bag LOW INTENSITY nomogram - [facility] (has no administration in time range)   sodium chloride 0.9% bolus 1,000 mL 1,000 mL (0 mLs Intravenous Stopped 2/5/25 1542)   ondansetron injection 4 mg (4 mg Intravenous Given 2/5/25 1443)   ketorolac injection 15 mg (15 mg Intravenous Given 2/5/25 1556)   iohexoL (OMNIPAQUE 350) injection 100 mL (100 mLs Intravenous Given 2/5/25 1609)   heparin 25,000 units in dextrose 5% (100 units/ml) IV bolus from bag LOW INTENSITY nomogram - [facility] (3,540 Units Intravenous Bolus from Bag 2/5/25 1751)     Medical Decision Making  This is an emergent evaluation of 29 y.o. male in the ED presenting for shortness of breath, abdominal pain, nausea, vomiting. Physical exam reveals a symptomatic, afebrile, and well-appearing male in apparent respiratory distress with tachypnea and accessory muscle use. Pertinent physical exam findings above. Vital signs reveal hypoxia and tachycardia. If available, previous records reviewed.    My overall impression is :  Hypoxia (Primary)  Congestive heart failure, unspecified HF chronicity, unspecified heart failure type  Respiratory alkalosis  Single ventricle with tricuspid atresia  S/P Fontan procedure  Hypokalemia  . Differential Diagnoses: Including but not limited to ACS/MI, PE, pneumothorax, cardiac tamponade, pneumonia, acute decompensated heart failure, asthma exacerbation, COPD exacerbation, foreign body aspiration, trauma/fracture, costochondritis/pleurisy, Including but not limited to Gastritis/PUD, splenic enlargement/rupture/infarction, LLL pneumonia, PE, pancreatitis, MI, li/myocarditis, GERD     All available findings were reviewed with the patient/family in  detail along with the indications for hospitalization in order to receive CHF management and BP control with respiratory support as needed.  All remaining questions and concerns were addressed at this time and the patient/family communicates understanding and agrees to proceed accordingly.  Similarly, all pertinent details of the encounter were discussed with Dr. Victoria who agrees to receive the patient at [facility] for further care as outlined above.  The patient will be transferred by EMS secondary to a need for ongoing cardiac monitoring and IVF en route.     This case was discussed with my attending, Dr. Momin who is in agreement with my assessment and plan.      Amount and/or Complexity of Data Reviewed  Labs: ordered. Decision-making details documented in ED Course.  Radiology: ordered. Decision-making details documented in ED Course.  ECG/medicine tests: ordered and independent interpretation performed. Decision-making details documented in ED Course.    Risk  OTC drugs.  Prescription drug management.  Decision regarding hospitalization.  Diagnosis or treatment significantly limited by social determinants of health.              Attending Attestation:     Physician Attestation Statement for NP/PA:   I personally made/approved the management plan and take responsibility for the patient management.    Other NP/PA Attestation Additions:    History of Present Illness: See ED Course               ED Course as of 02/05/25 1830   Wed Feb 05, 2025   1400 Cardiology note 09/2024:      I was called by the transfer center about this patient.  He is currently in a freestanding Emergency room with complaints of abdominal pain, nausea, and vomiting since early this morning.  In the emergency room, saturations were initially 89% on room air.  He did ambulate with saturations around 92%.  No complaints of shortness of breath.  No significant cough.  Chest x-ray reassuring.  There CT scanners currently  "inoperable, in the emergency room doctor there thought that a scan to rule out pulmonary embolism may be appropriate given his desaturation and noncompliance.     I reviewed his labs.  Comprehensive metabolic panel is basically unchanged.  Lactate is mildly elevated.  NT pro BNP elevated.  EKG is unchanged with sinus rhythm.  Chest x-ray looks good.  Blood pressure was initially elevated at 160/95 in the emergency room.  Vital signs otherwise stable.     Patient has a long history of medicine noncompliance and, by report, reports noncompliance with all of his medicines.  I last saw him in clinic almost a year ago with plans to follow-up in a month, but he did not show.  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     I think this is unlikely to be a pulmonary embolism given the chest x-ray finding and the lack of shortness of breath.  His baseline saturations run in the low 90s, and CT scan to rule out pulmonary embolism in Fontan physiology is notoriously inaccurate.  I would recommend against a CTA to look for a pulmonary embolism.  His symptoms sound more consistent with an acute abdominal issue like a viral gastroenteritis.  He is preload dependent, and we do not want him getting dehydrated.          [KB]   1403 29-year-old male presents for evaluation of generalized upper abdominal pain with shortness of breath.  Has complex medical history involving congenital malformations of heart and chronic hypoxia.  Supposed to be taking Xarelto but has not been taking for "some time". [KB]   1405 EKG 12-lead  Independent interpretation by me: Sinus tachycardic.  117 beats per minute.  Repolarization abnormality.  No ectopy. [LH]   1443 ISTAT PROCEDURE(!)  Respiratory alkalosis.  Patient tachypneic. [LH]   1449 X-Ray Chest AP Portable  Independent interpretation by me: no lung consolidation, effusion, or pneumothorax. Cardiac silhouette appears normal. I have " also read the radiologist's report and agree with their findings.    [LH]   1543 Potassium(!): 3.3 [LH]   1543 CO2(!): 19 [LH]   1543 BILIRUBIN TOTAL(!): 2.3 [LH]   1544 D-Dimer(!): 0.86 [LH]   1544 Troponin I(!): 0.100 [LH]   1544 NT-proBNP(!): 4400 [LH]   1605 Patient was satting between 78 and 83% on high-flow venti mask.  Patient was transitioned to high-flow non-rebreather. [LH]   1607 Connected with Olga to Dr. Weiland, pediatric cardiology on-call.   Don't know what a normal D dimer would be on a Fontan pt.   Don't need to do CT here.   Admit to adult congenital cardio. [LH]   1624 Called Radiology to cancel the CT scan here, but Pt has already gotten scanned here.  [LH]   1653 Discussed patient with Dr. Victoria, cardiology on-call. Advised to start heparin. Will need ICU bed.  Accepted for admission [LH]   1736 Attempted to secure chat Dr. Victoria for clarification on heparin intensity.  Call to confirm her low intensity heparin. [LH]   1806 Discussed with Dr. Luna, radiology. Decreased vascular flow in right lung field.  Reports pericholecystic fluid.  Reports we will have somebody and radiology more familiar with congenital heart disease look at scans prior to report being released. [LH]   1822 Patient has been maintaining 90% O2 sat on high-flow non-rebreather. [LH]      ED Course User Index  [KB] Caleb Momin MD  [LH] Yeny Aguero PA-C                           Clinical Impression:  Final diagnoses:  [R09.02] Hypoxia (Primary)  [I50.9] Congestive heart failure, unspecified HF chronicity, unspecified heart failure type  [E87.3] Respiratory alkalosis  [Q20.4, Q22.4] Single ventricle with tricuspid atresia  [Z98.890] S/P Fontan procedure  [E87.6] Hypokalemia          ED Disposition Condition    Transfer to Another Facility Stable                Yeny Aguero PA-C  02/05/25 1830       Caleb Momin MD  02/08/25 1250

## 2025-02-05 NOTE — Clinical Note
Diagnosis: Hypoxia [030593]   Future Attending Provider: JOE CASPER [[id number]]   Reason for IP Medical Treatment  (Clinical interventions that can only be accomplished in the IP setting? ) :: acute decompensated heart failure

## 2025-02-05 NOTE — ED NOTES
Pt states that he has been short of breath, nausea and vomiting for about 2 weeks. Pt states that today symptoms are worse.

## 2025-02-06 PROBLEM — I50.9 ACUTE DECOMPENSATED HEART FAILURE: Status: ACTIVE | Noted: 2025-02-06

## 2025-02-06 PROBLEM — R06.02 SOB (SHORTNESS OF BREATH): Status: ACTIVE | Noted: 2025-02-06

## 2025-02-06 LAB
ALBUMIN SERPL BCP-MCNC: 3.7 G/DL (ref 3.5–5.2)
ALLENS TEST: NORMAL
ALP SERPL-CCNC: 40 U/L (ref 38–126)
ALT SERPL W/O P-5'-P-CCNC: 25 U/L (ref 10–44)
ANION GAP SERPL CALC-SCNC: 10 MMOL/L (ref 8–16)
APICAL FOUR CHAMBER EJECTION FRACTION: 11 %
APICAL TWO CHAMBER EJECTION FRACTION: 7 %
APTT PPP: 44.2 SEC (ref 21–32)
APTT PPP: 50 SEC (ref 21–32)
AST SERPL-CCNC: 27 U/L (ref 15–46)
AV MEAN GRADIENT: 2 MMHG
AV PEAK GRADIENT: 2 MMHG
AV REGURGITATION PRESSURE HALF TIME: 731 MS
BASOPHILS # BLD AUTO: 0.02 K/UL (ref 0–0.2)
BASOPHILS NFR BLD: 0.3 % (ref 0–1.9)
BILIRUB SERPL-MCNC: 2.5 MG/DL (ref 0.1–1)
BSA FOR ECHO PROCEDURE: 1.66 M2
CALCIUM SERPL-MCNC: 8.6 MG/DL (ref 8.7–10.5)
CHLORIDE SERPL-SCNC: 104 MMOL/L (ref 95–110)
CO2 SERPL-SCNC: 22 MMOL/L (ref 23–29)
CREAT SERPL-MCNC: 0.88 MG/DL (ref 0.5–1.4)
CV ECHO LV RWT: 0.47 CM
DELSYS: NORMAL
DIFFERENTIAL METHOD BLD: ABNORMAL
DOP CALC AO PEAK VEL: 0.7 M/S
DOP CALC AO VTI: 9.2 CM
ECHO LV POSTERIOR WALL: 1.3 CM (ref 0.6–1.1)
EOSINOPHIL # BLD AUTO: 0 K/UL (ref 0–0.5)
EOSINOPHIL NFR BLD: 0 % (ref 0–8)
ERYTHROCYTE [DISTWIDTH] IN BLOOD BY AUTOMATED COUNT: 13.8 % (ref 11.5–14.5)
EST. GFR  (NO RACE VARIABLE): >60 ML/MIN/1.73 M^2
FRACTIONAL SHORTENING: 3.6 % (ref 28–44)
GLUCOSE SERPL-MCNC: 99 MG/DL (ref 70–110)
HCO3 UR-SCNC: 24.6 MMOL/L (ref 24–28)
HCT VFR BLD AUTO: 43.8 % (ref 40–54)
HCT VFR BLD CALC: 47 %PCV (ref 36–54)
HGB BLD-MCNC: 14.4 G/DL (ref 14–18)
HGB BLD-MCNC: 16 G/DL
IMM GRANULOCYTES # BLD AUTO: 0.01 K/UL (ref 0–0.04)
IMM GRANULOCYTES NFR BLD AUTO: 0.2 % (ref 0–0.5)
INFLUENZA A, MOLECULAR: NEGATIVE
INFLUENZA B, MOLECULAR: NEGATIVE
INTERVENTRICULAR SEPTUM: 1.1 CM (ref 0.6–1.1)
LEFT INTERNAL DIMENSION IN SYSTOLE: 5.3 CM (ref 2.1–4)
LEFT VENTRICLE DIASTOLIC VOLUME INDEX: 89.5 ML/M2
LEFT VENTRICLE DIASTOLIC VOLUME: 149.46 ML
LEFT VENTRICLE END DIASTOLIC VOLUME APICAL 2 CHAMBER: 131.71 ML
LEFT VENTRICLE END DIASTOLIC VOLUME APICAL 4 CHAMBER INDEX BSA: 95.9 ML/M2
LEFT VENTRICLE END DIASTOLIC VOLUME APICAL 4 CHAMBER: 163.3 ML
LEFT VENTRICLE MASS INDEX: 163.1 G/M2
LEFT VENTRICLE SYSTOLIC VOLUME INDEX: 79.4 ML/M2
LEFT VENTRICLE SYSTOLIC VOLUME: 132.62 ML
LEFT VENTRICULAR INTERNAL DIMENSION IN DIASTOLE: 5.5 CM (ref 3.5–6)
LEFT VENTRICULAR MASS: 272.4 G
LVED V (TEICH): 149.46 ML
LVES V (TEICH): 132.62 ML
LYMPHOCYTES # BLD AUTO: 1.9 K/UL (ref 1–4.8)
LYMPHOCYTES NFR BLD: 29.7 % (ref 18–48)
MCH RBC QN AUTO: 30.3 PG (ref 27–31)
MCHC RBC AUTO-ENTMCNC: 32.9 G/DL (ref 32–36)
MCV RBC AUTO: 92 FL (ref 82–98)
MONOCYTES # BLD AUTO: 0.4 K/UL (ref 0.3–1)
MONOCYTES NFR BLD: 6 % (ref 4–15)
NEUTROPHILS # BLD AUTO: 4.1 K/UL (ref 1.8–7.7)
NEUTROPHILS NFR BLD: 63.8 % (ref 38–73)
NRBC BLD-RTO: 0 /100 WBC
OHS LV EJECTION FRACTION SIMPSONS BIPLANE MOD: 5 %
PCO2 BLDA: 44.3 MMHG (ref 35–45)
PH SMN: 7.35 [PH] (ref 7.35–7.45)
PISA AR MAX VEL: 4.31 M/S
PISA MRMAX VEL: 4.59 M/S
PLATELET # BLD AUTO: 114 K/UL (ref 150–450)
PMV BLD AUTO: 10.9 FL (ref 9.2–12.9)
PO2 BLDA: 40 MMHG (ref 40–60)
POC BE: -1 MMOL/L
POC SATURATED O2: 72 % (ref 95–100)
POC TCO2: 26 MMOL/L (ref 24–29)
POTASSIUM BLD-SCNC: 4.1 MMOL/L (ref 3.5–5.1)
POTASSIUM SERPL-SCNC: 4.2 MMOL/L (ref 3.5–5.1)
PROT SERPL-MCNC: 6.4 G/DL (ref 6–8.4)
RBC # BLD AUTO: 4.76 M/UL (ref 4.6–6.2)
SAMPLE: NORMAL
SARS-COV-2 RDRP RESP QL NAA+PROBE: NEGATIVE
SITE: NORMAL
SODIUM BLD-SCNC: 139 MMOL/L (ref 136–145)
SODIUM SERPL-SCNC: 136 MMOL/L (ref 136–145)
SPECIMEN SOURCE: NORMAL
TROPONIN I SERPL DL<=0.01 NG/ML-MCNC: 0.04 NG/ML (ref 0–0.03)
TROPONIN I SERPL DL<=0.01 NG/ML-MCNC: 0.09 NG/ML (ref 0–0.03)
TROPONIN I SERPL-MCNC: 0.1 NG/ML (ref 0.01–0.03)
TSH SERPL DL<=0.005 MIU/L-ACNC: 0.87 UIU/ML (ref 0.4–4)
UUN UR-MCNC: 9 MG/DL (ref 2–20)
WBC # BLD AUTO: 6.49 K/UL (ref 3.9–12.7)
Z-SCORE OF LEFT VENTRICULAR DIMENSION IN END DIASTOLE: 1.64
Z-SCORE OF LEFT VENTRICULAR DIMENSION IN END SYSTOLE: 4.79

## 2025-02-06 PROCEDURE — 84484 ASSAY OF TROPONIN QUANT: CPT | Mod: ER | Performed by: EMERGENCY MEDICINE

## 2025-02-06 PROCEDURE — 99900035 HC TECH TIME PER 15 MIN (STAT)

## 2025-02-06 PROCEDURE — 80053 COMPREHEN METABOLIC PANEL: CPT | Mod: ER | Performed by: EMERGENCY MEDICINE

## 2025-02-06 PROCEDURE — 87635 SARS-COV-2 COVID-19 AMP PRB: CPT

## 2025-02-06 PROCEDURE — 93005 ELECTROCARDIOGRAM TRACING: CPT

## 2025-02-06 PROCEDURE — 85730 THROMBOPLASTIN TIME PARTIAL: CPT | Mod: 91,ER | Performed by: EMERGENCY MEDICINE

## 2025-02-06 PROCEDURE — 36415 COLL VENOUS BLD VENIPUNCTURE: CPT | Performed by: NURSE PRACTITIONER

## 2025-02-06 PROCEDURE — 27100171 HC OXYGEN HIGH FLOW UP TO 24 HOURS

## 2025-02-06 PROCEDURE — 94761 N-INVAS EAR/PLS OXIMETRY MLT: CPT | Mod: XB

## 2025-02-06 PROCEDURE — 27000249 HC VAPOTHERM CIRCUIT

## 2025-02-06 PROCEDURE — 20000000 HC ICU ROOM

## 2025-02-06 PROCEDURE — 25000003 PHARM REV CODE 250: Performed by: NURSE PRACTITIONER

## 2025-02-06 PROCEDURE — 82803 BLOOD GASES ANY COMBINATION: CPT | Mod: ER

## 2025-02-06 PROCEDURE — 27000221 HC OXYGEN, UP TO 24 HOURS: Mod: ER

## 2025-02-06 PROCEDURE — 93010 ELECTROCARDIOGRAM REPORT: CPT | Mod: ,,, | Performed by: STUDENT IN AN ORGANIZED HEALTH CARE EDUCATION/TRAINING PROGRAM

## 2025-02-06 PROCEDURE — 85730 THROMBOPLASTIN TIME PARTIAL: CPT | Mod: ER

## 2025-02-06 PROCEDURE — 99291 CRITICAL CARE FIRST HOUR: CPT | Mod: ,,, | Performed by: INTERNAL MEDICINE

## 2025-02-06 PROCEDURE — 87502 INFLUENZA DNA AMP PROBE: CPT

## 2025-02-06 PROCEDURE — 63600175 PHARM REV CODE 636 W HCPCS: Performed by: NURSE PRACTITIONER

## 2025-02-06 PROCEDURE — 5A0935A ASSISTANCE WITH RESPIRATORY VENTILATION, LESS THAN 24 CONSECUTIVE HOURS, HIGH NASAL FLOW/VELOCITY: ICD-10-PCS | Performed by: STUDENT IN AN ORGANIZED HEALTH CARE EDUCATION/TRAINING PROGRAM

## 2025-02-06 PROCEDURE — 84484 ASSAY OF TROPONIN QUANT: CPT | Mod: 91 | Performed by: NURSE PRACTITIONER

## 2025-02-06 PROCEDURE — 63600175 PHARM REV CODE 636 W HCPCS: Performed by: EMERGENCY MEDICINE

## 2025-02-06 PROCEDURE — 85025 COMPLETE CBC W/AUTO DIFF WBC: CPT | Mod: ER | Performed by: EMERGENCY MEDICINE

## 2025-02-06 PROCEDURE — 94799 UNLISTED PULMONARY SVC/PX: CPT

## 2025-02-06 PROCEDURE — 96366 THER/PROPH/DIAG IV INF ADDON: CPT | Mod: ER

## 2025-02-06 PROCEDURE — 99900035 HC TECH TIME PER 15 MIN (STAT): Mod: ER

## 2025-02-06 PROCEDURE — 63600175 PHARM REV CODE 636 W HCPCS: Mod: ER | Performed by: EMERGENCY MEDICINE

## 2025-02-06 PROCEDURE — 84443 ASSAY THYROID STIM HORMONE: CPT | Performed by: NURSE PRACTITIONER

## 2025-02-06 RX ORDER — LISINOPRIL 2.5 MG/1
10 TABLET ORAL DAILY
Status: DISCONTINUED | OUTPATIENT
Start: 2025-02-07 | End: 2025-02-08 | Stop reason: HOSPADM

## 2025-02-06 RX ORDER — POLYETHYLENE GLYCOL 3350 17 G/17G
17 POWDER, FOR SOLUTION ORAL DAILY PRN
Status: DISCONTINUED | OUTPATIENT
Start: 2025-02-06 | End: 2025-02-08 | Stop reason: HOSPADM

## 2025-02-06 RX ORDER — NALOXONE HCL 0.4 MG/ML
0.02 VIAL (ML) INJECTION
Status: DISCONTINUED | OUTPATIENT
Start: 2025-02-06 | End: 2025-02-08 | Stop reason: HOSPADM

## 2025-02-06 RX ORDER — IBUPROFEN 200 MG
24 TABLET ORAL
Status: DISCONTINUED | OUTPATIENT
Start: 2025-02-06 | End: 2025-02-08 | Stop reason: HOSPADM

## 2025-02-06 RX ORDER — AMOXICILLIN 250 MG
1 CAPSULE ORAL 2 TIMES DAILY PRN
Status: DISCONTINUED | OUTPATIENT
Start: 2025-02-06 | End: 2025-02-08 | Stop reason: HOSPADM

## 2025-02-06 RX ORDER — GLUCAGON 1 MG
1 KIT INJECTION
Status: DISCONTINUED | OUTPATIENT
Start: 2025-02-06 | End: 2025-02-08 | Stop reason: HOSPADM

## 2025-02-06 RX ORDER — IBUPROFEN 200 MG
16 TABLET ORAL
Status: DISCONTINUED | OUTPATIENT
Start: 2025-02-06 | End: 2025-02-08 | Stop reason: HOSPADM

## 2025-02-06 RX ORDER — SODIUM CHLORIDE 0.9 % (FLUSH) 0.9 %
5 SYRINGE (ML) INJECTION
Status: DISCONTINUED | OUTPATIENT
Start: 2025-02-06 | End: 2025-02-08 | Stop reason: HOSPADM

## 2025-02-06 RX ORDER — FUROSEMIDE 10 MG/ML
40 INJECTION INTRAMUSCULAR; INTRAVENOUS EVERY 12 HOURS
Status: DISCONTINUED | OUTPATIENT
Start: 2025-02-06 | End: 2025-02-08 | Stop reason: HOSPADM

## 2025-02-06 RX ORDER — FUROSEMIDE 10 MG/ML
20 INJECTION INTRAMUSCULAR; INTRAVENOUS
Status: COMPLETED | OUTPATIENT
Start: 2025-02-06 | End: 2025-02-06

## 2025-02-06 RX ORDER — HEPARIN SODIUM,PORCINE/D5W 25000/250
0-40 INTRAVENOUS SOLUTION INTRAVENOUS CONTINUOUS
Status: DISCONTINUED | OUTPATIENT
Start: 2025-02-06 | End: 2025-02-06

## 2025-02-06 RX ORDER — AMOXICILLIN 250 MG
1 CAPSULE ORAL 2 TIMES DAILY
Status: DISCONTINUED | OUTPATIENT
Start: 2025-02-06 | End: 2025-02-06

## 2025-02-06 RX ORDER — FUROSEMIDE 10 MG/ML
20 INJECTION INTRAMUSCULAR; INTRAVENOUS
Status: DISCONTINUED | OUTPATIENT
Start: 2025-02-06 | End: 2025-02-06

## 2025-02-06 RX ORDER — TALC
6 POWDER (GRAM) TOPICAL NIGHTLY PRN
Status: DISCONTINUED | OUTPATIENT
Start: 2025-02-06 | End: 2025-02-08 | Stop reason: HOSPADM

## 2025-02-06 RX ORDER — ONDANSETRON HYDROCHLORIDE 2 MG/ML
4 INJECTION, SOLUTION INTRAVENOUS EVERY 8 HOURS PRN
Status: DISCONTINUED | OUTPATIENT
Start: 2025-02-06 | End: 2025-02-08 | Stop reason: HOSPADM

## 2025-02-06 RX ORDER — HEPARIN SODIUM,PORCINE/D5W 25000/250
0-40 INTRAVENOUS SOLUTION INTRAVENOUS CONTINUOUS
Status: DISCONTINUED | OUTPATIENT
Start: 2025-02-06 | End: 2025-02-08 | Stop reason: HOSPADM

## 2025-02-06 RX ORDER — ACETAMINOPHEN 325 MG/1
650 TABLET ORAL EVERY 4 HOURS PRN
Status: DISCONTINUED | OUTPATIENT
Start: 2025-02-06 | End: 2025-02-08 | Stop reason: HOSPADM

## 2025-02-06 RX ORDER — FAMOTIDINE 10 MG/ML
20 INJECTION INTRAVENOUS DAILY
Status: DISCONTINUED | OUTPATIENT
Start: 2025-02-07 | End: 2025-02-08 | Stop reason: HOSPADM

## 2025-02-06 RX ADMIN — HEPARIN SODIUM 12 UNITS/KG/HR: 10000 INJECTION, SOLUTION INTRAVENOUS at 05:02

## 2025-02-06 RX ADMIN — Medication 6 MG: at 08:02

## 2025-02-06 RX ADMIN — HEPARIN SODIUM 12 UNITS/KG/HR: 10000 INJECTION, SOLUTION INTRAVENOUS at 01:02

## 2025-02-06 RX ADMIN — FUROSEMIDE 20 MG: 10 INJECTION, SOLUTION INTRAMUSCULAR; INTRAVENOUS at 11:02

## 2025-02-06 RX ADMIN — FUROSEMIDE 40 MG: 10 INJECTION, SOLUTION INTRAMUSCULAR; INTRAVENOUS at 08:02

## 2025-02-06 NOTE — ED NOTES
"Attempted to switch pt to NC. Pt states "I dont want that maam. I dont like that on my face."  MD made aware  RT at bedside to switch pt to lowest setting at 24% FiO2   "

## 2025-02-06 NOTE — ED NOTES
Patient continues to be on Non rebreather as he desats.  He is not in distress upon checking on him.

## 2025-02-06 NOTE — ED NOTES
Per Charge RN, pt family called her to bedside d/t pt vomiting. Per RN, pt vomit up a bunch of mucus and stated he actually feels better

## 2025-02-06 NOTE — ED NOTES
Report called to ICU [facility] nurse rm [phone].

## 2025-02-06 NOTE — PLAN OF CARE
Pt received on 100% 15L NRB spo2 88-91%, unlabored respirations / no increased WOB, pt verbalized no dyspnea @ this time and states he feels comfortable. Placed on Vapotherm HFNC 30L 85%, maintaining spo2 88-93%, will continue to monitor.

## 2025-02-06 NOTE — SUBJECTIVE & OBJECTIVE
Past Medical History:   Diagnosis Date    Acute decompensated heart failure 2/6/2025    History of heart surgery     Other cirrhosis of liver 11/12/2020    SVT (supraventricular tachycardia)     WPW syndrome        Past Surgical History:   Procedure Laterality Date    ANGIOGRAPHY OF AORTIC ARCH N/A 9/3/2020    Procedure: AORTOGRAM, AORTIC ARCH;  Surgeon: Stephania Crump MD;  Location: [facility] CATH LAB;  Service: Cardiology;  Laterality: N/A;    FONTAN PROCEDURE, EXTRACARDIAC      LEFT HEART CATHETERIZATION Left 9/3/2020    Procedure: Left heart cath;  Surgeon: Stephania Crump MD;  Location: [facility] CATH LAB;  Service: Cardiology;  Laterality: Left;    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 Extenuating circumstances    TRANSESOPHAGEAL ECHOCARDIOGRAPHY N/A 11/10/2022    Procedure: ECHOCARDIOGRAM, TRANSESOPHAGEAL;  Surgeon: Emeterio Hall MD;  Location: [facility] EP LAB;  Service: Cardiology;  Laterality: N/A;    TREATMENT OF CARDIAC ARRHYTHMIA N/A 1/9/2021    Procedure: CARDIOVERSION;  Surgeon: Jim Mcginnis MD;  Location: [facility] CATH LAB;  Service: Cardiology;  Laterality: N/A;       Review of patient's allergies indicates:  No Known Allergies    No current facility-administered medications on file prior to encounter.     Current Outpatient Medications on File Prior to Encounter   Medication Sig    benazepriL (LOTENSIN) 10 MG tablet Take 1 tablet (10 mg total) by mouth once daily.    benazepriL (LOTENSIN) 10 MG tablet Take 1 tablet (10 mg total) by mouth once daily.    benzonatate (TESSALON) 200 MG capsule Take 200 mg by mouth 3 (three) times daily as needed for Cough.    famotidine (PEPCID) 20 MG tablet Take 1 tablet (20 mg total) by mouth 2 (two) times daily.     ondansetron (ZOFRAN) 4 MG tablet Take 1 tablet (4 mg total) by mouth every 6 (six) hours.    ondansetron (ZOFRAN-ODT) 4 MG TbDL Take 1 tablet (4 mg total) by mouth every 6 (six) hours as needed (nausea, vomiting).    XARELTO 20 mg Tab TAKE 1 TABLET BY MOUTH DAILY WITH DINNER OR EVENING MEAL     Family History       Problem Relation (Age of Onset)    Heart disease Maternal Grandfather    No Known Problems Mother, Father, Sister, Brother, Maternal Aunt, Maternal Uncle, Paternal Aunt, Paternal Uncle, Maternal Grandmother, Paternal Grandmother, Paternal Grandfather          Tobacco Use    Smoking status: Former     Current packs/day: 0.00     Types: Cigarettes     Quit date:      Years since quittin.1     Passive exposure: Past    Smokeless tobacco: Never    Tobacco comments:     3-4 months    Substance and Sexual Activity    Alcohol use: No    Drug use: Yes     Types: Marijuana     Comment: Mojo today- pt reports a while back    Sexual activity: Yes     Review of Systems   Cardiovascular:  Positive for dyspnea on exertion.   Respiratory:  Positive for shortness of breath.    Gastrointestinal:  Positive for bloating and abdominal pain.   Genitourinary: Negative.    Neurological:  Negative for dizziness and light-headedness.   Psychiatric/Behavioral: Negative.       Objective:     Vital Signs (Most Recent):  Temp: 97.7 °F (36.5 °C) (25 1501)  Pulse: 104 (25 1501)  Resp: 16 (25 1501)  BP: (!) 138/93 (25 1501)  SpO2: 97 % (25 1501) Vital Signs (24h Range):  Temp:  [97.4 °F (36.3 °C)-97.9 °F (36.6 °C)] 97.7 °F (36.5 °C)  Pulse:  [] 104  Resp:  [10-33] 16  SpO2:  [83 %-100 %] 97 %  BP: (123-171)/() 138/93     Weight: 61 kg (134 lb 7.7 oz)  Body mass index is 21.71 kg/m².    SpO2: 97 %         Intake/Output Summary (Last 24 hours) at 2025 1519  Last data filed at 2025 1500  Gross per 24 hour   Intake 147.99 ml   Output 1850 ml   Net -1702.01 ml        Lines/Drains/Airways       Peripheral Intravenous Line  Duration                  Peripheral IV - Single Lumen 02/05/25 1100 20 G No Anterior;Right Forearm 1 day         Peripheral IV - Single Lumen 02/06/25 22 G Left Hand <1 day                     Physical Exam  Constitutional:       General: He is not in acute distress.     Appearance: He is ill-appearing. He is not diaphoretic.   HENT:      Head: Atraumatic.   Eyes:      General:         Right eye: No discharge.         Left eye: No discharge.   Cardiovascular:      Rate and Rhythm: Regular rhythm. Tachycardia present.      Heart sounds: Murmur heard.   Abdominal:      General: There is distension.   Skin:     General: Skin is warm and dry.   Neurological:      Mental Status: He is alert and oriented to person, place, and time.   Psychiatric:         Mood and Affect: Mood normal.         Thought Content: Thought content normal.          Significant Labs: BMP:   Recent Labs   Lab 02/05/25  1432 02/06/25  0607   * 99    136   K 3.3* 4.2    104   CO2 19* 22*   BUN 6 9   CREATININE 0.83 0.88   CALCIUM 9.1 8.6*   , CMP   Recent Labs   Lab 02/05/25  1432 02/06/25  0607    136   K 3.3* 4.2    104   CO2 19* 22*   * 99   BUN 6 9   CREATININE 0.83 0.88   CALCIUM 9.1 8.6*   PROT 7.2 6.4   ALBUMIN 4.1 3.7   BILITOT 2.3* 2.5*   ALKPHOS 51 40   AST 32 27   ALT 26 25   ANIONGAP 10 10   , CBC   Recent Labs   Lab 02/05/25  1432 02/05/25  1438 02/06/25  0607 02/06/25  0608   WBC 5.21  --  6.49  --    HGB 15.7  --  14.4  --    HCT 46.8   < > 43.8 47   *  --  114*  --     < > = values in this interval not displayed.   , and INR   Recent Labs   Lab 02/05/25  1441   INR 1.1       Significant Imaging: Echocardiogram: Transthoracic echo (TTE) complete (Cupid Only):   Results for orders placed or performed during the hospital encounter of 10/26/23   Echo   Result Value Ref Range    LVOT diameter 5.17 cm    MV Peak A Tod 0.52 m/s    E  wave deceleration time 141.54 msec    MV Peak E Tod 0.45 m/s    Sinus 4.24 cm    MV stenosis pressure 1/2 time 41.05 ms    MV valve area p 1/2 method 5.36 cm2    E/A ratio 0.87     LVOT area 21.0 cm2    BSA 1.82 m2    Narrative       CONGENITAL CARDIAC HISTORY:     Tricuspid atresia, pulmonary atresia  and WPW.      S/P Systemic to pulmonary artery shunt - [place].       S/P Bidirectional Jesus -- Shaun.      S/P Lateral tunnel Fontan procedure - Shaun.      S/P EP study with trans-septal puncture November 2022      S/P Ablation of a left lateral accessory pathway November 2022 by Dr. Ferguson      SEGMENTAL CARDIAC CONNECTIONS (previously demonstrated):  Abdominal situs is solitus.  Atrial situs is normal.  Imaging consistent with tricuspid atresia.  Tricuspid atresia.  Mitral valve appears normal in structure.  LV dilation.  Ventriculoarterial concordance.  Ventricular loop: D-loop.  Cardiac position is levocardia.  Left aortic arch branching.      IMPRESSION:  Imaging consistent with diagnosis of tricuspid atresia S/P lateral tunnel   Fontan - study remarkable for decreased systolic function of the single   ventricle not significantly changed.  Very difficult to demonstrate pulmonary circulation with significant chest   deformity due to median sternotomy.  Mildly dilated inferior vena cava connecting to through lateral for   completion of Fontan physiology with no obvious obstruction or thrombus.  Right superior vena cava identified with no obvious stenosis.  No evidence of obstruction at Fontan or Jesus anastomosis to the pulmonary   arteries.  Pulmonary confluence and proximal pulmonary branches not demonstrated.  At least two pulmonary veins demonstrated returning to the left atrium   with no evidence for obstruction.  There is a small right atrium with no obvious obstruction of right atrial   flow to the left atrium.  There is no obvious tricuspid valve tissue present and no right   ventricular cavity could  be demonstrated.  There is no evidence for pulmonary valve.  At least mild dilation of the left atrium.  Mildly dilated mitral valve with color Doppler demonstrating mild   insufficiency.  Single ventricle consistent with left ventricular morphology.  There is at least mild dilation of the left ventricle.  Left ventricular systolic function qualitatively severely compromised with   ejection fraction estimated 30 -35%.  Global left ventricular longitudinal strain abnormal - peak average   measured -10.5.  There is a large aortic annulus with trileaflet aortic valve, no stenosis   and trivial central jet of insufficiency.  Aortic dimensions:  Sinuses of Valsalva  = 52 mm.  ST junction               = 42 mm.  Ascending aorta       = not well demonstrated.  Qualitative impression of at least mild dilation of the ascending aorta.  No evidence for coarctation.  No obvious pericardial effusion.

## 2025-02-06 NOTE — PROVIDER PROGRESS NOTES - EMERGENCY DEPT.
Encounter Date: 2/5/2025    ED Physician Progress Notes        ED Physician Hand-off Note:    ED Course: I assumed care of patient from off-going ED physician team. Briefly, Patient is a 28 yo M who has been in the [facility] ED for over 17 hours accepted to [facility] campus since 16:07 yesterday over 14 hours ago who [place] [facility] [place]  Patient has a complicated medical history including WPW, liver cirrhosis, medication non compliance, Tricuspid and pulmonary atresia initially palliated with a systemic to pulmonary artery shunt followed by a bidirectional Jesus and subsequent lateral tunnel Fontan procedure who presented today with SOB and upper abdominal pain.    At the time of signout plan was pending -transfer.    Medications given in the ED:    Medications   heparin 25,000 units in dextrose 5% 250 mL (100 units/mL) infusion LOW INTENSITY nomogram - [facility] (12 Units/kg/hr × 59 kg Intravenous Verify Only 2/6/25 1600)   heparin 25,000 units in dextrose 5% (100 units/ml) IV bolus from bag LOW INTENSITY nomogram - [facility] (has no administration in time range)   heparin 25,000 units in dextrose 5% (100 units/ml) IV bolus from bag LOW INTENSITY nomogram - [facility] (has no administration in time range)   sodium chloride 0.9% flush 5 mL (has no administration in time range)   melatonin tablet 6 mg (has no administration in time range)   ondansetron injection 4 mg (has no administration in time range)   polyethylene glycol packet 17 g (has no administration in time range)   acetaminophen tablet 650 mg (has no administration in time range)   naloxone 0.4 mg/mL injection 0.02 mg (has no administration in time range)   glucose chewable tablet 16 g (has no administration in time range)   glucose chewable tablet 24 g (has no administration in time range)   dextrose 50% injection 12.5 g (has no administration in time range)   dextrose 50% injection 25 g (has no administration in time range)   glucagon (human  recombinant) injection 1 mg (has no administration in time range)   senna-docusate 8.6-50 mg per tablet 1 tablet (has no administration in time range)   furosemide injection 40 mg (has no administration in time range)   lisinopriL tablet 10 mg (has no administration in time range)   famotidine (PF) injection 20 mg (has no administration in time range)   sodium chloride 0.9% bolus 1,000 mL 1,000 mL (0 mLs Intravenous Stopped 2/5/25 1542)   ondansetron injection 4 mg (4 mg Intravenous Given 2/5/25 1443)   ketorolac injection 15 mg (15 mg Intravenous Given 2/5/25 1556)   iohexoL (OMNIPAQUE 350) injection 100 mL (100 mLs Intravenous Given 2/5/25 1609)   heparin 25,000 units in dextrose 5% (100 units/ml) IV bolus from bag LOW INTENSITY nomogram - [facility] (3,540 Units Intravenous Bolus from Bag 2/5/25 1751)   furosemide injection 20 mg (20 mg Intravenous Given 2/5/25 2026)   oxyCODONE immediate release tablet 10 mg (10 mg Oral Given 2/5/25 2026)   furosemide injection 20 mg (20 mg Intravenous Given 2/6/25 1137)     6:54 AM  Discussed with Britni York RN at [facility] and they are still waiting for patient to get a bed at [facility]...    10:57 AM  Patient is refusing to wear oxygen. Patient and mother very upset that he has not yet been transferred  Current O2 sat is 79% on RA    11:14 AM  Discussed with Dr. Santoyo,   peds cardiology. He would be in agreement with patient going to [facility] pending transfer to [facility]  11:24 AM  Discussed with  Dr. Farah at [place] and she accepted patient to [facility] pending transfer to [facility]  Attending is Dr. Madrid    Patient and mother in agreement with this plan    Disposition[place] [facility]    Patient comfortable with plan for transfer. Patient counseled regarding exam, results, diagnosis, treatment, and plan.    Impression: Final diagnoses:  [R09.02] Hypoxia (Primary)  [I50.9] Congestive heart failure, unspecified HF chronicity, unspecified heart failure type  [E87.3] Respiratory  alkalosis  [Q20.4, Q22.4] Single ventricle with tricuspid atresia  [Z98.890] S/P Fontan procedure  [E87.6] Hypokalemia  [R17] Elevated bilirubin

## 2025-02-06 NOTE — CONSULTS
"Progress Note  [facility]    Attending: Dr. Madrid  ICU Attending: Dr. Barrera  Admit Date: 2/5/2025  Today's Date: 02/06/2025    SUBJECTIVE:     Juani Reilly Jr. is a 29 y.o. 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 presenting to the Emergency Department for abdominal pain, nausea, vomiting, shortness of breath that got worse yesterday. Reports abdominal pain is in the upper abdomen. Patient's denies any fevers, chills, chest pain. Patient states he has not been taking his Xarelto and does not know when the last time he took it was.     2/6 On arrival to the ICU, patient states that he is feeling much better, no longer having nausea. Denies shortness of breath. SpO2 in low 90s on vapotherm HFNC. Appears to be his baseline.  OBJECTIVE:     Vital Signs Trends/Hx Reviewed  Vitals:    02/06/25 1325 02/06/25 1330 02/06/25 1349 02/06/25 1400   BP: (!) 138/94 (!) 138/94  (!) 143/102   BP Location: Left arm      Patient Position: Lying      Pulse: 108 107 103 109   Resp: (!) 30 12 (!) 25 (!) 21   Temp: 97.9 °F (36.6 °C)      TempSrc: Axillary      SpO2: (!) 90% (!) 90% (!) 90% (!) 93%   Weight: 61 kg (134 lb 7.7 oz)      Height: 5' 6" (1.676 m)          Oxygen Concentration (%):  [] 85        Physical Exam  Vitals reviewed.   Constitutional:       General: He is not in acute distress.     Appearance: Normal appearance.   Cardiovascular:      Rate and Rhythm: Normal rate and regular rhythm.   Pulmonary:      Effort: Pulmonary effort is normal. No respiratory distress.      Breath sounds: Normal breath sounds. No wheezing.   Abdominal:      General: Abdomen is flat. There is no distension.      Palpations: Abdomen is soft. There is no mass.      Tenderness: There is no abdominal tenderness.   Musculoskeletal:         General: Normal range of motion.      " Right lower leg: No edema.      Left lower leg: No edema.   Skin:     General: Skin is warm and dry.   Neurological:      General: No focal deficit present.      Mental Status: He is alert and oriented to person, place, and time.         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 Single ventricle with decreased systolic function. BNP 4400. Troponin plateau at 0.101.  -- Pending transfer to [facility] for pediatric cardiology  -- Vitals stable at this time  -- Not a transplant candidate due to his poor medication compliance  -- Should be on SBE prophylaxis, however pt not taking home xarelto  -- Pediatric cardiologist recommending heparin drip       Pulm  #Acute Respiratory Failure  Documented SpO2 81% in ED, placed on non rebreather. Pt has not had dyspnea or tachypnea in our ICU.   -- Doing well on HFNC, at baseline patient likely has O2 in low 90s due to cardiac shunt.  -- If pt decompensates will need BIPAP therapy  -- CXR without acute abnormality.  -- Pt okay to have lower saturations above 85%      Feeding: Cardiac diet  Analgesia: tylenol PRN  Sedation: --  Thrombo PPX: Heparin  Head of Bed: > 30 degrees  Ulcer PPX: --  Glucose: goal 140-180s  SBT/SAT: --  Bowel Regimen: PRN  Indwelling Lines: PIV x2  Abx: None     Code Status: Full      Renuka Dick MD  [facility], PGY-1  02/06/2025     Pt seen and examined with Pulmonary/Critical Care team and this note reviewed and validated with the following additional comments: Abd pain much improved.  No RUQ tenderness.  Now eating. SpO2 95% of FiO2 <50.  May be able to step down if OK with Cardiology.    The complexity of Medical Decision Making (MDM) was high.  The number of problems addressed was 2.  The risk of complications and/or morbidity/mortality was high.  The tests ordered were RUQ POCUS.  I  communicated with the following providers .  Time spent in the care of this patient was 40 minutes.    Bruce Giang MD  Phone [phone]

## 2025-02-06 NOTE — HOSPITAL COURSE
02/06/2025 Per HPI   02/07/2025 [facility] Diuresing with Lasix 40 mg IV BID. Net -2.6 L. EF 10-15%. On 30L 80% FIO2 HFNC.

## 2025-02-06 NOTE — SUBJECTIVE & OBJECTIVE
Past Medical History:   Diagnosis Date    History of heart surgery     Other cirrhosis of liver 11/12/2020    SVT (supraventricular tachycardia)     WPW syndrome        Past Surgical History:   Procedure Laterality Date    ANGIOGRAPHY OF AORTIC ARCH N/A 9/3/2020    Procedure: AORTOGRAM, AORTIC ARCH;  Surgeon: Stephania Crump MD;  Location: [facility] CATH LAB;  Service: Cardiology;  Laterality: N/A;    FONTAN PROCEDURE, EXTRACARDIAC      LEFT HEART CATHETERIZATION Left 9/3/2020    Procedure: Left heart cath;  Surgeon: Stephania Crump MD;  Location: [facility] CATH LAB;  Service: Cardiology;  Laterality: Left;    RIGHT HEART CATHETERIZATION FOR CONGENITAL HEART DEFECT N/A 9/3/2020    Procedure: CATHETERIZATION, HEART, RIGHT, FOR CONGENITAL HEART DEFECT;  Surgeon: Stephania Crump MD;  Location: [facility] CATH LAB;  Service: Cardiology;  Laterality: N/A;  [facility] - needs covid test morning of. Extenuating circumstances    TRANSESOPHAGEAL ECHOCARDIOGRAPHY N/A 11/10/2022    Procedure: ECHOCARDIOGRAM, TRANSESOPHAGEAL;  Surgeon: Emeterio Hall MD;  Location: [facility] EP LAB;  Service: Cardiology;  Laterality: N/A;    TREATMENT OF CARDIAC ARRHYTHMIA N/A 1/9/2021    Procedure: CARDIOVERSION;  Surgeon: Jim Mcginnis MD;  Location: [facility] CATH LAB;  Service: Cardiology;  Laterality: N/A;       Review of patient's allergies indicates:  No Known Allergies    Current Facility-Administered Medications on File Prior to Encounter   Medication    [DISCONTINUED] acetaminophen tablet 650 mg    [DISCONTINUED] dextrose 50% injection 12.5 g    [DISCONTINUED] dextrose 50% injection 25 g    [DISCONTINUED] famotidine (PF) injection 20 mg    [DISCONTINUED] furosemide injection 40 mg    [DISCONTINUED] glucagon (human recombinant) injection 1 mg    [DISCONTINUED] glucose chewable tablet 16 g    [DISCONTINUED] glucose chewable tablet 24 g    [DISCONTINUED] heparin 25,000 units in dextrose 5% (100 units/ml) IV bolus from bag LOW INTENSITY  nomogram - [facility]    [DISCONTINUED] heparin 25,000 units in dextrose 5% (100 units/ml) IV bolus from bag LOW INTENSITY nomogram - [facility]    [DISCONTINUED] heparin 25,000 units in dextrose 5% 250 mL (100 units/mL) infusion LOW INTENSITY nomogram - [facility]    [DISCONTINUED] lisinopriL tablet 10 mg    [DISCONTINUED] melatonin tablet 6 mg    [DISCONTINUED] naloxone 0.4 mg/mL injection 0.02 mg    [DISCONTINUED] ondansetron injection 4 mg    [DISCONTINUED] polyethylene glycol packet 17 g    [DISCONTINUED] senna-docusate 8.6-50 mg per tablet 1 tablet    [DISCONTINUED] sodium chloride 0.9% flush 5 mL     Current Outpatient Medications on File Prior to Encounter   Medication Sig    ibuprofen (ADVIL,MOTRIN) 800 MG tablet Take 800 mg by mouth 3 (three) times daily.    [DISCONTINUED] benazepriL (LOTENSIN) 10 MG tablet Take 1 tablet (10 mg total) by mouth once daily.    [DISCONTINUED] benazepriL (LOTENSIN) 10 MG tablet Take 1 tablet (10 mg total) by mouth once daily.    [DISCONTINUED] benzonatate (TESSALON) 200 MG capsule Take 200 mg by mouth 3 (three) times daily as needed for Cough.    [DISCONTINUED] famotidine (PEPCID) 20 MG tablet Take 1 tablet (20 mg total) by mouth 2 (two) times daily.    [DISCONTINUED] ondansetron (ZOFRAN) 4 MG tablet Take 1 tablet (4 mg total) by mouth every 6 (six) hours.    [DISCONTINUED] ondansetron (ZOFRAN-ODT) 4 MG TbDL Take 1 tablet (4 mg total) by mouth every 6 (six) hours as needed (nausea, vomiting).    [DISCONTINUED] XARELTO 20 mg Tab TAKE 1 TABLET BY MOUTH DAILY WITH DINNER OR EVENING MEAL     Family History       Problem Relation (Age of Onset)    Heart disease Maternal Grandfather    No Known Problems Mother, Father, Sister, Brother, Maternal Aunt, Maternal Uncle, Paternal Aunt, Paternal Uncle, Maternal Grandmother, Paternal Grandmother, Paternal Grandfather          Tobacco Use    Smoking status: Former     Current packs/day: 0.00     Types: Cigarettes     Quit date: 2014     Years since quitting:  11.1     Passive exposure: Past    Smokeless tobacco: Never    Tobacco comments:     3-4 months    Substance and Sexual Activity    Alcohol use: No    Drug use: Yes     Types: Marijuana     Comment: Mojo today- pt reports a while back    Sexual activity: Yes     Review of Systems   Constitutional: Negative for chills, diaphoresis and night sweats.   Cardiovascular:  Positive for irregular heartbeat. Negative for chest pain, dyspnea on exertion, leg swelling, near-syncope and palpitations.   Respiratory:  Positive for shortness of breath. Negative for cough and wheezing.    Skin:  Negative for color change and dry skin.   Musculoskeletal:  Negative for joint pain and joint swelling.   Gastrointestinal:  Negative for abdominal pain, diarrhea, nausea and vomiting.   Genitourinary:  Negative for dysuria.   Neurological:  Negative for dizziness, headaches, light-headedness and weakness.   All other systems reviewed and are negative.    Objective:     Vital Signs (Most Recent):    Vital Signs (24h Range):  Temp:  [97.5 °F (36.4 °C)-98.5 °F (36.9 °C)] 97.7 °F (36.5 °C)  Pulse:  [] 96  Resp:  [12-45] 21  SpO2:  [85 %-100 %] 92 %  BP: ()/() 113/78        Body mass index is 22.13 kg/m².              Intake/Output Summary (Last 24 hours) at 2/8/2025 1145  Last data filed at 2/8/2025 0600  Gross per 24 hour   Intake 45.31 ml   Output 300 ml   Net -254.69 ml       Lines/Drains/Airways       Peripheral Intravenous Line  Duration                  Peripheral IV - Single Lumen 02/05/25 1415 Left;Posterior Hand <1 day         Peripheral IV - Single Lumen 02/05/25 1433 20 G Anterior;Right Forearm <1 day                     Physical Exam  Constitutional:       Appearance: He is ill-appearing (chronic).   HENT:      Head: Normocephalic.      Nose: No rhinorrhea.      Mouth/Throat:      Mouth: Mucous membranes are moist.   Eyes:      General: No scleral icterus.  Cardiovascular:      Rate and Rhythm: Rhythm irregular.    Pulmonary:      Effort: Pulmonary effort is normal.   Abdominal:      General: Abdomen is flat.   Musculoskeletal:         General: No swelling.      Cervical back: Neck supple.   Skin:     Findings: No rash.   Neurological:      Mental Status: He is alert and oriented to person, place, and time.   Psychiatric:         Mood and Affect: Mood normal.          Significant Labs: CMP   Recent Labs   Lab 02/07/25  0235 02/08/25  0333 02/08/25  0846    140  --    K 3.4* 3.1* 4.3    105  --    CO2 25 25  --    GLU 88 94  --    BUN 11 11  --    CREATININE 1.0 0.8  --    CALCIUM 8.2* 8.2*  --    PROT 5.4* 6.1  --    ALBUMIN 3.3* 3.4*  --    BILITOT 1.8* 1.7*  --    ALKPHOS 37* 40  --    AST 15 16  --    ALT 16 20  --    ANIONGAP 10 10  --     and CBC   Recent Labs   Lab 02/07/25  0235 02/08/25  0333   WBC 5.02 6.07   HGB 13.8* 15.6   HCT 41.6 45.8   * 140*       Significant Imaging:  reviewed

## 2025-02-06 NOTE — ED NOTES
"Heparin drip remains infusing at 7.1ml/hr to rt forearm IV. Site w/o diff. Pt denies physical complaints at this time "I am just ready to be transferred and tired of being here that's all". Bed in low position, rails up x2, call bell within reach.   "

## 2025-02-06 NOTE — ED NOTES
Reports received from ROBERTO Cobb RN  Assumed care of pt   Pt is lying on stretcher c non-rebreather tolerating well at this time  Remains on continuous cardiac monitor.   MD updated on pt status at this time. No new orders in place as pt is stable at this time.

## 2025-02-06 NOTE — PROVIDER PROGRESS NOTES - EMERGENCY DEPT.
Encounter Date: 2/5/2025    ED Physician Progress Notes        Physician Note:   Patient is presently boarding in the  ED waiting ICU transfer at [facility] [address] due to breathing difficulty and abdominal pain.  Patient is currently stable on non-rebreather mask.  I discussed plan with Dr.Weiland with pediatric Cardiology if he were to decompensate.  He recommends giving a dose of 20 mg IV Lasix now followed by a 2nd dose of the same this morning if he is still here as well as BiPAP if he were to get worse.  Patient requested additional pain medication for abdominal pain, otherwise appears stable on reassessment..  He is awaiting transfer.  Will monitor closely.

## 2025-02-06 NOTE — H&P
"[facility]  H&P ICU Note       Admit Date: 2/5/2025   LOS: 0 days     Chief Complaint/Reason for Admission:  29 y.o. 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 Of note pt has a long history of non compliance and admits to not taking any of his mediations because sometimes they make him feel "weird" and other times he just doesn't want to take the medications.  Upon arrival he was hypoxic sating 87% on 100% NRB and later transitioned to Vapotherm on 30 Liters on 80% FiO2.   Pt reports that a week ago he started having  SOB, coughing, vomiting 2-3 times per day, generalized abdominal pain,diarrhea and night seats.  No citing event that he can recall.  He denies any chest pain, palpitations myalgia, sore throat, fever,chills, dysuria ,blood in the urine or stools or sick contacts.     In the ER, initial vital signs were as follows: Temp 97.4, /111, , SpO2 90% on NRB. Labs as follows: WBC 6.49, Hgb 14, Elevated TB  2.5, BUN/Cr 9/0.88 (baseline Cr 0.8). UA +1 protein and trace blood, Elevated tropoinin 0.100-->0.101, BNP 4400, Lactic Acid 1.5,  3/5/25 Blood Cx NGTD, Lipase 79    CXR No acute abnormality.   .    Abd US shows: Limited right upper quadrant ultrasound performed.  The liver measures 17 cm in length and is homogeneous in echogenicity.  Trace fluid seen along the falciform ligament.  Common bile duct is within normal limits at " 2.2 mm..  Mild gallbladder wall thickening.  Trace pericholecystic fluid.  Negative sonographic Disla sign.  Pancreas is obscured by bowel gas.  IVC within normal limits.  The right kidney is normal in length measuring 10.4 cm. No right sided hydronephrosis or renal masses identified. Impression:No ductal dilatation.    CT Chest: Cholelithiasis with possible wall thickening.  Recommend follow-up right upper quadrant ultrasound.  Suspect flash fill hemangioma measuring 11 mm in the right hepatic lobe.  Cardiomegaly with aneurysmal dilatation of the aortic root measuring 5.2 cm.  Correlate with congenital heart malformation.    EKG  with LVH Abnormaility, Qtc 488.     In the ER, patient was given Lasix 20 mg x 3 doses, 1 liter NS bolus, Heparin gtt initiated, Zofran 4 mg x 1  and oxycodone IR 10 mg x 1 dose .     Patient was admitted to [facility] for Hypoxic Respiratory Failure due to  Decompensated Heart failure.         Infusions:     heparin (porcine) in D5W  0-40 Units/kg/hr Intravenous Continuous 7.1 mL/hr at 02/06/25 1400 12 Units/kg/hr at 02/06/25 1400       PMHx  Past Medical History:   Diagnosis Date    Acute decompensated heart failure 2/6/2025    History of heart surgery     Other cirrhosis of liver 11/12/2020    SVT (supraventricular tachycardia)     WPW syndrome         PSHx  Past Surgical History:   Procedure Laterality Date    ANGIOGRAPHY OF AORTIC ARCH N/A 9/3/2020    Procedure: AORTOGRAM, AORTIC ARCH;  Surgeon: Stephania Crump MD;  Location: [facility] CATH LAB;  Service: Cardiology;  Laterality: N/A;    FONTAN PROCEDURE, EXTRACARDIAC      LEFT HEART CATHETERIZATION Left 9/3/2020    Procedure: Left heart cath;  Surgeon: Stephania Crump MD;  Location: [facility] CATH LAB;  Service: Cardiology;  Laterality: Left;    RIGHT HEART CATHETERIZATION FOR CONGENITAL HEART DEFECT N/A 9/3/2020    Procedure: CATHETERIZATION, HEART, RIGHT, FOR CONGENITAL HEART DEFECT;  Surgeon: Stephania NAGEL  MD Theron;  Location: [facility] CATH LAB;  Service: Cardiology;  Laterality: N/A;  [facility] - needs covid test morning of. Extenuating circumstances    TRANSESOPHAGEAL ECHOCARDIOGRAPHY N/A 11/10/2022    Procedure: ECHOCARDIOGRAM, TRANSESOPHAGEAL;  Surgeon: Emeterio Hall MD;  Location: [facility] EP LAB;  Service: Cardiology;  Laterality: N/A;    TREATMENT OF CARDIAC ARRHYTHMIA N/A 2021    Procedure: CARDIOVERSION;  Surgeon: Jim Mcginnis MD;  Location: [facility] CATH LAB;  Service: Cardiology;  Laterality: N/A;       Family History  Family History   Problem Relation Name Age of Onset    No Known Problems Mother      No Known Problems Father      No Known Problems Sister 3     No Known Problems Brother      No Known Problems Maternal Aunt      No Known Problems Maternal Uncle      No Known Problems Paternal Aunt      No Known Problems Paternal Uncle      No Known Problems Maternal Grandmother      Heart disease Maternal Grandfather      No Known Problems Paternal Grandmother      No Known Problems Paternal Grandfather      Anemia Neg Hx      Arrhythmia Neg Hx      Asthma Neg Hx      Clotting disorder Neg Hx      Fainting Neg Hx      Heart attack Neg Hx      Heart failure Neg Hx      Hyperlipidemia Neg Hx      Hypertension Neg Hx      Stroke Neg Hx      Atrial Septal Defect Neg Hx         Social  Social History     Socioeconomic History    Marital status: Single   Tobacco Use    Smoking status: Former     Current packs/day: 0.00     Types: Cigarettes     Quit date:      Years since quittin.1     Passive exposure: Past    Smokeless tobacco: Never    Tobacco comments:     3-4 months    Substance and Sexual Activity    Alcohol use: No    Drug use: Yes     Types: Marijuana     Comment: Mojo today- pt reports a while back    Sexual activity: Yes     Social Drivers of Health     Financial Resource Strain: Low Risk  (2025)    Overall Financial Resource Strain (CARDIA)     Difficulty of Paying Living Expenses:  Not hard at all   Food Insecurity: No Food Insecurity (2/6/2025)    Hunger Vital Sign     Worried About Running Out of Food in the Last Year: Never true     Ran Out of Food in the Last Year: Never true   Transportation Needs: No Transportation Needs (2/6/2025)    TRANSPORTATION NEEDS     Transportation : No   Stress: No Stress Concern Present (2/6/2025)    [employer] - Occupational Stress Questionnaire     Feeling of Stress : Not at all   Housing Stability: Low Risk  (2/6/2025)    Housing Stability Vital Sign     Unable to Pay for Housing in the Last Year: No     Homeless in the Last Year: No       Allergies  Review of patient's allergies indicates:  No Known Allergies    ROS- As per HPI, otherwise negative      Objective  Vitals:    02/06/25 1330 02/06/25 1349 02/06/25 1400 02/06/25 1430   BP: (!) 138/94  (!) 143/102 (!) 142/97   BP Location:       Patient Position:       Pulse: 107 103 109 105   Resp: 12 (!) 25 (!) 21 18   Temp:       TempSrc:       SpO2: (!) 90% (!) 90% (!) 93% 96%   Weight:       Height:         Vitals(Most Recent)      Temp: 97.9 °F (36.6 °C) (02/06/25 1325)  Pulse: 105 (02/06/25 1430)  Resp: 18 (02/06/25 1430)  BP: (!) 142/97 (02/06/25 1430)  SpO2: 96 % (02/06/25 1430)           Vitals Range  Temp:  [97.4 °F (36.3 °C)-97.9 °F (36.6 °C)]   Pulse:  []   Resp:  [10-33]   BP: (123-171)/()   SpO2:  [81 %-100 %]     Physical Exam  Physical Exam  Constitutional:       Appearance: Normal appearance.   HENT:      Head: Normocephalic and atraumatic.      Nose: Nose normal.      Mouth/Throat:      Mouth: Mucous membranes are moist.   Eyes:      Pupils: Pupils are equal, round, and reactive to light.   Cardiovascular:      Rate and Rhythm: Tachycardia present.      Pulses: Normal pulses.      Heart sounds: Murmur heard.      No friction rub. No gallop.   Pulmonary:      Effort: Pulmonary effort is normal. No respiratory distress.      Breath sounds: Normal breath  sounds. No wheezing, rhonchi or rales.      Comments: Vapotherm 30 Liters with 80% Fio2  Chest:      Chest wall: No tenderness.   Abdominal:      General: Bowel sounds are normal. There is no distension.      Palpations: Abdomen is soft.      Tenderness: There is no abdominal tenderness. There is no right CVA tenderness, left CVA tenderness, guarding or rebound.   Musculoskeletal:         General: Normal range of motion.      Right lower leg: No edema.      Left lower leg: No edema.   Skin:     General: Skin is warm and dry.      Capillary Refill: Capillary refill takes less than 2 seconds.      Findings: No rash.      Comments: Healed Vertical scar noted to abdomen    Neurological:      General: No focal deficit present.      Mental Status: He is alert.   Psychiatric:         Mood and Affect: Mood normal.         Labs  [unfilled]  Lab Results   Component Value Date    INR 1.1 02/05/2025    INR 1.1 03/09/2024    INR 1.0 11/10/2022    APTT 44.2 (H) 02/06/2025    APTT 50.0 (H) 02/06/2025    APTT 22.8 02/05/2025     Recent Labs     02/06/25  0005   TROPONINI 0.101*       Imaging  Imaging Results              US Abdomen Limited (Gallbladder) (Final result)  Result time 02/06/25 09:17:27      Final result by Virgilio Easton MD (02/06/25 09:17:27)                   Impression:      No ductal dilatation.      Electronically signed by: Virgilio Easton MD  Date:    02/06/2025  Time:    09:17               Narrative:    EXAMINATION:  US ABDOMEN LIMITED    CLINICAL HISTORY:  Unspecified jaundice    COMPARISON:  None    FINDINGS:  Limited right upper quadrant ultrasound performed.  The liver measures 17 cm in length and is homogeneous in echogenicity.  Trace fluid seen along the falciform ligament.  Common bile duct is within normal limits at 2.2 mm..  Mild gallbladder wall thickening.  Trace pericholecystic fluid.  Negative sonographic Disla sign.  Pancreas is obscured by bowel gas.  IVC within normal limits.  The right kidney  is normal in length measuring 10.4 cm. No right sided hydronephrosis or renal masses identified.                                       CT Chest Abdomen Pelvis With IV Contrast (XPD) NO Oral Contrast (Final result)  Result time 02/06/25 07:42:51      Final result by Stan Maddox Jr., MD (02/06/25 07:42:51)                   Impression:      1.  Cholelithiasis with possible wall thickening.  Recommend follow-up right upper quadrant ultrasound  2.  Suspect flash fill hemangioma measuring 11 mm in the right hepatic lobe  3.  Cardiomegaly with aneurysmal dilatation of the aortic root measuring 5.2 cm.  Correlate with congenital heart malformation.    All CT scans at [this location] are performed using dose modulation techniques as appropriate to a performed exam including the following:  Automated exposure control; adjustment of the mA and/or kV according to patient size (this includes techniques or standardized protocols for targeted exams where dose is matched to indication / reason for exam; i.e. extremities or head); use of iterative reconstruction technique.    Finalized on: 2/6/2025 7:42 AM By:  Stan Maddox MD  [facility]# [id number]      2025-02-06 07:44:[id number]     [facility]               Narrative:    EXAM: CT CHEST ABDOMEN PELVIS WITH IV CONTRAST (XPD)    CLINICAL HISTORY: Sepsis    COMPARISON: 11/08/2023.  Report.    TECHNIQUE: CT scan was obtained with contrast of the chest, abdomen and pelvis.  Coronal and sagittal reconstructions were performed on these images.    FINDINGS: Chest: The heart is enlarged.  Congenital malformation of the heart with postsurgical change.  The lungs are clear.  No effusion or pneumothorax.  No adenopathy.  No pericardial effusion.  Noncalcified coronary arteries.  Aneurysmal dilatation of the aortic root measuring 5.2 cm.    No adenopathy.  Osseous structures are intact.    Abdomen/pelvis: Focal area of enhancement in the inferior right hepatic lobe measuring 11 mm likely flash fill  hemangioma.  No other focal abnormality within the liver.  The spleen, kidneys, adrenal glands, and pancreas are within normal limits.  Thick-walled gallbladder with suggestion of a mildly radiopaque stone.  No evidence of ductal dilatation.    Urinary bladder is unremarkable.  Nonobstructive bowel gas pattern.  Appendix is normal.  Small fat-containing umbilical hernia.    Osseous structures intact.                                         X-Ray Chest AP Portable (Final result)  Result time 02/05/25 14:39:19      Final result by Grupo Nick MD (02/05/25 14:39:19)                   Impression:      No acute abnormality.      Electronically signed by: Grupo Nick  Date:    02/05/2025  Time:    14:39               Narrative:    EXAMINATION:  XR CHEST AP PORTABLE    CLINICAL HISTORY:  Sepsis;    TECHNIQUE:  Single frontal view of the chest was performed.    COMPARISON:  Multiple    FINDINGS:  The lungs are clear, with normal appearance of pulmonary vasculature and no pleural effusion or pneumothorax.    The cardiac silhouette is normal in size. The hilar and mediastinal contours are unremarkable.    Bones are intact.                                        Assessment/Plan:  Juani Reilly JrMark is a 29 y.o. male patient w/ PMHx of Atrial Fibrillation, WPW, liver cirrhosis, medication non compliance, Tricuspid and pulmonary atresia initially palliated with a systemic to pulmonary artery shunt followed by a bidirectional Jesus and subsequent lateral tunnel Fontan procedure (last EF with Left ventricular systolic function qualitatively severely compromised with ejection fraction estimated 30 -35% on 10/26/23) who presented today with SOB and upper abdominal pain and admitted for Hypoxic Respiratory Failure due to  Decompensated Heart failure.         Neuro/Psych  No known issues at this time.  Continue to monitor    CV  Acute Decompensated HF   Hx Paroxymal Atrial Fibrillation (not on for   Hx  "WPW  Evidenced by history, Elevated BNP 4400 and SOB     Chest X-ray: No acute abnormality.  Elevated troponin (0.100-->0.001)    Plan:  Diuresis w/ IV Lasix 40 mg every 12 hours and monitor UOP  Daily Weights  Strict I/O  Fluid restriction to 1,500cc daily  Low-sodium cardiac diet   Reassessing  volume status regularly  Oxygen PRN to keep O2 > 88%  Obtain 2D echo   Check TSH   Trend/EKG every 6 hours  Consult Pediatric Cardiologist Dr. Dorian Santoyo (known to him)    Pulm  #Hypoxic Respiratory Failure d/t decompensated HF    Oxygen Concentration (%):  [] 85      CXR:No acute abnormality.    ABG: WNL  Recent Labs   Lab 02/05/25  1438 02/06/25  0608   PH 7.473* 7.354   PCO2 28.2* 44.3   PO2 40 40   HCO3 20.6* 24.6   POCSATURATED 79 72   BE -3* -1     Plan:  Oxygen PRN to keep O2 > 88%  Wean Vapotherm 30 L with FiO2 30%    FEN/GI  Diet: Cardiac, 2 gm low sodium  Fluid restriction 1500 cc/day  Keep  K> 4, Phos >3, Mg> 2  Zofran prn for n/v   Continue home Pepcid 20 mg daily    Renal  BUN/Cr: 9/0.88, baseline 0.8  UOP: 400 ml , In: 1.4 L, net -347 ml in last 24 hrs  I/O  I/O last 3 completed shifts:  In: 52.3 [I.V.:52.3]  Out: 400 [Urine:400]    Intake/Output Summary (Last 24 hours) at 2/6/2025 1445  Last data filed at 2/6/2025 1400  Gross per 24 hour   Intake 140.91 ml   Output 1550 ml   Net -1409.09 ml     Plan:  Strict I&Os  Avoid renal toxic meds  Continue to monitor renal status and urine output    Heme  H/H 14/43; stable  WBC 6.4  DVT prophylaxis: Heparin (gtt)    Endo  No known issues at this time.  Maintain glucose 140-180    HgbA1C:   Lab Results   Component Value Date    HGBA1C 4.9 11/09/2023   , Last Gluc: No results for input(s): "POCTGLUCOSE" in the last 168 hours.    Plan:  TSH pending    ID  No known issues at this time.   (2/5/25) Cxs are NGTD.   No ABX required  Urinalysis: +1 protein and trace hematuria    UA  Urinalysis  Recent Labs   Lab 02/05/25  2256   COLORU Yellow   SPECGRAV 1.015   PHUR 6.0 "   PROTEINUA 1+*   BACTERIA Rare   NITRITE Negative   LEUKOCYTESUR Negative   UROBILINOGEN Negative   HYALINECASTS 0     Recent Labs   Lab 02/05/25  2256   COLORU Yellow   SPECGRAV 1.015   PHUR 6.0   PROTEINUA 1+*   BACTERIA Rare     Micro  Microbiology Results (last 7 days)       Procedure Component Value Units Date/Time    Blood culture x two cultures. Draw prior to antibiotics. [[id number]] Collected: 02/05/25 1437    Order Status: Completed Specimen: Blood from Peripheral, Forearm, Right Updated: 02/06/25 0715     Blood Culture, Routine No Growth to date    Narrative:      Aerobic and anaerobic    Blood culture x two cultures. Draw prior to antibiotics. [[id number]] Collected: 02/05/25 1415    Order Status: Completed Specimen: Blood from Peripheral, Hand, Left Updated: 02/06/25 0715     Blood Culture, Routine No Growth to date    Narrative:      Aerobic and anaerobic               DVT PPx: Heparin gtt  Diet: Cardia, low sodium  GI PPX: None  Deconditioning: PT/OT eval  Code Status: Full    ICU Checklist  Feeding:Cardiac, low sodium   Analgesia:none  Sedation:none  Thromboprophylaxis:Heparin gtt  Head up position: 30deg  Ulcer prophylaxis:Pepcid 20 mg daily  Glycemic control:Goal 140-180  Spontaneous Breathing Trial: n/a  Bowel care:n/a  Indwelling catheter removal:n/a  De-escalation of antibiotics:n/a      Dispo: ICU pending transfer to [facility] for higher level of care      2/6/2025 Mony Mauricio M.D., HO-I  [facility]  [facility]

## 2025-02-06 NOTE — HPI
29 y.o. male w/ PMHx of yo M who has been in the [facility] ED with a  history WPW, liver cirrhosis, medication non compliance, Tricuspid and pulmonary atresia initially palliated with a systemic to pulmonary artery shunt followed by a bidirectional Jesus and subsequent lateral tunnel Fontan procedure (last EF with Left ventricular systolic function qualitatively severely compromised with ejection fraction estimated 30 -35% on 10/26/23) who presented today with SOB and upper abdominal pain and then transferred to [facility] ER on 2/5/2025  for a higher level of care.     In the ER, initial vital signs were as follows: Temp 97.4, /111, , SpO2 90% on NRB. Labs as follows: WBC 6.49, Hgb 14, Elevated TB  2.5, BUN/Cr 9/0.88 (baseline Cr 0.8). UA +1 protein and trace blood, Elevated tropoinin 0.100-->0.101, BNP 4400, Lactic Acid 1.5,  3/5/25 Blood Cx NGTD, Lipase 79    CXR No acute abnormality.   .    Abd US shows: Limited right upper quadrant ultrasound performed.  The liver measures 17 cm in length and is homogeneous in echogenicity.  Trace fluid seen along the falciform ligament.  Common bile duct is within normal limits at 2.2 mm..  Mild gallbladder wall thickening.  Trace pericholecystic fluid.  Negative sonographic Disla sign.  Pancreas is obscured by bowel gas.  IVC within normal limits.  The right kidney is normal in length measuring 10.4 cm. No right sided hydronephrosis or renal masses identified. Impression:No ductal dilatation.     CT Chest: Cholelithiasis with possible wall thickening.  Recommend follow-up right upper quadrant ultrasound.  Suspect flash fill hemangioma measuring 11 mm in the right hepatic lobe.  Cardiomegaly with aneurysmal dilatation of the aortic root measuring 5.2 cm.  Correlate with congenital heart malformation.    EKG  with LVH Abnormaility, Qtc 488.     In the ER, patient was given Lasix 20 mg x 3 doses, 1 liter NS bolus, Heparin gtt initiated,  Zofran 4 mg x 1  and oxycodone IR 10 mg x 1 dose .     Patient was admitted to [facility] for Hypoxic Respiratory Failure due to  Decompensated Heart failure.

## 2025-02-06 NOTE — HPI
29-year-old male with medical history of tricuspid and pulmonary atresia initially palliated with a systemic to pulmonary a. 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     Per OSH documentation, he presented with hypoxia requiring 30L (vapo therm) as well as hypertensive urgency and atrial flutter requiring metoprolol. Echocardiogram showed EF of 10-15 %. He was weaned to room air and transferred for adult congenital heart service evaluation.

## 2025-02-06 NOTE — ASSESSMENT & PLAN NOTE
SEGMENTAL CARDIAC CONNECTIONS (previously demonstrated):  Abdominal situs is solitus.  Atrial situs is normal.  Imaging consistent with tricuspid atresia.  Tricuspid atresia.  Mitral valve appears normal in structure.  LV dilation.  Ventriculoarterial concordance.  Ventricular loop: D-loop.  Cardiac position is levocardia.  Left aortic arch branching.      IMPRESSION:  Imaging consistent with diagnosis of tricuspid atresia S/P lateral tunnel   Fontan - study remarkable for decreased systolic function of the single   ventricle not significantly changed.  Very difficult to demonstrate pulmonary circulation with significant chest   deformity due to median sternotomy.  Mildly dilated inferior vena cava connecting to through lateral for   completion of Fontan physiology with no obvious obstruction or thrombus.  Right superior vena cava identified with no obvious stenosis.  No evidence of obstruction at Fontan or Jesus anastomosis to the pulmonary   arteries.  Pulmonary confluence and proximal pulmonary branches not demonstrated.  At least two pulmonary veins demonstrated returning to the left atrium   with no evidence for obstruction.  There is a small right atrium with no obvious obstruction of right atrial   flow to the left atrium.  There is no obvious tricuspid valve tissue present and no right   ventricular cavity could be demonstrated.  There is no evidence for pulmonary valve.  At least mild dilation of the left atrium.  Mildly dilated mitral valve with color Doppler demonstrating mild   insufficiency.  Single ventricle consistent with left ventricular morphology.  There is at least mild dilation of the left ventricle.  Left ventricular systolic function qualitatively severely compromised with   ejection fraction estimated 30 -35%.  Global left ventricular longitudinal strain abnormal - peak average   measured -10.5.  There is a large aortic annulus with trileaflet aortic valve, no stenosis   and trivial central  jet of insufficiency.  Aortic dimensions:  Sinuses of Valsalva  = 52 mm.  ST junction               = 42 mm.  Ascending aorta       = not well demonstrated.  Qualitative impression of at least mild dilation of the ascending aorta.  No evidence for coarctation.    Lasix 20 mg IV X 2 given   Accurate intake and output  Daily weights  Currently requiring 30L HFNC to maintain sat at baseline (90%) low threshold for continuous BiPAP  Transfer to [facility] for congenital heart management, STAT.  Noncompliant with medications

## 2025-02-06 NOTE — CONSULTS
"[place] - Intensive Care  Cardiology  Consult Note    Patient Name: Juani Reilly Jr.  MRN: [id number]  Admission Date: 2/5/2025  Hospital Length of Stay: 0 days  Code Status: Full Code   Attending Provider: Sameera Madrid MD   Consulting Provider: Demetrio Brown NP  Primary Care Physician: Nestor Sui MD  Principal Problem:Acute decompensated heart failure    Patient information was obtained from patient, parent, past medical records, and ER records.     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 Patient presented as a transfer from [facility] to [place] for ADHF. Patient reports SOB and abdominal pain. Currently on 30L HFNC to maintain sat in the 90s. Patient is awaiting bed at [facility]. He has received Lasix 20 mg IV x 2. Patient reports that he does not take any medications at home.    Last office visit with Dr Santoyo 10/2023  "1. Tricuspid and pulmonary atresia initially palliated with a systemic to pulmonary artery shunt followed by a bidirectional Jesus and subsequent lateral tunnel Fontan procedure   - small atrial baffle leak along with new Fontan baffle puncture for EP study  - s/p EP study with trans-septal puncture November 2022  - reassuring hemodynamics on 2020 catheterization  2. Decreased left ventricular function  - no evidence of PLE  3. History of Fhxah-Yjzdzexow-Jkmuk, SVT  - status post successful ablation of a left lateral accessory pathway November 2022 by Dr. Ferguson  4. Cirrhosis  - last seen by hepatology May 2023  5. Medication noncompliance   6. " "marijuana use      To summarize, my recommendations are as follows:  1. SBE prophylaxis is definitely indicated  2. I stressed the importance of taking his medications.  Continue Xarelto and increase benazepril to 10mg daily (chosen for insurance reasons).  I left a message with his pharmacy to confirm compliance.  4. Follow up as planned with hepatology later this month  5. Follow up no tests in 1 month  6. Check baseline blood work today     Discussion:  He continues with significantly decreased systolic function of his systemic ventricle.  It is very unclear to me whether he is compliant with his medication.  I bumped his ACE inhibitor dose up, and we are going to see him again in a month.  I left a message at his pharmacy to ask about when he picked up his last refill to try to gauge compliance.    To be clear, his long-term prognosis is not good.  He has a single ventricle status post Fontan with significantly decreased heart function.  Typically, we would be considering a transplant in this situation.  However, he is clearly not a transplant candidate given his very poor compliance with medications."    Past Medical History:   Diagnosis Date    Acute decompensated heart failure 2/6/2025    History of heart surgery     Other cirrhosis of liver 11/12/2020    SVT (supraventricular tachycardia)     WPW syndrome        Past Surgical History:   Procedure Laterality Date    ANGIOGRAPHY OF AORTIC ARCH N/A 9/3/2020    Procedure: AORTOGRAM, AORTIC ARCH;  Surgeon: Stephania Crump MD;  Location: [facility] CATH LAB;  Service: Cardiology;  Laterality: N/A;    FONTAN PROCEDURE, EXTRACARDIAC      LEFT HEART CATHETERIZATION Left 9/3/2020    Procedure: Left heart cath;  Surgeon: Stephania Crump MD;  Location: [facility] CATH LAB;  Service: Cardiology;  Laterality: Left;    RIGHT HEART CATHETERIZATION FOR CONGENITAL HEART DEFECT N/A 9/3/2020    Procedure: CATHETERIZATION, HEART, RIGHT, FOR CONGENITAL HEART DEFECT;  Surgeon: " Stephania Crump MD;  Location: [facility] CATH LAB;  Service: Cardiology;  Laterality: N/A;  [facility] - needs covid test morning of. Extenuating circumstances    TRANSESOPHAGEAL ECHOCARDIOGRAPHY N/A 11/10/2022    Procedure: ECHOCARDIOGRAM, TRANSESOPHAGEAL;  Surgeon: Emeterio Hall MD;  Location: [facility] EP LAB;  Service: Cardiology;  Laterality: N/A;    TREATMENT OF CARDIAC ARRHYTHMIA N/A 2021    Procedure: CARDIOVERSION;  Surgeon: Jim Mcginnis MD;  Location: [facility] CATH LAB;  Service: Cardiology;  Laterality: N/A;       Review of patient's allergies indicates:  No Known Allergies    No current facility-administered medications on file prior to encounter.     Current Outpatient Medications on File Prior to Encounter   Medication Sig    benazepriL (LOTENSIN) 10 MG tablet Take 1 tablet (10 mg total) by mouth once daily.    benazepriL (LOTENSIN) 10 MG tablet Take 1 tablet (10 mg total) by mouth once daily.    benzonatate (TESSALON) 200 MG capsule Take 200 mg by mouth 3 (three) times daily as needed for Cough.    famotidine (PEPCID) 20 MG tablet Take 1 tablet (20 mg total) by mouth 2 (two) times daily.    ondansetron (ZOFRAN) 4 MG tablet Take 1 tablet (4 mg total) by mouth every 6 (six) hours.    ondansetron (ZOFRAN-ODT) 4 MG TbDL Take 1 tablet (4 mg total) by mouth every 6 (six) hours as needed (nausea, vomiting).    XARELTO 20 mg Tab TAKE 1 TABLET BY MOUTH DAILY WITH DINNER OR EVENING MEAL     Family History       Problem Relation (Age of Onset)    Heart disease Maternal Grandfather    No Known Problems Mother, Father, Sister, Brother, Maternal Aunt, Maternal Uncle, Paternal Aunt, Paternal Uncle, Maternal Grandmother, Paternal Grandmother, Paternal Grandfather          Tobacco Use    Smoking status: Former     Current packs/day: 0.00     Types: Cigarettes     Quit date:      Years since quittin.1     Passive exposure: Past    Smokeless tobacco: Never    Tobacco comments:     3-4 months    Substance  and Sexual Activity    Alcohol use: No    Drug use: Yes     Types: Marijuana     Comment: Mojo today- pt reports a while back    Sexual activity: Yes     Review of Systems   Cardiovascular:  Positive for dyspnea on exertion.   Respiratory:  Positive for shortness of breath.    Gastrointestinal:  Positive for bloating and abdominal pain.   Genitourinary: Negative.    Neurological:  Negative for dizziness and light-headedness.   Psychiatric/Behavioral: Negative.       Objective:     Vital Signs (Most Recent):  Temp: 97.7 °F (36.5 °C) (02/06/25 1501)  Pulse: 104 (02/06/25 1501)  Resp: 16 (02/06/25 1501)  BP: (!) 138/93 (02/06/25 1501)  SpO2: 97 % (02/06/25 1501) Vital Signs (24h Range):  Temp:  [97.4 °F (36.3 °C)-97.9 °F (36.6 °C)] 97.7 °F (36.5 °C)  Pulse:  [] 104  Resp:  [10-33] 16  SpO2:  [83 %-100 %] 97 %  BP: (123-171)/() 138/93     Weight: 61 kg (134 lb 7.7 oz)  Body mass index is 21.71 kg/m².    SpO2: 97 %         Intake/Output Summary (Last 24 hours) at 2/6/2025 1519  Last data filed at 2/6/2025 1500  Gross per 24 hour   Intake 147.99 ml   Output 1850 ml   Net -1702.01 ml       Lines/Drains/Airways       Peripheral Intravenous Line  Duration                  Peripheral IV - Single Lumen 02/05/25 1100 20 G No Anterior;Right Forearm 1 day         Peripheral IV - Single Lumen 02/06/25 22 G Left Hand <1 day                     Physical Exam  Constitutional:       General: He is not in acute distress.     Appearance: He is ill-appearing. He is not diaphoretic.   HENT:      Head: Atraumatic.   Eyes:      General:         Right eye: No discharge.         Left eye: No discharge.   Cardiovascular:      Rate and Rhythm: Regular rhythm. Tachycardia present.      Heart sounds: Murmur heard.   Abdominal:      General: There is distension.   Skin:     General: Skin is warm and dry.   Neurological:      Mental Status: He is alert and oriented to person, place, and time.   Psychiatric:         Mood and Affect: Mood  normal.         Thought Content: Thought content normal.          Significant Labs: BMP:   Recent Labs   Lab 02/05/25  1432 02/06/25  0607   * 99    136   K 3.3* 4.2    104   CO2 19* 22*   BUN 6 9   CREATININE 0.83 0.88   CALCIUM 9.1 8.6*   , CMP   Recent Labs   Lab 02/05/25  1432 02/06/25  0607    136   K 3.3* 4.2    104   CO2 19* 22*   * 99   BUN 6 9   CREATININE 0.83 0.88   CALCIUM 9.1 8.6*   PROT 7.2 6.4   ALBUMIN 4.1 3.7   BILITOT 2.3* 2.5*   ALKPHOS 51 40   AST 32 27   ALT 26 25   ANIONGAP 10 10   , CBC   Recent Labs   Lab 02/05/25  1432 02/05/25  1438 02/06/25  0607 02/06/25  0608   WBC 5.21  --  6.49  --    HGB 15.7  --  14.4  --    HCT 46.8   < > 43.8 47   *  --  114*  --     < > = values in this interval not displayed.   , and INR   Recent Labs   Lab 02/05/25  1441   INR 1.1       Significant Imaging: Echocardiogram: Transthoracic echo (TTE) complete ([facility] Only):   Results for orders placed or performed during the hospital encounter of 10/26/23   Echo   Result Value Ref Range    LVOT diameter 5.17 cm    MV Peak A Tod 0.52 m/s    E wave deceleration time 141.54 msec    MV Peak E Tod 0.45 m/s    Sinus 4.24 cm    MV stenosis pressure 1/2 time 41.05 ms    MV valve area p 1/2 method 5.36 cm2    E/A ratio 0.87     LVOT area 21.0 cm2    BSA 1.82 m2    Narrative       CONGENITAL CARDIAC HISTORY:     Tricuspid atresia, pulmonary atresia  and WPW.      S/P Systemic to pulmonary artery shunt - [place].       S/P Bidirectional Jesus -- [employer].      S/P Lateral tunnel Fontan procedure - [facility].      S/P EP study with trans-septal puncture November 2022      S/P Ablation of a left lateral accessory pathway November 2022 by Dr. Ferguson      SEGMENTAL CARDIAC CONNECTIONS (previously demonstrated):  Abdominal situs is solitus.  Atrial situs is normal.  Imaging consistent with tricuspid atresia.  Tricuspid atresia.  Mitral valve appears normal in structure.  LV  dilation.  Ventriculoarterial concordance.  Ventricular loop: D-loop.  Cardiac position is levocardia.  Left aortic arch branching.      IMPRESSION:  Imaging consistent with diagnosis of tricuspid atresia S/P lateral tunnel   Fontan - study remarkable for decreased systolic function of the single   ventricle not significantly changed.  Very difficult to demonstrate pulmonary circulation with significant chest   deformity due to median sternotomy.  Mildly dilated inferior vena cava connecting to through lateral for   completion of Fontan physiology with no obvious obstruction or thrombus.  Right superior vena cava identified with no obvious stenosis.  No evidence of obstruction at Fontan or Jesus anastomosis to the pulmonary   arteries.  Pulmonary confluence and proximal pulmonary branches not demonstrated.  At least two pulmonary veins demonstrated returning to the left atrium   with no evidence for obstruction.  There is a small right atrium with no obvious obstruction of right atrial   flow to the left atrium.  There is no obvious tricuspid valve tissue present and no right   ventricular cavity could be demonstrated.  There is no evidence for pulmonary valve.  At least mild dilation of the left atrium.  Mildly dilated mitral valve with color Doppler demonstrating mild   insufficiency.  Single ventricle consistent with left ventricular morphology.  There is at least mild dilation of the left ventricle.  Left ventricular systolic function qualitatively severely compromised with   ejection fraction estimated 30 -35%.  Global left ventricular longitudinal strain abnormal - peak average   measured -10.5.  There is a large aortic annulus with trileaflet aortic valve, no stenosis   and trivial central jet of insufficiency.  Aortic dimensions:  Sinuses of Valsalva  = 52 mm.  ST junction               = 42 mm.  Ascending aorta       = not well demonstrated.  Qualitative impression of at least mild dilation of the ascending  aorta.  No evidence for coarctation.  No obvious pericardial effusion.     Assessment and Plan:     * Acute decompensated heart failure  SEGMENTAL CARDIAC CONNECTIONS (previously demonstrated):  Abdominal situs is solitus.  Atrial situs is normal.  Imaging consistent with tricuspid atresia.  Tricuspid atresia.  Mitral valve appears normal in structure.  LV dilation.  Ventriculoarterial concordance.  Ventricular loop: D-loop.  Cardiac position is levocardia.  Left aortic arch branching.      IMPRESSION:  Imaging consistent with diagnosis of tricuspid atresia S/P lateral tunnel   Fontan - study remarkable for decreased systolic function of the single   ventricle not significantly changed.  Very difficult to demonstrate pulmonary circulation with significant chest   deformity due to median sternotomy.  Mildly dilated inferior vena cava connecting to through lateral for   completion of Fontan physiology with no obvious obstruction or thrombus.  Right superior vena cava identified with no obvious stenosis.  No evidence of obstruction at Fontan or Jesus anastomosis to the pulmonary   arteries.  Pulmonary confluence and proximal pulmonary branches not demonstrated.  At least two pulmonary veins demonstrated returning to the left atrium   with no evidence for obstruction.  There is a small right atrium with no obvious obstruction of right atrial   flow to the left atrium.  There is no obvious tricuspid valve tissue present and no right   ventricular cavity could be demonstrated.  There is no evidence for pulmonary valve.  At least mild dilation of the left atrium.  Mildly dilated mitral valve with color Doppler demonstrating mild   insufficiency.  Single ventricle consistent with left ventricular morphology.  There is at least mild dilation of the left ventricle.  Left ventricular systolic function qualitatively severely compromised with   ejection fraction estimated 30 -35%.  Global left ventricular longitudinal strain  abnormal - peak average   measured -10.5.  There is a large aortic annulus with trileaflet aortic valve, no stenosis   and trivial central jet of insufficiency.  Aortic dimensions:  Sinuses of Valsalva  = 52 mm.  ST junction               = 42 mm.  Ascending aorta       = not well demonstrated.  Qualitative impression of at least mild dilation of the ascending aorta.  No evidence for coarctation.    Lasix 20 mg IV X 2 given   Accurate intake and output  Daily weights  Currently requiring 30L HFNC to maintain sat at baseline (90%) low threshold for continuous BiPAP  Transfer to [facility] for congenital heart management, STAT.  Noncompliant with medications         VTE Risk Mitigation (From admission, onward)           Ordered     IP VTE HIGH RISK PATIENT  Once         02/06/25 1403     Place sequential compression device  Until discontinued         02/06/25 1403     heparin 25,000 units in dextrose 5% 250 mL (100 units/mL) infusion LOW INTENSITY nomogram - [facility]  Continuous        Question:  Begin at (units/kg/hr)  Answer:  12    02/06/25 0126     heparin 25,000 units in dextrose 5% (100 units/ml) IV bolus from bag LOW INTENSITY nomogram - [facility]  As needed (PRN)        Question:  Heparin Infusion Adjustment (DO NOT MODIFY ANSWER)  Answer:  [email or web address]\epic\Images\Pharmacy\HeparinInfusions\heparin LOW INTENSITY nomogram for [facility] [id number]    02/06/25 0128     heparin 25,000 units in dextrose 5% (100 units/ml) IV bolus from bag LOW INTENSITY nomogram - [facility]  As needed (PRN)        Question:  Heparin Infusion Adjustment (DO NOT MODIFY ANSWER)  Answer:  [email or web address]\Images\Pharmacy\HeparinInfusions\heparin LOW INTENSITY nomogram for [facility] [id number]    02/06/25 0128                    Thank you for your consult. I will follow-up with patient. Please contact us if you have any additional questions.    Demetrio Brown NP  Cardiology   [place] - Intensive Care

## 2025-02-06 NOTE — ED NOTES
Pt able to maintain O2 sat on 5L NC; however, pt states it is now slightly burning his nostrils and there is no humidity supplied to ED unit.  Pt is being placed on venti mask with 40% FiO2.

## 2025-02-06 NOTE — ED NOTES
"Pt's mother taisha at bedside. She requests to have pt discharged from ER to "go to his cardiology appt today at 1pm at main campus because he can't get a bed anywhere".Pt's mothers concerns escalated to [facility] Natalee via telephone. Also notified charge nurse, ED manager and director.   "

## 2025-02-06 NOTE — ED NOTES
Pt left via [facility] stretcher. No change in status, remains AAOx4, on O2 100%NRB, resp even nonlabored. Hep loc x 2 intact, Heparin infusing w/o diff. Pt valuables of 1 pr shoes sent with pt.

## 2025-02-06 NOTE — PLAN OF CARE
Problem: Adult Inpatient Plan of Care  Goal: Readiness for Transition of Care  2/6/2025 1516 by Leslie York RN  Outcome: Progressing    Pt to be transferred to [facility] when bed becomes available     Problem: Adult Inpatient Plan of Care  Goal: Optimal Comfort and Wellbeing  2/6/2025 1516 by Leslie York RN  Outcome: Progressing    Problem: Gas Exchange Impaired  Goal: Optimal Gas Exchange  2/6/2025 1516 by Leslie York RN  Outcome: Progressing    Pt on vapotherm @30L and 85% with O2 sat of %; no SOB reported     Problem: Pain Acute  Goal: Optimal Pain Control and Function  2/6/2025 1516 by Leslie York RN  Outcome: Progressing    No pain reported this shift     Problem: Adult Inpatient Plan of Care  Goal: Plan of Care Review  2/6/2025 1516 by Leslie York RN  Outcome: Progressing     Heparin gtt continued @12 units/kg/hr according to nomogram; POC reviewed with pt and family throughout shift

## 2025-02-06 NOTE — ED NOTES
Was a duplicate order for APTT orders in place. When discontinued, it discontinued heparin order set. Order set placed back in with PRN orders.

## 2025-02-06 NOTE — HPI
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 Patient presented as a transfer from [facility] to [place] for ADHF. Patient reports SOB and abdominal pain. Currently on 30L HFNC to maintain sat in the 90s. Patient is awaiting bed at [facility]. He has received Lasix 20 mg IV x 2. Patient reports that he does not take any medications at home.    Last office visit with Dr Santoyo 10/2023  "1. Tricuspid and pulmonary atresia initially palliated with a systemic to pulmonary artery shunt followed by a bidirectional Jesus and subsequent lateral tunnel Fontan procedure   - small atrial baffle leak along with new Fontan baffle puncture for EP study  - s/p EP study with trans-septal puncture November 2022  - reassuring hemodynamics on 2020 catheterization  2. Decreased left ventricular function  - no evidence of PLE  3. History of Ycbyi-Gcjbwzffq-Jkzwo, SVT  - status post successful ablation of a left lateral accessory pathway November 2022 by Dr. Ferguson  4. Cirrhosis  - last seen by hepatology May 2023  5. Medication noncompliance   6. marijuana use      To summarize, my recommendations are as follows:  1. SBE prophylaxis is definitely indicated  2. I stressed the importance of taking his medications.  Continue Xarelto and increase benazepril to 10mg daily (chosen for insurance reasons).  I left a message with his pharmacy to confirm compliance.  4. Follow up as planned with hepatology later this month  5. Follow up no tests in 1 month  6. Check baseline blood work today     Discussion:  He continues with significantly decreased systolic function of his systemic ventricle.  It is very unclear to me whether he is compliant with his medication.  I bumped his ACE inhibitor dose up, and we are going to see him again " "in a month.  I left a message at his pharmacy to ask about when he picked up his last refill to try to gauge compliance.    To be clear, his long-term prognosis is not good.  He has a single ventricle status post Fontan with significantly decreased heart function.  Typically, we would be considering a transplant in this situation.  However, he is clearly not a transplant candidate given his very poor compliance with medications."  "

## 2025-02-07 PROBLEM — R17 ELEVATED BILIRUBIN: Status: ACTIVE | Noted: 2025-02-07

## 2025-02-07 PROBLEM — R09.02 HYPOXIA: Status: ACTIVE | Noted: 2025-02-07

## 2025-02-07 PROBLEM — E87.3 RESPIRATORY ALKALOSIS: Status: ACTIVE | Noted: 2025-02-07

## 2025-02-07 LAB
ALBUMIN SERPL BCP-MCNC: 3.3 G/DL (ref 3.5–5.2)
ALP SERPL-CCNC: 37 U/L (ref 40–150)
ALT SERPL W/O P-5'-P-CCNC: 16 U/L (ref 10–44)
ANION GAP SERPL CALC-SCNC: 10 MMOL/L (ref 8–16)
APTT PPP: 37 SEC (ref 21–32)
APTT PPP: 42 SEC (ref 21–32)
APTT PPP: 48.6 SEC (ref 21–32)
AST SERPL-CCNC: 15 U/L (ref 10–40)
BASOPHILS # BLD AUTO: 0.02 K/UL (ref 0–0.2)
BASOPHILS NFR BLD: 0.4 % (ref 0–1.9)
BILIRUB SERPL-MCNC: 1.8 MG/DL (ref 0.1–1)
BUN SERPL-MCNC: 11 MG/DL (ref 6–20)
CALCIUM SERPL-MCNC: 8.2 MG/DL (ref 8.7–10.5)
CHLORIDE SERPL-SCNC: 105 MMOL/L (ref 95–110)
CO2 SERPL-SCNC: 25 MMOL/L (ref 23–29)
CREAT SERPL-MCNC: 1 MG/DL (ref 0.5–1.4)
DIFFERENTIAL METHOD BLD: ABNORMAL
EOSINOPHIL # BLD AUTO: 0 K/UL (ref 0–0.5)
EOSINOPHIL NFR BLD: 0 % (ref 0–8)
ERYTHROCYTE [DISTWIDTH] IN BLOOD BY AUTOMATED COUNT: 13.7 % (ref 11.5–14.5)
EST. GFR  (NO RACE VARIABLE): >60 ML/MIN/1.73 M^2
GLUCOSE SERPL-MCNC: 88 MG/DL (ref 70–110)
HCT VFR BLD AUTO: 41.6 % (ref 40–54)
HGB BLD-MCNC: 13.8 G/DL (ref 14–18)
IMM GRANULOCYTES # BLD AUTO: 0.01 K/UL (ref 0–0.04)
IMM GRANULOCYTES NFR BLD AUTO: 0.2 % (ref 0–0.5)
LYMPHOCYTES # BLD AUTO: 2 K/UL (ref 1–4.8)
LYMPHOCYTES NFR BLD: 40.4 % (ref 18–48)
MAGNESIUM SERPL-MCNC: 1.6 MG/DL (ref 1.6–2.6)
MCH RBC QN AUTO: 30.2 PG (ref 27–31)
MCHC RBC AUTO-ENTMCNC: 33.2 G/DL (ref 32–36)
MCV RBC AUTO: 91 FL (ref 82–98)
MONOCYTES # BLD AUTO: 0.4 K/UL (ref 0.3–1)
MONOCYTES NFR BLD: 7.4 % (ref 4–15)
NEUTROPHILS # BLD AUTO: 2.6 K/UL (ref 1.8–7.7)
NEUTROPHILS NFR BLD: 51.6 % (ref 38–73)
NRBC BLD-RTO: 0 /100 WBC
PHOSPHATE SERPL-MCNC: 4.3 MG/DL (ref 2.7–4.5)
PLATELET # BLD AUTO: 116 K/UL (ref 150–450)
PLATELET BLD QL SMEAR: ABNORMAL
PMV BLD AUTO: 11.1 FL (ref 9.2–12.9)
POTASSIUM SERPL-SCNC: 3.4 MMOL/L (ref 3.5–5.1)
PROT SERPL-MCNC: 5.4 G/DL (ref 6–8.4)
RBC # BLD AUTO: 4.57 M/UL (ref 4.6–6.2)
SODIUM SERPL-SCNC: 140 MMOL/L (ref 136–145)
TROPONIN I SERPL DL<=0.01 NG/ML-MCNC: 0.06 NG/ML (ref 0–0.03)
TROPONIN I SERPL DL<=0.01 NG/ML-MCNC: 0.11 NG/ML (ref 0–0.03)
WBC # BLD AUTO: 5.02 K/UL (ref 3.9–12.7)

## 2025-02-07 PROCEDURE — 97530 THERAPEUTIC ACTIVITIES: CPT

## 2025-02-07 PROCEDURE — 93005 ELECTROCARDIOGRAM TRACING: CPT

## 2025-02-07 PROCEDURE — 97161 PT EVAL LOW COMPLEX 20 MIN: CPT

## 2025-02-07 PROCEDURE — 99900035 HC TECH TIME PER 15 MIN (STAT)

## 2025-02-07 PROCEDURE — 94799 UNLISTED PULMONARY SVC/PX: CPT

## 2025-02-07 PROCEDURE — 97166 OT EVAL MOD COMPLEX 45 MIN: CPT

## 2025-02-07 PROCEDURE — 94761 N-INVAS EAR/PLS OXIMETRY MLT: CPT

## 2025-02-07 PROCEDURE — 99291 CRITICAL CARE FIRST HOUR: CPT | Mod: ,,, | Performed by: NURSE PRACTITIONER

## 2025-02-07 PROCEDURE — 36415 COLL VENOUS BLD VENIPUNCTURE: CPT | Performed by: STUDENT IN AN ORGANIZED HEALTH CARE EDUCATION/TRAINING PROGRAM

## 2025-02-07 PROCEDURE — 36415 COLL VENOUS BLD VENIPUNCTURE: CPT | Mod: XB

## 2025-02-07 PROCEDURE — 85730 THROMBOPLASTIN TIME PARTIAL: CPT | Performed by: STUDENT IN AN ORGANIZED HEALTH CARE EDUCATION/TRAINING PROGRAM

## 2025-02-07 PROCEDURE — 85025 COMPLETE CBC W/AUTO DIFF WBC: CPT | Performed by: NURSE PRACTITIONER

## 2025-02-07 PROCEDURE — 63600175 PHARM REV CODE 636 W HCPCS: Performed by: EMERGENCY MEDICINE

## 2025-02-07 PROCEDURE — 84484 ASSAY OF TROPONIN QUANT: CPT | Performed by: NURSE PRACTITIONER

## 2025-02-07 PROCEDURE — 25000003 PHARM REV CODE 250: Performed by: NURSE PRACTITIONER

## 2025-02-07 PROCEDURE — 84484 ASSAY OF TROPONIN QUANT: CPT | Mod: 91

## 2025-02-07 PROCEDURE — 83735 ASSAY OF MAGNESIUM: CPT | Performed by: NURSE PRACTITIONER

## 2025-02-07 PROCEDURE — 93010 ELECTROCARDIOGRAM REPORT: CPT | Mod: ,,, | Performed by: STUDENT IN AN ORGANIZED HEALTH CARE EDUCATION/TRAINING PROGRAM

## 2025-02-07 PROCEDURE — 27100171 HC OXYGEN HIGH FLOW UP TO 24 HOURS

## 2025-02-07 PROCEDURE — 63600175 PHARM REV CODE 636 W HCPCS: Performed by: NURSE PRACTITIONER

## 2025-02-07 PROCEDURE — 20000000 HC ICU ROOM

## 2025-02-07 PROCEDURE — 84100 ASSAY OF PHOSPHORUS: CPT | Performed by: NURSE PRACTITIONER

## 2025-02-07 PROCEDURE — 25000003 PHARM REV CODE 250

## 2025-02-07 PROCEDURE — 80053 COMPREHEN METABOLIC PANEL: CPT | Performed by: NURSE PRACTITIONER

## 2025-02-07 RX ORDER — MAGNESIUM SULFATE HEPTAHYDRATE 40 MG/ML
2 INJECTION, SOLUTION INTRAVENOUS ONCE
Status: COMPLETED | OUTPATIENT
Start: 2025-02-07 | End: 2025-02-07

## 2025-02-07 RX ORDER — ONDANSETRON HYDROCHLORIDE 2 MG/ML
4 INJECTION, SOLUTION INTRAVENOUS EVERY 8 HOURS PRN
Status: CANCELLED | OUTPATIENT
Start: 2025-02-07

## 2025-02-07 RX ORDER — HEPARIN SODIUM,PORCINE/D5W 25000/250
0-40 INTRAVENOUS SOLUTION INTRAVENOUS CONTINUOUS
Status: CANCELLED | OUTPATIENT
Start: 2025-02-07

## 2025-02-07 RX ORDER — IBUPROFEN 200 MG
16 TABLET ORAL
Status: CANCELLED | OUTPATIENT
Start: 2025-02-07

## 2025-02-07 RX ORDER — IBUPROFEN 200 MG
24 TABLET ORAL
Status: CANCELLED | OUTPATIENT
Start: 2025-02-07

## 2025-02-07 RX ORDER — ACETAMINOPHEN 325 MG/1
650 TABLET ORAL EVERY 4 HOURS PRN
Status: CANCELLED | OUTPATIENT
Start: 2025-02-07

## 2025-02-07 RX ORDER — FUROSEMIDE 10 MG/ML
40 INJECTION INTRAMUSCULAR; INTRAVENOUS EVERY 12 HOURS
Status: CANCELLED | OUTPATIENT
Start: 2025-02-07

## 2025-02-07 RX ORDER — AMOXICILLIN 250 MG
1 CAPSULE ORAL 2 TIMES DAILY PRN
Status: CANCELLED | OUTPATIENT
Start: 2025-02-07

## 2025-02-07 RX ORDER — POLYETHYLENE GLYCOL 3350 17 G/17G
17 POWDER, FOR SOLUTION ORAL DAILY PRN
Status: CANCELLED | OUTPATIENT
Start: 2025-02-07

## 2025-02-07 RX ORDER — SODIUM CHLORIDE 0.9 % (FLUSH) 0.9 %
5 SYRINGE (ML) INJECTION
Status: CANCELLED | OUTPATIENT
Start: 2025-02-07

## 2025-02-07 RX ORDER — FAMOTIDINE 10 MG/ML
20 INJECTION INTRAVENOUS DAILY
Status: CANCELLED | OUTPATIENT
Start: 2025-02-08

## 2025-02-07 RX ORDER — TALC
6 POWDER (GRAM) TOPICAL NIGHTLY PRN
Status: CANCELLED | OUTPATIENT
Start: 2025-02-07

## 2025-02-07 RX ORDER — POTASSIUM CHLORIDE 20 MEQ/1
40 TABLET, EXTENDED RELEASE ORAL EVERY 4 HOURS
Status: COMPLETED | OUTPATIENT
Start: 2025-02-07 | End: 2025-02-07

## 2025-02-07 RX ORDER — NALOXONE HCL 0.4 MG/ML
0.02 VIAL (ML) INJECTION
Status: CANCELLED | OUTPATIENT
Start: 2025-02-07

## 2025-02-07 RX ORDER — LISINOPRIL 2.5 MG/1
10 TABLET ORAL DAILY
Status: CANCELLED | OUTPATIENT
Start: 2025-02-08

## 2025-02-07 RX ORDER — GLUCAGON 1 MG
1 KIT INJECTION
Status: CANCELLED | OUTPATIENT
Start: 2025-02-07

## 2025-02-07 RX ADMIN — LISINOPRIL 10 MG: 2.5 TABLET ORAL at 10:02

## 2025-02-07 RX ADMIN — HEPARIN SODIUM 14 UNITS/KG/HR: 10000 INJECTION, SOLUTION INTRAVENOUS at 11:02

## 2025-02-07 RX ADMIN — POTASSIUM CHLORIDE 40 MEQ: 1500 TABLET, EXTENDED RELEASE ORAL at 09:02

## 2025-02-07 RX ADMIN — MAGNESIUM SULFATE HEPTAHYDRATE 2 G: 40 INJECTION, SOLUTION INTRAVENOUS at 09:02

## 2025-02-07 RX ADMIN — POTASSIUM CHLORIDE 40 MEQ: 1500 TABLET, EXTENDED RELEASE ORAL at 07:02

## 2025-02-07 RX ADMIN — Medication 6 MG: at 09:02

## 2025-02-07 RX ADMIN — FUROSEMIDE 40 MG: 10 INJECTION, SOLUTION INTRAMUSCULAR; INTRAVENOUS at 09:02

## 2025-02-07 RX ADMIN — FAMOTIDINE 20 MG: 10 INJECTION, SOLUTION INTRAVENOUS at 09:02

## 2025-02-07 NOTE — PROGRESS NOTES
"[facility]  Progress ICU Note       Admit Date: 2/5/2025   LOS: 1 day     Chief Complaint/Reason for Admission:  29 y.o. 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 Of note pt has a long history of non compliance and admits to not taking any of his mediations because sometimes they make him feel "weird" and other times he just doesn't want to take the medications.  Upon arrival he was hypoxic sating 87% on 100% NRB and later transitioned to Vapotherm on 30 Liters on 80% FiO2.   Pt reports that a week ago he started having  SOB, coughing, vomiting 2-3 times per day, generalized abdominal pain,diarrhea and night seats.  No citing event that he can recall.  He denies any chest pain, palpitations myalgia, sore throat, fever,chills, dysuria ,blood in the urine or stools or sick contacts.     In the ER, initial vital signs were as follows: Temp 97.4, /111, , SpO2 90% on NRB. Labs as follows: WBC 6.49, Hgb 14, Elevated TB  2.5, BUN/Cr 9/0.88 (baseline Cr 0.8). UA +1 protein and trace blood, Elevated tropoinin 0.100-->0.101, BNP 4400, Lactic Acid 1.5,  3/5/25 Blood Cx NGTD, Lipase 79    CXR No acute abnormality.   .    Abd US shows: Limited right upper quadrant ultrasound performed.  The liver measures 17 cm in length and is homogeneous in echogenicity.  Trace fluid seen along the falciform ligament.  Common bile duct is within normal limits " at 2.2 mm..  Mild gallbladder wall thickening.  Trace pericholecystic fluid.  Negative sonographic Disla sign.  Pancreas is obscured by bowel gas.  IVC within normal limits.  The right kidney is normal in length measuring 10.4 cm. No right sided hydronephrosis or renal masses identified. Impression:No ductal dilatation.    CT Chest: Cholelithiasis with possible wall thickening.  Recommend follow-up right upper quadrant ultrasound.  Suspect flash fill hemangioma measuring 11 mm in the right hepatic lobe.  Cardiomegaly with aneurysmal dilatation of the aortic root measuring 5.2 cm.  Correlate with congenital heart malformation.    EKG  with LVH Abnormaility, Qtc 488.     In the ER, patient was given Lasix 20 mg x 3 doses, 1 liter NS bolus, Heparin gtt initiated, Zofran 4 mg x 1  and oxycodone IR 10 mg x 1 dose .     Patient was admitted to [facility] for Hypoxic Respiratory Failure due to  Decompensated Heart failure.     Overnight Interval: No longer having abdominal pain. VSS and NAD.      Infusions:     heparin (porcine) in D5W  0-40 Units/kg/hr Intravenous Continuous 8.3 mL/hr at 02/07/25 0800 14 Units/kg/hr at 02/07/25 0800       PMHx  Past Medical History:   Diagnosis Date    Acute decompensated heart failure 2/6/2025    History of heart surgery     Other cirrhosis of liver 11/12/2020    SVT (supraventricular tachycardia)     WPW syndrome         PSHx  Past Surgical History:   Procedure Laterality Date    ANGIOGRAPHY OF AORTIC ARCH N/A 9/3/2020    Procedure: AORTOGRAM, AORTIC ARCH;  Surgeon: Stephania Crump MD;  Location: [facility] CATH LAB;  Service: Cardiology;  Laterality: N/A;    FONTAN PROCEDURE, EXTRACARDIAC      LEFT HEART CATHETERIZATION Left 9/3/2020    Procedure: Left heart cath;  Surgeon: Stephania Crump MD;  Location: [facility] CATH LAB;  Service: Cardiology;  Laterality: Left;    RIGHT HEART CATHETERIZATION FOR CONGENITAL HEART DEFECT N/A 9/3/2020    Procedure: CATHETERIZATION,  HEART, RIGHT, FOR CONGENITAL HEART DEFECT;  Surgeon: Stephania Crump MD;  Location: [facility] CATH LAB;  Service: Cardiology;  Laterality: N/A;  [facility] - needs covid test morning of. Extenuating circumstances    TRANSESOPHAGEAL ECHOCARDIOGRAPHY N/A 11/10/2022    Procedure: ECHOCARDIOGRAM, TRANSESOPHAGEAL;  Surgeon: Emeterio Hall MD;  Location: [facility] EP LAB;  Service: Cardiology;  Laterality: N/A;    TREATMENT OF CARDIAC ARRHYTHMIA N/A 2021    Procedure: CARDIOVERSION;  Surgeon: Jim Mcginnis MD;  Location: [facility] CATH LAB;  Service: Cardiology;  Laterality: N/A;       Family History  Family History   Problem Relation Name Age of Onset    No Known Problems Mother      No Known Problems Father      No Known Problems Sister 3     No Known Problems Brother      No Known Problems Maternal Aunt      No Known Problems Maternal Uncle      No Known Problems Paternal Aunt      No Known Problems Paternal Uncle      No Known Problems Maternal Grandmother      Heart disease Maternal Grandfather      No Known Problems Paternal Grandmother      No Known Problems Paternal Grandfather      Anemia Neg Hx      Arrhythmia Neg Hx      Asthma Neg Hx      Clotting disorder Neg Hx      Fainting Neg Hx      Heart attack Neg Hx      Heart failure Neg Hx      Hyperlipidemia Neg Hx      Hypertension Neg Hx      Stroke Neg Hx      Atrial Septal Defect Neg Hx         Social  Social History     Socioeconomic History    Marital status: Single   Tobacco Use    Smoking status: Former     Current packs/day: 0.00     Types: Cigarettes     Quit date:      Years since quittin.     Passive exposure: Past    Smokeless tobacco: Never    Tobacco comments:     3-4 months    Substance and Sexual Activity    Alcohol use: No    Drug use: Yes     Types: Marijuana     Comment: Mojo today- pt reports a while back    Sexual activity: Yes     Social Drivers of Health     Financial Resource Strain: Low Risk  (2025)    Overall Financial  Resource Strain (CARDIA)     Difficulty of Paying Living Expenses: Not hard at all   Food Insecurity: No Food Insecurity (2/6/2025)    Hunger Vital Sign     Worried About Running Out of Food in the Last Year: Never true     Ran Out of Food in the Last Year: Never true   Transportation Needs: No Transportation Needs (2/6/2025)    TRANSPORTATION NEEDS     Transportation : No   Stress: No Stress Concern Present (2/6/2025)    [employer] [place] [employer] - Occupational Stress Questionnaire     Feeling of Stress : Not at all   Housing Stability: Low Risk  (2/6/2025)    Housing Stability Vital Sign     Unable to Pay for Housing in the Last Year: No     Homeless in the Last Year: No       Allergies  Review of patient's allergies indicates:  No Known Allergies    ROS- As per HPI, otherwise negative      Objective  Vitals:    02/07/25 0730 02/07/25 0750 02/07/25 0800 02/07/25 0830   BP: 128/81  131/88 123/76   Pulse: 83 97 96 90   Resp: 18 17 14 17   Temp:       TempSrc:       SpO2: (!) 94% (!) 92% (!) 91% (!) 93%   Weight:       Height:         Vitals(Most Recent)      Temp: 97.9 °F (36.6 °C) (02/07/25 0712)  Pulse: 90 (02/07/25 0830)  Resp: 17 (02/07/25 0830)  BP: 123/76 (02/07/25 0830)  SpO2: (!) 93 % (02/07/25 0830)           Vitals Range  Temp:  [96.8 °F (36 °C)-98.4 °F (36.9 °C)]   Pulse:  []   Resp:  [11-30]   BP: (111-154)/()   SpO2:  [83 %-98 %]     Physical Exam  Physical Exam  Constitutional:       Appearance: Normal appearance.   HENT:      Head: Normocephalic and atraumatic.      Nose: Nose normal.      Mouth/Throat:      Mouth: Mucous membranes are moist.   Eyes:      Pupils: Pupils are equal, round, and reactive to light.   Cardiovascular:      Rate and Rhythm: Tachycardia present.      Pulses: Normal pulses.      Heart sounds: Murmur heard.      No friction rub. No gallop.   Pulmonary:      Effort: Pulmonary effort is normal. No respiratory distress.      Breath sounds: Normal breath  sounds. No wheezing, rhonchi or rales.      Comments: Vapotherm 30 Liters with 80% Fio2  Chest:      Chest wall: No tenderness.   Abdominal:      General: Bowel sounds are normal. There is no distension.      Palpations: Abdomen is soft.      Tenderness: There is no abdominal tenderness. There is no right CVA tenderness, left CVA tenderness, guarding or rebound.   Musculoskeletal:         General: Normal range of motion.      Right lower leg: No edema.      Left lower leg: No edema.   Skin:     General: Skin is warm and dry.      Capillary Refill: Capillary refill takes less than 2 seconds.      Findings: No rash.      Comments: Healed Vertical scar noted to abdomen    Neurological:      General: No focal deficit present.      Mental Status: He is alert.   Psychiatric:         Mood and Affect: Mood normal.         Labs    Lab Results   Component Value Date    INR 1.1 02/05/2025    INR 1.1 03/09/2024    INR 1.0 11/10/2022    APTT 37.0 (H) 02/07/2025    APTT 44.2 (H) 02/06/2025    APTT 50.0 (H) 02/06/2025     Recent Labs     02/07/25  0235   TROPONINI 0.109*       Imaging  Imaging Results              US Abdomen Limited (Gallbladder) (Final result)  Result time 02/06/25 09:17:27      Final result by Virgilio Easton MD (02/06/25 09:17:27)                   Impression:      No ductal dilatation.      Electronically signed by: Virgilio Easton MD  Date:    02/06/2025  Time:    09:17               Narrative:    EXAMINATION:  US ABDOMEN LIMITED    CLINICAL HISTORY:  Unspecified jaundice    COMPARISON:  None    FINDINGS:  Limited right upper quadrant ultrasound performed.  The liver measures 17 cm in length and is homogeneous in echogenicity.  Trace fluid seen along the falciform ligament.  Common bile duct is within normal limits at 2.2 mm..  Mild gallbladder wall thickening.  Trace pericholecystic fluid.  Negative sonographic Disla sign.  Pancreas is obscured by bowel gas.  IVC within normal limits.  The right kidney is  normal in length measuring 10.4 cm. No right sided hydronephrosis or renal masses identified.                                       CT Chest Abdomen Pelvis With IV Contrast (XPD) NO Oral Contrast (Final result)  Result time 02/06/25 07:42:51      Final result by Stan Maddox Jr., MD (02/06/25 07:42:51)                   Impression:      1.  Cholelithiasis with possible wall thickening.  Recommend follow-up right upper quadrant ultrasound  2.  Suspect flash fill hemangioma measuring 11 mm in the right hepatic lobe  3.  Cardiomegaly with aneurysmal dilatation of the aortic root measuring 5.2 cm.  Correlate with congenital heart malformation.    All CT scans at [this location] are performed using dose modulation techniques as appropriate to a performed exam including the following:  Automated exposure control; adjustment of the mA and/or kV according to patient size (this includes techniques or standardized protocols for targeted exams where dose is matched to indication / reason for exam; i.e. extremities or head); use of iterative reconstruction technique.    Finalized on: 2/6/2025 7:42 AM By:  Stan Maddox MD  [facility]# [id number]      2025-02-06 07:44:[id number]     [facility]               Narrative:    EXAM: CT CHEST ABDOMEN PELVIS WITH IV CONTRAST (XPD)    CLINICAL HISTORY: Sepsis    COMPARISON: 11/08/2023.  Report.    TECHNIQUE: CT scan was obtained with contrast of the chest, abdomen and pelvis.  Coronal and sagittal reconstructions were performed on these images.    FINDINGS: Chest: The heart is enlarged.  Congenital malformation of the heart with postsurgical change.  The lungs are clear.  No effusion or pneumothorax.  No adenopathy.  No pericardial effusion.  Noncalcified coronary arteries.  Aneurysmal dilatation of the aortic root measuring 5.2 cm.    No adenopathy.  Osseous structures are intact.    Abdomen/pelvis: Focal area of enhancement in the inferior right hepatic lobe measuring 11 mm likely flash fill  hemangioma.  No other focal abnormality within the liver.  The spleen, kidneys, adrenal glands, and pancreas are within normal limits.  Thick-walled gallbladder with suggestion of a mildly radiopaque stone.  No evidence of ductal dilatation.    Urinary bladder is unremarkable.  Nonobstructive bowel gas pattern.  Appendix is normal.  Small fat-containing umbilical hernia.    Osseous structures intact.                                         X-Ray Chest AP Portable (Final result)  Result time 02/05/25 14:39:19      Final result by Grupo Nick MD (02/05/25 14:39:19)                   Impression:      No acute abnormality.      Electronically signed by: Grupo Nick  Date:    02/05/2025  Time:    14:39               Narrative:    EXAMINATION:  XR CHEST AP PORTABLE    CLINICAL HISTORY:  Sepsis;    TECHNIQUE:  Single frontal view of the chest was performed.    COMPARISON:  Multiple    FINDINGS:  The lungs are clear, with normal appearance of pulmonary vasculature and no pleural effusion or pneumothorax.    The cardiac silhouette is normal in size. The hilar and mediastinal contours are unremarkable.    Bones are intact.                                        Assessment/Plan:  Juani Reilly JrMark is a 29 y.o. male patient w/ PMHx of Atrial Fibrillation, WPW, liver cirrhosis, medication non compliance, Tricuspid and pulmonary atresia initially palliated with a systemic to pulmonary artery shunt followed by a bidirectional Jesus and subsequent lateral tunnel Fontan procedure (last EF with Left ventricular systolic function qualitatively severely compromised with ejection fraction estimated 30-35% on 10/26/23) who presented today with SOB and upper abdominal pain and admitted for Hypoxic Respiratory Failure due to  Decompensated Heart failure.         Neuro/Psych  No known issues at this time.  Continue to monitor    CV  Acute Decompensated HF   Hx Paroxymal Atrial Fibrillation (not on for   Hx WPW  Evidenced  by history, Elevated BNP 4400 and SOB     Chest X-ray: No acute abnormality.  Persistenly Elevated troponin likely demand (0.93-->0.109)    Plan:  Diuresis w/ IV Lasix 40 mg every 12 hours and monitor UOP (UOP 2.8L)  Daily Weights  Strict I/O  Fluid restriction to 1,500cc daily  Low-sodium cardiac diet   Reassessing  volume status regularly  Oxygen PRN to keep O2 > 88%  Continue to Wean Vapotherm as tolerated (on 30 L at 75% FiO2)  Obtain 2D echo: EF 10-15% severely compromised. Imaging consistent with diagnosis of tricuspid atresia S/P lateral tunnel Fontan - study remarkable for severely decreased systolic function of the single ventricle, worsened compared to prior.  Severely enlarged ventricle.     Trend/EKG every 6 hours    Consulted Pediatric Cardiologist Dr. Dorian Santoyo (known to him)  Transfer to [facility] to CCU once bed becomes available      Pulm  #Hypoxic Respiratory Failure d/t decompensated HF    Oxygen Concentration (%):  [] 75      CXR:No acute abnormality.    ABG: WNL  Recent Labs   Lab 02/05/25  1438 02/06/25  0608   PH 7.473* 7.354   PCO2 28.2* 44.3   PO2 40 40   HCO3 20.6* 24.6   POCSATURATED 79 72   BE -3* -1     Plan:  Oxygen PRN to keep O2 > 80%  Wean Vapotherm accordingly    FEN/GI  Diet: Cardiac, 2 gm low sodium  Fluid restriction 1500 cc/day  Keep  K> 4 (3.4), Phos >3 (4.3), Mg> 2 (1.6) and repleted accordingly  Zofran prn for n/v   Continue home Pepcid 20 mg daily    Renal  BUN/Cr: 11/1.0, baseline 0.8  UOP: 2.8 L , In: 316 ml, net -2.5 L in last 24 hrs  I/O  I/O last 3 completed shifts:  In: 971 [P.O.:680; I.V.:273.3; IV Piggyback:17.7]  Out: 3575 [Urine:3575]    Intake/Output Summary (Last 24 hours) at 2/7/2025 0836  Last data filed at 2/7/2025 0800  Gross per 24 hour   Intake 1135.2 ml   Output 2775 ml   Net -1639.8 ml     Plan:  Strict I&Os  Avoid renal toxic meds  Continue to monitor renal status and urine output    Heme  H/H 13/41; stable  WBC 5.02  DVT prophylaxis: Heparin  "(gtt)    Endo  No known issues at this time.  Maintain glucose 140-180    HgbA1C:   Lab Results   Component Value Date    HGBA1C 4.9 11/09/2023   , Last Gluc: No results for input(s): "POCTGLUCOSE" in the last 168 hours.    Plan:  TSH WNL    ID  No known issues at this time.   (2/5/25) Blood Cxs are NGTD.   No ABX required  Urinalysis: +1 protein and trace hematuria    Micro  Microbiology Results (last 7 days)       Procedure Component Value Units Date/Time    Blood culture x two cultures. Draw prior to antibiotics. [[id number]] Collected: 02/05/25 1437    Order Status: Completed Specimen: Blood from Peripheral, Forearm, Right Updated: 02/07/25 0613     Blood Culture, Routine No Growth to date      No Growth to date    Narrative:      Aerobic and anaerobic    Blood culture x two cultures. Draw prior to antibiotics. [[id number]] Collected: 02/05/25 1415    Order Status: Completed Specimen: Blood from Peripheral, Hand, Left Updated: 02/07/25 0613     Blood Culture, Routine No Growth to date      No Growth to date    Narrative:      Aerobic and anaerobic    Influenza A & B by Molecular [[id number]] Collected: 02/06/25 1550    Order Status: Completed Specimen: Nasopharyngeal Swab Updated: 02/06/25 1632     Influenza A, Molecular Negative     Influenza B, Molecular Negative     Flu A & B Source Nasal swab               DVT PPx: Heparin gtt  Diet: Cardia, low sodium  GI PPX:  Pepcid 20 mg daily  Deconditioning: PT  Code Status: Full    ICU Checklist  Feeding:Cardiac, low sodium   Analgesia:none  Sedation:none  Thromboprophylaxis:Heparin gtt  Head up position: 30deg  Ulcer prophylaxis:Pepcid 20 mg daily  Glycemic control:Goal 140-180  Spontaneous Breathing Trial: n/a  Bowel care:n/a  Indwelling catheter removal:n/a  De-escalation of antibiotics:n/a      Dispo: ICU pending transfer to [facility] for higher level of care      2/7/2025 Mony Mauricio M.D., -I  [facility]  [facility]

## 2025-02-07 NOTE — ASSESSMENT & PLAN NOTE
LIMITED STUDY FOR EVALUATION OF SINGLE VENTRICULAR FUNCTION AND VALVULAR DISEASE     CONGENITAL CARDIAC HISTORY:     Tricuspid atresia, pulmonary atresia  and WPW.      S/P Systemic to pulmonary artery shunt - Shaun.       S/P Bidirectional Jesus -- Shaun.      S/P Lateral tunnel Fontan procedure - Shaun.      S/P EP study with trans-septal puncture November 2022      S/P Ablation of a left lateral accessory pathway November 2022 by Dr. Ferguson        SEGMENTAL CARDIAC CONNECTIONS (previously demonstrated):  Abdominal situs is solitus.  Atrial situs is normal.  Imaging consistent with tricuspid atresia.  Tricuspid atresia.  Mitral valve appears normal in structure.  LV dilation.  Ventriculoarterial concordance.  Ventricular loop: D-loop.  Cardiac position is levocardia.  Left aortic arch branching.        IMPRESSION:  Imaging consistent with diagnosis of tricuspid atresia S/P lateral tunnel Fontan - study remarkable for severely decreased systolic function of the single ventricle, worsened compared to prior.  Severely enlarged ventricle.     Pulmonary circulation not visualized  Mildly dilated inferior vena cava - connection to Fontan not visualized.  No apparent thrombus noted in the IVC  Right superior vena cava not visualized     No evidence of obstruction at Fontan on limited imaging.  Fibrinous material noted, which may be degenerated surgical material or  congenital material, less likely to be layered thrombus. Correlate clinically.      The Jesus anastomosis to the pulmonary arteries is not visualized on this echo.     Pulmonary confluence and proximal pulmonary branches not demonstrated.  At least two pulmonary veins demonstrated returning to the left atrium with no evidence for obstruction.     No right atrium, right ventricle, or tricuspid valve visualized  There is no evidence for pulmonary valve.     At least mild dilation of the left atrium.     Mildly dilated mitral valve with color Doppler  demonstrating moderate regurgitation.  Single ventricle consistent with left ventricular morphology.  There is at sever dilation of the left ventricle.  LVEDV index is 96 cc/m2     Left ventricular systolic function qualitatively severely compromised with ejection fraction estimated 10-15 %.     There is a large aortic annulus with trileaflet aortic valve, no stenosis and mild  insufficiency.  Aortic dimensions:  Sinuses of Valsalva  = 51 mm.  Ascending aorta       = not well demonstrated.     No obvious pericardial effusion.    Lasix 40 mg IV BID, Lisinopril  Accurate intake and output  Daily weights  Currently requiring 30L HFNC to maintain sat at baseline (90%)   Transfer to [facility] for congenital heart management, CICU- awaiting bed assignment  Noncompliant with medications

## 2025-02-07 NOTE — PLAN OF CARE
Problem: Adult Inpatient Plan of Care  Goal: Readiness for Transition of Care  Outcome: Progressing     Pt waiting for bed at main campus     Problem: Adult Inpatient Plan of Care  Goal: Optimal Comfort and Wellbeing  Outcome: Progressing     Pt up to chair with physical therapy this shift     Problem: Gas Exchange Impaired  Goal: Optimal Gas Exchange  Outcome: Progressing     Pt currently on RA with O2 sat of 96% after removing vapotherm himself and stating that he did not need it     Problem: Pain Acute  Goal: Optimal Pain Control and Function  Outcome: Progressing     No pain reported this shift    Problem: Adult Inpatient Plan of Care  Goal: Plan of Care Review  Outcome: Progressing      Heparin gtt @14units/kg/hr; POC discussed with pt throughout shift

## 2025-02-07 NOTE — NURSING
2225 Pt SpO2 85%, reports SOB, RT notified and oxygen requirements increased, now on Vapotherm 25L 80% FiO2, O2 sats 92-94% on these settings.

## 2025-02-07 NOTE — PLAN OF CARE
Problem: Occupational Therapy  Goal: Occupational Therapy Goal  Description: Goals to be met by: 03/07     Patient will increase functional independence with ADLs by performing:    LE Dressing with Modified [place].  Grooming while standing at sink with Modified [place].  Toileting from toilet with Modified [place] for hygiene and clothing management.   Toilet transfer to toilet with Modified [place].  Increased functional strength to WFL for ADLs.    Outcome: Progressing   Pt found in SF w/ fly in room & agreeable to OT eval/tx this date.  Pt on VT 30L at 75% & perf the following:  -sup-->EOB w/ SBA for donning socks via figure 4 tech at EOB w/ SBA  -standing w/o DME w/ SBA for marching in place x 10 reps  **pts O2 dropped to mid-80s, but pt w/o SOB  -perf PLBing for O2 recovery  -returned to supine w/ SBA  **O2 increased to 90% by tx termination  Edu/tx re: general safety techs, PLBing & HEP. Pt/fly verbalized understanding.    Pt presents w/ decreased overall endurance/conditioning, balance/mobility & coordination w/ subsequent decline in (I)/safety w/ BADLs, fxnl mobility & fxnl t/f's.  OT 3x/wk to increase phys/fxnl status & maximize potential to achieve established goals for d/c-->TBD pending respiratory status.

## 2025-02-07 NOTE — PROGRESS NOTES
[place] - Intensive Care  Cardiology  Progress Note    Patient Name: Juani Reilly Jr.  MRN: [id number]  Admission Date: 2/5/2025  Hospital Length of Stay: 1 days  Code Status: Full Code   Attending Physician: Sameera Madrid MD   Primary Care Physician: Nestor Siu MD  Expected Discharge Date:   Principal Problem:Acute decompensated heart failure    Subjective:     Hospital Course:   02/06/2025 Per HPI   02/07/2025 Awaiting bed at [facility]. Diuresing with Lasix 40 mg IV BID. Net -2.6 L. EF 10-15%. On 30L 80% FIO2 HFNC.     Past Medical History:   Diagnosis Date    Acute decompensated heart failure 2/6/2025    History of heart surgery     Other cirrhosis of liver 11/12/2020    SVT (supraventricular tachycardia)     WPW syndrome        Past Surgical History:   Procedure Laterality Date    ANGIOGRAPHY OF AORTIC ARCH N/A 9/3/2020    Procedure: AORTOGRAM, AORTIC ARCH;  Surgeon: Stephania Crump MD;  Location: [facility] CATH LAB;  Service: Cardiology;  Laterality: N/A;    FONTAN PROCEDURE, EXTRACARDIAC      LEFT HEART CATHETERIZATION Left 9/3/2020    Procedure: Left heart cath;  Surgeon: Stephania Crump MD;  Location: [facility] CATH LAB;  Service: Cardiology;  Laterality: Left;    RIGHT HEART CATHETERIZATION FOR CONGENITAL HEART DEFECT N/A 9/3/2020    Procedure: CATHETERIZATION, HEART, RIGHT, FOR CONGENITAL HEART DEFECT;  Surgeon: Stephania Crump MD;  Location: [facility] CATH LAB;  Service: Cardiology;  Laterality: N/A;  [facility] - needs covid test morning of. Extenuating circumstances    TRANSESOPHAGEAL ECHOCARDIOGRAPHY N/A 11/10/2022    Procedure: ECHOCARDIOGRAM, TRANSESOPHAGEAL;  Surgeon: Emeterio Hall MD;  Location: [facility] EP LAB;  Service: Cardiology;  Laterality: N/A;    TREATMENT OF CARDIAC ARRHYTHMIA N/A 1/9/2021    Procedure: CARDIOVERSION;  Surgeon: Jim Mcginnis MD;  Location: [facility] CATH LAB;  Service: Cardiology;  Laterality: N/A;       Review of patient's allergies  indicates:  No Known Allergies    No current facility-administered medications on file prior to encounter.     Current Outpatient Medications on File Prior to Encounter   Medication Sig    benazepriL (LOTENSIN) 10 MG tablet Take 1 tablet (10 mg total) by mouth once daily.    benazepriL (LOTENSIN) 10 MG tablet Take 1 tablet (10 mg total) by mouth once daily.    benzonatate (TESSALON) 200 MG capsule Take 200 mg by mouth 3 (three) times daily as needed for Cough.    famotidine (PEPCID) 20 MG tablet Take 1 tablet (20 mg total) by mouth 2 (two) times daily.    ondansetron (ZOFRAN) 4 MG tablet Take 1 tablet (4 mg total) by mouth every 6 (six) hours.    ondansetron (ZOFRAN-ODT) 4 MG TbDL Take 1 tablet (4 mg total) by mouth every 6 (six) hours as needed (nausea, vomiting).    XARELTO 20 mg Tab TAKE 1 TABLET BY MOUTH DAILY WITH DINNER OR EVENING MEAL     Family History       Problem Relation (Age of Onset)    Heart disease Maternal Grandfather    No Known Problems Mother, Father, Sister, Brother, Maternal Aunt, Maternal Uncle, Paternal Aunt, Paternal Uncle, Maternal Grandmother, Paternal Grandmother, Paternal Grandfather          Tobacco Use    Smoking status: Former     Current packs/day: 0.00     Types: Cigarettes     Quit date:      Years since quittin.     Passive exposure: Past    Smokeless tobacco: Never    Tobacco comments:     3-4 months    Substance and Sexual Activity    Alcohol use: No    Drug use: Yes     Types: Marijuana     Comment: Mojo today- pt reports a while back    Sexual activity: Yes     Review of Systems   Cardiovascular:  Positive for dyspnea on exertion.   Respiratory:  Positive for shortness of breath.    Gastrointestinal:  Positive for bloating and abdominal pain.   Genitourinary: Negative.    Neurological:  Negative for dizziness and light-headedness.   Psychiatric/Behavioral: Negative.       Objective:     Vital Signs (Most Recent):  Temp: 97.9 °F (36.6 °C) (25)  Pulse: 93  (02/07/25 0930)  Resp: 19 (02/07/25 0930)  BP: 128/85 (02/07/25 0930)  SpO2: (!) 91 % (02/07/25 0930) Vital Signs (24h Range):  Temp:  [96.8 °F (36 °C)-98.4 °F (36.9 °C)] 97.9 °F (36.6 °C)  Pulse:  [] 93  Resp:  [11-30] 19  SpO2:  [83 %-98 %] 91 %  BP: (111-154)/() 128/85     Weight: 59 kg (130 lb)  Body mass index is 20.98 kg/m².    SpO2: (!) 91 %         Intake/Output Summary (Last 24 hours) at 2/7/2025 1009  Last data filed at 2/7/2025 0939  Gross per 24 hour   Intake 1143.46 ml   Output 3075 ml   Net -1931.54 ml       Lines/Drains/Airways       Peripheral Intravenous Line  Duration                  Peripheral IV - Single Lumen 02/05/25 1100 20 G No Anterior;Right Forearm 1 day         Peripheral IV - Single Lumen 02/06/25 22 G Left Hand 1 day                     Physical Exam  Constitutional:       General: He is not in acute distress.     Appearance: He is ill-appearing. He is not diaphoretic.   HENT:      Head: Atraumatic.   Eyes:      General:         Right eye: No discharge.         Left eye: No discharge.   Cardiovascular:      Rate and Rhythm: Regular rhythm. Tachycardia present.      Heart sounds: Murmur heard.   Abdominal:      General: There is distension.   Skin:     General: Skin is warm and dry.   Neurological:      Mental Status: He is alert and oriented to person, place, and time.   Psychiatric:         Mood and Affect: Mood normal.         Thought Content: Thought content normal.          Significant Labs: BMP:   Recent Labs   Lab 02/05/25  1432 02/06/25  0607 02/07/25  0235   * 99 88    136 140   K 3.3* 4.2 3.4*    104 105   CO2 19* 22* 25   BUN 6 9 11   CREATININE 0.83 0.88 1.0   CALCIUM 9.1 8.6* 8.2*   MG  --   --  1.6   , CMP   Recent Labs   Lab 02/05/25  1432 02/06/25  0607 02/07/25  0235    136 140   K 3.3* 4.2 3.4*    104 105   CO2 19* 22* 25   * 99 88   BUN 6 9 11   CREATININE 0.83 0.88 1.0   CALCIUM 9.1 8.6* 8.2*   PROT 7.2 6.4 5.4*    ALBUMIN 4.1 3.7 3.3*   BILITOT 2.3* 2.5* 1.8*   ALKPHOS 51 40 37*   AST 32 27 15   ALT 26 25 16   ANIONGAP 10 10 10   , CBC   Recent Labs   Lab 02/05/25  1432 02/05/25  1438 02/06/25  0607 02/06/25  0608 02/07/25  0235   WBC 5.21  --  6.49  --  5.02   HGB 15.7  --  14.4  --  13.8*   HCT 46.8   < > 43.8   < > 41.6   *  --  114*  --  116*    < > = values in this interval not displayed.   , and INR   Recent Labs   Lab 02/05/25  1441   INR 1.1       Significant Imaging: Echocardiogram: Transthoracic echo (TTE) complete ([facility] Only):   Results for orders placed or performed during the hospital encounter of 02/05/25   Echo   Result Value Ref Range    LVIDd 5.5 3.5 - 6.0 cm    LV Systolic Volume 132.62 mL    LV Systolic Volume Index 79.4 mL/m2    LVIDs 5.3 (A) 2.1 - 4.0 cm    LV Diastolic Volume 149.46 mL    LV Diastolic Volume Index 89.50 mL/m2    IVS 1.1 0.6 - 1.1 cm    FS 3.6 (A) 28 - 44 %    Left Ventricle Relative Wall Thickness 0.47 cm    PW 1.3 (A) 0.6 - 1.1 cm    LV mass 272.4 g    LV Mass Index 163.1 g/m2    AV mean gradient 2 mmHg    AV peak gradient 2 mmHg    Ao peak otd 0.7 m/s    Ao VTI 9.2 cm    Mr max tod 4.59 m/s    ZLVIDS 4.79     ZLVIDD 1.64     Simon's Biplane MOD Ejection Fraction 5 %    A2C EF 7 %    A4C EF 11 %    LV EDV A2C 131.823976267753074 mL    LV EDV A4C 163.30 mL    Left Ventricular End Systolic Volume by Teichholz Method 132.62 mL    Left Ventricular End Diastolic Volume by Teichholz Method 149.46 mL    AV regurgitation pressure 1/2 time 731 ms    AR Max Tod 4.31 m/s    BSA 1.66 m2    LV EDV index A4C 95.9 ml/m2    Narrative    LIMITED STUDY FOR EVALUATION OF SINGLE VENTRICULAR FUNCTION AND VALVULAR   DISEASE    CONGENITAL CARDIAC HISTORY:     Tricuspid atresia, pulmonary atresia  and WPW.      S/P Systemic to pulmonary artery shunt - [place].       S/P Bidirectional Jesus -- [place].      S/P Lateral tunnel Fontan procedure - [place].      S/P EP study with trans-septal puncture  November 2022      S/P Ablation of a left lateral accessory pathway November 2022 by Dr. Ferguson        SEGMENTAL CARDIAC CONNECTIONS (previously demonstrated):  Abdominal situs is solitus.  Atrial situs is normal.  Imaging consistent with tricuspid atresia.  Tricuspid atresia.  Mitral valve appears normal in structure.  LV dilation.  Ventriculoarterial concordance.  Ventricular loop: D-loop.  Cardiac position is levocardia.  Left aortic arch branching.        IMPRESSION:  Imaging consistent with diagnosis of tricuspid atresia S/P lateral tunnel   Fontan - study remarkable for severely decreased systolic function of the   single ventricle, worsened compared to prior.  Severely enlarged   ventricle.    Pulmonary circulation not visualized  Mildly dilated inferior vena cava - connection to Fontan not visualized.    No apparent thrombus noted in the IVC  Right superior vena cava not visualized    No evidence of obstruction at Fontan on limited imaging.  Fibrinous   material noted, which may be degenerated surgical material or  congenital   material, less likely to be layered thrombus. Correlate clinically.     The Jesus anastomosis to the pulmonary arteries is not visualized on this   echo.    Pulmonary confluence and proximal pulmonary branches not demonstrated.  At least two pulmonary veins demonstrated returning to the left atrium   with no evidence for obstruction.    No right atrium, right ventricle, or tricuspid valve visualized  There is no evidence for pulmonary valve.    At least mild dilation of the left atrium.    Mildly dilated mitral valve with color Doppler demonstrating moderate   regurgitation.  Single ventricle consistent with left ventricular morphology.  There is at sever dilation of the left ventricle.  LVEDV index is 96 cc/m2    Left ventricular systolic function qualitatively severely compromised with   ejection fraction estimated 10-15 %.    There is a large aortic annulus with trileaflet aortic  valve, no stenosis   and mild  insufficiency.  Aortic dimensions:  Sinuses of Valsalva  = 51 mm.  Ascending aorta       = not well demonstrated.    No obvious pericardial effusion.       Assessment and Plan:     Brief HPI: Patient seen this morning on rounds with family at bedside. POC discussed for transfer once bed available at [facility].     * Acute decompensated heart failure  LIMITED STUDY FOR EVALUATION OF SINGLE VENTRICULAR FUNCTION AND VALVULAR DISEASE     CONGENITAL CARDIAC HISTORY:     Tricuspid atresia, pulmonary atresia  and WPW.      S/P Systemic to pulmonary artery shunt - [place].       S/P Bidirectional Jesus -- [place].      S/P Lateral tunnel Fontan procedure - [place].      S/P EP study with trans-septal puncture November 2022      S/P Ablation of a left lateral accessory pathway November 2022 by Dr. Ferguson        SEGMENTAL CARDIAC CONNECTIONS (previously demonstrated):  Abdominal situs is solitus.  Atrial situs is normal.  Imaging consistent with tricuspid atresia.  Tricuspid atresia.  Mitral valve appears normal in structure.  LV dilation.  Ventriculoarterial concordance.  Ventricular loop: D-loop.  Cardiac position is levocardia.  Left aortic arch branching.        IMPRESSION:  Imaging consistent with diagnosis of tricuspid atresia S/P lateral tunnel Fontan - study remarkable for severely decreased systolic function of the single ventricle, worsened compared to prior.  Severely enlarged ventricle.     Pulmonary circulation not visualized  Mildly dilated inferior vena cava - connection to Fontan not visualized.  No apparent thrombus noted in the IVC  Right superior vena cava not visualized     No evidence of obstruction at Fontan on limited imaging.  Fibrinous material noted, which may be degenerated surgical material or  congenital material, less likely to be layered thrombus. Correlate clinically.      The Jesus anastomosis to the pulmonary arteries is not visualized on this echo.     Pulmonary  confluence and proximal pulmonary branches not demonstrated.  At least two pulmonary veins demonstrated returning to the left atrium with no evidence for obstruction.     No right atrium, right ventricle, or tricuspid valve visualized  There is no evidence for pulmonary valve.     At least mild dilation of the left atrium.     Mildly dilated mitral valve with color Doppler demonstrating moderate regurgitation.  Single ventricle consistent with left ventricular morphology.  There is at sever dilation of the left ventricle.  LVEDV index is 96 cc/m2     Left ventricular systolic function qualitatively severely compromised with ejection fraction estimated 10-15 %.     There is a large aortic annulus with trileaflet aortic valve, no stenosis and mild  insufficiency.  Aortic dimensions:  Sinuses of Valsalva  = 51 mm.  Ascending aorta       = not well demonstrated.     No obvious pericardial effusion.    Lasix 40 mg IV BID, Lisinopril  Accurate intake and output  Daily weights  Currently requiring 30L HFNC to maintain sat at baseline (90%)   Transfer to [facility] for congenital heart management, CICU- awaiting bed assignment  Noncompliant with medications     Adult congenital heart disease  Per [facility]        VTE Risk Mitigation (From admission, onward)           Ordered     IP VTE HIGH RISK PATIENT  Once         02/06/25 1403     Place sequential compression device  Until discontinued         02/06/25 1403     heparin 25,000 units in dextrose 5% 250 mL (100 units/mL) infusion LOW INTENSITY nomogram - [facility]  Continuous        Question:  Begin at (units/kg/hr)  Answer:  12    02/06/25 0126     heparin 25,000 units in dextrose 5% (100 units/ml) IV bolus from bag LOW INTENSITY nomogram - [facility]  As needed (PRN)        Question:  Heparin Infusion Adjustment (DO NOT MODIFY ANSWER)  Answer:  [email or web address]\Images\Pharmacy\HeparinInfusions\heparin LOW INTENSITY nomogram for [facility] [id number]    02/06/25 0128     heparin 25,000 units  in dextrose 5% (100 units/ml) IV bolus from bag LOW INTENSITY nomogram - [facility]  As needed (PRN)        Question:  Heparin Infusion Adjustment (DO NOT MODIFY ANSWER)  Answer:  [email or web address]\Images\Pharmacy\HeparinInfusions\heparin LOW INTENSITY nomogram for [facility] [id number].pdf    02/06/25 0128                    Demetrio Brown NP  Cardiology  [place] - Intensive Care

## 2025-02-07 NOTE — PLAN OF CARE
CM met with pt - he is AAO x 3 - confirmed demographics   Lives with Grandmother Elaina  [phone]   Mother Amita  [phone]  lives nearby   Pt drives - is able to drive self to apts or family will assist.   No dme, no hh prior to admit     dx:  Acute decompensated heart failure  - Congenital Heart Management     Pt for transfer to [facility] - confirmed with Justina at [facility]   - bed is still pending.      Pharmacy:  [facility] - [place].  Able to afford meds.         02/07/25 1527   Discharge Assessment   Assessment Type Discharge Planning Assessment   Confirmed/corrected address, phone number and insurance Yes   Confirmed Demographics Correct on Facesheet   Source of Information patient;health record   Communicated ROJAS with patient/caregiver Date not available/Unable to determine   People in Home grandparent(s)   Do you expect to return to your current living situation? Yes   Do you have help at home or someone to help you manage your care at home? Yes   Who are your caregiver(s) and their phone number(s)? Grandmother Elaina  [phone]   Prior to hospitilization cognitive status: Alert/Oriented   Current cognitive status: Alert/Oriented   Equipment Currently Used at Home none   Patient currently being followed by outpatient case management? No   Do you currently have service(s) that help you manage your care at home? No   Do you take prescription medications? Yes  ([facility] [place])   Do you have any problems affording any of your prescribed medications? No   Who is going to help you get home at discharge? Grandmother Elaina   How do you get to doctors appointments? family or friend will provide;car, drives self   Are you on dialysis? No   Do you take coumadin? No   Discharge Plan A Home with family;Home   Discharge Plan B Home;Home with family;Home Health   DME Needed Upon Discharge    (tbd)   Discharge Plan discussed with: Patient   Transition of Care Barriers None   Physical  Activity   On average, how many days per week do you engage in moderate to strenuous exercise (like a brisk walk)? 0 days  (c/o SOB with exercise)   Financial Resource Strain   How hard is it for you to pay for the very basics like food, housing, medical care, and heating? Not very   Housing Stability   In the last 12 months, was there a time when you were not able to pay the mortgage or rent on time? N   Transportation Needs   Has the lack of transportation kept you from medical appointments, meetings, work or from getting things needed for daily living? No   Food Insecurity   Within the past 12 months, you worried that your food would run out before you got the money to buy more. Never true   Within the past 12 months, the food you bought just didn't last and you didn't have money to get more. Never true   Alcohol Use   Q1: How often do you have a drink containing alcohol? Never   Utilities   In the past 12 months has the electric, gas, oil, or water company threatened to shut off services in your home? No   Health Literacy   How often do you need to have someone help you when you read instructions, pamphlets, or other written material from your doctor or pharmacy? Never   OTHER   Name(s) of People in Home Grandmother Elaina

## 2025-02-07 NOTE — PT/OT/SLP EVAL
Occupational Therapy   Evaluation/tx    Name: Juani Reilly Jr.  MRN: [id number]  Admitting Diagnosis: Acute decompensated heart failure  Recent Surgery: * No surgery found *      Recommendations:     Discharge Recommendations: Moderate Intensity Therapy  Discharge Equipment Recommendations:  to be determined by next level of care  Barriers to discharge:  None    Assessment:   Pt presents w/ decreased overall endurance/conditioning, balance/mobility & coordination w/ subsequent decline in (I)/safety w/ BADLs, fxnl mobility & fxnl t/f's.  OT 3x/wk to increase phys/fxnl status & maximize potential to achieve established goals for d/c-->TBD pending respiratory status.    Juani Reilly Jr. is a 29 y.o. male with a medical diagnosis of Acute decompensated heart failure.  He presents with performance deficits affecting function: weakness, impaired endurance, impaired self care skills, impaired functional mobility, gait instability, impaired balance, decreased coordination, impaired cardiopulmonary response to activity.      Rehab Prognosis: Good; patient would benefit from acute skilled OT services to address these deficits and reach maximum level of function.       Plan:     Patient to be seen 3 x/week to address the above listed problems via therapeutic activities, therapeutic exercises  Plan of Care Expires: 03/07/25  Plan of Care Reviewed with: patient, family    Subjective     Chief Complaint: SOB  Patient/Family Comments/goals: return home    Occupational Profile:  Living Environment: w/ grdmom in H w/ 0STE; WIS & t/s w/ GB  Previous level of function: (I)  Roles and Routines: IADLs; driving  Equipment Used at Home: grab bar  Assistance upon Discharge: fly    Pain/Comfort:  Pain Rating 1: 0/10  Pain Rating Post-Intervention 1: 0/10    Patients cultural, spiritual, Evangelical conflicts given the current situation:      Objective:     Communicated with: deyanira prior to session.  Patient found HOB  elevated with bed alarm, peripheral IV, telemetry, blood pressure cuff, oxygen upon OT entry to room.    General Precautions: Standard, fall, respiratory  Orthopedic Precautions: N/A  Braces: N/A  Respiratory Status:  VT 30L at 75%    Occupational Performance:    Bed Mobility:    Patient completed Supine to Sit with stand by assistance  Patient completed Sit to Supine with stand by assistance    Functional Mobility/Transfers:  Patient completed Sit <> Stand Transfer with stand by assistance  with  no assistive device   Functional Mobility: marching in place x 10 reps    Activities of Daily Living:  Lower Body Dressing: stand by assistance don socks    Cognitive/Visual Perceptual:  AO4    No signif deficits noted    Physical Exam:  BUEs WFL at 4/5 prox; 4+/5 distally    Sit balance: F  Stand balance: F to F+    Sensation: intact  Coordination: diminished Anurag  Endurance: pt able to perform/complete tasks w/o SOB, however O2 sats dropped    AMPAC 6 Click ADL:  AMPAC Total Score: 19    Treatment & Education:  Pt found in SF w/ fly in room & agreeable to OT eval/tx this date.  Pt on VT 30L at 75% & perf the following:  -sup-->EOB w/ SBA for donning socks via figure 4 tech at EOB w/ SBA  -standing w/o DME w/ SBA for marching in place x 10 reps  **pts O2 dropped to mid-80s, but pt w/o SOB  -perf PLBing for O2 recovery  -returned to supine w/ SBA  **O2 increased to 90% by tx termination  Edu/tx re: general safety techs, PLBing & HEP. Pt/fly verbalized understanding.      Patient left HOB elevated with all lines intact, call button in reach, nsg notified, and fly present    GOALS:   Multidisciplinary Problems       Occupational Therapy Goals          Problem: Occupational Therapy    Goal Priority Disciplines Outcome Interventions   Occupational Therapy Goal     OT, PT/OT Progressing    Description: Goals to be met by: 03/07     Patient will increase functional independence with ADLs by performing:    LE Dressing with  [place].  Grooming while standing at sink with Modified [place].  Toileting from toilet with Modified [place] for hygiene and clothing management.   Toilet transfer to toilet with Modified [place].  Increased functional strength to WFL for ADLs.                         DME Justifications:  No DME recommended requiring DME justifications    History:     Past Medical History:   Diagnosis Date    Acute decompensated heart failure 2/6/2025    History of heart surgery     Other cirrhosis of liver 11/12/2020    SVT (supraventricular tachycardia)     WPW syndrome          Past Surgical History:   Procedure Laterality Date    ANGIOGRAPHY OF AORTIC ARCH N/A 9/3/2020    Procedure: AORTOGRAM, AORTIC ARCH;  Surgeon: Stephania Crump MD;  Location: [facility] CATH LAB;  Service: Cardiology;  Laterality: N/A;    FONTAN PROCEDURE, EXTRACARDIAC      LEFT HEART CATHETERIZATION Left 9/3/2020    Procedure: Left heart cath;  Surgeon: Stephania Crump MD;  Location: [facility] CATH LAB;  Service: Cardiology;  Laterality: Left;    RIGHT HEART CATHETERIZATION FOR CONGENITAL HEART DEFECT N/A 9/3/2020    Procedure: CATHETERIZATION, HEART, RIGHT, FOR CONGENITAL HEART DEFECT;  Surgeon: Stephania Crump MD;  Location: [facility] CATH LAB;  Service: Cardiology;  Laterality: N/A;  [facility] - needs covid test morning of. Extenuating circumstances    TRANSESOPHAGEAL ECHOCARDIOGRAPHY N/A 11/10/2022    Procedure: ECHOCARDIOGRAM, TRANSESOPHAGEAL;  Surgeon: Emeterio Hall MD;  Location: [facility] EP LAB;  Service: Cardiology;  Laterality: N/A;    TREATMENT OF CARDIAC ARRHYTHMIA N/A 1/9/2021    Procedure: CARDIOVERSION;  Surgeon: Jim Mcginnis MD;  Location: [facility] CATH LAB;  Service: Cardiology;  Laterality: N/A;       Time Tracking:     OT Date of Treatment: 02/07/25  OT Start Time: 0930  OT Stop Time: 0946  OT Total Time (min): 16 min    Billable Minutes:Evaluation 8  Therapeutic Activity 8  Total Time 16    2/7/2025

## 2025-02-07 NOTE — NURSING
Pt removed vapotherm and refusing to put back on stating he did not need it; pt O2 sat currently 95-98% on room air; U family medicine team notified

## 2025-02-07 NOTE — SUBJECTIVE & OBJECTIVE
Past Medical History:   Diagnosis Date    Acute decompensated heart failure 2/6/2025    History of heart surgery     Other cirrhosis of liver 11/12/2020    SVT (supraventricular tachycardia)     WPW syndrome        Past Surgical History:   Procedure Laterality Date    ANGIOGRAPHY OF AORTIC ARCH N/A 9/3/2020    Procedure: AORTOGRAM, AORTIC ARCH;  Surgeon: Stephania Crump MD;  Location: [facility] CATH LAB;  Service: Cardiology;  Laterality: N/A;    FONTAN PROCEDURE, EXTRACARDIAC      LEFT HEART CATHETERIZATION Left 9/3/2020    Procedure: Left heart cath;  Surgeon: Stephania Crump MD;  Location: [facility] CATH LAB;  Service: Cardiology;  Laterality: Left;    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 Extenuating circumstances    TRANSESOPHAGEAL ECHOCARDIOGRAPHY N/A 11/10/2022    Procedure: ECHOCARDIOGRAM, TRANSESOPHAGEAL;  Surgeon: Emeterio Hall MD;  Location: [facility] EP LAB;  Service: Cardiology;  Laterality: N/A;    TREATMENT OF CARDIAC ARRHYTHMIA N/A 1/9/2021    Procedure: CARDIOVERSION;  Surgeon: Jim Mcginnis MD;  Location: [facility] CATH LAB;  Service: Cardiology;  Laterality: N/A;       Review of patient's allergies indicates:  No Known Allergies    No current facility-administered medications on file prior to encounter.     Current Outpatient Medications on File Prior to Encounter   Medication Sig    benazepriL (LOTENSIN) 10 MG tablet Take 1 tablet (10 mg total) by mouth once daily.    benazepriL (LOTENSIN) 10 MG tablet Take 1 tablet (10 mg total) by mouth once daily.    benzonatate (TESSALON) 200 MG capsule Take 200 mg by mouth 3 (three) times daily as needed for Cough.    famotidine (PEPCID) 20 MG tablet Take 1 tablet (20 mg total) by mouth 2 (two) times daily.     ondansetron (ZOFRAN) 4 MG tablet Take 1 tablet (4 mg total) by mouth every 6 (six) hours.    ondansetron (ZOFRAN-ODT) 4 MG TbDL Take 1 tablet (4 mg total) by mouth every 6 (six) hours as needed (nausea, vomiting).    XARELTO 20 mg Tab TAKE 1 TABLET BY MOUTH DAILY WITH DINNER OR EVENING MEAL     Family History       Problem Relation (Age of Onset)    Heart disease Maternal Grandfather    No Known Problems Mother, Father, Sister, Brother, Maternal Aunt, Maternal Uncle, Paternal Aunt, Paternal Uncle, Maternal Grandmother, Paternal Grandmother, Paternal Grandfather          Tobacco Use    Smoking status: Former     Current packs/day: 0.00     Types: Cigarettes     Quit date:      Years since quittin.1     Passive exposure: Past    Smokeless tobacco: Never    Tobacco comments:     3-4 months    Substance and Sexual Activity    Alcohol use: No    Drug use: Yes     Types: Marijuana     Comment: Mojo today- pt reports a while back    Sexual activity: Yes     Review of Systems   Cardiovascular:  Positive for dyspnea on exertion.   Respiratory:  Positive for shortness of breath.    Gastrointestinal:  Positive for bloating and abdominal pain.   Genitourinary: Negative.    Neurological:  Negative for dizziness and light-headedness.   Psychiatric/Behavioral: Negative.       Objective:     Vital Signs (Most Recent):  Temp: 97.9 °F (36.6 °C) (25 0712)  Pulse: 93 (25)  Resp: 19 (25)  BP: 128/85 (25)  SpO2: (!) 91 % (25) Vital Signs (24h Range):  Temp:  [96.8 °F (36 °C)-98.4 °F (36.9 °C)] 97.9 °F (36.6 °C)  Pulse:  [] 93  Resp:  [11-30] 19  SpO2:  [83 %-98 %] 91 %  BP: (111-154)/() 128/85     Weight: 59 kg (130 lb)  Body mass index is 20.98 kg/m².    SpO2: (!) 91 %         Intake/Output Summary (Last 24 hours) at 2025 1009  Last data filed at 2025 0939  Gross per 24 hour   Intake 1143.46 ml   Output 3075 ml   Net -1931.54 ml       Lines/Drains/Airways        Peripheral Intravenous Line  Duration                  Peripheral IV - Single Lumen 02/05/25 1100 20 G No Anterior;Right Forearm 1 day         Peripheral IV - Single Lumen 02/06/25 22 G Left Hand 1 day                     Physical Exam  Constitutional:       General: He is not in acute distress.     Appearance: He is ill-appearing. He is not diaphoretic.   HENT:      Head: Atraumatic.   Eyes:      General:         Right eye: No discharge.         Left eye: No discharge.   Cardiovascular:      Rate and Rhythm: Regular rhythm. Tachycardia present.      Heart sounds: Murmur heard.   Abdominal:      General: There is distension.   Skin:     General: Skin is warm and dry.   Neurological:      Mental Status: He is alert and oriented to person, place, and time.   Psychiatric:         Mood and Affect: Mood normal.         Thought Content: Thought content normal.          Significant Labs: BMP:   Recent Labs   Lab 02/05/25  1432 02/06/25  0607 02/07/25  0235   * 99 88    136 140   K 3.3* 4.2 3.4*    104 105   CO2 19* 22* 25   BUN 6 9 11   CREATININE 0.83 0.88 1.0   CALCIUM 9.1 8.6* 8.2*   MG  --   --  1.6   , CMP   Recent Labs   Lab 02/05/25  1432 02/06/25  0607 02/07/25  0235    136 140   K 3.3* 4.2 3.4*    104 105   CO2 19* 22* 25   * 99 88   BUN 6 9 11   CREATININE 0.83 0.88 1.0   CALCIUM 9.1 8.6* 8.2*   PROT 7.2 6.4 5.4*   ALBUMIN 4.1 3.7 3.3*   BILITOT 2.3* 2.5* 1.8*   ALKPHOS 51 40 37*   AST 32 27 15   ALT 26 25 16   ANIONGAP 10 10 10   , CBC   Recent Labs   Lab 02/05/25  1432 02/05/25  1438 02/06/25  0607 02/06/25  0608 02/07/25  0235   WBC 5.21  --  6.49  --  5.02   HGB 15.7  --  14.4  --  13.8*   HCT 46.8   < > 43.8   < > 41.6   *  --  114*  --  116*    < > = values in this interval not displayed.   , and INR   Recent Labs   Lab 02/05/25  1441   INR 1.1       Significant Imaging: Echocardiogram: Transthoracic echo (TTE) complete (Cupid Only):   Results for orders  placed or performed during the hospital encounter of 02/05/25   Echo   Result Value Ref Range    LVIDd 5.5 3.5 - 6.0 cm    LV Systolic Volume 132.62 mL    LV Systolic Volume Index 79.4 mL/m2    LVIDs 5.3 (A) 2.1 - 4.0 cm    LV Diastolic Volume 149.46 mL    LV Diastolic Volume Index 89.50 mL/m2    IVS 1.1 0.6 - 1.1 cm    FS 3.6 (A) 28 - 44 %    Left Ventricle Relative Wall Thickness 0.47 cm    PW 1.3 (A) 0.6 - 1.1 cm    LV mass 272.4 g    LV Mass Index 163.1 g/m2    AV mean gradient 2 mmHg    AV peak gradient 2 mmHg    Ao peak tod 0.7 m/s    Ao VTI 9.2 cm    Mr max tod 4.59 m/s    ZLVIDS 4.79     ZLVIDD 1.64     Simon's Biplane MOD Ejection Fraction 5 %    A2C EF 7 %    A4C EF 11 %    LV EDV A2C 131.061092523496606 mL    LV EDV A4C 163.30 mL    Left Ventricular End Systolic Volume by Teichholz Method 132.62 mL    Left Ventricular End Diastolic Volume by Teichholz Method 149.46 mL    AV regurgitation pressure 1/2 time 731 ms    AR Max Tod 4.31 m/s    BSA 1.66 m2    LV EDV index A4C 95.9 ml/m2    Narrative    LIMITED STUDY FOR EVALUATION OF SINGLE VENTRICULAR FUNCTION AND VALVULAR   DISEASE    CONGENITAL CARDIAC HISTORY:     Tricuspid atresia, pulmonary atresia  and WPW.      S/P [facility]ic [facility] pulmonary artery [facility]unt - [facility].       [facility]      [facility]      S/P EP study with trans-septal puncture November 2022      S/P Ablation of a left lateral accessory pathway November 2022 by Dr. Ferguson        SEGMENTAL CARDIAC CONNECTIONS (previously demonstrated):  Abdominal situs is solitus.  Atrial situs is normal.  Imaging consistent with tricuspid atresia.  Tricuspid atresia.  Mitral valve appears normal in structure.  LV dilation.  Ventriculoarterial concordance.  Ventricular loop: D-loop.  Cardiac position is levocardia.  Left aortic arch branching.        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  Severely enlarged   ventricle.    Pulmonary circulation not visualized  Mildly dilated inferior vena cava - connection to Fontan not visualized.    No apparent thrombus noted in the IVC  Right superior vena cava not visualized    No evidence of obstruction at Fontan on limited imaging.  Fibrinous   material noted, which may be degenerated surgical material or  congenital   material, less likely to be layered thrombus. Correlate clinically.     The Jesus anastomosis to the pulmonary arteries is not visualized on this   echo.    Pulmonary confluence and proximal pulmonary branches not demonstrated.  At least two pulmonary veins demonstrated returning to the left atrium   with no evidence for obstruction.    No right atrium, right ventricle, or tricuspid valve visualized  There is no evidence for pulmonary valve.    At least mild dilation of the left atrium.    Mildly dilated mitral valve with color Doppler demonstrating moderate   regurgitation.  Single ventricle consistent with left ventricular morphology.  There is at sever dilation of the left ventricle.  LVEDV index is 96 cc/m2    Left ventricular systolic function qualitatively severely compromised with   ejection fraction estimated 10-15 %.    There is a large aortic annulus with trileaflet aortic valve, no stenosis   and mild  insufficiency.  Aortic dimensions:  Sinuses of Valsalva  = 51 mm.  Ascending aorta       = not well demonstrated.    No obvious pericardial effusion.

## 2025-02-07 NOTE — CARE UPDATE
"Called to bedside regarding patient wanting to leave AMA. Mom and patient thought that he came here for his "breathing" but that seems better and he wants to leave.  Reviewed his heart status.  Reviewed the ECHO reports from 2022 regarding his worsening Ejection fraction. Explained in great detail the risk of death and worsening function if he were to leave.  Also called the transport center and no beds are available at this time.  Advised that he has resources available her in the ICU that can be life saving if he were to decompensate.  Mom stated that she understood but he is 29 and has to make the decision himself.  The grandmother was notified and told the patient to stay.  The mother stated that the sister was coming to visit/stay with him.  The patient stated that he would think about staying.    _____________________________  Mony Ridley MD, PGY-1  [facility]     "

## 2025-02-07 NOTE — PLAN OF CARE
Problem: Physical Therapy  Goal: Physical Therapy Goal  Description: Goals to be met by: 3/7/25     Patient will increase functional independence with mobility by performin. Sit to stand transfer with [place]  2. Bed to chair transfer with [place] using No Assistive Device  3. Gait  x 150 feet with [place] using No Assistive Device.     Outcome: Progressing     PT Eval completed, note to follow.     Pt performed bed mobility with supervision/Mod I. Pt ambulated short distances with CGA-SBA and no AD. Distance limited by increased line management and pt connected to Vapotherm. VSS. Anticipating low intensity therapy upon d/c. No anticipated DME needs. Pt would benefit from skilled acute PT services to improve functional mobility/independence and promote safe discharge home.

## 2025-02-07 NOTE — PLAN OF CARE
Patient on oxygen with documented flow.  Will attempt to wean per O2 order protocol.  Will continue to monitor.     [address] [place] [id number]

## 2025-02-07 NOTE — NURSING
Latest Reference Range & Units 02/06/25 20:04   Troponin I 0.000 - 0.026 ng/mL 0.093 (H)   (H): Data is abnormally high        12-lead EKG obtained and available in chart.

## 2025-02-07 NOTE — H&P
"[facility]  Progress ICU Note       Admit Date: 2/5/2025   LOS: 1 day     Chief Complaint/Reason for Admission:  29 y.o. male w/ PMHx of yo M who has been in the [facility] ED with a  history Atrial Fibrillation, WPW, liver cirrhosis, medication non compliance, Tricuspid and pulmonary atresia initially palliated with a systemic to pulmonary artery shunt followed by a bidirectional Jesus and subsequent lateral tunnel Fontan procedure (last EF with Left ventricular systolic function qualitatively severely compromised with ejection fraction estimated 30 -35% on 10/26/23) who presented today with SOB and upper abdominal pain and then transferred to [facility] ER on 2/5/2025  for a higher level of care. Of note pt has a long history of non compliance and admits to not taking any of his mediations because sometimes they make him feel "weird" and other times he just doesn't want to take the medications.  Upon arrival he was hypoxic sating 87% on 100% NRB and later transitioned to Vapotherm on 30 Liters on 80% FiO2.   Pt reports that a week ago he started having  SOB, coughing, vomiting 2-3 times per day, generalized abdominal pain,diarrhea and night seats.  No citing event that he can recall.  He denies any chest pain, palpitations myalgia, sore throat, fever,chills, dysuria ,blood in the urine or stools or sick contacts.     In the ER, initial vital signs were as follows: Temp 97.4, /111, , SpO2 90% on NRB. Labs as follows: WBC 6.49, Hgb 14, Elevated TB  2.5, BUN/Cr 9/0.88 (baseline Cr 0.8). UA +1 protein and trace blood, Elevated tropoinin 0.100-->0.101, BNP 4400, Lactic Acid 1.5,  3/5/25 Blood Cx NGTD, Lipase 79    CXR No acute abnormality.   .    Abd US shows: Limited right upper quadrant ultrasound performed.  The liver measures 17 cm in length and is homogeneous in echogenicity.  Trace fluid seen along the falciform ligament.  Common bile duct is within normal limits " at 2.2 mm..  Mild gallbladder wall thickening.  Trace pericholecystic fluid.  Negative sonographic Disla sign.  Pancreas is obscured by bowel gas.  IVC within normal limits.  The right kidney is normal in length measuring 10.4 cm. No right sided hydronephrosis or renal masses identified. Impression:No ductal dilatation.    CT Chest: Cholelithiasis with possible wall thickening.  Recommend follow-up right upper quadrant ultrasound.  Suspect flash fill hemangioma measuring 11 mm in the right hepatic lobe.  Cardiomegaly with aneurysmal dilatation of the aortic root measuring 5.2 cm.  Correlate with congenital heart malformation.    EKG  with LVH Abnormaility, Qtc 488.     In the ER, patient was given Lasix 20 mg x 3 doses, 1 liter NS bolus, Heparin gtt initiated, Zofran 4 mg x 1  and oxycodone IR 10 mg x 1 dose .     Patient was admitted to [facility] for Hypoxic Respiratory Failure due to  Decompensated Heart failure.     Overnight Interval: No longer having abdominal pain. VSS and NAD.      Infusions:     heparin (porcine) in D5W  0-40 Units/kg/hr Intravenous Continuous 8.3 mL/hr at 02/07/25 0324 14 Units/kg/hr at 02/07/25 0324       PMHx  Past Medical History:   Diagnosis Date    Acute decompensated heart failure 2/6/2025    History of heart surgery     Other cirrhosis of liver 11/12/2020    SVT (supraventricular tachycardia)     WPW syndrome         PSHx  Past Surgical History:   Procedure Laterality Date    ANGIOGRAPHY OF AORTIC ARCH N/A 9/3/2020    Procedure: AORTOGRAM, AORTIC ARCH;  Surgeon: Stephania Crump MD;  Location: [facility] CATH LAB;  Service: Cardiology;  Laterality: N/A;    FONTAN PROCEDURE, EXTRACARDIAC      LEFT HEART CATHETERIZATION Left 9/3/2020    Procedure: Left heart cath;  Surgeon: Stephania Crump MD;  Location: [facility] CATH LAB;  Service: Cardiology;  Laterality: Left;    RIGHT HEART CATHETERIZATION FOR CONGENITAL HEART DEFECT N/A 9/3/2020    Procedure: CATHETERIZATION,  HEART, RIGHT, FOR CONGENITAL HEART DEFECT;  Surgeon: Stephania Crump MD;  Location: [facility] CATH LAB;  Service: Cardiology;  Laterality: N/A;  [facility] - needs covid test morning of. Extenuating circumstances    TRANSESOPHAGEAL ECHOCARDIOGRAPHY N/A 11/10/2022    Procedure: ECHOCARDIOGRAM, TRANSESOPHAGEAL;  Surgeon: Emeterio Hall MD;  Location: [facility] EP LAB;  Service: Cardiology;  Laterality: N/A;    TREATMENT OF CARDIAC ARRHYTHMIA N/A 2021    Procedure: CARDIOVERSION;  Surgeon: Jim Mcginnis MD;  Location: [facility] CATH LAB;  Service: Cardiology;  Laterality: N/A;       Family History  Family History   Problem Relation Name Age of Onset    No Known Problems Mother      No Known Problems Father      No Known Problems Sister 3     No Known Problems Brother      No Known Problems Maternal Aunt      No Known Problems Maternal Uncle      No Known Problems Paternal Aunt      No Known Problems Paternal Uncle      No Known Problems Maternal Grandmother      Heart disease Maternal Grandfather      No Known Problems Paternal Grandmother      No Known Problems Paternal Grandfather      Anemia Neg Hx      Arrhythmia Neg Hx      Asthma Neg Hx      Clotting disorder Neg Hx      Fainting Neg Hx      Heart attack Neg Hx      Heart failure Neg Hx      Hyperlipidemia Neg Hx      Hypertension Neg Hx      Stroke Neg Hx      Atrial Septal Defect Neg Hx         Social  Social History     Socioeconomic History    Marital status: Single   Tobacco Use    Smoking status: Former     Current packs/day: 0.00     Types: Cigarettes     Quit date:      Years since quittin.     Passive exposure: Past    Smokeless tobacco: Never    Tobacco comments:     3-4 months    Substance and Sexual Activity    Alcohol use: No    Drug use: Yes     Types: Marijuana     Comment: Mojo today- pt reports a while back    Sexual activity: Yes     Social Drivers of Health     Financial Resource Strain: Low Risk  (2025)    Overall Financial  Resource Strain (CARDIA)     Difficulty of Paying Living Expenses: Not hard at all   Food Insecurity: No Food Insecurity (2/6/2025)    Hunger Vital Sign     Worried About Running Out of Food in the Last Year: Never true     Ran Out of Food in the Last Year: Never true   Transportation Needs: No Transportation Needs (2/6/2025)    TRANSPORTATION NEEDS     Transportation : No   Stress: No Stress Concern Present (2/6/2025)    [employer] - Occupational Stress Questionnaire     Feeling of Stress : Not at all   Housing Stability: Low Risk  (2/6/2025)    Housing Stability Vital Sign     Unable to Pay for Housing in the Last Year: No     Homeless in the Last Year: No       Allergies  Review of patient's allergies indicates:  No Known Allergies    ROS- As per HPI, otherwise negative      Objective  Vitals:    02/07/25 0200 02/07/25 0300 02/07/25 0322 02/07/25 0330   BP: 116/69 111/64  115/69   Pulse: 83 84 89 90   Resp: 17 18 15 16   Temp:    96.8 °F (36 °C)   TempSrc:    Axillary   SpO2: (!) 94% (!) 93% (!) 93% (!) 92%   Weight:       Height:         Vitals(Most Recent)      Temp: 96.8 °F (36 °C) (02/07/25 0330)  Pulse: 90 (02/07/25 0330)  Resp: 16 (02/07/25 0330)  BP: 115/69 (02/07/25 0330)  SpO2: (!) 92 % (02/07/25 0330)           Vitals Range  Temp:  [96.8 °F (36 °C)-98.4 °F (36.9 °C)]   Pulse:  []   Resp:  [12-33]   BP: (111-157)/()   SpO2:  [83 %-98 %]     Physical Exam  Physical Exam  Constitutional:       Appearance: Normal appearance.   HENT:      Head: Normocephalic and atraumatic.      Nose: Nose normal.      Mouth/Throat:      Mouth: Mucous membranes are moist.   Eyes:      Pupils: Pupils are equal, round, and reactive to light.   Cardiovascular:      Rate and Rhythm: Tachycardia present.      Pulses: Normal pulses.      Heart sounds: Murmur heard.      No friction rub. No gallop.   Pulmonary:      Effort: Pulmonary effort is normal. No respiratory distress.      Breath  sounds: Normal breath sounds. No wheezing, rhonchi or rales.      Comments: Vapotherm 30 Liters with 80% Fio2  Chest:      Chest wall: No tenderness.   Abdominal:      General: Bowel sounds are normal. There is no distension.      Palpations: Abdomen is soft.      Tenderness: There is no abdominal tenderness. There is no right CVA tenderness, left CVA tenderness, guarding or rebound.   Musculoskeletal:         General: Normal range of motion.      Right lower leg: No edema.      Left lower leg: No edema.   Skin:     General: Skin is warm and dry.      Capillary Refill: Capillary refill takes less than 2 seconds.      Findings: No rash.      Comments: Healed Vertical scar noted to abdomen    Neurological:      General: No focal deficit present.      Mental Status: He is alert.   Psychiatric:         Mood and Affect: Mood normal.         Labs    Lab Results   Component Value Date    INR 1.1 02/05/2025    INR 1.1 03/09/2024    INR 1.0 11/10/2022    APTT 37.0 (H) 02/07/2025    APTT 44.2 (H) 02/06/2025    APTT 50.0 (H) 02/06/2025     Recent Labs     02/07/25  0235   TROPONINI 0.109*       Imaging  Imaging Results              US Abdomen Limited (Gallbladder) (Final result)  Result time 02/06/25 09:17:27      Final result by Virgilio Easton MD (02/06/25 09:17:27)                   Impression:      No ductal dilatation.      Electronically signed by: Virgilio Easton MD  Date:    02/06/2025  Time:    09:17               Narrative:    EXAMINATION:  US ABDOMEN LIMITED    CLINICAL HISTORY:  Unspecified jaundice    COMPARISON:  None    FINDINGS:  Limited right upper quadrant ultrasound performed.  The liver measures 17 cm in length and is homogeneous in echogenicity.  Trace fluid seen along the falciform ligament.  Common bile duct is within normal limits at 2.2 mm..  Mild gallbladder wall thickening.  Trace pericholecystic fluid.  Negative sonographic Disla sign.  Pancreas is obscured by bowel gas.  IVC within normal limits.   The right kidney is normal in length measuring 10.4 cm. No right sided hydronephrosis or renal masses identified.                                       CT Chest Abdomen Pelvis With IV Contrast (XPD) NO Oral Contrast (Final result)  Result time 02/06/25 07:42:51      Final result by Stan Maddox Jr., MD (02/06/25 07:42:51)                   Impression:      1.  Cholelithiasis with possible wall thickening.  Recommend follow-up right upper quadrant ultrasound  2.  Suspect flash fill hemangioma measuring 11 mm in the right hepatic lobe  3.  Cardiomegaly with aneurysmal dilatation of the aortic root measuring 5.2 cm.  Correlate with congenital heart malformation.    All CT scans at [this location] are performed using dose modulation techniques as appropriate to a performed exam including the following:  Automated exposure control; adjustment of the mA and/or kV according to patient size (this includes techniques or standardized protocols for targeted exams where dose is matched to indication / reason for exam; i.e. extremities or head); use of iterative reconstruction technique.    Finalized on: 2/6/2025 7:42 AM By:  Stan Maddox MD  [facility]# [id number]      2025-02-06 07:44:[id number]     [facility]               Narrative:    EXAM: CT CHEST ABDOMEN PELVIS WITH IV CONTRAST (XPD)    CLINICAL HISTORY: Sepsis    COMPARISON: 11/08/2023.  Report.    TECHNIQUE: CT scan was obtained with contrast of the chest, abdomen and pelvis.  Coronal and sagittal reconstructions were performed on these images.    FINDINGS: Chest: The heart is enlarged.  Congenital malformation of the heart with postsurgical change.  The lungs are clear.  No effusion or pneumothorax.  No adenopathy.  No pericardial effusion.  Noncalcified coronary arteries.  Aneurysmal dilatation of the aortic root measuring 5.2 cm.    No adenopathy.  Osseous structures are intact.    Abdomen/pelvis: Focal area of enhancement in the inferior right hepatic lobe measuring 11 mm  likely flash fill hemangioma.  No other focal abnormality within the liver.  The spleen, kidneys, adrenal glands, and pancreas are within normal limits.  Thick-walled gallbladder with suggestion of a mildly radiopaque stone.  No evidence of ductal dilatation.    Urinary bladder is unremarkable.  Nonobstructive bowel gas pattern.  Appendix is normal.  Small fat-containing umbilical hernia.    Osseous structures intact.                                         X-Ray Chest AP Portable (Final result)  Result time 02/05/25 14:39:19      Final result by Grupo Nick MD (02/05/25 14:39:19)                   Impression:      No acute abnormality.      Electronically signed by: Grupo Nick  Date:    02/05/2025  Time:    14:39               Narrative:    EXAMINATION:  XR CHEST AP PORTABLE    CLINICAL HISTORY:  Sepsis;    TECHNIQUE:  Single frontal view of the chest was performed.    COMPARISON:  Multiple    FINDINGS:  The lungs are clear, with normal appearance of pulmonary vasculature and no pleural effusion or pneumothorax.    The cardiac silhouette is normal in size. The hilar and mediastinal contours are unremarkable.    Bones are intact.                                        Assessment/Plan:  Juani Reilly JrMark is a 29 y.o. male patient w/ PMHx of Atrial Fibrillation, WPW, liver cirrhosis, medication non compliance, Tricuspid and pulmonary atresia initially palliated with a systemic to pulmonary artery shunt followed by a bidirectional Jesus and subsequent lateral tunnel Fontan procedure (last EF with Left ventricular systolic function qualitatively severely compromised with ejection fraction estimated 30-35% on 10/26/23) who presented today with SOB and upper abdominal pain and admitted for Hypoxic Respiratory Failure due to  Decompensated Heart failure.         Neuro/Psych  No known issues at this time.  Continue to monitor    CV  Acute Decompensated HF   Hx Paroxymal Atrial Fibrillation (not on for    Hx WPW  Evidenced by history, Elevated BNP 4400 and SOB     Chest X-ray: No acute abnormality.  Persistenly Elevated troponin likely demand (0.93-->0.109)    Plan:  Diuresis w/ IV Lasix 40 mg every 12 hours and monitor UOP (UOP 2.8L)  Daily Weights  Strict I/O  Fluid restriction to 1,500cc daily  Low-sodium cardiac diet   Reassessing  volume status regularly  Oxygen PRN to keep O2 > 88%  Continue to Wean Vapotherm as tolerated (on 30 L at 75% FiO2)  Obtain 2D echo: EF 10-15% severely compromised. Imaging consistent with diagnosis of tricuspid atresia S/P lateral tunnel Fontan - study remarkable for severely decreased systolic function of the single ventricle, worsened compared to prior.  Severely enlarged ventricle.     Trend/EKG every 6 hours    Consulted Pediatric Cardiologist Dr. Dorian Santoyo (known to him)  Transfer to [facility] to CCU once bed becomes available      Pulm  #Hypoxic Respiratory Failure d/t decompensated HF    Oxygen Concentration (%):  [] 80      CXR:No acute abnormality.    ABG: WNL  Recent Labs   Lab 02/05/25  1438 02/06/25  0608   PH 7.473* 7.354   PCO2 28.2* 44.3   PO2 40 40   HCO3 20.6* 24.6   POCSATURATED 79 72   BE -3* -1     Plan:  Oxygen PRN to keep O2 > 88%  Wean Vapotherm 30 L with FiO2 30%    FEN/GI  Diet: Cardiac, 2 gm low sodium  Fluid restriction 1500 cc/day  Keep  K> 4 (3.4), Phos >3 (4.3), Mg> 2 (1.6) and repleted accordingly  Zofran prn for n/v   Continue home Pepcid 20 mg daily    Renal  BUN/Cr: 11/1.0, baseline 0.8  UOP: 2.8 L , In: 316 ml, net -2.5 L in last 24 hrs  I/O  I/O last 3 completed shifts:  In: 369.2 [P.O.:200; I.V.:169.2]  Out: 1850 [Urine:1850]    Intake/Output Summary (Last 24 hours) at 2/7/2025 0533  Last data filed at 2/7/2025 0332  Gross per 24 hour   Intake 316.92 ml   Output 2875 ml   Net -2558.08 ml     Plan:  Strict I&Os  Avoid renal toxic meds  Continue to monitor renal status and urine output    Heme  H/H 13/41; stable  WBC 5.02  DVT  "prophylaxis: Heparin (gtt)    Endo  No known issues at this time.  Maintain glucose 140-180    HgbA1C:   Lab Results   Component Value Date    HGBA1C 4.9 11/09/2023   , Last Gluc: No results for input(s): "POCTGLUCOSE" in the last 168 hours.    Plan:  TSH WNL    ID  No known issues at this time.   (2/5/25) Blood Cxs are NGTD.   No ABX required  Urinalysis: +1 protein and trace hematuria    Micro  Microbiology Results (last 7 days)       Procedure Component Value Units Date/Time    Influenza A & B by Molecular [[id number]] Collected: 02/06/25 1550    Order Status: Completed Specimen: Nasopharyngeal Swab Updated: 02/06/25 1632     Influenza A, Molecular Negative     Influenza B, Molecular Negative     Flu A & B Source Nasal swab    Blood culture x two cultures. Draw prior to antibiotics. [[id number]] Collected: 02/05/25 1437    Order Status: Completed Specimen: Blood from Peripheral, Forearm, Right Updated: 02/06/25 0715     Blood Culture, Routine No Growth to date    Narrative:      Aerobic and anaerobic    Blood culture x two cultures. Draw prior to antibiotics. [[id number]] Collected: 02/05/25 1415    Order Status: Completed Specimen: Blood from Peripheral, Hand, Left Updated: 02/06/25 0715     Blood Culture, Routine No Growth to date    Narrative:      Aerobic and anaerobic               DVT PPx: Heparin gtt  Diet: Cardia, low sodium  GI PPX:  Pepcid 20 mg daily  Deconditioning: PT  Code Status: Full    ICU Checklist  Feeding:Cardiac, low sodium   Analgesia:none  Sedation:none  Thromboprophylaxis:Heparin gtt  Head up position: 30deg  Ulcer prophylaxis:Pepcid 20 mg daily  Glycemic control:Goal 140-180  Spontaneous Breathing Trial: n/a  Bowel care:n/a  Indwelling catheter removal:n/a  De-escalation of antibiotics:n/a      Dispo: ICU pending transfer to [facility] for higher level of care      2/7/2025 Mony Mauricio M.D., \[facility]\""[facility]  [facility]  [facility]    "

## 2025-02-07 NOTE — PT/OT/SLP EVAL
Physical Therapy Evaluation    Patient Name:  Juani Reilly Jr.   MRN:  [id number]    Recommendations:     Discharge Recommendations: Low Intensity Therapy   Discharge Equipment Recommendations: shower chair   Barriers to discharge: None    Assessment:     Juani Reilly Jr. is a 29 y.o. male admitted with a medical diagnosis of Acute decompensated heart failure.  He presents with the following impairments/functional limitations: impaired functional mobility, gait instability, impaired endurance, impaired balance, impaired cardiopulmonary response to activity .Pt performed bed mobility with supervision/Mod I. Pt ambulated short distances with CGA-SBA and no AD. Distance limited by increased line management and pt connected to Vapotherm. VSS. Anticipating low intensity therapy upon d/c. No anticipated DME needs. Pt would benefit from skilled acute PT services to improve functional mobility/independence and promote safe discharge home.     Rehab Prognosis: Good; patient would benefit from acute skilled PT services to address these deficits and reach maximum level of function.    Recent Surgery: * No surgery found *      Plan:     During this hospitalization, patient to be seen 3 x/week to address the identified rehab impairments via therapeutic activities, therapeutic exercises, gait training, neuromuscular re-education and progress toward the following goals:    Plan of Care Expires:  03/07/25    Subjective     Chief Complaint: SOB with exertion  Patient/Family Comments/goals: return home to [facility]  Pain/Comfort:  Pain Rating 1: 0/10  Pain Rating Post-Intervention 1: 0/10    Patients cultural, spiritual, Yazidi conflicts given the current situation: no    Living Environment:  Pt lives with grandmother in a [facility], 0 ABIODUN; WIS and T/S with GB  Prior to admission, patients level of function was Independent, driving, and working at [employer] until symptoms began.  Equipment used at home: grab bar.  DME  owned (not currently used): none.  Upon discharge, patient will have assistance from family.    Objective:     Communicated with nsg prior to session.  Patient found HOB elevated with bed alarm, peripheral IV, oxygen, blood pressure cuff, telemetry, pulse ox (continuous) (ICU monitoring)  upon PT entry to room.    General Precautions: Standard, fall, respiratory  Orthopedic Precautions:N/A   Braces: N/A  Respiratory Status:  Vapotherm 10 L at 30%    Exams:  Cognitive Exam:  Patient is oriented to Person, Place, Time, and Situation  Postural Exam:  Patient presented with the following abnormalities:    -       Rounded shoulders  Sensation:    -       Intact  Skin Integrity/Edema:      -       Skin integrity: Visible skin intact  -       Edema: None noted BLE  RLE ROM: WFL  RLE Strength: WFL  LLE ROM: WFL  LLE Strength: WFL    Functional Mobility:  Bed Mobility:     Supine to Sit: modified independence and supervision  Sit to Supine: modified independence and supervision  Transfers:     Sit to Stand:  contact guard assistance with no AD  Bed to Chair: contact guard assistance with  no AD  using  Step Transfer  Gait: Pt ambulated ~6 ft x 3 with no AD and with CGA; mild unsteadiness but no LOB      AM-PAC 6 CLICK MOBILITY  Total Score:20       Treatment & Education:  Pt educated on role of PT/POC and pt agreeable to participate in therapy session  Pt performed mobility as above and transferred to chair  Short distance ambulated in room X 3 trials due to increased line management and vapotherm   VSS  Pt educated on UE/LE therex / HEP while up in chair to maintain strength    Patient left up in chair with all lines intact, call button in reach, nsg notified, and nsg present.    GOALS:   Multidisciplinary Problems       Physical Therapy Goals          Problem: Physical Therapy    Goal Priority Disciplines Outcome Interventions   Physical Therapy Goal     PT, PT/OT Progressing    Description: Goals to be met by: 3/7/25      Patient will increase functional independence with mobility by performin. Sit to stand transfer with Ware  2. Bed to chair transfer with Ware using No Assistive Device  3. Gait  x 150 feet with Ware using No Assistive Device.                          History:     Past Medical History:   Diagnosis Date    Acute decompensated heart failure 2025    History of heart surgery     Other cirrhosis of liver 2020    SVT (supraventricular tachycardia)     WPW syndrome        Past Surgical History:   Procedure Laterality Date    ANGIOGRAPHY OF AORTIC ARCH N/A 9/3/2020    Procedure: AORTOGRAM, AORTIC ARCH;  Surgeon: Stephania Crump MD;  Location: [facility] CATH LAB;  Service: Cardiology;  Laterality: N/A;    FONTAN PROCEDURE, EXTRACARDIAC      LEFT HEART CATHETERIZATION Left 9/3/2020    Procedure: Left heart cath;  Surgeon: Stephania Crump MD;  Location: [facility] CATH LAB;  Service: Cardiology;  Laterality: Left;    RIGHT HEART CATHETERIZATION FOR CONGENITAL HEART DEFECT N/A 9/3/2020    Procedure: CATHETERIZATION, HEART, RIGHT, FOR CONGENITAL HEART DEFECT;  Surgeon: Stephania Crump MD;  Location: [facility] CATH LAB;  Service: Cardiology;  Laterality: N/A;  [facility] - needs covid test morning of. Extenuating circumstances    TRANSESOPHAGEAL ECHOCARDIOGRAPHY N/A 11/10/2022    Procedure: ECHOCARDIOGRAM, TRANSESOPHAGEAL;  Surgeon: Emeterio Hall MD;  Location: [facility] EP LAB;  Service: Cardiology;  Laterality: N/A;    TREATMENT OF CARDIAC ARRHYTHMIA N/A 2021    Procedure: CARDIOVERSION;  Surgeon: Jim Mcginnis MD;  Location: [facility] CATH LAB;  Service: Cardiology;  Laterality: N/A;       Time Tracking:     PT Received On: 25  PT Start Time: 1204     PT Stop Time: 1220  PT Total Time (min): 16 min     Billable Minutes: Evaluation 8 and Therapeutic Activity 8      2025

## 2025-02-07 NOTE — PROGRESS NOTES
"Progress Note  [facility] Pulmonary & Critical Care Medicine    Attending: Dr. Giang   Admit Date: 2/5/2025  Today's Date: 02/07/2025    SUBJECTIVE:   No acute overnight events. Abdominal pain has resolved. No SOB on 75% O2 30L flow. Weaned down to 10 L and patient continued to saturate >89%. Diuresing per cardiology. Awaiting bed at main campus for higher level care.    Vital Signs Trends/Hx Reviewed  Vitals:    02/07/25 0700 02/07/25 0712 02/07/25 0730 02/07/25 0750   BP: 134/82  128/81    Pulse: 90  83 97   Resp: 18  18 17   Temp:  97.9 °F (36.6 °C)     TempSrc:  Oral     SpO2: (!) 93%  (!) 94% (!) 92%   Weight:       Height:           Oxygen Concentration (%):  [] 75    Physical Exam  Vitals reviewed.   Constitutional:       General: He is not in acute distress.     Appearance: Normal appearance.   Cardiovascular:      Rate and Rhythm: Normal rate and regular rhythm.   Pulmonary:      Effort: Pulmonary effort is normal. No respiratory distress.      Breath sounds: Normal breath sounds. No wheezing.   Abdominal:      General: Abdomen is flat. There is no distension.      Palpations: Abdomen is soft. There is no mass.      Tenderness: There is no abdominal tenderness.   Musculoskeletal:         General: Normal range of motion.      Right lower leg: No edema.      Left lower leg: No edema.   Skin:     General: Skin is warm and dry.   Neurological:      General: No focal deficit present.      Mental Status: He is alert and oriented to person, place, and time.     Laboratory:  Recent Labs   Lab 02/07/25  0235   WBC 5.02   RBC 4.57*   HGB 13.8*   HCT 41.6   *   MCV 91   MCH 30.2   MCHC 33.2     Recent Labs   Lab 02/07/25  0235      K 3.4*      CO2 25   BUN 11   CREATININE 1.0   CALCIUM 8.2*   MG 1.6     No results for input(s): "PH", "PCO2", "PO2", "HCO3", "POCSATURATED", "BE" in the last 24 hours.      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 Single ventricle with decreased systolic function. Pro-BNP 4400. Troponin plateau at 0.101. EKG with sinus tachycardia. TTE with severely decreased systolic function of the single ventricle worsened from prior  -- Vitals stable at this time  -- Not a transplant candidate due to his poor medication compliance  -- Should be on SBE prophylaxis and AC at home; but patient has been non-adherent  --Continue heparin gtt out of concern for shunt thrombosis  -- Pending transfer to [facility] for pediatric cardiology evaluation  -- cardiology consulted; recommend continued ICU monitoring until transfer and continued diuresis     Pulm  #Acute Hypoxic Respiratory failure  Documented SpO2 81% in ED, placed on non rebreather. Pt has not had dyspnea or tachypnea in our ICU.  Low concern for infectious etiology  --- Doing well on HFNC, at baseline patient likely has O2 in low 90s due to cardiac shunt.  -- If pt decompensates will need BIPAP therapy  -- CXR without acute abnormality.  -- OK to have lower saturations above 85%    FEN/G  - tolerating cardiac diet  - continue famotidine for GI prophylaxis        RENAL  - Cr stable at 1.0; no acute concerns at this time  - continue diuresis for ADHF  - 3.175 UOP in the last 24 hours, will CTM      Heme  H/H 13.8/41.6; stable  WBC 5.02  DVT prophylaxis: Heparin (gtt)    Endo  No known issues at this time.    ID  No known issues at this time.    Feeding: Cardiac diet  Analgesia: na  Sedation: na  Thrombo PPX: heparin gtt  Head of Bed: > 30 degrees  Ulcer PPX: famotidine  Glucose: goal 140-180s  SBT/SAT: na  Bowel Regimen: na  Indwelling Lines: R and L PIV   Abx: na     Code Status: Full    Erika Blackwell DO  U Medicine PGY1    Patient discussed with Dr. Giang. Attending attestation to  follow.    Pt seen and examined with Pulmonary/Critical Care team and this note was reviewed and validated with the following additional comments: Pt's hypoxemia is likely R-L shunt through his Fontan fenestration.  It seems to be positional.  His hypoxemia is not improved by raising his FiO2, nor is is worsened by lowering his FiO2.  I would leave his FiO2 at 40% and his flow at 15 LPM pending transfer.    Critical Care time was spent validating the history and physical exam, reviewing the lab and imaging results, and discussing the care of the patient with the bedside nurse and the patient and/or surrogates. This critical care time did not overlap with that of any other provider or involve time for any procedures.  This patient has a high probability of sudden clinically significant deterioration which requires the highest level of physician preparedness to intervene urgently. I managed/supervised life or organ supporting interventions that required frequent physician assessments. I devoted my full attention in the ICU to the direct care of this patient for this period of time. Organ systems which are failing and require intensive, critical care support are: cardiac, pulmonary, GI  Critical Care time: 30 minutes    Bruce Giang MD  Phone [phone]

## 2025-02-08 ENCOUNTER — HOSPITAL ENCOUNTER (INPATIENT)
Facility: HOSPITAL | Age: 30
LOS: 1 days | Discharge: HOME | DRG: 292 | End: 2025-02-09
Attending: STUDENT IN AN ORGANIZED HEALTH CARE EDUCATION/TRAINING PROGRAM | Admitting: INTERNAL MEDICINE
Payer: MEDICAID

## 2025-02-08 VITALS
TEMPERATURE: 98 F | HEART RATE: 86 BPM | SYSTOLIC BLOOD PRESSURE: 101 MMHG | HEIGHT: 66 IN | BODY MASS INDEX: 20.89 KG/M2 | OXYGEN SATURATION: 100 % | WEIGHT: 130 LBS | DIASTOLIC BLOOD PRESSURE: 61 MMHG | RESPIRATION RATE: 16 BRPM

## 2025-02-08 DIAGNOSIS — R09.02 HYPOXIA: ICD-10-CM

## 2025-02-08 DIAGNOSIS — I50.9 DECOMPENSATED HEART FAILURE: ICD-10-CM

## 2025-02-08 PROBLEM — I50.23 ACUTE ON CHRONIC SYSTOLIC CONGESTIVE HEART FAILURE: Status: ACTIVE | Noted: 2025-02-08

## 2025-02-08 PROBLEM — I48.92 ATRIAL FLUTTER: Status: ACTIVE | Noted: 2025-02-08

## 2025-02-08 LAB
ALBUMIN SERPL BCP-MCNC: 3.4 G/DL (ref 3.5–5.2)
ALP SERPL-CCNC: 40 U/L (ref 40–150)
ALT SERPL W/O P-5'-P-CCNC: 20 U/L (ref 10–44)
ANION GAP SERPL CALC-SCNC: 10 MMOL/L (ref 8–16)
APTT PPP: 40.5 SEC (ref 21–32)
APTT PPP: 46.8 SEC (ref 21–32)
AST SERPL-CCNC: 16 U/L (ref 10–40)
BASOPHILS # BLD AUTO: 0.02 K/UL (ref 0–0.2)
BASOPHILS NFR BLD: 0.3 % (ref 0–1.9)
BILIRUB SERPL-MCNC: 1.7 MG/DL (ref 0.1–1)
BNP SERPL-MCNC: 379 PG/ML (ref 0–99)
BUN SERPL-MCNC: 11 MG/DL (ref 6–20)
CALCIUM SERPL-MCNC: 8.2 MG/DL (ref 8.7–10.5)
CHLORIDE SERPL-SCNC: 105 MMOL/L (ref 95–110)
CO2 SERPL-SCNC: 25 MMOL/L (ref 23–29)
CREAT SERPL-MCNC: 0.8 MG/DL (ref 0.5–1.4)
DIFFERENTIAL METHOD BLD: ABNORMAL
EOSINOPHIL # BLD AUTO: 0 K/UL (ref 0–0.5)
EOSINOPHIL NFR BLD: 0.2 % (ref 0–8)
ERYTHROCYTE [DISTWIDTH] IN BLOOD BY AUTOMATED COUNT: 13.3 % (ref 11.5–14.5)
EST. GFR  (NO RACE VARIABLE): >60 ML/MIN/1.73 M^2
GLUCOSE SERPL-MCNC: 94 MG/DL (ref 70–110)
HCT VFR BLD AUTO: 45.8 % (ref 40–54)
HGB BLD-MCNC: 15.6 G/DL (ref 14–18)
IMM GRANULOCYTES # BLD AUTO: 0.01 K/UL (ref 0–0.04)
IMM GRANULOCYTES NFR BLD AUTO: 0.2 % (ref 0–0.5)
LYMPHOCYTES # BLD AUTO: 1.8 K/UL (ref 1–4.8)
LYMPHOCYTES NFR BLD: 30 % (ref 18–48)
MAGNESIUM SERPL-MCNC: 1.8 MG/DL (ref 1.6–2.6)
MAGNESIUM SERPL-MCNC: 2.9 MG/DL (ref 1.6–2.6)
MCH RBC QN AUTO: 30.4 PG (ref 27–31)
MCHC RBC AUTO-ENTMCNC: 34.1 G/DL (ref 32–36)
MCV RBC AUTO: 89 FL (ref 82–98)
MONOCYTES # BLD AUTO: 0.5 K/UL (ref 0.3–1)
MONOCYTES NFR BLD: 8.9 % (ref 4–15)
NEUTROPHILS # BLD AUTO: 3.7 K/UL (ref 1.8–7.7)
NEUTROPHILS NFR BLD: 60.4 % (ref 38–73)
NRBC BLD-RTO: 0 /100 WBC
PHOSPHATE SERPL-MCNC: 3.3 MG/DL (ref 2.7–4.5)
PLATELET # BLD AUTO: 140 K/UL (ref 150–450)
PMV BLD AUTO: 11.4 FL (ref 9.2–12.9)
POTASSIUM SERPL-SCNC: 3.1 MMOL/L (ref 3.5–5.1)
POTASSIUM SERPL-SCNC: 4.3 MMOL/L (ref 3.5–5.1)
PROT SERPL-MCNC: 6.1 G/DL (ref 6–8.4)
RBC # BLD AUTO: 5.14 M/UL (ref 4.6–6.2)
SODIUM SERPL-SCNC: 140 MMOL/L (ref 136–145)
WBC # BLD AUTO: 6.07 K/UL (ref 3.9–12.7)

## 2025-02-08 PROCEDURE — 80053 COMPREHEN METABOLIC PANEL: CPT | Performed by: NURSE PRACTITIONER

## 2025-02-08 PROCEDURE — 97165 OT EVAL LOW COMPLEX 30 MIN: CPT

## 2025-02-08 PROCEDURE — 25000003 PHARM REV CODE 250: Performed by: STUDENT IN AN ORGANIZED HEALTH CARE EDUCATION/TRAINING PROGRAM

## 2025-02-08 PROCEDURE — 99223 1ST HOSP IP/OBS HIGH 75: CPT | Mod: ,,, | Performed by: INTERNAL MEDICINE

## 2025-02-08 PROCEDURE — 97530 THERAPEUTIC ACTIVITIES: CPT

## 2025-02-08 PROCEDURE — 63600175 PHARM REV CODE 636 W HCPCS: Performed by: NURSE PRACTITIONER

## 2025-02-08 PROCEDURE — 99223 1ST HOSP IP/OBS HIGH 75: CPT | Mod: ,,, | Performed by: PEDIATRICS

## 2025-02-08 PROCEDURE — 85730 THROMBOPLASTIN TIME PARTIAL: CPT | Performed by: NURSE PRACTITIONER

## 2025-02-08 PROCEDURE — 25000003 PHARM REV CODE 250: Performed by: INTERNAL MEDICINE

## 2025-02-08 PROCEDURE — 85025 COMPLETE CBC W/AUTO DIFF WBC: CPT | Performed by: NURSE PRACTITIONER

## 2025-02-08 PROCEDURE — 85730 THROMBOPLASTIN TIME PARTIAL: CPT | Mod: 91 | Performed by: STUDENT IN AN ORGANIZED HEALTH CARE EDUCATION/TRAINING PROGRAM

## 2025-02-08 PROCEDURE — 83735 ASSAY OF MAGNESIUM: CPT | Mod: 91 | Performed by: STUDENT IN AN ORGANIZED HEALTH CARE EDUCATION/TRAINING PROGRAM

## 2025-02-08 PROCEDURE — 83880 ASSAY OF NATRIURETIC PEPTIDE: CPT

## 2025-02-08 PROCEDURE — 63600175 PHARM REV CODE 636 W HCPCS: Performed by: STUDENT IN AN ORGANIZED HEALTH CARE EDUCATION/TRAINING PROGRAM

## 2025-02-08 PROCEDURE — 84132 ASSAY OF SERUM POTASSIUM: CPT | Performed by: STUDENT IN AN ORGANIZED HEALTH CARE EDUCATION/TRAINING PROGRAM

## 2025-02-08 PROCEDURE — 84100 ASSAY OF PHOSPHORUS: CPT | Performed by: NURSE PRACTITIONER

## 2025-02-08 PROCEDURE — 83735 ASSAY OF MAGNESIUM: CPT | Performed by: NURSE PRACTITIONER

## 2025-02-08 PROCEDURE — 20600001 HC STEP DOWN PRIVATE ROOM

## 2025-02-08 PROCEDURE — 94761 N-INVAS EAR/PLS OXIMETRY MLT: CPT

## 2025-02-08 PROCEDURE — 97161 PT EVAL LOW COMPLEX 20 MIN: CPT

## 2025-02-08 PROCEDURE — 25000003 PHARM REV CODE 250: Performed by: NURSE PRACTITIONER

## 2025-02-08 RX ORDER — LISINOPRIL 10 MG/1
10 TABLET ORAL DAILY
Status: DISCONTINUED | OUTPATIENT
Start: 2025-02-08 | End: 2025-02-09 | Stop reason: HOSPADM

## 2025-02-08 RX ORDER — AMOXICILLIN 250 MG
1 CAPSULE ORAL 2 TIMES DAILY PRN
Status: DISCONTINUED | OUTPATIENT
Start: 2025-02-08 | End: 2025-02-09 | Stop reason: HOSPADM

## 2025-02-08 RX ORDER — ACETAMINOPHEN 325 MG/1
650 TABLET ORAL EVERY 4 HOURS PRN
Status: DISCONTINUED | OUTPATIENT
Start: 2025-02-08 | End: 2025-02-09 | Stop reason: HOSPADM

## 2025-02-08 RX ORDER — GLUCAGON 1 MG
1 KIT INJECTION
Status: DISCONTINUED | OUTPATIENT
Start: 2025-02-08 | End: 2025-02-09 | Stop reason: HOSPADM

## 2025-02-08 RX ORDER — FAMOTIDINE 20 MG/1
20 TABLET, FILM COATED ORAL 2 TIMES DAILY
Status: DISCONTINUED | OUTPATIENT
Start: 2025-02-08 | End: 2025-02-09

## 2025-02-08 RX ORDER — TALC
6 POWDER (GRAM) TOPICAL NIGHTLY PRN
Status: DISCONTINUED | OUTPATIENT
Start: 2025-02-08 | End: 2025-02-09 | Stop reason: HOSPADM

## 2025-02-08 RX ORDER — POTASSIUM CHLORIDE 20 MEQ/1
40 TABLET, EXTENDED RELEASE ORAL ONCE
Status: COMPLETED | OUTPATIENT
Start: 2025-02-08 | End: 2025-02-08

## 2025-02-08 RX ORDER — POLYETHYLENE GLYCOL 3350 17 G/17G
17 POWDER, FOR SOLUTION ORAL DAILY PRN
Status: DISCONTINUED | OUTPATIENT
Start: 2025-02-08 | End: 2025-02-09 | Stop reason: HOSPADM

## 2025-02-08 RX ORDER — HEPARIN SODIUM,PORCINE/D5W 25000/250
0-40 INTRAVENOUS SOLUTION INTRAVENOUS CONTINUOUS
Status: DISCONTINUED | OUTPATIENT
Start: 2025-02-08 | End: 2025-02-08

## 2025-02-08 RX ORDER — IBUPROFEN 200 MG
24 TABLET ORAL
Status: DISCONTINUED | OUTPATIENT
Start: 2025-02-08 | End: 2025-02-09 | Stop reason: HOSPADM

## 2025-02-08 RX ORDER — POTASSIUM CHLORIDE 20 MEQ/1
40 TABLET, EXTENDED RELEASE ORAL ONCE
Status: DISCONTINUED | OUTPATIENT
Start: 2025-02-08 | End: 2025-02-08

## 2025-02-08 RX ORDER — FAMOTIDINE 10 MG/ML
20 INJECTION INTRAVENOUS DAILY
Status: DISCONTINUED | OUTPATIENT
Start: 2025-02-08 | End: 2025-02-08

## 2025-02-08 RX ORDER — FUROSEMIDE 10 MG/ML
40 INJECTION INTRAMUSCULAR; INTRAVENOUS EVERY 12 HOURS
Status: DISCONTINUED | OUTPATIENT
Start: 2025-02-08 | End: 2025-02-08

## 2025-02-08 RX ORDER — ONDANSETRON HYDROCHLORIDE 2 MG/ML
4 INJECTION, SOLUTION INTRAVENOUS EVERY 8 HOURS PRN
Status: DISCONTINUED | OUTPATIENT
Start: 2025-02-08 | End: 2025-02-09 | Stop reason: HOSPADM

## 2025-02-08 RX ORDER — METOPROLOL SUCCINATE 25 MG/1
25 TABLET, EXTENDED RELEASE ORAL DAILY
Status: DISCONTINUED | OUTPATIENT
Start: 2025-02-08 | End: 2025-02-09 | Stop reason: HOSPADM

## 2025-02-08 RX ORDER — SODIUM CHLORIDE 0.9 % (FLUSH) 0.9 %
5 SYRINGE (ML) INJECTION
Status: DISCONTINUED | OUTPATIENT
Start: 2025-02-08 | End: 2025-02-09 | Stop reason: HOSPADM

## 2025-02-08 RX ORDER — IBUPROFEN 800 MG/1
800 TABLET ORAL 3 TIMES DAILY
Status: ON HOLD | COMMUNITY
Start: 2024-11-27 | End: 2025-02-09 | Stop reason: HOSPADM

## 2025-02-08 RX ORDER — NALOXONE HCL 0.4 MG/ML
0.02 VIAL (ML) INJECTION
Status: DISCONTINUED | OUTPATIENT
Start: 2025-02-08 | End: 2025-02-09 | Stop reason: HOSPADM

## 2025-02-08 RX ORDER — SPIRONOLACTONE 25 MG/1
25 TABLET ORAL DAILY
Status: DISCONTINUED | OUTPATIENT
Start: 2025-02-08 | End: 2025-02-09 | Stop reason: HOSPADM

## 2025-02-08 RX ORDER — IBUPROFEN 200 MG
16 TABLET ORAL
Status: DISCONTINUED | OUTPATIENT
Start: 2025-02-08 | End: 2025-02-09 | Stop reason: HOSPADM

## 2025-02-08 RX ORDER — MAGNESIUM SULFATE HEPTAHYDRATE 40 MG/ML
2 INJECTION, SOLUTION INTRAVENOUS ONCE
Status: COMPLETED | OUTPATIENT
Start: 2025-02-08 | End: 2025-02-08

## 2025-02-08 RX ADMIN — MAGNESIUM SULFATE HEPTAHYDRATE 2 G: 40 INJECTION, SOLUTION INTRAVENOUS at 05:02

## 2025-02-08 RX ADMIN — LISINOPRIL 10 MG: 10 TABLET ORAL at 09:02

## 2025-02-08 RX ADMIN — SPIRONOLACTONE 25 MG: 25 TABLET, FILM COATED ORAL at 11:02

## 2025-02-08 RX ADMIN — HEPARIN SODIUM 14 UNITS/KG/HR: 10000 INJECTION, SOLUTION INTRAVENOUS at 03:02

## 2025-02-08 RX ADMIN — POTASSIUM CHLORIDE 40 MEQ: 1500 TABLET, EXTENDED RELEASE ORAL at 05:02

## 2025-02-08 RX ADMIN — METOPROLOL SUCCINATE 25 MG: 25 TABLET, EXTENDED RELEASE ORAL at 11:02

## 2025-02-08 RX ADMIN — FAMOTIDINE 20 MG: 20 TABLET ORAL at 08:02

## 2025-02-08 RX ADMIN — FAMOTIDINE 20 MG: 20 TABLET ORAL at 09:02

## 2025-02-08 RX ADMIN — RIVAROXABAN 20 MG: 20 TABLET, FILM COATED ORAL at 05:02

## 2025-02-08 NOTE — PLAN OF CARE
Problem: Occupational Therapy  Goal: Occupational Therapy Goal  Outcome: Met   OT eval completed; no goals established as pt is performing ADL safely and without A. SADIE Cat

## 2025-02-08 NOTE — PLAN OF CARE
PT evaluation complete. Goals established and met. Pt is baseline with mobility and has no acute PT needs at this time. D/C from PT services.    Problem: Physical Therapy  Goal: Physical Therapy Goal  Description: Goals to be met by: 2025     Patient will increase functional independence with mobility by performin. Gait  x 30 feet with [place] using no AD.     Outcome: Met     2025

## 2025-02-08 NOTE — DISCHARGE SUMMARY
[facility] Medicine  Discharge Summary      Patient Name: Juani Reilly Jr.  MRN: [id number]  SIRENA: [id number]  Patient Class: IP- Inpatient  Admission Date: 2/5/2025  Hospital Length of Stay: 2 days  Discharge Date and Time:  02/08/2025 2:06 AM  Attending Physician: No att. providers found   Discharging Provider: Emeterio Rea MD  Primary Care Provider: Nestor Siu MD    Primary Care Team: Networked reference to record PCT     HPI:   29 y.o. male w/ PMHx of yo M who has been in the [facility] ED with a  history WPW, liver cirrhosis, medication non compliance, Tricuspid and pulmonary atresia initially palliated with a systemic to pulmonary artery shunt followed by a bidirectional Jesus and subsequent lateral tunnel Fontan procedure (last EF with Left ventricular systolic function qualitatively severely compromised with ejection fraction estimated 30 -35% on 10/26/23) who presented today with SOB and upper abdominal pain and then transferred to [facility] ER on 2/5/2025  for a higher level of care.     In the ER, initial vital signs were as follows: Temp 97.4, /111, , SpO2 90% on NRB. Labs as follows: WBC 6.49, Hgb 14, Elevated TB  2.5, BUN/Cr 9/0.88 (baseline Cr 0.8). UA +1 protein and trace blood, Elevated tropoinin 0.100-->0.101, BNP 4400, Lactic Acid 1.5,  3/5/25 Blood Cx NGTD, Lipase 79    CXR No acute abnormality.   .    Abd US shows: Limited right upper quadrant ultrasound performed.  The liver measures 17 cm in length and is homogeneous in echogenicity.  Trace fluid seen along the falciform ligament.  Common bile duct is within normal limits at 2.2 mm..  Mild gallbladder wall thickening.  Trace pericholecystic fluid.  Negative sonographic Disla sign.  Pancreas is obscured by bowel gas.  IVC within normal limits.  The right kidney is normal in length measuring 10.4 cm. No right sided hydronephrosis or renal masses identified. Impression:No ductal  dilatation.     CT Chest: Cholelithiasis with possible wall thickening.  Recommend follow-up right upper quadrant ultrasound.  Suspect flash fill hemangioma measuring 11 mm in the right hepatic lobe.  Cardiomegaly with aneurysmal dilatation of the aortic root measuring 5.2 cm.  Correlate with congenital heart malformation.    EKG  with LVH Abnormaility, Qtc 488.     In the ER, patient was given Lasix 20 mg x 3 doses, 1 liter NS bolus, Heparin gtt initiated, Zofran 4 mg x 1  and oxycodone IR 10 mg x 1 dose .     Patient was admitted to [facility] for Hypoxic Respiratory Failure due to  Decompensated Heart failure.     * No surgery found *      Hospital Course:   Pt was transferred from [facility] due to no beds available at [facility] and was admitted into ICU for closer monitoring. He required vapo therm 30 L with FiO2 80% due to his hypoxic state  with an O2 saturation in the 80's.  His blood pressure was greater than 200 systolic and diastolics in the 120's at the bedside.  He continued to be in Aflutter requiring metoprolol.  An echo was obtained that showed Left ventricular systolic function qualitatively severely compromised with ejection fraction estimated 10-15 %. Pt is currently stable and the his oxygen requirement has since decreased. He is currently on room air.  Patient transferred to CCU at [facility] for closer monitoring in setting of congenital heart condition.      Goals of Care Treatment Preferences:  Code Status: Full Code      SDOH Screening:  The patient was screened for utility difficulties, food insecurity, transport difficulties, housing insecurity, and interpersonal safety and there were no concerns identified this admission.     Consults:   Consults (From admission, onward)          Status Ordering Provider     Inpatient consult to Pulmonology  Once        Provider:  Shola Dos Santos MD    Completed DIEGO KERN     Inpatient consult to  Cardiology-[facility]  Once        Provider:  Demetrio Brown RN    Completed DIEGO KERN            No notes have been filed under this hospital service.  Service: Hospital Medicine    Final Active Diagnoses:    Diagnosis Date Noted POA    PRINCIPAL PROBLEM:  Acute decompensated heart failure [I50.9] 02/06/2025 Yes    Hypoxia [R09.02] 02/07/2025 Yes    Elevated bilirubin [R17] 02/07/2025 Yes    Respiratory alkalosis [E87.3] 02/07/2025 Yes    SOB (shortness of breath) [R06.02] 02/06/2025 Unknown    Adult congenital heart disease [Q24.9] 10/03/2019 Not Applicable      Problems Resolved During this Admission:       Discharged Condition: stable    Disposition: Hospice/Medical Facility    Follow Up:    Patient Instructions:   No discharge procedures on file.    Significant Diagnostic Studies: Labs: CMP   Recent Labs   Lab 02/06/25  0607 02/07/25  0235    140   K 4.2 3.4*    105   CO2 22* 25   GLU 99 88   BUN 9 11   CREATININE 0.88 1.0   CALCIUM 8.6* 8.2*   PROT 6.4 5.4*   ALBUMIN 3.7 3.3*   BILITOT 2.5* 1.8*   ALKPHOS 40 37*   AST 27 15   ALT 25 16   ANIONGAP 10 10    and CBC   Recent Labs   Lab 02/06/25  0607 02/06/25  0608 02/07/25  0235   WBC 6.49  --  5.02   HGB 14.4  --  13.8*   HCT 43.8   < > 41.6   *  --  116*    < > = values in this interval not displayed.       Pending Diagnostic Studies:       Procedure Component Value Units Date/Time    APTT [[id number]]     Order Status: Sent Lab Status: No result     Specimen: Blood     CBC with Automated Differential [[id number]]     Order Status: Sent Lab Status: No result     Specimen: Blood     Comprehensive Metabolic Panel (CMP) [[id number]]     Order Status: Sent Lab Status: No result     Specimen: Blood     Magnesium [[id number]]     Order Status: Sent Lab Status: No result     Specimen: Blood     Phosphorus [[id number]]     Order Status: Sent Lab Status: No result     Specimen: Blood            Medications:  Reconciled Home  Medications:      Medication List        ASK your doctor about these medications      * benazepriL 10 MG tablet  Commonly known as: LOTENSIN  Take 1 tablet (10 mg total) by mouth once daily.     * benazepriL 10 MG tablet  Commonly known as: LOTENSIN  Take 1 tablet (10 mg total) by mouth once daily.     benzonatate 200 MG capsule  Commonly known as: TESSALON  Take 200 mg by mouth 3 (three) times daily as needed for Cough.     famotidine 20 MG tablet  Commonly known as: PEPCID  Take 1 tablet (20 mg total) by mouth 2 (two) times daily.     ondansetron 4 MG tablet  Commonly known as: ZOFRAN  Take 1 tablet (4 mg total) by mouth every 6 (six) hours.     ondansetron 4 MG Tbdl  Commonly known as: ZOFRAN-ODT  Take 1 tablet (4 mg total) by mouth every 6 (six) hours as needed (nausea, vomiting).     XARELTO 20 mg Tab  Generic drug: rivaroxaban  TAKE 1 TABLET BY MOUTH DAILY WITH DINNER OR EVENING MEAL           * This list has 2 medication(s) that are the same as other medications prescribed for you. Read the directions carefully, and ask your doctor or other care provider to review them with you.                  Indwelling Lines/Drains at time of discharge:   Lines/Drains/Airways       None                   Time spent on the discharge of patient: 35 minutes    Critical care time spent on the evaluation and treatment of severe organ dysfunction, review of pertinent labs and imaging studies, discussions with consulting providers and discussions with patient/family: 35 minutes.     Emeterio Rea MD  Department of Hospital Medicine  [facility] Intensive Care

## 2025-02-08 NOTE — PT/OT/SLP EVAL
Physical Therapy Co-Evaluation and Treatment and Discharge Note    Patient Name:  Juani Reilly Jr.   MRN:  [id number]    Recommendations:     Discharge Recommendations: No Therapy Indicated  Discharge Equipment Recommendations: none   Barriers to Discharge: None    Assessment:     Juani Reilly Jr. is a 29 y.o. male admitted with a medical diagnosis of Acute on chronic systolic congestive heart failure. Patient is able to complete all visualized functional mobility with independence. They are functioning at their baseline and no longer require acute care physical therapy.    Plan:     During this hospitalization, patient does not require further acute PT services. Please re-consult if functional status changes.      Subjective     Chief Complaint: None verbalized  Patient/Family Comments/goals: To go home  Pain/Comfort:  Pain Rating 1: 0/10    Patients cultural, spiritual, Catholic conflicts given the current situation: no    Living Environment:  Living Environment: Patient lives with their grandmother  in a single story house with number of outside stair(s): 0, tub-shower combo, and grab bars.  Prior Level of Function: Prior to admission, patient was independent and working up until ~1 week ago .  Equipment Used at Home: none.  DME owned (not currently used): none  Assistance Upon Discharge: grandmother    Objective:     Communicated with RN prior to session.  Patient found HOB elevated with blood pressure cuff, pulse ox (continuous), telemetry, oxygen, peripheral IV upon PT entry to room.    General Precautions: Standard, fall   Orthopedic Precautions:N/A   Braces: N/A    Exams:  Cognitive Exam:  Patient is oriented to Person, Place, Time, Situation  RLE ROM: WFL  RLE Strength: WFL  LLE ROM: WFL  LLE Strength: WFL  Sensation: Intact light touch to BLEs    Functional Mobility:  Bed Mobility:     Supine to Sit: independence  Transfers:     Sit to Stand: independence with no AD  Gait: Patient  ambulated 35' with no AD and SBA progressing to independence.   Patient demonstrates steady gait, decreased step length, narrow base of support, decreased weight shift, decreased foot clearance, flexed posture, decreased mohit, and decreased arm swing.  Patient required cues for upright posture, gluteal activation, obstacle navigation, increased step size, increased foot clearance, and increased TARI.  All lines remained intact throughout ambulation trial, portable Supplemental O2 2L utilized.    Therapeutic Activities and Exercises:  Patient educated on role of acute care PT and PT POC, safety while in hospital including calling nurse for mobility, and call light usage  Pt educated on the effects of bed rest and the importance of OOB activity. Pt encouraged to sit UIC majority of day as tolerated and continue daily ambulation with nursing assist. Pt verbalized understanding.  Educated about gradual progression of activity once out of hospital  Educated about safety with functional mobility  Answered all questions within PT scope of practice to patient's satisfaction    AM-PAC 6 CLICK MOBILITY  Total Score:24     Patient left sitting edge of bed with all lines intact, call button in reach, RN notified, and family present.    GOALS:   Multidisciplinary Problems       Physical Therapy Goals       Not on file              Multidisciplinary Problems (Resolved)          Problem: Physical Therapy    Goal Priority Disciplines Outcome Interventions   Physical Therapy Goal   (Resolved)     PT, PT/OT Met    Description: Goals to be met by: 2025     Patient will increase functional independence with mobility by performin. Gait  x 30 feet with [place] using no AD.                          DME Justifications:  No DME recommended requiring DME justifications    History:     Past Medical History:   Diagnosis Date    Acute decompensated heart failure 2025    History of heart surgery     Other cirrhosis of liver  11/12/2020    SVT (supraventricular tachycardia)     WPW syndrome        Past Surgical History:   Procedure Laterality Date    ANGIOGRAPHY OF AORTIC ARCH N/A 9/3/2020    Procedure: AORTOGRAM, AORTIC ARCH;  Surgeon: Stephania Crump MD;  Location: [facility] CATH LAB;  Service: Cardiology;  Laterality: N/A;    FONTAN PROCEDURE, EXTRACARDIAC      LEFT HEART CATHETERIZATION Left 9/3/2020    Procedure: Left heart cath;  Surgeon: Stephania Crump MD;  Location: [facility] CATH LAB;  Service: Cardiology;  Laterality: Left;    RIGHT HEART CATHETERIZATION FOR CONGENITAL HEART DEFECT N/A 9/3/2020    Procedure: CATHETERIZATION, HEART, RIGHT, FOR CONGENITAL HEART DEFECT;  Surgeon: Stephania Crump MD;  Location: [facility] CATH LAB;  Service: Cardiology;  Laterality: N/A;  [facility] - needs covid test morning of. Extenuating circumstances    TRANSESOPHAGEAL ECHOCARDIOGRAPHY N/A 11/10/2022    Procedure: ECHOCARDIOGRAM, TRANSESOPHAGEAL;  Surgeon: Emeterio Hall MD;  Location: [facility] EP LAB;  Service: Cardiology;  Laterality: N/A;    TREATMENT OF CARDIAC ARRHYTHMIA N/A 1/9/2021    Procedure: CARDIOVERSION;  Surgeon: Jim Mcginnis MD;  Location: [facility] CATH LAB;  Service: Cardiology;  Laterality: N/A;       Time Tracking:     PT Received On: 02/08/25  PT Start Time: 1128     PT Stop Time: 1145  PT Total Time (min): 17 min     Billable Minutes: Evaluation 9 Therapeutic Activity 8      02/08/2025

## 2025-02-08 NOTE — NURSING
Pt off the monitor at 1916, requests to take a shower and not have equipment on; Dr. Cates at bedside, MD and RN educated pt on the risks of being off the monitor, pt continued to be adamant about taking the monitor off to shower. Monitor and heparin drip paused.    1937 Pt back on monitor and heparin drip restarted. No acute changes and/or complications note in pt's assessment, VSS.

## 2025-02-08 NOTE — HPI
Patient well known to me.  I last saw him about a year and a half ago with plans to follow-up a month later, but he never returned to clinic.  He has a long history of medication and follow-up noncompliance.  He presented a few days ago to the emergency room with shortness of breath, abdominal pain, and emesis that had progressed over several days.  No diarrhea.  He was severely desaturated in the emergency room with improvement on oxygen.  On oxygen and IV diuretics over the past several days, he has had progressive improvement and now feels back to his baseline.  However, he remains on oxygen.  No fever.  He admits to be noncompliant with his medications for a long time, stating that he can not remember the last time he took them.  Of note, he was significantly hypertensive on presentation to the emergency room.  The benazepril he used to take it caused drowsiness.  He has done well on lisinopril, and his blood pressure has been much better.  The chart says that he had atrial flutter prior to transfer to [facility], but I can find no actual documentation of that.  All of his EKG shows sinus tachycardia.

## 2025-02-08 NOTE — NURSING TRANSFER
Nursing Transfer Note      2/8/2025   4:20 PM    Nurse giving handoff:Johnie Quick receiving handoff:Tobias CSU    Reason patient is being transferred: step down    Transfer To: 342 from 3076    Transfer via bed    Transfer with 2L NC    Transported by Johnie Leigh RN    Transfer Vital Signs:  flowsheets    Order for Tele Monitor? Yes    Additional Lines: Oxygen    Medicines sent: n/a    Any special needs or follow-up needed: none    Patient belongings transferred with patient: Yes    Chart send with patient: Yes    Notified: family present    Patient reassessed at: transfer (date, time)  1  Upon arrival to floor: cardiac monitor applied, patient oriented to room, call bell in reach, and bed in lowest position

## 2025-02-08 NOTE — NURSING
Pt discharged via EMS to [facility] CICU room 3076. Report called to HILLARY Linder. Pt's chart and belongings sent, mother at bedside for discharge.

## 2025-02-08 NOTE — PLAN OF CARE
Cardiac ICU Care Plan    Refusing supplemental O2 despite saturations between 83-86%. MD Goss notified.     POC reviewed with Juani Reilly Jr. and family. Questions and concerns addressed. See below and flowsheets for full assessment and VS info.     Neuro:  Gravel Switch Coma Scale  Best Eye Response: 4-->(E4) spontaneous  Best Motor Response: 6-->(M6) obeys commands  Best Verbal Response: 5-->(V5) oriented  Gravel Switch Coma Scale Score: 15  Assessment Qualifiers: patient not sedated/intubated, no eye obstruction present  Pupil PERRLA: yes    24 hr Temp:  [97.5 °F (36.4 °C)-98.5 °F (36.9 °C)]      CV:  Rhythm: normal sinus rhythm   DVT prophylaxis: VTE Core Measure: Pharmacological prophylaxis initiated/maintained  Pulses  Right Radial Pulse: 2+ (normal)  Left Radial Pulse: 2+ (normal)  Right Dorsalis Pedis Pulse: 1+ (weak)  Left Dorsalis Pedis Pulse: 1+ (weak)  Right Posterior Tibial Pulse: 1+ (weak)  Left Posterior Tibial Pulse: 1+ (weak)    Resp:     GI/:  GI prophylaxis: no     Last Bowel Movement: 02/06/25      Intake/Output Summary (Last 24 hours) at 2/8/2025 0436  Last data filed at 2/8/2025 0400  Gross per 24 hour   Intake 5.92 ml   Output 300 ml   Net -294.08 ml     Labs/Accuchecks:  Recent Labs   Lab 02/06/25  0607 02/06/25  0608 02/07/25  0235 02/08/25  0333   WBC 6.49  --  5.02 6.07   RBC 4.76  --  4.57* 5.14   HGB 14.4  --  13.8* 15.6   HCT 43.8 47 41.6 45.8   *  --  116* 140*      Recent Labs   Lab 02/05/25  1441 02/06/25  0005 02/07/25  0943 02/07/25  1543 02/08/25  0333   INR 1.1  --   --   --   --    APTT 22.8   < > 48.6* 42.0* 40.5*    < > = values in this interval not displayed.      Recent Labs     02/08/25  0333      K 3.1*   CO2 25      BUN 11   CREATININE 0.8   ALKPHOS 40   ALT 20   AST 16   BILITOT 1.7*       Recent Labs   Lab 02/06/25  2004 02/07/25  0235 02/07/25  1435   TROPONINI 0.093* 0.109* 0.064*      Recent Labs     02/05/25  1438 02/06/25  0608   PH  7.473* 7.354   PCO2 28.2* 44.3   PO2 40 40   HCO3 20.6* 24.6   POCSATURATED 79 72   BE -3* -1     Electrolytes: Electrolytes replaced  Accuchecks: none    Gtts/LDAs:   heparin (porcine) in D5W  0-40 Units/kg/hr Intravenous Continuous 8.3 mL/hr at 02/08/25 0400 14 Units/kg/hr at 02/08/25 0400     Lines/Drains/Airways       Peripheral Intravenous Line  Duration                  Peripheral IV - Single Lumen 02/06/25 22 G Left Hand 2 days         Peripheral IV - Single Lumen 02/08/25 0300 20 G Right Forearm <1 day                  Skin/Wounds     Wounds: No  Wound care consulted: No      Problem: Adult Inpatient Plan of Care  Goal: Plan of Care Review  2/8/2025 0436 by Kailash Palm, RN  Outcome: Progressing

## 2025-02-08 NOTE — CONSULTS
[facility]  Pediatric Cardiology  Consult Note    Patient Name: Juani Reilly Jr.  MRN: [id number]  Admission Date: 2/8/2025  Hospital Length of Stay: 0 days  Code Status: Full Code   Attending Provider: Markus Salcido MD   Consulting Provider: Dorian Santoyo MD  Primary Care Physician: Nestor Siu MD  Principal Problem:Acute on chronic systolic congestive heart failure    Consults  Subjective:     Chief Complaint:  congenital heart disease     HPI:   Patient well known to me.  I last saw him about a year and a half ago with plans to follow-up a month later, but he never returned to clinic.  He has a long history of medication and follow-up noncompliance.  He presented a few days ago to the emergency room with shortness of breath, abdominal pain, and emesis that had progressed over several days.  No diarrhea.  He was severely desaturated in the emergency room with improvement on oxygen.  On oxygen and IV diuretics over the past several days, he has had progressive improvement and now feels back to his baseline.  However, he remains on oxygen.  No fever.  He admits to be noncompliant with his medications for a long time, stating that he can not remember the last time he took them.  Of note, he was significantly hypertensive on presentation to the emergency room.  The benazepril he used to take it caused drowsiness.  He has done well on lisinopril, and his blood pressure has been much better.  The chart says that he had atrial flutter prior to transfer to [facility], but I can find no actual documentation of that.  All of his EKG shows sinus tachycardia.    Past Medical History:   Diagnosis Date    Acute decompensated heart failure 2/6/2025    History of heart surgery     Other cirrhosis of liver 11/12/2020    SVT (supraventricular tachycardia)     WPW syndrome        Past Surgical History:   Procedure Laterality Date    ANGIOGRAPHY OF AORTIC ARCH N/A 9/3/2020     Procedure: AORTOGRAM, AORTIC ARCH;  Surgeon: Stephania Crump MD;  Location: [facility] CATH LAB;  Service: Cardiology;  Laterality: N/A;    FONTAN PROCEDURE, EXTRACARDIAC      LEFT HEART CATHETERIZATION Left 9/3/2020    Procedure: Left heart cath;  Surgeon: Stephania Crump MD;  Location: [facility] CATH LAB;  Service: Cardiology;  Laterality: Left;    RIGHT HEART CATHETERIZATION FOR CONGENITAL HEART DEFECT N/A 9/3/2020    Procedure: CATHETERIZATION, HEART, RIGHT, FOR CONGENITAL HEART DEFECT;  Surgeon: Stephania Crump MD;  Location: [facility] CATH LAB;  Service: Cardiology;  Laterality: N/A;  [facility] - needs covid test morning of. Extenuating circumstances    TRANSESOPHAGEAL ECHOCARDIOGRAPHY N/A 11/10/2022    Procedure: ECHOCARDIOGRAM, TRANSESOPHAGEAL;  Surgeon: Emeterio Hall MD;  Location: [facility] EP LAB;  Service: Cardiology;  Laterality: N/A;    TREATMENT OF CARDIAC ARRHYTHMIA N/A 1/9/2021    Procedure: CARDIOVERSION;  Surgeon: Jim Mcginnis MD;  Location: [facility] CATH LAB;  Service: Cardiology;  Laterality: N/A;       Review of patient's allergies indicates:  No Known Allergies    Current Facility-Administered Medications on File Prior to Encounter   Medication    [DISCONTINUED] acetaminophen tablet 650 mg    [DISCONTINUED] dextrose 50% injection 12.5 g    [DISCONTINUED] dextrose 50% injection 25 g    [DISCONTINUED] famotidine (PF) injection 20 mg    [DISCONTINUED] furosemide injection 40 mg    [DISCONTINUED] glucagon (human recombinant) injection 1 mg    [DISCONTINUED] glucose chewable tablet 16 g    [DISCONTINUED] glucose chewable tablet 24 g    [DISCONTINUED] heparin 25,000 units in dextrose 5% (100 units/ml) IV bolus from bag LOW INTENSITY nomogram - [facility]    [DISCONTINUED] heparin 25,000 units in dextrose 5% (100 units/ml) IV bolus from bag LOW INTENSITY nomogram - [facility]    [DISCONTINUED] heparin 25,000 units in dextrose 5% 250 mL (100 units/mL) infusion LOW INTENSITY nomogram - [facility]    [DISCONTINUED]  lisinopriL tablet 10 mg    [DISCONTINUED] melatonin tablet 6 mg    [DISCONTINUED] naloxone 0.4 mg/mL injection 0.02 mg    [DISCONTINUED] ondansetron injection 4 mg    [DISCONTINUED] polyethylene glycol packet 17 g    [DISCONTINUED] senna-docusate 8.6-50 mg per tablet 1 tablet    [DISCONTINUED] sodium chloride 0.9% flush 5 mL     Current Outpatient Medications on File Prior to Encounter   Medication Sig    ibuprofen (ADVIL,MOTRIN) 800 MG tablet Take 800 mg by mouth 3 (three) times daily.    [DISCONTINUED] benazepriL (LOTENSIN) 10 MG tablet Take 1 tablet (10 mg total) by mouth once daily.    [DISCONTINUED] benazepriL (LOTENSIN) 10 MG tablet Take 1 tablet (10 mg total) by mouth once daily.    [DISCONTINUED] benzonatate (TESSALON) 200 MG capsule Take 200 mg by mouth 3 (three) times daily as needed for Cough.    [DISCONTINUED] famotidine (PEPCID) 20 MG tablet Take 1 tablet (20 mg total) by mouth 2 (two) times daily.    [DISCONTINUED] ondansetron (ZOFRAN) 4 MG tablet Take 1 tablet (4 mg total) by mouth every 6 (six) hours.    [DISCONTINUED] ondansetron (ZOFRAN-ODT) 4 MG TbDL Take 1 tablet (4 mg total) by mouth every 6 (six) hours as needed (nausea, vomiting).    [DISCONTINUED] XARELTO 20 mg Tab TAKE 1 TABLET BY MOUTH DAILY WITH DINNER OR EVENING MEAL     Family History       Problem Relation (Age of Onset)    Heart disease Maternal Grandfather    No Known Problems Mother, Father, Sister, Brother, Maternal Aunt, Maternal Uncle, Paternal Aunt, Paternal Uncle, Maternal Grandmother, Paternal Grandmother, Paternal Grandfather          Social History     Social History Narrative    Not on file     Review of Systems  The review of systems is as noted above. It is otherwise negative for other symptoms related to the general, neurological, psychiatric, endocrine, gastrointestinal, genitourinary, respiratory, dermatologic, musculoskeletal, hematologic, and immunologic systems.    Objective:     Vital Signs (Most Recent):  Temp:  97.7 °F (36.5 °C) (02/08/25 1101)  Pulse: 93 (02/08/25 1401)  Resp: (!) 25 (02/08/25 1401)  BP: 107/83 (02/08/25 1401)  SpO2: (!) 89 % (02/08/25 1401) Vital Signs (24h Range):  Temp:  [97.5 °F (36.4 °C)-98.5 °F (36.9 °C)] 97.7 °F (36.5 °C)  Pulse:  [] 93  Resp:  [12-45] 25  SpO2:  [85 %-100 %] 89 %  BP: ()/() 107/83     Weight: 62.2 kg (137 lb 2 oz)  Body mass index is 22.13 kg/m².    SpO2: (!) 89 %         Intake/Output Summary (Last 24 hours) at 2/8/2025 1506  Last data filed at 2/8/2025 0600  Gross per 24 hour   Intake 45.31 ml   Output 300 ml   Net -254.69 ml       Lines/Drains/Airways       Peripheral Intravenous Line  Duration                  Peripheral IV - Single Lumen 02/06/25 22 G Left Hand 2 days         Peripheral IV - Single Lumen 02/08/25 0300 20 G Right Forearm <1 day                       Physical Exam     GENERAL: Awake, well-developed well-nourished, no apparent distress.  On 2 L of oxygen, saturations around 88%.  HEENT: Mucous membranes moist and pink, normocephalic atraumatic, no cranial or carotid bruits, sclera anicteric, EOMI  NECK: No jugular venous distention, no thyromegaly, no lymphadenopathy  CHEST: Good air movement, clear to auscultation bilaterally. Well healed sternotomy.  CARDIOVASCULAR: Quiet precordium, regular rate and rhythm, S1 with single S2, 2/6 systolic murmur heard at the apex.    ABDOMEN: Soft, nontender nondistended, no hepatosplenomegaly, no aortic bruits.  No tenderness.  Liver about 2 cm below the right costal margin.  EXTREMITIES: Warm well perfused, 2+ radial/femoral/pedal pulses, capillary refill 2 seconds, no cyanosis or edema.   NEURO: Alert and oriented, cooperative with exam, face symmetric, moves all extremities well.  CMP  Sodium   Date Value Ref Range Status   02/08/2025 140 136 - 145 mmol/L Final     Potassium   Date Value Ref Range Status   02/08/2025 4.3 3.5 - 5.1 mmol/L Final     Chloride   Date Value Ref Range Status   02/08/2025 105  95 - 110 mmol/L Final     CO2   Date Value Ref Range Status   02/08/2025 25 23 - 29 mmol/L Final     Glucose   Date Value Ref Range Status   02/08/2025 94 70 - 110 mg/dL Final     BUN   Date Value Ref Range Status   02/08/2025 11 6 - 20 mg/dL Final     Creatinine   Date Value Ref Range Status   02/08/2025 0.8 0.5 - 1.4 mg/dL Final     Calcium   Date Value Ref Range Status   02/08/2025 8.2 (L) 8.7 - 10.5 mg/dL Final     Total Protein   Date Value Ref Range Status   02/08/2025 6.1 6.0 - 8.4 g/dL Final     Albumin   Date Value Ref Range Status   02/08/2025 3.4 (L) 3.5 - 5.2 g/dL Final     Total Bilirubin   Date Value Ref Range Status   02/08/2025 1.7 (H) 0.1 - 1.0 mg/dL Final     Comment:     For infants and newborns, interpretation of results should be based  on gestational age, weight and in agreement with clinical  observations.    Premature Infant recommended reference ranges:  Up to 24 hours.............<8.0 mg/dL  Up to 48 hours............<12.0 mg/dL  3-5 days..................<15.0 mg/dL  6-29 days.................<15.0 mg/dL       Alkaline Phosphatase   Date Value Ref Range Status   02/08/2025 40 40 - 150 U/L Final     AST   Date Value Ref Range Status   02/08/2025 16 10 - 40 U/L Final     ALT   Date Value Ref Range Status   02/08/2025 20 10 - 44 U/L Final     Anion Gap   Date Value Ref Range Status   02/08/2025 10 8 - 16 mmol/L Final     eGFR   Date Value Ref Range Status   02/08/2025 >60.0 >60 mL/min/1.73 m^2 Final     Lab Results   Component Value Date    WBC 6.07 02/08/2025    HGB 15.6 02/08/2025    HCT 45.8 02/08/2025    MCV 89 02/08/2025     (L) 02/08/2025       BNP   Date Value Ref Range Status   02/08/2025 379 (H) 0 - 99 pg/mL Final     Comment:     Values of less than 100 pg/ml are consistent with non-CHF populations.      EKGs reviewed.  Sinus rhythm with rate in the 105-110 range noted.  Telemetry reviewed.  No significant arrhythmias noted.    Results for orders placed during the hospital  encounter of 02/05/25    Echo    Interpretation Summary  LIMITED STUDY FOR EVALUATION OF SINGLE VENTRICULAR FUNCTION AND VALVULAR DISEASE    CONGENITAL CARDIAC HISTORY:  Tricuspid atresia, pulmonary atresia  and WPW.  S/P Systemic to pulmonary artery shunt - [place].  S/P Bidirectional Jesus -- [place].  S/P Lateral tunnel Fontan procedure - [facility].  S/P EP study with trans-septal puncture November 2022  S/P Ablation of a left lateral accessory pathway November 2022 by Dr. Ferguson      SEGMENTAL CARDIAC CONNECTIONS (previously demonstrated):  Abdominal situs is solitus.  Atrial situs is normal.  Imaging consistent with tricuspid atresia.  Tricuspid atresia.  Mitral valve appears normal in structure.  LV dilation.  Ventriculoarterial concordance.  Ventricular loop: D-loop.  Cardiac position is levocardia.  Left aortic arch branching.      IMPRESSION:  Imaging consistent with diagnosis of tricuspid atresia S/P lateral tunnel Fontan - study remarkable for severely decreased systolic function of the single ventricle, worsened compared to prior.  Severely enlarged ventricle.    Pulmonary circulation not visualized  Mildly dilated inferior vena cava - connection to Fontan not visualized.  No apparent thrombus noted in the IVC  Right superior vena cava not visualized    No evidence of obstruction at Fontan on limited imaging.  Fibrinous material noted, which may be degenerated surgical material or  congenital material, less likely to be layered thrombus. Correlate clinically.    The Jesus anastomosis to the pulmonary arteries is not visualized on this echo.    Pulmonary confluence and proximal pulmonary branches not demonstrated.  At least two pulmonary veins demonstrated returning to the left atrium with no evidence for obstruction.    No right atrium, right ventricle, or tricuspid valve visualized  There is no evidence for pulmonary valve.    At least mild dilation of the left atrium.    Mildly dilated mitral valve with  color Doppler demonstrating moderate regurgitation.  Single ventricle consistent with left ventricular morphology.  There is at sever dilation of the left ventricle.  LVEDV index is 96 cc/m2    Left ventricular systolic function qualitatively severely compromised with ejection fraction estimated 10-15 %.    There is a large aortic annulus with trileaflet aortic valve, no stenosis and mild  insufficiency.  Aortic dimensions:  Sinuses of Valsalva  = 51 mm.  Ascending aorta       = not well demonstrated.    No obvious pericardial effusion.    Recent CT scan reviewed.  Liver ultrasound from February 6, 2025 reviewed.  No evidence of hepatocellular carcinoma.  Assessment and Plan:     Cardiac/Vascular  S/P Fontan procedure  1. Tricuspid and pulmonary atresia initially palliated with a systemic to pulmonary artery shunt followed by a bidirectional Jesus and subsequent lateral tunnel Fontan procedure   - small atrial baffle leak along with new Fontan baffle puncture for EP study  - s/p EP study with trans-septal puncture November 2022  - reassuring hemodynamics on 2020 catheterization  - no evidence of protein-losing enteropathy  - no obvious thrombus within his Fontan  2. Decreased left ventricular function.  Baseline ejection fraction around 30%, but much worse right now.  Suspect that this is related to uncontrolled hypertension given his medication noncompliance.  - no evidence of PLE  3. History of Srukp-Agonsabut-Cqouo, SVT  - status post successful ablation of a left lateral accessory pathway November 2022 by Dr. Ferguson  4. Cirrhosis  5. Medication noncompliance     Discussion:   From my standpoint, he looks pretty close to being back to baseline.  His chest x-ray looks great.  Liver imaging shows no evidence of hepatocellular carcinoma, which she is certainly at increased risk for given his known cirrhosis.  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    He has baseline  moderately decreased systemic ventricular function, and I suspect the worsening noted on his recent echocardiogram is due to medication noncompliance and poorly controlled hypertension.  He is at increased risk for thrombus with in his Fontan, and given known leaks in his Fontan allowing right-to-left shunting, he is at risk for thromboembolic events from both right-sided thrombus and, given his poor left ventricular function, left-sided thrombus as well.  I stressed the importance of compliance with his medications.    To be clear, he is not a heart transplant candidate given his very poor compliance.  He is not a candidate for ECMO if he had cardiac arrest, but hopefully that will not be a concern.    Recommendations:   1. Continue current medications  2. Try to wean off oxygen.  I would accept saturations 85% or higher.  3. Could consider a repeat cardiac catheterization if his sats are consistently lower than 85 off of oxygen.  Specific goal of the catheterization would be to look for and, potentially, close any leaks in the Fontan baffle contributing to a right-to-left shunt.  However, we would need to be very careful with this as closing leaks could potentially increase his Fontan pressure and place him at higher risk for developing plastic bronchitis, protein-losing enteropathy, or other sequelae of high central venous pressure.  4. Not a candidate for ECMO.  Not a heart transplant candidate.  5. I stressed the importance of compliance with his medications.          Thank you for your consult. I will follow-up with patient. Please contact us if you have any additional questions.    Dorian Santoyo MD  Pediatric Cardiology   [facility]

## 2025-02-08 NOTE — NURSING
[facility] Paperwork placed in patient chart, belongings at bedside. Mother is with patient. MD Goss notified of arrival.    Patient satting 83-87% on room air - refused supplemental O2. MD Goss notified.    Current gtts:  Heparin @ 14

## 2025-02-08 NOTE — SUBJECTIVE & OBJECTIVE
Past Medical History:   Diagnosis Date    Acute decompensated heart failure 2/6/2025    History of heart surgery     Other cirrhosis of liver 11/12/2020    SVT (supraventricular tachycardia)     WPW syndrome        Past Surgical History:   Procedure Laterality Date    ANGIOGRAPHY OF AORTIC ARCH N/A 9/3/2020    Procedure: AORTOGRAM, AORTIC ARCH;  Surgeon: Stephania Crump MD;  Location: [facility] CATH LAB;  Service: Cardiology;  Laterality: N/A;    FONTAN PROCEDURE, EXTRACARDIAC      LEFT HEART CATHETERIZATION Left 9/3/2020    Procedure: Left heart cath;  Surgeon: Stephania Crump MD;  Location: [facility] CATH LAB;  Service: Cardiology;  Laterality: Left;    RIGHT HEART CATHETERIZATION FOR CONGENITAL HEART DEFECT N/A 9/3/2020    Procedure: CATHETERIZATION, HEART, RIGHT, FOR CONGENITAL HEART DEFECT;  Surgeon: Stephania Crump MD;  Location: [facility] CATH LAB;  Service: Cardiology;  Laterality: N/A;  [facility] - needs covid test morning of. Extenuating circumstances    TRANSESOPHAGEAL ECHOCARDIOGRAPHY N/A 11/10/2022    Procedure: ECHOCARDIOGRAM, TRANSESOPHAGEAL;  Surgeon: Emeterio Hall MD;  Location: [facility] EP LAB;  Service: Cardiology;  Laterality: N/A;    TREATMENT OF CARDIAC ARRHYTHMIA N/A 1/9/2021    Procedure: CARDIOVERSION;  Surgeon: Jim Mcginnis MD;  Location: [facility] CATH LAB;  Service: Cardiology;  Laterality: N/A;       Review of patient's allergies indicates:  No Known Allergies    Current Facility-Administered Medications on File Prior to Encounter   Medication    [DISCONTINUED] acetaminophen tablet 650 mg    [DISCONTINUED] dextrose 50% injection 12.5 g    [DISCONTINUED] dextrose 50% injection 25 g    [DISCONTINUED] famotidine (PF) injection 20 mg    [DISCONTINUED] furosemide injection 40 mg    [DISCONTINUED] glucagon (human recombinant) injection 1 mg    [DISCONTINUED] glucose chewable tablet 16 g    [DISCONTINUED] glucose chewable tablet 24 g    [DISCONTINUED] heparin 25,000 units in dextrose 5%  (100 units/ml) IV bolus from bag LOW INTENSITY nomogram - [facility]    [DISCONTINUED] heparin 25,000 units in dextrose 5% (100 units/ml) IV bolus from bag LOW INTENSITY nomogram - [facility]    [DISCONTINUED] heparin 25,000 units in dextrose 5% 250 mL (100 units/mL) infusion LOW INTENSITY nomogram - [facility]    [DISCONTINUED] lisinopriL tablet 10 mg    [DISCONTINUED] melatonin tablet 6 mg    [DISCONTINUED] naloxone 0.4 mg/mL injection 0.02 mg    [DISCONTINUED] ondansetron injection 4 mg    [DISCONTINUED] polyethylene glycol packet 17 g    [DISCONTINUED] senna-docusate 8.6-50 mg per tablet 1 tablet    [DISCONTINUED] sodium chloride 0.9% flush 5 mL     Current Outpatient Medications on File Prior to Encounter   Medication Sig    ibuprofen (ADVIL,MOTRIN) 800 MG tablet Take 800 mg by mouth 3 (three) times daily.    [DISCONTINUED] benazepriL (LOTENSIN) 10 MG tablet Take 1 tablet (10 mg total) by mouth once daily.    [DISCONTINUED] benazepriL (LOTENSIN) 10 MG tablet Take 1 tablet (10 mg total) by mouth once daily.    [DISCONTINUED] benzonatate (TESSALON) 200 MG capsule Take 200 mg by mouth 3 (three) times daily as needed for Cough.    [DISCONTINUED] famotidine (PEPCID) 20 MG tablet Take 1 tablet (20 mg total) by mouth 2 (two) times daily.    [DISCONTINUED] ondansetron (ZOFRAN) 4 MG tablet Take 1 tablet (4 mg total) by mouth every 6 (six) hours.    [DISCONTINUED] ondansetron (ZOFRAN-ODT) 4 MG TbDL Take 1 tablet (4 mg total) by mouth every 6 (six) hours as needed (nausea, vomiting).    [DISCONTINUED] XARELTO 20 mg Tab TAKE 1 TABLET BY MOUTH DAILY WITH DINNER OR EVENING MEAL     Family History       Problem Relation (Age of Onset)    Heart disease Maternal Grandfather    No Known Problems Mother, Father, Sister, Brother, Maternal Aunt, Maternal Uncle, Paternal Aunt, Paternal Uncle, Maternal Grandmother, Paternal Grandmother, Paternal Grandfather          Social History     Social History Narrative    Not on file     Review of Systems  The  review of systems is as noted above. It is otherwise negative for other symptoms related to the general, neurological, psychiatric, endocrine, gastrointestinal, genitourinary, respiratory, dermatologic, musculoskeletal, hematologic, and immunologic systems.    Objective:     Vital Signs (Most Recent):  Temp: 97.7 °F (36.5 °C) (02/08/25 1101)  Pulse: 93 (02/08/25 1401)  Resp: (!) 25 (02/08/25 1401)  BP: 107/83 (02/08/25 1401)  SpO2: (!) 89 % (02/08/25 1401) Vital Signs (24h Range):  Temp:  [97.5 °F (36.4 °C)-98.5 °F (36.9 °C)] 97.7 °F (36.5 °C)  Pulse:  [] 93  Resp:  [12-45] 25  SpO2:  [85 %-100 %] 89 %  BP: ()/() 107/83     Weight: 62.2 kg (137 lb 2 oz)  Body mass index is 22.13 kg/m².    SpO2: (!) 89 %         Intake/Output Summary (Last 24 hours) at 2/8/2025 1506  Last data filed at 2/8/2025 0600  Gross per 24 hour   Intake 45.31 ml   Output 300 ml   Net -254.69 ml       Lines/Drains/Airways       Peripheral Intravenous Line  Duration                  Peripheral IV - Single Lumen 02/06/25 22 G Left Hand 2 days         Peripheral IV - Single Lumen 02/08/25 0300 20 G Right Forearm <1 day                       Physical Exam     GENERAL: Awake, well-developed well-nourished, no apparent distress.  On 2 L of oxygen, saturations around 88%.  HEENT: Mucous membranes moist and pink, normocephalic atraumatic, no cranial or carotid bruits, sclera anicteric, EOMI  NECK: No jugular venous distention, no thyromegaly, no lymphadenopathy  CHEST: Good air movement, clear to auscultation bilaterally. Well healed sternotomy.  CARDIOVASCULAR: Quiet precordium, regular rate and rhythm, S1 with single S2, 2/6 systolic murmur heard at the apex.    ABDOMEN: Soft, nontender nondistended, no hepatosplenomegaly, no aortic bruits.  No tenderness.  Liver about 2 cm below the right costal margin.  EXTREMITIES: Warm well perfused, 2+ radial/femoral/pedal pulses, capillary refill 2 seconds, no cyanosis or edema.   NEURO: Alert  and oriented, cooperative with exam, face symmetric, moves all extremities well.  CMP  Sodium   Date Value Ref Range Status   02/08/2025 140 136 - 145 mmol/L Final     Potassium   Date Value Ref Range Status   02/08/2025 4.3 3.5 - 5.1 mmol/L Final     Chloride   Date Value Ref Range Status   02/08/2025 105 95 - 110 mmol/L Final     CO2   Date Value Ref Range Status   02/08/2025 25 23 - 29 mmol/L Final     Glucose   Date Value Ref Range Status   02/08/2025 94 70 - 110 mg/dL Final     BUN   Date Value Ref Range Status   02/08/2025 11 6 - 20 mg/dL Final     Creatinine   Date Value Ref Range Status   02/08/2025 0.8 0.5 - 1.4 mg/dL Final     Calcium   Date Value Ref Range Status   02/08/2025 8.2 (L) 8.7 - 10.5 mg/dL Final     Total Protein   Date Value Ref Range Status   02/08/2025 6.1 6.0 - 8.4 g/dL Final     Albumin   Date Value Ref Range Status   02/08/2025 3.4 (L) 3.5 - 5.2 g/dL Final     Total Bilirubin   Date Value Ref Range Status   02/08/2025 1.7 (H) 0.1 - 1.0 mg/dL Final     Comment:     For infants and newborns, interpretation of results should be based  on gestational age, weight and in agreement with clinical  observations.    Premature Infant recommended reference ranges:  Up to 24 hours.............<8.0 mg/dL  Up to 48 hours............<12.0 mg/dL  3-5 days..................<15.0 mg/dL  6-29 days.................<15.0 mg/dL       Alkaline Phosphatase   Date Value Ref Range Status   02/08/2025 40 40 - 150 U/L Final     AST   Date Value Ref Range Status   02/08/2025 16 10 - 40 U/L Final     ALT   Date Value Ref Range Status   02/08/2025 20 10 - 44 U/L Final     Anion Gap   Date Value Ref Range Status   02/08/2025 10 8 - 16 mmol/L Final     eGFR   Date Value Ref Range Status   02/08/2025 >60.0 >60 mL/min/1.73 m^2 Final     Lab Results   Component Value Date    WBC 6.07 02/08/2025    HGB 15.6 02/08/2025    HCT 45.8 02/08/2025    MCV 89 02/08/2025     (L) 02/08/2025       BNP   Date Value Ref Range Status    02/08/2025 379 (H) 0 - 99 pg/mL Final     Comment:     Values of less than 100 pg/ml are consistent with non-CHF populations.      EKGs reviewed.  Sinus rhythm with rate in the 105-110 range noted.  Telemetry reviewed.  No significant arrhythmias noted.    Results for orders placed during the hospital encounter of 02/05/25    Echo    Interpretation Summary  LIMITED STUDY FOR EVALUATION OF SINGLE VENTRICULAR FUNCTION AND VALVULAR DISEASE    CONGENITAL CARDIAC HISTORY:  Tricuspid atresia, pulmonary atresia  and WPW.  S/P Systemic to pulmonary artery shunt - [facility].  S/P Bidirectional Jesus -- [facility].  S/P Lateral tunnel Fontan procedure - [facility].  S/P EP study with trans-septal puncture November 2022  S/P Ablation of a left lateral accessory pathway November 2022 by Dr. Ferguson      SEGMENTAL CARDIAC CONNECTIONS (previously demonstrated):  Abdominal situs is solitus.  Atrial situs is normal.  Imaging consistent with tricuspid atresia.  Tricuspid atresia.  Mitral valve appears normal in structure.  LV dilation.  Ventriculoarterial concordance.  Ventricular loop: D-loop.  Cardiac position is levocardia.  Left aortic arch branching.      IMPRESSION:  Imaging consistent with diagnosis of tricuspid atresia S/P lateral tunnel Fontan - study remarkable for severely decreased systolic function of the single ventricle, worsened compared to prior.  Severely enlarged ventricle.    Pulmonary circulation not visualized  Mildly dilated inferior vena cava - connection to Fontan not visualized.  No apparent thrombus noted in the IVC  Right superior vena cava not visualized    No evidence of obstruction at Fontan on limited imaging.  Fibrinous material noted, which may be degenerated surgical material or  congenital material, less likely to be layered thrombus. Correlate clinically.    The Jesus anastomosis to the pulmonary arteries is not visualized on this echo.    Pulmonary confluence and proximal pulmonary branches not  demonstrated.  At least two pulmonary veins demonstrated returning to the left atrium with no evidence for obstruction.    No right atrium, right ventricle, or tricuspid valve visualized  There is no evidence for pulmonary valve.    At least mild dilation of the left atrium.    Mildly dilated mitral valve with color Doppler demonstrating moderate regurgitation.  Single ventricle consistent with left ventricular morphology.  There is at sever dilation of the left ventricle.  LVEDV index is 96 cc/m2    Left ventricular systolic function qualitatively severely compromised with ejection fraction estimated 10-15 %.    There is a large aortic annulus with trileaflet aortic valve, no stenosis and mild  insufficiency.  Aortic dimensions:  Sinuses of Valsalva  = 51 mm.  Ascending aorta       = not well demonstrated.    No obvious pericardial effusion.    Recent CT scan reviewed.  Liver ultrasound from February 6, 2025 reviewed.  No evidence of hepatocellular carcinoma.

## 2025-02-08 NOTE — ASSESSMENT & PLAN NOTE
"Patient with congenital heart disease status-post fontan procedure presenting with dyspnea and noted drop in EF to 10-15% from 30-35% previously. He does admit reduced compliance to home ACEi (benazapril) as it "makes him sleepy" and hinders him from work. No concern for ACS at this time. Briefly initiated on diuresis but HELD given Fontan physiology is somewhat pre-load dependent. Currently stable on room air.    - Adult congenital cardiology consulted; appreciate recommendations  - Initiate GDMT ad tolerated with metoprolol, lisinopril, spironolactone for now  - Monitor UOP    "

## 2025-02-08 NOTE — ASSESSMENT & PLAN NOTE
History of WPW, SVT status-post ablation in 2022. He presented to OSH with atrial flutter requiring metoprolol. He is Rx'ed xarelto at home.    - Continue metoprolol (also GDMT)  - Continue home Xarelto  - Telemetry

## 2025-02-08 NOTE — PT/OT/SLP EVAL
Occupational Therapy   Co-Evaluation and Discharge Note    Name: Juani Reilly Jr.  MRN: [id number]  Admitting Diagnosis: Acute on chronic systolic congestive heart failure  Recent Surgery: * No surgery found *      Recommendations:     Discharge Recommendations: No Therapy Indicated  Discharge Equipment Recommendations: none  Barriers to discharge:  None    Assessment:     Juani Reilly Jr. is a 29 y.o. male with a medical diagnosis of Acute on chronic systolic congestive heart failure. At this time, patient is functioning at their prior level of function and does not require further acute OT services. Pt benefits from co-treatment with PT to accommodate pt's activity tolerance and progression with therapy.      Plan:     During this hospitalization, patient does not require further acute OT services.  Please re-consult if situation changes.    Plan of Care Reviewed with: patient, mother, grandparent    Subjective     Chief Complaint: denies  Patient/Family Comments/goals: to go home    Occupational Profile:  Living Environment:  Living Environment: Patient lives with their grandmother  in a single story house with number of outside stair(s): 0, tub-shower combo, and grab bars.  Prior Level of Function: Prior to admission, patient was independent and working up until ~1 week ago .  Equipment Used at Home: none.  DME owned (not currently used): none  Assistance Upon Discharge: grandmother      Pain/Comfort:  Pain Rating 1: 0/10  Pain Rating Post-Intervention 1: 0/10    Patients cultural, spiritual, Restorationism conflicts given the current situation: no    Objective:     Communicated with: RN prior to session.  Patient found supine with blood pressure cuff, pulse ox (continuous), telemetry, peripheral IV, oxygen upon OT entry to room.    General Precautions: Standard, fall  Orthopedic Precautions:    Braces:        Occupational Performance:    Bed Mobility:    Patient completed Supine to Sit with  independence    Functional Mobility/Transfers:  Patient completed Sit <> Stand Transfer with independence  with  no assistive device   Functional Mobility: SBA progressing to (I)around room    Activities of Daily Living:  Independent with all ADL    Cognitive/Visual Perceptual:  Oriented to: Person, Place, Time and Situation  Follows Commands/attention: Follows multistep  commands  Communication: clear/fluent  Memory:  No Deficits noted  Safety awareness/insight to disability: intact  Coping skills/emotional control: Appropriate to situation    Physical Exam:  Postural examination/scapula alignment:    -       No postural abnormalities identified  Sensation:    -       Intact  Upper Extremity Range of Motion:     -       Right Upper Extremity: WFL  -       Left Upper Extremity: WFL  Upper Extremity Strength:    -       Right Upper Extremity: WFL  -       Left Upper Extremity: WFL   Strength:    -       Right Upper Extremity: WFL  -       Left Upper Extremity: WFL  Fine Motor Coordination:    -       Intact  Gross motor coordination:   WFL      AMPAC 6 Click ADL:  AMPAC Total Score: 24    Treatment & Education:  Pt ed on  OT POC  Pt ed on continued ADL and mobility with staff while in ICU  Pt ed on ROM ex's daily for increased overall strength and endurance to reduce risk of hospital acquired weakness  Pt ed on safety with ADL and fall risk  Reinforced use of call button to contact nursing staff for assistance with mobility    Patient left sitting edge of bed with all lines intact, call button in reach, RN notified, and family present    GOALS:   Multidisciplinary Problems       Occupational Therapy Goals       Not on file              Multidisciplinary Problems (Resolved)          Problem: Occupational Therapy    Goal Priority Disciplines Outcome Interventions   Occupational Therapy Goal   (Resolved)     OT, PT/OT Met                        DME Justifications:  No DME recommended requiring DME  justifications    History:     Past Medical History:   Diagnosis Date    Acute decompensated heart failure 2/6/2025    History of heart surgery     Other cirrhosis of liver 11/12/2020    SVT (supraventricular tachycardia)     WPW syndrome          Past Surgical History:   Procedure Laterality Date    ANGIOGRAPHY OF AORTIC ARCH N/A 9/3/2020    Procedure: AORTOGRAM, AORTIC ARCH;  Surgeon: Stephania Crump MD;  Location: [facility] CATH LAB;  Service: Cardiology;  Laterality: N/A;    FONTAN PROCEDURE, EXTRACARDIAC      LEFT HEART CATHETERIZATION Left 9/3/2020    Procedure: Left heart cath;  Surgeon: Stephania Crump MD;  Location: [facility] CATH LAB;  Service: Cardiology;  Laterality: Left;    RIGHT HEART CATHETERIZATION FOR CONGENITAL HEART DEFECT N/A 9/3/2020    Procedure: CATHETERIZATION, HEART, RIGHT, FOR CONGENITAL HEART DEFECT;  Surgeon: Stephania Crump MD;  Location: [facility] CATH LAB;  Service: Cardiology;  Laterality: N/A;  [facility] - needs covid test morning of. Extenuating circumstances    TRANSESOPHAGEAL ECHOCARDIOGRAPHY N/A 11/10/2022    Procedure: ECHOCARDIOGRAM, TRANSESOPHAGEAL;  Surgeon: Emeterio Hall MD;  Location: [facility] EP LAB;  Service: Cardiology;  Laterality: N/A;    TREATMENT OF CARDIAC ARRHYTHMIA N/A 1/9/2021    Procedure: CARDIOVERSION;  Surgeon: Jim Mcginnis MD;  Location: [facility] CATH LAB;  Service: Cardiology;  Laterality: N/A;       Time Tracking:     OT Date of Treatment: 02/08/25  OT Start Time: 1131  OT Stop Time: 1147  OT Total Time (min): 16 min    Billable Minutes:Evaluation 8  Self-Care 8    2/8/2025

## 2025-02-08 NOTE — H&P
[facility] - Cardiac Intensive Care  Cardiology  History and Physical     Patient Name: Juani Reilly Jr.  MRN: [id number]  Admission Date: 2/8/2025  Code Status: Full Code   Attending Provider: Markus Salcido MD   Primary Care Physician: Nestor Siu MD  Principal Problem:Acute on chronic systolic congestive heart failure    Patient information was obtained from patient, relative(s), past medical records, and ER records.     Subjective:     Chief Complaint:  Hypoxia     HPI:  29-year-old male with medical history of tricuspid and pulmonary atresia initially palliated with a systemic to pulmonary a. 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     Per OSH documentation, he presented with hypoxia requiring 30L (vapo therm) as well as hypertensive urgency and atrial flutter requiring metoprolol. Echocardiogram showed EF of 10-15 %. He was weaned to room air and transferred for adult congenital heart service evaluation.     Past Medical History:   Diagnosis Date    History of heart surgery     Other cirrhosis of liver 11/12/2020    SVT (supraventricular tachycardia)     WPW syndrome        Past Surgical History:   Procedure Laterality Date    ANGIOGRAPHY OF AORTIC ARCH N/A 9/3/2020    Procedure: AORTOGRAM, AORTIC ARCH;  Surgeon: Stephania Crump MD;  Location: [facility] CATH LAB;  Service: Cardiology;  Laterality: N/A;    FONTAN PROCEDURE, EXTRACARDIAC      LEFT HEART CATHETERIZATION Left 9/3/2020    Procedure: Left heart cath;  Surgeon: Stephania Crump MD;  Location: [facility] CATH LAB;  Service: Cardiology;  Laterality: Left;    RIGHT HEART CATHETERIZATION FOR CONGENITAL HEART DEFECT N/A 9/3/2020    Procedure:  CATHETERIZATION, HEART, RIGHT, FOR CONGENITAL HEART DEFECT;  Surgeon: Stephania Crump MD;  Location: [facility] CATH LAB;  Service: Cardiology;  Laterality: N/A;  [facility] - needs covid test morning of. Extenuating circumstances    TRANSESOPHAGEAL ECHOCARDIOGRAPHY N/A 11/10/2022    Procedure: ECHOCARDIOGRAM, TRANSESOPHAGEAL;  Surgeon: Emeterio Hall MD;  Location: [facility] EP LAB;  Service: Cardiology;  Laterality: N/A;    TREATMENT OF CARDIAC ARRHYTHMIA N/A 1/9/2021    Procedure: CARDIOVERSION;  Surgeon: Jim Mcginnis MD;  Location: [facility] CATH LAB;  Service: Cardiology;  Laterality: N/A;       Review of patient's allergies indicates:  No Known Allergies    Current Facility-Administered Medications on File Prior to Encounter   Medication    [DISCONTINUED] acetaminophen tablet 650 mg    [DISCONTINUED] dextrose 50% injection 12.5 g    [DISCONTINUED] dextrose 50% injection 25 g    [DISCONTINUED] famotidine (PF) injection 20 mg    [DISCONTINUED] furosemide injection 40 mg    [DISCONTINUED] glucagon (human recombinant) injection 1 mg    [DISCONTINUED] glucose chewable tablet 16 g    [DISCONTINUED] glucose chewable tablet 24 g    [DISCONTINUED] heparin 25,000 units in dextrose 5% (100 units/ml) IV bolus from bag LOW INTENSITY nomogram - [facility]    [DISCONTINUED] heparin 25,000 units in dextrose 5% (100 units/ml) IV bolus from bag LOW INTENSITY nomogram - [facility]    [DISCONTINUED] heparin 25,000 units in dextrose 5% 250 mL (100 units/mL) infusion LOW INTENSITY nomogram - [facility]    [DISCONTINUED] lisinopriL tablet 10 mg    [DISCONTINUED] melatonin tablet 6 mg    [DISCONTINUED] naloxone 0.4 mg/mL injection 0.02 mg    [DISCONTINUED] ondansetron injection 4 mg    [DISCONTINUED] polyethylene glycol packet 17 g    [DISCONTINUED] senna-docusate 8.6-50 mg per tablet 1 tablet    [DISCONTINUED] sodium chloride 0.9% flush 5 mL     Current Outpatient Medications on File Prior to Encounter   Medication Sig    ibuprofen (ADVIL,MOTRIN) 800 MG  tablet Take 800 mg by mouth 3 (three) times daily.    [DISCONTINUED] benazepriL (LOTENSIN) 10 MG tablet Take 1 tablet (10 mg total) by mouth once daily.    [DISCONTINUED] benazepriL (LOTENSIN) 10 MG tablet Take 1 tablet (10 mg total) by mouth once daily.    [DISCONTINUED] benzonatate (TESSALON) 200 MG capsule Take 200 mg by mouth 3 (three) times daily as needed for Cough.    [DISCONTINUED] famotidine (PEPCID) 20 MG tablet Take 1 tablet (20 mg total) by mouth 2 (two) times daily.    [DISCONTINUED] ondansetron (ZOFRAN) 4 MG tablet Take 1 tablet (4 mg total) by mouth every 6 (six) hours.    [DISCONTINUED] ondansetron (ZOFRAN-ODT) 4 MG TbDL Take 1 tablet (4 mg total) by mouth every 6 (six) hours as needed (nausea, vomiting).    [DISCONTINUED] XARELTO 20 mg Tab TAKE 1 TABLET BY MOUTH DAILY WITH DINNER OR EVENING MEAL     Family History       Problem Relation (Age of Onset)    Heart disease Maternal Grandfather    No Known Problems Mother, Father, Sister, Brother, Maternal Aunt, Maternal Uncle, Paternal Aunt, Paternal Uncle, Maternal Grandmother, Paternal Grandmother, Paternal Grandfather          Tobacco Use    Smoking status: Former     Current packs/day: 0.00     Types: Cigarettes     Quit date:      Years since quittin.1     Passive exposure: Past    Smokeless tobacco: Never    Tobacco comments:     3-4 months    Substance and Sexual Activity    Alcohol use: No    Drug use: Yes     Types: Marijuana     Comment: Mojo today- pt reports a while back    Sexual activity: Yes     Review of Systems   Constitutional: Negative for chills, diaphoresis and night sweats.   Cardiovascular:  Positive for irregular heartbeat. Negative for chest pain, dyspnea on exertion, leg swelling, near-syncope and palpitations.   Respiratory:  Positive for shortness of breath. Negative for cough and wheezing.    Skin:  Negative for color change and dry skin.   Musculoskeletal:  Negative for joint pain and joint swelling.    Gastrointestinal:  Negative for abdominal pain, diarrhea, nausea and vomiting.   Genitourinary:  Negative for dysuria.   Neurological:  Negative for dizziness, headaches, light-headedness and weakness.   All other systems reviewed and are negative.    Objective:     Vital Signs (Most Recent):    Vital Signs (24h Range):  Temp:  [97.5 °F (36.4 °C)-98.5 °F (36.9 °C)] 97.7 °F (36.5 °C)  Pulse:  [] 96  Resp:  [12-45] 21  SpO2:  [85 %-100 %] 92 %  BP: ()/() 113/78        Body mass index is 22.13 kg/m².              Intake/Output Summary (Last 24 hours) at 2/8/2025 1145  Last data filed at 2/8/2025 0600  Gross per 24 hour   Intake 45.31 ml   Output 300 ml   Net -254.69 ml       Lines/Drains/Airways       Peripheral Intravenous Line  Duration                  Peripheral IV - Single Lumen 02/05/25 1415 Left;Posterior Hand <1 day         Peripheral IV - Single Lumen 02/05/25 1433 20 G Anterior;Right Forearm <1 day                     Physical Exam  Constitutional:       Appearance: He is ill-appearing (chronic).   HENT:      Head: Normocephalic.      Nose: No rhinorrhea.      Mouth/Throat:      Mouth: Mucous membranes are moist.   Eyes:      General: No scleral icterus.  Cardiovascular:      Rate and Rhythm: Rhythm irregular.   Pulmonary:      Effort: Pulmonary effort is normal.   Abdominal:      General: Abdomen is flat.   Musculoskeletal:         General: No swelling.      Cervical back: Neck supple.   Skin:     Findings: No rash.   Neurological:      Mental Status: He is alert and oriented to person, place, and time.   Psychiatric:         Mood and Affect: Mood normal.          Significant Labs: CMP   Recent Labs   Lab 02/07/25  0235 02/08/25  0333 02/08/25  0846    140  --    K 3.4* 3.1* 4.3    105  --    CO2 25 25  --    GLU 88 94  --    BUN 11 11  --    CREATININE 1.0 0.8  --    CALCIUM 8.2* 8.2*  --    PROT 5.4* 6.1  --    ALBUMIN 3.3* 3.4*  --    BILITOT 1.8* 1.7*  --    ALKPHOS 37* 40  " --    AST 15 16  --    ALT 16 20  --    ANIONGAP 10 10  --     and CBC   Recent Labs   Lab 02/07/25  0235 02/08/25  0333   WBC 5.02 6.07   HGB 13.8* 15.6   HCT 41.6 45.8   * 140*       Significant Imaging:  reviewed  Assessment and Plan:     * Acute on chronic systolic congestive heart failure  Patient with congenital heart disease status-post fontan procedure presenting with dyspnea and noted drop in EF to 10-15% from 30-35% previously. He does admit reduced compliance to home ACEi (benazapril) as it "makes him sleepy" and hinders him from work. No concern for ACS at this time. Briefly initiated on diuresis but HELD given Fontan physiology is somewhat pre-load dependent. Currently stable on room air.    - Adult congenital cardiology consulted; appreciate recommendations  - Initiate GDMT ad tolerated with metoprolol, lisinopril, spironolactone for now  - Monitor UOP      Atrial flutter  History of WPW, SVT status-post ablation in 2022. He presented to OSH with atrial flutter requiring metoprolol. He is Rx'ed xarelto at home.    - Continue metoprolol (also GDMT)  - Continue home Xarelto  - Telemetry    S/P Fontan procedure  History of congenital tricuspid and pulmonary atresia status-post Fontan procedure    WPW syndrome  Status-post ablation (November 2022)        VTE Risk Mitigation (From admission, onward)           Ordered     rivaroxaban tablet 20 mg  With dinner         02/08/25 1025     IP VTE HIGH RISK PATIENT  Once         02/08/25 0236     Place sequential compression device  Until discontinued         02/08/25 0236                    Yenni Smiley MD  Cardiology   [facility] - Cardiac Intensive Care      "

## 2025-02-08 NOTE — ASSESSMENT & PLAN NOTE
1. Tricuspid and pulmonary atresia initially palliated with a systemic to pulmonary artery shunt followed by a bidirectional Jesus and subsequent lateral tunnel Fontan procedure   - small atrial baffle leak along with new Fontan baffle puncture for EP study  - s/p EP study with trans-septal puncture November 2022  - reassuring hemodynamics on 2020 catheterization  - no evidence of protein-losing enteropathy  - no obvious thrombus within his Fontan  2. Decreased left ventricular function.  Baseline ejection fraction around 30%, but much worse right now.  Suspect that this is related to uncontrolled hypertension given his medication noncompliance.  - no evidence of PLE  3. History of Ooetc-Dmqwfkvjw-Pnrzh, SVT  - status post successful ablation of a left lateral accessory pathway November 2022 by Dr. Ferguson  4. Cirrhosis  5. Medication noncompliance     Discussion:   From my standpoint, he looks pretty close to being back to baseline.  His chest x-ray looks great.  Liver imaging shows no evidence of hepatocellular carcinoma, which she is certainly at increased risk for given his known cirrhosis.  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    He has baseline moderately decreased systemic ventricular function, and I suspect the worsening noted on his recent echocardiogram is due to medication noncompliance and poorly controlled hypertension.  He is at increased risk for thrombus with in his Fontan, and given known leaks in his Fontan allowing right-to-left shunting, he is at risk for thromboembolic events from both right-sided thrombus and, given his poor left ventricular function, left-sided thrombus as well.  I stressed the importance of compliance with his medications.    To be clear, he is not a heart transplant candidate given his very poor compliance.  He is not a candidate for ECMO if he had cardiac arrest, but hopefully that will not be a concern.    Recommendations:   1.  Continue current medications  2. Try to wean off oxygen.  I would accept saturations 85% or higher.  3. Could consider a repeat cardiac catheterization if his sats are consistently lower than 85 off of oxygen.  Specific goal of the catheterization would be to look for and, potentially, close any leaks in the Fontan baffle contributing to a right-to-left shunt.  However, we would need to be very careful with this as closing leaks could potentially increase his Fontan pressure and place him at higher risk for developing plastic bronchitis, protein-losing enteropathy, or other sequelae of high central venous pressure.  4. Not a candidate for ECMO.  Not a heart transplant candidate.  5. I stressed the importance of compliance with his medications.

## 2025-02-08 NOTE — HOSPITAL COURSE
Pt was transferred from [facility] due to no beds available at [facility] and was admitted into ICU for closer monitoring. He required vapo therm 30 L with FiO2 80% due to his hypoxic state  with an O2 saturation in the 80's.  His blood pressure was greater than 200 systolic and diastolics in the 120's at the bedside.  He continued to be in Aflutter requiring metoprolol.  An echo was obtained that showed Left ventricular systolic function qualitatively severely compromised with ejection fraction estimated 10-15 %. Pt is currently stable and the his oxygen requirement has since decreased. He is currently on room air.  Patient transferred to CCU at [facility] for closer monitoring in setting of congenital heart condition.

## 2025-02-09 VITALS
DIASTOLIC BLOOD PRESSURE: 93 MMHG | WEIGHT: 137.13 LBS | OXYGEN SATURATION: 86 % | RESPIRATION RATE: 18 BRPM | HEART RATE: 94 BPM | TEMPERATURE: 98 F | HEIGHT: 66 IN | BODY MASS INDEX: 22.04 KG/M2 | SYSTOLIC BLOOD PRESSURE: 131 MMHG

## 2025-02-09 LAB
ALBUMIN SERPL BCP-MCNC: 3.4 G/DL (ref 3.5–5.2)
ALP SERPL-CCNC: 40 U/L (ref 40–150)
ALT SERPL W/O P-5'-P-CCNC: 20 U/L (ref 10–44)
ANION GAP SERPL CALC-SCNC: 10 MMOL/L (ref 8–16)
AST SERPL-CCNC: 16 U/L (ref 10–40)
BILIRUB SERPL-MCNC: 1.6 MG/DL (ref 0.1–1)
BUN SERPL-MCNC: 16 MG/DL (ref 6–20)
CALCIUM SERPL-MCNC: 8.8 MG/DL (ref 8.7–10.5)
CHLORIDE SERPL-SCNC: 104 MMOL/L (ref 95–110)
CO2 SERPL-SCNC: 26 MMOL/L (ref 23–29)
CREAT SERPL-MCNC: 0.9 MG/DL (ref 0.5–1.4)
EST. GFR  (NO RACE VARIABLE): >60 ML/MIN/1.73 M^2
GLUCOSE SERPL-MCNC: 77 MG/DL (ref 70–110)
MAGNESIUM SERPL-MCNC: 2.2 MG/DL (ref 1.6–2.6)
PHOSPHATE SERPL-MCNC: 4.7 MG/DL (ref 2.7–4.5)
POTASSIUM SERPL-SCNC: 3.7 MMOL/L (ref 3.5–5.1)
PROT SERPL-MCNC: 6.4 G/DL (ref 6–8.4)
SODIUM SERPL-SCNC: 140 MMOL/L (ref 136–145)

## 2025-02-09 PROCEDURE — 36415 COLL VENOUS BLD VENIPUNCTURE: CPT | Performed by: NURSE PRACTITIONER

## 2025-02-09 PROCEDURE — 83735 ASSAY OF MAGNESIUM: CPT | Performed by: NURSE PRACTITIONER

## 2025-02-09 PROCEDURE — 80053 COMPREHEN METABOLIC PANEL: CPT | Performed by: NURSE PRACTITIONER

## 2025-02-09 PROCEDURE — 25000003 PHARM REV CODE 250: Performed by: NURSE PRACTITIONER

## 2025-02-09 PROCEDURE — 99238 HOSP IP/OBS DSCHRG MGMT 30/<: CPT | Mod: ,,, | Performed by: INTERNAL MEDICINE

## 2025-02-09 PROCEDURE — 25000003 PHARM REV CODE 250: Performed by: INTERNAL MEDICINE

## 2025-02-09 PROCEDURE — 25000003 PHARM REV CODE 250: Performed by: STUDENT IN AN ORGANIZED HEALTH CARE EDUCATION/TRAINING PROGRAM

## 2025-02-09 PROCEDURE — 25000003 PHARM REV CODE 250

## 2025-02-09 PROCEDURE — 84100 ASSAY OF PHOSPHORUS: CPT | Performed by: NURSE PRACTITIONER

## 2025-02-09 RX ORDER — FUROSEMIDE 20 MG/1
20 TABLET ORAL DAILY PRN
Qty: 90 TABLET | Refills: 2 | Status: SHIPPED | OUTPATIENT
Start: 2025-02-09 | End: 2025-11-06

## 2025-02-09 RX ORDER — POTASSIUM CHLORIDE 750 MG/1
30 CAPSULE, EXTENDED RELEASE ORAL ONCE
Status: COMPLETED | OUTPATIENT
Start: 2025-02-09 | End: 2025-02-09

## 2025-02-09 RX ORDER — FUROSEMIDE 20 MG/1
20 TABLET ORAL
Qty: 60 TABLET | Refills: 11 | Status: SHIPPED | OUTPATIENT
Start: 2025-02-09 | End: 2025-02-09 | Stop reason: HOSPADM

## 2025-02-09 RX ORDER — LISINOPRIL 10 MG/1
10 TABLET ORAL DAILY
Qty: 90 TABLET | Refills: 3 | Status: SHIPPED | OUTPATIENT
Start: 2025-02-10 | End: 2026-02-10

## 2025-02-09 RX ORDER — METOPROLOL SUCCINATE 25 MG/1
25 TABLET, EXTENDED RELEASE ORAL DAILY
Qty: 90 TABLET | Refills: 3 | Status: SHIPPED | OUTPATIENT
Start: 2025-02-10 | End: 2026-02-10

## 2025-02-09 RX ORDER — SPIRONOLACTONE 25 MG/1
25 TABLET ORAL DAILY
Qty: 30 TABLET | Refills: 11 | Status: SHIPPED | OUTPATIENT
Start: 2025-02-10 | End: 2026-02-10

## 2025-02-09 RX ADMIN — METOPROLOL SUCCINATE 25 MG: 25 TABLET, EXTENDED RELEASE ORAL at 08:02

## 2025-02-09 RX ADMIN — SPIRONOLACTONE 25 MG: 25 TABLET, FILM COATED ORAL at 08:02

## 2025-02-09 RX ADMIN — LISINOPRIL 10 MG: 10 TABLET ORAL at 08:02

## 2025-02-09 RX ADMIN — FAMOTIDINE 20 MG: 20 TABLET ORAL at 08:02

## 2025-02-09 RX ADMIN — POTASSIUM CHLORIDE 30 MEQ: 750 CAPSULE, EXTENDED RELEASE ORAL at 08:02

## 2025-02-09 NOTE — PLAN OF CARE
Problem: Adult Inpatient Plan of Care  Goal: Plan of Care Review  Outcome: Progressing  Flowsheets (Taken 2/8/2025 2000)  Plan of Care Reviewed With:   patient   family     Problem: Heart Failure  Goal: Fluid and Electrolyte Balance  Outcome: Progressing  Intervention: Monitor and Manage Fluid and Electrolyte Balance  Flowsheets (Taken 2/8/2025 2000)  Fluid/Electrolyte Management: fluids restricted  Goal: Effective Oxygenation and Ventilation  Outcome: Progressing  Intervention: Optimize Oxygenation and Ventilation  Flowsheets (Taken 2/8/2025 2000)  Airway/Ventilation Management:   airway patency maintained   oxygen saturation monitored and maintained >85%     Problem: Fall Injury Risk  Goal: Absence of Fall and Fall-Related Injury  Outcome: Progressing  Intervention: Identify and Manage Contributors  Flowsheets (Taken 2/8/2025 2000)  Self-Care Promotion: independence encouraged

## 2025-02-09 NOTE — DISCHARGE SUMMARY
[facility] - Cardiology Stepdown  Cardiology  Discharge Summary      Patient Name: Juani Reilly Jr.  MRN: [id number]  Admission Date: 2/8/2025  Hospital Length of Stay: 1 days  Discharge Date and Time:  02/09/2025 11:03 AM  Attending Physician: Markus Salcido MD    Discharging Provider: Yenni Smiley MD  Primary Care Physician: Nestor Siu MD    HPI:   29-year-old male with medical history of tricuspid and pulmonary atresia initially palliated with a systemic to pulmonary a. 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     Per OSH documentation, he presented with hypoxia requiring 30L (vapo therm) as well as hypertensive urgency and atrial flutter requiring metoprolol. Echocardiogram showed EF of 10-15 %. He was weaned to room air and transferred for adult congenital heart service evaluation.       Indwelling Lines/Drains at time of discharge:  Lines/Drains/Airways       None                   Hospital Course:  Patient with history of congenital heart disease status-post Bidirectional Jesus and Fontan procedure admitted for newly further reduced EF noted on OSH echocardiogram and hypoxia. He noted difficulty adhering to benazepril as an outpatient as it caused him somnolence. Initiated on metoprolol, lisinopril and spironolactone. Evaluated by Adult congenital cardiologist team with recommendations to continue above medications and goal saturation of 85%. Continued on previously Rx'ed eliquis (given his history of increased risk of thromboembolic events) but was reportedly in atrial fibrillation on presentation to OSH ED. No noted arrhythmias during admission here. Stable on room air prior to discharge. PRN  low-dose lasix prescribed on discharge with weight gain protocol. Advised to follow up with adult congenital cardiology clinic.     Goals of Care Treatment Preferences:  Code Status: Full Code      Consults:   Consults (From admission, onward)          Status Ordering Provider     Inpatient consult to Adult Congenital Heart Disease  Once        Provider:  (Not yet assigned)    Completed CAMERON DON          Vitals:    02/09/25 0659 02/09/25 0705 02/09/25 0730 02/09/25 1000   BP:   106/70    BP Location:   Left arm    Patient Position:   Lying    Pulse: 89 89 102 101   Resp:   17    Temp:   97.2 °F (36.2 °C)    TempSrc:       SpO2:   99%    Weight:       Height:          Physical Exam  Constitutional:       Appearance: He is ill-appearing (chronic).   HENT:      Head: Normocephalic.      Nose: No rhinorrhea.      Mouth/Throat:      Mouth: Mucous membranes are moist.   Eyes:      General: No scleral icterus.  Cardiovascular:      Rate and Rhythm: normal rate  Pulmonary:      Effort: Pulmonary effort is normal.   Abdominal:      General: Abdomen is flat.   Musculoskeletal:         General: No swelling.      Cervical back: Neck supple.   Skin:     Findings: No rash.   Neurological:      Mental Status: He is alert and oriented to person, place, and time.   Psychiatric:         Mood and Affect: Mood normal    Significant Diagnostic Studies: Labs: CMP   Recent Labs   Lab 02/08/25  0333 02/08/25  0846 02/09/25  0626     --  140   K 3.1* 4.3 3.7     --  104   CO2 25  --  26   GLU 94  --  77   BUN 11  --  16   CREATININE 0.8  --  0.9   CALCIUM 8.2*  --  8.8   PROT 6.1  --  6.4   ALBUMIN 3.4*  --  3.4*   BILITOT 1.7*  --  1.6*   ALKPHOS 40  --  40   AST 16  --  16   ALT 20  --  20   ANIONGAP 10  --  10    and CBC   Recent Labs   Lab 02/08/25  0333   WBC 6.07   HGB 15.6   HCT 45.8   *       Pending Diagnostic Studies:       None            Final Active Diagnoses:    Diagnosis Date Noted POA    PRINCIPAL  PROBLEM:  Acute on chronic systolic congestive heart failure [I50.23] 02/08/2025 Unknown    Atrial flutter [I48.92] 02/08/2025 Unknown    S/P Fontan procedure [Z98.890] 10/03/2019 Not Applicable    WPW syndrome [I45.6] 10/01/2019 Yes     Chronic      Problems Resolved During this Admission:       Discharged Condition: stable    Disposition: Home or Self Care      Patient Instructions:      Ambulatory referral/consult to Adult Congenital Cardiology Clinic   Standing Status: Future   Referral Priority: Routine Referral Type: Consultation   Referral Reason: Specialty Services Required   Requested Specialty: Cardiology   Number of Visits Requested: 1     Medications:  Reconciled Home Medications:      Medication List        START taking these medications      furosemide 20 MG tablet  Commonly known as: LASIX  Take 1 tablet (20 mg total) by mouth daily as needed (if weight gain of more than 2 lbs in a day or 5 lbs in a week).     lisinopriL 10 MG tablet  Take 1 tablet (10 mg total) by mouth once daily.  Start taking on: February 10, 2025     metoprolol succinate 25 MG 24 hr tablet  Commonly known as: TOPROL-XL  Take 1 tablet (25 mg total) by mouth once daily.  Start taking on: February 10, 2025     rivaroxaban 20 mg Tab  Commonly known as: XARELTO  Take 1 tablet (20 mg total) by mouth daily with dinner or evening meal.     spironolactone 25 MG tablet  Commonly known as: ALDACTONE  Take 1 tablet (25 mg total) by mouth once daily.  Start taking on: February 10, 2025            STOP taking these medications      ibuprofen 800 MG tablet  Commonly known as: ADVIL,MOTRIN              Time spent on the discharge of patient: 45 minutes    Yenni Smiley MD  Cardiology  [facility] Cardiology [facility]

## 2025-02-09 NOTE — NURSING
Patient is ready for discharge. Patient stable alert and oriented. IVs removed. No complaints of pain. Discussed discharge plan. Reviewed medications and side effects, appointments, and answered questions with patient and family. RX delivered to patient at bedside.

## 2025-02-09 NOTE — PLAN OF CARE
[facility] - Cardiology Stepdown  Initial Discharge Assessment       Primary Care Provider: Nestor Siu MD    Admission Diagnosis: Hypoxia [R09.02]  Decompensated heart failure [I50.9]    Admission Date: 2/8/2025  Expected Discharge Date: 2/9/2025    Transition of Care Barriers: (P) None    Payor: MEDICAID / Plan: [place] [employer] / Product Type: Managed Medicaid /     Extended Emergency Contact Information  Primary Emergency Contact: Amita Reilly  Address: [address] [address] [place]  Home Phone: [phone]  Mobile Phone: [phone]  Relation: Mother  Secondary Emergency Contact: Elaina Epstein  Mobile Phone: [phone]  Relation: Grandparent  Preferred language: English   needed? No    Discharge Plan A: (P) Home with family  Discharge Plan B: (P) Home      [id number] - [place] - [address] AT [facility] [address]  [address]  [place] [address]  Phone: [phone] Fax: [phone]      Initial Assessment (most recent)       Adult Discharge Assessment - 02/09/25 1248          Discharge Assessment    Assessment Type Discharge Planning Assessment (P)      Confirmed/corrected address, phone number and insurance Yes (P)      Confirmed Demographics Correct on Facesheet (P)      Source of Information patient;family (P)      Communicated ROJAS with patient/caregiver Yes (P)      Reason For Admission Congestive Heart Failure (P)      People in Home grandparent(s) (P)      Do you expect to return to your current living situation? Yes (P)      Do you have help at home or someone to help you manage your care at home? Yes (P)      Who are your caregiver(s) and their phone number(s)? Mother & Grandmother (P)      Prior to hospitilization cognitive status: Alert/Oriented (P)      Current cognitive status: Alert/Oriented (P)      Walking or Climbing Stairs Difficulty no (P)      Dressing/Bathing Difficulty no  (P)      Equipment Currently Used at Home none (P)      Readmission within 30 days? Yes (P)      Patient currently being followed by outpatient case management? No (P)      Do you currently have service(s) that help you manage your care at home? No (P)      Do you take prescription medications? Yes (P)      Do you have prescription coverage? Yes (P)      Coverage Medicaid-[place] [facility] (P)      Do you have any problems affording any of your prescribed medications? No (P)      Is the patient taking medications as prescribed? yes (P)      Who is going to help you get home at discharge? Mother & Grandmother (P)      How do you get to doctors appointments? car, drives self;family or friend will provide (P)      Are you on dialysis? No (P)      Do you take coumadin? No (P)      Discharge Plan A Home with family (P)      Discharge Plan B Home (P)      DME Needed Upon Discharge  none (P)      Discharge Plan discussed with: Patient;Parent(s) (P)      Name(s) and Number(s) MotherAmita (c) [phone] (P)      Transition of Care Barriers None (P)         Physical Activity    On average, how many days per week do you engage in moderate to strenuous exercise (like a brisk walk)? 0 days (P)      On average, how many minutes do you engage in exercise at this level? 0 min (P)         Financial Resource Strain    How hard is it for you to pay for the very basics like food, housing, medical care, and heating? Not very hard (P)         Housing Stability    In the last 12 months, was there a time when you were not able to pay the mortgage or rent on time? No (P)      At any time in the past 12 months, were you homeless or living in a shelter (including now)? No (P)         Transportation Needs    Has the lack of transportation kept you from medical appointments, meetings, work or from getting things needed for daily living? No (P)         Food Insecurity    Within the past 12 months, you worried that your  food would run out before you got the money to buy more. Never true (P)      Within the past 12 months, the food you bought just didn't last and you didn't have money to get more. Never true (P)         Stress    Do you feel stress - tense, restless, nervous, or anxious, or unable to sleep at night because your mind is troubled all the time - these days? Not at all (P)         Social Isolation    How often do you feel lonely or isolated from those around you?  Never (P)         Alcohol Use    Q1: How often do you have a drink containing alcohol? Never (P)      Q2: How many drinks containing alcohol do you have on a typical day when you are drinking? Patient does not drink (P)      Q3: How often do you have six or more drinks on one occasion? Never (P)         Utilities    In the past 12 months has the electric, gas, oil, or water company threatened to shut off services in your home? No (P)         Health Literacy    How often do you need to have someone help you when you read instructions, pamphlets, or other written material from your doctor or pharmacy? Never (P)         OTHER    Name(s) of People in Home Grandmother, Elaina Epstein (P)                    SW met with pt and mother, Amita at bedside to complete Initial Discharge Planning Assessment. Pt. And grandmother, Elaina live in grandmother's home in [place], [place]. The home has no steps/stairs. No DME, Home Health, Dialysis or Coumadin. Pt. Drives, but mother will provide transportation home upon discharge. ROJAS: 02/09/25.    Discharge Plan A and Plan B have been determined by review of patient's clinical status, future medical and therapeutic needs, and coverage/benefits for post-acute care in coordination with multidisciplinary team members.      Sammy Bean LMSW

## 2025-02-09 NOTE — NURSING
2000  Assessment completed, see flowsheets.  Denies pain, nausea, and/or SOB. RA sats 80-82%.  Placed on 1L NC with improvement to 89%.  Ambulatory with steady gait. Tele showing SR/ST. Discussed POC for this shift with pt and family.  Verbalize understanding with no questions or concerns voiced. Call light within reach, will continue to monitor.     2220  Just back to bed from bathroom.  Continuous pulse ox connected with sats noted to be 77%-80%-not wearing O2.  When reapplied sats only increased 80%-81%.  Increased oxygen to 2L NC with improvement to 89%-92%.  Does not want tubing extended to wear when walks to bathroom (doesn't like). Will continue to monitor/attempt to wean    0146   Resting quietly, resp even and unlabored. Oxygen sats 97-98%-decreased to 0.5L at this time. No current needs voiced, will continue to monitor.     0556   Uneventful shift.  Previous assessment remains unchanged.  Pt sleeping quietly with no sign of pain or acute distress noted.  Pt not awakened at this time.  Will continue to monitor and report to oncoming shift.

## 2025-02-09 NOTE — HOSPITAL COURSE
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 He noted difficulty adhering to benazepril as an outpatient as it caused him somnolence. Initiated on metoprolol, lisinopril and spironolactone. Evaluated by Adult congenital cardiologist team with recommendations to continue above medications and goal saturation of 85%. Continued on previously Rx'ed eliquis (given his history of increased risk of thromboembolic events) but was reportedly in atrial fibrillation on presentation to OSH ED. No noted arrhythmias during admission here. Stable on room air prior to discharge. PRN low-dose lasix prescribed on discharge with weight gain protocol. Advised to follow up with adult congenital cardiology clinic.

## 2025-02-10 LAB
OHS QRS DURATION: 94 MS
OHS QRS DURATION: 98 MS
OHS QTC CALCULATION: 478 MS
OHS QTC CALCULATION: 485 MS
OHS QTC CALCULATION: 485 MS
OHS QTC CALCULATION: 497 MS

## 2025-02-10 NOTE — PLAN OF CARE
Pt free of falls/trauma/injuries.  Denies c/o SOB, CP, or discomfort.  Generalized skin remains CDI;  remains stable.  Electrolytes replaced as ordered.  Pt tolerating plan of care.       Problem: Adult Inpatient Plan of Care  Goal: Plan of Care Review  Outcome: Progressing  Goal: Patient-Specific Goal (Individualized)  Outcome: Progressing  Goal: Absence of Hospital-Acquired Illness or Injury  Outcome: Progressing  Goal: Optimal Comfort and Wellbeing  Outcome: Progressing  Goal: Readiness for Transition of Care  Outcome: Progressing     Problem: Heart Failure  Goal: Optimal Coping  Outcome: Progressing  Goal: Optimal Cardiac Output  Outcome: Progressing  Goal: Stable Heart Rate and Rhythm  Outcome: Progressing  Goal: Optimal Functional Ability  Outcome: Progressing  Goal: Fluid and Electrolyte Balance  Outcome: Progressing  Goal: Improved Oral Intake  Outcome: Progressing  Goal: Effective Oxygenation and Ventilation  Outcome: Progressing  Goal: Effective Breathing Pattern During Sleep  Outcome: Progressing     Problem: Fall Injury Risk  Goal: Absence of Fall and Fall-Related Injury  Outcome: Progressing     Problem: Gas Exchange Impaired  Goal: Optimal Gas Exchange  Outcome: Progressing

## 2025-02-11 ENCOUNTER — TELEPHONE (OUTPATIENT)
Dept: CARDIOLOGY | Facility: CLINIC | Age: 30
End: 2025-02-11
Payer: MEDICAID

## 2025-02-11 LAB
BACTERIA BLD CULT: NORMAL
BACTERIA BLD CULT: NORMAL

## 2025-02-11 NOTE — TELEPHONE ENCOUNTER
Appt scheduled for Feb 20 start time 12:45 PM, left all information on voicemail including call back name and number. Appt slips mailed to address on file.  ----- Message from Yas Lyman sent at 2/11/2025 10:23 AM CST -----  Regarding: FW: New Patient  Contact: Patient Grandmother Elaina    ----- Message -----  From: Deirdre Stephen  Sent: 2/11/2025  10:21 AM CST  To: [facility] Cardiology Clinical Support Staff  Subject: New Patient                                      Type:  Needs Medical Advice    Who Called: Elaina (Grandmother)  Symptoms (please be specific): n/a    How long has patient had these symptoms:  n/a   Pharmacy name and phone #:  n/a   Would the patient rather a call back or a response via [facility]? Call back   Best Call Back Number:  [phone]  Additional Information: A referral was submitted for patient to be seen by Adult Congenital Cardiology Clinic due to  Decompensated heart failure Osorioia is requesting a call back for further assistance

## 2025-02-12 LAB
OHS QRS DURATION: 98 MS
OHS QTC CALCULATION: 482 MS

## 2025-02-17 ENCOUNTER — OFFICE VISIT (OUTPATIENT)
Dept: FAMILY MEDICINE | Facility: HOSPITAL | Age: 30
End: 2025-02-17
Payer: MEDICAID

## 2025-02-17 VITALS
WEIGHT: 141.75 LBS | OXYGEN SATURATION: 85 % | SYSTOLIC BLOOD PRESSURE: 131 MMHG | HEIGHT: 66 IN | DIASTOLIC BLOOD PRESSURE: 88 MMHG | HEART RATE: 107 BPM | BODY MASS INDEX: 22.78 KG/M2

## 2025-02-17 DIAGNOSIS — Q24.9 ADULT CONGENITAL HEART DISEASE: Primary | ICD-10-CM

## 2025-02-17 DIAGNOSIS — Z71.89 ADVANCE CARE PLANNING: ICD-10-CM

## 2025-02-17 DIAGNOSIS — Q22.4 SINGLE VENTRICLE WITH TRICUSPID ATRESIA: ICD-10-CM

## 2025-02-17 DIAGNOSIS — Z98.890 S/P FONTAN PROCEDURE: ICD-10-CM

## 2025-02-17 DIAGNOSIS — Q20.4 SINGLE VENTRICLE WITH TRICUSPID ATRESIA: ICD-10-CM

## 2025-02-17 PROCEDURE — 99213 OFFICE O/P EST LOW 20 MIN: CPT

## 2025-02-20 ENCOUNTER — HOSPITAL ENCOUNTER (OUTPATIENT)
Dept: CARDIOLOGY | Facility: CLINIC | Age: 30
Discharge: HOME | End: 2025-02-20
Payer: MEDICAID

## 2025-02-20 ENCOUNTER — OFFICE VISIT (OUTPATIENT)
Dept: CARDIOLOGY | Facility: CLINIC | Age: 30
End: 2025-02-20
Payer: MEDICAID

## 2025-02-20 VITALS
HEART RATE: 118 BPM | BODY MASS INDEX: 23.77 KG/M2 | HEIGHT: 66 IN | SYSTOLIC BLOOD PRESSURE: 141 MMHG | WEIGHT: 147.94 LBS | DIASTOLIC BLOOD PRESSURE: 99 MMHG | OXYGEN SATURATION: 84 %

## 2025-02-20 DIAGNOSIS — I50.9 DECOMPENSATED HEART FAILURE: ICD-10-CM

## 2025-02-20 DIAGNOSIS — Q24.9 ADULT CONGENITAL HEART DISEASE: ICD-10-CM

## 2025-02-20 DIAGNOSIS — Q22.4 SINGLE VENTRICLE WITH TRICUSPID ATRESIA: ICD-10-CM

## 2025-02-20 DIAGNOSIS — Z98.890 S/P FONTAN PROCEDURE: ICD-10-CM

## 2025-02-20 DIAGNOSIS — I10 ESSENTIAL HYPERTENSION: ICD-10-CM

## 2025-02-20 DIAGNOSIS — Z98.890 S/P FONTAN PROCEDURE: Primary | ICD-10-CM

## 2025-02-20 DIAGNOSIS — R00.0 TACHYCARDIA: ICD-10-CM

## 2025-02-20 DIAGNOSIS — Q20.4 SINGLE VENTRICLE WITH TRICUSPID ATRESIA: ICD-10-CM

## 2025-02-20 LAB
OHS QRS DURATION: 96 MS
OHS QTC CALCULATION: 460 MS

## 2025-02-20 PROCEDURE — 99213 OFFICE O/P EST LOW 20 MIN: CPT | Mod: PBBFAC

## 2025-02-20 PROCEDURE — 93005 ELECTROCARDIOGRAM TRACING: CPT | Mod: PBBFAC | Performed by: INTERNAL MEDICINE

## 2025-02-20 PROCEDURE — 99999 PR PBB SHADOW E&M-EST. PATIENT-LVL III: CPT | Mod: PBBFAC,,,

## 2025-02-20 RX ORDER — LISINOPRIL 20 MG/1
20 TABLET ORAL DAILY
Qty: 90 TABLET | Refills: 3 | Status: SHIPPED | OUTPATIENT
Start: 2025-02-20 | End: 2026-02-20

## 2025-02-20 NOTE — PROGRESS NOTES
2025     re:Juani Reilly Jr.  :1995    Dr. Abebe Ferguson    [facility]     Dear Dr. Ferguson:    Juani Reilly Jr. is a 29 y.o. male seen in the [facility] clinic today for follow up after being discharged on 25 from the hospital for newly further reduced EF noted on OSH echocardiogram and hypoxia. To summarize his diagnoses are as follow:  1. 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 Decreased left ventricular function  - no evidence of PLE  3. History of Urmca-Uanguofyl-Irvkd, SVT  - status post successful ablation of a left lateral accessory pathway 2022 by Dr. Ferguson  4. Cirrhosis  - last seen by hepatology May 2023  5. Medication noncompliance   6. marijuana use     To summarize, my recommendations are as follows:  1. SBE prophylaxis is definitely indicated  2. I stressed the importance of taking his medications.  Continue all of his current meds (xarelto, spironalactone, metoprolol, and lasix PRN) and also increase his lisinopril from 10 to 20mg daily. In the past he has been non-compliant with meds, but it sounds like he has been taking his meds regularly since discharge earlier this month.  4. Will need to schedule follow up with hepatology at a future visit (last visit was )  5. Follow up with BP check and EKG in 1 month  6. Check BMP and BNP today (check potassium not too high as he's now taking his meds)  7. Repeat next echo in 3 months (approximately 25) to see if function improves post-discharge after taking medications regularly  8.  - Changing his lasix 20 mg from PRN to daily.      Discussion:  He was lost to care after his last clinic visit in 2023. He then went into the hospital with SOB and was admitted for newly further reduced EF noted on OSH echocardiogram and hypoxia. He was discharged on 2/9/25 and it seems like he's been taking his meds regularly since then. His BP is high today at 141/99. Will have him back in a month with a BP check. Discussed the importance of continued compliance with meds and regular follow up in clinic. Start process of patient education with Fontan procedure, but will definitely need to revisit this at next visit and in the future. Hopefully he will get looped back into care after this last hospital discharge.    2/21 - His labs came back with his BNP higher than discharge (379 to 956). Changing his lasix 20 mg from PRN to daily. (Will make sure this is all clear when I see him in a month.) His potassium looked fine on labs.    From Dr. Santoyo's 2023 note:    To be clear, his long-term prognosis is not good.  He has a single ventricle status post Fontan with significantly decreased heart function.  Typically, we would be considering a transplant in this situation.  However, he is clearly not a transplant candidate given his very poor compliance with medications.    History of present illness:  He seems to be doing well today and has no cardiac complaints since being discharged. He denies chest pain, diarrhea, and productive cough.  No edema.  No syncope.  He doesn't feel any palpitations but he is in sinus tachycardia at 117. (Will repeat an EKG in a month and continue to monitor for potential of 2:1 AF).    The review of systems is as noted above. It is otherwise negative for other symptoms related to the general, neurological, psychiatric, endocrine, gastrointestinal, genitourinary, respiratory, dermatologic, musculoskeletal, hematologic, and immunologic systems.    Past Medical History:   Diagnosis Date    Acute decompensated heart failure 2/6/2025     History of heart surgery     Other cirrhosis of liver 11/12/2020    SVT (supraventricular tachycardia)     WPW syndrome      Past Surgical History:   Procedure Laterality Date    ANGIOGRAPHY OF AORTIC ARCH N/A 9/3/2020    Procedure: AORTOGRAM, AORTIC ARCH;  Surgeon: Stephania Crump MD;  Location: [facility] CATH LAB;  Service: Cardiology;  Laterality: N/A;    FONTAN PROCEDURE, EXTRACARDIAC      LEFT HEART CATHETERIZATION Left 9/3/2020    Procedure: Left heart cath;  Surgeon: Stephania Crump MD;  Location: [facility] CATH LAB;  Service: Cardiology;  Laterality: Left;    RIGHT HEART CATHETERIZATION FOR CONGENITAL HEART DEFECT N/A 9/3/2020    Procedure: CATHETERIZATION, HEART, RIGHT, FOR CONGENITAL HEART DEFECT;  Surgeon: Stephania Crump MD;  Location: [facility] CATH LAB;  Service: Cardiology;  Laterality: N/A;  [facility] - needs covid test morning of. Extenuating circumstances    TRANSESOPHAGEAL ECHOCARDIOGRAPHY N/A 11/10/2022    Procedure: ECHOCARDIOGRAM, TRANSESOPHAGEAL;  Surgeon: Emeterio Hall MD;  Location: [facility] EP LAB;  Service: Cardiology;  Laterality: N/A;    TREATMENT OF CARDIAC ARRHYTHMIA N/A 1/9/2021    Procedure: CARDIOVERSION;  Surgeon: Jim Mcginnis MD;  Location: [facility] CATH LAB;  Service: Cardiology;  Laterality: N/A;     Family History   Problem Relation Name Age of Onset    No Known Problems Mother      No Known Problems Father      No Known Problems Sister 3     No Known Problems Brother      No Known Problems Maternal Aunt      No Known Problems Maternal Uncle      No Known Problems Paternal Aunt      No Known Problems Paternal Uncle      No Known Problems Maternal Grandmother      Heart disease Maternal Grandfather      No Known Problems Paternal Grandmother      No Known Problems Paternal Grandfather      Anemia Neg Hx      Arrhythmia Neg Hx      Asthma Neg Hx      Clotting disorder Neg Hx      Fainting Neg Hx      Heart attack Neg Hx      Heart failure Neg Hx      Hyperlipidemia Neg Hx       Hypertension Neg Hx      Stroke Neg Hx      Atrial Septal Defect Neg Hx       Social History     Socioeconomic History    Marital status: Single   Tobacco Use    Smoking status: Former     Current packs/day: 0.00     Types: Cigarettes     Quit date:      Years since quittin.1     Passive exposure: Past    Smokeless tobacco: Never    Tobacco comments:     3-4 months    Substance and Sexual Activity    Alcohol use: No    Drug use: Yes     Types: Marijuana     Comment: Odalys today- pt reports a while back    Sexual activity: Yes     Social Drivers of Health     Financial Resource Strain: Low Risk  (2025)    Overall Financial Resource Strain (CARDIA)     Difficulty of Paying Living Expenses: Not very hard   Food Insecurity: No Food Insecurity (2025)    Hunger Vital Sign     Worried About Running Out of Food in the Last Year: Never true     Ran Out of Food in the Last Year: Never true   Transportation Needs: No Transportation Needs (2025)    TRANSPORTATION NEEDS     Transportation : No   Physical Activity: Inactive (2025)    Exercise Vital Sign     Days of Exercise per Week: 0 days     Minutes of Exercise per Session: 0 min   Stress: No Stress Concern Present (2025)    [employer] [place] [employer] - Occupational Stress Questionnaire     Feeling of Stress : Not at all   Housing Stability: Unknown (2025)    Housing Stability Vital Sign     Unable to Pay for Housing in the Last Year: No     Homeless in the Last Year: No       Current Outpatient Medications:     furosemide (LASIX) 20 MG tablet, Take 1 tablet (20 mg total) by mouth daily as needed (if weight gain of more than 2 lbs in a day or 5 lbs in a week)., Disp: 90 tablet, Rfl: 2    lisinopriL 10 MG tablet, Take 1 tablet (10 mg total) by mouth once daily., Disp: 90 tablet, Rfl: 3    metoprolol succinate (TOPROL-XL) 25 MG 24 hr tablet, Take 1 tablet (25 mg total) by mouth once daily., Disp: 90 tablet, Rfl: 3    rivaroxaban  "(XARELTO) 20 mg Tab, Take 1 tablet (20 mg total) by mouth daily with dinner or evening meal., Disp: 180 tablet, Rfl: 1    spironolactone (ALDACTONE) 25 MG tablet, Take 1 tablet (25 mg total) by mouth once daily., Disp: 30 tablet, Rfl: 11      Review of patient's allergies indicates:  No Known Allergies     Vitals:    02/20/25 1308   BP: (!) 141/99   BP Location: Right arm   Patient Position: Sitting   Pulse: (!) 118   SpO2: (!) 84%   Weight: 67.1 kg (147 lb 14.9 oz)   Height: 5' 6" (1.676 m)     GENERAL: Awake, well-developed well-nourished, no apparent distress. Non-cyanotic.  HEENT: Mucous membranes moist and pink, normocephalic atraumatic, no cranial or carotid bruits, sclera anicteric, EOMI  NECK: No jugular venous distention, no thyromegaly, no lymphadenopathy  CHEST: Good air movement, clear to auscultation bilaterally. Well healed sternotomy.  CARDIOVASCULAR: Quiet precordium, regular rate and rhythm, S1 with single S2, 2/6 systolic murmur heard best at the apex  ABDOMEN: Soft, nontender nondistended, no hepatosplenomegaly, no aortic bruits.  No tenderness.  EXTREMITIES: Warm well perfused, 2+ radial/femoral/pedal pulses, capillary refill 2 seconds, no cyanosis or edema  NEURO: Alert and oriented, cooperative with exam, face symmetric, moves all extremities well.    I personally reviewed the following tests performed today and my interpretation follows:  EKG with sinus tachycardia    Results for orders placed during the hospital encounter of 02/05/25    Echo    Interpretation Summary  LIMITED STUDY FOR EVALUATION OF SINGLE VENTRICULAR FUNCTION AND VALVULAR DISEASE    CONGENITAL CARDIAC HISTORY:  Tricuspid atresia, pulmonary atresia  and WPW.  S/P Systemic to pulmonary artery shunt - [place].  S/P Bidirectional Jesus -- [place].  S/P Lateral tunnel Fontan procedure - [facility].  S/P EP study with trans-septal puncture November 2022  S/P Ablation of a left lateral accessory pathway November 2022 by Dr." Marty      SEGMENTAL CARDIAC CONNECTIONS (previously demonstrated):  Abdominal situs is solitus.  Atrial situs is normal.  Imaging consistent with tricuspid atresia.  Tricuspid atresia.  Mitral valve appears normal in structure.  LV dilation.  Ventriculoarterial concordance.  Ventricular loop: D-loop.  Cardiac position is levocardia.  Left aortic arch branching.    IMPRESSION:  Imaging consistent with diagnosis of tricuspid atresia S/P lateral tunnel Fontan - study remarkable for severely decreased systolic function of the single ventricle, worsened compared to prior.  Severely enlarged ventricle.    Pulmonary circulation not visualized  Mildly dilated inferior vena cava - connection to Fontan not visualized.  No apparent thrombus noted in the IVC  Right superior vena cava not visualized    No evidence of obstruction at Fontan on limited imaging.  Fibrinous material noted, which may be degenerated surgical material or  congenital material, less likely to be layered thrombus. Correlate clinically.    The Jesus anastomosis to the pulmonary arteries is not visualized on this echo.    Pulmonary confluence and proximal pulmonary branches not demonstrated.  At least two pulmonary veins demonstrated returning to the left atrium with no evidence for obstruction.    No right atrium, right ventricle, or tricuspid valve visualized  There is no evidence for pulmonary valve.    At least mild dilation of the left atrium.    Mildly dilated mitral valve with color Doppler demonstrating moderate regurgitation.  Single ventricle consistent with left ventricular morphology.  There is at sever dilation of the left ventricle.  LVEDV index is 96 cc/m2    Left ventricular systolic function qualitatively severely compromised with ejection fraction estimated 10-15 %.    There is a large aortic annulus with trileaflet aortic valve, no stenosis and mild  insufficiency.  Aortic dimensions:  Sinuses of Valsalva  = 51 mm.  Ascending aorta        = not well demonstrated.    No obvious pericardial effusion.     Cath 9/3/20:  IMPRESSION:  1) Tricuspid atresia s/p intra-atrial Fontan  2) Normal Fontan pressures (11mmHg) and wedge pressure (8mmHg).  3) Normal cardiac output and vascular resistance calculations  4) Small atrial baffle leak  5) No significant aorta-pulmonary or veno-venous collaterals  6) Occlusion of right common femoral artery       Cardiac MRI 1/2/2020  Conclusion:   SVC to RPA unobstructed (Jesus)  Extracardiac Fontan with low flow but no evidence of thrombus.  Unobstructed ASD  Tricuspid Atresia  No obstruction to the right or left PA with Q flow of 21cc through each.  Increased LV mass and volume with systemic LVEF of 42%  Dilated sinus of Valsalva 49mm  Trace AI with regurgitation fraction of 8%     Lab Results   Component Value Date    WBC 6.07 02/08/2025    HGB 15.6 02/08/2025    HCT 45.8 02/08/2025    MCV 89 02/08/2025     (L) 02/08/2025       CMP  Sodium   Date Value Ref Range Status   02/09/2025 140 136 - 145 mmol/L Final     Potassium   Date Value Ref Range Status   02/09/2025 3.7 3.5 - 5.1 mmol/L Final     Chloride   Date Value Ref Range Status   02/09/2025 104 95 - 110 mmol/L Final     CO2   Date Value Ref Range Status   02/09/2025 26 23 - 29 mmol/L Final     Glucose   Date Value Ref Range Status   02/09/2025 77 70 - 110 mg/dL Final     BUN   Date Value Ref Range Status   02/09/2025 16 6 - 20 mg/dL Final     Creatinine   Date Value Ref Range Status   02/09/2025 0.9 0.5 - 1.4 mg/dL Final     Calcium   Date Value Ref Range Status   02/09/2025 8.8 8.7 - 10.5 mg/dL Final     Total Protein   Date Value Ref Range Status   02/09/2025 6.4 6.0 - 8.4 g/dL Final     Albumin   Date Value Ref Range Status   02/09/2025 3.4 (L) 3.5 - 5.2 g/dL Final     Total Bilirubin   Date Value Ref Range Status   02/09/2025 1.6 (H) 0.1 - 1.0 mg/dL Final     Comment:     For infants and newborns, interpretation of results should be based  on gestational  age, weight and in agreement with clinical  observations.    Premature Infant recommended reference ranges:  Up to 24 hours.............<8.0 mg/dL  Up to 48 hours............<12.0 mg/dL  3-5 days..................<15.0 mg/dL  6-29 days.................<15.0 mg/dL       Alkaline Phosphatase   Date Value Ref Range Status   02/09/2025 40 40 - 150 U/L Final     AST   Date Value Ref Range Status   02/09/2025 16 10 - 40 U/L Final     ALT   Date Value Ref Range Status   02/09/2025 20 10 - 44 U/L Final     Anion Gap   Date Value Ref Range Status   02/09/2025 10 8 - 16 mmol/L Final     eGFR   Date Value Ref Range Status   02/09/2025 >60.0 >60 mL/min/1.73 m^2 Final     Lab Results   Component Value Date    INR 1.1 02/05/2025    INR 1.1 03/09/2024    INR 1.0 11/10/2022     Thank you for referring this patient to our clinic.  Please call with any questions.    Sincerely,      Princess Montesinos PA-C  Pediatric Cardiology  Adult Congenital Heart Disease  [facility] [address] [place]  [address]  [phone]

## 2025-02-21 ENCOUNTER — PATIENT MESSAGE (OUTPATIENT)
Dept: CARDIOLOGY | Facility: CLINIC | Age: 30
End: 2025-02-21
Payer: MEDICAID

## 2025-02-21 RX ORDER — FUROSEMIDE 20 MG/1
20 TABLET ORAL DAILY
Qty: 90 TABLET | Refills: 3 | Status: SHIPPED | OUTPATIENT
Start: 2025-02-21

## 2025-02-21 RX ORDER — FUROSEMIDE 20 MG/1
20 TABLET ORAL DAILY
COMMUNITY
End: 2025-02-21 | Stop reason: SDUPTHER

## 2025-03-02 NOTE — PROGRESS NOTES
[facility] NOTE  Follow-up Visit      SUBJECTIVE:     Patient: Juani Reilly Jr. is a 29 y.o. male.    Chief Compliant:   Chief Complaint   Patient presents with    Follow-up       History of Present Illness:  29-year-old male with medical history of tricuspid and pulmonary atresia initially palliated with a systemic to pulmonary a. Shunt followed by a bidirectional Jesus and subsequent lateral tunnel Fontan procedure, status-post EP study with trans-septal puncture (Nov 2022), decreased LV function (EF 30-35%), WPW, SVT status-post ablation (Nov 2022), cirrhosis and reported difficulty with adherence to medications presents today for a hospital follow-up.    #Congenital Heart Disease  #Hospital follow up  - Patient admitted to [facility] on 2/6/25 and transferred to the CCU at [facility] for higher level of care. Patient was discharged from [facility] on 2/9/25.  - Per chart review: Patient with history of congenital heart disease status-post Bidirectional Jesus and Fontan procedure admitted for newly further reduced EF noted on OSH echocardiogram and hypoxia. He noted difficulty adhering to benazepril as an outpatient as it caused him somnolence. Initiated on metoprolol, lisinopril and spironolactone. Evaluated by Adult congenital cardiologist team with recommendations to continue above medications and goal saturation of 85%. Continued on previously Rx'ed eliquis (given his history of increased risk of thromboembolic events).   - Patient compliant with medications he received on discharge and reports feeling much improved. He had a bout of vomiting over the weekend which is now resolved and he is able to tolerate PO intake.   - Patient has cardiology appointment on 2/20.  - Will defer management to cardiology    #Advance Care Planning   -Advance Care Planning     Date: 02/17/2025  Patient and family have never discussed ACP before. Provided patient with ACP/living will documents to  "go over at home. Will follow up in 1 month to discuss decisions about goals of care.           ROS  A 10+ review of systems was performed with pertinent positives and negatives noted above in the history of present illness. Other systems were negative unless otherwise specified.    OBJECTIVE:     Vital Signs (Most Recent)  Vitals:    02/17/25 1331   BP: 131/88   Patient Position: Sitting   Pulse: 107   SpO2: (!) 85%   Weight: 64.3 kg (141 lb 12.1 oz)   Height: 5' 6" (1.676 m)     BMI: Body mass index is 22.88 kg/m².     Physical Exam:  Physical Exam     ASSESSMENT:   Juani Reilly Jr. is a 29 y.o. male who presents to clinic to for    1. Adult congenital heart disease    2. S/P Fontan procedure    3. Single ventricle with tricuspid atresia    4. Advance care planning         PLAN:   29-year-old male with medical history of tricuspid and pulmonary atresia initially palliated with a systemic to pulmonary a. Shunt followed by a bidirectional Jesus and subsequent lateral tunnel Fontan procedure, status-post EP study with trans-septal puncture (Nov 2022), decreased LV function (EF 30-35%), WPW, SVT status-post ablation (Nov 2022), cirrhosis and reported difficulty with adherence to medications presents today for a hospital follow-up.    #Congenital Heart Disease  #Hospital follow up  - Patient admitted to [facility] on 2/6/25 and transferred to the CCU at [facility] for higher level of care. Patient was discharged from [facility] on 2/9/25.  - Per chart review: Patient with history of congenital heart disease status-post Bidirectional Jesus and Fontan procedure admitted for newly further reduced EF noted on OSH echocardiogram and hypoxia. He noted difficulty adhering to benazepril as an outpatient as it caused him somnolence. Initiated on metoprolol, lisinopril and spironolactone. Evaluated by Adult congenital cardiologist team with recommendations to continue above medications and goal saturation of 85%. " Continued on previously Rx'ed eliquis (given his history of increased risk of thromboembolic events).   - Patient compliant with medications he received on discharge and reports feeling much improved. He had a bout of vomiting over the weekend which is now resolved and he is able to tolerate PO intake.   - Patient has cardiology appointment on 2/20.  - Will defer management to cardiology    #Advance Care Planning   -Advance Care Planning     Date: 02/17/2025  - Patient and family have never discussed ACP before. Provided patient with ACP/living will documents to go over at home.   - Will follow up in 1 month to discuss decisions about goals of care    Adult congenital heart disease    S/P Fontan procedure    Single ventricle with tricuspid atresia    Advance care planning            Provided patient with anticipatory guidance and return precautions. Treatment plan discussed with patient, all questions answered, and patient acknowledged understanding and verbal agreement.      Follow-up in: 1 months; or sooner PRN if acute concerns arise.      Case discussed with Dr. ASHLI Hartmann    _________________________________________  Jaqueline Pardo MD, PGY-1  \[facility]\"" Family Medicine Residency Program - [place]

## 2025-03-05 ENCOUNTER — TELEPHONE (OUTPATIENT)
Dept: PEDIATRIC CARDIOLOGY | Facility: HOSPITAL | Age: 30
End: 2025-03-05
Payer: MEDICAID

## 2025-03-05 NOTE — TELEPHONE ENCOUNTER
I was contacted via teams to let me know that we now have an updated number for Juani. Called and spoke with grandma (Elaina) and explained to her about taking the lasix every day (not just PRN) and she understood. Also clarified that we have an appt coming up to check his BP and to see about adjusting meds. Of note, she does think he's been taking his meds regularly since being discharged from the hospital.    NATALIE HernandezC  Adult Congenital Heart Disease  Pediatric Cardiology

## 2025-03-05 NOTE — TELEPHONE ENCOUNTER
I called the 3 numbers we have on file for Juani but didn't get through. Will need to check and make sure we have uptodate contact info for him. He has not yet seen my message in [place] about increasing his lasix dose from PRN to daily (due to his BNP being increased.) Will need to clarify this when he follow up in the clinic.

## 2025-03-17 ENCOUNTER — OFFICE VISIT (OUTPATIENT)
Dept: FAMILY MEDICINE | Facility: HOSPITAL | Age: 30
End: 2025-03-17
Payer: MEDICAID

## 2025-03-17 VITALS
WEIGHT: 139.75 LBS | BODY MASS INDEX: 23.28 KG/M2 | HEART RATE: 112 BPM | HEIGHT: 65 IN | SYSTOLIC BLOOD PRESSURE: 145 MMHG | OXYGEN SATURATION: 80 % | DIASTOLIC BLOOD PRESSURE: 72 MMHG

## 2025-03-17 DIAGNOSIS — T78.40XA ALLERGY, INITIAL ENCOUNTER: ICD-10-CM

## 2025-03-17 DIAGNOSIS — K21.9 GASTROESOPHAGEAL REFLUX DISEASE, UNSPECIFIED WHETHER ESOPHAGITIS PRESENT: Primary | ICD-10-CM

## 2025-03-17 DIAGNOSIS — Z71.89 ACP (ADVANCE CARE PLANNING): ICD-10-CM

## 2025-03-17 PROCEDURE — 99213 OFFICE O/P EST LOW 20 MIN: CPT

## 2025-03-17 RX ORDER — FAMOTIDINE 20 MG/1
20 TABLET, FILM COATED ORAL 2 TIMES DAILY
Qty: 60 TABLET | Refills: 3 | Status: SHIPPED | OUTPATIENT
Start: 2025-03-17 | End: 2025-07-15

## 2025-03-17 RX ORDER — CETIRIZINE HYDROCHLORIDE 10 MG/1
10 TABLET ORAL DAILY
Qty: 90 TABLET | Refills: 3 | Status: SHIPPED | OUTPATIENT
Start: 2025-03-17 | End: 2026-03-17

## 2025-03-17 RX ORDER — FLUTICASONE PROPIONATE 50 MCG
1 SPRAY, SUSPENSION (ML) NASAL DAILY
Qty: 16 G | Refills: 3 | Status: SHIPPED | OUTPATIENT
Start: 2025-03-17

## 2025-03-17 NOTE — PROGRESS NOTES
[facility] CLINIC NOTE  Follow-up Visit      SUBJECTIVE:     Patient: Juani Reilly Jr. is a 29 y.o. male.    Chief Compliant:   Chief Complaint   Patient presents with    Follow-up     History of Present Illness:  29-year-old male with medical history of tricuspid and pulmonary atresia initially palliated with a systemic to pulmonary a. Shunt followed by a bidirectional Jesus and subsequent lateral tunnel Fontan procedure, status-post EP study with trans-septal puncture (Nov 2022), decreased LV function (EF 30-35%), WPW, SVT status-post ablation (Nov 2022), cirrhosis and reported difficulty with adherence to medications presents today for chronic abdominal pain    #Chronic Abdominal pain  - Patient reports chronic 5/10 epigastric abdominal pain that started years ago and waxes and wanes over time (no specific timeline pin-pointed, sometimes with weeks of relief).   - Pain lasts minutes but can recur hours later.   - It is sharp/stabbing in nature and alleviated by pressing on his abdomen or by a hot shower.   - Patient reports no aggravating factors.   - It is associated with non-bilious, non-bloody vomiting occasionally and SOB.  - Of note, patient has been smoking marijuana once a day since he was 15 years old.   - Patient has not tried any OTC medication to help.     #Seasonal Allergies  - Patient with complaints of runny nose, watery eyes, and sneezing  - Complains that he has had allergies before but doesn't take any medication      #Advance Care Planning  Advance Care Planning   Date: 03/22/2025  ACP Reviewed/No Changes  Voluntary advance care planning discussion had today with patient. No Previously completed paperwork in electronic medical record.   - Patient did not bring paperwork with him today but he did mention that he would like resuscitation with CPR/intubation.     A total of 10 min was spent on advance care planning, goals of care discussion, emotional support, formulating  "and communicating prognosis and exploring burden/benefit of various approaches of treatment. This discussion occurred on a fully voluntary basis with the verbal consent of the patient and/or family.       Review of Systems   All other systems reviewed and are negative.    A 10+ review of systems was performed with pertinent positives and negatives noted above in the history of present illness. Other systems were negative unless otherwise specified.    OBJECTIVE:     Vital Signs (Most Recent)  Vitals:    03/17/25 1458   BP: (!) 145/72   Pulse: (!) 112   SpO2: (!) 80%   Weight: 63.4 kg (139 lb 12.4 oz)   Height: 5' 5" (1.651 m)     BMI: Body mass index is 23.26 kg/m².     Physical Exam:  Physical Exam  Constitutional:       General: He is not in acute distress.     Appearance: Normal appearance. He is normal weight. He is not ill-appearing, toxic-appearing or diaphoretic.   HENT:      Head: Normocephalic and atraumatic.      Right Ear: External ear normal.      Left Ear: External ear normal.      Nose: Nose normal.      Mouth/Throat:      Mouth: Mucous membranes are moist.      Pharynx: Oropharynx is clear.   Eyes:      Extraocular Movements: Extraocular movements intact.   Cardiovascular:      Rate and Rhythm: Regular rhythm. Tachycardia present.      Pulses: Normal pulses.      Heart sounds: Normal heart sounds. No murmur heard.  Pulmonary:      Effort: Pulmonary effort is normal. No respiratory distress.      Breath sounds: Normal breath sounds.   Abdominal:      General: Bowel sounds are normal. There is no distension.      Palpations: Abdomen is soft.      Tenderness: There is no abdominal tenderness.   Musculoskeletal:         General: No swelling or tenderness.   Skin:     General: Skin is warm and dry.   Neurological:      Mental Status: He is alert and oriented to person, place, and time.          ASSESSMENT:   Juani Reilly Jr. is a 29 y.o. male who presents to clinic to for    1. Gastroesophageal " reflux disease, unspecified whether esophagitis present    2. Allergy, initial encounter    3. ACP (advance care planning)         PLAN:   29-year-old male with medical history of tricuspid and pulmonary atresia initially palliated with a systemic to pulmonary a. Shunt followed by a bidirectional Jesus and subsequent lateral tunnel Fontan procedure, status-post EP study with trans-septal puncture (Nov 2022), decreased LV function (EF 30-35%), WPW, SVT status-post ablation (Nov 2022), cirrhosis and reported difficulty with adherence to medications presents today for chronic abdominal pain    #Chronic Abdominal pain  - Patient reports chronic 5/10 epigastric abdominal pain that started years ago and waxes and wanes over time (no specific timeline pin-pointed, sometimes with weeks of relief).   - Pain lasts minutes but can recur hours later.   - It is sharp/stabbing in nature and alleviated by pressing on his abdomen or by a hot shower.   - Patient reports no aggravating factors.   - It is associated with non-bilious, non-bloody vomiting occasionally and SOB.  - Of note, patient has been smoking marijuana once a day since he was 15 years old.   - Patient has not tried any OTC medication to help.   - Will trial pepcid  - Chronic condition.  - Uncontrolled.  - Discussed various diagnostic and treatment options with patient at this visit.  - Medications and/or medication refills as below.      #Seasonal Allergies  - Patient with complaints of runny nose, watery eyes, and sneezing  - Complains that he has had allergies before but doesn't take any medication  - Will prescribe zyrtec and flonase today.  - Acute condition.  - Uncontrolled.  - Medications and/or medication refills as below.      #Advance Care Planning  Advance Care Planning   Date: 03/22/2025  ACP Reviewed/No Changes  Voluntary advance care planning discussion had today with patient. No Previously completed paperwork in electronic medical record.   - Patient  did not bring paperwork with him today but he did mention that he would like resuscitation with CPR/intubation.     A total of 10 min was spent on advance care planning, goals of care discussion, emotional support, formulating and communicating prognosis and exploring burden/benefit of various approaches of treatment. This discussion occurred on a fully voluntary basis with the verbal consent of the patient and/or family.      Gastroesophageal reflux disease, unspecified whether esophagitis present  -     famotidine (PEPCID) 20 MG tablet; Take 1 tablet (20 mg total) by mouth 2 (two) times daily. (Patient not taking: Reported on 3/20/2025)  Dispense: 60 tablet; Refill: 3    Allergy, initial encounter  -     cetirizine (ZYRTEC) 10 MG tablet; Take 1 tablet (10 mg total) by mouth once daily.  Dispense: 90 tablet; Refill: 3  -     fluticasone propionate (FLONASE) 50 mcg/actuation nasal spray; 1 spray (50 mcg total) by Each Nostril route once daily. (Patient not taking: Reported on 3/20/2025)  Dispense: 16 g; Refill: 3    ACP (advance care planning)        Provided patient with anticipatory guidance and return precautions. Treatment plan discussed with patient, all questions answered, and patient acknowledged understanding and verbal agreement.      Follow-up in: 1 months; or sooner PRN if acute concerns arise.      Case discussed with Dr. ASHLI Hartmann    _________________________________________  Jaqueline Pardo MD, PGY-1  [facility] - [place]

## 2025-03-20 ENCOUNTER — HOSPITAL ENCOUNTER (OUTPATIENT)
Dept: CARDIOLOGY | Facility: CLINIC | Age: 30
Discharge: HOME | End: 2025-03-20
Payer: MEDICAID

## 2025-03-20 ENCOUNTER — HOSPITAL ENCOUNTER (OUTPATIENT)
Dept: PEDIATRIC CARDIOLOGY | Facility: HOSPITAL | Age: 30
Discharge: HOME | End: 2025-03-20
Attending: PEDIATRICS
Payer: MEDICAID

## 2025-03-20 ENCOUNTER — OFFICE VISIT (OUTPATIENT)
Dept: CARDIOLOGY | Facility: CLINIC | Age: 30
End: 2025-03-20
Payer: MEDICAID

## 2025-03-20 VITALS
DIASTOLIC BLOOD PRESSURE: 99 MMHG | BODY MASS INDEX: 22.68 KG/M2 | WEIGHT: 141.13 LBS | HEIGHT: 66 IN | OXYGEN SATURATION: 83 % | SYSTOLIC BLOOD PRESSURE: 133 MMHG | HEART RATE: 118 BPM

## 2025-03-20 DIAGNOSIS — R00.0 TACHYCARDIA: ICD-10-CM

## 2025-03-20 DIAGNOSIS — Z98.890 S/P FONTAN PROCEDURE: ICD-10-CM

## 2025-03-20 DIAGNOSIS — Q24.9 ADULT CONGENITAL HEART DISEASE: ICD-10-CM

## 2025-03-20 DIAGNOSIS — I48.92 ATRIAL FLUTTER, UNSPECIFIED TYPE: ICD-10-CM

## 2025-03-20 DIAGNOSIS — Q22.4 SINGLE VENTRICLE WITH TRICUSPID ATRESIA: ICD-10-CM

## 2025-03-20 DIAGNOSIS — Q20.4 SINGLE VENTRICLE WITH TRICUSPID ATRESIA: ICD-10-CM

## 2025-03-20 DIAGNOSIS — I50.9 DECOMPENSATED HEART FAILURE: ICD-10-CM

## 2025-03-20 DIAGNOSIS — Q24.9 ADULT CONGENITAL HEART DISEASE: Primary | ICD-10-CM

## 2025-03-20 DIAGNOSIS — I45.6 WPW SYNDROME: Primary | Chronic | ICD-10-CM

## 2025-03-20 DIAGNOSIS — I10 ESSENTIAL HYPERTENSION: Primary | ICD-10-CM

## 2025-03-20 DIAGNOSIS — I10 ESSENTIAL HYPERTENSION: ICD-10-CM

## 2025-03-20 LAB
OHS QRS DURATION: 96 MS
OHS QTC CALCULATION: 462 MS

## 2025-03-20 PROCEDURE — 99212 OFFICE O/P EST SF 10 MIN: CPT | Mod: PBBFAC

## 2025-03-20 PROCEDURE — 99999 PR PBB SHADOW E&M-EST. PATIENT-LVL II: CPT | Mod: PBBFAC,,,

## 2025-03-20 PROCEDURE — 93242 EXT ECG>48HR<7D RECORDING: CPT

## 2025-03-20 PROCEDURE — 93005 ELECTROCARDIOGRAM TRACING: CPT | Mod: PBBFAC | Performed by: INTERNAL MEDICINE

## 2025-03-20 RX ORDER — METOPROLOL SUCCINATE 50 MG/1
50 TABLET, EXTENDED RELEASE ORAL DAILY
Qty: 90 TABLET | Refills: 3 | Status: SHIPPED | OUTPATIENT
Start: 2025-03-20 | End: 2026-03-20

## 2025-03-20 RX ORDER — LOSARTAN POTASSIUM 100 MG/1
100 TABLET ORAL DAILY
Qty: 90 TABLET | Refills: 3 | Status: SHIPPED | OUTPATIENT
Start: 2025-03-20 | End: 2026-03-20

## 2025-03-20 NOTE — PROGRESS NOTES
2025     re:Juani Relily Jr.  :1995    Dr. Abebe Ferguson    [facility]     Dear Dr. Ferguson:    Juani Reilly Jr. is a 29 y.o. male seen in the [facility] clinic today for a one month blood pressure check. He recently was discharged on 25 from the hospital for newly further reduced EF noted on OSH echocardiogram and hypoxia. To summarize his diagnoses are as follow:  1. 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 Decreased left ventricular function  - no evidence of PLE  3. History of [id number], SVT  - status post successful ablation of a left lateral accessory pathway 2022 by Dr. Ferguson  4. Cirrhosis  - last seen by hepatology May 2023  5. Medication noncompliance   6. marijuana use     To summarize, my recommendations are as follows:  1. SBE prophylaxis is definitely indicated  2. Discontinue lisinopril today (has had a dry cough). Switching to the equivalent dose of losartan 100 mg daily. (The maximum dose of losartan is 150, may need to go up next time to bring BP down further)  3. Increasing metoprolol today from 25 to 50 mg daily.  4. Continue all other current meds (xarelto, spironalactone, and lasix). In the past he has been non-compliant with meds, but it sounds like he has been taking his meds regularly since discharge from the hospital.   5. Will need to schedule follow up with hepatology at a future visit (last visit was ). His recent imaging (in the hospital) and blood work look good from a liver standpoint, so will hold off on this to avoid overwhelming him.  6. 72 hour holter monitor today  7.  Follow up with BP check and EKG in 1 month  8. Check BMP and BNP again today (check potassium not too high as he's now taking his meds) (BNP was increased last visit, see if it's down with current lasix dose. May need to increase lasix from 20 to 40 mg in a future appointment.)  9. Repeat next echo in 2 months (approximately 5/20/25) to see if function improves post-discharge after taking medications regularly    Discussion:  He was lost to care after his last clinic visit in 2023. He then went into the hospital with SOB and was admitted for newly further reduced EF noted on OSH echocardiogram and hypoxia. He was discharged on 2/9/25 and it seems like he's been taking his meds regularly since then. His BP is still high today (133/99) but it's down from his BP one month ago at his first f/u appt (141/99). Will have him back in a month with another BP check and hopefully will continue to see BP trend down. (May need to increase losartan then from 100 to 150). Repeating BNP today and hoping to see this down as well after last visit (Consider increasing lasix 20 to 40 if not.) I've continued to stress the importance of continued compliance with meds and regular follow up in clinic. He is doing good will all of this. He did seem to get a little frustrated towards the end of the visit (possibly with med changes, etc.) but did say he would keep following up. Last time I start process of patient education with Fontan procedure, but decided not to continue today as he's learning a good deal about meds, etc. Hoping that as a Fontan patient, he will continue to stay looped is after his last hospital admission.    From Dr. Santoyo's 2023 note:    To be clear, his long-term prognosis is not good.  He has a single ventricle status post Fontan with significantly decreased heart function.  Typically, we would be considering a transplant in this situation.  However, he is clearly not a transplant candidate given his very poor  compliance with medications.    History of present illness:  He seems to be doing well today although he's had some congestion recently. He went to his PCP and she's started him on allergy meds. He also has been getting a dry cough, which he says occurs after taking his lisinopril (am) and xarelto (pm.) switching his lisinopril today to see if this may help this SE. He's also felt like he's had a racing heart recently, which happens daily and he notices at rest and is accompanied by some dizziness, SOB, and fatigue. Getting a holter today. No other cardiac complaints. He denies chest pain, diarrhea, and productive cough. Trace LE edema.  No syncope.  He is in sinus tachycardia again today at 118 (last time was 117). (Continue to repeats EKGs and monitor for potential of 2:1 AF, today's EKG looked fine).    The review of systems is as noted above. It is otherwise negative for other symptoms related to the general, neurological, psychiatric, endocrine, gastrointestinal, genitourinary, respiratory, dermatologic, musculoskeletal, hematologic, and immunologic systems.    Past Medical History:   Diagnosis Date    Acute decompensated heart failure 2/6/2025    History of heart surgery     Other cirrhosis of liver 11/12/2020    SVT (supraventricular tachycardia)     WPW syndrome      Past Surgical History:   Procedure Laterality Date    ANGIOGRAPHY OF AORTIC ARCH N/A 9/3/2020    Procedure: AORTOGRAM, AORTIC ARCH;  Surgeon: Stephania Crump MD;  Location: [facility] CATH LAB;  Service: Cardiology;  Laterality: N/A;    FONTAN PROCEDURE, EXTRACARDIAC      LEFT HEART CATHETERIZATION Left 9/3/2020    Procedure: Left heart cath;  Surgeon: Stephania Crump MD;  Location: [facility] CATH LAB;  Service: Cardiology;  Laterality: Left;    RIGHT HEART CATHETERIZATION FOR CONGENITAL HEART DEFECT N/A 9/3/2020    Procedure: CATHETERIZATION, HEART, RIGHT, FOR CONGENITAL HEART DEFECT;  Surgeon: Stephania Crump MD;  Location: [facility] CATH  LAB;  Service: Cardiology;  Laterality: N/A;  [facility] - needs covid test morning of. Extenuating circumstances    TRANSESOPHAGEAL ECHOCARDIOGRAPHY N/A 11/10/2022    Procedure: ECHOCARDIOGRAM, TRANSESOPHAGEAL;  Surgeon: Emeterio Hall MD;  Location: [facility] EP LAB;  Service: Cardiology;  Laterality: N/A;    TREATMENT OF CARDIAC ARRHYTHMIA N/A 2021    Procedure: CARDIOVERSION;  Surgeon: Jim Mcginnis MD;  Location: [facility] CATH LAB;  Service: Cardiology;  Laterality: N/A;     Family History   Problem Relation Name Age of Onset    No Known Problems Mother      No Known Problems Father      No Known Problems Sister 3     No Known Problems Brother      No Known Problems Maternal Aunt      No Known Problems Maternal Uncle      No Known Problems Paternal Aunt      No Known Problems Paternal Uncle      No Known Problems Maternal Grandmother      Heart disease Maternal Grandfather      No Known Problems Paternal Grandmother      No Known Problems Paternal Grandfather      Anemia Neg Hx      Arrhythmia Neg Hx      Asthma Neg Hx      Clotting disorder Neg Hx      Fainting Neg Hx      Heart attack Neg Hx      Heart failure Neg Hx      Hyperlipidemia Neg Hx      Hypertension Neg Hx      Stroke Neg Hx      Atrial Septal Defect Neg Hx       Social History     Socioeconomic History    Marital status: Single   Tobacco Use    Smoking status: Former     Current packs/day: 0.00     Types: Cigarettes     Quit date:      Years since quittin.2     Passive exposure: Past    Smokeless tobacco: Never    Tobacco comments:     3-4 months    Substance and Sexual Activity    Alcohol use: No    Drug use: Yes     Types: Marijuana     Comment: Mojo today- pt reports a while back    Sexual activity: Yes     Social Drivers of Health     Financial Resource Strain: Low Risk  (2025)    Overall Financial Resource Strain (CARDIA)     Difficulty of Paying Living Expenses: Not very hard   Food Insecurity: No Food Insecurity (2025)     Hunger Vital Sign     Worried About Running Out of Food in the Last Year: Never true     Ran Out of Food in the Last Year: Never true   Transportation Needs: No Transportation Needs (2/9/2025)    TRANSPORTATION NEEDS     Transportation : No   Physical Activity: Inactive (2/9/2025)    Exercise Vital Sign     Days of Exercise per Week: 0 days     Minutes of Exercise per Session: 0 min   Stress: No Stress Concern Present (2/9/2025)    [place] [employer] - Occupational Stress Questionnaire     Feeling of Stress : Not at all   Housing Stability: Unknown (2/9/2025)    Housing Stability Vital Sign     Unable to Pay for Housing in the Last Year: No     Homeless in the Last Year: No       Current Outpatient Medications:     cetirizine (ZYRTEC) 10 MG tablet, Take 1 tablet (10 mg total) by mouth once daily., Disp: 90 tablet, Rfl: 3    famotidine (PEPCID) 20 MG tablet, Take 1 tablet (20 mg total) by mouth 2 (two) times daily., Disp: 60 tablet, Rfl: 3    fluticasone propionate (FLONASE) 50 mcg/actuation nasal spray, 1 spray (50 mcg total) by Each Nostril route once daily., Disp: 16 g, Rfl: 3    furosemide (LASIX) 20 MG tablet, Take 1 tablet (20 mg total) by mouth once daily., Disp: 90 tablet, Rfl: 3    lisinopriL (PRINIVIL,ZESTRIL) 20 MG tablet, Take 1 tablet (20 mg total) by mouth once daily., Disp: 90 tablet, Rfl: 3    metoprolol succinate (TOPROL-XL) 25 MG 24 hr tablet, Take 1 tablet (25 mg total) by mouth once daily., Disp: 90 tablet, Rfl: 3    rivaroxaban (XARELTO) 20 mg Tab, Take 1 tablet (20 mg total) by mouth daily with dinner or evening meal., Disp: 180 tablet, Rfl: 1    spironolactone (ALDACTONE) 25 MG tablet, Take 1 tablet (25 mg total) by mouth once daily., Disp: 30 tablet, Rfl: 11      Review of patient's allergies indicates:  No Known Allergies     Vitals:    03/20/25 1326   BP: (!) 133/99   BP Location: Right arm   Patient Position: Sitting   Pulse: (!) 118   SpO2: (!) 83%   Weight: 64 kg  "(141 lb 1.5 oz)   Height: 5' 6" (1.676 m)     GENERAL: Awake, well-developed well-nourished, no apparent distress. Non-cyanotic.  HEENT: Mucous membranes moist and pink, normocephalic atraumatic, no cranial or carotid bruits, sclera anicteric, EOMI  NECK: No jugular venous distention, no thyromegaly, no lymphadenopathy  CHEST: Good air movement, clear to auscultation bilaterally. Well healed sternotomy.  CARDIOVASCULAR: Quiet precordium, regular rate and rhythm, S1 with single S2, 2/6 systolic murmur heard best at the apex  ABDOMEN: Soft, nontender nondistended, no hepatosplenomegaly, no aortic bruits.  No tenderness.  EXTREMITIES: Warm well perfused, 2+ radial/femoral/pedal pulses, capillary refill 2 seconds, no cyanosis or edema  NEURO: Alert and oriented, cooperative with exam, face symmetric, moves all extremities well.    I personally reviewed the following tests performed today and my interpretation follows:  EKG with sinus tachycardia    Results for orders placed during the hospital encounter of 02/05/25    Echo    Interpretation Summary  LIMITED STUDY FOR EVALUATION OF SINGLE VENTRICULAR FUNCTION AND VALVULAR DISEASE    CONGENITAL CARDIAC HISTORY:  Tricuspid atresia, pulmonary atresia  and WPW.  S/P Systemic to pulmonary artery shunt - Shaun.  S/P Bidirectional Jesus -- Shaun.  S/P Lateral tunnel Fontan procedure - Shaun.  S/P EP study with trans-septal puncture November 2022  S/P Ablation of a left lateral accessory pathway November 2022 by Dr. Ferguson      SEGMENTAL CARDIAC CONNECTIONS (previously demonstrated):  Abdominal situs is solitus.  Atrial situs is normal.  Imaging consistent with tricuspid atresia.  Tricuspid atresia.  Mitral valve appears normal in structure.  LV dilation.  Ventriculoarterial concordance.  Ventricular loop: D-loop.  Cardiac position is levocardia.  Left aortic arch branching.    IMPRESSION:  Imaging consistent with diagnosis of tricuspid atresia S/P lateral tunnel Fontan - " study remarkable for severely decreased systolic function of the single ventricle, worsened compared to prior.  Severely enlarged ventricle.    Pulmonary circulation not visualized  Mildly dilated inferior vena cava - connection to Fontan not visualized.  No apparent thrombus noted in the IVC  Right superior vena cava not visualized    No evidence of obstruction at Fontan on limited imaging.  Fibrinous material noted, which may be degenerated surgical material or  congenital material, less likely to be layered thrombus. Correlate clinically.    The Jesus anastomosis to the pulmonary arteries is not visualized on this echo.    Pulmonary confluence and proximal pulmonary branches not demonstrated.  At least two pulmonary veins demonstrated returning to the left atrium with no evidence for obstruction.    No right atrium, right ventricle, or tricuspid valve visualized  There is no evidence for pulmonary valve.    At least mild dilation of the left atrium.    Mildly dilated mitral valve with color Doppler demonstrating moderate regurgitation.  Single ventricle consistent with left ventricular morphology.  There is at sever dilation of the left ventricle.  LVEDV index is 96 cc/m2    Left ventricular systolic function qualitatively severely compromised with ejection fraction estimated 10-15 %.    There is a large aortic annulus with trileaflet aortic valve, no stenosis and mild  insufficiency.  Aortic dimensions:  Sinuses of Valsalva  = 51 mm.  Ascending aorta       = not well demonstrated.    No obvious pericardial effusion.     Cath 9/3/20:  IMPRESSION:  1) Tricuspid atresia s/p intra-atrial Fontan  2) Normal Fontan pressures (11mmHg) and wedge pressure (8mmHg).  3) Normal cardiac output and vascular resistance calculations  4) Small atrial baffle leak  5) No significant aorta-pulmonary or veno-venous collaterals  6) Occlusion of right common femoral artery       Cardiac MRI 1/2/2020  Conclusion:   SVC to RPA  unobstructed (Jesus)  Extracardiac Fontan with low flow but no evidence of thrombus.  Unobstructed ASD  Tricuspid Atresia  No obstruction to the right or left PA with Q flow of 21cc through each.  Increased LV mass and volume with systemic LVEF of 42%  Dilated sinus of Valsalva 49mm  Trace AI with regurgitation fraction of 8%     Lab Results   Component Value Date    WBC 6.07 02/08/2025    HGB 15.6 02/08/2025    HCT 45.8 02/08/2025    MCV 89 02/08/2025     (L) 02/08/2025       CMP  Sodium   Date Value Ref Range Status   02/20/2025 140 136 - 145 mmol/L Final     Potassium   Date Value Ref Range Status   02/20/2025 3.9 3.5 - 5.1 mmol/L Final     Chloride   Date Value Ref Range Status   02/20/2025 106 95 - 110 mmol/L Final     CO2   Date Value Ref Range Status   02/20/2025 24 23 - 29 mmol/L Final     Glucose   Date Value Ref Range Status   02/20/2025 99 70 - 110 mg/dL Final     BUN   Date Value Ref Range Status   02/20/2025 10 6 - 20 mg/dL Final     Creatinine   Date Value Ref Range Status   02/20/2025 0.8 0.5 - 1.4 mg/dL Final     Calcium   Date Value Ref Range Status   02/20/2025 9.2 8.7 - 10.5 mg/dL Final     Total Protein   Date Value Ref Range Status   02/09/2025 6.4 6.0 - 8.4 g/dL Final     Albumin   Date Value Ref Range Status   02/09/2025 3.4 (L) 3.5 - 5.2 g/dL Final     Total Bilirubin   Date Value Ref Range Status   02/09/2025 1.6 (H) 0.1 - 1.0 mg/dL Final     Comment:     For infants and newborns, interpretation of results should be based  on gestational age, weight and in agreement with clinical  observations.    Premature Infant recommended reference ranges:  Up to 24 hours.............<8.0 mg/dL  Up to 48 hours............<12.0 mg/dL  3-5 days..................<15.0 mg/dL  6-29 days.................<15.0 mg/dL       Alkaline Phosphatase   Date Value Ref Range Status   02/09/2025 40 40 - 150 U/L Final     AST   Date Value Ref Range Status   02/09/2025 16 10 - 40 U/L Final     ALT   Date Value Ref  Range Status   02/09/2025 20 10 - 44 U/L Final     Anion Gap   Date Value Ref Range Status   02/20/2025 10 8 - 16 mmol/L Final     eGFR   Date Value Ref Range Status   02/20/2025 >60.0 >60 mL/min/1.73 m^2 Final     Lab Results   Component Value Date    INR 1.1 02/05/2025    INR 1.1 03/09/2024    INR 1.0 11/10/2022     Thank you for referring this patient to our clinic.  Please call with any questions.    Sincerely,      Princess Montesinos PA-C  Pediatric Cardiology  Adult Congenital Heart Disease  [facility] [address] [place]  [address]  [phone]

## 2025-03-25 NOTE — PROGRESS NOTES
I assume primary medical responsibility for this patient. I have reviewed the history, physical, and assessement & treatment plan with the resident and agree that the care is reasonable and necessary. This service has been performed by a resident without the presence of a teaching physician under the primary care exception. If necessary, an addendum of additional findings or evaluation beyond the resident documentation will be noted below.    Agree with plan as stated. Unclear etiology of waxing and waning epigastric pain with nausea. May consider h.pylori test in the future. See he was followed by hepatology in the past for liver failure last seen in 2023 overdue for follow up.   Additionally BP elevated managed by cardiology whom he is seeing in several days. Will review again next visit for potential adjustments.

## 2025-03-31 ENCOUNTER — HOSPITAL ENCOUNTER (INPATIENT)
Facility: HOSPITAL | Age: 30
LOS: 3 days | Discharge: HOME | DRG: 293 | End: 2025-04-04
Attending: EMERGENCY MEDICINE | Admitting: INTERNAL MEDICINE
Payer: MEDICAID

## 2025-03-31 DIAGNOSIS — I50.23 ACUTE ON CHRONIC SYSTOLIC CONGESTIVE HEART FAILURE: Primary | ICD-10-CM

## 2025-03-31 DIAGNOSIS — I50.43 ACUTE ON CHRONIC COMBINED SYSTOLIC AND DIASTOLIC CONGESTIVE HEART FAILURE: ICD-10-CM

## 2025-03-31 DIAGNOSIS — R06.02 SOB (SHORTNESS OF BREATH): ICD-10-CM

## 2025-03-31 DIAGNOSIS — Q22.4 SINGLE VENTRICLE WITH TRICUSPID ATRESIA: ICD-10-CM

## 2025-03-31 DIAGNOSIS — Q20.4 SINGLE VENTRICLE WITH TRICUSPID ATRESIA: ICD-10-CM

## 2025-03-31 DIAGNOSIS — Q24.9 ADULT CONGENITAL HEART DISEASE: ICD-10-CM

## 2025-03-31 LAB
ABSOLUTE EOSINOPHIL (OHS): 0 K/UL
ABSOLUTE MONOCYTE (OHS): 0.37 K/UL (ref 0.3–1)
ABSOLUTE NEUTROPHIL COUNT (OHS): 3.41 K/UL (ref 1.8–7.7)
ALBUMIN SERPL BCP-MCNC: 3.9 G/DL (ref 3.5–5.2)
ALLENS TEST: ABNORMAL
ALP SERPL-CCNC: 56 UNIT/L (ref 40–150)
ALT SERPL W/O P-5'-P-CCNC: 39 UNIT/L (ref 10–44)
ANION GAP (OHS): 12 MMOL/L (ref 8–16)
AST SERPL-CCNC: 39 UNIT/L (ref 11–45)
BACTERIA #/AREA URNS AUTO: NORMAL /HPF
BASOPHILS # BLD AUTO: 0.02 K/UL
BASOPHILS NFR BLD AUTO: 0.4 %
BILIRUB SERPL-MCNC: 2.3 MG/DL (ref 0.1–1)
BILIRUB UR QL STRIP.AUTO: NEGATIVE
BIPAP: 0
BNP SERPL-MCNC: 1317 PG/ML (ref 0–99)
BUN SERPL-MCNC: 11 MG/DL (ref 6–20)
BUN SERPL-MCNC: 12 MG/DL (ref 6–30)
CALCIUM SERPL-MCNC: 8.7 MG/DL (ref 8.7–10.5)
CHLORIDE SERPL-SCNC: 107 MMOL/L (ref 95–110)
CHLORIDE SERPL-SCNC: 107 MMOL/L (ref 95–110)
CLARITY UR: CLEAR
CO2 SERPL-SCNC: 21 MMOL/L (ref 23–29)
COLOR UR AUTO: YELLOW
CORRECTED TEMPERATURE (PCO2): 45.7 MMHG
CORRECTED TEMPERATURE (PH): 7.34
CORRECTED TEMPERATURE (PO2): 10.8 MMHG
CREAT SERPL-MCNC: 1.1 MG/DL (ref 0.5–1.4)
CREAT SERPL-MCNC: 1.1 MG/DL (ref 0.5–1.4)
ERYTHROCYTE [DISTWIDTH] IN BLOOD BY AUTOMATED COUNT: 15.9 % (ref 11.5–14.5)
FIO2: 21 %
GFR SERPLBLD CREATININE-BSD FMLA CKD-EPI: >60 ML/MIN/1.73/M2
GLUCOSE SERPL-MCNC: 102 MG/DL (ref 70–110)
GLUCOSE SERPL-MCNC: 106 MG/DL (ref 70–110)
GLUCOSE UR QL STRIP: NEGATIVE
HCO3 UR-SCNC: 20.1 MMOL/L (ref 24–28)
HCT VFR BLD AUTO: 46.6 % (ref 40–54)
HCT VFR BLD CALC: 49.3 %
HCT VFR BLD CALC: 52 %PCV (ref 36–54)
HCV AB SERPL QL IA: NORMAL
HGB BLD-MCNC: 14.8 GM/DL (ref 14–18)
HGB UR QL STRIP: NEGATIVE
HIV 1+2 AB+HIV1 P24 AG SERPL QL IA: NORMAL
HYALINE CASTS UR QL AUTO: 0 /LPF (ref 0–1)
IMM GRANULOCYTES # BLD AUTO: 0.02 K/UL (ref 0–0.04)
IMM GRANULOCYTES NFR BLD AUTO: 0.4 % (ref 0–0.5)
KETONES UR QL STRIP: NEGATIVE
LDH SERPL L TO P-CCNC: 3.8 MMOL/L (ref 0.5–2.2)
LEUKOCYTE ESTERASE UR QL STRIP: NEGATIVE
LYMPHOCYTES # BLD AUTO: 1.7 K/UL (ref 1–4.8)
MCH RBC QN AUTO: 28.4 PG (ref 27–31)
MCHC RBC AUTO-ENTMCNC: 31.8 G/DL (ref 32–36)
MCV RBC AUTO: 89 FL (ref 82–98)
MICROSCOPIC COMMENT: NORMAL
NITRITE UR QL STRIP: NEGATIVE
NUCLEATED RBC (/100WBC) (OHS): 0 /100 WBC
PCO2 BLDA: 32.4 MMHG (ref 35–45)
PCO2 BLDA: 45.7 MMHG
PH SMN: 7.34 [PH]
PH SMN: 7.4 [PH] (ref 7.35–7.45)
PH UR STRIP: 7 [PH]
PLATELET # BLD AUTO: 139 K/UL (ref 150–450)
PMV BLD AUTO: 12 FL (ref 9.2–12.9)
PO2 BLDA: 10.8 MMHG
PO2 BLDA: 55 MMHG (ref 80–100)
POC BASE DEFICIT: -1.6 MMOL/L
POC BE: -5 MMOL/L
POC HCO3: 20.8 MMOL/L
POC IONIZED CALCIUM: 1.06 MMOL/L (ref 1.06–1.42)
POC IONIZED CALCIUM: 1.09 MMOL/L (ref 1.06–1.42)
POC PERFORMED BY: ABNORMAL
POC SATURATED O2: 89 % (ref 95–100)
POC TCO2 (MEASURED): 23 MMOL/L (ref 23–29)
POC TCO2: 21 MMOL/L (ref 23–27)
POC TEMPERATURE: 37 C
POTASSIUM BLD-SCNC: 4 MMOL/L (ref 3.5–5.1)
POTASSIUM BLD-SCNC: 4.3 MMOL/L (ref 3.5–5.1)
POTASSIUM SERPL-SCNC: 3.8 MMOL/L (ref 3.5–5.1)
PROT SERPL-MCNC: 7.3 GM/DL (ref 6–8.4)
PROT UR QL STRIP: ABNORMAL
RBC # BLD AUTO: 5.21 M/UL (ref 4.6–6.2)
RBC #/AREA URNS AUTO: 3 /HPF (ref 0–4)
RELATIVE EOSINOPHIL (OHS): 0 %
RELATIVE LYMPHOCYTE (OHS): 30.8 % (ref 18–48)
RELATIVE MONOCYTE (OHS): 6.7 % (ref 4–15)
RELATIVE NEUTROPHIL (OHS): 61.7 % (ref 38–73)
SAMPLE: ABNORMAL
SAMPLE: NORMAL
SITE: ABNORMAL
SITE: ABNORMAL
SODIUM BLD-SCNC: 142 MMOL/L (ref 136–145)
SODIUM BLD-SCNC: 143 MMOL/L (ref 136–145)
SODIUM SERPL-SCNC: 140 MMOL/L (ref 136–145)
SP GR UR STRIP: 1.01
SPECIMEN SOURCE: ABNORMAL
TROPONIN I SERPL HS-MCNC: 40 NG/L
UROBILINOGEN UR STRIP-ACNC: ABNORMAL EU/DL
WBC # BLD AUTO: 5.52 K/UL (ref 3.9–12.7)
WBC #/AREA URNS AUTO: 4 /HPF (ref 0–5)

## 2025-03-31 PROCEDURE — 27100171 HC OXYGEN HIGH FLOW UP TO 24 HOURS

## 2025-03-31 PROCEDURE — 96374 THER/PROPH/DIAG INJ IV PUSH: CPT

## 2025-03-31 PROCEDURE — 80053 COMPREHEN METABOLIC PANEL: CPT | Performed by: EMERGENCY MEDICINE

## 2025-03-31 PROCEDURE — 82330 ASSAY OF CALCIUM: CPT

## 2025-03-31 PROCEDURE — 83605 ASSAY OF LACTIC ACID: CPT

## 2025-03-31 PROCEDURE — 99900035 HC TECH TIME PER 15 MIN (STAT)

## 2025-03-31 PROCEDURE — 94761 N-INVAS EAR/PLS OXIMETRY MLT: CPT | Mod: XB

## 2025-03-31 PROCEDURE — 86803 HEPATITIS C AB TEST: CPT | Performed by: EMERGENCY MEDICINE

## 2025-03-31 PROCEDURE — 84132 ASSAY OF SERUM POTASSIUM: CPT

## 2025-03-31 PROCEDURE — 84484 ASSAY OF TROPONIN QUANT: CPT | Performed by: EMERGENCY MEDICINE

## 2025-03-31 PROCEDURE — 85025 COMPLETE CBC W/AUTO DIFF WBC: CPT | Performed by: EMERGENCY MEDICINE

## 2025-03-31 PROCEDURE — 27000190 HC CPAP FULL FACE MASK W/VALVE

## 2025-03-31 PROCEDURE — 87040 BLOOD CULTURE FOR BACTERIA: CPT | Performed by: EMERGENCY MEDICINE

## 2025-03-31 PROCEDURE — 81001 URINALYSIS AUTO W/SCOPE: CPT | Performed by: EMERGENCY MEDICINE

## 2025-03-31 PROCEDURE — 99291 CRITICAL CARE FIRST HOUR: CPT

## 2025-03-31 PROCEDURE — 87389 HIV-1 AG W/HIV-1&-2 AB AG IA: CPT | Performed by: EMERGENCY MEDICINE

## 2025-03-31 PROCEDURE — 82803 BLOOD GASES ANY COMBINATION: CPT

## 2025-03-31 PROCEDURE — 94660 CPAP INITIATION&MGMT: CPT

## 2025-03-31 PROCEDURE — 84484 ASSAY OF TROPONIN QUANT: CPT

## 2025-03-31 PROCEDURE — 5A09357 ASSISTANCE WITH RESPIRATORY VENTILATION, LESS THAN 24 CONSECUTIVE HOURS, CONTINUOUS POSITIVE AIRWAY PRESSURE: ICD-10-PCS | Performed by: INTERNAL MEDICINE

## 2025-03-31 PROCEDURE — 85014 HEMATOCRIT: CPT

## 2025-03-31 PROCEDURE — 83880 ASSAY OF NATRIURETIC PEPTIDE: CPT | Performed by: EMERGENCY MEDICINE

## 2025-03-31 PROCEDURE — 36600 WITHDRAWAL OF ARTERIAL BLOOD: CPT

## 2025-03-31 PROCEDURE — 84295 ASSAY OF SERUM SODIUM: CPT

## 2025-03-31 PROCEDURE — 63600175 PHARM REV CODE 636 W HCPCS: Performed by: EMERGENCY MEDICINE

## 2025-03-31 RX ORDER — FUROSEMIDE 10 MG/ML
60 INJECTION INTRAMUSCULAR; INTRAVENOUS
Status: COMPLETED | OUTPATIENT
Start: 2025-03-31 | End: 2025-03-31

## 2025-03-31 RX ADMIN — FUROSEMIDE 60 MG: 10 INJECTION, SOLUTION INTRAMUSCULAR; INTRAVENOUS at 10:03

## 2025-04-01 LAB
ABSOLUTE EOSINOPHIL (OHS): 0 K/UL
ABSOLUTE MONOCYTE (OHS): 0.32 K/UL (ref 0.3–1)
ABSOLUTE NEUTROPHIL COUNT (OHS): 3.68 K/UL (ref 1.8–7.7)
ALBUMIN SERPL BCP-MCNC: 3.4 G/DL (ref 3.5–5.2)
ALP SERPL-CCNC: 47 UNIT/L (ref 40–150)
ALT SERPL W/O P-5'-P-CCNC: 39 UNIT/L (ref 10–44)
ANION GAP (OHS): 12 MMOL/L (ref 8–16)
ANION GAP (OHS): 13 MMOL/L (ref 8–16)
ANION GAP (OHS): 14 MMOL/L (ref 8–16)
ANION GAP (OHS): 16 MMOL/L (ref 8–16)
AST SERPL-CCNC: 43 UNIT/L (ref 11–45)
AV AREA BY CONTINUOUS VTI: 4.1 CM2
AV INDEX (PROSTH): 0.57
AV LVOT MEAN GRADIENT: 0 MMHG
AV LVOT PEAK GRADIENT: 1 MMHG
AV MEAN GRADIENT: 1 MMHG
AV PEAK GRADIENT: 1 MMHG
AV VALVE AREA BY VELOCITY RATIO: 5.7 CM²
AV VALVE AREA: 4 CM2
AV VELOCITY RATIO: 0.8
BASOPHILS # BLD AUTO: 0.02 K/UL
BASOPHILS NFR BLD AUTO: 0.4 %
BILIRUB SERPL-MCNC: 2.3 MG/DL (ref 0.1–1)
BIPAP: 0
BSA FOR ECHO PROCEDURE: 1.71 M2
BUN SERPL-MCNC: 12 MG/DL (ref 6–20)
BUN SERPL-MCNC: 15 MG/DL (ref 6–20)
BUN SERPL-MCNC: 15 MG/DL (ref 6–20)
BUN SERPL-MCNC: 16 MG/DL (ref 6–20)
CALCIUM SERPL-MCNC: 8.8 MG/DL (ref 8.7–10.5)
CALCIUM SERPL-MCNC: 8.9 MG/DL (ref 8.7–10.5)
CALCIUM SERPL-MCNC: 9 MG/DL (ref 8.7–10.5)
CALCIUM SERPL-MCNC: 9.5 MG/DL (ref 8.7–10.5)
CHLORIDE SERPL-SCNC: 101 MMOL/L (ref 95–110)
CHLORIDE SERPL-SCNC: 101 MMOL/L (ref 95–110)
CHLORIDE SERPL-SCNC: 103 MMOL/L (ref 95–110)
CHLORIDE SERPL-SCNC: 106 MMOL/L (ref 95–110)
CO2 SERPL-SCNC: 20 MMOL/L (ref 23–29)
CO2 SERPL-SCNC: 22 MMOL/L (ref 23–29)
CO2 SERPL-SCNC: 22 MMOL/L (ref 23–29)
CO2 SERPL-SCNC: 25 MMOL/L (ref 23–29)
CORRECTED TEMPERATURE (PCO2): 34.1 MMHG
CORRECTED TEMPERATURE (PH): 7.41
CORRECTED TEMPERATURE (PO2): 30.2 MMHG
CREAT SERPL-MCNC: 0.9 MG/DL (ref 0.5–1.4)
CREAT SERPL-MCNC: 0.9 MG/DL (ref 0.5–1.4)
CREAT SERPL-MCNC: 1.1 MG/DL (ref 0.5–1.4)
CREAT SERPL-MCNC: 1.2 MG/DL (ref 0.5–1.4)
CV ECHO LV RWT: 0.26 CM
DOP CALC AO PEAK VEL: 0.5 M/S
DOP CALC AO VTI: 9 CM
DOP CALC LVOT AREA: 7.1 CM2
DOP CALC LVOT DIAMETER: 3 CM
DOP CALC LVOT PEAK VEL: 0.4 M/S
DOP CALC LVOT STROKE VOLUME: 36 CM3
DOP CALCLVOT PEAK VEL VTI: 5.1 CM
E WAVE DECELERATION TIME: 154 MS
E/A RATIO: 2.81
E/E' RATIO: 11 M/S
ECHO EF ESTIMATED: 17 %
ECHO LV POSTERIOR WALL: 1 CM (ref 0.6–1.1)
ERYTHROCYTE [DISTWIDTH] IN BLOOD BY AUTOMATED COUNT: 15.4 % (ref 11.5–14.5)
FIO2: 21 %
FRACTIONAL SHORTENING: 9.1 % (ref 28–44)
GFR SERPLBLD CREATININE-BSD FMLA CKD-EPI: >60 ML/MIN/1.73/M2
GLUCOSE SERPL-MCNC: 101 MG/DL (ref 70–110)
GLUCOSE SERPL-MCNC: 109 MG/DL (ref 70–110)
GLUCOSE SERPL-MCNC: 129 MG/DL (ref 70–110)
GLUCOSE SERPL-MCNC: 143 MG/DL (ref 70–110)
HCT VFR BLD AUTO: 41.8 % (ref 40–54)
HCT VFR BLD CALC: 44.7 %
HGB BLD-MCNC: 13.6 GM/DL (ref 14–18)
IMM GRANULOCYTES # BLD AUTO: 0.02 K/UL (ref 0–0.04)
IMM GRANULOCYTES NFR BLD AUTO: 0.4 % (ref 0–0.5)
INR PPP: 1.4 (ref 0.8–1.2)
INTERVENTRICULAR SEPTUM: 1.2 CM (ref 0.6–1.1)
LACTATE SERPL-SCNC: 1.7 MMOL/L (ref 0.5–2.2)
LDH SERPL L TO P-CCNC: 2.9 MMOL/L (ref 0.5–2.2)
LEFT ATRIUM SIZE: 4.5 CM
LEFT INTERNAL DIMENSION IN SYSTOLE: 7 CM (ref 2.1–4)
LEFT VENTRICLE DIASTOLIC VOLUME INDEX: 183.04 ML/M2
LEFT VENTRICLE DIASTOLIC VOLUME: 313 ML
LEFT VENTRICLE MASS INDEX: 250.3 G/M2
LEFT VENTRICLE SYSTOLIC VOLUME INDEX: 150.9 ML/M2
LEFT VENTRICLE SYSTOLIC VOLUME: 258 ML
LEFT VENTRICULAR INTERNAL DIMENSION IN DIASTOLE: 7.7 CM (ref 3.5–6)
LEFT VENTRICULAR MASS: 428.1 G
LV LATERAL E/E' RATIO: 9.1
LV SEPTAL E/E' RATIO: 14.6
LYMPHOCYTES # BLD AUTO: 1.43 K/UL (ref 1–4.8)
MAGNESIUM SERPL-MCNC: 1.5 MG/DL (ref 1.6–2.6)
MAGNESIUM SERPL-MCNC: 1.8 MG/DL (ref 1.6–2.6)
MCH RBC QN AUTO: 28.3 PG (ref 27–31)
MCHC RBC AUTO-ENTMCNC: 32.5 G/DL (ref 32–36)
MCV RBC AUTO: 87 FL (ref 82–98)
MV PEAK A VEL: 0.26 M/S
MV PEAK E VEL: 0.73 M/S
NUCLEATED RBC (/100WBC) (OHS): 0 /100 WBC
OHS QRS DURATION: 100 MS
OHS QTC CALCULATION: 470 MS
PCO2 BLDA: 34.1 MMHG
PH SMN: 7.41 [PH]
PHOSPHATE SERPL-MCNC: 4.1 MG/DL (ref 2.7–4.5)
PLATELET # BLD AUTO: 112 K/UL (ref 150–450)
PMV BLD AUTO: 11.9 FL (ref 9.2–12.9)
PO2 BLDA: 30.2 MMHG
POC BASE DEFICIT: -2.2 MMOL/L
POC HCO3: 21.4 MMOL/L
POC PERFORMED BY: ABNORMAL
POC TEMPERATURE: 37 C
POTASSIUM SERPL-SCNC: 3.9 MMOL/L (ref 3.5–5.1)
POTASSIUM SERPL-SCNC: 4 MMOL/L (ref 3.5–5.1)
POTASSIUM SERPL-SCNC: 4.2 MMOL/L (ref 3.5–5.1)
POTASSIUM SERPL-SCNC: 4.4 MMOL/L (ref 3.5–5.1)
PROT SERPL-MCNC: 6.4 GM/DL (ref 6–8.4)
PROTHROMBIN TIME: 14.8 SECONDS (ref 9–12.5)
RBC # BLD AUTO: 4.81 M/UL (ref 4.6–6.2)
RELATIVE EOSINOPHIL (OHS): 0 %
RELATIVE LYMPHOCYTE (OHS): 26.1 % (ref 18–48)
RELATIVE MONOCYTE (OHS): 5.9 % (ref 4–15)
RELATIVE NEUTROPHIL (OHS): 67.2 % (ref 38–73)
SINUS: 4.41 CM
SODIUM SERPL-SCNC: 138 MMOL/L (ref 136–145)
SODIUM SERPL-SCNC: 139 MMOL/L (ref 136–145)
SPECIMEN SOURCE: ABNORMAL
STJ: 3.93 CM
TDI LATERAL: 0.08 M/S
TDI SEPTAL: 0.05 M/S
TDI: 0.07 M/S
WBC # BLD AUTO: 5.47 K/UL (ref 3.9–12.7)
Z-SCORE OF LEFT VENTRICULAR DIMENSION IN END DIASTOLE: 4.83
Z-SCORE OF LEFT VENTRICULAR DIMENSION IN END SYSTOLE: 6.86

## 2025-04-01 PROCEDURE — 94761 N-INVAS EAR/PLS OXIMETRY MLT: CPT | Mod: XB

## 2025-04-01 PROCEDURE — 25000003 PHARM REV CODE 250: Performed by: STUDENT IN AN ORGANIZED HEALTH CARE EDUCATION/TRAINING PROGRAM

## 2025-04-01 PROCEDURE — 85610 PROTHROMBIN TIME: CPT | Performed by: STUDENT IN AN ORGANIZED HEALTH CARE EDUCATION/TRAINING PROGRAM

## 2025-04-01 PROCEDURE — 83605 ASSAY OF LACTIC ACID: CPT

## 2025-04-01 PROCEDURE — 99223 1ST HOSP IP/OBS HIGH 75: CPT | Mod: ,,, | Performed by: PEDIATRICS

## 2025-04-01 PROCEDURE — 82803 BLOOD GASES ANY COMBINATION: CPT

## 2025-04-01 PROCEDURE — 83735 ASSAY OF MAGNESIUM: CPT | Performed by: STUDENT IN AN ORGANIZED HEALTH CARE EDUCATION/TRAINING PROGRAM

## 2025-04-01 PROCEDURE — 82435 ASSAY OF BLOOD CHLORIDE: CPT

## 2025-04-01 PROCEDURE — 63600175 PHARM REV CODE 636 W HCPCS: Performed by: STUDENT IN AN ORGANIZED HEALTH CARE EDUCATION/TRAINING PROGRAM

## 2025-04-01 PROCEDURE — 99223 1ST HOSP IP/OBS HIGH 75: CPT | Mod: ,,, | Performed by: INTERNAL MEDICINE

## 2025-04-01 PROCEDURE — 20000000 HC ICU ROOM

## 2025-04-01 PROCEDURE — 5A0945A ASSISTANCE WITH RESPIRATORY VENTILATION, 24-96 CONSECUTIVE HOURS, HIGH NASAL FLOW/VELOCITY: ICD-10-PCS | Performed by: INTERNAL MEDICINE

## 2025-04-01 PROCEDURE — 94799 UNLISTED PULMONARY SVC/PX: CPT

## 2025-04-01 PROCEDURE — 94660 CPAP INITIATION&MGMT: CPT

## 2025-04-01 PROCEDURE — 85025 COMPLETE CBC W/AUTO DIFF WBC: CPT | Performed by: STUDENT IN AN ORGANIZED HEALTH CARE EDUCATION/TRAINING PROGRAM

## 2025-04-01 PROCEDURE — 84100 ASSAY OF PHOSPHORUS: CPT | Performed by: STUDENT IN AN ORGANIZED HEALTH CARE EDUCATION/TRAINING PROGRAM

## 2025-04-01 PROCEDURE — 63600175 PHARM REV CODE 636 W HCPCS

## 2025-04-01 PROCEDURE — 99900035 HC TECH TIME PER 15 MIN (STAT)

## 2025-04-01 PROCEDURE — 80053 COMPREHEN METABOLIC PANEL: CPT | Performed by: STUDENT IN AN ORGANIZED HEALTH CARE EDUCATION/TRAINING PROGRAM

## 2025-04-01 PROCEDURE — 27100171 HC OXYGEN HIGH FLOW UP TO 24 HOURS

## 2025-04-01 RX ORDER — POLYETHYLENE GLYCOL 3350 17 G/17G
17 POWDER, FOR SOLUTION ORAL 2 TIMES DAILY PRN
Status: DISCONTINUED | OUTPATIENT
Start: 2025-04-01 | End: 2025-04-04 | Stop reason: HOSPADM

## 2025-04-01 RX ORDER — FAMOTIDINE 20 MG/1
20 TABLET, FILM COATED ORAL 2 TIMES DAILY
Status: DISCONTINUED | OUTPATIENT
Start: 2025-04-01 | End: 2025-04-04 | Stop reason: HOSPADM

## 2025-04-01 RX ORDER — DAPAGLIFLOZIN 10 MG/1
10 TABLET, FILM COATED ORAL DAILY
Status: DISCONTINUED | OUTPATIENT
Start: 2025-04-01 | End: 2025-04-04 | Stop reason: HOSPADM

## 2025-04-01 RX ORDER — MAGNESIUM SULFATE HEPTAHYDRATE 40 MG/ML
2 INJECTION, SOLUTION INTRAVENOUS ONCE
Status: DISCONTINUED | OUTPATIENT
Start: 2025-04-01 | End: 2025-04-02

## 2025-04-01 RX ORDER — MAGNESIUM SULFATE HEPTAHYDRATE 40 MG/ML
2 INJECTION, SOLUTION INTRAVENOUS ONCE
Status: COMPLETED | OUTPATIENT
Start: 2025-04-01 | End: 2025-04-01

## 2025-04-01 RX ORDER — LOSARTAN POTASSIUM 50 MG/1
150 TABLET ORAL DAILY
Status: DISCONTINUED | OUTPATIENT
Start: 2025-04-02 | End: 2025-04-02

## 2025-04-01 RX ORDER — LANOLIN ALCOHOL/MO/W.PET/CERES
800 CREAM (GRAM) TOPICAL ONCE
Status: COMPLETED | OUTPATIENT
Start: 2025-04-01 | End: 2025-04-01

## 2025-04-01 RX ORDER — LOSARTAN POTASSIUM 50 MG/1
100 TABLET ORAL DAILY
Status: DISCONTINUED | OUTPATIENT
Start: 2025-04-01 | End: 2025-04-01

## 2025-04-01 RX ORDER — ONDANSETRON HYDROCHLORIDE 2 MG/ML
4 INJECTION, SOLUTION INTRAVENOUS EVERY 6 HOURS PRN
Status: DISCONTINUED | OUTPATIENT
Start: 2025-04-01 | End: 2025-04-04 | Stop reason: HOSPADM

## 2025-04-01 RX ORDER — TALC
6 POWDER (GRAM) TOPICAL NIGHTLY PRN
Status: DISCONTINUED | OUTPATIENT
Start: 2025-04-01 | End: 2025-04-04 | Stop reason: HOSPADM

## 2025-04-01 RX ORDER — FUROSEMIDE 10 MG/ML
40 INJECTION INTRAMUSCULAR; INTRAVENOUS EVERY 12 HOURS
Status: DISCONTINUED | OUTPATIENT
Start: 2025-04-01 | End: 2025-04-02

## 2025-04-01 RX ORDER — CETIRIZINE HYDROCHLORIDE 10 MG/1
10 TABLET ORAL DAILY
Status: DISCONTINUED | OUTPATIENT
Start: 2025-04-01 | End: 2025-04-04 | Stop reason: HOSPADM

## 2025-04-01 RX ORDER — ACETAMINOPHEN 325 MG/1
650 TABLET ORAL EVERY 4 HOURS PRN
Status: DISCONTINUED | OUTPATIENT
Start: 2025-04-01 | End: 2025-04-04 | Stop reason: HOSPADM

## 2025-04-01 RX ORDER — SODIUM CHLORIDE 0.9 % (FLUSH) 0.9 %
10 SYRINGE (ML) INJECTION
Status: DISCONTINUED | OUTPATIENT
Start: 2025-04-01 | End: 2025-04-04 | Stop reason: HOSPADM

## 2025-04-01 RX ORDER — METOPROLOL SUCCINATE 50 MG/1
50 TABLET, EXTENDED RELEASE ORAL DAILY
Status: DISCONTINUED | OUTPATIENT
Start: 2025-04-01 | End: 2025-04-04 | Stop reason: HOSPADM

## 2025-04-01 RX ORDER — SPIRONOLACTONE 25 MG/1
25 TABLET ORAL DAILY
Status: DISCONTINUED | OUTPATIENT
Start: 2025-04-01 | End: 2025-04-04 | Stop reason: HOSPADM

## 2025-04-01 RX ORDER — FUROSEMIDE 10 MG/ML
80 INJECTION INTRAMUSCULAR; INTRAVENOUS EVERY 12 HOURS
Status: DISCONTINUED | OUTPATIENT
Start: 2025-04-01 | End: 2025-04-01

## 2025-04-01 RX ADMIN — Medication 800 MG: at 02:04

## 2025-04-01 RX ADMIN — LOSARTAN POTASSIUM 100 MG: 50 TABLET, FILM COATED ORAL at 09:04

## 2025-04-01 RX ADMIN — CETIRIZINE HYDROCHLORIDE 10 MG: 10 TABLET, FILM COATED ORAL at 09:04

## 2025-04-01 RX ADMIN — ONDANSETRON 4 MG: 2 INJECTION INTRAMUSCULAR; INTRAVENOUS at 06:04

## 2025-04-01 RX ADMIN — METOPROLOL SUCCINATE 50 MG: 50 TABLET, EXTENDED RELEASE ORAL at 09:04

## 2025-04-01 RX ADMIN — DAPAGLIFLOZIN 10 MG: 10 TABLET, FILM COATED ORAL at 01:04

## 2025-04-01 RX ADMIN — FAMOTIDINE 20 MG: 20 TABLET, FILM COATED ORAL at 01:04

## 2025-04-01 RX ADMIN — FAMOTIDINE 20 MG: 20 TABLET, FILM COATED ORAL at 09:04

## 2025-04-01 RX ADMIN — FUROSEMIDE 40 MG: 10 INJECTION, SOLUTION INTRAVENOUS at 12:04

## 2025-04-01 RX ADMIN — Medication 800 MG: at 08:04

## 2025-04-01 RX ADMIN — RIVAROXABAN 20 MG: 20 TABLET, FILM COATED ORAL at 05:04

## 2025-04-01 RX ADMIN — FAMOTIDINE 20 MG: 20 TABLET, FILM COATED ORAL at 08:04

## 2025-04-01 RX ADMIN — SPIRONOLACTONE 25 MG: 25 TABLET, FILM COATED ORAL at 09:04

## 2025-04-01 RX ADMIN — FUROSEMIDE 40 MG: 10 INJECTION, SOLUTION INTRAVENOUS at 08:04

## 2025-04-01 RX ADMIN — MAGNESIUM SULFATE HEPTAHYDRATE 2 G: 40 INJECTION, SOLUTION INTRAVENOUS at 12:04

## 2025-04-01 NOTE — ASSESSMENT & PLAN NOTE
Patient has Combined Systolic and Diastolic heart failure that is Acute on chronic.   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 Severely enlarged ventricle.  -Pulmonary circulation not visualized  -Mildly dilated inferior vena cava - connection to Fontan not visualized.  No apparent thrombus noted in the IVC  -Right superior vena cava not visualized  -No evidence of obstruction at Fontan on limited imaging.  Fibrinous material noted, which may be degenerated surgical material or  congenital material, less likely to be layered thrombus. Correlate clinically.  -The Jesus anastomosis to the pulmonary arteries is not visualized on this echo.  -Pulmonary confluence and proximal pulmonary branches not demonstrated.  -Mildly dilated mitral valve with color Doppler demonstrating moderate regurgitation.  -Single ventricle consistent with left ventricular morphology.  -There is at sever dilation of the left ventricle.  LVEDV index is 96 cc/m2  -Left ventricular systolic function qualitatively severely compromised with ejection fraction estimated 10-15 %.    Current Heart Failure Medications  losartan tablet 100 mg, Daily, Oral  metoprolol succinate (TOPROL-XL) 24 hr tablet 50 mg, Daily, Oral  spironolactone tablet 25 mg, Daily, Oral  furosemide injection 80 mg, Every 12 hours, Intravenous    Plan  - Monitor strict I&Os and daily weights.    - Place on telemetry  - Low sodium diet  - Patient with congenital heart disease status-post fontan procedure presenting with dyspnea and noted drop in EF to 10-15% from 30-35% previously.   - On BiPAP for respiratory distress. Sinus tach with elevated BNP and  work of breathing.   - Adult congenital cardiology consulted; appreciate recommendations  - Continue GDMT as tolerated with metoprolol, losartan, spironolactone  - Monitor UOP

## 2025-04-01 NOTE — H&P
[facility]  Cardiology  History and Physical     Patient Name: Juani Reilly Jr.  MRN: [id number]  Admission Date: 3/31/2025  Code Status: Full Code   Attending Provider: Kai Feliciano MD   Primary Care Physician: Jaqueline Pardo MD  Principal Problem:<principal problem not specified>    Patient information was obtained from patient, relative(s), past medical records, and ER records.     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 Ferguson Nov 2022, decreased LV function (EF 30-35%), cirrhosis and reported difficulty with adherence to medications who presented with respiratory distress requiring BiPAP.    Initially hypoxic with O2 sats 85% on IPAP 16 EPAP 8 and 100% O2.  I-STAT lactate 3.8 with unremarkable electrolytes.  BNP 1317. Troponin mildly elevated.  EKG unremarkable when compared to previous. Given dose of IV Lasix in the ED with 650cc UOP.  Kept on BiPAP. Reports his symptoms have progressed with fatigue and shortness of breath over the past week but worsened today. Admitted early February for similar admission. Diuresed, weaned off oxygen and discharged at that time. He is established with congenital heart team.    Cardiology consulted for admission to CCU.       Past Medical History:   Diagnosis Date    Acute decompensated heart failure 02/06/2025    History of heart surgery     Other cirrhosis of liver 11/12/2020    SVT (supraventricular tachycardia)     WPW syndrome        Past Surgical History:   Procedure Laterality Date    ANGIOGRAPHY OF AORTIC ARCH N/A 9/3/2020    Procedure: AORTOGRAM, AORTIC ARCH;  Surgeon: Stephania Crump MD;  Location: [facility] CATH LAB;  Service: Cardiology;  Laterality:  N/A;    FONTAN PROCEDURE, EXTRACARDIAC      LEFT HEART CATHETERIZATION Left 9/3/2020    Procedure: Left heart cath;  Surgeon: Stephania Crump MD;  Location: [facility] CATH LAB;  Service: Cardiology;  Laterality: Left;    RIGHT HEART CATHETERIZATION FOR CONGENITAL HEART DEFECT N/A 9/3/2020    Procedure: CATHETERIZATION, HEART, RIGHT, FOR CONGENITAL HEART DEFECT;  Surgeon: Stephania Crump MD;  Location: [facility] CATH LAB;  Service: Cardiology;  Laterality: N/A;  [facility] - needs covid test morning of. Extenuating circumstances    TRANSESOPHAGEAL ECHOCARDIOGRAPHY N/A 11/10/2022    Procedure: ECHOCARDIOGRAM, TRANSESOPHAGEAL;  Surgeon: Emeterio Hall MD;  Location: [facility] EP LAB;  Service: Cardiology;  Laterality: N/A;    TREATMENT OF CARDIAC ARRHYTHMIA N/A 1/9/2021    Procedure: CARDIOVERSION;  Surgeon: Jim Mcginnis MD;  Location: [facility] CATH LAB;  Service: Cardiology;  Laterality: N/A;       Review of patient's allergies indicates:  No Known Allergies    No current facility-administered medications on file prior to encounter.     Current Outpatient Medications on File Prior to Encounter   Medication Sig    cetirizine (ZYRTEC) 10 MG tablet Take 1 tablet (10 mg total) by mouth once daily.    famotidine (PEPCID) 20 MG tablet Take 1 tablet (20 mg total) by mouth 2 (two) times daily. (Patient not taking: Reported on 3/20/2025)    fluticasone propionate (FLONASE) 50 mcg/actuation nasal spray 1 spray (50 mcg total) by Each Nostril route once daily. (Patient not taking: Reported on 3/20/2025)    furosemide (LASIX) 20 MG tablet Take 1 tablet (20 mg total) by mouth once daily.    losartan (COZAAR) 100 MG tablet Take 1 tablet (100 mg total) by mouth once daily.    metoprolol succinate (TOPROL-XL) 50 MG 24 hr tablet Take 1 tablet (50 mg total) by mouth once daily.    rivaroxaban (XARELTO) 20 mg Tab Take 1 tablet (20 mg total) by mouth daily with dinner or evening meal.    spironolactone (ALDACTONE) 25 MG tablet Take 1  tablet (25 mg total) by mouth once daily.     Family History       Problem Relation (Age of Onset)    Heart disease Maternal Grandfather    No Known Problems Mother, Father, Sister, Brother, Maternal Aunt, Maternal Uncle, Paternal Aunt, Paternal Uncle, Maternal Grandmother, Paternal Grandmother, Paternal Grandfather          Tobacco Use    Smoking status: Former     Current packs/day: 0.00     Types: Cigarettes     Quit date:      Years since quittin.2     Passive exposure: Past    Smokeless tobacco: Never    Tobacco comments:     3-4 months    Substance and Sexual Activity    Alcohol use: No    Drug use: Yes     Types: Marijuana     Comment: Mojo today- pt reports a while back    Sexual activity: Yes     Review of Systems   Constitutional: Positive for malaise/fatigue. Negative for chills, diaphoresis, fever and night sweats.   Cardiovascular:  Positive for dyspnea on exertion. Negative for chest pain, irregular heartbeat, leg swelling, near-syncope, palpitations and syncope.   Respiratory:  Positive for shortness of breath. Negative for cough and wheezing.    Skin:  Negative for color change and dry skin.   Musculoskeletal:  Negative for joint pain and joint swelling.   Gastrointestinal:  Negative for abdominal pain, diarrhea, nausea and vomiting.   Genitourinary:  Negative for dysuria.   Neurological:  Negative for dizziness, headaches, light-headedness and weakness.   All other systems reviewed and are negative.    Objective:     Vital Signs (Most Recent):  Temp: 98.1 °F (36.7 °C) (25)  Pulse: 101 (25)  Resp: (!) 32 (25)  BP: (!) 136/105 (25)  SpO2: (!) 88 % (25) Vital Signs (24h Range):  Temp:  [97.4 °F (36.3 °C)-98.1 °F (36.7 °C)] 98.1 °F (36.7 °C)  Pulse:  [] 101  Resp:  [22-32] 32  SpO2:  [82 %-93 %] 88 %  BP: (134-137)/() 136/105     Weight: 64 kg (141 lb)  Body mass index is 22.76 kg/m².  SpO2: (!) 88 %  Intake/Output Summary (Last  "24 hours) at 4/1/2025 0032  Last data filed at 3/31/2025 2319  Gross per 24 hour   Intake --   Output 650 ml   Net -650 ml     Lines/Drains/Airways       Peripheral Intravenous Line  Duration                  Peripheral IV - Single Lumen 03/31/25 0000 20 G Right Antecubital 1 day                  Physical Exam  Vitals and nursing note reviewed.   Constitutional:       General: He is not in acute distress.     Appearance: He is well-developed. He is not diaphoretic.   HENT:      Head: Normocephalic and atraumatic.   Eyes:      Conjunctiva/sclera: Conjunctivae normal.      Pupils: Pupils are equal, round, and reactive to light.   Neck:      Vascular: JVD present.   Cardiovascular:      Rate and Rhythm: Regular rhythm. Tachycardia present.      Pulses: Normal pulses and intact distal pulses.      Heart sounds: No murmur heard.  Pulmonary:      Effort: Pulmonary effort is normal.      Comments: On Bipap 16/8 and 100% O2.  Abdominal:      General: Abdomen is flat. There is no distension.      Palpations: Abdomen is soft.      Tenderness: There is no abdominal tenderness.   Musculoskeletal:         General: Normal range of motion.      Cervical back: Normal range of motion.      Right lower leg: No edema.      Left lower leg: No edema.   Skin:     General: Skin is warm and dry.      Capillary Refill: Capillary refill takes less than 2 seconds.      Findings: No erythema or rash.   Neurological:      General: No focal deficit present.      Mental Status: He is alert and oriented to person, place, and time.   Psychiatric:         Mood and Affect: Mood normal.         Thought Content: Thought content normal.       Significant Labs: ABG:   Recent Labs   Lab 03/31/25  2148 03/31/25  2207   PH 7.339 7.402   PCO2 45.7 32.4*   HCO3 20.8 20.1*   POCSATURATED  --  89   BE  --  -5*   , Blood Culture: No results for input(s): "LABBLOO" in the last 48 hours., CMP   Recent Labs   Lab 03/31/25  2317      K 3.8      CO2 21* " "  BUN 11   CREATININE 1.1   CALCIUM 8.7   ALBUMIN 3.9   BILITOT 2.3*   ALKPHOS 56   AST 39   ALT 39   ANIONGAP 12   , CBC   Recent Labs   Lab 03/31/25  2224   WBC 5.52   HGB 14.8   HCT 46.6   *   , and INR No results for input(s): "INR", "PROTIME" in the last 48 hours.    Significant Imaging: Reviewed  Assessment and Plan:     Acute on chronic systolic congestive heart failure  Patient has Combined Systolic and Diastolic heart failure that is Acute on chronic.   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 Severely enlarged ventricle.  -Pulmonary circulation not visualized  -Mildly dilated inferior vena cava - connection to Fontan not visualized.  No apparent thrombus noted in the IVC  -Right superior vena cava not visualized  -No evidence of obstruction at Fontan on limited imaging.  Fibrinous material noted, which may be degenerated surgical material or  congenital material, less likely to be layered thrombus. Correlate clinically.  -The Jesus anastomosis to the pulmonary arteries is not visualized on this echo.  -Pulmonary confluence and proximal pulmonary branches not demonstrated.  -Mildly dilated mitral valve with color Doppler demonstrating moderate regurgitation.  -Single ventricle consistent with left ventricular morphology.  -There is at sever dilation of the left ventricle.  LVEDV index is 96 cc/m2  -Left ventricular systolic function qualitatively severely compromised with ejection fraction estimated 10-15 %.    Current Heart Failure Medications  losartan tablet 100 mg, Daily, Oral  metoprolol succinate (TOPROL-XL) 24 hr tablet 50 mg, Daily, Oral  spironolactone tablet 25 " mg, Daily, Oral  furosemide injection 80 mg, Every 12 hours, Intravenous    Plan  - Monitor strict I&Os and daily weights.    - Place on telemetry  - Low sodium diet  - Patient with congenital heart disease status-post fontan procedure presenting with dyspnea and noted drop in EF to 10-15% from 30-35% previously.   - On BiPAP for respiratory distress. Sinus tach with elevated BNP and work of breathing.   - Adult congenital cardiology consulted; appreciate recommendations  - Continue GDMT as tolerated with metoprolol, losartan, spironolactone  - Monitor UOP  - Goal sats 85-90%, wean BiPAP to Airvo/high flow as tolerated  - Diuresis for respiratory distress, Fontan preload dependent      VTE Risk Mitigation (From admission, onward)           Ordered     rivaroxaban tablet 20 mg  With dinner         04/01/25 0026     IP VTE HIGH RISK PATIENT  Once         04/01/25 0026     Place sequential compression device  Until discontinued         04/01/25 0026                    Kendell Garvey MD  Cardiology   [facility]

## 2025-04-01 NOTE — NURSING
CICU DAILY GOALS       A: Awake    RASS: Goal -    Actual -     Restraint necessity:    B: Breath   SBT: Not intubated   C: Coordinate A & B, analgesics/sedatives   Pain: managed    SAT: Not intubated  D: Delirium   CAM-ICU:    E: Early(intubated/ Progressive (non-intubated) Mobility   MOVE Screen: Pass   Activity: Activity Management: Arm raise - L1, Rolling - L1  FAS: Feeding/Nutrition   Diet order: Diet/Nutrition Received: 2 gram sodium, low saturated fat/low cholesterol,   Fluid restriction:     Nutritional Supplement Intake:   T: Thrombus   DVT prophylaxis: VTE Core Measure: Pharmacological prophylaxis initiated/maintained  H: HOB Elevation   Head of Bed (HOB) Positioning: HOB at 30 degrees  U: Ulcer Prophylaxis   GI: yes  G: Glucose control   managed    S: Skin   Bathing/Skin Care: bath, complete (04/01/25 3888)  Wounds: No  Wound care consulted: No  B: Bowel Function   no issues   I: Indwelling Catheters   Parry necessity:     CVC necessity: No  D: De-escalation Antibx    Plan for the day     Family/Goals of care/Code Status   Code Status: Full Code     Not trending cvp or svo2. Patient stable and received a one time dose of lasix 40 mg. No acute events throughout day, VS and assessment per flow sheet, patient progressing towards goals as tolerated, plan of care reviewed with Juani Reilly Jr. and family, all concerns addressed.

## 2025-04-01 NOTE — SUBJECTIVE & OBJECTIVE
Past Medical History:   Diagnosis Date    Acute decompensated heart failure 02/06/2025    History of heart surgery     Other cirrhosis of liver 11/12/2020    SVT (supraventricular tachycardia)     WPW syndrome        Past Surgical History:   Procedure Laterality Date    ANGIOGRAPHY OF AORTIC ARCH N/A 9/3/2020    Procedure: AORTOGRAM, AORTIC ARCH;  Surgeon: Stephania Crump MD;  Location: [facility] CATH LAB;  Service: Cardiology;  Laterality: N/A;    FONTAN PROCEDURE, EXTRACARDIAC      LEFT HEART CATHETERIZATION Left 9/3/2020    Procedure: Left heart cath;  Surgeon: Stephania Crump MD;  Location: [facility] CATH LAB;  Service: Cardiology;  Laterality: Left;    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 Extenuating circumstances    TRANSESOPHAGEAL ECHOCARDIOGRAPHY N/A 11/10/2022    Procedure: ECHOCARDIOGRAM, TRANSESOPHAGEAL;  Surgeon: Emeterio Hall MD;  Location: [facility] EP LAB;  Service: Cardiology;  Laterality: N/A;    TREATMENT OF CARDIAC ARRHYTHMIA N/A 1/9/2021    Procedure: CARDIOVERSION;  Surgeon: Jim Mcginnis MD;  Location: [facility] CATH LAB;  Service: Cardiology;  Laterality: N/A;       Review of patient's allergies indicates:  No Known Allergies    No current facility-administered medications on file prior to encounter.     Current Outpatient Medications on File Prior to Encounter   Medication Sig    cetirizine (ZYRTEC) 10 MG tablet Take 1 tablet (10 mg total) by mouth once daily.    famotidine (PEPCID) 20 MG tablet Take 1 tablet (20 mg total) by mouth 2 (two) times daily. (Patient not taking: Reported on 3/20/2025)    fluticasone propionate (FLONASE) 50 mcg/actuation nasal spray 1 spray (50 mcg total) by Each Nostril route once daily. (Patient not taking: Reported  on 3/20/2025)    furosemide (LASIX) 20 MG tablet Take 1 tablet (20 mg total) by mouth once daily.    losartan (COZAAR) 100 MG tablet Take 1 tablet (100 mg total) by mouth once daily.    metoprolol succinate (TOPROL-XL) 50 MG 24 hr tablet Take 1 tablet (50 mg total) by mouth once daily.    rivaroxaban (XARELTO) 20 mg Tab Take 1 tablet (20 mg total) by mouth daily with dinner or evening meal.    spironolactone (ALDACTONE) 25 MG tablet Take 1 tablet (25 mg total) by mouth once daily.     Family History       Problem Relation (Age of Onset)    Heart disease Maternal Grandfather    No Known Problems Mother, Father, Sister, Brother, Maternal Aunt, Maternal Uncle, Paternal Aunt, Paternal Uncle, Maternal Grandmother, Paternal Grandmother, Paternal Grandfather          Tobacco Use    Smoking status: Former     Current packs/day: 0.00     Types: Cigarettes     Quit date:      Years since quittin.2     Passive exposure: Past    Smokeless tobacco: Never    Tobacco comments:     3-4 months    Substance and Sexual Activity    Alcohol use: No    Drug use: Yes     Types: Marijuana     Comment: Mojo today- pt reports a while back    Sexual activity: Yes     Review of Systems   Constitutional: Positive for malaise/fatigue. Negative for fever and night sweats.   HENT:  Positive for congestion. Negative for sore throat.    Cardiovascular:  Positive for dyspnea on exertion. Negative for chest pain, irregular heartbeat, leg swelling, near-syncope, palpitations and syncope.   Respiratory:  Positive for shortness of breath and sputum production. Negative for cough.         Improved off lisinopril   Skin:  Negative for dry skin and rash.   Musculoskeletal:  Negative for myalgias and neck pain.   Gastrointestinal:  Negative for constipation, diarrhea, nausea and vomiting.   Neurological:  Negative for dizziness, headaches and light-headedness.   Psychiatric/Behavioral:  Negative for depression. The patient is not nervous/anxious.       Objective:     Vital Signs (Most Recent):  Temp: 97.5 °F (36.4 °C) (04/01/25 0445)  Pulse: 88 (04/01/25 1006)  Resp: (!) 23 (04/01/25 0701)  BP: (!) 142/103 (04/01/25 0701)  SpO2: (!) 84 % (04/01/25 0916) Vital Signs (24h Range):  Temp:  [97.4 °F (36.3 °C)-98.1 °F (36.7 °C)] 97.5 °F (36.4 °C)  Pulse:  [] 88  Resp:  [4-32] 23  SpO2:  [81 %-94 %] 84 %  BP: (116-147)/() 142/103     Weight: 62.6 kg (138 lb)  Body mass index is 22.27 kg/m².    SpO2: (!) 84 %         Intake/Output Summary (Last 24 hours) at 4/1/2025 1013  Last data filed at 4/1/2025 0701  Gross per 24 hour   Intake 150 ml   Output 1975 ml   Net -1825 ml       Lines/Drains/Airways       Peripheral Intravenous Line  Duration                  Peripheral IV - Single Lumen 03/31/25 0000 20 G Right Antecubital 1 day         Peripheral IV - Single Lumen 04/01/25 0422 20 G Distal;Left;Posterior Forearm <1 day                     Physical Exam  Constitutional:       Appearance: He is normal weight. He is ill-appearing.   HENT:      Head: Normocephalic and atraumatic.      Nose: Nose normal. No congestion or rhinorrhea.      Mouth/Throat:      Mouth: Mucous membranes are moist.      Pharynx: Oropharynx is clear.   Eyes:      Extraocular Movements: Extraocular movements intact.      Pupils: Pupils are equal, round, and reactive to light.   Neck:      Comments: JVD present  Cardiovascular:      Rate and Rhythm: Regular rhythm. Tachycardia present.      Pulses: Normal pulses.      Heart sounds: No murmur heard.     Comments: S1 w/ Single S2  Pulmonary:      Effort: No respiratory distress.      Breath sounds: Normal breath sounds.      Comments: On Bipap 16/8 and 100% O2.  Abdominal:      General: Bowel sounds are normal.      Tenderness: There is no abdominal tenderness. There is no guarding.   Musculoskeletal:         General: No swelling. Normal range of motion.      Cervical back: Normal range of motion. No rigidity.      Right lower leg: No edema.       Left lower leg: No edema.   Lymphadenopathy:      Cervical: No cervical adenopathy.   Skin:     General: Skin is warm.      Capillary Refill: Capillary refill takes 2 to 3 seconds.      Findings: No rash.   Neurological:      General: No focal deficit present.      Mental Status: He is oriented to person, place, and time.   Psychiatric:         Mood and Affect: Mood normal.         Behavior: Behavior normal.        Significant Labs:     CMP  Sodium   Date Value Ref Range Status   04/01/2025 139 136 - 145 mmol/L Final   03/20/2025 139 136 - 145 mmol/L Final     Potassium   Date Value Ref Range Status   04/01/2025 3.9 3.5 - 5.1 mmol/L Final   03/20/2025 3.5 3.5 - 5.1 mmol/L Final     Chloride   Date Value Ref Range Status   04/01/2025 106 95 - 110 mmol/L Final   03/20/2025 104 95 - 110 mmol/L Final     CO2   Date Value Ref Range Status   04/01/2025 20 (L) 23 - 29 mmol/L Final   03/20/2025 23 23 - 29 mmol/L Final     Glucose   Date Value Ref Range Status   03/20/2025 94 70 - 110 mg/dL Final     BUN   Date Value Ref Range Status   04/01/2025 12 6 - 20 mg/dL Final     Creatinine   Date Value Ref Range Status   04/01/2025 0.9 0.5 - 1.4 mg/dL Final     Calcium   Date Value Ref Range Status   04/01/2025 8.8 8.7 - 10.5 mg/dL Final   03/20/2025 8.9 8.7 - 10.5 mg/dL Final     Total Protein   Date Value Ref Range Status   02/09/2025 6.4 6.0 - 8.4 g/dL Final     Albumin   Date Value Ref Range Status   04/01/2025 3.4 (L) 3.5 - 5.2 g/dL Final   02/09/2025 3.4 (L) 3.5 - 5.2 g/dL Final     Total Bilirubin   Date Value Ref Range Status   02/09/2025 1.6 (H) 0.1 - 1.0 mg/dL Final     Comment:     For infants and newborns, interpretation of results should be based  on gestational age, weight and in agreement with clinical  observations.    Premature Infant recommended reference ranges:  Up to 24 hours.............<8.0 mg/dL  Up to 48 hours............<12.0 mg/dL  3-5 days..................<15.0 mg/dL  6-29  days.................<15.0 mg/dL       Bilirubin Total   Date Value Ref Range Status   04/01/2025 2.3 (H) 0.1 - 1.0 mg/dL Final     Comment:     For infants and newborns, interpretation of results should be based   on gestational age, weight and in agreement with clinical   observations.    Premature Infant recommended reference ranges:   0-24 hours:  <8.0 mg/dL   24-48 hours: <12.0 mg/dL   3-5 days:    <15.0 mg/dL   6-29 days:   <15.0 mg/dL     Alkaline Phosphatase   Date Value Ref Range Status   02/09/2025 40 40 - 150 U/L Final     ALP   Date Value Ref Range Status   04/01/2025 47 40 - 150 unit/L Final     AST   Date Value Ref Range Status   04/01/2025 43 11 - 45 unit/L Final     Comment:     *Result may be interfered by visible hemolysis   02/09/2025 16 10 - 40 U/L Final     ALT   Date Value Ref Range Status   04/01/2025 39 10 - 44 unit/L Final   02/09/2025 20 10 - 44 U/L Final     Anion Gap   Date Value Ref Range Status   04/01/2025 13 8 - 16 mmol/L Final     eGFR   Date Value Ref Range Status   04/01/2025 >60 >60 mL/min/1.73/m2 Final     Comment:     Estimated GFR calculated using the CKD-EPI creatinine (2021) equation.   03/20/2025 >60.0 >60 mL/min/1.73 m^2 Final      Lab Results   Component Value Date    WBC 5.47 04/01/2025    HGB 13.6 (L) 04/01/2025    HCT 41.8 04/01/2025    MCV 87 04/01/2025     (L) 04/01/2025     Significant Imaging:     Holter 3/21/25:    Patient had a min HR of 81 bpm, max HR of 141 bpm, and avg HR of 108 bpm. Predominant underlying rhythm was Sinus Rhythm. Bundle Branch Block/IVCD was present. Isolated SVEs were rare (<1.0%, 590), SVE Couplets were rare (<1.0%, 4), and no SVE Triplets were present. Isolated VEs were rare (<1.0%, 58), and no VE Couplets or VE Triplets were present.     Echo 2/5/25    Interpretation Summary  LIMITED STUDY FOR EVALUATION OF SINGLE VENTRICULAR FUNCTION AND VALVULAR DISEASE    CONGENITAL CARDIAC HISTORY:  Tricuspid atresia, pulmonary atresia  and  WPW.  S/P Systemic to pulmonary artery shunt - Shaun.  S/P Bidirectional Jesus -- Casperane.  S/P Lateral tunnel Fontan procedure - Shaun.  S/P EP study with trans-septal puncture November 2022  S/P Ablation of a left lateral accessory pathway November 2022 by Dr. Ferguson    SEGMENTAL CARDIAC CONNECTIONS (previously demonstrated):  Abdominal situs is solitus.  Atrial situs is normal.  Imaging consistent with tricuspid atresia.  Tricuspid atresia.  Mitral valve appears normal in structure.  LV dilation.  Ventriculoarterial concordance.  Ventricular loop: D-loop.  Cardiac position is levocardia.  Left aortic arch branching.    IMPRESSION:  Imaging consistent with diagnosis of tricuspid atresia S/P lateral tunnel Fontan - study remarkable for severely decreased systolic function of the single ventricle, worsened compared to prior.  Severely enlarged ventricle.    Pulmonary circulation not visualized  Mildly dilated inferior vena cava - connection to Fontan not visualized.  No apparent thrombus noted in the IVC  Right superior vena cava not visualized    No evidence of obstruction at Fontan on limited imaging.  Fibrinous material noted, which may be degenerated surgical material or  congenital material, less likely to be layered thrombus. Correlate clinically.    The Jesus anastomosis to the pulmonary arteries is not visualized on this echo.    Pulmonary confluence and proximal pulmonary branches not demonstrated.  At least two pulmonary veins demonstrated returning to the left atrium with no evidence for obstruction.    No right atrium, right ventricle, or tricuspid valve visualized  There is no evidence for pulmonary valve.    At least mild dilation of the left atrium.    Mildly dilated mitral valve with color Doppler demonstrating moderate regurgitation.  Single ventricle consistent with left ventricular morphology.  There is at sever dilation of the left ventricle.  LVEDV index is 96 cc/m2    Left ventricular systolic  function qualitatively severely compromised with ejection fraction estimated 10-15 %.    There is a large aortic annulus with trileaflet aortic valve, no stenosis and mild  insufficiency.  Aortic dimensions:  Sinuses of Valsalva  = 51 mm.  Ascending aorta       = not well demonstrated.    No obvious pericardial effusion.     Cath 9/3/20:  IMPRESSION:  1) Tricuspid atresia s/p intra-atrial Fontan  2) Normal Fontan pressures (11mmHg) and wedge pressure (8mmHg).  3) Normal cardiac output and vascular resistance calculations  4) Small atrial baffle leak  5) No significant aorta-pulmonary or veno-venous collaterals  6) Occlusion of right common femoral artery        Cardiac MRI 1/2/2020  Conclusion:   SVC to RPA unobstructed (Jesus)  Extracardiac Fontan with low flow but no evidence of thrombus.  Unobstructed ASD  Tricuspid Atresia  No obstruction to the right or left PA with Q flow of 21cc through each.  Increased LV mass and volume with systemic LVEF of 42%  Dilated sinus of Valsalva 49mm  Trace AI with regurgitation fraction of 8%

## 2025-04-01 NOTE — NURSING
Pt arrived to the unit via stretcher, on cardiac monitor, HFNC at 30L 100% o2 satting 81%. Talking/answering questions caused him to desat to the 70s. HFNC increased to 35L to achieve sat goal of 85%. Pt oriented to the unit, bed locked and low, instructed to call for mobility.Dr. Jimenez notified that patient arrived to the unit. No distress or complaints at this time.

## 2025-04-01 NOTE — ED NOTES
Assumed care of the patient. Report received from EMS. Pt placed on continuous cardiac monitoring, continuous pulse oximetry, and automatic BP cuff cycling Q15min. Pt in hospital gown, side rails up X2, bed low and locked, and call light is placed within reach. No family/visitors at bedside at this time. Pt denies any complaints or needs.

## 2025-04-01 NOTE — ASSESSMENT & PLAN NOTE
Juani Reilly Jr. is a 29 y.o. male who [facility]. He recently was discharged on 2/9/25 from the hospital for newly further reduced EF noted on OSH echocardiogram and hypoxia. Prior to this he had been predominately lost to care since 10/2023. He has a history of non-compliance that has improved considerably since his 2/9/25 discharge. To summarize his diagnoses are as follow:  1. Tricuspid and pulmonary atresia initially palliated with a systemic to pulmonary artery shunt followed by a bidirectional Jesus and subsequent lateral tunnel Fontan procedure   - small atrial baffle leak along with new Fontan baffle puncture for EP study  - s/p EP study with trans-septal puncture November 2022  - reassuring hemodynamics on 2020 catheterization  2. Decreased left ventricular function  - no evidence of PLE  3. History of Bjyss-Upafrjolo-Iurqz, SVT  - status post successful ablation of a left lateral accessory pathway November 2022 by Dr. Ferguson  4. Cirrhosis  - last seen by hepatology May 2023  5. Medication noncompliance (Improved since last hospital discharge on 2/9/25)    Discussion:  He has a right to left shunt (leak in his Fontan) and baseline sats are around 85-90%. His CXR looks great.     Recommendations:  I would be fine weaning oxygen for sats greater than 84% if that helps with management.  Any help that the heart failure team can provide regarding BP/heart failure management is appreciated.     Thank you for this consult. [facility] will continue to follow Juani's care during this admission. Please let us know if we can be of any assistance.

## 2025-04-01 NOTE — PLAN OF CARE
CICU DAILY GOALS       A: Awake    RASS: Goal -    Actual -     Restraint necessity:    B: Breath   SBT: Not intubated   C: Coordinate A & B, analgesics/sedatives   Pain: managed    SAT: Not intubated  D: Delirium   CAM-ICU:    E: Early(intubated/ Progressive (non-intubated) Mobility   MOVE Screen: Pass   Activity: Activity Management: Ankle pumps - L1, Rolling - L1  FAS: Feeding/Nutrition   Diet order: Diet/Nutrition Received: 2 gram sodium, low saturated fat/low cholesterol,   Fluid restriction:     Nutritional Supplement Intake: Quantity , Type:   T: Thrombus   DVT prophylaxis: VTE Core Measure: Pharmacological prophylaxis initiated/maintained  H: HOB Elevation   Head of Bed (HOB) Positioning: HOB at 30 degrees  U: Ulcer Prophylaxis   GI: yes  G: Glucose control   managed    S: Skin   Bathing/Skin Care: bath, complete (04/01/25 7637)  Wounds: No  Wound care consulted: No  B: Bowel Function   constipation   I: Indwelling Catheters   Parry necessity:     CVC necessity: No  D: De-escalation Antibx   No  Plan for the day     Family/Goals of care/Code Status   Code Status: Full Code     VS and assessment per flow sheet, patient progressing towards goals as tolerated, plan of care reviewed with Juani Reilly Jr. and family, all concerns addressed.

## 2025-04-01 NOTE — CONSULTS
[facility]  Cardiology  Consult Note    Patient Name: Juani Reilly Jr.  MRN: [id number]  Admission Date: 3/31/2025  Hospital Length of Stay: 0 days  Code Status: Full Code   Attending Provider: Kai Feliciano MD   Consulting Provider: Princess Montesinos PA-C  Primary Care Physician: Jaqueline Pardo MD  Principal Problem:<principal problem not specified>    Patient information was obtained from patient, caregiver / friend, and past medical records.     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 Ferguson Nov 2022, decreased LV function (EF 30-35%), cirrhosis and reported difficulty with adherence to medications who presented with respiratory distress requiring BiPAP.    Initially hypoxic with O2 sats 85% on IPAP 16 EPAP 8 and 100% O2.  I-STAT lactate 3.8 with unremarkable electrolytes.  BNP 1317. Troponin mildly elevated.  EKG unremarkable when compared to previous. Given dose of IV Lasix in the ED with 650cc UOP.  Kept on BiPAP. Reports his symptoms have progressed with fatigue and shortness of breath over the past week but worsened today. Admitted early February for similar admission. Diuresed, weaned off oxygen and discharged at that time. He is established with congenital heart team.    Cardiology consulted for admission to CCU.       Past Medical History:   Diagnosis Date    Acute decompensated heart failure 02/06/2025    History of heart surgery     Other cirrhosis of liver 11/12/2020    SVT (supraventricular tachycardia)     WPW syndrome        Past Surgical History:   Procedure Laterality Date    ANGIOGRAPHY OF AORTIC ARCH N/A 9/3/2020    Procedure: AORTOGRAM, AORTIC ARCH;  Surgeon: Stephania  ROBE Crump MD;  Location: [facility] CATH LAB;  Service: Cardiology;  Laterality: N/A;    FONTAN PROCEDURE, EXTRACARDIAC      LEFT HEART CATHETERIZATION Left 9/3/2020    Procedure: Left heart cath;  Surgeon: Stephania Crump MD;  Location: [facility] CATH LAB;  Service: Cardiology;  Laterality: Left;    RIGHT HEART CATHETERIZATION FOR CONGENITAL HEART DEFECT N/A 9/3/2020    Procedure: CATHETERIZATION, HEART, RIGHT, FOR CONGENITAL HEART DEFECT;  Surgeon: Stephania Crump MD;  Location: [facility] CATH LAB;  Service: Cardiology;  Laterality: N/A;  [facility] - needs covid test morning of. Extenuating circumstances    TRANSESOPHAGEAL ECHOCARDIOGRAPHY N/A 11/10/2022    Procedure: ECHOCARDIOGRAM, TRANSESOPHAGEAL;  Surgeon: Emeterio Hall MD;  Location: [facility] EP LAB;  Service: Cardiology;  Laterality: N/A;    TREATMENT OF CARDIAC ARRHYTHMIA N/A 1/9/2021    Procedure: CARDIOVERSION;  Surgeon: Jim Mcginnis MD;  Location: [facility] [place], operated [facility] CATH LAB;  Service: Cardiology;  Laterality: N/A;       Review of patient's allergies indicates:  No Known Allergies    No current facility-administered medications on file prior to encounter.     Current Outpatient Medications on File Prior to Encounter   Medication Sig    cetirizine (ZYRTEC) 10 MG tablet Take 1 tablet (10 mg total) by mouth once daily.    famotidine (PEPCID) 20 MG tablet Take 1 tablet (20 mg total) by mouth 2 (two) times daily. (Patient not taking: Reported on 3/20/2025)    fluticasone propionate (FLONASE) 50 mcg/actuation nasal spray 1 spray (50 mcg total) by Each Nostril route once daily. (Patient not taking: Reported on 3/20/2025)    furosemide (LASIX) 20 MG tablet Take 1 tablet (20 mg total) by mouth once daily.    losartan (COZAAR) 100 MG tablet Take 1 tablet (100 mg total) by mouth once daily.    metoprolol succinate (TOPROL-XL) 50 MG 24 hr tablet Take 1 tablet (50 mg total) by mouth once daily.    rivaroxaban (XARELTO) 20 mg Tab Take 1 tablet (20 mg total) by mouth daily  with dinner or evening meal.    spironolactone (ALDACTONE) 25 MG tablet Take 1 tablet (25 mg total) by mouth once daily.     Family History       Problem Relation (Age of Onset)    Heart disease Maternal Grandfather    No Known Problems Mother, Father, Sister, Brother, Maternal Aunt, Maternal Uncle, Paternal Aunt, Paternal Uncle, Maternal Grandmother, Paternal Grandmother, Paternal Grandfather          Tobacco Use    Smoking status: Former     Current packs/day: 0.00     Types: Cigarettes     Quit date:      Years since quittin.2     Passive exposure: Past    Smokeless tobacco: Never    Tobacco comments:     3-4 months    Substance and Sexual Activity    Alcohol use: No    Drug use: Yes     Types: Marijuana     Comment: Mojo today- pt reports a while back    Sexual activity: Yes     Review of Systems   Constitutional: Positive for malaise/fatigue. Negative for fever and night sweats.   HENT:  Positive for congestion. Negative for sore throat.    Cardiovascular:  Positive for dyspnea on exertion. Negative for chest pain, irregular heartbeat, leg swelling, near-syncope, palpitations and syncope.   Respiratory:  Positive for shortness of breath and sputum production. Negative for cough.         Improved off lisinopril   Skin:  Negative for dry skin and rash.   Musculoskeletal:  Negative for myalgias and neck pain.   Gastrointestinal:  Negative for constipation, diarrhea, nausea and vomiting.   Neurological:  Negative for dizziness, headaches and light-headedness.   Psychiatric/Behavioral:  Negative for depression. The patient is not nervous/anxious.      Objective:     Vital Signs (Most Recent):  Temp: 97.5 °F (36.4 °C) (25 0445)  Pulse: 88 (25 1006)  Resp: (!) 23 (25 0701)  BP: (!) 142/103 (25 0701)  SpO2: (!) 84 % (25 0916) Vital Signs (24h Range):  Temp:  [97.4 °F (36.3 °C)-98.1 °F (36.7 °C)] 97.5 °F (36.4 °C)  Pulse:  [] 88  Resp:  [4-32] 23  SpO2:  [81 %-94 %] 84 %  BP:  (116-147)/() 142/103     Weight: 62.6 kg (138 lb)  Body mass index is 22.27 kg/m².    SpO2: (!) 84 %         Intake/Output Summary (Last 24 hours) at 4/1/2025 1013  Last data filed at 4/1/2025 0701  Gross per 24 hour   Intake 150 ml   Output 1975 ml   Net -1825 ml       Lines/Drains/Airways       Peripheral Intravenous Line  Duration                  Peripheral IV - Single Lumen 03/31/25 0000 20 G Right Antecubital 1 day         Peripheral IV - Single Lumen 04/01/25 0422 20 G Distal;Left;Posterior Forearm <1 day                     Physical Exam  Constitutional:       Appearance: He is normal weight. He is ill-appearing.   HENT:      Head: Normocephalic and atraumatic.      Nose: Nose normal. No congestion or rhinorrhea.      Mouth/Throat:      Mouth: Mucous membranes are moist.      Pharynx: Oropharynx is clear.   Eyes:      Extraocular Movements: Extraocular movements intact.      Pupils: Pupils are equal, round, and reactive to light.   Neck:      Comments: JVD present  Cardiovascular:      Rate and Rhythm: Regular rhythm. Tachycardia present.      Pulses: Normal pulses.      Heart sounds: No murmur heard.     Comments: S1 w/ Single S2  Pulmonary:      Effort: No respiratory distress.      Breath sounds: Normal breath sounds.      Comments: On Bipap 16/8 and 100% O2.  Abdominal:      General: Bowel sounds are normal.      Tenderness: There is no abdominal tenderness. There is no guarding.   Musculoskeletal:         General: No swelling. Normal range of motion.      Cervical back: Normal range of motion. No rigidity.      Right lower leg: No edema.      Left lower leg: No edema.   Lymphadenopathy:      Cervical: No cervical adenopathy.   Skin:     General: Skin is warm.      Capillary Refill: Capillary refill takes 2 to 3 seconds.      Findings: No rash.   Neurological:      General: No focal deficit present.      Mental Status: He is oriented to person, place, and time.   Psychiatric:         Mood and  Affect: Mood normal.         Behavior: Behavior normal.        Significant Labs:     CMP  Sodium   Date Value Ref Range Status   04/01/2025 139 136 - 145 mmol/L Final   03/20/2025 139 136 - 145 mmol/L Final     Potassium   Date Value Ref Range Status   04/01/2025 3.9 3.5 - 5.1 mmol/L Final   03/20/2025 3.5 3.5 - 5.1 mmol/L Final     Chloride   Date Value Ref Range Status   04/01/2025 106 95 - 110 mmol/L Final   03/20/2025 104 95 - 110 mmol/L Final     CO2   Date Value Ref Range Status   04/01/2025 20 (L) 23 - 29 mmol/L Final   03/20/2025 23 23 - 29 mmol/L Final     Glucose   Date Value Ref Range Status   03/20/2025 94 70 - 110 mg/dL Final     BUN   Date Value Ref Range Status   04/01/2025 12 6 - 20 mg/dL Final     Creatinine   Date Value Ref Range Status   04/01/2025 0.9 0.5 - 1.4 mg/dL Final     Calcium   Date Value Ref Range Status   04/01/2025 8.8 8.7 - 10.5 mg/dL Final   03/20/2025 8.9 8.7 - 10.5 mg/dL Final     Total Protein   Date Value Ref Range Status   02/09/2025 6.4 6.0 - 8.4 g/dL Final     Albumin   Date Value Ref Range Status   04/01/2025 3.4 (L) 3.5 - 5.2 g/dL Final   02/09/2025 3.4 (L) 3.5 - 5.2 g/dL Final     Total Bilirubin   Date Value Ref Range Status   02/09/2025 1.6 (H) 0.1 - 1.0 mg/dL Final     Comment:     For infants and newborns, interpretation of results should be based  on gestational age, weight and in agreement with clinical  observations.    Premature Infant recommended reference ranges:  Up to 24 hours.............<8.0 mg/dL  Up to 48 hours............<12.0 mg/dL  3-5 days..................<15.0 mg/dL  6-29 days.................<15.0 mg/dL       Bilirubin Total   Date Value Ref Range Status   04/01/2025 2.3 (H) 0.1 - 1.0 mg/dL Final     Comment:     For infants and newborns, interpretation of results should be based   on gestational age, weight and in agreement with clinical   observations.    Premature Infant recommended reference ranges:   0-24 hours:  <8.0 mg/dL   24-48 hours: <12.0  mg/dL   3-5 days:    <15.0 mg/dL   6-29 days:   <15.0 mg/dL     Alkaline Phosphatase   Date Value Ref Range Status   02/09/2025 40 40 - 150 U/L Final     ALP   Date Value Ref Range Status   04/01/2025 47 40 - 150 unit/L Final     AST   Date Value Ref Range Status   04/01/2025 43 11 - 45 unit/L Final     Comment:     *Result may be interfered by visible hemolysis   02/09/2025 16 10 - 40 U/L Final     ALT   Date Value Ref Range Status   04/01/2025 39 10 - 44 unit/L Final   02/09/2025 20 10 - 44 U/L Final     Anion Gap   Date Value Ref Range Status   04/01/2025 13 8 - 16 mmol/L Final     eGFR   Date Value Ref Range Status   04/01/2025 >60 >60 mL/min/1.73/m2 Final     Comment:     Estimated GFR calculated using the CKD-EPI creatinine (2021) equation.   03/20/2025 >60.0 >60 mL/min/1.73 m^2 Final      Lab Results   Component Value Date    WBC 5.47 04/01/2025    HGB 13.6 (L) 04/01/2025    HCT 41.8 04/01/2025    MCV 87 04/01/2025     (L) 04/01/2025     Significant Imaging:     Holter 3/21/25:    Patient had a min HR of 81 bpm, max HR of 141 bpm, and avg HR of 108 bpm. Predominant underlying rhythm was Sinus Rhythm. Bundle Branch Block/IVCD was present. Isolated SVEs were rare (<1.0%, 590), SVE Couplets were rare (<1.0%, 4), and no SVE Triplets were present. Isolated VEs were rare (<1.0%, 58), and no VE Couplets or VE Triplets were present.     Echo 2/5/25    Interpretation Summary  LIMITED STUDY FOR EVALUATION OF SINGLE VENTRICULAR FUNCTION AND VALVULAR DISEASE    CONGENITAL CARDIAC HISTORY:  Tricuspid atresia, pulmonary atresia  and WPW.  S/P Systemic to pulmonary artery shunt - [facility].  S/P Bidirectional Jesus -- [facility].  S/P Lateral tunnel Fontan procedure - [facility].  S/P EP study with trans-septal puncture November 2022  S/P Ablation of a left lateral accessory pathway November 2022 by Dr. Ferguson    SEGMENTAL CARDIAC CONNECTIONS (previously demonstrated):  Abdominal situs is solitus.  Atrial situs is  normal.  Imaging consistent with tricuspid atresia.  Tricuspid atresia.  Mitral valve appears normal in structure.  LV dilation.  Ventriculoarterial concordance.  Ventricular loop: D-loop.  Cardiac position is levocardia.  Left aortic arch branching.    IMPRESSION:  Imaging consistent with diagnosis of tricuspid atresia S/P lateral tunnel Fontan - study remarkable for severely decreased systolic function of the single ventricle, worsened compared to prior.  Severely enlarged ventricle.    Pulmonary circulation not visualized  Mildly dilated inferior vena cava - connection to Fontan not visualized.  No apparent thrombus noted in the IVC  Right superior vena cava not visualized    No evidence of obstruction at Fontan on limited imaging.  Fibrinous material noted, which may be degenerated surgical material or  congenital material, less likely to be layered thrombus. Correlate clinically.    The Jesus anastomosis to the pulmonary arteries is not visualized on this echo.    Pulmonary confluence and proximal pulmonary branches not demonstrated.  At least two pulmonary veins demonstrated returning to the left atrium with no evidence for obstruction.    No right atrium, right ventricle, or tricuspid valve visualized  There is no evidence for pulmonary valve.    At least mild dilation of the left atrium.    Mildly dilated mitral valve with color Doppler demonstrating moderate regurgitation.  Single ventricle consistent with left ventricular morphology.  There is at sever dilation of the left ventricle.  LVEDV index is 96 cc/m2    Left ventricular systolic function qualitatively severely compromised with ejection fraction estimated 10-15 %.    There is a large aortic annulus with trileaflet aortic valve, no stenosis and mild  insufficiency.  Aortic dimensions:  Sinuses of Valsalva  = 51 mm.  Ascending aorta       = not well demonstrated.    No obvious pericardial effusion.     Cath 9/3/20:  IMPRESSION:  1) Tricuspid atresia  s/p intra-atrial Fontan  2) Normal Fontan pressures (11mmHg) and wedge pressure (8mmHg).  3) Normal cardiac output and vascular resistance calculations  4) Small atrial baffle leak  5) No significant aorta-pulmonary or veno-venous collaterals  6) Occlusion of right common femoral artery        Cardiac MRI 1/2/2020  Conclusion:   SVC to RPA unobstructed (Jesus)  Extracardiac Fontan with low flow but no evidence of thrombus.  Unobstructed ASD  Tricuspid Atresia  No obstruction to the right or left PA with Q flow of 21cc through each.  Increased LV mass and volume with systemic LVEF of 42%  Dilated sinus of Valsalva 49mm  Trace AI with regurgitation fraction of 8%   Assessment and Plan:     Acute on chronic systolic congestive heart failure  Patient has Combined Systolic and Diastolic heart failure that is Acute on chronic.   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 Severely enlarged ventricle.  -Pulmonary circulation not visualized  -Mildly dilated inferior vena cava - connection to Fontan not visualized.  No apparent thrombus noted in the IVC  -Right superior vena cava not visualized  -No evidence of obstruction at Fontan on limited imaging.  Fibrinous material noted, which may be degenerated surgical material or  congenital material, less likely to be layered thrombus. Correlate clinically.  -The Jesus anastomosis to the pulmonary arteries is not visualized on this echo.  -Pulmonary confluence and proximal pulmonary branches not demonstrated.  -Mildly dilated mitral valve with color Doppler demonstrating moderate regurgitation.  -Single ventricle consistent with left ventricular  morphology.  -There is at sever dilation of the left ventricle.  LVEDV index is 96 cc/m2  -Left ventricular systolic function qualitatively severely compromised with ejection fraction estimated 10-15 %.    Current Heart Failure Medications  losartan tablet 100 mg, Daily, Oral  metoprolol succinate (TOPROL-XL) 24 hr tablet 50 mg, Daily, Oral  spironolactone tablet 25 mg, Daily, Oral  furosemide injection 80 mg, Every 12 hours, Intravenous    Plan  - Monitor strict I&Os and daily weights.    - Place on telemetry  - Low sodium diet  - Patient with congenital heart disease status-post fontan procedure presenting with dyspnea and noted drop in EF to 10-15% from 30-35% previously.   - On BiPAP for respiratory distress. Sinus tach with elevated BNP and work of breathing.   - Adult congenital cardiology consulted; appreciate recommendations  - Continue GDMT as tolerated with metoprolol, losartan, spironolactone  - Monitor UOP    Adult congenital heart disease  Juani Emeterio Reilly Jr. is a 29 y.o. male who is followed in the [facility] clinic. He recently was discharged on 2/9/25 from the hospital for newly further reduced EF noted on OSH echocardiogram and hypoxia. Prior to this he had been predominately lost to care since 10/2023. He has a history of non-compliance that has improved considerably since his 2/9/25 discharge. To summarize his diagnoses are as follow:  1. Tricuspid and pulmonary atresia initially palliated with a systemic to pulmonary artery shunt followed by a bidirectional Jesus and subsequent lateral tunnel Fontan procedure   - small atrial baffle leak along with new Fontan baffle puncture for EP study  - s/p EP study with trans-septal puncture November 2022  - reassuring hemodynamics on 2020 catheterization  2. Decreased left ventricular function  - no evidence of PLE  3. History of [id number], SVT  - status post successful ablation of a left lateral accessory pathway November 2022 by   Marty  4. Cirrhosis  - last seen by hepatology May 2023  5. Medication noncompliance (Improved since last hospital discharge on 2/9/25)    Discussion:  He has a right to left shunt (leak in his Fontan) and baseline sats are around 85-90%. His CXR looks great.     Recommendations:  I would be fine weaning oxygen for sats greater than 84% if that helps with management.  Any help that the heart failure team can provide regarding BP/heart failure management is appreciated.     Thank you for this consult. [facility] will continue to follow [place]'s care during this admission. Please let us know if we can be of any assistance.        VTE Risk Mitigation (From admission, onward)           Ordered     rivaroxaban tablet 20 mg  With dinner         04/01/25 0026     IP VTE HIGH RISK PATIENT  Once         04/01/25 0026     Place sequential compression device  Until discontinued         04/01/25 0026                    Thank you for your consult. I will follow-up with patient. Please contact us if you have any additional questions.    Princess Montesinos PA-C  Cardiology   [facility]

## 2025-04-01 NOTE — ED TRIAGE NOTES
Pt presents to ED via EMS for c/o shortness of breath, nausea and vomiting beginning about two hours ago today. Pt has hx of CHF, O2 sats in 80s at baseline on room air. On EMS arrival pt was satting in 70s on room air. No oxygen at baseline. Pt endorses generalized abdominal pain. Pt endorses constipation x 1 month. Pt arrives on bipap mask. Pt is alert and oriented x4. GCS 15.

## 2025-04-01 NOTE — HPI
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 Ferguson Nov 2022, decreased LV function (EF 30-35%), cirrhosis and reported difficulty with adherence to medications who presented with respiratory distress requiring BiPAP.    Initially hypoxic with O2 sats 85% on IPAP 16 EPAP 8 and 100% O2.  I-STAT lactate 3.8 with unremarkable electrolytes.  BNP 1317. Troponin mildly elevated.  EKG unremarkable when compared to previous. Given dose of IV Lasix in the ED with 650cc UOP.  Kept on BiPAP. Reports his symptoms have progressed with fatigue and shortness of breath over the past week but worsened today. Admitted early February for similar admission. Diuresed, weaned off oxygen and discharged at that time. He is established with congenital heart team.    Cardiology consulted for admission to CCU.

## 2025-04-01 NOTE — SUBJECTIVE & OBJECTIVE
Past Medical History:   Diagnosis Date    Acute decompensated heart failure 02/06/2025    History of heart surgery     Other cirrhosis of liver 11/12/2020    SVT (supraventricular tachycardia)     WPW syndrome        Past Surgical History:   Procedure Laterality Date    ANGIOGRAPHY OF AORTIC ARCH N/A 9/3/2020    Procedure: AORTOGRAM, AORTIC ARCH;  Surgeon: Stephania Crump MD;  Location: [facility] CATH LAB;  Service: Cardiology;  Laterality: N/A;    FONTAN PROCEDURE, EXTRACARDIAC      LEFT HEART CATHETERIZATION Left 9/3/2020    Procedure: Left heart cath;  Surgeon: Stephania Crump MD;  Location: [facility] CATH LAB;  Service: Cardiology;  Laterality: Left;    RIGHT HEART CATHETERIZATION FOR CONGENITAL HEART DEFECT N/A 9/3/2020    Procedure: CATHETERIZATION, HEART, RIGHT, FOR CONGENITAL HEART DEFECT;  Surgeon: Stephania Crump MD;  Location: [facility] CATH LAB;  Service: Cardiology;  Laterality: N/A;  [facility] - needs covid test morning of. Extenuating circumstances    TRANSESOPHAGEAL ECHOCARDIOGRAPHY N/A 11/10/2022    Procedure: ECHOCARDIOGRAM, TRANSESOPHAGEAL;  Surgeon: Emeterio Hall MD;  Location: [facility] EP LAB;  Service: Cardiology;  Laterality: N/A;    TREATMENT OF CARDIAC ARRHYTHMIA N/A 1/9/2021    Procedure: CARDIOVERSION;  Surgeon: Jim Mcginnis MD;  Location: [facility] [place] CATH LAB;  Service: Cardiology;  Laterality: N/A;       Review of patient's allergies indicates:  No Known Allergies    No current facility-administered medications on file prior to encounter.     Current Outpatient Medications on File Prior to Encounter   Medication Sig    cetirizine (ZYRTEC) 10 MG tablet Take 1 tablet (10 mg total) by mouth once daily.    famotidine (PEPCID) 20 MG tablet Take 1 tablet (20 mg total) by mouth 2 (two) times daily. (Patient not taking: Reported on 3/20/2025)    fluticasone propionate (FLONASE) 50 mcg/actuation nasal spray 1 spray (50 mcg total) by Each Nostril route once daily. (Patient not taking: Reported  on 3/20/2025)    furosemide (LASIX) 20 MG tablet Take 1 tablet (20 mg total) by mouth once daily.    losartan (COZAAR) 100 MG tablet Take 1 tablet (100 mg total) by mouth once daily.    metoprolol succinate (TOPROL-XL) 50 MG 24 hr tablet Take 1 tablet (50 mg total) by mouth once daily.    rivaroxaban (XARELTO) 20 mg Tab Take 1 tablet (20 mg total) by mouth daily with dinner or evening meal.    spironolactone (ALDACTONE) 25 MG tablet Take 1 tablet (25 mg total) by mouth once daily.     Family History       Problem Relation (Age of Onset)    Heart disease Maternal Grandfather    No Known Problems Mother, Father, Sister, Brother, Maternal Aunt, Maternal Uncle, Paternal Aunt, Paternal Uncle, Maternal Grandmother, Paternal Grandmother, Paternal Grandfather          Tobacco Use    Smoking status: Former     Current packs/day: 0.00     Types: Cigarettes     Quit date:      Years since quittin.2     Passive exposure: Past    Smokeless tobacco: Never    Tobacco comments:     3-4 months    Substance and Sexual Activity    Alcohol use: No    Drug use: Yes     Types: Marijuana     Comment: Mojo today- pt reports a while back    Sexual activity: Yes     Review of Systems   Constitutional: Positive for malaise/fatigue. Negative for chills, diaphoresis, fever and night sweats.   Cardiovascular:  Positive for dyspnea on exertion. Negative for chest pain, irregular heartbeat, leg swelling, near-syncope, palpitations and syncope.   Respiratory:  Positive for shortness of breath. Negative for cough and wheezing.    Skin:  Negative for color change and dry skin.   Musculoskeletal:  Negative for joint pain and joint swelling.   Gastrointestinal:  Negative for abdominal pain, diarrhea, nausea and vomiting.   Genitourinary:  Negative for dysuria.   Neurological:  Negative for dizziness, headaches, light-headedness and weakness.   All other systems reviewed and are negative.    Objective:     Vital Signs (Most Recent):  Temp: 98.1  °F (36.7 °C) (03/31/25 2305)  Pulse: 101 (03/31/25 2305)  Resp: (!) 32 (03/31/25 2305)  BP: (!) 136/105 (03/31/25 2305)  SpO2: (!) 88 % (03/31/25 2305) Vital Signs (24h Range):  Temp:  [97.4 °F (36.3 °C)-98.1 °F (36.7 °C)] 98.1 °F (36.7 °C)  Pulse:  [] 101  Resp:  [22-32] 32  SpO2:  [82 %-93 %] 88 %  BP: (134-137)/() 136/105     Weight: 64 kg (141 lb)  Body mass index is 22.76 kg/m².  SpO2: (!) 88 %  Intake/Output Summary (Last 24 hours) at 4/1/2025 0032  Last data filed at 3/31/2025 2319  Gross per 24 hour   Intake --   Output 650 ml   Net -650 ml     Lines/Drains/Airways       Peripheral Intravenous Line  Duration                  Peripheral IV - Single Lumen 03/31/25 0000 20 G Right Antecubital 1 day                  Physical Exam  Vitals and nursing note reviewed.   Constitutional:       General: He is not in acute distress.     Appearance: He is well-developed. He is not diaphoretic.   HENT:      Head: Normocephalic and atraumatic.   Eyes:      Conjunctiva/sclera: Conjunctivae normal.      Pupils: Pupils are equal, round, and reactive to light.   Neck:      Vascular: JVD present.   Cardiovascular:      Rate and Rhythm: Regular rhythm. Tachycardia present.      Pulses: Normal pulses and intact distal pulses.      Heart sounds: No murmur heard.  Pulmonary:      Effort: Pulmonary effort is normal.      Comments: On Bipap 16/8 and 100% O2.  Abdominal:      General: Abdomen is flat. There is no distension.      Palpations: Abdomen is soft.      Tenderness: There is no abdominal tenderness.   Musculoskeletal:         General: Normal range of motion.      Cervical back: Normal range of motion.      Right lower leg: No edema.      Left lower leg: No edema.   Skin:     General: Skin is warm and dry.      Capillary Refill: Capillary refill takes less than 2 seconds.      Findings: No erythema or rash.   Neurological:      General: No focal deficit present.      Mental Status: He is alert and oriented to  "person, place, and time.   Psychiatric:         Mood and Affect: Mood normal.         Thought Content: Thought content normal.       Significant Labs: ABG:   Recent Labs   Lab 03/31/25  2148 03/31/25  2207   PH 7.339 7.402   PCO2 45.7 32.4*   HCO3 20.8 20.1*   POCSATURATED  --  89   BE  --  -5*   , Blood Culture: No results for input(s): "LABBLOO" in the last 48 hours., CMP   Recent Labs   Lab 03/31/25  2317      K 3.8      CO2 21*   BUN 11   CREATININE 1.1   CALCIUM 8.7   ALBUMIN 3.9   BILITOT 2.3*   ALKPHOS 56   AST 39   ALT 39   ANIONGAP 12   , CBC   Recent Labs   Lab 03/31/25  2224   WBC 5.52   HGB 14.8   HCT 46.6   *   , and INR No results for input(s): "INR", "PROTIME" in the last 48 hours.    Significant Imaging: Reviewed  "

## 2025-04-01 NOTE — CARE UPDATE
SpO2 goal is 85-90%  Transitioned to comfort flow/high flow nasal cannula 40L 100%, SpO2 maintaining 84-86%, patient appears comfortable

## 2025-04-01 NOTE — ED PROVIDER NOTES
Encounter Date: 3/31/2025       History     Chief Complaint   Patient presents with    Shortness of Breath     Patient is a 29-year-old w/ hx of CHF last EF 2/2025 10-15% presenting due to worsening SOB. States that symptoms have been worsening for a week. Takes lasix at home. Had multiple episodes of emesis every other day. States that emesis is a chronic issue. Reports decreased stool output. Last bowel movement today. Denies any chest pain, abdominal pain.         Review of patient's allergies indicates:  No Known Allergies  Past Medical History:   Diagnosis Date    Acute decompensated heart failure 02/06/2025    History of heart surgery     Other cirrhosis of liver 11/12/2020    SVT (supraventricular tachycardia)     WPW syndrome      Past Surgical History:   Procedure Laterality Date    ANGIOGRAPHY OF AORTIC ARCH N/A 9/3/2020    Procedure: AORTOGRAM, AORTIC ARCH;  Surgeon: Stephania Crump MD;  Location: [facility] CATH LAB;  Service: Cardiology;  Laterality: N/A;    FONTAN PROCEDURE, EXTRACARDIAC      LEFT HEART CATHETERIZATION Left 9/3/2020    Procedure: Left heart cath;  Surgeon: Stephania Crump MD;  Location: [facility] CATH LAB;  Service: Cardiology;  Laterality: Left;    RIGHT HEART CATHETERIZATION FOR CONGENITAL HEART DEFECT N/A 9/3/2020    Procedure: CATHETERIZATION, HEART, RIGHT, FOR CONGENITAL HEART DEFECT;  Surgeon: Stephania Crump MD;  Location: [facility] CATH LAB;  Service: Cardiology;  Laterality: N/A;  [facility] - needs covid test morning of. Extenuating circumstances    TRANSESOPHAGEAL ECHOCARDIOGRAPHY N/A 11/10/2022    Procedure: ECHOCARDIOGRAM, TRANSESOPHAGEAL;  Surgeon: Emeterio Hall MD;  Location: [facility] EP LAB;  Service: Cardiology;  Laterality: N/A;    TREATMENT OF CARDIAC ARRHYTHMIA N/A 1/9/2021    Procedure: CARDIOVERSION;  Surgeon: Jim Mcginnis MD;  Location: [facility] C[facility] LAB;  Service: Cardiology;  Laterality: N/A;     Family History   Problem Relation Name Age of Onset    No  Known Problems Mother      No Known Problems Father      No Known Problems Sister 3     No Known Problems Brother      No Known Problems Maternal Aunt      No Known Problems Maternal Uncle      No Known Problems Paternal Aunt      No Known Problems Paternal Uncle      No Known Problems Maternal Grandmother      Heart disease Maternal Grandfather      No Known Problems Paternal Grandmother      No Known Problems Paternal Grandfather      Anemia Neg Hx      Arrhythmia Neg Hx      Asthma Neg Hx      Clotting disorder Neg Hx      Fainting Neg Hx      Heart attack Neg Hx      Heart failure Neg Hx      Hyperlipidemia Neg Hx      Hypertension Neg Hx      Stroke Neg Hx      Atrial Septal Defect Neg Hx       Social History[1]  Review of Systems  Per HPI  Physical Exam     Initial Vitals [03/31/25 2151]   BP Pulse Resp Temp SpO2   (!) 137/105 105 (!) 24 97.4 °F (36.3 °C) (!) 85 %      MAP       --         Physical Exam    Constitutional: He appears well-developed and well-nourished. He is not diaphoretic. He appears distressed.   Eyes: Conjunctivae are normal. Right eye exhibits no discharge. Left eye exhibits no discharge. No scleral icterus.   Cardiovascular:  Regular rhythm.           Pulmonary/Chest: Breath sounds normal. No stridor. No respiratory distress. He has no wheezes. He has no rhonchi. He has no rales.   On BiPAP   Abdominal: Abdomen is soft. He exhibits no distension. There is no abdominal tenderness.   Healed surgical scar from upper center abdomen to chest There is no rebound and no guarding.   Musculoskeletal:         General: No tenderness or edema.     Skin: Skin is warm and dry.   Psychiatric: He has a normal mood and affect.       ED Course   Procedures  Labs Reviewed   URINALYSIS, REFLEX TO URINE CULTURE - Abnormal       Result Value    Color, UA Yellow      Appearance, UA Clear      pH, UA 7.0      Spec Grav UA 1.010      Protein, UA 2+ (*)     Glucose, UA Negative      Ketones, UA Negative       Bilirubin, UA Negative      Blood, UA Negative      Nitrites, UA Negative      Urobilinogen, UA 2.0-3.0 (*)     Leukocyte Esterase, UA Negative     TROPONIN I HIGH SENSITIVITY - Abnormal    Troponin High Sensitive 40 (*)    B-TYPE NATRIURETIC PEPTIDE - Abnormal    BNP 1,317 (*)    CBC WITH DIFFERENTIAL - Abnormal    WBC 5.52      RBC 5.21      HGB 14.8      HCT 46.6      MCV 89      MCH 28.4      MCHC 31.8 (*)     RDW 15.9 (*)     Platelet Count 139 (*)     MPV 12.0      Nucleated RBC 0      Neut % 61.7      Lymph % 30.8      Mono % 6.7      Eos % 0.0      Basophil % 0.4      Imm Grans % 0.4      Neut # 3.41      Lymph # 1.70      Mono # 0.37      Eos # 0.00      Baso # 0.02      Imm Grans # 0.02     COMPREHENSIVE METABOLIC PANEL - Abnormal    Sodium 140      Potassium 3.8      Chloride 107      CO2 21 (*)     Glucose 106      BUN 11      Creatinine 1.1      Calcium 8.7      Protein Total 7.3      Albumin 3.9      Bilirubin Total 2.3 (*)     ALP 56      AST 39      ALT 39      Anion Gap 12      eGFR >60     ISTAT PROCEDURE - Abnormal    POC PH 7.402      POC PCO2 32.4 (*)     POC PO2 55 (*)     POC HCO3 20.1 (*)     POC BE -5 (*)     POC SATURATED O2 89      POC TCO2 21 (*)     Sample ARTERIAL      Site RR      Allens Test Pass     HEPATITIS C ANTIBODY - Normal    Hep C Ab Interp Non-Reactive     HIV 1 / 2 ANTIBODY - Normal    HIV 1/2 Ag/Ab Non-Reactive     CULTURE, BLOOD   CULTURE, BLOOD   CBC W/ AUTO DIFFERENTIAL    Narrative:     The following orders were created for panel order CBC auto differential.  Procedure                               Abnormality         Status                     ---------                               -----------         ------                     CBC with Differential[[id number]]       Abnormal            Final result                 Please view results for these tests on the individual orders.   URINALYSIS MICROSCOPIC    RBC, UA 3      WBC, UA 4      Bacteria, UA Rare      Hyaline  "Casts, UA 0      Microscopic Comment       URINALYSIS, REFLEX TO URINE CULTURE   ISTAT PROCEDURE    POC Glucose 102      POC BUN 12      POC Creatinine 1.1      POC Sodium 142      POC Potassium 4.0      POC Chloride 107      POC TCO2 (MEASURED) 23      POC Ionized Calcium 1.09      POC Hematocrit 52      Sample LINDSEY     ISTAT CHEM8     EKG Readings: (Independently Interpreted)   Initial Reading: No STEMI.   Left ventricular hypertrophy.  IL interval WNL.  Sinus tach.       Imaging Results              X-Ray Chest AP Portable (Final result)  Result time 04/01/25 00:12:47      Final result by James Ritchie MD (04/01/25 00:12:47)                   Impression:      No detrimental change when compared with 02/08/2025.      Electronically signed by: James Ritchie MD  Date:    04/01/2025  Time:    00:12               Narrative:    EXAMINATION:  XR CHEST AP PORTABLE    CLINICAL HISTORY:  Provided history is "SOB, hx of HF;  ".    TECHNIQUE:  One view of the chest.    COMPARISON:  02/08/2025.    FINDINGS:  Cardiac wires overlie the chest.  Cardiomediastinal silhouette is prominent and similar to the prior study.  Operative changes again noted in the chest.  Lungs are essentially clear.  No detrimental change in lung aeration.  Mild scoliotic curvature of the spine which could be exaggerated by positioning.                                    X-Rays:   Independently Interpreted Readings:   Other Readings:  No blunting of costophrenic angles.  No focal consolidation.  No increased reticular opacities.  No bony abnormality noted.        Medications   cetirizine tablet 10 mg (has no administration in time range)   losartan tablet 100 mg (has no administration in time range)   metoprolol succinate (TOPROL-XL) 24 hr tablet 50 mg (has no administration in time range)   rivaroxaban tablet 20 mg (has no administration in time range)   spironolactone tablet 25 mg (has no administration in time range)   sodium chloride 0.9% " flush 10 mL (has no administration in time range)   acetaminophen tablet 650 mg (has no administration in time range)   polyethylene glycol packet 17 g (has no administration in time range)   melatonin tablet 6 mg (has no administration in time range)   famotidine tablet 20 mg (has no administration in time range)   furosemide injection 80 mg (has no administration in time range)   furosemide injection 60 mg (60 mg Intravenous Given 3/31/25 2230)     Medical Decision Making  Patient is a 29-year-old afebrile, hypoxic, tachycardic, tachypneic, normotensive, in mild distress male presenting due to shortness of breath.    DDX:  CHF exacerbation, pneumonia, ACS    Initially satting 85% on IPAP 16 EPAP 8 and 100% O2.  I-STAT showing lactate of 3.8 with unremarkable electrolytes.  BNP 1000 +.  Troponin elevated.  EKG unremarkable when compared to previous.  Hemoglobin stable.  Chest x-ray without significant reticular opacities or focal opacity.  Low concern for pneumonia.  We will trend troponin.  Given patient's previous medical history of tricuspid atresia and an EF of 10-15% suspecting that patient's presentation is likely due to CHF exacerbation.  Started on IV Lasix.  Kept on BiPAP.  Cardiology consulted.  Agreed to see patient at bedside.  Patient handed off to oncoming team pending Cardiology evaluation.    Amount and/or Complexity of Data Reviewed  Labs: ordered.  Radiology: ordered.    Risk  Prescription drug management.                                      Clinical Impression:  Final diagnoses:  [I50.43] Acute on chronic combined systolic and diastolic congestive heart failure          ED Disposition Condition    Admit                   [1]   Social History  Tobacco Use    Smoking status: Former     Current packs/day: 0.00     Types: Cigarettes     Quit date:      Years since quittin.2     Passive exposure: Past    Smokeless tobacco: Never    Tobacco comments:     3-4 months    Substance Use Topics     Alcohol use: No    Drug use: Yes     Types: Marijuana     Comment: Odalys today- pt reports a while back        Emeterio Pack MD  Resident  04/01/25 0027

## 2025-04-01 NOTE — PLAN OF CARE
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]  Acute on chronic combined systolic and diastolic congestive heart failure [I50.43]  Acute on chronic systolic congestive heart failure [I50.23]  Adult congenital heart disease [Q24.9]    Admission Date: 3/31/2025  Expected Discharge Date: 4/4/2025    Transition of Care Barriers: None    Payor: MEDICAID / Plan: [place] [employer] / Product Type: Managed Medicaid /     Extended Emergency Contact Information  Primary Emergency Contact: Amita Reilly  Address: [address]           [place] [address] [facility]  Home Phone: [phone]  Mobile Phone: [phone]  Relation: Mother  Secondary Emergency Contact: Elaina Epstein  Mobile Phone: [phone]  Relation: Grandparent  Preferred language: English   needed? No    Discharge Plan A: Home with family  Discharge Plan B: Rehab      [id number] - [place] - [address] AT [facility] [address]  [address]  [place] [address]  Phone: [phone] Fax: [phone]      Initial Assessment (most recent)       Adult Discharge Assessment - 04/01/25 1607          Discharge Assessment    Assessment Type Discharge Planning Assessment     Confirmed/corrected address, phone number and insurance Yes     Confirmed Demographics Correct on Facesheet     Source of Information patient;family;health record     Communicated ROJAS with patient/caregiver Date not available/Unable to determine     Reason For Admission SOB     People in Home grandparent(s)     Facility Arrived From: Home     Do you expect to return to your current living situation? Yes     Do you have help at home or someone to help you manage your care at home? Yes     Who are your caregiver(s) and their phone  number(s)? Elaina Epstein (grandmother) [phone]     Prior to hospitilization cognitive status: Alert/Oriented     Current cognitive status: Alert/Oriented     Walking or Climbing Stairs Difficulty no     Dressing/Bathing Difficulty no     Home Layout Able to live on 1st floor     Equipment Currently Used at Home none     Readmission within 30 days? No     Patient currently being followed by outpatient case management? No     Do you currently have service(s) that help you manage your care at home? No     Do you take prescription medications? Yes     Do you have prescription coverage? Yes     Do you have any problems affording any of your prescribed medications? No     Is the patient taking medications as prescribed? yes     Who is going to help you get home at discharge? Elaina Epstein (grandmother) [phone]     How do you get to doctors appointments? car, drives self     Are you on dialysis? No     Do you take coumadin? No     Discharge Plan A Home with family     Discharge Plan B Rehab     DME Needed Upon Discharge  none     Discharge Plan discussed with: Patient;Caregiver     Name(s) and Number(s) Elaina Epstein (grandmother) [phone]     Transition of Care Barriers None                   SW met with pt and grandmother at bedside to discuss discharge planning.  Pt lives with his grandmother in a one-story house and is independent with ambulation and ADLs.  No DME, HH, dialysis, or coumadin.  PCP is Dr Pardo.  Pt will have transportation and assistance from his grandmother at discharge.  Discharge Plan A and Plan B have been determined by review of patient's clinical status, future medical and therapeutic needs, and coverage/benefits for post-acute care in coordination with multidisciplinary team members.  Discharge Plan A and Plan B have been determined by review of patient's clinical status, future medical and therapeutic needs, and coverage/benefits for post-acute care in coordination with  multidisciplinary team members.  JASON name and ext on whiteboard.  Will continue to follow.      Edyta Jennings LMSW  [facility]  [phone]

## 2025-04-02 LAB
ABSOLUTE EOSINOPHIL (OHS): 0.02 K/UL
ABSOLUTE MONOCYTE (OHS): 0.47 K/UL (ref 0.3–1)
ABSOLUTE NEUTROPHIL COUNT (OHS): 3.12 K/UL (ref 1.8–7.7)
ALBUMIN SERPL BCP-MCNC: 3.3 G/DL (ref 3.5–5.2)
ALP SERPL-CCNC: 47 UNIT/L (ref 40–150)
ALT SERPL W/O P-5'-P-CCNC: 42 UNIT/L (ref 10–44)
ANION GAP (OHS): 11 MMOL/L (ref 8–16)
ANION GAP (OHS): 12 MMOL/L (ref 8–16)
AST SERPL-CCNC: 31 UNIT/L (ref 11–45)
BASOPHILS # BLD AUTO: 0.01 K/UL
BASOPHILS NFR BLD AUTO: 0.2 %
BILIRUB SERPL-MCNC: 2.4 MG/DL (ref 0.1–1)
BUN SERPL-MCNC: 16 MG/DL (ref 6–20)
BUN SERPL-MCNC: 16 MG/DL (ref 6–20)
CALCIUM SERPL-MCNC: 8.4 MG/DL (ref 8.7–10.5)
CALCIUM SERPL-MCNC: 8.6 MG/DL (ref 8.7–10.5)
CHLORIDE SERPL-SCNC: 100 MMOL/L (ref 95–110)
CHLORIDE SERPL-SCNC: 103 MMOL/L (ref 95–110)
CO2 SERPL-SCNC: 25 MMOL/L (ref 23–29)
CO2 SERPL-SCNC: 27 MMOL/L (ref 23–29)
CREAT SERPL-MCNC: 1.1 MG/DL (ref 0.5–1.4)
CREAT SERPL-MCNC: 1.2 MG/DL (ref 0.5–1.4)
ERYTHROCYTE [DISTWIDTH] IN BLOOD BY AUTOMATED COUNT: 15.9 % (ref 11.5–14.5)
GFR SERPLBLD CREATININE-BSD FMLA CKD-EPI: >60 ML/MIN/1.73/M2
GFR SERPLBLD CREATININE-BSD FMLA CKD-EPI: >60 ML/MIN/1.73/M2
GLUCOSE SERPL-MCNC: 139 MG/DL (ref 70–110)
GLUCOSE SERPL-MCNC: 94 MG/DL (ref 70–110)
HCT VFR BLD AUTO: 43.4 % (ref 40–54)
HGB BLD-MCNC: 14.1 GM/DL (ref 14–18)
IMM GRANULOCYTES # BLD AUTO: 0.01 K/UL (ref 0–0.04)
IMM GRANULOCYTES NFR BLD AUTO: 0.2 % (ref 0–0.5)
INR PPP: 1.5 (ref 0.8–1.2)
LYMPHOCYTES # BLD AUTO: 2.17 K/UL (ref 1–4.8)
MAGNESIUM SERPL-MCNC: 1.8 MG/DL (ref 1.6–2.6)
MCH RBC QN AUTO: 28.4 PG (ref 27–31)
MCHC RBC AUTO-ENTMCNC: 32.5 G/DL (ref 32–36)
MCV RBC AUTO: 87 FL (ref 82–98)
NUCLEATED RBC (/100WBC) (OHS): 0 /100 WBC
PHOSPHATE SERPL-MCNC: 4.3 MG/DL (ref 2.7–4.5)
PLATELET # BLD AUTO: 129 K/UL (ref 150–450)
PMV BLD AUTO: 11.8 FL (ref 9.2–12.9)
POCT GLUCOSE: 102 MG/DL (ref 70–110)
POCT GLUCOSE: 137 MG/DL (ref 70–110)
POCT GLUCOSE: 165 MG/DL (ref 70–110)
POTASSIUM SERPL-SCNC: 3.6 MMOL/L (ref 3.5–5.1)
POTASSIUM SERPL-SCNC: 3.6 MMOL/L (ref 3.5–5.1)
PROT SERPL-MCNC: 6.3 GM/DL (ref 6–8.4)
PROTHROMBIN TIME: 16.3 SECONDS (ref 9–12.5)
RBC # BLD AUTO: 4.97 M/UL (ref 4.6–6.2)
RELATIVE EOSINOPHIL (OHS): 0.3 %
RELATIVE LYMPHOCYTE (OHS): 37.4 % (ref 18–48)
RELATIVE MONOCYTE (OHS): 8.1 % (ref 4–15)
RELATIVE NEUTROPHIL (OHS): 53.8 % (ref 38–73)
SODIUM SERPL-SCNC: 138 MMOL/L (ref 136–145)
SODIUM SERPL-SCNC: 140 MMOL/L (ref 136–145)
WBC # BLD AUTO: 5.8 K/UL (ref 3.9–12.7)

## 2025-04-02 PROCEDURE — 94761 N-INVAS EAR/PLS OXIMETRY MLT: CPT

## 2025-04-02 PROCEDURE — 85610 PROTHROMBIN TIME: CPT | Performed by: STUDENT IN AN ORGANIZED HEALTH CARE EDUCATION/TRAINING PROGRAM

## 2025-04-02 PROCEDURE — 25000003 PHARM REV CODE 250: Performed by: STUDENT IN AN ORGANIZED HEALTH CARE EDUCATION/TRAINING PROGRAM

## 2025-04-02 PROCEDURE — 80053 COMPREHEN METABOLIC PANEL: CPT | Performed by: STUDENT IN AN ORGANIZED HEALTH CARE EDUCATION/TRAINING PROGRAM

## 2025-04-02 PROCEDURE — 99291 CRITICAL CARE FIRST HOUR: CPT | Mod: ,,, | Performed by: INTERNAL MEDICINE

## 2025-04-02 PROCEDURE — 84100 ASSAY OF PHOSPHORUS: CPT | Performed by: STUDENT IN AN ORGANIZED HEALTH CARE EDUCATION/TRAINING PROGRAM

## 2025-04-02 PROCEDURE — 20000000 HC ICU ROOM

## 2025-04-02 PROCEDURE — 36415 COLL VENOUS BLD VENIPUNCTURE: CPT

## 2025-04-02 PROCEDURE — 99900035 HC TECH TIME PER 15 MIN (STAT)

## 2025-04-02 PROCEDURE — 63600175 PHARM REV CODE 636 W HCPCS: Performed by: STUDENT IN AN ORGANIZED HEALTH CARE EDUCATION/TRAINING PROGRAM

## 2025-04-02 PROCEDURE — 84295 ASSAY OF SERUM SODIUM: CPT

## 2025-04-02 PROCEDURE — 85025 COMPLETE CBC W/AUTO DIFF WBC: CPT | Performed by: STUDENT IN AN ORGANIZED HEALTH CARE EDUCATION/TRAINING PROGRAM

## 2025-04-02 PROCEDURE — 94660 CPAP INITIATION&MGMT: CPT

## 2025-04-02 PROCEDURE — 83735 ASSAY OF MAGNESIUM: CPT | Performed by: STUDENT IN AN ORGANIZED HEALTH CARE EDUCATION/TRAINING PROGRAM

## 2025-04-02 PROCEDURE — 27100171 HC OXYGEN HIGH FLOW UP TO 24 HOURS

## 2025-04-02 RX ORDER — SIMETHICONE 80 MG
1 TABLET,CHEWABLE ORAL 3 TIMES DAILY PRN
Status: DISCONTINUED | OUTPATIENT
Start: 2025-04-02 | End: 2025-04-04 | Stop reason: HOSPADM

## 2025-04-02 RX ORDER — LANOLIN ALCOHOL/MO/W.PET/CERES
400 CREAM (GRAM) TOPICAL ONCE
Status: COMPLETED | OUTPATIENT
Start: 2025-04-02 | End: 2025-04-02

## 2025-04-02 RX ORDER — POTASSIUM CHLORIDE 750 MG/1
50 CAPSULE, EXTENDED RELEASE ORAL ONCE
Status: COMPLETED | OUTPATIENT
Start: 2025-04-02 | End: 2025-04-02

## 2025-04-02 RX ORDER — FUROSEMIDE 10 MG/ML
40 INJECTION INTRAMUSCULAR; INTRAVENOUS ONCE
Status: COMPLETED | OUTPATIENT
Start: 2025-04-02 | End: 2025-04-02

## 2025-04-02 RX ORDER — LOSARTAN POTASSIUM 50 MG/1
100 TABLET ORAL DAILY
Status: DISCONTINUED | OUTPATIENT
Start: 2025-04-03 | End: 2025-04-04 | Stop reason: HOSPADM

## 2025-04-02 RX ORDER — FUROSEMIDE 10 MG/ML
40 INJECTION INTRAMUSCULAR; INTRAVENOUS DAILY
Status: DISCONTINUED | OUTPATIENT
Start: 2025-04-02 | End: 2025-04-02

## 2025-04-02 RX ADMIN — POTASSIUM CHLORIDE 50 MEQ: 750 CAPSULE, EXTENDED RELEASE ORAL at 10:04

## 2025-04-02 RX ADMIN — RIVAROXABAN 20 MG: 20 TABLET, FILM COATED ORAL at 04:04

## 2025-04-02 RX ADMIN — FAMOTIDINE 20 MG: 20 TABLET, FILM COATED ORAL at 08:04

## 2025-04-02 RX ADMIN — METOPROLOL SUCCINATE 50 MG: 50 TABLET, EXTENDED RELEASE ORAL at 08:04

## 2025-04-02 RX ADMIN — FUROSEMIDE 40 MG: 10 INJECTION, SOLUTION INTRAVENOUS at 05:04

## 2025-04-02 RX ADMIN — SIMETHICONE 80 MG: 80 TABLET, CHEWABLE ORAL at 05:04

## 2025-04-02 RX ADMIN — SPIRONOLACTONE 25 MG: 25 TABLET, FILM COATED ORAL at 08:04

## 2025-04-02 RX ADMIN — Medication 400 MG: at 10:04

## 2025-04-02 RX ADMIN — DAPAGLIFLOZIN 10 MG: 10 TABLET, FILM COATED ORAL at 08:04

## 2025-04-02 RX ADMIN — CETIRIZINE HYDROCHLORIDE 10 MG: 10 TABLET, FILM COATED ORAL at 08:04

## 2025-04-02 RX ADMIN — LOSARTAN POTASSIUM 150 MG: 50 TABLET, FILM COATED ORAL at 08:04

## 2025-04-02 RX ADMIN — FUROSEMIDE 40 MG: 10 INJECTION, SOLUTION INTRAVENOUS at 11:04

## 2025-04-02 NOTE — ASSESSMENT & PLAN NOTE
Patient has Combined Systolic and Diastolic heart failure that is Acute on chronic.   Recent Labs     03/31/25  2224   BNP 1,317*     Results for orders placed during the hospital encounter of 03/31/25    Echo    Interpretation Summary  CONGENITAL CARDIAC HISTORY:  Tricuspid atresia, pulmonary atresia  and WPW.  S/P Systemic to pulmonary artery shunt - [facility].  S/P Bidirectional Jesus -- [facility].  S/P Lateral tunnel Fontan procedure - [facility].  S/P EP study with trans-septal puncture November 2022  S/P Ablation of a left lateral accessory pathway November 2022 by Dr. Ferguson      SEGMENTAL CARDIAC CONNECTIONS (previously demonstrated):  Abdominal situs is solitus.  Atrial situs is normal.  Imaging consistent with tricuspid atresia.  Tricuspid atresia.  Mitral valve appears normal in structure.  LV dilation.  Ventriculoarterial concordance.  Ventricular loop: D-loop.  Cardiac position is levocardia.  Left aortic arch branching.      IMPRESSION:  Imaging consistent with diagnosis of tricuspid atresia S/P lateral tunnel Fontan - study remarkable for decreased systolic function of the single ventricle worsened since ACHD study 10/26/2023:  Very difficult to demonstrate pulmonary circulation with significant chest deformity due to median sternotomy.  Mildly dilated inferior vena cava connecting to lateral for completion of Fontan physiology with no obvious obstruction or thrombus.  Right superior vena cava identified with laminar flow and no obvious stenosis.  No evidence of obstruction at Fontan or Jesus anastomosis to the pulmonary arteries in very limited imaging.  Pulmonary confluence and LPA unobstructed in very limited imaging.  RPA not demonstrated..  At least two pulmonary veins demonstrated returning to the left atrium with no evidence for obstruction.  There is a small right atrium with no obvious obstruction of right atrial flow to the left atrium.  There is no obvious tricuspid valve tissue present and no right  ventricular cavity could be demonstrated.  There is no evidence for pulmonary valve.  At least mild dilation of the left atrium.  Mildly dilated mitral valve annulus with color Doppler demonstrating at least moderate insufficiency.  Single ventricle consistent with left ventricular morphology.  There is at least moderate dilation of the left ventricle with prominently trabeculated apical myocardium.  Left ventricular systolic function qualitatively severely compromised with ejection fraction estimated 20 -25%.  Global left ventricular longitudinal strain abnormal - peak average measured -3.6.  There is a large aortic annulus with trileaflet aortic valve, no stenosis and trivial central jet of insufficiency.  Aortic dimensions:  Sinuses of Valsalva   = 49 mm.  ST junction               = 39 mm.  Ascending aorta       = not well demonstrated.  Qualitative impression of at least mild dilation of the ascending aorta.  No evidence for coarctation.  No obvious pericardial effusion.      Current Heart Failure Medications  metoprolol succinate (TOPROL-XL) 24 hr tablet 50 mg, Daily, Oral  spironolactone tablet 25 mg, Daily, Oral  losartan tablet 150 mg, Daily, Oral  dapagliflozin propanediol (Farxiga) tablet 10 mg, Daily, Oral    Plan  - Monitor strict I&Os and daily weights.    - Place on telemetry  - Low sodium diet  - Patient with congenital heart disease status-post fontan procedure presenting with dyspnea and noted drop in EF to 10-15% from 30-35% previously, now 20-25%  - Adult congenital cardiology consulted; appreciate recommendations  - increase losartan to 150mg for afterload reduction  - started dapagliflozin 10mg daily  - continue Toprol-XL 50mg  - continue spironolactone 25mg  - will potentially transition to oral diuretics today

## 2025-04-02 NOTE — PROGRESS NOTES
[facility]  Cardiology  Progress Note    Patient Name: Juani Reilly Jr.  MRN: [id number]  Admission Date: 3/31/2025  Hospital Length of Stay: 1 days  Code Status: Full Code   Attending Physician: Kai Feliciano MD   Primary Care Physician: Jaqueline Pardo MD  Expected Discharge Date: 4/4/2025  Principal Problem:<principal problem not specified>    Subjective:     Hospital Course:   No notes on file    Interval History:   - Patient diuresing well on IV lasix  - started SGLT2i yesterday  - SpO2 88% on room Air this morning    Review of Systems   Constitutional: Negative for chills and fever.   Cardiovascular:  Positive for dyspnea on exertion and leg swelling. Negative for chest pain, orthopnea and syncope.   Respiratory:  Negative for shortness of breath.      Objective:     Vital Signs (Most Recent):  Temp: 98.5 °F (36.9 °C) (04/02/25 0301)  Pulse: 83 (04/02/25 0501)  Resp: 14 (04/02/25 0501)  BP: 122/69 (04/02/25 0501)  SpO2: (!) 88 % (04/02/25 0501) Vital Signs (24h Range):  Temp:  [97.7 °F (36.5 °C)-98.8 °F (37.1 °C)] 98.5 °F (36.9 °C)  Pulse:  [] 83  Resp:  [8-26] 14  SpO2:  [83 %-94 %] 88 %  BP: (102-141)/(56-98) 122/69     Weight: 59.9 kg (132 lb 0.9 oz)  Body mass index is 21.31 kg/m².     SpO2: (!) 88 %         Intake/Output Summary (Last 24 hours) at 4/2/2025 0829  Last data filed at 4/2/2025 0601  Gross per 24 hour   Intake 2034 ml   Output 3600 ml   Net -1566 ml       Lines/Drains/Airways       Peripheral Intravenous Line  Duration                  Peripheral IV - Single Lumen 03/31/25 0000 20 G Right Antecubital 2 days         Peripheral IV - Single Lumen 04/01/25 0422 20 G Distal;Left;Posterior Forearm 1 day         Peripheral IV - Single Lumen 04/01/25 2030 20 G Left;Posterior Hand <1 day                       Physical Exam  Vitals reviewed.   Constitutional:       General: He is not in acute distress.  Eyes:      General: No scleral  "icterus.  Cardiovascular:      Rate and Rhythm: Normal rate and regular rhythm.      Heart sounds: Murmur heard.   Pulmonary:      Effort: Pulmonary effort is normal. No respiratory distress.   Abdominal:      General: There is no distension.      Palpations: Abdomen is soft.   Musculoskeletal:      Right lower leg: No edema.      Left lower leg: No edema.   Skin:     General: Skin is warm and dry.   Neurological:      Mental Status: He is alert and oriented to person, place, and time.            Significant Labs: ABG:   Recent Labs   Lab 03/31/25  2148 03/31/25  2207 04/01/25  0158   PH 7.339 7.402 7.412   PCO2 45.7 32.4* 34.1   HCO3 20.8 20.1* 21.4   POCSATURATED  --  89  --    BE  --  -5*  --    , CMP   Recent Labs   Lab 03/31/25  2317 04/01/25  0405 04/01/25  1212 04/01/25  1726 04/01/25  2102    139 139 139 138   K 3.8 3.9 4.0 4.4 4.2    106 103 101 101   CO2 21* 20* 22* 22* 25   BUN 11 12 15 16 15   CREATININE 1.1 0.9 0.9 1.1 1.2   CALCIUM 8.7 8.8 8.9 9.5 9.0   ALBUMIN 3.9 3.4*  --   --   --    BILITOT 2.3* 2.3*  --   --   --    ALKPHOS 56 47  --   --   --    AST 39 43  --   --   --    ALT 39 39  --   --   --    ANIONGAP 12 13 14 16 12   , CBC   Recent Labs   Lab 03/31/25  2224 04/01/25  0158 04/01/25  0405 04/02/25  0530   WBC 5.52  --  5.47 5.80   HGB 14.8  --  13.6* 14.1   HCT 46.6   < > 41.8 43.4   *  --  112* 129*    < > = values in this interval not displayed.   , INR   Recent Labs   Lab 04/01/25  0405   INR 1.4*   , Lipid Panel No results for input(s): "CHOL", "HDL", "LDLCALC", "TRIG", "CHOLHDL" in the last 48 hours., Troponin   Recent Labs   Lab 03/31/25  2224   TROPONINIHS 40*   , and All pertinent lab results from the last 24 hours have been reviewed.    Significant Imaging: Echocardiogram: Transthoracic echo (TTE) complete (Cupid Only):   Results for orders placed or performed during the hospital encounter of 03/31/25   Echo   Result Value Ref Range    LV Diastolic Volume 313 mL "    Echo EF Estimated 17 %    LV Systolic Volume 258 mL    IVS 1.2 (A) 0.6 - 1.1 cm    LVIDd 7.7 (A) 3.5 - 6.0 cm    LVIDs 7.0 (A) 2.1 - 4.0 cm    LVOT diameter 3.0 cm    PW 1.0 0.6 - 1.1 cm    AV LVOT peak gradient 1 mmHg    LVOT mn grad 0 mmHg    LVOT peak tod 0.4 m/s    LVOT peak VTI 5.1 cm    LA size 4.5 cm    AV valve area 4.0 cm2    AV area by cont VTI 4.1 cm2    AV peak gradient 1 mmHg    AV mean gradient 1 mmHg    Ao peak tod 0.5 m/s    Ao VTI 9.0 cm    MV Peak A Tod 0.26 m/s    E wave deceleration time 154 ms    MV Peak E Tod 0.73 m/s    E/A ratio 2.81     LV LATERAL E/E' RATIO 9.1     LV SEPTAL E/E' RATIO 14.6     TDI LATERAL 0.08 m/s    TDI SEPTAL 0.05 m/s    STJ 3.93 cm    Sinus 4.41 cm    BSA 1.71 m2    LVOT stroke volume 36.0 cm3    LV Systolic Volume Index 150.9 mL/m2    LV Diastolic Volume Index 183.04 mL/m2    LVOT area 7.1 cm2    FS 9.1 (A) 28 - 44 %    Left Ventricle Relative Wall Thickness 0.26 cm    LV mass 428.1 g    LV Mass Index 250.3 g/m2    E/E' ratio 11 m/s    AV Velocity Ratio 0.80     AV index (prosthetic) 0.57     CONNIE by Velocity Ratio 5.7 cm²    Mean e' 0.07 m/s    ZLVIDS 6.86     ZLVIDD 4.83     Narrative      CONGENITAL CARDIAC HISTORY:     Tricuspid atresia, pulmonary atresia  and WPW.      S/P Systemic to pulmonary artery shunt - [facility].       S/P Bidirectional Jesus -- [facility].      S/P Lateral tunnel Fontan procedure - [facility].      S/P EP study with trans-septal puncture November 2022      S/P Ablation of a left lateral accessory pathway November 2022 by Dr. Ferguson        SEGMENTAL CARDIAC CONNECTIONS (previously demonstrated):  Abdominal situs is solitus.  Atrial situs is normal.  Imaging consistent with tricuspid atresia.  Tricuspid atresia.  Mitral valve appears normal in structure.  LV dilation.  Ventriculoarterial concordance.  Ventricular loop: D-loop.  Cardiac position is levocardia.  Left aortic arch branching.        IMPRESSION:  Imaging consistent with diagnosis of  tricuspid atresia S/P lateral tunnel   Fontan - study remarkable for decreased systolic function of the single   ventricle worsened since ACHD study 10/26/2023:  Very difficult to demonstrate pulmonary circulation with significant chest   deformity due to median sternotomy.  Mildly dilated inferior vena cava connecting to lateral for completion of   Fontan physiology with no obvious obstruction or thrombus.  Right superior vena cava identified with laminar flow and no obvious   stenosis.  No evidence of obstruction at Fontan or Jesus anastomosis to the pulmonary   arteries in very limited imaging.  Pulmonary confluence and LPA unobstructed in very limited imaging.  RPA not demonstrated..  At least two pulmonary veins demonstrated returning to the left atrium   with no evidence for obstruction.  There is a small right atrium with no obvious obstruction of right atrial   flow to the left atrium.  There is no obvious tricuspid valve tissue present and no right   ventricular cavity could be demonstrated.  There is no evidence for pulmonary valve.  At least mild dilation of the left atrium.  Mildly dilated mitral valve annulus with color Doppler demonstrating at   least moderate insufficiency.  Single ventricle consistent with left ventricular morphology.  There is at least moderate dilation of the left ventricle with prominently   trabeculated apical myocardium.  Left ventricular systolic function qualitatively severely compromised with   ejection fraction estimated 20 -25%.  Global left ventricular longitudinal strain abnormal - peak average   measured -3.6.  There is a large aortic annulus with trileaflet aortic valve, no stenosis   and trivial central jet of insufficiency.  Aortic dimensions:  Sinuses of Valsalva   = 49 mm.  ST junction               = 39 mm.  Ascending aorta       = not well demonstrated.  Qualitative impression of at least mild dilation of the ascending aorta.  No evidence for coarctation.  No  obvious pericardial effusion.          Assessment and Plan:     Brief HPI:   29M with tricuspid and pulmonary atresia s/p Jesus and Fontan, WPW s/p RFA (11/2022), HFrEF (10-15%), and cardiac cirrhosis admitted with acute decompensated heart failure. Patient now with baseline hypoxia on RA after aggressive diuresis.    Acute on chronic systolic congestive heart failure  Patient has Combined Systolic and Diastolic heart failure that is Acute on chronic.   Recent Labs     03/31/25  2224   BNP 1,317*       Results for orders placed during the hospital encounter of 03/31/25    Echo    Interpretation Summary  CONGENITAL CARDIAC HISTORY:  Tricuspid atresia, pulmonary atresia  and WPW.  S/P Systemic to pulmonary artery shunt - [facility].  S/P Bidirectional Jesus -- [facility].  S/P Lateral tunnel Fontan procedure - [facility].  S/P EP study with trans-septal puncture November 2022  S/P Ablation of a left lateral accessory pathway November 2022 by Dr. Ferguson      SEGMENTAL CARDIAC CONNECTIONS (previously demonstrated):  Abdominal situs is solitus.  Atrial situs is normal.  Imaging consistent with tricuspid atresia.  Tricuspid atresia.  Mitral valve appears normal in structure.  LV dilation.  Ventriculoarterial concordance.  Ventricular loop: D-loop.  Cardiac position is levocardia.  Left aortic arch branching.      IMPRESSION:  Imaging consistent with diagnosis of tricuspid atresia S/P lateral tunnel Fontan - study remarkable for decreased systolic function of the single ventricle worsened since ACHD study 10/26/2023:  Very difficult to demonstrate pulmonary circulation with significant chest deformity due to median sternotomy.  Mildly dilated inferior vena cava connecting to lateral for completion of Fontan physiology with no obvious obstruction or thrombus.  Right superior vena cava identified with laminar flow and no obvious stenosis.  No evidence of obstruction at Fontan or Jesus anastomosis to the pulmonary arteries in very  limited imaging.  Pulmonary confluence and LPA unobstructed in very limited imaging.  RPA not demonstrated..  At least two pulmonary veins demonstrated returning to the left atrium with no evidence for obstruction.  There is a small right atrium with no obvious obstruction of right atrial flow to the left atrium.  There is no obvious tricuspid valve tissue present and no right ventricular cavity could be demonstrated.  There is no evidence for pulmonary valve.  At least mild dilation of the left atrium.  Mildly dilated mitral valve annulus with color Doppler demonstrating at least moderate insufficiency.  Single ventricle consistent with left ventricular morphology.  There is at least moderate dilation of the left ventricle with prominently trabeculated apical myocardium.  Left ventricular systolic function qualitatively severely compromised with ejection fraction estimated 20 -25%.  Global left ventricular longitudinal strain abnormal - peak average measured -3.6.  There is a large aortic annulus with trileaflet aortic valve, no stenosis and trivial central jet of insufficiency.  Aortic dimensions:  Sinuses of Valsalva   = 49 mm.  ST junction               = 39 mm.  Ascending aorta       = not well demonstrated.  Qualitative impression of at least mild dilation of the ascending aorta.  No evidence for coarctation.  No obvious pericardial effusion.      Current Heart Failure Medications  metoprolol succinate (TOPROL-XL) 24 hr tablet 50 mg, Daily, Oral  spironolactone tablet 25 mg, Daily, Oral  dapagliflozin propanediol (Farxiga) tablet 10 mg, Daily, Oral  losartan tablet 100 mg, Daily, Oral    Plan  - Monitor strict I&Os and daily weights.    - Place on telemetry  - Low sodium diet  - Patient with congenital heart disease status-post fontan procedure presenting with dyspnea and noted drop in EF to 10-15% from 30-35% previously, now 20-25%  - Adult congenital cardiology consulted; appreciate recommendations  -  losartan to 100mg , if BP elevated will increase to 150mg  - started dapagliflozin 10mg daily  - continue Toprol-XL 50mg  - continue spironolactone 25mg  - will potentially transition to oral diuretics today    Adult congenital heart disease  Juani Reilly Jr. is a 29 y.o. male [facility]. He recently was discharged on 2/9/25 from the hospital for newly further reduced EF noted on OSH echocardiogram and hypoxia. Prior to this he had been predominately lost to care since 10/2023. He has a history of non-compliance that has improved considerably since his 2/9/25 discharge. To summarize his diagnoses are as follow:  1. Tricuspid and pulmonary atresia initially palliated with a systemic to pulmonary artery shunt followed by a bidirectional Jesus and subsequent lateral tunnel Fontan procedure   - small atrial baffle leak along with new Fontan baffle puncture for EP study  - s/p EP study with trans-septal puncture November 2022  - reassuring hemodynamics on 2020 catheterization  2. Decreased left ventricular function  - no evidence of PLE  3. History of [id number], SVT  - status post successful ablation of a left lateral accessory pathway November 2022 by Dr. Ferguson  4. Cirrhosis  - last seen by hepatology May 2023  5. Medication noncompliance (Improved since last hospital discharge on 2/9/25)    Discussion:  He has a right to left shunt (leak in his Fontan) and baseline sats are around 85-90%. His CXR looks great.     Recommendations:  I would be fine weaning oxygen for sats greater than 84% if that helps with management.  Any help that the heart failure team can provide regarding BP/heart failure management is appreciated.     Thank you for this consult. [facility] will continue to follow Juani's care during this admission. Please let us know if we can be of any assistance.        VTE Risk Mitigation (From admission, onward)           Ordered     rivaroxaban tablet 20 mg  With  dinner         04/01/25 0026     IP VTE HIGH RISK PATIENT  Once         04/01/25 0026     Place sequential compression device  Until discontinued         04/01/25 0026                    Emeterio Perez MD  Cardiology  [facility]

## 2025-04-02 NOTE — ASSESSMENT & PLAN NOTE
Patient has Combined Systolic and Diastolic heart failure that is Acute on chronic.   Recent Labs     03/31/25  2224   BNP 1,317*       Results for orders placed during the hospital encounter of 03/31/25    Echo    Interpretation Summary  CONGENITAL CARDIAC HISTORY:  Tricuspid atresia, pulmonary atresia  and WPW.  S/P Systemic to pulmonary artery shunt - [facility].  S/P Bidirectional Jesus -- [facility].  S/P Lateral tunnel Fontan procedure - [facility].  S/P EP study with trans-septal puncture November 2022  S/P Ablation of a left lateral accessory pathway November 2022 by Dr. Ferguson      SEGMENTAL CARDIAC CONNECTIONS (previously demonstrated):  Abdominal situs is solitus.  Atrial situs is normal.  Imaging consistent with tricuspid atresia.  Tricuspid atresia.  Mitral valve appears normal in structure.  LV dilation.  Ventriculoarterial concordance.  Ventricular loop: D-loop.  Cardiac position is levocardia.  Left aortic arch branching.      IMPRESSION:  Imaging consistent with diagnosis of tricuspid atresia S/P lateral tunnel Fontan - study remarkable for decreased systolic function of the single ventricle worsened since ACHD study 10/26/2023:  Very difficult to demonstrate pulmonary circulation with significant chest deformity due to median sternotomy.  Mildly dilated inferior vena cava connecting to lateral for completion of Fontan physiology with no obvious obstruction or thrombus.  Right superior vena cava identified with laminar flow and no obvious stenosis.  No evidence of obstruction at Fontan or Jesus anastomosis to the pulmonary arteries in very limited imaging.  Pulmonary confluence and LPA unobstructed in very limited imaging.  RPA not demonstrated..  At least two pulmonary veins demonstrated returning to the left atrium with no evidence for obstruction.  There is a small right atrium with no obvious obstruction of right atrial flow to the left atrium.  There is no obvious tricuspid valve tissue present and no  right ventricular cavity could be demonstrated.  There is no evidence for pulmonary valve.  At least mild dilation of the left atrium.  Mildly dilated mitral valve annulus with color Doppler demonstrating at least moderate insufficiency.  Single ventricle consistent with left ventricular morphology.  There is at least moderate dilation of the left ventricle with prominently trabeculated apical myocardium.  Left ventricular systolic function qualitatively severely compromised with ejection fraction estimated 20 -25%.  Global left ventricular longitudinal strain abnormal - peak average measured -3.6.  There is a large aortic annulus with trileaflet aortic valve, no stenosis and trivial central jet of insufficiency.  Aortic dimensions:  Sinuses of Valsalva   = 49 mm.  ST junction               = 39 mm.  Ascending aorta       = not well demonstrated.  Qualitative impression of at least mild dilation of the ascending aorta.  No evidence for coarctation.  No obvious pericardial effusion.      Current Heart Failure Medications  metoprolol succinate (TOPROL-XL) 24 hr tablet 50 mg, Daily, Oral  spironolactone tablet 25 mg, Daily, Oral  dapagliflozin propanediol (Farxiga) tablet 10 mg, Daily, Oral  losartan tablet 100 mg, Daily, Oral    Plan  - Monitor strict I&Os and daily weights.    - Place on telemetry  - Low sodium diet  - Patient with congenital heart disease status-post fontan procedure presenting with dyspnea and noted drop in EF to 10-15% from 30-35% previously, now 20-25%  - Adult congenital cardiology consulted; appreciate recommendations  - losartan to 100mg , if BP elevated will increase to 150mg  - started dapagliflozin 10mg daily  - continue Toprol-XL 50mg  - continue spironolactone 25mg  - will potentially transition to oral diuretics today

## 2025-04-02 NOTE — PLAN OF CARE
Problem: Adult Inpatient Plan of Care  Goal: Plan of Care Review  Outcome: Progressing     Problem: Heart Failure  Goal: Optimal Coping  Outcome: Progressing  Goal: Optimal Functional Ability  Outcome: Progressing

## 2025-04-02 NOTE — PLAN OF CARE
CICU DAILY GOALS       A: Awake    RASS: Goal -    Actual -     Restraint necessity:    B: Breath   SBT: NA   C: Coordinate A & B, analgesics/sedatives   Pain: managed    SAT: NA  D: Delirium   CAM-ICU:    E: Early(intubated/ Progressive (non-intubated) Mobility   MOVE Screen: Fail   Activity: Activity Management: Back in bed  FAS: Feeding/Nutrition   Diet order: Diet/Nutrition Received: 2 gram sodium, low saturated fat/low cholesterol,   Fluid restriction:     Nutritional Supplement Intake: Quantity n/a, Type:  n/a  T: Thrombus   DVT prophylaxis: VTE Core Measure: Pharmacological prophylaxis initiated/maintained  H: HOB Elevation   Head of Bed (HOB) Positioning: HOB at 30 degrees  U: Ulcer Prophylaxis   GI: yes  G: Glucose control   managed    S: Skin   Bathing/Skin Care: bath, complete (04/01/25 8924)  Wounds: Yes  Wound care consulted: No  B: Bowel Function   no issues   I: Indwelling Catheters   [place]ry necessity:     CVC necessity: No  D: De-escalation Antibx   No  Plan for the day   Continue current support  Family/Goals of care/Code Status   Code Status: Full Code     No acute events throughout day, VS and assessment per flow sheet, patient progressing towards goals as tolerated, plan of care reviewed with Juani Reilly Jr. and family, all concerns addressed, will continue to monitor.

## 2025-04-02 NOTE — SUBJECTIVE & OBJECTIVE
Interval History:   - Patient diuresing well on IV lasix  - started SGLT2i yesterday  - SpO2 88% on room Air this morning    Review of Systems   Constitutional: Negative for chills and fever.   Cardiovascular:  Positive for dyspnea on exertion and leg swelling. Negative for chest pain, orthopnea and syncope.   Respiratory:  Negative for shortness of breath.      Objective:     Vital Signs (Most Recent):  Temp: 98.5 °F (36.9 °C) (04/02/25 0301)  Pulse: 83 (04/02/25 0501)  Resp: 14 (04/02/25 0501)  BP: 122/69 (04/02/25 0501)  SpO2: (!) 88 % (04/02/25 0501) Vital Signs (24h Range):  Temp:  [97.7 °F (36.5 °C)-98.8 °F (37.1 °C)] 98.5 °F (36.9 °C)  Pulse:  [] 83  Resp:  [8-26] 14  SpO2:  [83 %-94 %] 88 %  BP: (102-141)/(56-98) 122/69     Weight: 59.9 kg (132 lb 0.9 oz)  Body mass index is 21.31 kg/m².     SpO2: (!) 88 %         Intake/Output Summary (Last 24 hours) at 4/2/2025 0829  Last data filed at 4/2/2025 0601  Gross per 24 hour   Intake 2034 ml   Output 3600 ml   Net -1566 ml       Lines/Drains/Airways       Peripheral Intravenous Line  Duration                  Peripheral IV - Single Lumen 03/31/25 0000 20 G Right Antecubital 2 days         Peripheral IV - Single Lumen 04/01/25 0422 20 G Distal;Left;Posterior Forearm 1 day         Peripheral IV - Single Lumen 04/01/25 2030 20 G Left;Posterior Hand <1 day                       Physical Exam  Vitals reviewed.   Constitutional:       General: He is not in acute distress.  Eyes:      General: No scleral icterus.  Cardiovascular:      Rate and Rhythm: Normal rate and regular rhythm.      Heart sounds: Murmur heard.   Pulmonary:      Effort: Pulmonary effort is normal. No respiratory distress.   Abdominal:      General: There is no distension.      Palpations: Abdomen is soft.   Musculoskeletal:      Right lower leg: No edema.      Left lower leg: No edema.   Skin:     General: Skin is warm and dry.   Neurological:      Mental Status: He is alert and oriented to  "person, place, and time.            Significant Labs: ABG:   Recent Labs   Lab 03/31/25  2148 03/31/25  2207 04/01/25  0158   PH 7.339 7.402 7.412   PCO2 45.7 32.4* 34.1   HCO3 20.8 20.1* 21.4   POCSATURATED  --  89  --    BE  --  -5*  --    , CMP   Recent Labs   Lab 03/31/25  2317 04/01/25  0405 04/01/25  1212 04/01/25  1726 04/01/25  2102    139 139 139 138   K 3.8 3.9 4.0 4.4 4.2    106 103 101 101   CO2 21* 20* 22* 22* 25   BUN 11 12 15 16 15   CREATININE 1.1 0.9 0.9 1.1 1.2   CALCIUM 8.7 8.8 8.9 9.5 9.0   ALBUMIN 3.9 3.4*  --   --   --    BILITOT 2.3* 2.3*  --   --   --    ALKPHOS 56 47  --   --   --    AST 39 43  --   --   --    ALT 39 39  --   --   --    ANIONGAP 12 13 14 16 12   , CBC   Recent Labs   Lab 03/31/25  2224 04/01/25  0158 04/01/25  0405 04/02/25  0530   WBC 5.52  --  5.47 5.80   HGB 14.8  --  13.6* 14.1   HCT 46.6   < > 41.8 43.4   *  --  112* 129*    < > = values in this interval not displayed.   , INR   Recent Labs   Lab 04/01/25 0405   INR 1.4*   , Lipid Panel No results for input(s): "CHOL", "HDL", "LDLCALC", "TRIG", "CHOLHDL" in the last 48 hours., Troponin   Recent Labs   Lab 03/31/25 2224   TROPONINIHS 40*   , and All pertinent lab results from the last 24 hours have been reviewed.    Significant Imaging: Echocardiogram: Transthoracic echo (TTE) complete (Cupid Only):   Results for orders placed or performed during the hospital encounter of 03/31/25   Echo   Result Value Ref Range    LV Diastolic Volume 313 mL    Echo EF Estimated 17 %    LV Systolic Volume 258 mL    IVS 1.2 (A) 0.6 - 1.1 cm    LVIDd 7.7 (A) 3.5 - 6.0 cm    LVIDs 7.0 (A) 2.1 - 4.0 cm    LVOT diameter 3.0 cm    PW 1.0 0.6 - 1.1 cm    AV LVOT peak gradient 1 mmHg    LVOT mn grad 0 mmHg    LVOT peak efrain 0.4 m/s    LVOT peak VTI 5.1 cm    LA size 4.5 cm    AV valve area 4.0 cm2    AV area by cont VTI 4.1 cm2    AV peak gradient 1 mmHg    AV mean gradient 1 mmHg    Ao peak efrain 0.5 m/s    Ao VTI 9.0 cm    MV " Peak A Tod 0.26 m/s    E wave deceleration time 154 ms    MV Peak E Tod 0.73 m/s    E/A ratio 2.81     LV LATERAL E/E' RATIO 9.1     LV SEPTAL E/E' RATIO 14.6     TDI LATERAL 0.08 m/s    TDI SEPTAL 0.05 m/s    STJ 3.93 cm    Sinus 4.41 cm    BSA 1.71 m2    LVOT stroke volume 36.0 cm3    LV Systolic Volume Index 150.9 mL/m2    LV Diastolic Volume Index 183.04 mL/m2    LVOT area 7.1 cm2    FS 9.1 (A) 28 - 44 %    Left Ventricle Relative Wall Thickness 0.26 cm    LV mass 428.1 g    LV Mass Index 250.3 g/m2    E/E' ratio 11 m/s    AV Velocity Ratio 0.80     AV index (prosthetic) 0.57     CONNIE by Velocity Ratio 5.7 cm²    Mean e' 0.07 m/s    ZLVIDS 6.86     ZLVIDD 4.83     Narrative      CONGENITAL CARDIAC HISTORY:     Tricuspid atresia, pulmonary atresia  and WPW.      S/P Systemic to pulmonary artery shunt - [facility].       S/P Bidirectional Jesus -- [facility].      S/P Lateral tunnel Fontan procedure - [facility].      S/P EP study with trans-septal puncture November 2022      S/P Ablation of a left lateral accessory pathway November 2022 by Dr. Ferguson        SEGMENTAL CARDIAC CONNECTIONS (previously demonstrated):  Abdominal situs is solitus.  Atrial situs is normal.  Imaging consistent with tricuspid atresia.  Tricuspid atresia.  Mitral valve appears normal in structure.  LV dilation.  Ventriculoarterial concordance.  Ventricular loop: D-loop.  Cardiac position is levocardia.  Left aortic arch branching.        IMPRESSION:  Imaging consistent with diagnosis of tricuspid atresia S/P lateral tunnel   Fontan - study remarkable for decreased systolic function of the single   ventricle worsened since [facility] study 10/26/2023:  Very difficult to demonstrate pulmonary circulation with significant chest   deformity due to median sternotomy.  Mildly dilated inferior vena cava connecting to lateral for completion of   Fontan physiology with no obvious obstruction or thrombus.  Right superior vena cava identified with laminar flow and no  obvious   stenosis.  No evidence of obstruction at Fontan or Jesus anastomosis to the pulmonary   arteries in very limited imaging.  Pulmonary confluence and LPA unobstructed in very limited imaging.  RPA not demonstrated..  At least two pulmonary veins demonstrated returning to the left atrium   with no evidence for obstruction.  There is a small right atrium with no obvious obstruction of right atrial   flow to the left atrium.  There is no obvious tricuspid valve tissue present and no right   ventricular cavity could be demonstrated.  There is no evidence for pulmonary valve.  At least mild dilation of the left atrium.  Mildly dilated mitral valve annulus with color Doppler demonstrating at   least moderate insufficiency.  Single ventricle consistent with left ventricular morphology.  There is at least moderate dilation of the left ventricle with prominently   trabeculated apical myocardium.  Left ventricular systolic function qualitatively severely compromised with   ejection fraction estimated 20 -25%.  Global left ventricular longitudinal strain abnormal - peak average   measured -3.6.  There is a large aortic annulus with trileaflet aortic valve, no stenosis   and trivial central jet of insufficiency.  Aortic dimensions:  Sinuses of Valsalva   = 49 mm.  ST junction               = 39 mm.  Ascending aorta       = not well demonstrated.  Qualitative impression of at least mild dilation of the ascending aorta.  No evidence for coarctation.  No obvious pericardial effusion.

## 2025-04-02 NOTE — ASSESSMENT & PLAN NOTE
Juani Reilly Jr. is a 29 y.o. male who is followed in the [facility] clinic. He recently was discharged on 2/9/25 from the hospital for newly further reduced EF noted on OSH echocardiogram and hypoxia. Prior to this he had been predominately lost to care since 10/2023. He has a history of non-compliance that has improved considerably since his 2/9/25 discharge. To summarize his diagnoses are as follow:  1. Tricuspid and pulmonary atresia initially palliated with a systemic to pulmonary artery shunt followed by a bidirectional Jesus and subsequent lateral tunnel Fontan procedure   - small atrial baffle leak along with new Fontan baffle puncture for EP study  - s/p EP study with trans-septal puncture November 2022  - reassuring hemodynamics on 2020 catheterization  2. Decreased left ventricular function  - no evidence of PLE  3. History of [id number], SVT  - status post successful ablation of a left lateral accessory pathway November 2022 by Dr. Ferguson  4. Cirrhosis  - last seen by hepatology May 2023  5. Medication noncompliance (Improved since last hospital discharge on 2/9/25)    Discussion:  1. EKG shows sinus tachycardia.  I went back and reviewed previous EKG, and I am confident that he is not in two-to-one atrial flutter.    2. Echocardiogram yesterday (4/1) shows worsened left ventricular function with ejection fraction likely around 20% (full report pending) and moderate mitral insufficiency.  No obvious thrombi or obstruction.  3. At his most recent clinic appointment, due to a dry cough he was switched from ACE inhibitor to angiotensin receptor blocker.  Patient states that his cough is much better on this, but he has had progressive worsening shortness of breath over the past week.  4. His chest x-ray looks surprisingly clear given his ventricular function.  I suspect that his desaturation is more due to his right-to-left atrial baffle leak in his Fontan. His baseline sats are around  85-90%.     Unfortunately, his ventricular function appears to be worsening.  I was hoping he was in an arrhythmia, as this would be potentially fixable cause of worsening function, but he appears to be in sinus.  I feel strongly that he is not a transplant candidate given a long history of noncompliance, and I would also consider him a very poor candidate for mechanical circulatory support.  We are left with medical management.  I am hoping we can get him feeling better with more aggressive goal-directed medical therapy, and I am completely on board with starting an SGLT2 inhibitor.    Recommendations:  1. I would be fine weaning oxygen for sats greater than 84% if that helps with management.  2. Any help that the heart failure team can provide regarding BP/heart failure management is appreciated.   3. Agree with optimizing medical therapy for his heart failure.  Agree with SGLT2 inhibitor.  4. If over the next few days we are unable to get him feeling better, we could consider a cardiac catheterization to reassess hemodynamics and potentially close his Fontan leak.  This would improve saturations but at the cost of a higher central venous pressure, potentially decreasing cardiac output and increasing the risk of protein-losing enteropathy and plastic bronchitis.  However, if we are not able to get him feeling better, this may be an appropriate thing to try.  5. He is definitely at risk for thromboembolic phenomenon, and I recommend continuing his Xarelto.  6. I would not consider him an ECMO candidate.

## 2025-04-03 LAB
ANION GAP (OHS): 11 MMOL/L (ref 8–16)
ANION GAP (OHS): 12 MMOL/L (ref 8–16)
BUN SERPL-MCNC: 17 MG/DL (ref 6–20)
BUN SERPL-MCNC: 18 MG/DL (ref 6–20)
CALCIUM SERPL-MCNC: 9.1 MG/DL (ref 8.7–10.5)
CALCIUM SERPL-MCNC: 9.4 MG/DL (ref 8.7–10.5)
CHLORIDE SERPL-SCNC: 100 MMOL/L (ref 95–110)
CHLORIDE SERPL-SCNC: 100 MMOL/L (ref 95–110)
CO2 SERPL-SCNC: 26 MMOL/L (ref 23–29)
CO2 SERPL-SCNC: 27 MMOL/L (ref 23–29)
CREAT SERPL-MCNC: 0.9 MG/DL (ref 0.5–1.4)
CREAT SERPL-MCNC: 1 MG/DL (ref 0.5–1.4)
GFR SERPLBLD CREATININE-BSD FMLA CKD-EPI: >60 ML/MIN/1.73/M2
GFR SERPLBLD CREATININE-BSD FMLA CKD-EPI: >60 ML/MIN/1.73/M2
GLUCOSE SERPL-MCNC: 101 MG/DL (ref 70–110)
GLUCOSE SERPL-MCNC: 86 MG/DL (ref 70–110)
INR PPP: 1.4 (ref 0.8–1.2)
MAGNESIUM SERPL-MCNC: 2.2 MG/DL (ref 1.6–2.6)
MAGNESIUM SERPL-MCNC: 2.2 MG/DL (ref 1.6–2.6)
PHOSPHATE SERPL-MCNC: 4.3 MG/DL (ref 2.7–4.5)
PHOSPHATE SERPL-MCNC: 4.5 MG/DL (ref 2.7–4.5)
POTASSIUM SERPL-SCNC: 3.7 MMOL/L (ref 3.5–5.1)
POTASSIUM SERPL-SCNC: 3.7 MMOL/L (ref 3.5–5.1)
PROTHROMBIN TIME: 15.2 SECONDS (ref 9–12.5)
SODIUM SERPL-SCNC: 138 MMOL/L (ref 136–145)
SODIUM SERPL-SCNC: 138 MMOL/L (ref 136–145)

## 2025-04-03 PROCEDURE — 25000003 PHARM REV CODE 250

## 2025-04-03 PROCEDURE — 84100 ASSAY OF PHOSPHORUS: CPT

## 2025-04-03 PROCEDURE — 85610 PROTHROMBIN TIME: CPT | Performed by: STUDENT IN AN ORGANIZED HEALTH CARE EDUCATION/TRAINING PROGRAM

## 2025-04-03 PROCEDURE — 99900035 HC TECH TIME PER 15 MIN (STAT)

## 2025-04-03 PROCEDURE — 27000221 HC OXYGEN, UP TO 24 HOURS

## 2025-04-03 PROCEDURE — 94761 N-INVAS EAR/PLS OXIMETRY MLT: CPT

## 2025-04-03 PROCEDURE — 25000003 PHARM REV CODE 250: Performed by: STUDENT IN AN ORGANIZED HEALTH CARE EDUCATION/TRAINING PROGRAM

## 2025-04-03 PROCEDURE — 99291 CRITICAL CARE FIRST HOUR: CPT | Mod: ,,, | Performed by: INTERNAL MEDICINE

## 2025-04-03 PROCEDURE — 20000000 HC ICU ROOM

## 2025-04-03 PROCEDURE — 80048 BASIC METABOLIC PNL TOTAL CA: CPT

## 2025-04-03 PROCEDURE — 63600175 PHARM REV CODE 636 W HCPCS: Performed by: STUDENT IN AN ORGANIZED HEALTH CARE EDUCATION/TRAINING PROGRAM

## 2025-04-03 PROCEDURE — 84100 ASSAY OF PHOSPHORUS: CPT | Performed by: STUDENT IN AN ORGANIZED HEALTH CARE EDUCATION/TRAINING PROGRAM

## 2025-04-03 PROCEDURE — 83735 ASSAY OF MAGNESIUM: CPT

## 2025-04-03 PROCEDURE — 82565 ASSAY OF CREATININE: CPT | Performed by: STUDENT IN AN ORGANIZED HEALTH CARE EDUCATION/TRAINING PROGRAM

## 2025-04-03 PROCEDURE — 83735 ASSAY OF MAGNESIUM: CPT | Performed by: STUDENT IN AN ORGANIZED HEALTH CARE EDUCATION/TRAINING PROGRAM

## 2025-04-03 RX ORDER — POTASSIUM CHLORIDE 750 MG/1
30 CAPSULE, EXTENDED RELEASE ORAL ONCE
Status: COMPLETED | OUTPATIENT
Start: 2025-04-03 | End: 2025-04-03

## 2025-04-03 RX ORDER — FUROSEMIDE 10 MG/ML
60 INJECTION INTRAMUSCULAR; INTRAVENOUS ONCE
Status: COMPLETED | OUTPATIENT
Start: 2025-04-03 | End: 2025-04-03

## 2025-04-03 RX ADMIN — SPIRONOLACTONE 25 MG: 25 TABLET, FILM COATED ORAL at 09:04

## 2025-04-03 RX ADMIN — CETIRIZINE HYDROCHLORIDE 10 MG: 10 TABLET, FILM COATED ORAL at 09:04

## 2025-04-03 RX ADMIN — LOSARTAN POTASSIUM 100 MG: 50 TABLET, FILM COATED ORAL at 09:04

## 2025-04-03 RX ADMIN — DAPAGLIFLOZIN 10 MG: 10 TABLET, FILM COATED ORAL at 09:04

## 2025-04-03 RX ADMIN — FAMOTIDINE 20 MG: 20 TABLET, FILM COATED ORAL at 09:04

## 2025-04-03 RX ADMIN — METOPROLOL SUCCINATE 50 MG: 50 TABLET, EXTENDED RELEASE ORAL at 09:04

## 2025-04-03 RX ADMIN — POTASSIUM BICARBONATE 20 MEQ: 391 TABLET, EFFERVESCENT ORAL at 04:04

## 2025-04-03 RX ADMIN — FUROSEMIDE 60 MG: 10 INJECTION, SOLUTION INTRAMUSCULAR; INTRAVENOUS at 04:04

## 2025-04-03 RX ADMIN — FAMOTIDINE 20 MG: 20 TABLET, FILM COATED ORAL at 08:04

## 2025-04-03 RX ADMIN — RIVAROXABAN 20 MG: 20 TABLET, FILM COATED ORAL at 04:04

## 2025-04-03 RX ADMIN — POTASSIUM CHLORIDE 30 MEQ: 750 CAPSULE, EXTENDED RELEASE ORAL at 11:04

## 2025-04-03 NOTE — HOSPITAL COURSE
Respiratory symptoms improving after diuresis and addition of GDMT. Goal SaO2 >/= 84%.  Will d/c with on dapagliflozin and f/u Dr. Santoyo of [facility].

## 2025-04-03 NOTE — PLAN OF CARE
Lab notified BMP hemolyzed. Neither of patient's 2 Ivs will draw back blood after multiple attempts. Patient refused blood draw and did not want to be stuck again. Md notified regarding patient status and hemolyzed lab. Per MD, patient okay to not have BMP done until AM draw unless cardiac rhythm change on monitor.

## 2025-04-03 NOTE — PLAN OF CARE
Problem: Heart Failure  Goal: Optimal Coping  Outcome: Progressing  Goal: Optimal Cardiac Output  Outcome: Progressing  Goal: Stable Heart Rate and Rhythm  Outcome: Progressing  Goal: Optimal Functional Ability  Outcome: Progressing  Goal: Fluid and Electrolyte Balance  Outcome: Progressing  Goal: Improved Oral Intake  Outcome: Progressing  Goal: Effective Oxygenation and Ventilation  Outcome: Progressing  Goal: Effective Breathing Pattern During Sleep  Outcome: Progressing     Problem: Adult Inpatient Plan of Care  Goal: Plan of Care Review  Outcome: Progressing  Goal: Patient-Specific Goal (Individualized)  Outcome: Progressing  Goal: Absence of Hospital-Acquired Illness or Injury  Outcome: Progressing  Goal: Optimal Comfort and Wellbeing  Outcome: Progressing  Goal: Readiness for Transition of Care  Outcome: Progressing

## 2025-04-03 NOTE — SUBJECTIVE & OBJECTIVE
Interval History: NAEON. Able to weaned off airvo and now in 4LNC. Stable for step-down from ICU.    Review of Systems   Constitutional: Negative for chills and fever.   Cardiovascular:  Positive for dyspnea on exertion and leg swelling. Negative for chest pain, orthopnea and syncope.   Respiratory:  Negative for shortness of breath.      Objective:     Vital Signs (Most Recent):  Temp: 98.1 °F (36.7 °C) (04/03/25 1200)  Pulse: 97 (04/03/25 1200)  Resp: (!) 28 (04/03/25 1200)  BP: (!) 116/90 (04/03/25 1200)  SpO2: (!) 85 % (04/03/25 1200) Vital Signs (24h Range):  Temp:  [97.7 °F (36.5 °C)-98.8 °F (37.1 °C)] 98.1 °F (36.7 °C)  Pulse:  [77-97] 97  Resp:  [10-30] 28  SpO2:  [80 %-91 %] 85 %  BP: ()/(53-90) 116/90     Weight: 60.1 kg (132 lb 7.9 oz)  Body mass index is 21.39 kg/m².     SpO2: (!) 85 %         Intake/Output Summary (Last 24 hours) at 4/3/2025 1247  Last data filed at 4/3/2025 1200  Gross per 24 hour   Intake 1880 ml   Output 3350 ml   Net -1470 ml       Lines/Drains/Airways       Peripheral Intravenous Line  Duration                  Peripheral IV - Single Lumen 03/31/25 0000 20 G Right Antecubital 3 days         Peripheral IV - Single Lumen 04/01/25 0422 20 G Distal;Left;Posterior Forearm 2 days         Peripheral IV - Single Lumen 04/01/25 2030 20 G Left;Posterior Hand 1 day                       Physical Exam  Vitals reviewed.   Constitutional:       General: He is not in acute distress.     Appearance: He is ill-appearing.   HENT:      Head: Normocephalic and atraumatic.   Eyes:      General: No scleral icterus.  Cardiovascular:      Rate and Rhythm: Normal rate and regular rhythm.      Heart sounds: Murmur heard.   Pulmonary:      Effort: Pulmonary effort is normal. No respiratory distress.   Abdominal:      General: There is no distension.      Palpations: Abdomen is soft.   Musculoskeletal:      Right lower leg: No edema.      Left lower leg: No edema.   Skin:     General: Skin is warm and  dry.   Neurological:      Mental Status: He is alert and oriented to person, place, and time.            Significant Labs: All pertinent lab results from the last 24 hours have been reviewed.    Significant Imaging: Echocardiogram: Transthoracic echo (TTE) complete (Cupid Only):   Results for orders placed or performed during the hospital encounter of 03/31/25   Echo   Result Value Ref Range    LV Diastolic Volume 313 mL    Echo EF Estimated 17 %    LV Systolic Volume 258 mL    IVS 1.2 (A) 0.6 - 1.1 cm    LVIDd 7.7 (A) 3.5 - 6.0 cm    LVIDs 7.0 (A) 2.1 - 4.0 cm    LVOT diameter 3.0 cm    PW 1.0 0.6 - 1.1 cm    AV LVOT peak gradient 1 mmHg    LVOT mn grad 0 mmHg    LVOT peak tod 0.4 m/s    LVOT peak VTI 5.1 cm    LA size 4.5 cm    AV valve area 4.0 cm2    AV area by cont VTI 4.1 cm2    AV peak gradient 1 mmHg    AV mean gradient 1 mmHg    Ao peak tod 0.5 m/s    Ao VTI 9.0 cm    MV Peak A Tod 0.26 m/s    E wave deceleration time 154 ms    MV Peak E Tod 0.73 m/s    E/A ratio 2.81     LV LATERAL E/E' RATIO 9.1     LV SEPTAL E/E' RATIO 14.6     TDI LATERAL 0.08 m/s    TDI SEPTAL 0.05 m/s    STJ 3.93 cm    Sinus 4.41 cm    BSA 1.71 m2    LVOT stroke volume 36.0 cm3    LV Systolic Volume Index 150.9 mL/m2    LV Diastolic Volume Index 183.04 mL/m2    LVOT area 7.1 cm2    FS 9.1 (A) 28 - 44 %    Left Ventricle Relative Wall Thickness 0.26 cm    LV mass 428.1 g    LV Mass Index 250.3 g/m2    E/E' ratio 11 m/s    AV Velocity Ratio 0.80     AV index (prosthetic) 0.57     CONNIE by Velocity Ratio 5.7 cm²    Mean e' 0.07 m/s    ZLVIDS 6.86     ZLVIDD 4.83     Narrative      CONGENITAL CARDIAC HISTORY:     Tricuspid atresia, pulmonary atresia  and WPW.      S/P Systemic to pulmonary artery shunt - [facility].       S/P Bidirectional Jesus -- [facility].      S/P Lateral tunnel Fontan procedure - [facility].      S/P EP study with trans-septal puncture November 2022      S/P Ablation of a left lateral accessory pathway November 2022 by     Marty        SEGMENTAL CARDIAC CONNECTIONS (previously demonstrated):  Abdominal situs is solitus.  Atrial situs is normal.  Imaging consistent with tricuspid atresia.  Tricuspid atresia.  Mitral valve appears normal in structure.  LV dilation.  Ventriculoarterial concordance.  Ventricular loop: D-loop.  Cardiac position is levocardia.  Left aortic arch branching.        IMPRESSION:  Imaging consistent with diagnosis of tricuspid atresia S/P lateral tunnel   Fontan - study remarkable for decreased systolic function of the single   ventricle worsened since [facility] study 10/26/2023:  Very difficult to demonstrate pulmonary circulation with significant chest   deformity due to median sternotomy.  Mildly dilated inferior vena cava connecting to lateral for completion of   Fontan physiology with no obvious obstruction or thrombus.  Right superior vena cava identified with laminar flow and no obvious   stenosis.  No evidence of obstruction at Fontan or Jesus anastomosis to the pulmonary   arteries in very limited imaging.  Pulmonary confluence and LPA unobstructed in very limited imaging.  RPA not demonstrated..  At least two pulmonary veins demonstrated returning to the left atrium   with no evidence for obstruction.  There is a small right atrium with no obvious obstruction of right atrial   flow to the left atrium.  There is no obvious tricuspid valve tissue present and no right   ventricular cavity could be demonstrated.  There is no evidence for pulmonary valve.  At least mild dilation of the left atrium.  Mildly dilated mitral valve annulus with color Doppler demonstrating at   least moderate insufficiency.  Single ventricle consistent with left ventricular morphology.  There is at least moderate dilation of the left ventricle with prominently   trabeculated apical myocardium.  Left ventricular systolic function qualitatively severely compromised with   ejection fraction estimated 20 -25%.  Global left ventricular  longitudinal strain abnormal - peak average   measured -3.6.  There is a large aortic annulus with trileaflet aortic valve, no stenosis   and trivial central jet of insufficiency.  Aortic dimensions:  Sinuses of Valsalva   = 49 mm.  ST junction               = 39 mm.  Ascending aorta       = not well demonstrated.  Qualitative impression of at least mild dilation of the ascending aorta.  No evidence for coarctation.  No obvious pericardial effusion.

## 2025-04-03 NOTE — PLAN OF CARE
CICU DAILY GOALS       A: Awake    RASS: Goal -    Actual -     Restraint necessity:    B: Breath   SBT: NA   C: Coordinate A & B, analgesics/sedatives   Pain: managed    SAT: NA  D: Delirium   CAM-ICU:    E: Early(intubated/ Progressive (non-intubated) Mobility   MOVE Screen: Pass   Activity: Activity Management: Back in bed  FAS: Feeding/Nutrition   Diet order: Diet/Nutrition Received: 2 gram sodium, low saturated fat/low cholesterol,   Fluid restriction:     Nutritional Supplement Intake: Quantity n/a, Type:  n/a  T: Thrombus   DVT prophylaxis: VTE Core Measure: Pharmacological prophylaxis initiated/maintained  H: HOB Elevation   Head of Bed (HOB) Positioning: HOB elevated  U: Ulcer Prophylaxis   GI: yes  G: Glucose control   managed    S: Skin   Bathing/Skin Care: bath, complete;dressed/undressed;linen changed (04/02/25 2101)  Wounds: No  Wound care consulted: No  B: Bowel Function   no issues   I: Indwelling Catheters   Parry necessity:     CVC necessity: No  D: De-escalation Antibx   No  Plan for the day   Continue current support  Family/Goals of care/Code Status   Code Status: Full Code     Patient noncompliant with wearing high flow nasal cannula. See note. No other acute events throughout night, VS and assessment per flow sheet, patient progressing towards goals as tolerated, plan of care reviewed with Juani Reilly Jr. and family, all concerns addressed, will continue to monitor.

## 2025-04-03 NOTE — PROGRESS NOTES
[facility]  Cardiology  Progress Note    Patient Name: Juani Reilly Jr.  MRN: [id number]  Admission Date: 3/31/2025  Hospital Length of Stay: 2 days  Code Status: Full Code   Attending Physician: Kai Feliciano MD   Primary Care Physician: Jaqueline Pardo MD  Expected Discharge Date: 4/5/2025  Principal Problem:Acute on chronic systolic congestive heart failure    Subjective:     Hospital Course:   Respiratory symptoms improving after diuresis and addition of GDMT. Goal SaO2 >/= 84%.      Interval History: NAEON. Able to weaned off airvo and now in 4LNC. Stable for step-down from ICU.    Review of Systems   Constitutional: Negative for chills and fever.   Cardiovascular:  Positive for dyspnea on exertion and leg swelling. Negative for chest pain, orthopnea and syncope.   Respiratory:  Negative for shortness of breath.      Objective:     Vital Signs (Most Recent):  Temp: 98.1 °F (36.7 °C) (04/03/25 1200)  Pulse: 97 (04/03/25 1200)  Resp: (!) 28 (04/03/25 1200)  BP: (!) 116/90 (04/03/25 1200)  SpO2: (!) 85 % (04/03/25 1200) Vital Signs (24h Range):  Temp:  [97.7 °F (36.5 °C)-98.8 °F (37.1 °C)] 98.1 °F (36.7 °C)  Pulse:  [77-97] 97  Resp:  [10-30] 28  SpO2:  [80 %-91 %] 85 %  BP: ()/(53-90) 116/90     Weight: 60.1 kg (132 lb 7.9 oz)  Body mass index is 21.39 kg/m².     SpO2: (!) 85 %         Intake/Output Summary (Last 24 hours) at 4/3/2025 1247  Last data filed at 4/3/2025 1200  Gross per 24 hour   Intake 1880 ml   Output 3350 ml   Net -1470 ml       Lines/Drains/Airways       Peripheral Intravenous Line  Duration                  Peripheral IV - Single Lumen 03/31/25 0000 20 G Right Antecubital 3 days         Peripheral IV - Single Lumen 04/01/25 0422 20 G Distal;Left;Posterior Forearm 2 days         Peripheral IV - Single Lumen 04/01/25 2030 20 G Left;Posterior Hand 1 day                       Physical Exam  Vitals reviewed.   Constitutional:       General: He is not in  acute distress.     Appearance: He is ill-appearing.   HENT:      Head: Normocephalic and atraumatic.   Eyes:      General: No scleral icterus.  Cardiovascular:      Rate and Rhythm: Normal rate and regular rhythm.      Heart sounds: Murmur heard.   Pulmonary:      Effort: Pulmonary effort is normal. No respiratory distress.   Abdominal:      General: There is no distension.      Palpations: Abdomen is soft.   Musculoskeletal:      Right lower leg: No edema.      Left lower leg: No edema.   Skin:     General: Skin is warm and dry.   Neurological:      Mental Status: He is alert and oriented to person, place, and time.            Significant Labs: All pertinent lab results from the last 24 hours have been reviewed.    Significant Imaging: Echocardiogram: Transthoracic echo (TTE) complete (Cupid Only):   Results for orders placed or performed during the hospital encounter of 03/31/25   Echo   Result Value Ref Range    LV Diastolic Volume 313 mL    Echo EF Estimated 17 %    LV Systolic Volume 258 mL    IVS 1.2 (A) 0.6 - 1.1 cm    LVIDd 7.7 (A) 3.5 - 6.0 cm    LVIDs 7.0 (A) 2.1 - 4.0 cm    LVOT diameter 3.0 cm    PW 1.0 0.6 - 1.1 cm    AV LVOT peak gradient 1 mmHg    LVOT mn grad 0 mmHg    LVOT peak tod 0.4 m/s    LVOT peak VTI 5.1 cm    LA size 4.5 cm    AV valve area 4.0 cm2    AV area by cont VTI 4.1 cm2    AV peak gradient 1 mmHg    AV mean gradient 1 mmHg    Ao peak tod 0.5 m/s    Ao VTI 9.0 cm    MV Peak A Tod 0.26 m/s    E wave deceleration time 154 ms    MV Peak E Tod 0.73 m/s    E/A ratio 2.81     LV LATERAL E/E' RATIO 9.1     LV SEPTAL E/E' RATIO 14.6     TDI LATERAL 0.08 m/s    TDI SEPTAL 0.05 m/s    STJ 3.93 cm    Sinus 4.41 cm    BSA 1.71 m2    LVOT stroke volume 36.0 cm3    LV Systolic Volume Index 150.9 mL/m2    LV Diastolic Volume Index 183.04 mL/m2    LVOT area 7.1 cm2    FS 9.1 (A) 28 - 44 %    Left Ventricle Relative Wall Thickness 0.26 cm    LV mass 428.1 g    LV Mass Index 250.3 g/m2    E/E' ratio  11 m/s    AV Velocity Ratio 0.80     AV index (prosthetic) 0.57     CONNIE by Velocity Ratio 5.7 cm²    Mean e' 0.07 m/s    ZLVIDS 6.86     ZLVIDD 4.83     Narrative      CONGENITAL CARDIAC HISTORY:     Tricuspid atresia, pulmonary atresia  and WPW.      S/P Systemic to pulmonary artery shunt - [facility].       S/P Bidirectional Jesus -- [facility].      S/P Lateral tunnel Fontan procedure - [facility].      S/P EP study with trans-septal puncture November 2022      S/P Ablation of a left lateral accessory pathway November 2022 by Dr. Ferguson        SEGMENTAL CARDIAC CONNECTIONS (previously demonstrated):  Abdominal situs is solitus.  Atrial situs is normal.  Imaging consistent with tricuspid atresia.  Tricuspid atresia.  Mitral valve appears normal in structure.  LV dilation.  Ventriculoarterial concordance.  Ventricular loop: D-loop.  Cardiac position is levocardia.  Left aortic arch branching.        IMPRESSION:  Imaging consistent with diagnosis of tricuspid atresia S/P lateral tunnel   Fontan - study remarkable for decreased systolic function of the single   ventricle worsened since [facility] study 10/26/2023:  Very difficult to demonstrate pulmonary circulation with significant chest   deformity due to median sternotomy.  Mildly dilated inferior vena cava connecting to lateral for completion of   Fontan physiology with no obvious obstruction or thrombus.  Right superior vena cava identified with laminar flow and no obvious   stenosis.  No evidence of obstruction at Fontan or Jesus anastomosis to the pulmonary   arteries in very limited imaging.  Pulmonary confluence and LPA unobstructed in very limited imaging.  RPA not demonstrated..  At least two pulmonary veins demonstrated returning to the left atrium   with no evidence for obstruction.  There is a small right atrium with no obvious obstruction of right atrial   flow to the left atrium.  There is no obvious tricuspid valve tissue present and no right   ventricular cavity  could be demonstrated.  There is no evidence for pulmonary valve.  At least mild dilation of the left atrium.  Mildly dilated mitral valve annulus with color Doppler demonstrating at   least moderate insufficiency.  Single ventricle consistent with left ventricular morphology.  There is at least moderate dilation of the left ventricle with prominently   trabeculated apical myocardium.  Left ventricular systolic function qualitatively severely compromised with   ejection fraction estimated 20 -25%.  Global left ventricular longitudinal strain abnormal - peak average   measured -3.6.  There is a large aortic annulus with trileaflet aortic valve, no stenosis   and trivial central jet of insufficiency.  Aortic dimensions:  Sinuses of Valsalva   = 49 mm.  ST junction               = 39 mm.  Ascending aorta       = not well demonstrated.  Qualitative impression of at least mild dilation of the ascending aorta.  No evidence for coarctation.  No obvious pericardial effusion.          Assessment and Plan:     * Acute on chronic systolic congestive heart failure  Patient has Combined Systolic and Diastolic heart failure that is Acute on chronic.   Recent Labs     03/31/25  2224   BNP 1,317*       Results for orders placed during the hospital encounter of 03/31/25    Echo    Interpretation Summary  CONGENITAL CARDIAC HISTORY:  Tricuspid atresia, pulmonary atresia  and WPW.  S/P Systemic to pulmonary artery shunt - [facility].  S/P Bidirectional Jesus -- [facility].  S/P Lateral tunnel Fontan procedure - [facility].  S/P EP study with trans-septal puncture November 2022  S/P Ablation of a left lateral accessory pathway November 2022 by Dr. Ferguson      SEGMENTAL CARDIAC CONNECTIONS (previously demonstrated):  Abdominal situs is solitus.  Atrial situs is normal.  Imaging consistent with tricuspid atresia.  Tricuspid atresia.  Mitral valve appears normal in structure.  LV dilation.  Ventriculoarterial concordance.  Ventricular loop:  D-loop.  Cardiac position is levocardia.  Left aortic arch branching.      IMPRESSION:  Imaging consistent with diagnosis of tricuspid atresia S/P lateral tunnel Fontan - study remarkable for decreased systolic function of the single ventricle worsened since [facility] study 10/26/2023:  Very difficult to demonstrate pulmonary circulation with significant chest deformity due to median sternotomy.  Mildly dilated inferior vena cava connecting to lateral for completion of Fontan physiology with no obvious obstruction or thrombus.  Right superior vena cava identified with laminar flow and no obvious stenosis.  No evidence of obstruction at Fontan or Jesus anastomosis to the pulmonary arteries in very limited imaging.  Pulmonary confluence and LPA unobstructed in very limited imaging.  RPA not demonstrated..  At least two pulmonary veins demonstrated returning to the left atrium with no evidence for obstruction.  There is a small right atrium with no obvious obstruction of right atrial flow to the left atrium.  There is no obvious tricuspid valve tissue present and no right ventricular cavity could be demonstrated.  There is no evidence for pulmonary valve.  At least mild dilation of the left atrium.  Mildly dilated mitral valve annulus with color Doppler demonstrating at least moderate insufficiency.  Single ventricle consistent with left ventricular morphology.  There is at least moderate dilation of the left ventricle with prominently trabeculated apical myocardium.  Left ventricular systolic function qualitatively severely compromised with ejection fraction estimated 20 -25%.  Global left ventricular longitudinal strain abnormal - peak average measured -3.6.  There is a large aortic annulus with trileaflet aortic valve, no stenosis and trivial central jet of insufficiency.  Aortic dimensions:  Sinuses of Valsalva   = 49 mm.  ST junction               = 39 mm.  Ascending aorta       = not well demonstrated.  Qualitative  impression of at least mild dilation of the ascending aorta.  No evidence for coarctation.  No obvious pericardial effusion.      Current Heart Failure Medications  metoprolol succinate (TOPROL-XL) 24 hr tablet 50 mg, Daily, Oral  spironolactone tablet 25 mg, Daily, Oral  dapagliflozin propanediol (Farxiga) tablet 10 mg, Daily, Oral  losartan tablet 100 mg, Daily, Oral    Plan  - Monitor strict I&Os and daily weights.    - Place on telemetry  - Low sodium diet  - Patient with congenital heart disease status-post fontan procedure presenting with dyspnea and noted drop in EF to 10-15% from 30-35% previously, now 20-25%  - Adult congenital cardiology consulted; appreciate recommendations  - losartan to 100mg , if BP elevated will increase to 150mg  - started dapagliflozin 10mg daily  - continue Toprol-XL 50mg  - continue spironolactone 25mg  - will potentially transition to oral diuretics today    Adult congenital heart disease  Juani Reilly Jr. is a 29 y.o. male who is followed in the [facility] clinic. He recently was discharged on 2/9/25 from the hospital for newly further reduced EF noted on OSH echocardiogram and hypoxia. Prior to this he had been predominately lost to care since 10/2023. He has a history of non-compliance that has improved considerably since his 2/9/25 discharge. To summarize his diagnoses are as follow:  1. Tricuspid and pulmonary atresia initially palliated with a systemic to pulmonary artery shunt followed by a bidirectional Jesus and subsequent lateral tunnel Fontan procedure   - small atrial baffle leak along with new Fontan baffle puncture for EP study  - s/p EP study with trans-septal puncture November 2022  - reassuring hemodynamics on 2020 catheterization  2. Decreased left ventricular function  - no evidence of PLE  3. History of [id number], SVT  - status post successful ablation of a left lateral accessory pathway November 2022 by Dr. Ferguson  4. Cirrhosis  - last seen  by hepatology May 2023  5. Medication noncompliance (Improved since last hospital discharge on 2/9/25)    Discussion:  1. EKG shows sinus tachycardia.  I went back and reviewed previous EKG, and I am confident that he is not in two-to-one atrial flutter.    2. Echocardiogram yesterday (4/1) shows worsened left ventricular function with ejection fraction likely around 20% (full report pending) and moderate mitral insufficiency.  No obvious thrombi or obstruction.  3. At his most recent clinic appointment, due to a dry cough he was switched from ACE inhibitor to angiotensin receptor blocker.  Patient states that his cough is much better on this, but he has had progressive worsening shortness of breath over the past week.  4. His chest x-ray looks surprisingly clear given his ventricular function.  I suspect that his desaturation is more due to his right-to-left atrial baffle leak in his Fontan. His baseline sats are around 85-90%.     Unfortunately, his ventricular function appears to be worsening.  I was hoping he was in an arrhythmia, as this would be potentially fixable cause of worsening function, but he appears to be in sinus.  I feel strongly that he is not a transplant candidate given a long history of noncompliance, and I would also consider him a very poor candidate for mechanical circulatory support.  We are left with medical management.  I am hoping we can get him feeling better with more aggressive goal-directed medical therapy, and I am completely on board with starting an SGLT2 inhibitor.    Recommendations:  1. I would be fine weaning oxygen for sats greater than 84% if that helps with management.  2. Any help that the heart failure team can provide regarding BP/heart failure management is appreciated.   3. Agree with optimizing medical therapy for his heart failure.  Agree with SGLT2 inhibitor.  4. If over the next few days we are unable to get him feeling better, we could consider a cardiac  catheterization to reassess hemodynamics and potentially close his Fontan leak.  This would improve saturations but at the cost of a higher central venous pressure, potentially decreasing cardiac output and increasing the risk of protein-losing enteropathy and plastic bronchitis.  However, if we are not able to get him feeling better, this may be an appropriate thing to try.  5. He is definitely at risk for thromboembolic phenomenon, and I recommend continuing his Xarelto.  6. I would not consider him an ECMO candidate.        VTE Risk Mitigation (From admission, onward)           Ordered     rivaroxaban tablet 20 mg  With dinner         04/01/25 0026     IP VTE HIGH RISK PATIENT  Once         04/01/25 0026     Place sequential compression device  Until discontinued         04/01/25 0026                    Osiel Lobato MD  Cardiology  [facility]

## 2025-04-03 NOTE — PLAN OF CARE
"Patient oxygen saturation decreased to 80%. Walked into room and found patient had removed high flow nasal cannula. Patient stated that his oxygen saturation will not go above 85% and that was his normal. Patient stated he did not want to wear the high flow nasal cannula as it felt like it was "too much". Patient stated staff were not weaning oxygen down like "[they] said [they] would". Patient informed his oxygen has been titrated down through the entire shift but may not feel like it due to the fact it is high flow. Patient educated by nurse and RT regarding compliance. Patient stated he would not be wearing it unless he desats. Md informed.  "

## 2025-04-04 VITALS
HEART RATE: 98 BPM | WEIGHT: 132.5 LBS | OXYGEN SATURATION: 78 % | HEIGHT: 66 IN | TEMPERATURE: 98 F | SYSTOLIC BLOOD PRESSURE: 107 MMHG | BODY MASS INDEX: 21.29 KG/M2 | RESPIRATION RATE: 21 BRPM | DIASTOLIC BLOOD PRESSURE: 75 MMHG

## 2025-04-04 PROBLEM — I50.23 ACUTE ON CHRONIC SYSTOLIC CONGESTIVE HEART FAILURE: Status: RESOLVED | Noted: 2025-02-08 | Resolved: 2025-04-04

## 2025-04-04 LAB
ABSOLUTE EOSINOPHIL (OHS): 0.01 K/UL
ABSOLUTE MONOCYTE (OHS): 0.44 K/UL (ref 0.3–1)
ABSOLUTE NEUTROPHIL COUNT (OHS): 2.89 K/UL (ref 1.8–7.7)
ALBUMIN SERPL BCP-MCNC: 3.4 G/DL (ref 3.5–5.2)
ALP SERPL-CCNC: 54 UNIT/L (ref 40–150)
ALT SERPL W/O P-5'-P-CCNC: 43 UNIT/L (ref 10–44)
ANION GAP (OHS): 11 MMOL/L (ref 8–16)
AST SERPL-CCNC: 26 UNIT/L (ref 11–45)
BASOPHILS # BLD AUTO: 0.02 K/UL
BASOPHILS NFR BLD AUTO: 0.4 %
BILIRUB SERPL-MCNC: 1.9 MG/DL (ref 0.1–1)
BUN SERPL-MCNC: 18 MG/DL (ref 6–20)
CALCIUM SERPL-MCNC: 8.9 MG/DL (ref 8.7–10.5)
CHLORIDE SERPL-SCNC: 101 MMOL/L (ref 95–110)
CO2 SERPL-SCNC: 28 MMOL/L (ref 23–29)
CREAT SERPL-MCNC: 0.9 MG/DL (ref 0.5–1.4)
ERYTHROCYTE [DISTWIDTH] IN BLOOD BY AUTOMATED COUNT: 15.5 % (ref 11.5–14.5)
GFR SERPLBLD CREATININE-BSD FMLA CKD-EPI: >60 ML/MIN/1.73/M2
GLUCOSE SERPL-MCNC: 87 MG/DL (ref 70–110)
HCT VFR BLD AUTO: 48.6 % (ref 40–54)
HGB BLD-MCNC: 15.7 GM/DL (ref 14–18)
IMM GRANULOCYTES # BLD AUTO: 0.02 K/UL (ref 0–0.04)
IMM GRANULOCYTES NFR BLD AUTO: 0.4 % (ref 0–0.5)
INR PPP: 1.7 (ref 0.8–1.2)
LYMPHOCYTES # BLD AUTO: 2.22 K/UL (ref 1–4.8)
MAGNESIUM SERPL-MCNC: 2.1 MG/DL (ref 1.6–2.6)
MCH RBC QN AUTO: 27.7 PG (ref 27–31)
MCHC RBC AUTO-ENTMCNC: 32.3 G/DL (ref 32–36)
MCV RBC AUTO: 86 FL (ref 82–98)
NUCLEATED RBC (/100WBC) (OHS): 0 /100 WBC
OHS QRS DURATION: 92 MS
OHS QTC CALCULATION: 470 MS
PHOSPHATE SERPL-MCNC: 4.7 MG/DL (ref 2.7–4.5)
PLATELET # BLD AUTO: 141 K/UL (ref 150–450)
PMV BLD AUTO: 12.3 FL (ref 9.2–12.9)
POTASSIUM SERPL-SCNC: 4.1 MMOL/L (ref 3.5–5.1)
PROT SERPL-MCNC: 6.6 GM/DL (ref 6–8.4)
PROTHROMBIN TIME: 17.8 SECONDS (ref 9–12.5)
RBC # BLD AUTO: 5.67 M/UL (ref 4.6–6.2)
RELATIVE EOSINOPHIL (OHS): 0.2 %
RELATIVE LYMPHOCYTE (OHS): 39.6 % (ref 18–48)
RELATIVE MONOCYTE (OHS): 7.9 % (ref 4–15)
RELATIVE NEUTROPHIL (OHS): 51.5 % (ref 38–73)
SODIUM SERPL-SCNC: 140 MMOL/L (ref 136–145)
WBC # BLD AUTO: 5.6 K/UL (ref 3.9–12.7)

## 2025-04-04 PROCEDURE — 99239 HOSP IP/OBS DSCHRG MGMT >30: CPT | Mod: ,,, | Performed by: INTERNAL MEDICINE

## 2025-04-04 PROCEDURE — 25000003 PHARM REV CODE 250: Performed by: STUDENT IN AN ORGANIZED HEALTH CARE EDUCATION/TRAINING PROGRAM

## 2025-04-04 PROCEDURE — 99221 1ST HOSP IP/OBS SF/LOW 40: CPT | Mod: ,,,

## 2025-04-04 PROCEDURE — 63600175 PHARM REV CODE 636 W HCPCS: Performed by: STUDENT IN AN ORGANIZED HEALTH CARE EDUCATION/TRAINING PROGRAM

## 2025-04-04 PROCEDURE — 84100 ASSAY OF PHOSPHORUS: CPT | Performed by: STUDENT IN AN ORGANIZED HEALTH CARE EDUCATION/TRAINING PROGRAM

## 2025-04-04 PROCEDURE — 94761 N-INVAS EAR/PLS OXIMETRY MLT: CPT

## 2025-04-04 PROCEDURE — 83735 ASSAY OF MAGNESIUM: CPT | Performed by: STUDENT IN AN ORGANIZED HEALTH CARE EDUCATION/TRAINING PROGRAM

## 2025-04-04 PROCEDURE — 85610 PROTHROMBIN TIME: CPT | Performed by: STUDENT IN AN ORGANIZED HEALTH CARE EDUCATION/TRAINING PROGRAM

## 2025-04-04 PROCEDURE — 85025 COMPLETE CBC W/AUTO DIFF WBC: CPT | Performed by: STUDENT IN AN ORGANIZED HEALTH CARE EDUCATION/TRAINING PROGRAM

## 2025-04-04 PROCEDURE — 82247 BILIRUBIN TOTAL: CPT | Performed by: STUDENT IN AN ORGANIZED HEALTH CARE EDUCATION/TRAINING PROGRAM

## 2025-04-04 RX ORDER — FUROSEMIDE 10 MG/ML
40 INJECTION INTRAMUSCULAR; INTRAVENOUS ONCE
Status: DISCONTINUED | OUTPATIENT
Start: 2025-04-04 | End: 2025-04-04

## 2025-04-04 RX ORDER — FUROSEMIDE 10 MG/ML
60 INJECTION INTRAMUSCULAR; INTRAVENOUS ONCE
Status: COMPLETED | OUTPATIENT
Start: 2025-04-04 | End: 2025-04-04

## 2025-04-04 RX ORDER — DAPAGLIFLOZIN 10 MG/1
10 TABLET, FILM COATED ORAL DAILY
Qty: 90 TABLET | Refills: 3 | Status: SHIPPED | OUTPATIENT
Start: 2025-04-05

## 2025-04-04 RX ORDER — FUROSEMIDE 20 MG/1
20 TABLET ORAL DAILY
Status: DISCONTINUED | OUTPATIENT
Start: 2025-04-05 | End: 2025-04-04 | Stop reason: HOSPADM

## 2025-04-04 RX ADMIN — FUROSEMIDE 60 MG: 10 INJECTION, SOLUTION INTRAMUSCULAR; INTRAVENOUS at 08:04

## 2025-04-04 RX ADMIN — DAPAGLIFLOZIN 10 MG: 10 TABLET, FILM COATED ORAL at 08:04

## 2025-04-04 RX ADMIN — LOSARTAN POTASSIUM 100 MG: 50 TABLET, FILM COATED ORAL at 08:04

## 2025-04-04 RX ADMIN — SPIRONOLACTONE 25 MG: 25 TABLET, FILM COATED ORAL at 08:04

## 2025-04-04 RX ADMIN — METOPROLOL SUCCINATE 50 MG: 50 TABLET, EXTENDED RELEASE ORAL at 08:04

## 2025-04-04 RX ADMIN — CETIRIZINE HYDROCHLORIDE 10 MG: 10 TABLET, FILM COATED ORAL at 08:04

## 2025-04-04 RX ADMIN — FAMOTIDINE 20 MG: 20 TABLET, FILM COATED ORAL at 08:04

## 2025-04-04 NOTE — DISCHARGE SUMMARY
"  [facility]  Cardiology  Discharge Summary      Patient Name: Juani Reilly Jr.  MRN: [id number]  Admission Date: 3/31/2025  Hospital Length of Stay: 3 days  Discharge Date and Time: 04/04/2025 2:16 PM  Attending Physician: Pascale Victoria MD    Discharging Provider: Osiel Lobato MD  Primary Care Physician: Jaqueline Pardo MD    HPI:   29-year-old male with medical history of tricuspid and pulmonary atresia initially palliated with a systemic to pulmonary artery shunt followed by a bidirectional Jesus and subsequent lateral tunnel Fontan procedure, WPW s/p EPS & RFA left lateral AP with Dr. Ferguson Nov 2022, decreased LV function (EF 30-35%), cirrhosis and reported difficulty with adherence to medications who presented with respiratory distress requiring BiPAP.    Initially hypoxic with O2 sats 85% on IPAP 16 EPAP 8 and 100% O2.  I-STAT lactate 3.8 with unremarkable electrolytes.  BNP 1317. Troponin mildly elevated.  EKG unremarkable when compared to previous. Given dose of IV Lasix in the ED with 650cc UOP.  Kept on BiPAP. Reports his symptoms have progressed with fatigue and shortness of breath over the past week but worsened today. Admitted early February for similar admission. Diuresed, weaned off oxygen and discharged at that time. He is established with congenital heart team.    Cardiology consulted for admission to CCU.       * No surgery found *     Indwelling Lines/Drains at time of discharge:  Lines/Drains/Airways       None                   Hospital Course:  Respiratory symptoms improving after diuresis and addition of GDMT. Goal SaO2 >/= 84%.  Will d/c with on dapagliflozin and f/u Dr. Santoyo of [facility].    Goals of Care Treatment Preferences:  Code Status: Full Code    /75   Pulse 98   Temp 97.5 °F (36.4 °C) (Oral)   Resp (!) 21   Ht 5' 6" (1.676 m)   Wt 60.1 kg (132 lb 7.9 oz)   SpO2 (!) 78%   BMI 21.39 kg/m²     Physical Exam  Vitals reviewed. "   Constitutional:       General: He is not in acute distress.     Appearance: He is ill-appearing.   HENT:      Head: Normocephalic and atraumatic.   Eyes:      General: No scleral icterus.  Cardiovascular:      Rate and Rhythm: Normal rate and regular rhythm.      Heart sounds: Murmur heard.   Pulmonary:      Effort: Pulmonary effort is normal. No respiratory distress.   Abdominal:      General: There is no distension.      Palpations: Abdomen is soft.   Musculoskeletal:      Right lower leg: No edema.      Left lower leg: No edema.   Skin:     General: Skin is warm and dry.   Neurological:      Mental Status: He is alert and oriented to person, place, and time.           Consults:   Consults (From admission, onward)          Status Ordering Provider     Inpatient consult to Adult Congenital Heart Disease  Once        Provider:  (Not yet assigned)    KATHLEEN Luu Diagnostic Studies: N/A    Pending Diagnostic Studies:       Procedure Component Value Units Date/Time    Basic metabolic panel [[id number]] Collected: 04/03/25 0516    Order Status: Sent Lab Status: No result     Specimen: Blood             Final Active Diagnoses:    Diagnosis Date Noted POA    Adult congenital heart disease [Q24.9] 10/03/2019 Not Applicable    S/P Fontan procedure [Z87.74] 10/03/2019 Not Applicable      Problems Resolved During this Admission:    Diagnosis Date Noted Date Resolved POA    PRINCIPAL PROBLEM:  Acute on chronic systolic congestive heart failure [I50.23] 02/08/2025 04/04/2025 Yes     No new Assessment & Plan notes have been filed under this hospital service since the last note was generated.  Service: Cardiology      Discharged Condition: fair    Disposition: Home or Self Care    Follow Up:   Follow-up Information       Dorian Santoyo MD. Go on 4/24/2025.    Specialties: Pediatric Cardiology, Cardiology  Contact information:  [address] [facility] [id number] [phone]                            Patient Instructions:   No discharge procedures on file.  Medications:  Reconciled Home Medications:      Medication List        START taking these medications      dapagliflozin propanediol 10 mg tablet  Commonly known as: Farxiga  Take 1 tablet (10 mg total) by mouth once daily.  Start taking on: April 5, 2025            CONTINUE taking these medications      cetirizine 10 MG tablet  Commonly known as: ZYRTEC  Take 1 tablet (10 mg total) by mouth once daily.     famotidine 20 MG tablet  Commonly known as: PEPCID  Take 1 tablet (20 mg total) by mouth 2 (two) times daily.     fluticasone propionate 50 mcg/actuation nasal spray  Commonly known as: FLONASE  1 spray (50 mcg total) by Each Nostril route once daily.     furosemide 20 MG tablet  Commonly known as: LASIX  Take 1 tablet (20 mg total) by mouth once daily.     losartan 100 MG tablet  Commonly known as: COZAAR  Take 1 tablet (100 mg total) by mouth once daily.     metoprolol succinate 50 MG 24 hr tablet  Commonly known as: TOPROL-XL  Take 1 tablet (50 mg total) by mouth once daily.     spironolactone 25 MG tablet  Commonly known as: ALDACTONE  Take 1 tablet (25 mg total) by mouth once daily.     XARELTO 20 mg Tab  Generic drug: rivaroxaban  Take 1 tablet (20 mg total) by mouth daily with dinner or evening meal.              Time spent on the discharge of patient: 45 minutes    Osiel Lobato MD  Cardiology  [facility]

## 2025-04-04 NOTE — NURSING
Patient on toilet complaining of lateral pain in chest. Chris DANIELS called to bedside. Patient returned to bed with standby assist. RN obtained EKG and put 4L NC on patient. Patient feeling better after laying down and thinks it was due to straining for a BM. MD at bedside aware

## 2025-04-04 NOTE — NURSING
Pt. Continues to take oxygen on and off. Pt. Educated on the importance of wearing his oxygen. Pt. Continues to be noncompliant. MD Isael notified.

## 2025-04-04 NOTE — SUBJECTIVE & OBJECTIVE
Interval History:  Juani is looking and feeling much better today and is looking forward to being discharged. He denies all cardiac sx, no SOB, DEMPSEY, palpitations, syncope, near-syncope, swelling. He did have some CP earlier today (on his lateral left side) when he got up to go to the bathroom - but he thinks this was due to straining and he's feeling fine now.     Talked to him about the importance of continuing to take all of his meds and to be sure he's clear on discharge today as to which meds he's now taking (as heart failure has adjusted his meds while he's in-patient.) He will follow up with us in the [facility] clinic on 4/24 with vitals and an EKG. Told him to contact us if he has any questions (and if an emergency to proceed to the ED, ideally here at [facility] if possible - letting us know if he can that he's on his way.) He expressed understanding of and agreement with this plan.    Past Medical History:   Diagnosis Date    Acute decompensated heart failure 02/06/2025    History of heart surgery     Other cirrhosis of liver 11/12/2020    SVT (supraventricular tachycardia)     WPW syndrome        Past Surgical History:   Procedure Laterality Date    ANGIOGRAPHY OF AORTIC ARCH N/A 9/3/2020    Procedure: AORTOGRAM, AORTIC ARCH;  Surgeon: Stephania Crump MD;  Location: [facility] CATH LAB;  Service: Cardiology;  Laterality: N/A;    FONTAN PROCEDURE, EXTRACARDIAC      LEFT HEART CATHETERIZATION Left 9/3/2020    Procedure: Left heart cath;  Surgeon: Stephania Crump MD;  Location: [facility] CATH LAB;  Service: Cardiology;  Laterality: Left;    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 Extenuating circumstances    TRANSESOPHAGEAL ECHOCARDIOGRAPHY N/A 11/10/2022     Procedure: ECHOCARDIOGRAM, TRANSESOPHAGEAL;  Surgeon: Emeterio Hall MD;  Location: [facility] EP LAB;  Service: Cardiology;  Laterality: N/A;    TREATMENT OF CARDIAC ARRHYTHMIA N/A 2021    Procedure: CARDIOVERSION;  Surgeon: Jim Mcginnis MD;  Location: [facility] CATH LAB;  Service: Cardiology;  Laterality: N/A;       Review of patient's allergies indicates:  No Known Allergies    No current facility-administered medications on file prior to encounter.     Current Outpatient Medications on File Prior to Encounter   Medication Sig    cetirizine (ZYRTEC) 10 MG tablet Take 1 tablet (10 mg total) by mouth once daily.    famotidine (PEPCID) 20 MG tablet Take 1 tablet (20 mg total) by mouth 2 (two) times daily.    fluticasone propionate (FLONASE) 50 mcg/actuation nasal spray 1 spray (50 mcg total) by Each Nostril route once daily.    furosemide (LASIX) 20 MG tablet Take 1 tablet (20 mg total) by mouth once daily.    losartan (COZAAR) 100 MG tablet Take 1 tablet (100 mg total) by mouth once daily.    metoprolol succinate (TOPROL-XL) 50 MG 24 hr tablet Take 1 tablet (50 mg total) by mouth once daily.    rivaroxaban (XARELTO) 20 mg Tab Take 1 tablet (20 mg total) by mouth daily with dinner or evening meal.    spironolactone (ALDACTONE) 25 MG tablet Take 1 tablet (25 mg total) by mouth once daily.     Family History       Problem Relation (Age of Onset)    Heart disease Maternal Grandfather    No Known Problems Mother, Father, Sister, Brother, Maternal Aunt, Maternal Uncle, Paternal Aunt, Paternal Uncle, Maternal Grandmother, Paternal Grandmother, Paternal Grandfather          Tobacco Use    Smoking status: Former     Current packs/day: 0.00     Types: Cigarettes     Quit date:      Years since quittin.2     Passive exposure: Past    Smokeless tobacco: Never    Tobacco comments:     3-4 months    Substance and Sexual Activity    Alcohol use: No    Drug use: Yes     Types: Marijuana     Comment: Mojo today- pt reports  a while back    Sexual activity: Yes     Review of Systems   Constitutional: Negative for fever, malaise/fatigue and night sweats.   HENT:  Negative for congestion and sore throat.    Cardiovascular:  Positive for chest pain. Negative for dyspnea on exertion, irregular heartbeat, leg swelling, near-syncope, palpitations and syncope.        Had some lateral CP today when he went to the rest room, improved. Thinks it was due to straining.   Respiratory:  Negative for cough, shortness of breath and sputum production.         Improved off lisinopril   Skin:  Negative for dry skin and rash.   Musculoskeletal:  Negative for myalgias and neck pain.   Gastrointestinal:  Negative for constipation, diarrhea, nausea and vomiting.   Neurological:  Negative for dizziness, headaches and light-headedness.   Psychiatric/Behavioral:  Negative for depression. The patient is not nervous/anxious.      Objective:     Vital Signs (Most Recent):  Temp: 97.5 °F (36.4 °C) (04/04/25 1105)  Pulse: 91 (04/04/25 1205)  Resp: (!) 21 (04/04/25 1205)  BP: 119/79 (04/04/25 1205)  SpO2: (!) 79 % (04/04/25 1205) Vital Signs (24h Range):  Temp:  [97.5 °F (36.4 °C)-98 °F (36.7 °C)] 97.5 °F (36.4 °C)  Pulse:  [] 91  Resp:  [13-30] 21  SpO2:  [79 %-98 %] 79 %  BP: ()/(50-82) 119/79     Weight: 60.1 kg (132 lb 7.9 oz)  Body mass index is 21.39 kg/m².    SpO2: (!) 79 %         Intake/Output Summary (Last 24 hours) at 4/4/2025 1230  Last data filed at 4/4/2025 1146  Gross per 24 hour   Intake 118 ml   Output 2600 ml   Net -2482 ml       Lines/Drains/Airways       Peripheral Intravenous Line  Duration                  Peripheral IV - Single Lumen 03/31/25 0000 20 G Right Antecubital 4 days         Peripheral IV - Single Lumen 04/01/25 0422 20 G Distal;Left;Posterior Forearm 3 days                     Physical Exam  Constitutional:       Appearance: He is normal weight. He is not ill-appearing.   HENT:      Head: Normocephalic and atraumatic.       Nose: Nose normal. No congestion or rhinorrhea.      Mouth/Throat:      Mouth: Mucous membranes are moist.      Pharynx: Oropharynx is clear.   Eyes:      Extraocular Movements: Extraocular movements intact.      Pupils: Pupils are equal, round, and reactive to light.   Cardiovascular:      Rate and Rhythm: Regular rhythm. Tachycardia present.      Pulses: Normal pulses.      Heart sounds: No murmur heard.     Comments: S1 w/ Single S2  Pulmonary:      Effort: No respiratory distress.      Breath sounds: Normal breath sounds.   Abdominal:      General: Bowel sounds are normal.      Tenderness: There is no abdominal tenderness. There is no guarding.   Musculoskeletal:         General: No swelling. Normal range of motion.      Cervical back: Normal range of motion. No rigidity.      Right lower leg: No edema.      Left lower leg: No edema.   Lymphadenopathy:      Cervical: No cervical adenopathy.   Skin:     General: Skin is warm.      Capillary Refill: Capillary refill takes 2 to 3 seconds.      Findings: No rash.   Neurological:      General: No focal deficit present.      Mental Status: He is oriented to person, place, and time.   Psychiatric:         Mood and Affect: Mood normal.         Behavior: Behavior normal.        Significant Labs:     CMP  Sodium   Date Value Ref Range Status   04/04/2025 140 136 - 145 mmol/L Final   03/20/2025 139 136 - 145 mmol/L Final     Potassium   Date Value Ref Range Status   04/04/2025 4.1 3.5 - 5.1 mmol/L Final   03/20/2025 3.5 3.5 - 5.1 mmol/L Final     Chloride   Date Value Ref Range Status   04/04/2025 101 95 - 110 mmol/L Final   03/20/2025 104 95 - 110 mmol/L Final     CO2   Date Value Ref Range Status   04/04/2025 28 23 - 29 mmol/L Final   03/20/2025 23 23 - 29 mmol/L Final     Glucose   Date Value Ref Range Status   03/20/2025 94 70 - 110 mg/dL Final     BUN   Date Value Ref Range Status   04/04/2025 18 6 - 20 mg/dL Final     Creatinine   Date Value Ref Range Status    04/04/2025 0.9 0.5 - 1.4 mg/dL Final     Calcium   Date Value Ref Range Status   04/04/2025 8.9 8.7 - 10.5 mg/dL Final   03/20/2025 8.9 8.7 - 10.5 mg/dL Final     Total Protein   Date Value Ref Range Status   02/09/2025 6.4 6.0 - 8.4 g/dL Final     Albumin   Date Value Ref Range Status   04/04/2025 3.4 (L) 3.5 - 5.2 g/dL Final   02/09/2025 3.4 (L) 3.5 - 5.2 g/dL Final     Total Bilirubin   Date Value Ref Range Status   02/09/2025 1.6 (H) 0.1 - 1.0 mg/dL Final     Comment:     For infants and newborns, interpretation of results should be based  on gestational age, weight and in agreement with clinical  observations.    Premature Infant recommended reference ranges:  Up to 24 hours.............<8.0 mg/dL  Up to 48 hours............<12.0 mg/dL  3-5 days..................<15.0 mg/dL  6-29 days.................<15.0 mg/dL       Bilirubin Total   Date Value Ref Range Status   04/04/2025 1.9 (H) 0.1 - 1.0 mg/dL Final     Comment:     For infants and newborns, interpretation of results should be based   on gestational age, weight and in agreement with clinical   observations.    Premature Infant recommended reference ranges:   0-24 hours:  <8.0 mg/dL   24-48 hours: <12.0 mg/dL   3-5 days:    <15.0 mg/dL   6-29 days:   <15.0 mg/dL     Alkaline Phosphatase   Date Value Ref Range Status   02/09/2025 40 40 - 150 U/L Final     ALP   Date Value Ref Range Status   04/04/2025 54 40 - 150 unit/L Final     AST   Date Value Ref Range Status   04/04/2025 26 11 - 45 unit/L Final   02/09/2025 16 10 - 40 U/L Final     ALT   Date Value Ref Range Status   04/04/2025 43 10 - 44 unit/L Final   02/09/2025 20 10 - 44 U/L Final     Anion Gap   Date Value Ref Range Status   04/04/2025 11 8 - 16 mmol/L Final     eGFR   Date Value Ref Range Status   04/04/2025 >60 >60 mL/min/1.73/m2 Final     Comment:     Estimated GFR calculated using the CKD-EPI creatinine (2021) equation.   03/20/2025 >60.0 >60 mL/min/1.73 m^2 Final      Lab Results    Component Value Date    WBC 5.60 04/04/2025    HGB 15.7 04/04/2025    HCT 48.6 04/04/2025    MCV 86 04/04/2025     (L) 04/04/2025     Significant Imaging:     Holter 3/21/25: official report pending:    Patient had a min HR of 81 bpm, max HR of 141 bpm, and avg HR of 108 bpm. Predominant underlying rhythm was Sinus Rhythm. Bundle Branch Block/IVCD was present. Isolated SVEs were rare (<1.0%, 590), SVE Couplets were rare (<1.0%, 4), and no SVE Triplets were present. Isolated VEs were rare (<1.0%, 58), and no VE Couplets or VE Triplets were present.     Echo 4/1/25:      CONGENITAL CARDIAC HISTORY:     Tricuspid atresia, pulmonary atresia  and WPW.      S/P Systemic to pulmonary artery shunt - [place].       S/P Bidirectional Jesus -- [facility].      S/P Lateral tunnel Fontan procedure - [facility].      S/P EP study with trans-septal puncture November 2022      S/P Ablation of a left lateral accessory pathway November 2022 by Dr. Ferguson     SEGMENTAL CARDIAC CONNECTIONS (previously demonstrated):  Abdominal situs is solitus.  Atrial situs is normal.  Imaging consistent with tricuspid atresia.  Tricuspid atresia.  Mitral valve appears normal in structure.  LV dilation.  Ventriculoarterial concordance.  Ventricular loop: D-loop.  Cardiac position is levocardia.  Left aortic arch branching.      IMPRESSION:  Imaging consistent with diagnosis of tricuspid atresia S/P lateral tunnel Fontan - study remarkable for decreased systolic function of the single ventricle worsened since [facility] study 10/26/2023:  Very difficult to demonstrate pulmonary circulation with significant chest deformity due to median sternotomy.  Mildly dilated inferior vena cava connecting to lateral for completion of Fontan physiology with no obvious obstruction or thrombus.  Right superior vena cava identified with laminar flow and no obvious stenosis.  No evidence of obstruction at Fontan or Jesus anastomosis to the pulmonary arteries in very limited  imaging.  Pulmonary confluence and LPA unobstructed in very limited imaging.  RPA not demonstrated..  At least two pulmonary veins demonstrated returning to the left atrium with no evidence for obstruction.  There is a small right atrium with no obvious obstruction of right atrial flow to the left atrium.  There is no obvious tricuspid valve tissue present and no right ventricular cavity could be demonstrated.  There is no evidence for pulmonary valve.  At least mild dilation of the left atrium.  Mildly dilated mitral valve annulus with color Doppler demonstrating at least moderate insufficiency.  Single ventricle consistent with left ventricular morphology.  There is at least moderate dilation of the left ventricle with prominently trabeculated apical myocardium.  Left ventricular systolic function qualitatively severely compromised with ejection fraction estimated 20 -25%.  Global left ventricular longitudinal strain abnormal - peak average measured -3.6.  There is a large aortic annulus with trileaflet aortic valve, no stenosis and trivial central jet of insufficiency.  Aortic dimensions:  Sinuses of Valsalva   = 49 mm.  ST junction               = 39 mm.  Ascending aorta       = not well demonstrated.  Qualitative impression of at least mild dilation of the ascending aorta.  No evidence for coarctation.  No obvious pericardial effusion.    Echo 2/5/25:    Interpretation Summary  LIMITED STUDY FOR EVALUATION OF SINGLE VENTRICULAR FUNCTION AND VALVULAR DISEASE    CONGENITAL CARDIAC HISTORY:  Tricuspid atresia, pulmonary atresia  and WPW.  S/P Systemic to pulmonary artery shunt - [facility].  S/P Bidirectional Jesus -- [facility].  S/P Lateral tunnel Fontan procedure - [facility].  S/P EP study with trans-septal puncture November 2022  S/P Ablation of a left lateral accessory pathway November 2022 by Dr. Ferguson    SEGMENTAL CARDIAC CONNECTIONS (previously demonstrated):  Abdominal situs is solitus.  Atrial situs is  normal.  Imaging consistent with tricuspid atresia.  Tricuspid atresia.  Mitral valve appears normal in structure.  LV dilation.  Ventriculoarterial concordance.  Ventricular loop: D-loop.  Cardiac position is levocardia.  Left aortic arch branching.    IMPRESSION:  Imaging consistent with diagnosis of tricuspid atresia S/P lateral tunnel Fontan - study remarkable for severely decreased systolic function of the single ventricle, worsened compared to prior.  Severely enlarged ventricle.    Pulmonary circulation not visualized  Mildly dilated inferior vena cava - connection to Fontan not visualized.  No apparent thrombus noted in the IVC  Right superior vena cava not visualized    No evidence of obstruction at Fontan on limited imaging.  Fibrinous material noted, which may be degenerated surgical material or  congenital material, less likely to be layered thrombus. Correlate clinically.    The Jesus anastomosis to the pulmonary arteries is not visualized on this echo.    Pulmonary confluence and proximal pulmonary branches not demonstrated.  At least two pulmonary veins demonstrated returning to the left atrium with no evidence for obstruction.    No right atrium, right ventricle, or tricuspid valve visualized  There is no evidence for pulmonary valve.    At least mild dilation of the left atrium.    Mildly dilated mitral valve with color Doppler demonstrating moderate regurgitation.  Single ventricle consistent with left ventricular morphology.  There is at sever dilation of the left ventricle.  LVEDV index is 96 cc/m2    Left ventricular systolic function qualitatively severely compromised with ejection fraction estimated 10-15 %.    There is a large aortic annulus with trileaflet aortic valve, no stenosis and mild  insufficiency.  Aortic dimensions:  Sinuses of Valsalva  = 51 mm.  Ascending aorta       = not well demonstrated.    No obvious pericardial effusion.     Cath 9/3/20:  IMPRESSION:  1) Tricuspid atresia  s/p intra-atrial Fontan  2) Normal Fontan pressures (11mmHg) and wedge pressure (8mmHg).  3) Normal cardiac output and vascular resistance calculations  4) Small atrial baffle leak  5) No significant aorta-pulmonary or veno-venous collaterals  6) Occlusion of right common femoral artery        Cardiac MRI 1/2/2020  Conclusion:   SVC to RPA unobstructed (Jesus)  Extracardiac Fontan with low flow but no evidence of thrombus.  Unobstructed ASD  Tricuspid Atresia  No obstruction to the right or left PA with Q flow of 21cc through each.  Increased LV mass and volume with systemic LVEF of 42%  Dilated sinus of Valsalva 49mm  Trace AI with regurgitation fraction of 8%

## 2025-04-04 NOTE — ASSESSMENT & PLAN NOTE
Juani Reilly Jr. is a 29 y.o. male who is followed in the [facility] clinic. He recently was discharged on 2/9/25 from the hospital for newly further reduced EF noted on OSH echocardiogram and hypoxia. Prior to this he had been predominately lost to care since 10/2023. He has a history of non-compliance that has improved considerably since his 2/9/25 discharge. To summarize his diagnoses are as follow:  1. Tricuspid and pulmonary atresia initially palliated with a systemic to pulmonary artery shunt followed by a bidirectional Jesus and subsequent lateral tunnel Fontan procedure   - small atrial baffle leak along with new Fontan baffle puncture for EP study  - s/p EP study with trans-septal puncture November 2022  - reassuring hemodynamics on 2020 catheterization  2. Decreased left ventricular function  - no evidence of PLE  3. History of [id number], SVT  - status post successful ablation of a left lateral accessory pathway November 2022 by Dr. Ferguson  4. Cirrhosis  - last seen by hepatology May 2023  5. Medication noncompliance (Improved since last hospital discharge on 2/9/25)    Discussion:  1. Echocardiogram yesterday (4/1) shows worsened left ventricular function with ejection fraction likely around 20% (full report pending) and moderate mitral insufficiency.  No obvious thrombi or obstruction.  Of note, according to Dr. Hall's report, while his systolic function is still decreased, his ejection fraction has improved slightly from 10-15% to 20-25% (qualitatively)  2. His chest x-ray looks surprisingly clear given his ventricular function.  I suspect that his desaturation is more due to his right-to-left atrial baffle leak in his Fontan. His baseline sats are around 85-90%.  3. I feel strongly that he is not a transplant candidate given a long history of noncompliance, and I would also consider him a very poor candidate for mechanical circulatory support.  We are left with medical management.  I  am hoping we can get him feeling better with more aggressive goal-directed medical therapy, and I am completely on board with starting an SGLT2 inhibitor.  4. Fortunately he is now feeling better and we do not need to consider a cardiac catheterization to reassess hemodynamics and potentially close his Fontan leak.  This would improve saturations but at the cost of a higher central venous pressure, potentially decreasing cardiac output and increasing the risk of protein-losing enteropathy and plastic bronchitis.      Recommendations:  Agree with optimizing medical therapy for his heart failure.  Agree with SGLT2 inhibitor.   He is definitely at risk for thromboembolic phenomenon, and I recommend continuing his Xarelto.  I would not consider him an ECMO candidate.  He does have follow up scheduled with [facility] on 4/24 with an EKG and vitals

## 2025-04-04 NOTE — PROGRESS NOTES
[facility] - Cardiac Intensive Care  Cardiology  Progress Note    Patient Name: Juani Reilly Jr.  MRN: [id number]  Admission Date: 3/31/2025  Hospital Length of Stay: 3 days  Code Status: Full Code   Attending Physician: Kai Feliciano MD   Primary Care Physician: Jaqueline Pardo MD  Expected Discharge Date: 4/6/2025  Principal Problem:Acute on chronic systolic congestive heart failure    Subjective:     Hospital Course:   Respiratory symptoms improving after diuresis and addition of GDMT. Goal SaO2 >/= 84%.      Interval History:  Juani is looking and feeling much better today and is looking forward to being discharged. He denies all cardiac sx, no SOB, DEMPSEY, palpitations, syncope, near-syncope, swelling. He did have some CP earlier today (on his lateral left side) when he got up to go to the bathroom - but he thinks this was due to straining and he's feeling fine now.     Talked to him about the importance of continuing to take all of his meds and to be sure he's clear on discharge today as to which meds he's now taking (as heart failure has adjusted his meds while he's in-patient.) He will follow up with us in the [facility] clinic on 4/24 with vitals and an EKG. Told him to contact us if he has any questions (and if an emergency to proceed to the ED, ideally here at [facility] if possible - letting us know if he can that he's on his way.) He expressed understanding of and agreement with this plan.    Past Medical History:   Diagnosis Date    Acute decompensated heart failure 02/06/2025    History of heart surgery     Other cirrhosis of liver 11/12/2020    SVT (supraventricular tachycardia)     WPW syndrome        Past Surgical History:   Procedure Laterality Date    ANGIOGRAPHY OF AORTIC ARCH N/A 9/3/2020    Procedure: AORTOGRAM, AORTIC ARCH;  Surgeon: Stephania Crump MD;  Location: [facility] CATH LAB;  Service: Cardiology;  Laterality: N/A;    FONTAN PROCEDURE, EXTRACARDIAC      LEFT HEART  CATHETERIZATION Left 9/3/2020    Procedure: Left heart cath;  Surgeon: Stephania Crump MD;  Location: [facility] CATH LAB;  Service: Cardiology;  Laterality: Left;    RIGHT HEART CATHETERIZATION FOR CONGENITAL HEART DEFECT N/A 9/3/2020    Procedure: CATHETERIZATION, HEART, RIGHT, FOR CONGENITAL HEART DEFECT;  Surgeon: Stephania Crump MD;  Location: [facility] CATH LAB;  Service: Cardiology;  Laterality: N/A;  [facility] - needs covid test morning of. Extenuating circumstances    TRANSESOPHAGEAL ECHOCARDIOGRAPHY N/A 11/10/2022    Procedure: ECHOCARDIOGRAM, TRANSESOPHAGEAL;  Surgeon: Emeterio Hall MD;  Location: [facility] EP LAB;  Service: Cardiology;  Laterality: N/A;    TREATMENT OF CARDIAC ARRHYTHMIA N/A 1/9/2021    Procedure: CARDIOVERSION;  Surgeon: Jim Mcginnis MD;  Location: [facility] CATH LAB;  Service: Cardiology;  Laterality: N/A;       Review of patient's allergies indicates:  No Known Allergies    No current facility-administered medications on file prior to encounter.     Current Outpatient Medications on File Prior to Encounter   Medication Sig    cetirizine (ZYRTEC) 10 MG tablet Take 1 tablet (10 mg total) by mouth once daily.    famotidine (PEPCID) 20 MG tablet Take 1 tablet (20 mg total) by mouth 2 (two) times daily.    fluticasone propionate (FLONASE) 50 mcg/actuation nasal spray 1 spray (50 mcg total) by Each Nostril route once daily.    furosemide (LASIX) 20 MG tablet Take 1 tablet (20 mg total) by mouth once daily.    losartan (COZAAR) 100 MG tablet Take 1 tablet (100 mg total) by mouth once daily.    metoprolol succinate (TOPROL-XL) 50 MG 24 hr tablet Take 1 tablet (50 mg total) by mouth once daily.    rivaroxaban (XARELTO) 20 mg Tab Take 1 tablet (20 mg total) by mouth daily with dinner or evening meal.    spironolactone (ALDACTONE) 25 MG tablet Take 1 tablet (25 mg total) by mouth once daily.     Family History       Problem Relation (Age of Onset)    Heart disease Maternal Grandfather    No  Known Problems Mother, Father, Sister, Brother, Maternal Aunt, Maternal Uncle, Paternal Aunt, Paternal Uncle, Maternal Grandmother, Paternal Grandmother, Paternal Grandfather          Tobacco Use    Smoking status: Former     Current packs/day: 0.00     Types: Cigarettes     Quit date:      Years since quittin.2     Passive exposure: Past    Smokeless tobacco: Never    Tobacco comments:     3-4 months    Substance and Sexual Activity    Alcohol use: No    Drug use: Yes     Types: Marijuana     Comment: Mojo today- pt reports a while back    Sexual activity: Yes     Review of Systems   Constitutional: Negative for fever, malaise/fatigue and night sweats.   HENT:  Negative for congestion and sore throat.    Cardiovascular:  Positive for chest pain. Negative for dyspnea on exertion, irregular heartbeat, leg swelling, near-syncope, palpitations and syncope.        Had some lateral CP today when he went to the rest room, improved. Thinks it was due to straining.   Respiratory:  Negative for cough, shortness of breath and sputum production.         Improved off lisinopril   Skin:  Negative for dry skin and rash.   Musculoskeletal:  Negative for myalgias and neck pain.   Gastrointestinal:  Negative for constipation, diarrhea, nausea and vomiting.   Neurological:  Negative for dizziness, headaches and light-headedness.   Psychiatric/Behavioral:  Negative for depression. The patient is not nervous/anxious.      Objective:     Vital Signs (Most Recent):  Temp: 97.5 °F (36.4 °C) (25 1105)  Pulse: 91 (25 1205)  Resp: (!) 21 (25 1205)  BP: 119/79 (25 1205)  SpO2: (!) 79 % (25 1205) Vital Signs (24h Range):  Temp:  [97.5 °F (36.4 °C)-98 °F (36.7 °C)] 97.5 °F (36.4 °C)  Pulse:  [] 91  Resp:  [13-30] 21  SpO2:  [79 %-98 %] 79 %  BP: ()/(50-82) 119/79     Weight: 60.1 kg (132 lb 7.9 oz)  Body mass index is 21.39 kg/m².    SpO2: (!) 79 %         Intake/Output Summary (Last 24 hours)  at 4/4/2025 1230  Last data filed at 4/4/2025 1146  Gross per 24 hour   Intake 118 ml   Output 2600 ml   Net -2482 ml       Lines/Drains/Airways       Peripheral Intravenous Line  Duration                  Peripheral IV - Single Lumen 03/31/25 0000 20 G Right Antecubital 4 days         Peripheral IV - Single Lumen 04/01/25 0422 20 G Distal;Left;Posterior Forearm 3 days                     Physical Exam  Constitutional:       Appearance: He is normal weight. He is not ill-appearing.   HENT:      Head: Normocephalic and atraumatic.      Nose: Nose normal. No congestion or rhinorrhea.      Mouth/Throat:      Mouth: Mucous membranes are moist.      Pharynx: Oropharynx is clear.   Eyes:      Extraocular Movements: Extraocular movements intact.      Pupils: Pupils are equal, round, and reactive to light.   Cardiovascular:      Rate and Rhythm: Regular rhythm. Tachycardia present.      Pulses: Normal pulses.      Heart sounds: No murmur heard.     Comments: S1 w/ Single S2  Pulmonary:      Effort: No respiratory distress.      Breath sounds: Normal breath sounds.   Abdominal:      General: Bowel sounds are normal.      Tenderness: There is no abdominal tenderness. There is no guarding.   Musculoskeletal:         General: No swelling. Normal range of motion.      Cervical back: Normal range of motion. No rigidity.      Right lower leg: No edema.      Left lower leg: No edema.   Lymphadenopathy:      Cervical: No cervical adenopathy.   Skin:     General: Skin is warm.      Capillary Refill: Capillary refill takes 2 to 3 seconds.      Findings: No rash.   Neurological:      General: No focal deficit present.      Mental Status: He is oriented to person, place, and time.   Psychiatric:         Mood and Affect: Mood normal.         Behavior: Behavior normal.        Significant Labs:     [facility]  Sodium   Date Value Ref Range Status   04/04/2025 140 136 - 145 mmol/L Final   03/20/2025 139 136 - 145 mmol/L Final     Potassium   Date  Value Ref Range Status   04/04/2025 4.1 3.5 - 5.1 mmol/L Final   03/20/2025 3.5 3.5 - 5.1 mmol/L Final     Chloride   Date Value Ref Range Status   04/04/2025 101 95 - 110 mmol/L Final   03/20/2025 104 95 - 110 mmol/L Final     CO2   Date Value Ref Range Status   04/04/2025 28 23 - 29 mmol/L Final   03/20/2025 23 23 - 29 mmol/L Final     Glucose   Date Value Ref Range Status   03/20/2025 94 70 - 110 mg/dL Final     BUN   Date Value Ref Range Status   04/04/2025 18 6 - 20 mg/dL Final     Creatinine   Date Value Ref Range Status   04/04/2025 0.9 0.5 - 1.4 mg/dL Final     Calcium   Date Value Ref Range Status   04/04/2025 8.9 8.7 - 10.5 mg/dL Final   03/20/2025 8.9 8.7 - 10.5 mg/dL Final     Total Protein   Date Value Ref Range Status   02/09/2025 6.4 6.0 - 8.4 g/dL Final     Albumin   Date Value Ref Range Status   04/04/2025 3.4 (L) 3.5 - 5.2 g/dL Final   02/09/2025 3.4 (L) 3.5 - 5.2 g/dL Final     Total Bilirubin   Date Value Ref Range Status   02/09/2025 1.6 (H) 0.1 - 1.0 mg/dL Final     Comment:     For infants and newborns, interpretation of results should be based  on gestational age, weight and in agreement with clinical  observations.    Premature Infant recommended reference ranges:  Up to 24 hours.............<8.0 mg/dL  Up to 48 hours............<12.0 mg/dL  3-5 days..................<15.0 mg/dL  6-29 days.................<15.0 mg/dL       Bilirubin Total   Date Value Ref Range Status   04/04/2025 1.9 (H) 0.1 - 1.0 mg/dL Final     Comment:     For infants and newborns, interpretation of results should be based   on gestational age, weight and in agreement with clinical   observations.    Premature Infant recommended reference ranges:   0-24 hours:  <8.0 mg/dL   24-48 hours: <12.0 mg/dL   3-5 days:    <15.0 mg/dL   6-29 days:   <15.0 mg/dL     Alkaline Phosphatase   Date Value Ref Range Status   02/09/2025 40 40 - 150 U/L Final     ALP   Date Value Ref Range Status   04/04/2025 54 40 - 150 unit/L Final      AST   Date Value Ref Range Status   04/04/2025 26 11 - 45 unit/L Final   02/09/2025 16 10 - 40 U/L Final     ALT   Date Value Ref Range Status   04/04/2025 43 10 - 44 unit/L Final   02/09/2025 20 10 - 44 U/L Final     Anion Gap   Date Value Ref Range Status   04/04/2025 11 8 - 16 mmol/L Final     eGFR   Date Value Ref Range Status   04/04/2025 >60 >60 mL/min/1.73/m2 Final     Comment:     Estimated GFR calculated using the CKD-EPI creatinine (2021) equation.   03/20/2025 >60.0 >60 mL/min/1.73 m^2 Final      Lab Results   Component Value Date    WBC 5.60 04/04/2025    HGB 15.7 04/04/2025    HCT 48.6 04/04/2025    MCV 86 04/04/2025     (L) 04/04/2025     Significant Imaging:     Holter 3/21/25: official report pending:    Patient had a min HR of 81 bpm, max HR of 141 bpm, and avg HR of 108 bpm. Predominant underlying rhythm was Sinus Rhythm. Bundle Branch Block/IVCD was present. Isolated SVEs were rare (<1.0%, 590), SVE Couplets were rare (<1.0%, 4), and no SVE Triplets were present. Isolated VEs were rare (<1.0%, 58), and no VE Couplets or VE Triplets were present.     Echo 4/1/25:      CONGENITAL CARDIAC HISTORY:     Tricuspid atresia, pulmonary atresia  and WPW.      S/P Systemic to pulmonary artery shunt - [facility].       [facility]      S/P Lateral tunnel Fontan procedure - [facility].      S/P EP study with trans-septal puncture November 2022      S/P Ablation of a left lateral accessory pathway November 2022 by Dr. Ferguson     SEGMENTAL CARDIAC CONNECTIONS (previously demonstrated):  Abdominal situs is solitus.  Atrial situs is normal.  Imaging consistent with tricuspid atresia.  Tricuspid atresia.  Mitral valve appears normal in structure.  LV dilation.  Ventriculoarterial concordance.  Ventricular loop: D-loop.  Cardiac position is levocardia.  Left aortic arch branching.      IMPRESSION:  Imaging consistent with diagnosis of tricuspid atresia S/P lateral tunnel Fontan - study  remarkable for decreased systolic function of the single ventricle worsened since [facility] study 10/26/2023:  Very difficult to demonstrate pulmonary circulation with significant chest deformity due to median sternotomy.  Mildly dilated inferior vena cava connecting to lateral for completion of Fontan physiology with no obvious obstruction or thrombus.  Right superior vena cava identified with laminar flow and no obvious stenosis.  No evidence of obstruction at Fontan or Jesus anastomosis to the pulmonary arteries in very limited imaging.  Pulmonary confluence and LPA unobstructed in very limited imaging.  RPA not demonstrated..  At least two pulmonary veins demonstrated returning to the left atrium with no evidence for obstruction.  There is a small right atrium with no obvious obstruction of right atrial flow to the left atrium.  There is no obvious tricuspid valve tissue present and no right ventricular cavity could be demonstrated.  There is no evidence for pulmonary valve.  At least mild dilation of the left atrium.  Mildly dilated mitral valve annulus with color Doppler demonstrating at least moderate insufficiency.  Single ventricle consistent with left ventricular morphology.  There is at least moderate dilation of the left ventricle with prominently trabeculated apical myocardium.  Left ventricular systolic function qualitatively severely compromised with ejection fraction estimated 20 -25%.  Global left ventricular longitudinal strain abnormal - peak average measured -3.6.  There is a large aortic annulus with trileaflet aortic valve, no stenosis and trivial central jet of insufficiency.  Aortic dimensions:  Sinuses of Valsalva   = 49 mm.  ST junction               = 39 mm.  Ascending aorta       = not well demonstrated.  Qualitative impression of at least mild dilation of the ascending aorta.  No evidence for coarctation.  No obvious pericardial effusion.    Echo 2/5/25:    Interpretation Summary  LIMITED  STUDY FOR EVALUATION OF SINGLE VENTRICULAR FUNCTION AND VALVULAR DISEASE    CONGENITAL CARDIAC HISTORY:  Tricuspid atresia, pulmonary atresia  and WPW.  S/P Systemic to pulmonary artery shunt - [facility].  S/P Bidirectional Jesus -- [facility].  S/P Lateral tunnel Fontan procedure - [facility].  S/P EP study with trans-septal puncture November 2022  S/P Ablation of a left lateral accessory pathway November 2022 by Dr. Ferguson    SEGMENTAL CARDIAC CONNECTIONS (previously demonstrated):  Abdominal situs is solitus.  Atrial situs is normal.  Imaging consistent with tricuspid atresia.  Tricuspid atresia.  Mitral valve appears normal in structure.  LV dilation.  Ventriculoarterial concordance.  Ventricular loop: D-loop.  Cardiac position is levocardia.  Left aortic arch branching.    IMPRESSION:  Imaging consistent with diagnosis of tricuspid atresia S/P lateral tunnel Fontan - study remarkable for severely decreased systolic function of the single ventricle, worsened compared to prior.  Severely enlarged ventricle.    Pulmonary circulation not visualized  Mildly dilated inferior vena cava - connection to Fontan not visualized.  No apparent thrombus noted in the IVC  Right superior vena cava not visualized    No evidence of obstruction at Fontan on limited imaging.  Fibrinous material noted, which may be degenerated surgical material or  congenital material, less likely to be layered thrombus. Correlate clinically.    The Jesus anastomosis to the pulmonary arteries is not visualized on this echo.    Pulmonary confluence and proximal pulmonary branches not demonstrated.  At least two pulmonary veins demonstrated returning to the left atrium with no evidence for obstruction.    No right atrium, right ventricle, or tricuspid valve visualized  There is no evidence for pulmonary valve.    At least mild dilation of the left atrium.    Mildly dilated mitral valve with color Doppler demonstrating moderate regurgitation.  Single ventricle  consistent with left ventricular morphology.  There is at sever dilation of the left ventricle.  LVEDV index is 96 cc/m2    Left ventricular systolic function qualitatively severely compromised with ejection fraction estimated 10-15 %.    There is a large aortic annulus with trileaflet aortic valve, no stenosis and mild  insufficiency.  Aortic dimensions:  Sinuses of Valsalva  = 51 mm.  Ascending aorta       = not well demonstrated.    No obvious pericardial effusion.     Cath 9/3/20:  IMPRESSION:  1) Tricuspid atresia s/p intra-atrial Fontan  2) Normal Fontan pressures (11mmHg) and wedge pressure (8mmHg).  3) Normal cardiac output and vascular resistance calculations  4) Small atrial baffle leak  5) No significant aorta-pulmonary or veno-venous collaterals  6) Occlusion of right common femoral artery        Cardiac MRI 1/2/2020  Conclusion:   SVC to RPA unobstructed (Jesus)  Extracardiac Fontan with low flow but no evidence of thrombus.  Unobstructed ASD  Tricuspid Atresia  No obstruction to the right or left PA with Q flow of 21cc through each.  Increased LV mass and volume with systemic LVEF of 42%  Dilated sinus of Valsalva 49mm  Trace AI with regurgitation fraction of 8%   Assessment and Plan:     * Acute on chronic systolic congestive heart failure  Patient has Combined Systolic and Diastolic heart failure that is Acute on chronic.   Recent Labs     03/31/25  2224   BNP 1,317*     Current Heart Failure Medications  metoprolol succinate (TOPROL-XL) 24 hr tablet 50 mg, Daily, Oral  spironolactone tablet 25 mg, Daily, Oral  dapagliflozin propanediol (Farxiga) tablet 10 mg, Daily, Oral  losartan tablet 100 mg, Daily, Oral    Plan  - Monitor strict I&Os and daily weights.    - Place on telemetry  - Low sodium diet  - Patient with congenital heart disease status-post fontan procedure presenting with dyspnea and noted drop in EF to 10-15% from 30-35% previously, now 20-25%  - Adult congenital cardiology consulted;  appreciate recommendations  - losartan to 100mg , if BP elevated will increase to 150mg  - started dapagliflozin 10mg daily  - continue Toprol-XL 50mg  - continue spironolactone 25mg  - will potentially transition to oral diuretics today    Adult congenital heart disease  Juani Reilly Jr. is a 29 y.o. male who is followed in the [facility] clinic. He recently was discharged on 2/9/25 from the hospital for newly further reduced EF noted on OSH echocardiogram and hypoxia. Prior to this he had been predominately lost to care since 10/2023. He has a history of non-compliance that has improved considerably since his 2/9/25 discharge. To summarize his diagnoses are as follow:  1. Tricuspid and pulmonary atresia initially palliated with a systemic to pulmonary artery shunt followed by a bidirectional Jesus and subsequent lateral tunnel Fontan procedure   - small atrial baffle leak along with new Fontan baffle puncture for EP study  - s/p EP study with trans-septal puncture November 2022  - reassuring hemodynamics on 2020 catheterization  2. Decreased left ventricular function  - no evidence of PLE  3. History of Vincent, SVT  - status post successful ablation of a left lateral accessory pathway November 2022 by Dr. Ferguson  4. Cirrhosis  - last seen by hepatology May 2023  5. Medication noncompliance (Improved since last hospital discharge on 2/9/25)    Discussion:  1. Echocardiogram yesterday (4/1) shows worsened left ventricular function with ejection fraction likely around 20% (full report pending) and moderate mitral insufficiency.  No obvious thrombi or obstruction.  Of note, according to Dr. Hall's report, while his systolic function is still decreased, his ejection fraction has improved slightly from 10-15% to 20-25% (qualitatively)  2. His chest x-ray looks surprisingly clear given his ventricular function.  I suspect that his desaturation is more due to his right-to-left atrial baffle leak in  his Fontan. His baseline sats are around 85-90%.  3. I feel strongly that he is not a transplant candidate given a long history of noncompliance, and I would also consider him a very poor candidate for mechanical circulatory support.  We are left with medical management.  I am hoping we can get him feeling better with more aggressive goal-directed medical therapy, and I am completely on board with starting an SGLT2 inhibitor.  4. Fortunately he is now feeling better and we do not need to consider a cardiac catheterization to reassess hemodynamics and potentially close his Fontan leak.  This would improve saturations but at the cost of a higher central venous pressure, potentially decreasing cardiac output and increasing the risk of protein-losing enteropathy and plastic bronchitis.      Recommendations:  Agree with optimizing medical therapy for his heart failure.  Agree with SGLT2 inhibitor.   He is definitely at risk for thromboembolic phenomenon, and I recommend continuing his Xarelto.  I would not consider him an ECMO candidate.  He does have follow up scheduled with [facility] on 4/24 with an EKG and vitals        VTE Risk Mitigation (From admission, onward)           Ordered     rivaroxaban tablet 20 mg  With dinner         04/01/25 0026     IP VTE HIGH RISK PATIENT  Once         04/01/25 0026     Place sequential compression device  Until discontinued         04/01/25 0026                    Princess Montesinos PA-C  Cardiology  [facility] - Cardiac Intensive Care

## 2025-04-04 NOTE — PLAN OF CARE
[facility] - Cardiac Int[facility] Care  Discharge Final Note    Primary Care Provider: Jaqueline Pardo MD    Expected Discharge Date: 4/4/2025    Final Discharge Note (most recent)       Final Note - 04/04/25 1442          Final Note    Assessment Type Final Discharge Note     Anticipated Discharge Disposition Home or Self Care                 SW met with pt at bedside who voiced no questions or concerns at this time.      Edyta Jennings LMSW  [facility]  [id number]    Future Appointments   Date Time Provider Department Center   4/24/2025 12:45 PM EKG, APPT [facility] EKG [facility]   4/24/2025  1:00 PM Princess Montesinos PA-C [facility] CONHR [facility]   4/25/2025 11:00 AM Jaqueline Pardo MD [facility]       Contact Info       Dorian Santoyo MD   Specialty: Pediatric Cardiology, Cardiology    [address]  [facility] [address]   Phone: [phone]       Next Steps: Go on 4/24/2025

## 2025-04-04 NOTE — NURSING
Patient discharged home. RN took patient to pharmacy in wheelchair to meet family and  medications. AVS packet reviewed with and given to patient.

## 2025-04-04 NOTE — PLAN OF CARE
CICU DAILY GOALS     Pt. Did not comply with wearing his oxygen. Pt. Educated and MD notified.     A: Awake    RASS: Goal -    Actual -     Restraint necessity:    B: Breath   SBT: Pass   C: Coordinate A & B, analgesics/sedatives   Pain: managed    SAT: Pass  D: Delirium   CAM-ICU:    E: Early(intubated/ Progressive (non-intubated) Mobility   MOVE Screen: Pass   Activity: Activity Management: Rolling - L1  FAS: Feeding/Nutrition   Diet order: Diet/Nutrition Received: low saturated fat/low cholesterol, 2 gram sodium,   Fluid restriction:       T: Thrombus   DVT prophylaxis: VTE Core Measure: (SCDs) Sequential compression device initiated/maintained  H: HOB Elevation   Head of Bed (HOB) Positioning: HOB elevated  U: Ulcer Prophylaxis   GI: yes  G: Glucose control   managed    S: Skin   Bathing/Skin Care: bath, complete (04/03/25 7531)  Wounds: No  Wound care consulted: No  B: Bowel Function   no issues   I: Indwelling Catheters   Parry necessity:     CVC necessity: No  D: De-escalation Antibx   No  Plan for the day   discharge  Family/Goals of care/Code Status   Code Status: Full Code     No acute events throughout day, VS and assessment per flow sheet, patient progressing towards goals as tolerated, plan of care reviewed with Juani Reilly Jr. and family, all concerns addressed, will continue to monitor.

## 2025-04-06 LAB
BACTERIA BLD CULT: NORMAL
BACTERIA BLD CULT: NORMAL

## 2025-04-07 ENCOUNTER — PATIENT MESSAGE (OUTPATIENT)
Dept: CARDIOLOGY | Facility: CLINIC | Age: 30
End: 2025-04-07
Payer: MEDICAID

## 2025-04-16 ENCOUNTER — TELEPHONE (OUTPATIENT)
Dept: PEDIATRIC CARDIOLOGY | Facility: HOSPITAL | Age: 30
End: 2025-04-16
Payer: MEDICAID

## 2025-04-16 NOTE — TELEPHONE ENCOUNTER
I called Mr. Reilly regarding his heart monitor results(3/20/25). I informed Mr. Reilly that Dr. Santoyo sent him a portal message with his results, but he has not logged into his portal since 2023. I told Mr. Reilly that per Dr. Santoyo his heart monitor results are normal. He had no further questions or concerns. Thank you.      ----- Message from Princess Montesinos PA-C sent at 4/14/2025  3:06 PM CDT -----  Regarding: RE: Holter Monitor  Absolutely - that would be a huge help!Princess  ----- Message -----  From: Izabella Anthony  Sent: 4/11/2025  11:10 AM CDT  To: Princess Montesinos PA-C  Subject: Holter Monitor                                   Good morning Princess, so I noticed that this patient has not logged into his portal since 2023 and Dr. Santoyo sent a portal message regarding his holter results. I would like to call and give him his results because I don't think he will see the portal message. Is this ok?

## 2025-04-24 ENCOUNTER — HOSPITAL ENCOUNTER (OUTPATIENT)
Dept: CARDIOLOGY | Facility: CLINIC | Age: 30
Discharge: HOME | End: 2025-04-24
Payer: MEDICAID

## 2025-04-24 ENCOUNTER — OFFICE VISIT (OUTPATIENT)
Dept: CARDIOLOGY | Facility: CLINIC | Age: 30
End: 2025-04-24
Payer: MEDICAID

## 2025-04-24 ENCOUNTER — TELEPHONE (OUTPATIENT)
Dept: TRANSPLANT | Facility: CLINIC | Age: 30
End: 2025-04-24
Payer: MEDICAID

## 2025-04-24 VITALS
OXYGEN SATURATION: 83 % | SYSTOLIC BLOOD PRESSURE: 114 MMHG | RESPIRATION RATE: 22 BRPM | BODY MASS INDEX: 21.69 KG/M2 | WEIGHT: 134.94 LBS | HEART RATE: 102 BPM | DIASTOLIC BLOOD PRESSURE: 73 MMHG | HEIGHT: 66 IN

## 2025-04-24 DIAGNOSIS — Q20.4 SINGLE VENTRICLE WITH TRICUSPID ATRESIA: ICD-10-CM

## 2025-04-24 DIAGNOSIS — I48.92 ATRIAL FLUTTER, UNSPECIFIED TYPE: ICD-10-CM

## 2025-04-24 DIAGNOSIS — Q22.4 SINGLE VENTRICLE WITH TRICUSPID ATRESIA: ICD-10-CM

## 2025-04-24 DIAGNOSIS — R09.02 HYPOXIA: ICD-10-CM

## 2025-04-24 DIAGNOSIS — I50.9 CONGESTIVE HEART FAILURE, UNSPECIFIED HF CHRONICITY, UNSPECIFIED HEART FAILURE TYPE: Primary | ICD-10-CM

## 2025-04-24 DIAGNOSIS — Q24.9 ADULT CONGENITAL HEART DISEASE: ICD-10-CM

## 2025-04-24 DIAGNOSIS — Z87.74 S/P FONTAN PROCEDURE: ICD-10-CM

## 2025-04-24 DIAGNOSIS — I50.20 HEART FAILURE WITH REDUCED EJECTION FRACTION: Primary | ICD-10-CM

## 2025-04-24 LAB
OHS QRS DURATION: 98 MS
OHS QTC CALCULATION: 492 MS

## 2025-04-24 PROCEDURE — 99214 OFFICE O/P EST MOD 30 MIN: CPT | Mod: S$PBB,,,

## 2025-04-24 PROCEDURE — 1111F DSCHRG MED/CURRENT MED MERGE: CPT | Mod: CPTII,,,

## 2025-04-24 PROCEDURE — 3008F BODY MASS INDEX DOCD: CPT | Mod: CPTII,,,

## 2025-04-24 PROCEDURE — 93010 ELECTROCARDIOGRAM REPORT: CPT | Mod: S$PBB,,, | Performed by: INTERNAL MEDICINE

## 2025-04-24 PROCEDURE — 3078F DIAST BP <80 MM HG: CPT | Mod: CPTII,,,

## 2025-04-24 PROCEDURE — 93005 ELECTROCARDIOGRAM TRACING: CPT | Mod: PBBFAC | Performed by: INTERNAL MEDICINE

## 2025-04-24 PROCEDURE — 99213 OFFICE O/P EST LOW 20 MIN: CPT | Mod: PBBFAC,25

## 2025-04-24 PROCEDURE — 4010F ACE/ARB THERAPY RXD/TAKEN: CPT | Mod: CPTII,,,

## 2025-04-24 PROCEDURE — 3074F SYST BP LT 130 MM HG: CPT | Mod: CPTII,,,

## 2025-04-24 PROCEDURE — 99999 PR PBB SHADOW E&M-EST. PATIENT-LVL III: CPT | Mod: PBBFAC,,,

## 2025-04-24 NOTE — PROGRESS NOTES
2025     re:Juani Reilly Jr.  :1995    Dr. Abebe Ferguson    [facility]     Dear Dr. Ferguson:    Juani Reilly Jr. is a 30 y.o. male seen in the [facility] clinic today for a blood pressure check/EKG and as post-discharge follow up. In the recent months, he has been hospitalized twice, with his most recent discharge on 25. To summarize his diagnoses are as follow:  1. 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 Decreased left ventricular function  - no evidence of PLE  3. History of Jojgh-Psrpnsuwn-Sanwn, SVT  - status post successful ablation of a left lateral accessory pathway 2022 by Dr. Ferguson  4. Cirrhosis  - last seen by hepatology May 2023    To summarize, my recommendations are as follows:  SBE prophylaxis is definitely indicated  Continue on losartan 100 mg daily. (Had considered in past increasing to 150, but his BP looks great today at 114/73)  Continue metoprolol 50 mg daily (recently increased)  Continue all other cardiac meds. (We called his grandmother and he is taking all of the following: Farxiga 10 mg (newly added at last hospitalization by HF team), xarelto 20 mg, lasix 20 mg (considered increasing to 40 mg in past but he looks euvolemic today on physical exam)  He thought that he should stop taking his spironalactone 25 mg - but clarified today that he should be taking this as well.  Of note, in the past he has been non-compliant with meds, but he has really been doing a good job with this since first going to the hospital in February  Will need to schedule follow up with hepatology at a future visit (last  visit was 2023). His recent imaging (in the hospital) and blood work look good from a liver standpoint, so will hold off on this for now.  Will refer him today out-patient to our heart failure team to ensure his medical management is optimized  We are going to schedule him for  a diagnostic cath (with a possible intervention if possible and this might improve his hypoxia)  Need to order an AFP the next time we get bloodwork    Discussion:  He had been lost to care after his last clinic visit in 2023. Over the past few months, he's been hospitalized twice: first on 2/8/25 for reduced EF on echocardiogram and hypoxia and then second on 3/31/25 for respiratory distress requiring BiPAP.  we have worked on optimizing his medical management and his BP has really improved (at first f/u appt 141/99, second was 133/99, and today 114/73).He is also slightly less tachycardic today with a BP of 96 BPM on EKG (last two visits at 118 and 117).    His most recent in-patient echo demonstrated left ventricular systolic function qualitatively severely compromised with ejection fraction estimated at 20 -25% and moderate mitral insufficiency with no obvious thrombi or obstruction. He has note been in an arrhythmia on recent tests (a potentially fixable cause of worsening function) and rather he appears to be in sinus.  His in-patient chest x-ray was surprisingly clear given his ventricular function. His desaturation is more likely due to his right-to-left atrial baffle leak.     In the recent months we have focused on medical management, hoping we can get him feeling better with more aggressive goal-directed medical therapy. The heart failure team recently started him on an SGLT2 inhibitor or Farxiga. We will refer him today to out-patient heart failure and continue to optimize his meds. Will also refer him today for a cardiac catheterization, (considering that his sats have consistently trended down in recent months, despite his  "compliance with medication.). Purpose of the cardiac catheterization would be to reassess hemodynamics and potentially close his Fontan leak. A concern with this, however, is that this may improve saturations but at the cost of a higher central venous pressure, potentially decreasing cardiac output and increasing the risk of protein-losing enteropathy and plastic bronchitis.  However, as we have not been able to get him feeling better, (see HPI below) this seems the next appropriate step to try.    History of present illness:  He reports today that he is not feeling better and he is feeling disheartened by this. He has been compliant with his meds over the last few months. Since his most recent discharge, he reports that he wakes up in the morning with SOB. This improves slightly throughout the day if he rests but increases with exertion. He says that he feels tired all the time and doesn't have any energy to do anything. He also says that he can't lay flat to rest and has to sit up to "get enough oxygen". Reports needing 3 pillows to sleep at night. Some palpitations accompanied by SOB. Some CP but he says this has been his normal for years.    The review of systems is as noted above. It is otherwise negative for other symptoms related to the general, neurological, psychiatric, endocrine, gastrointestinal, genitourinary, respiratory, dermatologic, musculoskeletal, hematologic, and immunologic systems.    Past Medical History:   Diagnosis Date    Acute decompensated heart failure 02/06/2025    History of heart surgery     Other cirrhosis of liver 11/12/2020    SVT (supraventricular tachycardia)     WPW syndrome      Past Surgical History:   Procedure Laterality Date    ANGIOGRAPHY OF AORTIC ARCH N/A 9/3/2020    Procedure: AORTOGRAM, AORTIC ARCH;  Surgeon: Stephania Crump MD;  Location: [facility] CATH LAB;  Service: Cardiology;  Laterality: N/A;    FONTAN PROCEDURE, EXTRACARDIAC      LEFT HEART CATHETERIZATION Left " 9/3/2020    Procedure: Left heart cath;  Surgeon: Stephania Crump MD;  Location: [facility] CATH LAB;  Service: Cardiology;  Laterality: Left;    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 Extenuating circumstances    TRANSESOPHAGEAL ECHOCARDIOGRAPHY N/A 11/10/2022    Procedure: ECHOCARDIOGRAM, TRANSESOPHAGEAL;  Surgeon: Emeterio Hall MD;  Location: [facility] EP LAB;  Service: Cardiology;  Laterality: N/A;    TREATMENT OF CARDIAC ARRHYTHMIA N/A 2021    Procedure: CARDIOVERSION;  Surgeon: Jim Mcginnis MD;  Location: [facility] CATH LAB;  Service: Cardiology;  Laterality: N/A;     Family History   Problem Relation Name Age of Onset    No Known Problems Mother      No Known Problems Father      No Known Problems Sister 3     No Known Problems Brother      No Known Problems Maternal Aunt      No Known Problems Maternal Uncle      No Known Problems Paternal Aunt      No Known Problems Paternal Uncle      No Known Problems Maternal Grandmother      Heart disease Maternal Grandfather      No Known Problems Paternal Grandmother      No Known Problems Paternal Grandfather      Anemia Neg Hx      Arrhythmia Neg Hx      Asthma Neg Hx      Clotting disorder Neg Hx      Fainting Neg Hx      Heart attack Neg Hx      Heart failure Neg Hx      Hyperlipidemia Neg Hx      Hypertension Neg Hx      Stroke Neg Hx      Atrial Septal Defect Neg Hx       Social History     Socioeconomic History    Marital status: Single   Tobacco Use    Smoking status: Former     Current packs/day: 0.00     Types: Cigarettes     Quit date:      Years since quittin.3     Passive exposure: Past    Smokeless tobacco: Never    Tobacco comments:     3-4 months    Substance and Sexual Activity    Alcohol use:  No    Drug use: Yes     Types: Marijuana     Comment: Odalys today- pt reports a while back    Sexual activity: Yes     Social Drivers of Health     Financial Resource Strain: Low Risk  (4/1/2025)    Overall Financial Resource Strain (CARDIA)     Difficulty of Paying Living Expenses: Not very hard   Food Insecurity: No Food Insecurity (4/1/2025)    Hunger Vital Sign     Worried About Running Out of Food in the Last Year: Never true     Ran Out of Food in the Last Year: Never true   Transportation Needs: No Transportation Needs (4/1/2025)    PRAPARE - Transportation     Lack of Transportation (Medical): No     Lack of Transportation (Non-Medical): No   Physical Activity: Inactive (2/9/2025)    Exercise Vital Sign     Days of Exercise per Week: 0 days     Minutes of Exercise per Session: 0 min   Stress: No Stress Concern Present (4/1/2025)    [employer] - Occupational Stress Questionnaire     Feeling of Stress : Only a little   Housing Stability: Low Risk  (4/1/2025)    Housing Stability Vital Sign     Unable to Pay for Housing in the Last Year: No     Homeless in the Last Year: No       Current Outpatient Medications:     cetirizine (ZYRTEC) 10 MG tablet, Take 1 tablet (10 mg total) by mouth once daily., Disp: 90 tablet, Rfl: 3    dapagliflozin propanediol (FARXIGA) 10 mg tablet, Take 1 tablet (10 mg total) by mouth once daily., Disp: 90 tablet, Rfl: 3    famotidine (PEPCID) 20 MG tablet, Take 1 tablet (20 mg total) by mouth 2 (two) times daily., Disp: 60 tablet, Rfl: 3    furosemide (LASIX) 20 MG tablet, Take 1 tablet (20 mg total) by mouth once daily., Disp: 90 tablet, Rfl: 3    losartan (COZAAR) 100 MG tablet, Take 1 tablet (100 mg total) by mouth once daily., Disp: 90 tablet, Rfl: 3    metoprolol succinate (TOPROL-XL) 50 MG 24 hr tablet, Take 1 tablet (50 mg total) by mouth once daily., Disp: 90 tablet, Rfl: 3    rivaroxaban (XARELTO) 20 mg Tab, Take 1 tablet (20 mg total) by mouth  "daily with dinner or evening meal., Disp: 180 tablet, Rfl: 1    spironolactone (ALDACTONE) 25 MG tablet, Take 1 tablet (25 mg total) by mouth once daily., Disp: 30 tablet, Rfl: 11      Review of patient's allergies indicates:  No Known Allergies     Vitals:    04/24/25 1318   BP: 114/73   BP Location: Right arm   Patient Position: Sitting   Pulse: 102   Resp: (!) 22   SpO2: (!) 83%  Comment: baseline sats 80-85% per patient   Weight: 61.2 kg (134 lb 14.7 oz)   Height: 5' 6" (1.676 m)     GENERAL: Awake, well-developed well-nourished, no apparent distress. Non-cyanotic.  HEENT: Mucous membranes moist and pink, normocephalic atraumatic, no cranial or carotid bruits, sclera anicteric, EOMI  NECK: No jugular venous distention, no thyromegaly, no lymphadenopathy  CHEST: Good air movement, clear to auscultation bilaterally. Well healed sternotomy.  CARDIOVASCULAR: Quiet precordium, regular rate and rhythm, S1 with single S2, 2/6 systolic murmur heard best at the apex  ABDOMEN: Soft, nontender nondistended, no hepatosplenomegaly, no aortic bruits.  No tenderness.  EXTREMITIES: Warm well perfused, 2+ radial/femoral/pedal pulses, capillary refill 2 seconds, no cyanosis or edema  NEURO: Alert and oriented, cooperative with exam, face symmetric, moves all extremities well.    EKG 4/24/25:  Normal sinus rhythm 96 BPM    LVH  with QRS widening and repolarization abnormality    When compared with ECG of 04-Apr-2025 12:06,  ST less depressed in Inferior leads  ST is less depressed in  Anterior-lateral leads  T wave inversion less evident in Lateral leads     Echo 3/31/25    Interpretation Summary  CONGENITAL CARDIAC HISTORY:  Tricuspid atresia, pulmonary atresia  and WPW.  S/P Systemic to pulmonary artery shunt - [facility].  S/P Bidirectional Jesus -- [facility].  S/P Lateral tunnel Fontan procedure - [facility].  S/P EP study with trans-septal puncture November 2022  S/P Ablation of a left lateral accessory pathway November 2022 by Dr." Marty    SEGMENTAL CARDIAC CONNECTIONS (previously demonstrated):  Abdominal situs is solitus.  Atrial situs is normal.  Imaging consistent with tricuspid atresia.  Tricuspid atresia.  Mitral valve appears normal in structure.  LV dilation.  Ventriculoarterial concordance.  Ventricular loop: D-loop.  Cardiac position is levocardia.  Left aortic arch branching.    IMPRESSION:  Imaging consistent with diagnosis of tricuspid atresia S/P lateral tunnel Fontan - study remarkable for decreased systolic function of the single ventricle worsened since [facility] study 10/26/2023:  Very difficult to demonstrate pulmonary circulation with significant chest deformity due to median sternotomy.  Mildly dilated inferior vena cava connecting to lateral for completion of Fontan physiology with no obvious obstruction or thrombus.  Right superior vena cava identified with laminar flow and no obvious stenosis.  No evidence of obstruction at Fontan or Jesus anastomosis to the pulmonary arteries in very limited imaging.  Pulmonary confluence and LPA unobstructed in very limited imaging.  RPA not demonstrated..  At least two pulmonary veins demonstrated returning to the left atrium with no evidence for obstruction.  There is a small right atrium with no obvious obstruction of right atrial flow to the left atrium.  There is no obvious tricuspid valve tissue present and no right ventricular cavity could be demonstrated.  There is no evidence for pulmonary valve.  At least mild dilation of the left atrium.  Mildly dilated mitral valve annulus with color Doppler demonstrating at least moderate insufficiency.  Single ventricle consistent with left ventricular morphology.  There is at least moderate dilation of the left ventricle with prominently trabeculated apical myocardium.  Left ventricular systolic function qualitatively severely compromised with ejection fraction estimated 20 -25%.  Global left ventricular longitudinal strain abnormal - peak  average measured -3.6.  There is a large aortic annulus with trileaflet aortic valve, no stenosis and trivial central jet of insufficiency.  Aortic dimensions:  Sinuses of Valsalva   = 49 mm.  ST junction               = 39 mm.  Ascending aorta       = not well demonstrated.  Qualitative impression of at least mild dilation of the ascending aorta.  No evidence for coarctation.  No obvious pericardial effusion.     Cath 9/3/20:  IMPRESSION:  1) Tricuspid atresia s/p intra-atrial Fontan  2) Normal Fontan pressures (11mmHg) and wedge pressure (8mmHg).  3) Normal cardiac output and vascular resistance calculations  4) Small atrial baffle leak  5) No significant aorta-pulmonary or veno-venous collaterals  6) Occlusion of right common femoral artery       Cardiac MRI 1/2/2020  Conclusion:   SVC to RPA unobstructed (Jesus)  Extracardiac Fontan with low flow but no evidence of thrombus.  Unobstructed ASD  Tricuspid Atresia  No obstruction to the right or left PA with Q flow of 21cc through each.  Increased LV mass and volume with systemic LVEF of 42%  Dilated sinus of Valsalva 49mm  Trace AI with regurgitation fraction of 8%     Lab Results   Component Value Date    WBC 5.60 04/04/2025    HGB 15.7 04/04/2025    HCT 48.6 04/04/2025    MCV 86 04/04/2025     (L) 04/04/2025       CMP  Sodium   Date Value Ref Range Status   04/04/2025 140 136 - 145 mmol/L Final   03/20/2025 139 136 - 145 mmol/L Final     Potassium   Date Value Ref Range Status   04/04/2025 4.1 3.5 - 5.1 mmol/L Final   03/20/2025 3.5 3.5 - 5.1 mmol/L Final     Chloride   Date Value Ref Range Status   04/04/2025 101 95 - 110 mmol/L Final   03/20/2025 104 95 - 110 mmol/L Final     CO2   Date Value Ref Range Status   04/04/2025 28 23 - 29 mmol/L Final   03/20/2025 23 23 - 29 mmol/L Final     Glucose   Date Value Ref Range Status   03/20/2025 94 70 - 110 mg/dL Final     BUN   Date Value Ref Range Status   04/04/2025 18 6 - 20 mg/dL Final     Creatinine   Date  Value Ref Range Status   04/04/2025 0.9 0.5 - 1.4 mg/dL Final     Calcium   Date Value Ref Range Status   04/04/2025 8.9 8.7 - 10.5 mg/dL Final   03/20/2025 8.9 8.7 - 10.5 mg/dL Final     Total Protein   Date Value Ref Range Status   02/09/2025 6.4 6.0 - 8.4 g/dL Final     Albumin   Date Value Ref Range Status   04/04/2025 3.4 (L) 3.5 - 5.2 g/dL Final   02/09/2025 3.4 (L) 3.5 - 5.2 g/dL Final     Total Bilirubin   Date Value Ref Range Status   02/09/2025 1.6 (H) 0.1 - 1.0 mg/dL Final     Comment:     For infants and newborns, interpretation of results should be based  on gestational age, weight and in agreement with clinical  observations.    Premature Infant recommended reference ranges:  Up to 24 hours.............<8.0 mg/dL  Up to 48 hours............<12.0 mg/dL  3-5 days..................<15.0 mg/dL  6-29 days.................<15.0 mg/dL       Bilirubin Total   Date Value Ref Range Status   04/04/2025 1.9 (H) 0.1 - 1.0 mg/dL Final     Comment:     For infants and newborns, interpretation of results should be based   on gestational age, weight and in agreement with clinical   observations.    Premature Infant recommended reference ranges:   0-24 hours:  <8.0 mg/dL   24-48 hours: <12.0 mg/dL   3-5 days:    <15.0 mg/dL   6-29 days:   <15.0 mg/dL     Alkaline Phosphatase   Date Value Ref Range Status   02/09/2025 40 40 - 150 U/L Final     ALP   Date Value Ref Range Status   04/04/2025 54 40 - 150 unit/L Final     AST   Date Value Ref Range Status   04/04/2025 26 11 - 45 unit/L Final   02/09/2025 16 10 - 40 U/L Final     ALT   Date Value Ref Range Status   04/04/2025 43 10 - 44 unit/L Final   02/09/2025 20 10 - 44 U/L Final     Anion Gap   Date Value Ref Range Status   04/04/2025 11 8 - 16 mmol/L Final     eGFR   Date Value Ref Range Status   04/04/2025 >60 >60 mL/min/1.73/m2 Final     Comment:     Estimated GFR calculated using the CKD-EPI creatinine (2021) equation.   03/20/2025 >60.0 >60 mL/min/1.73 m^2 Final      Lab Results   Component Value Date    INR 1.7 (H) 04/04/2025    INR 1.4 (H) 04/03/2025    INR 1.5 (H) 04/02/2025     Thank you for referring this patient to our clinic.  Please call with any questions.    Sincerely,      Princess Montesinos PA-C  Pediatric Cardiology  Adult Congenital Heart Disease  [facility] [address] [place]  [address]  [phone]

## 2025-05-19 ENCOUNTER — TELEPHONE (OUTPATIENT)
Dept: CARDIOLOGY | Facility: CLINIC | Age: 30
End: 2025-05-19
Payer: MEDICAID

## 2025-05-19 NOTE — TELEPHONE ENCOUNTER
Left callback name and number on voicemail   ----- Message from HILLARY Selby sent at 5/16/2025  8:46 PM CDT -----  Contact: [phone] Mom    ----- Message -----  From: Sol Earl RN  Sent: 5/16/2025   2:43 PM CDT  To: Princess Montesinos PA-C; Jaqueline Pardo MD; Sourav#    Good afternoonThis message was mistking sent to our office/dept  This pt has not yet been seen by any of our providersPlease reach out to pt/mother as per message belowLake Alix  ----- Message -----  From: Gina Carmona MA  Sent: 5/16/2025   2:25 PM CDT  To: [facility] Heart Failure Clinical    Not sure if this is for us we have not seen the patient yet in office but she will be a CHF patient  ----- Message -----  From: Obdulia Kang  Sent: 5/16/2025   2:22 PM CDT  To: [facility] Staff    1MEDICALADVICE Patient is calling for Medical Advice regarding:Vomiting from medication Patient wants a call back or [facility]:Call back Comments:Pt mom would like a call back from nurse in office regarding medication wouldn't stay down pt vomiting Please advise patient replies from provider may take up to 48 hours.

## 2025-05-27 ENCOUNTER — TELEPHONE (OUTPATIENT)
Dept: PEDIATRIC CARDIOLOGY | Facility: CLINIC | Age: 30
End: 2025-05-27
Payer: MEDICAID

## 2025-05-27 NOTE — TELEPHONE ENCOUNTER
Called patient and discussed scheduling cath procedure, agreed to 6/10. Xarelto to be held for 3 days prior per Dr. Crump's instructions. Cath instructions mailed to pt and Dr. Santoyo's team updated at this time.     ----- Message from Stephania Crump MD sent at 4/25/2025 12:09 PM CDT -----  Regarding: RE: Scheduled for Cath  Ok to schedule.IC3  ----- Message -----  From: Dorian Santoyo MD  Sent: 4/24/2025   4:08 PM CDT  To: Stephania Crump MD; VAL Hernandez#  Subject: RE: Scheduled for Cath                           I confirm.  Worsening sats with good CXR.  Worried about worsening collaterals.  ----- Message -----  From: Princess Montesinos PA-C  Sent: 4/24/2025   3:41 PM CDT  To: Dorian Santoyo MD; [facility] Peds Cath Schedule  Subject: Scheduled for Cath                               Justice Palmer,Could you please schedule [place] for a diagnostic cath with possible intervention for worsening hypoxia? I've cc-ed Dr. Santoyo to confirm.Princess

## 2025-06-04 ENCOUNTER — CARDIOLOGY CONFERENCE (OUTPATIENT)
Dept: CARDIOLOGY | Facility: HOSPITAL | Age: 30
End: 2025-06-04
Payer: MEDICAID

## 2025-06-06 NOTE — PRE ADMISSION SCREENING
"Called patient, received permission from patient to review pre op instructions with grandmother. Rn reviewed pre op instructions listed below with grandmother, all questions answered, grandmother verbalized understanding.     Dear Mr. Reilly,      We have scheduled a cardiac catheterization for you on Tuesday, Bing 10, 2025. You should check in on the third floor of the hospital at 9:30 AM. Take the "Gold" elevators to the 3rd floor- follow signs for the Cath Lab waiting room, check in at the desk.      The hospital is located at [address] [place] [address].  [id number]     You should not have any solids or milk products after Midnight the morning of the procedure. You may have clear liquids such as water, apple juice, sprite until 7:30 AM.  Eating or drinking after the above listed time could result in cancellation of the procedure.      Please anticipate at least a one night stay at the hospital.     STOP taking Xarelto and Farxiga 3 days prior to your procedure on Saturday, June 7th.      Do NOT take Lasix, Spironolactone, or Losartan the morning of your procedure.      Take your Metoprolol the morning of the procedure.      Please feel free to call with any questions or concerns. [phone] or [phone]      Sincerely,  Regina THORNTON  "

## 2025-06-10 ENCOUNTER — HOSPITAL ENCOUNTER (INPATIENT)
Facility: HOSPITAL | Age: 30
LOS: 2 days | Discharge: HOME | DRG: 291 | End: 2025-06-12
Attending: PEDIATRICS | Admitting: INTERNAL MEDICINE
Payer: MEDICAID

## 2025-06-10 DIAGNOSIS — Z87.74 S/P FONTAN PROCEDURE: ICD-10-CM

## 2025-06-10 DIAGNOSIS — I50.9 ACUTE DECOMPENSATED HEART FAILURE: ICD-10-CM

## 2025-06-10 DIAGNOSIS — I50.23 ACUTE ON CHRONIC SYSTOLIC CONGESTIVE HEART FAILURE: ICD-10-CM

## 2025-06-10 DIAGNOSIS — Q24.9 ADULT CONGENITAL HEART DISEASE: ICD-10-CM

## 2025-06-10 DIAGNOSIS — Q22.4 TRICUSPID ATRESIA: ICD-10-CM

## 2025-06-10 DIAGNOSIS — I10 ESSENTIAL HYPERTENSION: ICD-10-CM

## 2025-06-10 PROBLEM — R10.13 EPIGASTRIC PAIN: Status: ACTIVE | Noted: 2025-06-10

## 2025-06-10 LAB
ABSOLUTE EOSINOPHIL (OHS): 0.01 K/UL
ABSOLUTE MONOCYTE (OHS): 0.37 K/UL (ref 0.3–1)
ABSOLUTE NEUTROPHIL COUNT (OHS): 3.5 K/UL (ref 1.8–7.7)
ANION GAP (OHS): 12 MMOL/L (ref 8–16)
BASOPHILS # BLD AUTO: 0.01 K/UL
BASOPHILS NFR BLD AUTO: 0.2 %
BUN SERPL-MCNC: 7 MG/DL (ref 6–20)
CALCIUM SERPL-MCNC: 8.5 MG/DL (ref 8.7–10.5)
CHLORIDE SERPL-SCNC: 104 MMOL/L (ref 95–110)
CO2 SERPL-SCNC: 20 MMOL/L (ref 23–29)
CREAT SERPL-MCNC: 1 MG/DL (ref 0.5–1.4)
ERYTHROCYTE [DISTWIDTH] IN BLOOD BY AUTOMATED COUNT: 19 % (ref 11.5–14.5)
GFR SERPLBLD CREATININE-BSD FMLA CKD-EPI: >60 ML/MIN/1.73/M2
GLUCOSE SERPL-MCNC: 98 MG/DL (ref 70–110)
HCT VFR BLD AUTO: 45.1 % (ref 40–54)
HGB BLD-MCNC: 14.3 GM/DL (ref 14–18)
IMM GRANULOCYTES # BLD AUTO: 0.01 K/UL (ref 0–0.04)
IMM GRANULOCYTES NFR BLD AUTO: 0.2 % (ref 0–0.5)
INDIRECT COOMBS: NORMAL
INR PPP: 1.6 (ref 0.8–1.2)
LYMPHOCYTES # BLD AUTO: 1.22 K/UL (ref 1–4.8)
MCH RBC QN AUTO: 27.2 PG (ref 27–31)
MCHC RBC AUTO-ENTMCNC: 31.7 G/DL (ref 32–36)
MCV RBC AUTO: 86 FL (ref 82–98)
NUCLEATED RBC (/100WBC) (OHS): 0 /100 WBC
PLATELET # BLD AUTO: 117 K/UL (ref 150–450)
PMV BLD AUTO: 11 FL (ref 9.2–12.9)
POTASSIUM SERPL-SCNC: 4.4 MMOL/L (ref 3.5–5.1)
PROTHROMBIN TIME: 16.7 SECONDS (ref 9–12.5)
RBC # BLD AUTO: 5.26 M/UL (ref 4.6–6.2)
RELATIVE EOSINOPHIL (OHS): 0.2 %
RELATIVE LYMPHOCYTE (OHS): 23.8 % (ref 18–48)
RELATIVE MONOCYTE (OHS): 7.2 % (ref 4–15)
RELATIVE NEUTROPHIL (OHS): 68.4 % (ref 38–73)
RH BLD: NORMAL
SODIUM SERPL-SCNC: 136 MMOL/L (ref 136–145)
SPECIMEN OUTDATE: NORMAL
WBC # BLD AUTO: 5.12 K/UL (ref 3.9–12.7)

## 2025-06-10 PROCEDURE — 25000003 PHARM REV CODE 250: Performed by: PHYSICIAN ASSISTANT

## 2025-06-10 PROCEDURE — 86920 COMPATIBILITY TEST SPIN: CPT | Performed by: PEDIATRICS

## 2025-06-10 PROCEDURE — 85610 PROTHROMBIN TIME: CPT | Performed by: PEDIATRICS

## 2025-06-10 PROCEDURE — 82310 ASSAY OF CALCIUM: CPT | Performed by: PEDIATRICS

## 2025-06-10 PROCEDURE — 94761 N-INVAS EAR/PLS OXIMETRY MLT: CPT

## 2025-06-10 PROCEDURE — 63600175 PHARM REV CODE 636 W HCPCS: Performed by: NURSE PRACTITIONER

## 2025-06-10 PROCEDURE — 25000003 PHARM REV CODE 250: Performed by: PEDIATRICS

## 2025-06-10 PROCEDURE — 20600001 HC STEP DOWN PRIVATE ROOM

## 2025-06-10 PROCEDURE — 99499 UNLISTED E&M SERVICE: CPT | Mod: ,,, | Performed by: PEDIATRICS

## 2025-06-10 PROCEDURE — 63600175 PHARM REV CODE 636 W HCPCS

## 2025-06-10 PROCEDURE — 25000003 PHARM REV CODE 250: Performed by: NURSE PRACTITIONER

## 2025-06-10 PROCEDURE — 86900 BLOOD TYPING SEROLOGIC ABO: CPT | Performed by: PEDIATRICS

## 2025-06-10 PROCEDURE — 25000003 PHARM REV CODE 250: Performed by: STUDENT IN AN ORGANIZED HEALTH CARE EDUCATION/TRAINING PROGRAM

## 2025-06-10 PROCEDURE — 85025 COMPLETE CBC W/AUTO DIFF WBC: CPT | Performed by: PEDIATRICS

## 2025-06-10 RX ORDER — FAMOTIDINE 20 MG/1
20 TABLET, FILM COATED ORAL 2 TIMES DAILY
Status: DISCONTINUED | OUTPATIENT
Start: 2025-06-10 | End: 2025-06-10

## 2025-06-10 RX ORDER — SPIRONOLACTONE 25 MG/1
25 TABLET ORAL DAILY
Status: DISCONTINUED | OUTPATIENT
Start: 2025-06-10 | End: 2025-06-12 | Stop reason: HOSPADM

## 2025-06-10 RX ORDER — MORPHINE SULFATE 2 MG/ML
INJECTION, SOLUTION INTRAMUSCULAR; INTRAVENOUS
Status: COMPLETED
Start: 2025-06-10 | End: 2025-06-10

## 2025-06-10 RX ORDER — FENTANYL CITRATE 50 UG/ML
25 INJECTION, SOLUTION INTRAMUSCULAR; INTRAVENOUS EVERY 5 MIN PRN
Refills: 0 | Status: CANCELLED | OUTPATIENT
Start: 2025-06-10

## 2025-06-10 RX ORDER — ONDANSETRON HYDROCHLORIDE 2 MG/ML
4 INJECTION, SOLUTION INTRAVENOUS DAILY PRN
Status: CANCELLED | OUTPATIENT
Start: 2025-06-10

## 2025-06-10 RX ORDER — CETIRIZINE HYDROCHLORIDE 10 MG/1
10 TABLET ORAL DAILY
Status: DISCONTINUED | OUTPATIENT
Start: 2025-06-10 | End: 2025-06-12 | Stop reason: HOSPADM

## 2025-06-10 RX ORDER — METOPROLOL SUCCINATE 50 MG/1
50 TABLET, EXTENDED RELEASE ORAL DAILY
Status: DISCONTINUED | OUTPATIENT
Start: 2025-06-11 | End: 2025-06-12 | Stop reason: HOSPADM

## 2025-06-10 RX ORDER — FUROSEMIDE 20 MG/1
20 TABLET ORAL DAILY
Status: DISCONTINUED | OUTPATIENT
Start: 2025-06-10 | End: 2025-06-11

## 2025-06-10 RX ORDER — LOSARTAN POTASSIUM 50 MG/1
100 TABLET ORAL DAILY
Status: DISCONTINUED | OUTPATIENT
Start: 2025-06-10 | End: 2025-06-12 | Stop reason: HOSPADM

## 2025-06-10 RX ORDER — METHOCARBAMOL 500 MG/1
500 TABLET, FILM COATED ORAL ONCE
Status: COMPLETED | OUTPATIENT
Start: 2025-06-10 | End: 2025-06-10

## 2025-06-10 RX ORDER — SODIUM CHLORIDE 0.9 % (FLUSH) 0.9 %
10 SYRINGE (ML) INJECTION
Status: DISCONTINUED | OUTPATIENT
Start: 2025-06-10 | End: 2025-06-12 | Stop reason: HOSPADM

## 2025-06-10 RX ORDER — SIMETHICONE 80 MG
1 TABLET,CHEWABLE ORAL
Status: DISCONTINUED | OUTPATIENT
Start: 2025-06-10 | End: 2025-06-12 | Stop reason: HOSPADM

## 2025-06-10 RX ORDER — SODIUM CHLORIDE 0.9 % (FLUSH) 0.9 %
10 SYRINGE (ML) INJECTION
Status: CANCELLED | OUTPATIENT
Start: 2025-06-10

## 2025-06-10 RX ORDER — FAMOTIDINE 20 MG/1
20 TABLET, FILM COATED ORAL 2 TIMES DAILY
Status: DISCONTINUED | OUTPATIENT
Start: 2025-06-10 | End: 2025-06-12 | Stop reason: HOSPADM

## 2025-06-10 RX ORDER — DAPAGLIFLOZIN 10 MG/1
10 TABLET, FILM COATED ORAL DAILY
Status: DISCONTINUED | OUTPATIENT
Start: 2025-06-10 | End: 2025-06-12 | Stop reason: HOSPADM

## 2025-06-10 RX ORDER — FUROSEMIDE 10 MG/ML
60 INJECTION INTRAMUSCULAR; INTRAVENOUS ONCE
Status: COMPLETED | OUTPATIENT
Start: 2025-06-10 | End: 2025-06-10

## 2025-06-10 RX ORDER — LIDOCAINE 50 MG/G
1 PATCH TOPICAL
Status: DISCONTINUED | OUTPATIENT
Start: 2025-06-10 | End: 2025-06-12 | Stop reason: HOSPADM

## 2025-06-10 RX ORDER — MORPHINE SULFATE 2 MG/ML
1 INJECTION, SOLUTION INTRAMUSCULAR; INTRAVENOUS ONCE
Status: COMPLETED | OUTPATIENT
Start: 2025-06-10 | End: 2025-06-10

## 2025-06-10 RX ORDER — GLUCAGON 1 MG
1 KIT INJECTION
Status: CANCELLED | OUTPATIENT
Start: 2025-06-10

## 2025-06-10 RX ORDER — MUPIROCIN 20 MG/G
OINTMENT TOPICAL 2 TIMES DAILY
Status: DISCONTINUED | OUTPATIENT
Start: 2025-06-10 | End: 2025-06-12 | Stop reason: HOSPADM

## 2025-06-10 RX ADMIN — MORPHINE SULFATE 2 MG: 2 INJECTION, SOLUTION INTRAMUSCULAR; INTRAVENOUS at 02:06

## 2025-06-10 RX ADMIN — FAMOTIDINE 20 MG: 20 TABLET, FILM COATED ORAL at 02:06

## 2025-06-10 RX ADMIN — SPIRONOLACTONE 25 MG: 25 TABLET, FILM COATED ORAL at 02:06

## 2025-06-10 RX ADMIN — CETIRIZINE HYDROCHLORIDE 10 MG: 10 TABLET, FILM COATED ORAL at 02:06

## 2025-06-10 RX ADMIN — FUROSEMIDE 20 MG: 20 TABLET ORAL at 02:06

## 2025-06-10 RX ADMIN — DAPAGLIFLOZIN 10 MG: 10 TABLET, FILM COATED ORAL at 03:06

## 2025-06-10 RX ADMIN — SIMETHICONE 80 MG: 80 TABLET, CHEWABLE ORAL at 10:06

## 2025-06-10 RX ADMIN — MUPIROCIN: 20 OINTMENT TOPICAL at 08:06

## 2025-06-10 RX ADMIN — FAMOTIDINE 20 MG: 20 TABLET, FILM COATED ORAL at 08:06

## 2025-06-10 RX ADMIN — LOSARTAN POTASSIUM 100 MG: 50 TABLET, FILM COATED ORAL at 02:06

## 2025-06-10 RX ADMIN — SIMETHICONE 80 MG: 80 TABLET, CHEWABLE ORAL at 08:06

## 2025-06-10 RX ADMIN — LIDOCAINE 1 PATCH: 50 PATCH CUTANEOUS at 05:06

## 2025-06-10 RX ADMIN — RIVAROXABAN 20 MG: 20 TABLET, FILM COATED ORAL at 05:06

## 2025-06-10 RX ADMIN — METHOCARBAMOL 500 MG: 500 TABLET ORAL at 05:06

## 2025-06-10 RX ADMIN — FUROSEMIDE 60 MG: 10 INJECTION, SOLUTION INTRAMUSCULAR; INTRAVENOUS at 03:06

## 2025-06-10 NOTE — HPI
30 year-old male with history of tricuspid and pulmonary atresia initially palliated with a systemic to pulmonary artery shunt followed by a bidirectional Jesus and subsequent lateral tunnel Fontan procedure, WPW s/p EPS & RFA left lateral AP with Dr. Ferguson Nov 2022, decreased LV function (EF 30-35%), cirrhosis who was admitted directly from procedure room after developing cough and abdominal pain(unable to undergo cath). He states the cough started this am as well as the abdominal pain. He describes the pain as central over his abdomen and feel crampy. Cough is intermittent and nonproductive. He states that he feels more dyspneic since recent hospital stay. Describes worsening orthopnea over last week. Denies CP, palpitations, syncope, presyncope, fevers, n/v/d.

## 2025-06-10 NOTE — NURSING TRANSFER
Nursing Transfer Note    Sending Transfer Note      6/10/2025 2:05 PM  Transfer via wheelchair  From [facility] Cath Recovery 10 to [facility] 3099   Transfered with telemetry  Transported by: FILOMENA De Oliveira RN  Report given as documented in PER Handoff on Doc Flowsheet  VS's per Doc Flowsheet  Medicines sent: No  Chart sent with patient: Yes  What caregiver / guardian was Notified of transfer: Grandmother  FILOMENA De Oliveira, HILLARY  6/10/2025 2:05 PM

## 2025-06-10 NOTE — CARE UPDATE
Unit [facility] Care Support Interaction      I have reviewed the chart of Juani Reilly Jr. who is hospitalized for Adult congenital heart disease. The patient is currently located in the following unit: CSU        I have assisted the primary physician in management of the following:      MRSA Decolonization - Mupirocin ordered and CHG ordered        Laurel Benton PA-C  Unit Based [facility]

## 2025-06-10 NOTE — H&P
[facility] Step[facility]  Heart Transplant  H&P    Patient Name: Juani Reilly Jr.  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 Ferguson Nov 2022, decreased LV function (EF 30-35%), cirrhosis who was admitted directly from procedure room after developing cough and abdominal pain(unable to undergo cath). He states the cough started this am as well as the abdominal pain. He describes the pain as central over his abdomen and feel crampy. Cough is intermittent and nonproductive. He states that he feels more dyspneic since recent hospital stay. Describes worsening orthopnea over last week. Denies CP, palpitations, syncope, presyncope, fevers, n/v/d.         Past Medical History:   Diagnosis Date    Acute decompensated heart failure 02/06/2025    History of heart surgery     Other cirrhosis of liver 11/12/2020    SVT (supraventricular tachycardia)     WPW syndrome        Past Surgical History:   Procedure Laterality Date    ANGIOGRAPHY OF AORTIC ARCH N/A 9/3/2020    Procedure: AORTOGRAM, AORTIC ARCH;  Surgeon: Stephania Crump MD;  Location: [facility] CATH LAB;  Service: Cardiology;  Laterality: N/A;    FONTAN PROCEDURE, EXTRACARDIAC      LEFT HEART CATHETERIZATION Left 9/3/2020    Procedure: Left heart cath;  Surgeon: Stephania Crump MD;  Location: [facility] CATH LAB;  Service: Cardiology;  Laterality: Left;    RIGHT HEART CATHETERIZATION FOR CONGENITAL HEART DEFECT N/A 9/3/2020    Procedure: CATHETERIZATION, HEART, RIGHT, FOR CONGENITAL HEART DEFECT;  Surgeon: Stephania NAGEL  MD Theron;  Location: [facility] CATH LAB;  Service: Cardiology;  Laterality: N/A;  [facility] - needs covid test morning of. Extenuating circumstances    TRANSESOPHAGEAL ECHOCARDIOGRAPHY N/A 11/10/2022    Procedure: ECHOCARDIOGRAM, TRANSESOPHAGEAL;  Surgeon: Emeterio Hall MD;  Location: [facility] EP LAB;  Service: Cardiology;  Laterality: N/A;    TREATMENT OF CARDIAC ARRHYTHMIA N/A 2021    Procedure: CARDIOVERSION;  Surgeon: Jim Mcginnis MD;  Location: [facility] CATH LAB;  Service: Cardiology;  Laterality: N/A;     Review of patient's allergies indicates:  No Known Allergies    Current Facility-Administered Medications   Medication    cetirizine tablet 10 mg    dapagliflozin propanediol (Farxiga) tablet 10 mg    famotidine tablet 20 mg    furosemide injection 60 mg    furosemide tablet 20 mg    losartan tablet 100 mg    [START ON 2025] metoprolol succinate (TOPROL-XL) 24 hr tablet 50 mg    morphine injection 1 mg    mupirocin 2 % ointment    simethicone chewable tablet 80 mg    sodium chloride 0.9% flush 10 mL    spironolactone tablet 25 mg     Family History       Problem Relation (Age of Onset)    Heart disease Maternal Grandfather    No Known Problems Mother, Father, Sister, Brother, Maternal Aunt, Maternal Uncle, Paternal Aunt, Paternal Uncle, Maternal Grandmother, Paternal Grandmother, Paternal Grandfather          Tobacco Use    Smoking status: Former     Current packs/day: 0.00     Types: Cigarettes     Quit date:      Years since quittin.4     Passive exposure: Past    Smokeless tobacco: Never    Tobacco comments:     3-4 months    Substance and Sexual Activity    Alcohol use: No    Drug use: Yes     Types: Marijuana     Comment: Mojo today- pt reports a while back    Sexual activity: Yes     Review of Systems   Constitutional:  Positive for appetite change.   HENT: Negative.     Eyes: Negative.    Respiratory:  Positive for shortness of breath.    Cardiovascular:  Positive for leg swelling.  "  Gastrointestinal:  Positive for abdominal pain.   Genitourinary: Negative.    Neurological: Negative.    Psychiatric/Behavioral: Negative.       Objective:     Vital Signs (Most Recent):  Temp: 97.6 °F (36.4 °C) (06/10/25 1416)  Pulse: (!) 116 (06/10/25 1416)  Resp: 18 (06/10/25 1426)  BP: (!) 132/90 (06/10/25 1416)  SpO2: (!) 79 % (06/10/25 1416) Vital Signs (24h Range):  Temp:  [97.6 °F (36.4 °C)-98.6 °F (37 °C)] 97.6 °F (36.4 °C)  Pulse:  [106-116] 116  Resp:  [18-39] 18  SpO2:  [75 %-81 %] 79 %  BP: (132-140)/() 132/90     Patient Vitals for the past 72 hrs (Last 3 readings):   Weight   06/10/25 1007 63.5 kg (140 lb)     Body mass index is 24.03 kg/m².    No intake or output data in the 24 hours ending 06/10/25 1504       Physical Exam  Vitals and nursing note reviewed.   Constitutional:       Appearance: He is well-developed.   HENT:      Head: Normocephalic.   Eyes:      Pupils: Pupils are equal, round, and reactive to light.   Neck:      Comments: +JVD  Cardiovascular:      Rate and Rhythm: Normal rate and regular rhythm.      Heart sounds: Murmur heard.   Pulmonary:      Effort: Pulmonary effort is normal.      Breath sounds: Normal breath sounds.   Abdominal:      General: Bowel sounds are normal. There is distension.      Palpations: Abdomen is soft.   Musculoskeletal:         General: Normal range of motion.      Cervical back: Normal range of motion and neck supple.      Right lower leg: Edema present.      Left lower leg: Edema present.   Skin:     General: Skin is warm and dry.   Neurological:      Mental Status: He is alert and oriented to person, place, and time.   Psychiatric:         Behavior: Behavior normal.          Significant Labs:  CBC:  Recent Labs   Lab 06/10/25  1022   WBC 5.12   RBC 5.26   HGB 14.3   HCT 45.1   *   MCV 86   MCH 27.2   MCHC 31.7*     BNP:  No results for input(s): "BNP" in the last 168 hours.    Invalid input(s): "BNPJUNAID"  CMP:  Recent Labs   Lab " "06/10/25  1022   GLU 98   CALCIUM 8.5*      K 4.4   CO2 20*      BUN 7   CREATININE 1.0      Coagulation:   Recent Labs   Lab 06/10/25  1026   INR 1.6*     LDH:  No results for input(s): "LDH" in the last 72 hours.  Microbiology:  Microbiology Results (last 7 days)       ** No results found for the last 168 hours. **          I have reviewed all pertinent labs within the past 24 hours.    Diagnostic Results:  I have reviewed all pertinent imaging results/findings within the past 24 hours.    Assessment/Plan:     * Acute decompensated heart failure  -Volume up on exam and per pt.   -Will start with IVP lasix and follow response.   -GDMT: Toprol 50mg daily, Spironolactone 25mg, Losartan 100mg daily, Dapa 10mg daily.   -Restart anticoag as Cath postponed..       Epigastric pain  -Will get KUB and follow.  -Start simethicone scheduled.     Atrial flutter  -Currently NSR.  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 Ferguson Nov 2022, decreased LV function (EF 30-35%), cirrhosis.  -Will consult Congenital to follow.       Nazario Thompson, NP  Heart Transplant  [facility] - [facility] Step[facility]  "

## 2025-06-10 NOTE — ASSESSMENT & PLAN NOTE
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 Ferguson Nov 2022, decreased LV function (EF 30-35%), cirrhosis.  -Will consult Congenital to follow.

## 2025-06-10 NOTE — ASSESSMENT & PLAN NOTE
-Volume up on exam and per pt.   -Will start with IVP lasix and follow response.   -GDMT: Toprol 50mg daily, Spironolactone 25mg, Losartan 100mg daily, Dapa 10mg daily.   -Holding anticoag in case Cath needed.

## 2025-06-10 NOTE — NURSING
"Patient complaining of pain to left lateral chest when he breaths which he noticed after he sneezed three times in a row this morning. Breath sounds clear and equal bilaterally. Splinting of left side noted. RR upper 20s. O2 sats 75-80% which patient states is his norm at home. Frequent productive cough. Sinus tachycardia ('s), 's/100s. Patient states that he stopped taking all of his meds "when he was told to stop" which was on Saturday. The only medication that he took was his Lasix last night due to "swelling of his feet." Patient states that he is unable to lay flat and sleeps with 3 pillows. Dr. Crump and Dr. Raygoza notified. Home metoprolol taken. Patient informed that cath procedure will be postponed and patient will be admitted to HF team.    "

## 2025-06-10 NOTE — SUBJECTIVE & OBJECTIVE
Past Medical History:   Diagnosis Date    Acute decompensated heart failure 02/06/2025    History of heart surgery     Other cirrhosis of liver 11/12/2020    SVT (supraventricular tachycardia)     WPW syndrome        Past Surgical History:   Procedure Laterality Date    ANGIOGRAPHY OF AORTIC ARCH N/A 9/3/2020    Procedure: AORTOGRAM, AORTIC ARCH;  Surgeon: Stephania Crump MD;  Location: [facility] CATH LAB;  Service: Cardiology;  Laterality: N/A;    FONTAN PROCEDURE, EXTRACARDIAC      LEFT HEART CATHETERIZATION Left 9/3/2020    Procedure: Left heart cath;  Surgeon: Stephania Crump MD;  Location: [facility] CATH LAB;  Service: Cardiology;  Laterality: Left;    RIGHT HEART CATHETERIZATION FOR CONGENITAL HEART DEFECT N/A 9/3/2020    Procedure: CATHETERIZATION, HEART, RIGHT, FOR CONGENITAL HEART DEFECT;  Surgeon: Stephania Crump MD;  Location: [facility] CATH LAB;  Service: Cardiology;  Laterality: N/A;  [facility] - needs covid test morning of. Extenuating circumstances    TRANSESOPHAGEAL ECHOCARDIOGRAPHY N/A 11/10/2022    Procedure: ECHOCARDIOGRAM, TRANSESOPHAGEAL;  Surgeon: Emeterio Hall MD;  Location: [facility] EP LAB;  Service: Cardiology;  Laterality: N/A;    TREATMENT OF CARDIAC ARRHYTHMIA N/A 1/9/2021    Procedure: CARDIOVERSION;  Surgeon: Jim Mcginnis MD;  Location: [facility] CATH LAB;  Service: Cardiology;  Laterality: N/A;     Review of patient's allergies indicates:  No Known Allergies    Current Facility-Administered Medications   Medication    cetirizine tablet 10 mg    dapagliflozin propanediol (Farxiga) tablet 10 mg    famotidine tablet 20 mg    furosemide injection 60 mg    furosemide tablet 20 mg    losartan tablet 100 mg    [START ON 6/11/2025] metoprolol succinate (TOPROL-XL) 24 hr tablet 50 mg    morphine injection 1 mg    mupirocin 2 % ointment    simethicone chewable tablet 80 mg    sodium chloride 0.9% flush 10 mL    spironolactone tablet 25 mg     Family History       Problem Relation (Age of  Onset)    Heart disease Maternal Grandfather    No Known Problems Mother, Father, Sister, Brother, Maternal Aunt, Maternal Uncle, Paternal Aunt, Paternal Uncle, Maternal Grandmother, Paternal Grandmother, Paternal Grandfather          Tobacco Use    Smoking status: Former     Current packs/day: 0.00     Types: Cigarettes     Quit date:      Years since quittin.4     Passive exposure: Past    Smokeless tobacco: Never    Tobacco comments:     3-4 months    Substance and Sexual Activity    Alcohol use: No    Drug use: Yes     Types: Marijuana     Comment: Mojo today- pt reports a while back    Sexual activity: Yes     Review of Systems   Constitutional:  Positive for appetite change.   HENT: Negative.     Eyes: Negative.    Respiratory:  Positive for shortness of breath.    Cardiovascular:  Positive for leg swelling.   Gastrointestinal:  Positive for abdominal pain.   Genitourinary: Negative.    Neurological: Negative.    Psychiatric/Behavioral: Negative.       Objective:     Vital Signs (Most Recent):  Temp: 97.6 °F (36.4 °C) (06/10/25 1416)  Pulse: (!) 116 (06/10/25 1416)  Resp: 18 (06/10/25 1426)  BP: (!) 132/90 (06/10/25 1416)  SpO2: (!) 79 % (06/10/25 1416) Vital Signs (24h Range):  Temp:  [97.6 °F (36.4 °C)-98.6 °F (37 °C)] 97.6 °F (36.4 °C)  Pulse:  [106-116] 116  Resp:  [18-39] 18  SpO2:  [75 %-81 %] 79 %  BP: (132-140)/() 132/90     Patient Vitals for the past 72 hrs (Last 3 readings):   Weight   06/10/25 1007 63.5 kg (140 lb)     Body mass index is 24.03 kg/m².    No intake or output data in the 24 hours ending 06/10/25 1504       Physical Exam  Vitals and nursing note reviewed.   Constitutional:       Appearance: He is well-developed.   HENT:      Head: Normocephalic.   Eyes:      Pupils: Pupils are equal, round, and reactive to light.   Neck:      Comments: +JVD  Cardiovascular:      Rate and Rhythm: Normal rate and regular rhythm.      Heart sounds: Murmur heard.   Pulmonary:      Effort:  "Pulmonary effort is normal.      Breath sounds: Normal breath sounds.   Abdominal:      General: Bowel sounds are normal. There is distension.      Palpations: Abdomen is soft.   Musculoskeletal:         General: Normal range of motion.      Cervical back: Normal range of motion and neck supple.      Right lower leg: Edema present.      Left lower leg: Edema present.   Skin:     General: Skin is warm and dry.   Neurological:      Mental Status: He is alert and oriented to person, place, and time.   Psychiatric:         Behavior: Behavior normal.          Significant Labs:  CBC:  Recent Labs   Lab 06/10/25  1022   WBC 5.12   RBC 5.26   HGB 14.3   HCT 45.1   *   MCV 86   MCH 27.2   MCHC 31.7*     BNP:  No results for input(s): "BNP" in the last 168 hours.    Invalid input(s): "BNPTRIAGELBLO"  CMP:  Recent Labs   Lab 06/10/25  1022   GLU 98   CALCIUM 8.5*      K 4.4   CO2 20*      BUN 7   CREATININE 1.0      Coagulation:   Recent Labs   Lab 06/10/25  1026   INR 1.6*     LDH:  No results for input(s): "LDH" in the last 72 hours.  Microbiology:  Microbiology Results (last 7 days)       ** No results found for the last 168 hours. **          I have reviewed all pertinent labs within the past 24 hours.    Diagnostic Results:  I have reviewed all pertinent imaging results/findings within the past 24 hours.    "

## 2025-06-11 LAB
ABSOLUTE EOSINOPHIL (OHS): 0.01 K/UL
ABSOLUTE MONOCYTE (OHS): 0.64 K/UL (ref 0.3–1)
ABSOLUTE NEUTROPHIL COUNT (OHS): 3.3 K/UL (ref 1.8–7.7)
AFP SERPL-MCNC: 4.5 NG/ML
ALBUMIN SERPL BCP-MCNC: 3.8 G/DL (ref 3.5–5.2)
ALP SERPL-CCNC: 60 UNIT/L (ref 40–150)
ALT SERPL W/O P-5'-P-CCNC: 25 UNIT/L (ref 10–44)
ANION GAP (OHS): 13 MMOL/L (ref 8–16)
AST SERPL-CCNC: 21 UNIT/L (ref 11–45)
BASOPHILS # BLD AUTO: 0.03 K/UL
BASOPHILS NFR BLD AUTO: 0.5 %
BILIRUB SERPL-MCNC: 3.4 MG/DL (ref 0.1–1)
BUN SERPL-MCNC: 7 MG/DL (ref 6–20)
CALCIUM SERPL-MCNC: 8.8 MG/DL (ref 8.7–10.5)
CHLORIDE SERPL-SCNC: 103 MMOL/L (ref 95–110)
CO2 SERPL-SCNC: 22 MMOL/L (ref 23–29)
CREAT SERPL-MCNC: 1 MG/DL (ref 0.5–1.4)
ERYTHROCYTE [DISTWIDTH] IN BLOOD BY AUTOMATED COUNT: 18.2 % (ref 11.5–14.5)
GFR SERPLBLD CREATININE-BSD FMLA CKD-EPI: >60 ML/MIN/1.73/M2
GLUCOSE SERPL-MCNC: 103 MG/DL (ref 70–110)
HCT VFR BLD AUTO: 43.8 % (ref 40–54)
HGB BLD-MCNC: 14 GM/DL (ref 14–18)
IMM GRANULOCYTES # BLD AUTO: 0.02 K/UL (ref 0–0.04)
IMM GRANULOCYTES NFR BLD AUTO: 0.3 % (ref 0–0.5)
LYMPHOCYTES # BLD AUTO: 2.03 K/UL (ref 1–4.8)
MAGNESIUM SERPL-MCNC: 1.6 MG/DL (ref 1.6–2.6)
MCH RBC QN AUTO: 26.8 PG (ref 27–31)
MCHC RBC AUTO-ENTMCNC: 32 G/DL (ref 32–36)
MCV RBC AUTO: 84 FL (ref 82–98)
NUCLEATED RBC (/100WBC) (OHS): 0 /100 WBC
PLATELET # BLD AUTO: 130 K/UL (ref 150–450)
PMV BLD AUTO: 11.5 FL (ref 9.2–12.9)
POTASSIUM SERPL-SCNC: 3.6 MMOL/L (ref 3.5–5.1)
PROT SERPL-MCNC: 6.9 GM/DL (ref 6–8.4)
RBC # BLD AUTO: 5.23 M/UL (ref 4.6–6.2)
RELATIVE EOSINOPHIL (OHS): 0.2 %
RELATIVE LYMPHOCYTE (OHS): 33.7 % (ref 18–48)
RELATIVE MONOCYTE (OHS): 10.6 % (ref 4–15)
RELATIVE NEUTROPHIL (OHS): 54.7 % (ref 38–73)
SODIUM SERPL-SCNC: 138 MMOL/L (ref 136–145)
WBC # BLD AUTO: 6.03 K/UL (ref 3.9–12.7)

## 2025-06-11 PROCEDURE — 94761 N-INVAS EAR/PLS OXIMETRY MLT: CPT

## 2025-06-11 PROCEDURE — 25000003 PHARM REV CODE 250: Performed by: STUDENT IN AN ORGANIZED HEALTH CARE EDUCATION/TRAINING PROGRAM

## 2025-06-11 PROCEDURE — 63600175 PHARM REV CODE 636 W HCPCS: Performed by: NURSE PRACTITIONER

## 2025-06-11 PROCEDURE — 83735 ASSAY OF MAGNESIUM: CPT | Performed by: NURSE PRACTITIONER

## 2025-06-11 PROCEDURE — G0378 HOSPITAL OBSERVATION PER HR: HCPCS

## 2025-06-11 PROCEDURE — 36415 COLL VENOUS BLD VENIPUNCTURE: CPT | Performed by: NURSE PRACTITIONER

## 2025-06-11 PROCEDURE — 99233 SBSQ HOSP IP/OBS HIGH 50: CPT | Mod: 95,,, | Performed by: NURSE PRACTITIONER

## 2025-06-11 PROCEDURE — 25000003 PHARM REV CODE 250: Performed by: NURSE PRACTITIONER

## 2025-06-11 PROCEDURE — 80053 COMPREHEN METABOLIC PANEL: CPT | Performed by: NURSE PRACTITIONER

## 2025-06-11 PROCEDURE — 85025 COMPLETE CBC W/AUTO DIFF WBC: CPT | Performed by: NURSE PRACTITIONER

## 2025-06-11 PROCEDURE — 36415 COLL VENOUS BLD VENIPUNCTURE: CPT

## 2025-06-11 PROCEDURE — 25000003 PHARM REV CODE 250: Performed by: PEDIATRICS

## 2025-06-11 PROCEDURE — 20600001 HC STEP DOWN PRIVATE ROOM

## 2025-06-11 PROCEDURE — 82105 ALPHA-FETOPROTEIN SERUM: CPT

## 2025-06-11 PROCEDURE — 99223 1ST HOSP IP/OBS HIGH 75: CPT | Mod: ,,, | Performed by: PEDIATRICS

## 2025-06-11 RX ORDER — METHOCARBAMOL 500 MG/1
500 TABLET, FILM COATED ORAL 4 TIMES DAILY PRN
Status: DISCONTINUED | OUTPATIENT
Start: 2025-06-11 | End: 2025-06-12 | Stop reason: HOSPADM

## 2025-06-11 RX ORDER — METOPROLOL SUCCINATE 50 MG/1
50 TABLET, EXTENDED RELEASE ORAL DAILY
Qty: 90 TABLET | Refills: 3 | Status: SHIPPED | OUTPATIENT
Start: 2025-06-11 | End: 2026-06-11

## 2025-06-11 RX ORDER — SPIRONOLACTONE 25 MG/1
25 TABLET ORAL DAILY
Qty: 90 TABLET | Refills: 3 | Status: SHIPPED | OUTPATIENT
Start: 2025-06-11 | End: 2026-06-11

## 2025-06-11 RX ORDER — TRAMADOL HYDROCHLORIDE 50 MG/1
50 TABLET, FILM COATED ORAL EVERY 6 HOURS PRN
Status: DISCONTINUED | OUTPATIENT
Start: 2025-06-11 | End: 2025-06-12 | Stop reason: HOSPADM

## 2025-06-11 RX ORDER — MORPHINE SULFATE 2 MG/ML
1 INJECTION, SOLUTION INTRAMUSCULAR; INTRAVENOUS EVERY 6 HOURS PRN
Status: DISCONTINUED | OUTPATIENT
Start: 2025-06-11 | End: 2025-06-11

## 2025-06-11 RX ORDER — FUROSEMIDE 40 MG/1
40 TABLET ORAL DAILY
Status: DISCONTINUED | OUTPATIENT
Start: 2025-06-12 | End: 2025-06-12 | Stop reason: HOSPADM

## 2025-06-11 RX ORDER — LOSARTAN POTASSIUM 100 MG/1
100 TABLET ORAL DAILY
Qty: 90 TABLET | Refills: 3 | Status: SHIPPED | OUTPATIENT
Start: 2025-06-11 | End: 2026-06-11

## 2025-06-11 RX ORDER — ACETAMINOPHEN 325 MG/1
650 TABLET ORAL EVERY 6 HOURS PRN
Status: DISCONTINUED | OUTPATIENT
Start: 2025-06-11 | End: 2025-06-12 | Stop reason: HOSPADM

## 2025-06-11 RX ORDER — DAPAGLIFLOZIN 10 MG/1
10 TABLET, FILM COATED ORAL DAILY
Qty: 90 TABLET | Refills: 3 | Status: SHIPPED | OUTPATIENT
Start: 2025-06-11

## 2025-06-11 RX ORDER — FUROSEMIDE 20 MG/1
40 TABLET ORAL DAILY
Qty: 180 TABLET | Refills: 3 | Status: SHIPPED | OUTPATIENT
Start: 2025-06-11

## 2025-06-11 RX ADMIN — CETIRIZINE HYDROCHLORIDE 10 MG: 10 TABLET, FILM COATED ORAL at 09:06

## 2025-06-11 RX ADMIN — SIMETHICONE 80 MG: 80 TABLET, CHEWABLE ORAL at 09:06

## 2025-06-11 RX ADMIN — LIDOCAINE 1 PATCH: 50 PATCH CUTANEOUS at 04:06

## 2025-06-11 RX ADMIN — LOSARTAN POTASSIUM 100 MG: 50 TABLET, FILM COATED ORAL at 09:06

## 2025-06-11 RX ADMIN — MUPIROCIN: 20 OINTMENT TOPICAL at 09:06

## 2025-06-11 RX ADMIN — FAMOTIDINE 20 MG: 20 TABLET, FILM COATED ORAL at 09:06

## 2025-06-11 RX ADMIN — DAPAGLIFLOZIN 10 MG: 10 TABLET, FILM COATED ORAL at 09:06

## 2025-06-11 RX ADMIN — SIMETHICONE 80 MG: 80 TABLET, CHEWABLE ORAL at 08:06

## 2025-06-11 RX ADMIN — MUPIROCIN: 20 OINTMENT TOPICAL at 08:06

## 2025-06-11 RX ADMIN — RIVAROXABAN 20 MG: 20 TABLET, FILM COATED ORAL at 04:06

## 2025-06-11 RX ADMIN — SIMETHICONE 80 MG: 80 TABLET, CHEWABLE ORAL at 02:06

## 2025-06-11 RX ADMIN — METOPROLOL SUCCINATE 50 MG: 50 TABLET, EXTENDED RELEASE ORAL at 09:06

## 2025-06-11 RX ADMIN — FUROSEMIDE 20 MG: 20 TABLET ORAL at 09:06

## 2025-06-11 RX ADMIN — SPIRONOLACTONE 25 MG: 25 TABLET, FILM COATED ORAL at 09:06

## 2025-06-11 RX ADMIN — MORPHINE SULFATE 1 MG: 2 INJECTION, SOLUTION INTRAMUSCULAR; INTRAVENOUS at 09:06

## 2025-06-11 RX ADMIN — FAMOTIDINE 20 MG: 20 TABLET, FILM COATED ORAL at 08:06

## 2025-06-11 NOTE — PROGRESS NOTES
[facility] - [facility] Stepdown  Heart Transplant  Progress Note    Patient Name: Juani Reilly Jr.  MRN: [id number]  Admission Date: 6/10/2025  Hospital Length of Stay: 0 days  Attending Physician: Keren Toure MD  Primary Care Provider: Jaqueline Pardo MD  Principal Problem:Acute decompensated heart failure    Subjective:   Interval History: Breathing better after dose of IVP Lasix yesterday. Abd pain improved as well but still complaining of R sided rib pain due to coughing yesterday.     Scheduled Meds:   cetirizine  10 mg Oral Daily    dapagliflozin propanediol  10 mg Oral Daily    famotidine  20 mg Oral BID    furosemide  20 mg Oral Daily    LIDOcaine  1 patch Transdermal Q24H    losartan  100 mg Oral Daily    metoprolol succinate  50 mg Oral Daily    mupirocin   Nasal BID    rivaroxaban  20 mg Oral Daily with dinner    simethicone  1 tablet Oral QID (PC + HS)    spironolactone  25 mg Oral Daily     PRN Meds:  Current Facility-Administered Medications:     acetaminophen, 650 mg, Oral, Q6H PRN    methocarbamoL, 500 mg, Oral, QID PRN    morphine, 1 mg, Intravenous, Q6H PRN    sodium chloride 0.9%, 10 mL, Intravenous, PRN    Review of patient's allergies indicates:  No Known Allergies  Objective:     Vital Signs (Most Recent):  Temp: 97.6 °F (36.4 °C) (06/11/25 1215)  Pulse: 97 (06/11/25 1215)  Resp: 18 (06/11/25 1215)  BP: 112/72 (06/11/25 0810)  SpO2: (!) 79 % (06/11/25 1215) Vital Signs (24h Range):  Temp:  [97.6 °F (36.4 °C)-98.7 °F (37.1 °C)] 97.6 °F (36.4 °C)  Pulse:  [] 97  Resp:  [16-31] 18  SpO2:  [77 %-82 %] 79 %  BP: ()/(63-90) 112/72     Patient Vitals for the past 72 hrs (Last 3 readings):   Weight   06/11/25 0613 59 kg (130 lb 1.1 oz)   06/10/25 1541 64.4 kg (141 lb 15.6 oz)   06/10/25 1007 63.5 kg (140 lb)     Body mass index is 22.33 kg/m².      Intake/Output Summary (Last 24 hours) at 6/11/2025 1218  Last data filed at 6/11/2025 0448  Gross per 24 hour   Intake 835 ml  "  Output 4175 ml   Net -3340 ml          Physical Exam  Vitals and nursing note reviewed.   Constitutional:       Appearance: He is well-developed.   HENT:      Head: Normocephalic.   Eyes:      Pupils: Pupils are equal, round, and reactive to light.   Neck:      Comments: +JVD  Cardiovascular:      Rate and Rhythm: Normal rate and regular rhythm.      Heart sounds: Murmur heard.   Pulmonary:      Effort: Pulmonary effort is normal.      Breath sounds: Normal breath sounds.   Abdominal:      General: Bowel sounds are normal.      Palpations: Abdomen is soft.   Musculoskeletal:         General: Normal range of motion.      Cervical back: Normal range of motion and neck supple.      Right lower leg: Edema present.      Left lower leg: Edema present.   Skin:     General: Skin is warm and dry.   Neurological:      Mental Status: He is alert and oriented to person, place, and time.   Psychiatric:         Behavior: Behavior normal.          Significant Labs:  CBC:  Recent Labs   Lab 06/10/25  1022 06/11/25  0228   WBC 5.12 6.03   RBC 5.26 5.23   HGB 14.3 14.0   HCT 45.1 43.8   * 130*   MCV 86 84   MCH 27.2 26.8*   MCHC 31.7* 32.0     BNP:  No results for input(s): "BNP" in the last 168 hours.    Invalid input(s): "BNPTRIAGELBLO"  CMP:  Recent Labs   Lab 06/10/25  1022 06/11/25  0227   GLU 98 103   CALCIUM 8.5* 8.8   ALBUMIN  --  3.8   PROT  --  6.9    138   K 4.4 3.6   CO2 20* 22*    103   BUN 7 7   CREATININE 1.0 1.0   ALKPHOS  --  60   ALT  --  25   AST  --  21   BILITOT  --  3.4*      Coagulation:   Recent Labs   Lab 06/10/25  1026   INR 1.6*     LDH:  No results for input(s): "LDH" in the last 72 hours.  Microbiology:  Microbiology Results (last 7 days)       ** No results found for the last 168 hours. **          I have reviewed all pertinent labs within the past 24 hours.    Estimated Creatinine Clearance: 89.3 mL/min (based on SCr of 1 mg/dL).    Diagnostic Results:  I have reviewed all pertinent " imaging results/findings within the past 24 hours.  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 Ferguson Nov 2022, decreased LV function (EF 30-35%), cirrhosis who was admitted directly from procedure room after developing cough and abdominal pain(unable to undergo cath). He states the cough started this am as well as the abdominal pain. He describes the pain as central over his abdomen and feel crampy. Cough is intermittent and nonproductive. He states that he feels more dyspneic since recent hospital stay. Describes worsening orthopnea over last week. Denies CP, palpitations, syncope, presyncope, fevers, n/v/d.         * Acute decompensated heart failure  -Improved post dose of IVP Lasix.    -GDMT: Toprol 50mg daily, Spironolactone 25mg, Losartan 100mg daily, Dapa 10mg daily.       Epigastric pain  -Likely gas  -Cont simethicone scheduled.     Atrial flutter  -Currently NSR.  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 Ferguson Nov 2022, decreased LV function (EF 30-35%), cirrhosis.  - Congenital to follow.         Nazario Thompson, NP  Heart Transplant  [facility]

## 2025-06-11 NOTE — SUBJECTIVE & OBJECTIVE
Past Medical History:   Diagnosis Date    Acute decompensated heart failure 02/06/2025    History of heart surgery     Other cirrhosis of liver 11/12/2020    SVT (supraventricular tachycardia)     WPW syndrome        Past Surgical History:   Procedure Laterality Date    ANGIOGRAPHY OF AORTIC ARCH N/A 9/3/2020    Procedure: AORTOGRAM, AORTIC ARCH;  Surgeon: Stephania Crump MD;  Location: [facility] CATH LAB;  Service: Cardiology;  Laterality: N/A;    FONTAN PROCEDURE, EXTRACARDIAC      LEFT HEART CATHETERIZATION Left 9/3/2020    Procedure: Left heart cath;  Surgeon: Stephania Crump MD;  Location: [facility] CATH LAB;  Service: Cardiology;  Laterality: Left;    RIGHT HEART CATHETERIZATION FOR CONGENITAL HEART DEFECT N/A 9/3/2020    Procedure: CATHETERIZATION, HEART, RIGHT, FOR CONGENITAL HEART DEFECT;  Surgeon: Stephania Crump MD;  Location: [facility] CATH LAB;  Service: Cardiology;  Laterality: N/A;  [facility] - needs covid test morning of. Extenuating circumstances    TRANSESOPHAGEAL ECHOCARDIOGRAPHY N/A 11/10/2022    Procedure: ECHOCARDIOGRAM, TRANSESOPHAGEAL;  Surgeon: Emeterio Hall MD;  Location: [facility] EP LAB;  Service: Cardiology;  Laterality: N/A;    TREATMENT OF CARDIAC ARRHYTHMIA N/A 1/9/2021    Procedure: CARDIOVERSION;  Surgeon: Jim Mcginnis MD;  Location: [facility] CATH LAB;  Service: Cardiology;  Laterality: N/A;       Review of patient's allergies indicates:  No Known Allergies    No current facility-administered medications on file prior to encounter.     Current Outpatient Medications on File Prior to Encounter   Medication Sig    cetirizine (ZYRTEC) 10 MG tablet Take 1 tablet (10 mg total) by mouth once daily.    furosemide (LASIX) 20 MG tablet Take 1 tablet (20 mg total) by mouth once daily.    metoprolol succinate (TOPROL-XL) 50 MG 24 hr tablet Take 1 tablet (50 mg total) by mouth once daily.    dapagliflozin propanediol (FARXIGA) 10 mg tablet Take 1 tablet (10 mg total) by mouth once daily.     famotidine (PEPCID) 20 MG tablet Take 1 tablet (20 mg total) by mouth 2 (two) times daily.    losartan (COZAAR) 100 MG tablet Take 1 tablet (100 mg total) by mouth once daily.    rivaroxaban (XARELTO) 20 mg Tab Take 1 tablet (20 mg total) by mouth daily with dinner or evening meal.    spironolactone (ALDACTONE) 25 MG tablet Take 1 tablet (25 mg total) by mouth once daily.     Family History       Problem Relation (Age of Onset)    Heart disease Maternal Grandfather    No Known Problems Mother, Father, Sister, Brother, Maternal Aunt, Maternal Uncle, Paternal Aunt, Paternal Uncle, Maternal Grandmother, Paternal Grandmother, Paternal Grandfather          Tobacco Use    Smoking status: Former     Current packs/day: 0.00     Types: Cigarettes     Quit date:      Years since quittin.4     Passive exposure: Past    Smokeless tobacco: Never    Tobacco comments:     3-4 months    Substance and Sexual Activity    Alcohol use: No    Drug use: Yes     Types: Marijuana     Comment: Mojo today- pt reports a while back    Sexual activity: Yes     Review of Systems   Constitutional: Positive for malaise/fatigue. Negative for fever and night sweats.   HENT:  Negative for congestion and sore throat.    Eyes:  Negative for blurred vision and double vision.   Cardiovascular:  Positive for dyspnea on exertion, leg swelling and orthopnea. Negative for chest pain, irregular heartbeat, near-syncope, palpitations and syncope.   Respiratory:  Positive for cough and shortness of breath.    Skin:  Negative for dry skin and rash.   Musculoskeletal:  Negative for myalgias and neck pain.   Gastrointestinal:  Positive for abdominal pain, constipation and flatus. Negative for diarrhea, nausea and vomiting.   Genitourinary:  Negative for dysuria and hematuria.   Neurological:  Negative for dizziness, headaches and light-headedness.   Psychiatric/Behavioral:  Negative for depression. The patient is not nervous/anxious.      Objective:      Vital Signs (Most Recent):  Temp: 97.6 °F (36.4 °C) (06/11/25 1215)  Pulse: 97 (06/11/25 1215)  Resp: 18 (06/11/25 1215)  BP: 109/70 (06/11/25 1215)  SpO2: (!) 79 % (06/11/25 1215) Vital Signs (24h Range):  Temp:  [97.6 °F (36.4 °C)-98.7 °F (37.1 °C)] 97.6 °F (36.4 °C)  Pulse:  [] 97  Resp:  [16-18] 18  SpO2:  [79 %-82 %] 79 %  BP: ()/(63-90) 109/70     Weight: 59 kg (130 lb 1.1 oz)  Body mass index is 22.33 kg/m².    SpO2: (!) 79 %         Intake/Output Summary (Last 24 hours) at 6/11/2025 1352  Last data filed at 6/11/2025 0448  Gross per 24 hour   Intake 835 ml   Output 4175 ml   Net -3340 ml       Lines/Drains/Airways       Peripheral Intravenous Line  Duration                  Peripheral IV - Single Lumen 06/10/25 1630 22 G Anterior;Proximal;Right Forearm <1 day                     Physical Exam  Constitutional:       Appearance: He is normal weight. He is ill-appearing.   HENT:      Head: Normocephalic and atraumatic.      Nose: Nose normal. No congestion or rhinorrhea.      Mouth/Throat:      Mouth: Mucous membranes are moist.      Pharynx: Oropharynx is clear.   Eyes:      Extraocular Movements: Extraocular movements intact.      Pupils: Pupils are equal, round, and reactive to light.   Cardiovascular:      Rate and Rhythm: Normal rate and regular rhythm.      Pulses: Normal pulses.      Heart sounds: Murmur heard.      Comments: regular rate and rhythm  S1 with single S2  2/6 systolic murmur heard best at the apex  Pulmonary:      Effort: Pulmonary effort is normal. No respiratory distress.      Breath sounds: Normal breath sounds.   Abdominal:      General: There is distension.      Tenderness: There is abdominal tenderness.      Comments: Mild distension, improved since yesterday   Musculoskeletal:         General: No swelling. Normal range of motion.      Cervical back: Normal range of motion. No rigidity.      Right lower leg: Edema present.      Left lower leg: Edema present.       Comments: Mild lower extremity edema   Lymphadenopathy:      Cervical: No cervical adenopathy.   Skin:     General: Skin is warm.      Capillary Refill: Capillary refill takes 2 to 3 seconds.      Findings: No rash.   Neurological:      General: No focal deficit present.      Mental Status: He is alert and oriented to person, place, and time.   Psychiatric:         Mood and Affect: Mood normal.         Behavior: Behavior normal.          Significant Labs:     CMP  Sodium   Date Value Ref Range Status   06/11/2025 138 136 - 145 mmol/L Final   03/20/2025 139 136 - 145 mmol/L Final     Potassium   Date Value Ref Range Status   06/11/2025 3.6 3.5 - 5.1 mmol/L Final   03/20/2025 3.5 3.5 - 5.1 mmol/L Final     Chloride   Date Value Ref Range Status   06/11/2025 103 95 - 110 mmol/L Final   03/20/2025 104 95 - 110 mmol/L Final     CO2   Date Value Ref Range Status   06/11/2025 22 (L) 23 - 29 mmol/L Final   03/20/2025 23 23 - 29 mmol/L Final     Glucose   Date Value Ref Range Status   06/11/2025 103 70 - 110 mg/dL Final   03/20/2025 94 70 - 110 mg/dL Final     BUN   Date Value Ref Range Status   06/11/2025 7 6 - 20 mg/dL Final     Creatinine   Date Value Ref Range Status   06/11/2025 1.0 0.5 - 1.4 mg/dL Final     Calcium   Date Value Ref Range Status   06/11/2025 8.8 8.7 - 10.5 mg/dL Final   03/20/2025 8.9 8.7 - 10.5 mg/dL Final     Protein Total   Date Value Ref Range Status   06/11/2025 6.9 6.0 - 8.4 gm/dL Final     Total Protein   Date Value Ref Range Status   02/09/2025 6.4 6.0 - 8.4 g/dL Final     Albumin   Date Value Ref Range Status   06/11/2025 3.8 3.5 - 5.2 g/dL Final   02/09/2025 3.4 (L) 3.5 - 5.2 g/dL Final     Total Bilirubin   Date Value Ref Range Status   02/09/2025 1.6 (H) 0.1 - 1.0 mg/dL Final     Comment:     For infants and newborns, interpretation of results should be based  on gestational age, weight and in agreement with clinical  observations.    Premature Infant recommended reference ranges:  Up to 24  hours.............<8.0 mg/dL  Up to 48 hours............<12.0 mg/dL  3-5 days..................<15.0 mg/dL  6-29 days.................<15.0 mg/dL       Bilirubin Total   Date Value Ref Range Status   06/11/2025 3.4 (H) 0.1 - 1.0 mg/dL Final     Comment:     For infants and newborns, interpretation of results should be based   on gestational age, weight and in agreement with clinical   observations.    Premature Infant recommended reference ranges:   0-24 hours:  <8.0 mg/dL   24-48 hours: <12.0 mg/dL   3-5 days:    <15.0 mg/dL   6-29 days:   <15.0 mg/dL     Alkaline Phosphatase   Date Value Ref Range Status   02/09/2025 40 40 - 150 U/L Final     ALP   Date Value Ref Range Status   06/11/2025 60 40 - 150 unit/L Final     AST   Date Value Ref Range Status   06/11/2025 21 11 - 45 unit/L Final   02/09/2025 16 10 - 40 U/L Final     ALT   Date Value Ref Range Status   06/11/2025 25 10 - 44 unit/L Final   02/09/2025 20 10 - 44 U/L Final     Anion Gap   Date Value Ref Range Status   06/11/2025 13 8 - 16 mmol/L Final     eGFR   Date Value Ref Range Status   06/11/2025 >60 >60 mL/min/1.73/m2 Final     Comment:     Estimated GFR calculated using the CKD-EPI creatinine (2021) equation.   03/20/2025 >60.0 >60 mL/min/1.73 m^2 Final      Lab Results   Component Value Date    WBC 6.03 06/11/2025    HGB 14.0 06/11/2025    HCT 43.8 06/11/2025    MCV 84 06/11/2025     (L) 06/11/2025     Significant Imaging:     X-Ray Abdomen 6/10/25:  Nonspecific, nonobstructive appearing bowel gas pattern.     CXR 6/10/25:  The lungs are clear.   No pleural effusion.     Echo 3/31/25  Imaging consistent with diagnosis of tricuspid atresia S/P lateral tunnel Fontan - study remarkable for decreased systolic function of the single ventricle worsened since [facility] study 10/26/2023:  Very difficult to demonstrate pulmonary circulation with significant chest deformity due to median sternotomy.  Mildly dilated inferior vena cava connecting to lateral for  completion of Fontan physiology with no obvious obstruction or thrombus.  Right superior vena cava identified with laminar flow and no obvious stenosis.  No evidence of obstruction at Fontan or Jesus anastomosis to the pulmonary arteries in very limited imaging.  Pulmonary confluence and LPA unobstructed in very limited imaging.  RPA not demonstrated..  At least two pulmonary veins demonstrated returning to the left atrium with no evidence for obstruction.  There is a small right atrium with no obvious obstruction of right atrial flow to the left atrium.  There is no obvious tricuspid valve tissue present and no right ventricular cavity could be demonstrated.  There is no evidence for pulmonary valve.  At least mild dilation of the left atrium.  Mildly dilated mitral valve annulus with color Doppler demonstrating at least moderate insufficiency.  Single ventricle consistent with left ventricular morphology.  There is at least moderate dilation of the left ventricle with prominently trabeculated apical myocardium.  Left ventricular systolic function qualitatively severely compromised with ejection fraction estimated 20 -25%.  Global left ventricular longitudinal strain abnormal - peak average measured -3.6.  There is a large aortic annulus with trileaflet aortic valve, no stenosis and trivial central jet of insufficiency.  Aortic dimensions:  Sinuses of Valsalva   = 49 mm.  ST junction               = 39 mm.  Ascending aorta       = not well demonstrated.  Qualitative impression of at least mild dilation of the ascending aorta.  No evidence for coarctation.  No obvious pericardial effusion.     Holter Monitor 3/21/25:    - The predominant rhythm was sinus rhythm, with heart rate ranges within normal limits for age.   - There were no episodes of supraventricular tachycardia. There was no significant supraventricular ectopy seen.  - There was no significant ventricular ectopy noted.  - There were no abnormal pauses or  episodes of heart block.   - Patient triggered events correlated with sinus rhythm and sinus tachycardia     CONCLUSION:  - Normal Holter monitor      Cath 9/3/20:  IMPRESSION:  1) Tricuspid atresia s/p intra-atrial Fontan  2) Normal Fontan pressures (11mmHg) and wedge pressure (8mmHg).  3) Normal cardiac output and vascular resistance calculations  4) Small atrial baffle leak  5) No significant aorta-pulmonary or veno-venous collaterals  6) Occlusion of right common femoral artery        Cardiac MRI 1/2/2020  Conclusion:   SVC to RPA unobstructed (Jesus)  Extracardiac Fontan with low flow but no evidence of thrombus.  Unobstructed ASD  Tricuspid Atresia  No obstruction to the right or left PA with Q flow of 21cc through each.  Increased LV mass and volume with systemic LVEF of 42%  Dilated sinus of Valsalva 49mm  Trace AI with regurgitation fraction of 8%

## 2025-06-11 NOTE — ASSESSMENT & PLAN NOTE
-Improved post dose of IVP Lasix.    -GDMT: Toprol 50mg daily, Spironolactone 25mg, Losartan 100mg daily, Dapa 10mg daily.

## 2025-06-11 NOTE — ASSESSMENT & PLAN NOTE
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 followed in the with the following diagnoses:  1. Tricuspid and pulmonary atresia initially palliated with a systemic to pulmonary artery shunt followed by a bidirectional Jesus and subsequent lateral tunnel Fontan procedure   - small atrial baffle leak along with new Fontan baffle puncture for EP study  - s/p EP study with trans-septal puncture November 2022  - reassuring hemodynamics on 2020 catheterization  2. Decreased left ventricular function  - no evidence of PLE  3. History of [id number], SVT  - status post successful ablation of a left lateral accessory pathway November 2022 by Dr. Ferguson  4. Cirrhosis  - last seen by hepatology May 2023    Recommendations:  - Appreciate heart failure team working on optimization of his heart failure meds  - Ready for discharge and will follow-up with [facility] in 1-2 weeks in [facility] clinic.  - Will then work towards rescheduling cath to reassess hemodynamics and potentially intervene (e.g., baffle leak, possible pulmonary AVMs)    * Acute decompensated heart failure  -Improved post dose of IVP Lasix.   -GDMT: Toprol 50mg daily, Spironolactone 25mg, Losartan 100mg daily, Dapa 10mg daily  - CXR today with clear lungs and no sign of pleural effusion.   - Needs follow-up post-discharge with heart failure out-patient    Epigastric pain  - X-ray abdomen today with nonspecific, nonobstructive appearing bowel gas pattern   -Cont simethicone scheduled    Atrial flutter  -Currently NSR.  -Cont rivaroxaban    Cirrhosis:  Ordering AFP today (as he's not had one previously)  Needs to follow up with hepatology (last seen 5/16/2023)

## 2025-06-11 NOTE — HPI
Juani is a 30 year-old male with history of tricuspid and pulmonary atresia initially palliated with a systemic to pulmonary artery shunt followed by a bidirectional Jesus and subsequent lateral tunnel Fontan procedure, decreased LV function (estimated 20 -25% on most recent echo), cirrhosis who was admitted directly from cath procedure room after developing cough and abdominal pain (unable to undergo cath). He states that he started sneezing and coughing the morning of 6/10, which lead to the abdominal pain. Abdominal pain has improved since yesterday but still reports some pain and not able to lie on left side. Denies CP, palpitations, syncope, presyncope, fevers, n/v/d.

## 2025-06-11 NOTE — CONSULTS
[facility] - Cardiology Stepdown  Cardiology  Consult Note    Patient Name: Juani Reilly Jr.  MRN: [id number]  Admission Date: 6/10/2025  Hospital Length of Stay: 0 days  Code Status: Full Code   Attending Provider: Keren Toure MD   Consulting Provider: Princess Montesinos PA-C  Primary Care Physician: Jaqueline Pardo MD  Principal Problem:Acute decompensated heart failure    Patient information was obtained from patient and past medical records.     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 He states that he started sneezing and coughing the morning of 6/10, which lead to the abdominal pain. Abdominal pain has improved since yesterday but still reports some pain and not able to lie on left side. Denies CP, palpitations, syncope, presyncope, fevers, n/v/d.     Past Medical History:   Diagnosis Date    Acute decompensated heart failure 02/06/2025    History of heart surgery     Other cirrhosis of liver 11/12/2020    SVT (supraventricular tachycardia)     WPW syndrome        Past Surgical History:   Procedure Laterality Date    ANGIOGRAPHY OF AORTIC ARCH N/A 9/3/2020    Procedure: AORTOGRAM, AORTIC ARCH;  Surgeon: Stephania Crump MD;  Location: [facility] CATH LAB;  Service: Cardiology;  Laterality: N/A;    FONTAN PROCEDURE, EXTRACARDIAC       LEFT HEART CATHETERIZATION Left 9/3/2020    Procedure: Left heart cath;  Surgeon: Stephania Crump MD;  Location: [facility] CATH LAB;  Service: Cardiology;  Laterality: Left;    RIGHT HEART CATHETERIZATION FOR CONGENITAL HEART DEFECT N/A 9/3/2020    Procedure: CATHETERIZATION, HEART, RIGHT, FOR CONGENITAL HEART DEFECT;  Surgeon: Stephania Crump MD;  Location: [facility] CATH LAB;  Service: Cardiology;  Laterality: N/A;  [facility] - needs covid test morning of. Extenuating circumstances    TRANSESOPHAGEAL ECHOCARDIOGRAPHY N/A 11/10/2022    Procedure: ECHOCARDIOGRAM, TRANSESOPHAGEAL;  Surgeon: Emeterio Hall MD;  Location: [facility] EP LAB;  Service: Cardiology;  Laterality: N/A;    TREATMENT OF CARDIAC ARRHYTHMIA N/A 1/9/2021    Procedure: CARDIOVERSION;  Surgeon: Jim Mcginnis MD;  Location: [facility] CATH LAB;  Service: Cardiology;  Laterality: N/A;       Review of patient's allergies indicates:  No Known Allergies    No current facility-administered medications on file prior to encounter.     Current Outpatient Medications on File Prior to Encounter   Medication Sig    cetirizine (ZYRTEC) 10 MG tablet Take 1 tablet (10 mg total) by mouth once daily.    furosemide (LASIX) 20 MG tablet Take 1 tablet (20 mg total) by mouth once daily.    metoprolol succinate (TOPROL-XL) 50 MG 24 hr tablet Take 1 tablet (50 mg total) by mouth once daily.    dapagliflozin propanediol (FARXIGA) 10 mg tablet Take 1 tablet (10 mg total) by mouth once daily.    famotidine (PEPCID) 20 MG tablet Take 1 tablet (20 mg total) by mouth 2 (two) times daily.    losartan (COZAAR) 100 MG tablet Take 1 tablet (100 mg total) by mouth once daily.    rivaroxaban (XARELTO) 20 mg Tab Take 1 tablet (20 mg total) by mouth daily with dinner or evening meal.    spironolactone (ALDACTONE) 25 MG tablet Take 1 tablet (25 mg total) by mouth once daily.     Family History       Problem Relation (Age of Onset)    Heart disease Maternal Grandfather    No Known  Problems Mother, Father, Sister, Brother, Maternal Aunt, Maternal Uncle, Paternal Aunt, Paternal Uncle, Maternal Grandmother, Paternal Grandmother, Paternal Grandfather          Tobacco Use    Smoking status: Former     Current packs/day: 0.00     Types: Cigarettes     Quit date:      Years since quittin.4     Passive exposure: Past    Smokeless tobacco: Never    Tobacco comments:     3-4 months    Substance and Sexual Activity    Alcohol use: No    Drug use: Yes     Types: Marijuana     Comment: Mojo today- pt reports a while back    Sexual activity: Yes     Review of Systems   Constitutional: Positive for malaise/fatigue. Negative for fever and night sweats.   HENT:  Negative for congestion and sore throat.    Eyes:  Negative for blurred vision and double vision.   Cardiovascular:  Positive for dyspnea on exertion, leg swelling and orthopnea. Negative for chest pain, irregular heartbeat, near-syncope, palpitations and syncope.   Respiratory:  Positive for cough and shortness of breath.    Skin:  Negative for dry skin and rash.   Musculoskeletal:  Negative for myalgias and neck pain.   Gastrointestinal:  Positive for abdominal pain, constipation and flatus. Negative for diarrhea, nausea and vomiting.   Genitourinary:  Negative for dysuria and hematuria.   Neurological:  Negative for dizziness, headaches and light-headedness.   Psychiatric/Behavioral:  Negative for depression. The patient is not nervous/anxious.      Objective:     Vital Signs (Most Recent):  Temp: 97.6 °F (36.4 °C) (25 1215)  Pulse: 97 (25 1215)  Resp: 18 (25 1215)  BP: 109/70 (25 1215)  SpO2: (!) 79 % (25 1215) Vital Signs (24h Range):  Temp:  [97.6 °F (36.4 °C)-98.7 °F (37.1 °C)] 97.6 °F (36.4 °C)  Pulse:  [] 97  Resp:  [16-18] 18  SpO2:  [79 %-82 %] 79 %  BP: ()/(63-90) 109/70     Weight: 59 kg (130 lb 1.1 oz)  Body mass index is 22.33 kg/m².    SpO2: (!) 79 %         Intake/Output Summary (Last  24 hours) at 6/11/2025 1352  Last data filed at 6/11/2025 0448  Gross per 24 hour   Intake 835 ml   Output 4175 ml   Net -3340 ml       Lines/Drains/Airways       Peripheral Intravenous Line  Duration                  Peripheral IV - Single Lumen 06/10/25 1630 22 G Anterior;Proximal;Right Forearm <1 day                     Physical Exam  Constitutional:       Appearance: He is normal weight. He is ill-appearing.   HENT:      Head: Normocephalic and atraumatic.      Nose: Nose normal. No congestion or rhinorrhea.      Mouth/Throat:      Mouth: Mucous membranes are moist.      Pharynx: Oropharynx is clear.     Neck:      Comments: +JVD  Eyes:      Extraocular Movements: Extraocular movements intact.      Pupils: Pupils are equal, round, and reactive to light.   Cardiovascular:      Rate and Rhythm: Normal rate and regular rhythm.      Pulses: Normal pulses.      Heart sounds: Murmur heard.      Comments: regular rate and rhythm  S1 with single S2  2/6 systolic murmur heard best at the apex  Pulmonary:      Effort: Pulmonary effort is normal. No respiratory distress.      Breath sounds: Normal breath sounds.   Abdominal:      General: There is distension.      Tenderness: There is abdominal tenderness.      Comments: Mild distension, improved since yesterday   Musculoskeletal:         General: No swelling. Normal range of motion.      Cervical back: Normal range of motion. No rigidity.      Right lower leg: Edema present.      Left lower leg: Edema present.      Comments: Mild lower extremity edema   Lymphadenopathy:      Cervical: No cervical adenopathy.   Skin:     General: Skin is warm.      Capillary Refill: Capillary refill takes 2 to 3 seconds.      Findings: No rash.   Neurological:      General: No focal deficit present.      Mental Status: He is alert and oriented to person, place, and time.   Psychiatric:         Mood and Affect: Mood normal.         Behavior: Behavior normal.          Significant Labs:      CMP  Sodium   Date Value Ref Range Status   06/11/2025 138 136 - 145 mmol/L Final   03/20/2025 139 136 - 145 mmol/L Final     Potassium   Date Value Ref Range Status   06/11/2025 3.6 3.5 - 5.1 mmol/L Final   03/20/2025 3.5 3.5 - 5.1 mmol/L Final     Chloride   Date Value Ref Range Status   06/11/2025 103 95 - 110 mmol/L Final   03/20/2025 104 95 - 110 mmol/L Final     CO2   Date Value Ref Range Status   06/11/2025 22 (L) 23 - 29 mmol/L Final   03/20/2025 23 23 - 29 mmol/L Final     Glucose   Date Value Ref Range Status   06/11/2025 103 70 - 110 mg/dL Final   03/20/2025 94 70 - 110 mg/dL Final     BUN   Date Value Ref Range Status   06/11/2025 7 6 - 20 mg/dL Final     Creatinine   Date Value Ref Range Status   06/11/2025 1.0 0.5 - 1.4 mg/dL Final     Calcium   Date Value Ref Range Status   06/11/2025 8.8 8.7 - 10.5 mg/dL Final   03/20/2025 8.9 8.7 - 10.5 mg/dL Final     Protein Total   Date Value Ref Range Status   06/11/2025 6.9 6.0 - 8.4 gm/dL Final     Total Protein   Date Value Ref Range Status   02/09/2025 6.4 6.0 - 8.4 g/dL Final     Albumin   Date Value Ref Range Status   06/11/2025 3.8 3.5 - 5.2 g/dL Final   02/09/2025 3.4 (L) 3.5 - 5.2 g/dL Final     Total Bilirubin   Date Value Ref Range Status   02/09/2025 1.6 (H) 0.1 - 1.0 mg/dL Final     Comment:     For infants and newborns, interpretation of results should be based  on gestational age, weight and in agreement with clinical  observations.    Premature Infant recommended reference ranges:  Up to 24 hours.............<8.0 mg/dL  Up to 48 hours............<12.0 mg/dL  3-5 days..................<15.0 mg/dL  6-29 days.................<15.0 mg/dL       Bilirubin Total   Date Value Ref Range Status   06/11/2025 3.4 (H) 0.1 - 1.0 mg/dL Final     Comment:     For infants and newborns, interpretation of results should be based   on gestational age, weight and in agreement with clinical   observations.    Premature Infant recommended reference ranges:   0-24  hours:  <8.0 mg/dL   24-48 hours: <12.0 mg/dL   3-5 days:    <15.0 mg/dL   6-29 days:   <15.0 mg/dL     Alkaline Phosphatase   Date Value Ref Range Status   02/09/2025 40 40 - 150 U/L Final     ALP   Date Value Ref Range Status   06/11/2025 60 40 - 150 unit/L Final     AST   Date Value Ref Range Status   06/11/2025 21 11 - 45 unit/L Final   02/09/2025 16 10 - 40 U/L Final     ALT   Date Value Ref Range Status   06/11/2025 25 10 - 44 unit/L Final   02/09/2025 20 10 - 44 U/L Final     Anion Gap   Date Value Ref Range Status   06/11/2025 13 8 - 16 mmol/L Final     eGFR   Date Value Ref Range Status   06/11/2025 >60 >60 mL/min/1.73/m2 Final     Comment:     Estimated GFR calculated using the CKD-EPI creatinine (2021) equation.   03/20/2025 >60.0 >60 mL/min/1.73 m^2 Final      Lab Results   Component Value Date    WBC 6.03 06/11/2025    HGB 14.0 06/11/2025    HCT 43.8 06/11/2025    MCV 84 06/11/2025     (L) 06/11/2025     Significant Imaging:     X-Ray Abdomen 6/10/25:  Nonspecific, nonobstructive appearing bowel gas pattern.     CXR 6/10/25:  The lungs are clear.   No pleural effusion.     Echo 3/31/25  Imaging consistent with diagnosis of tricuspid atresia S/P lateral tunnel Fontan - study remarkable for decreased systolic function of the single ventricle worsened since [facility] study 10/26/2023:  Very difficult to demonstrate pulmonary circulation with significant chest deformity due to median sternotomy.  Mildly dilated inferior vena cava connecting to lateral for completion of Fontan physiology with no obvious obstruction or thrombus.  Right superior vena cava identified with laminar flow and no obvious stenosis.  No evidence of obstruction at Fontan or Jesus anastomosis to the pulmonary arteries in very limited imaging.  Pulmonary confluence and LPA unobstructed in very limited imaging.  RPA not demonstrated..  At least two pulmonary veins demonstrated returning to the left atrium with no evidence for  obstruction.  There is a small right atrium with no obvious obstruction of right atrial flow to the left atrium.  There is no obvious tricuspid valve tissue present and no right ventricular cavity could be demonstrated.  There is no evidence for pulmonary valve.  At least mild dilation of the left atrium.  Mildly dilated mitral valve annulus with color Doppler demonstrating at least moderate insufficiency.  Single ventricle consistent with left ventricular morphology.  There is at least moderate dilation of the left ventricle with prominently trabeculated apical myocardium.  Left ventricular systolic function qualitatively severely compromised with ejection fraction estimated 20 -25%.  Global left ventricular longitudinal strain abnormal - peak average measured -3.6.  There is a large aortic annulus with trileaflet aortic valve, no stenosis and trivial central jet of insufficiency.  Aortic dimensions:  Sinuses of Valsalva   = 49 mm.  ST junction               = 39 mm.  Ascending aorta       = not well demonstrated.  Qualitative impression of at least mild dilation of the ascending aorta.  No evidence for coarctation.  No obvious pericardial effusion.     Holter Monitor 3/21/25:    - The predominant rhythm was sinus rhythm, with heart rate ranges within normal limits for age.   - There were no episodes of supraventricular tachycardia. There was no significant supraventricular ectopy seen.  - There was no significant ventricular ectopy noted.  - There were no abnormal pauses or episodes of heart block.   - Patient triggered events correlated with sinus rhythm and sinus tachycardia     CONCLUSION:  - Normal Holter monitor      Cath 9/3/20:  IMPRESSION:  1) Tricuspid atresia s/p intra-atrial Fontan  2) Normal Fontan pressures (11mmHg) and wedge pressure (8mmHg).  3) Normal cardiac output and vascular resistance calculations  4) Small atrial baffle leak  5) No significant aorta-pulmonary or veno-venous collaterals  6)  Occlusion of right common femoral artery        Cardiac MRI 1/2/2020  Conclusion:   SVC to RPA unobstructed (Jesus)  Extracardiac Fontan with low flow but no evidence of thrombus.  Unobstructed ASD  Tricuspid Atresia  No obstruction to the right or left PA with Q flow of 21cc through each.  Increased LV mass and volume with systemic LVEF of 42%  Dilated sinus of Valsalva 49mm  Trace AI with regurgitation fraction of 8%   Assessment and Plan:     Adult congenital heart disease  Juani is a 30 year-old with a history of tricuspid and pulmonary atresia s/p lateral tunnel Fontan procedure, decreased LV function (EF 20-25% by last echo), and cirrhosis who was admitted directly from procedure room after developing cough and abdominal pain(unable to undergo cath)    To summarize, he has the following diagnoses:  1. Tricuspid and pulmonary atresia initially palliated with a systemic to pulmonary artery shunt followed by a bidirectional Jesus and subsequent lateral tunnel Fontan procedure   - small atrial baffle leak along with new Fontan baffle puncture for EP study  - s/p EP study with trans-septal puncture November 2022  - reassuring hemodynamics on 2020 catheterization  2. Decreased left ventricular function  - no evidence of PLE  3. History of [id number], SVT  - status post successful ablation of a left lateral accessory pathway November 2022 by Dr. Ferguson  4. Cirrhosis  - last seen by hepatology May 2023    Recommendations:  - Appreciate heart failure team working on optimization of his heart failure meds  - Ready for discharge and will follow-up with Princess in 1-2 weeks in [facility] clinic.  - Will then work towards rescheduling cath to reassess hemodynamics and potentially intervene (e.g., baffle leak, possible pulmonary AVMs)    * Acute decompensated heart failure  -Improved post dose of IVP Lasix.   -GDMT: Toprol 50mg daily, Spironolactone 25mg, Losartan 100mg daily, Dapa 10mg daily  - CXR today with clear  lungs and no sign of pleural effusion.   - Needs follow-up post-discharge with heart failure out-patient    Epigastric pain  - X-ray abdomen today with nonspecific, nonobstructive appearing bowel gas pattern   -Cont simethicone scheduled    Atrial flutter  -Currently NSR.  -Cont rivaroxaban    Cirrhosis:  Ordering AFP today (as he's not had one previously)  Needs to follow up with hepatology (last seen 5/16/2023)        VTE Risk Mitigation (From admission, onward)           Ordered     rivaroxaban tablet 20 mg  With dinner         06/10/25 1929                    Thank you for your consult. I will follow-up with patient. Please contact us if you have any additional questions.    Princess Montesinos PA-C  Adult Congenital Heart Disease

## 2025-06-11 NOTE — ASSESSMENT & PLAN NOTE
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 Ferguson Nov 2022, decreased LV function (EF 30-35%), cirrhosis.  - Congenital to follow.

## 2025-06-11 NOTE — TREATMENT PLAN
Discharge Planning: CM/UM  Patient admitted as OBS after aborted RHC and required IV Lasix x 1 dose.  Patient is co-managed with HTS and Peds IC  Peds IC stating patient should dc home today, no discharge needs noted needs    Per Dr. Natanael prescott f//u clinic appt will be made for 1-2 weeks --Peds IC , PA to make appt for patient    Bedside nurse aware of dc plan per Secure chat messaging

## 2025-06-11 NOTE — SUBJECTIVE & OBJECTIVE
Interval History: Breathing better after dose of IVP Lasix yesterday. Abd pain improved as well but still complaining of R sided rib pain due to coughing yesterday.     Scheduled Meds:   cetirizine  10 mg Oral Daily    dapagliflozin propanediol  10 mg Oral Daily    famotidine  20 mg Oral BID    furosemide  20 mg Oral Daily    LIDOcaine  1 patch Transdermal Q24H    losartan  100 mg Oral Daily    metoprolol succinate  50 mg Oral Daily    mupirocin   Nasal BID    rivaroxaban  20 mg Oral Daily with dinner    simethicone  1 tablet Oral QID (PC + HS)    spironolactone  25 mg Oral Daily     PRN Meds:  Current Facility-Administered Medications:     acetaminophen, 650 mg, Oral, Q6H PRN    methocarbamoL, 500 mg, Oral, QID PRN    morphine, 1 mg, Intravenous, Q6H PRN    sodium chloride 0.9%, 10 mL, Intravenous, PRN    Review of patient's allergies indicates:  No Known Allergies  Objective:     Vital Signs (Most Recent):  Temp: 97.6 °F (36.4 °C) (06/11/25 1215)  Pulse: 97 (06/11/25 1215)  Resp: 18 (06/11/25 1215)  BP: 112/72 (06/11/25 0810)  SpO2: (!) 79 % (06/11/25 1215) Vital Signs (24h Range):  Temp:  [97.6 °F (36.4 °C)-98.7 °F (37.1 °C)] 97.6 °F (36.4 °C)  Pulse:  [] 97  Resp:  [16-31] 18  SpO2:  [77 %-82 %] 79 %  BP: ()/(63-90) 112/72     Patient Vitals for the past 72 hrs (Last 3 readings):   Weight   06/11/25 0613 59 kg (130 lb 1.1 oz)   06/10/25 1541 64.4 kg (141 lb 15.6 oz)   06/10/25 1007 63.5 kg (140 lb)     Body mass index is 22.33 kg/m².      Intake/Output Summary (Last 24 hours) at 6/11/2025 1218  Last data filed at 6/11/2025 0448  Gross per 24 hour   Intake 835 ml   Output 4175 ml   Net -3340 ml          Physical Exam  Vitals and nursing note reviewed.   Constitutional:       Appearance: He is well-developed.   HENT:      Head: Normocephalic.   Eyes:      Pupils: Pupils are equal, round, and reactive to light.   Neck:      Comments: +JVD  Cardiovascular:      Rate and Rhythm: Normal rate and regular  "rhythm.      Heart sounds: Murmur heard.   Pulmonary:      Effort: Pulmonary effort is normal.      Breath sounds: Normal breath sounds.   Abdominal:      General: Bowel sounds are normal.      Palpations: Abdomen is soft.   Musculoskeletal:         General: Normal range of motion.      Cervical back: Normal range of motion and neck supple.      Right lower leg: Edema present.      Left lower leg: Edema present.   Skin:     General: Skin is warm and dry.   Neurological:      Mental Status: He is alert and oriented to person, place, and time.   Psychiatric:         Behavior: Behavior normal.          Significant Labs:  CBC:  Recent Labs   Lab 06/10/25  1022 06/11/25  0228   WBC 5.12 6.03   RBC 5.26 5.23   HGB 14.3 14.0   HCT 45.1 43.8   * 130*   MCV 86 84   MCH 27.2 26.8*   MCHC 31.7* 32.0     BNP:  No results for input(s): "BNP" in the last 168 hours.    Invalid input(s): "BNPTRIAGELBLO"  CMP:  Recent Labs   Lab 06/10/25  1022 06/11/25 0227   GLU 98 103   CALCIUM 8.5* 8.8   ALBUMIN  --  3.8   PROT  --  6.9    138   K 4.4 3.6   CO2 20* 22*    103   BUN 7 7   CREATININE 1.0 1.0   ALKPHOS  --  60   ALT  --  25   AST  --  21   BILITOT  --  3.4*      Coagulation:   Recent Labs   Lab 06/10/25  1026   INR 1.6*     LDH:  No results for input(s): "LDH" in the last 72 hours.  Microbiology:  Microbiology Results (last 7 days)       ** No results found for the last 168 hours. **          I have reviewed all pertinent labs within the past 24 hours.    Estimated Creatinine Clearance: 89.3 mL/min (based on SCr of 1 mg/dL).    Diagnostic Results:  I have reviewed all pertinent imaging results/findings within the past 24 hours.  "

## 2025-06-12 VITALS
DIASTOLIC BLOOD PRESSURE: 73 MMHG | SYSTOLIC BLOOD PRESSURE: 107 MMHG | TEMPERATURE: 98 F | BODY MASS INDEX: 22.2 KG/M2 | HEIGHT: 64 IN | RESPIRATION RATE: 18 BRPM | HEART RATE: 116 BPM | OXYGEN SATURATION: 82 % | WEIGHT: 130.06 LBS

## 2025-06-12 LAB
ABSOLUTE EOSINOPHIL (OHS): 0 K/UL
ABSOLUTE MONOCYTE (OHS): 0.9 K/UL (ref 0.3–1)
ABSOLUTE NEUTROPHIL COUNT (OHS): 5.06 K/UL (ref 1.8–7.7)
ALBUMIN SERPL BCP-MCNC: 3.2 G/DL (ref 3.5–5.2)
ALP SERPL-CCNC: 53 UNIT/L (ref 40–150)
ALT SERPL W/O P-5'-P-CCNC: 19 UNIT/L (ref 10–44)
ANION GAP (OHS): 13 MMOL/L (ref 8–16)
AST SERPL-CCNC: 15 UNIT/L (ref 11–45)
BASOPHILS # BLD AUTO: 0.02 K/UL
BASOPHILS NFR BLD AUTO: 0.3 %
BILIRUB SERPL-MCNC: 3.4 MG/DL (ref 0.1–1)
BUN SERPL-MCNC: 10 MG/DL (ref 6–20)
CALCIUM SERPL-MCNC: 8.2 MG/DL (ref 8.7–10.5)
CHLORIDE SERPL-SCNC: 97 MMOL/L (ref 95–110)
CO2 SERPL-SCNC: 23 MMOL/L (ref 23–29)
CREAT SERPL-MCNC: 0.9 MG/DL (ref 0.5–1.4)
ERYTHROCYTE [DISTWIDTH] IN BLOOD BY AUTOMATED COUNT: 18.4 % (ref 11.5–14.5)
GFR SERPLBLD CREATININE-BSD FMLA CKD-EPI: >60 ML/MIN/1.73/M2
GLUCOSE SERPL-MCNC: 86 MG/DL (ref 70–110)
HCT VFR BLD AUTO: 43.2 % (ref 40–54)
HGB BLD-MCNC: 14 GM/DL (ref 14–18)
IMM GRANULOCYTES # BLD AUTO: 0.03 K/UL (ref 0–0.04)
IMM GRANULOCYTES NFR BLD AUTO: 0.4 % (ref 0–0.5)
LYMPHOCYTES # BLD AUTO: 1.73 K/UL (ref 1–4.8)
MAGNESIUM SERPL-MCNC: 1.5 MG/DL (ref 1.6–2.6)
MCH RBC QN AUTO: 27.1 PG (ref 27–31)
MCHC RBC AUTO-ENTMCNC: 32.4 G/DL (ref 32–36)
MCV RBC AUTO: 84 FL (ref 82–98)
NUCLEATED RBC (/100WBC) (OHS): 0 /100 WBC
PLATELET # BLD AUTO: 122 K/UL (ref 150–450)
PMV BLD AUTO: 11.3 FL (ref 9.2–12.9)
POTASSIUM SERPL-SCNC: 3.3 MMOL/L (ref 3.5–5.1)
PROT SERPL-MCNC: 6.3 GM/DL (ref 6–8.4)
RBC # BLD AUTO: 5.16 M/UL (ref 4.6–6.2)
RELATIVE EOSINOPHIL (OHS): 0 %
RELATIVE LYMPHOCYTE (OHS): 22.4 % (ref 18–48)
RELATIVE MONOCYTE (OHS): 11.6 % (ref 4–15)
RELATIVE NEUTROPHIL (OHS): 65.3 % (ref 38–73)
SODIUM SERPL-SCNC: 133 MMOL/L (ref 136–145)
WBC # BLD AUTO: 7.74 K/UL (ref 3.9–12.7)

## 2025-06-12 PROCEDURE — 20600001 HC STEP DOWN PRIVATE ROOM

## 2025-06-12 PROCEDURE — 25000003 PHARM REV CODE 250: Performed by: NURSE PRACTITIONER

## 2025-06-12 PROCEDURE — 36415 COLL VENOUS BLD VENIPUNCTURE: CPT | Performed by: NURSE PRACTITIONER

## 2025-06-12 PROCEDURE — 99233 SBSQ HOSP IP/OBS HIGH 50: CPT | Mod: 95,,, | Performed by: NURSE PRACTITIONER

## 2025-06-12 PROCEDURE — 80053 COMPREHEN METABOLIC PANEL: CPT | Performed by: NURSE PRACTITIONER

## 2025-06-12 PROCEDURE — 99232 SBSQ HOSP IP/OBS MODERATE 35: CPT | Mod: ,,, | Performed by: PEDIATRICS

## 2025-06-12 PROCEDURE — 25000003 PHARM REV CODE 250: Performed by: PEDIATRICS

## 2025-06-12 PROCEDURE — 83735 ASSAY OF MAGNESIUM: CPT | Performed by: NURSE PRACTITIONER

## 2025-06-12 PROCEDURE — 85025 COMPLETE CBC W/AUTO DIFF WBC: CPT | Performed by: NURSE PRACTITIONER

## 2025-06-12 RX ORDER — BENZONATATE 100 MG/1
100 CAPSULE ORAL 3 TIMES DAILY PRN
Qty: 30 CAPSULE | Refills: 3 | Status: SHIPPED | OUTPATIENT
Start: 2025-06-12

## 2025-06-12 RX ORDER — BENZONATATE 100 MG/1
100 CAPSULE ORAL 3 TIMES DAILY PRN
Status: DISCONTINUED | OUTPATIENT
Start: 2025-06-12 | End: 2025-06-12 | Stop reason: HOSPADM

## 2025-06-12 RX ORDER — PROMETHAZINE HYDROCHLORIDE AND CODEINE PHOSPHATE 6.25; 1 MG/5ML; MG/5ML
5 SOLUTION ORAL EVERY 4 HOURS PRN
Status: DISCONTINUED | OUTPATIENT
Start: 2025-06-12 | End: 2025-06-12 | Stop reason: HOSPADM

## 2025-06-12 RX ORDER — POTASSIUM CHLORIDE 20 MEQ/1
40 TABLET, EXTENDED RELEASE ORAL ONCE
Status: COMPLETED | OUTPATIENT
Start: 2025-06-12 | End: 2025-06-12

## 2025-06-12 RX ADMIN — POTASSIUM CHLORIDE 40 MEQ: 1500 TABLET, EXTENDED RELEASE ORAL at 02:06

## 2025-06-12 RX ADMIN — TRAMADOL HYDROCHLORIDE 50 MG: 50 TABLET, COATED ORAL at 04:06

## 2025-06-12 RX ADMIN — FUROSEMIDE 40 MG: 40 TABLET ORAL at 02:06

## 2025-06-12 RX ADMIN — FAMOTIDINE 20 MG: 20 TABLET, FILM COATED ORAL at 02:06

## 2025-06-12 RX ADMIN — SPIRONOLACTONE 25 MG: 25 TABLET, FILM COATED ORAL at 02:06

## 2025-06-12 RX ADMIN — METOPROLOL SUCCINATE 50 MG: 50 TABLET, EXTENDED RELEASE ORAL at 02:06

## 2025-06-12 RX ADMIN — DAPAGLIFLOZIN 10 MG: 10 TABLET, FILM COATED ORAL at 02:06

## 2025-06-12 RX ADMIN — LOSARTAN POTASSIUM 100 MG: 50 TABLET, FILM COATED ORAL at 02:06

## 2025-06-12 RX ADMIN — CETIRIZINE HYDROCHLORIDE 10 MG: 10 TABLET, FILM COATED ORAL at 02:06

## 2025-06-12 RX ADMIN — PROMETHAZINE HYDROCHLORIDE AND CODEINE PHOSPHATE 5 ML: 6.25; 1 SOLUTION ORAL at 02:06

## 2025-06-12 RX ADMIN — SIMETHICONE 80 MG: 80 TABLET, CHEWABLE ORAL at 02:06

## 2025-06-12 NOTE — PROGRESS NOTES
[facility] - Cardiology Stepdown  Heart Transplant  Progress Note    Patient Name: Juani Reilly Jr.  MRN: [id number]  Admission Date: 6/10/2025  Hospital Length of Stay: 0 days  Attending Physician: Keren Toure MD  Primary Care Provider: Jaqueline Pardo MD  Principal Problem:Acute decompensated heart failure    Subjective:   Interval History: Having dome hemoptysis this am and continues with some dyspnea. Started with cough again overnight. Sats improved with addition of 2L O2.     Scheduled Meds:   cetirizine  10 mg Oral Daily    dapagliflozin propanediol  10 mg Oral Daily    famotidine  20 mg Oral BID    furosemide  40 mg Oral Daily    LIDOcaine  1 patch Transdermal Q24H    losartan  100 mg Oral Daily    metoprolol succinate  50 mg Oral Daily    mupirocin   Nasal BID    potassium chloride  40 mEq Oral Once    rivaroxaban  20 mg Oral Daily with dinner    simethicone  1 tablet Oral QID (PC + HS)    spironolactone  25 mg Oral Daily     PRN Meds:  Current Facility-Administered Medications:     acetaminophen, 650 mg, Oral, Q6H PRN    benzonatate, 100 mg, Oral, TID PRN    methocarbamoL, 500 mg, Oral, QID PRN    sodium chloride 0.9%, 10 mL, Intravenous, PRN    traMADoL, 50 mg, Oral, Q6H PRN    Review of patient's allergies indicates:  No Known Allergies  Objective:     Vital Signs (Most Recent):  Temp: 98.4 °F (36.9 °C) (06/12/25 0730)  Pulse: 93 (06/12/25 0730)  Resp: 18 (06/12/25 0730)  BP: 120/76 (06/12/25 0730)  SpO2: (!) 88 % (06/12/25 0730) Vital Signs (24h Range):  Temp:  [97.4 °F (36.3 °C)-98.5 °F (36.9 °C)] 98.4 °F (36.9 °C)  Pulse:  [89-99] 93  Resp:  [16-20] 18  SpO2:  [79 %-90 %] 88 %  BP: (100-120)/(70-76) 120/76     Patient Vitals for the past 72 hrs (Last 3 readings):   Weight   06/11/25 0613 59 kg (130 lb 1.1 oz)   06/10/25 1541 64.4 kg (141 lb 15.6 oz)   06/10/25 1007 63.5 kg (140 lb)     Body mass index is 22.33 kg/m².      Intake/Output Summary (Last 24 hours) at 6/12/2025 0914  Last data  "filed at 6/11/2025 2025  Gross per 24 hour   Intake 675 ml   Output 1475 ml   Net -800 ml          Physical Exam  Vitals and nursing note reviewed.   Constitutional:       Appearance: He is well-developed.   HENT:      Head: Normocephalic.   Eyes:      Pupils: Pupils are equal, round, and reactive to light.   Neck:      Comments: +JVD  Cardiovascular:      Rate and Rhythm: Normal rate and regular rhythm.      Heart sounds: Murmur heard.   Pulmonary:      Effort: Pulmonary effort is normal.      Breath sounds: Normal breath sounds.   Abdominal:      General: Bowel sounds are normal.      Palpations: Abdomen is soft.   Musculoskeletal:         General: Normal range of motion.      Cervical back: Normal range of motion and neck supple.      Right lower leg: Edema present.      Left lower leg: Edema present.   Skin:     General: Skin is warm and dry.   Neurological:      Mental Status: He is alert and oriented to person, place, and time.   Psychiatric:         Behavior: Behavior normal.          Significant Labs:  CBC:  Recent Labs   Lab 06/10/25  1022 06/11/25  0228   WBC 5.12 6.03   RBC 5.26 5.23   HGB 14.3 14.0   HCT 45.1 43.8   * 130*   MCV 86 84   MCH 27.2 26.8*   MCHC 31.7* 32.0     BNP:  No results for input(s): "BNP" in the last 168 hours.    Invalid input(s): "BNPTRIAGELBLO"  CMP:  Recent Labs   Lab 06/10/25  1022 06/11/25  0227 06/12/25  0355   GLU 98 103 86   CALCIUM 8.5* 8.8 8.2*   ALBUMIN  --  3.8 3.2*   PROT  --  6.9 6.3    138 133*   K 4.4 3.6 3.3*   CO2 20* 22* 23    103 97   BUN 7 7 10   CREATININE 1.0 1.0 0.9   ALKPHOS  --  60 53   ALT  --  25 19   AST  --  21 15   BILITOT  --  3.4* 3.4*      Coagulation:   Recent Labs   Lab 06/10/25  1026   INR 1.6*     LDH:  No results for input(s): "LDH" in the last 72 hours.  Microbiology:  Microbiology Results (last 7 days)       ** No results found for the last 168 hours. **          I have reviewed all pertinent labs within the past 24 " hours.    Estimated Creatinine Clearance: 99.2 mL/min (based on SCr of 0.9 mg/dL).    Diagnostic Results:  I have reviewed all pertinent imaging results/findings within the past 24 hours.  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 Ferguson Nov 2022, decreased LV function (EF 30-35%), cirrhosis who was admitted directly from procedure room after developing cough and abdominal pain(unable to undergo cath). He states the cough started this am as well as the abdominal pain. He describes the pain as central over his abdomen and feel crampy. Cough is intermittent and nonproductive. He states that he feels more dyspneic since recent hospital stay. Describes worsening orthopnea over last week. Denies CP, palpitations, syncope, presyncope, fevers, n/v/d.         * Acute decompensated heart failure  -Improved post dose of IVP Lasix.    -GDMT: Toprol 50mg daily, Spironolactone 25mg, Losartan 100mg daily, Dapa 10mg daily.       Epigastric pain  -Likely gas  -Cont simethicone scheduled.     Atrial flutter  -Currently NSR.  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 Ferguson Nov 2022, decreased LV function (EF 30-35%), cirrhosis.  - Congenital to follow.       Hemoptysis-      -Hold rivaroxaban.     -Will start tessalon perles     -CXR.     Date Performed: 6/12/2025     1)Patient's Home O2 Sat on room air, while at rest: 82                               If O2 sats on room air at rest are 88% or below, patient qualifies. No additional testing needed. Document N/A in  steps 2 and 3. If 89% or above, complete steps 2.        2)         Patient's O2 Sat on room air while exercising:                                If O2 sats on room air while exercising remain 89% or above patient does not qualify, no further testing needed Document N/A in step 3. If O2 sats on room air while exercising are 88% or below, continue to step 3.        3)         Patient's O2 Sat while exercising on O2:  at  LPM                                           (Must show improvement from #2 for patients to qualify)     If O2 sats improve on oxygen, patient qualifies for portable oxygen. If not, the patient does not qualify.    Per nursing flowsheet    Nazario Thompson NP  Heart Transplant  [facility] - [facility]iology Stepdown

## 2025-06-12 NOTE — PLAN OF CARE
Home Oxygen Evaluation    Date Performed: 6/12/2025    1) Patient's Home O2 Sat on room air, while at rest: 82        If O2 sats on room air at rest are 88% or below, patient qualifies. No additional testing needed. Document N/A in steps 2 and 3. If 89% or above, complete steps 2.      2) Patient's O2 Sat on room air while exercising:         If O2 sats on room air while exercising remain 89% or above patient does not qualify, no further testing needed Document N/A in step 3. If O2 sats on room air while exercising are 88% or below, continue to step 3.      3) Patient's O2 Sat while exercising on O2:  at  LPM         (Must show improvement from #2 for patients to qualify)    If O2 sats improve on oxygen, patient qualifies for portable oxygen. If not, the patient does not qualify.    Per nursing flowsheet

## 2025-06-12 NOTE — NURSING
Patient is ready for discharge. Patient stable alert and oriented. IVs removed. No complaints of pain. Discussed discharge plan. Reviewed medication, appointments, and answered questions with patient and family. Rx given to patient at bedside. Patient will leave via wheelchair.

## 2025-06-12 NOTE — TREATMENT PLAN
Dispo Planning:    Patient qualified for home Oxygen as recommended by Peds IC  Orders placed, documentation completed  HME to deliver oxygen to bedside

## 2025-06-12 NOTE — PROGRESS NOTES
Dc note     Met with pt in room to discuss dc today. Pt was distressed. Pt was amenable to dc today and stated his mother, Amita, will transport him home. Pt did not verbalize any further dc needs/concerns. Home O2 (portable and concentrator) arranged with [facility] ([phone]) by STEF Landry, with portable scheduled for delivery at bedside. Pt will have support of his family (mother and grandmother Elaina) post-dc. Pt denies further needs, questions, concerns at this time and none indicated. Providing psychosocial and counseling support, education, resources, assistance and discharge planning as indicated. Informed pt's mother ([phone]) of pt's dc today. Pt's mother reported she was en route to  pt. Informed pt's mother that pt was awaiting delivery of portable O2 to transport home. Pt's mother confirmed concentrator was scheduled for delivery to her mother's (pt's grandmother) home. Notified pt's nurse, Gia, of pt's dc today, and pt was awaiting delivery of portable O2 at bedside prior to dc via secure chat. Also, informed pt's nurse that pt's mother was en route to transport pt home. SW remains available.

## 2025-06-12 NOTE — NURSING
Notified on call HTS provider, Cleopatra Goss that the patient reported having coughed up sputum with blood in it.

## 2025-06-12 NOTE — SUBJECTIVE & OBJECTIVE
Interval History: Trial overnight of oxygen to see if improvement. Patient reports that he was able to sleep flat on his back. Hemoptysis today with ongoing cough. Continues to report left side chest/abdominal pain.    Past Medical History:   Diagnosis Date    Acute decompensated heart failure 02/06/2025    History of heart surgery     Other cirrhosis of liver 11/12/2020    SVT (supraventricular tachycardia)     WPW syndrome        Past Surgical History:   Procedure Laterality Date    ANGIOGRAPHY OF AORTIC ARCH N/A 9/3/2020    Procedure: AORTOGRAM, AORTIC ARCH;  Surgeon: Stephania Crump MD;  Location: [facility] CATH LAB;  Service: Cardiology;  Laterality: N/A;    FONTAN PROCEDURE, EXTRACARDIAC      LEFT HEART CATHETERIZATION Left 9/3/2020    Procedure: Left heart cath;  Surgeon: Stephania Crump MD;  Location: [facility] CATH LAB;  Service: Cardiology;  Laterality: Left;    RIGHT HEART CATHETERIZATION FOR CONGENITAL HEART DEFECT N/A 9/3/2020    Procedure: CATHETERIZATION, HEART, RIGHT, FOR CONGENITAL HEART DEFECT;  Surgeon: Stephania Crump MD;  Location: [facility] CATH LAB;  Service: Cardiology;  Laterality: N/A;  [facility] - needs covid test morning of. Extenuating circumstances    TRANSESOPHAGEAL ECHOCARDIOGRAPHY N/A 11/10/2022    Procedure: ECHOCARDIOGRAM, TRANSESOPHAGEAL;  Surgeon: Emeterio Hall MD;  Location: [facility] EP LAB;  Service: Cardiology;  Laterality: N/A;    TREATMENT OF CARDIAC ARRHYTHMIA N/A 1/9/2021    Procedure: CARDIOVERSION;  Surgeon: Jim Mcginnis MD;  Location: [facility] [place] CATH LAB;  Service: Cardiology;  Laterality: N/A;       Review of patient's allergies indicates:  No Known Allergies    No current facility-administered medications on file prior to encounter.     Current Outpatient Medications on File Prior to Encounter   Medication Sig    cetirizine (ZYRTEC) 10 MG tablet Take 1 tablet (10 mg total) by mouth once daily.    famotidine (PEPCID) 20 MG tablet Take 1 tablet (20 mg total) by  mouth 2 (two) times daily.     Family History       Problem Relation (Age of Onset)    Heart disease Maternal Grandfather    No Known Problems Mother, Father, Sister, Brother, Maternal Aunt, Maternal Uncle, Paternal Aunt, Paternal Uncle, Maternal Grandmother, Paternal Grandmother, Paternal Grandfather          Tobacco Use    Smoking status: Former     Current packs/day: 0.00     Types: Cigarettes     Quit date:      Years since quittin.4     Passive exposure: Past    Smokeless tobacco: Never    Tobacco comments:     3-4 months    Substance and Sexual Activity    Alcohol use: No    Drug use: Yes     Types: Marijuana     Comment: Mojo today- pt reports a while back    Sexual activity: Yes     Objective:     Vital Signs (Most Recent):  Temp: 98.4 °F (36.9 °C) (25 1130)  Pulse: 109 (25 1130)  Resp: 18 (25 1130)  BP: 107/73 (25 1130)  SpO2: (!) 82 % (patient didn't want to put NC on at this time) (25 1130) Vital Signs (24h Range):  Temp:  [97.4 °F (36.3 °C)-98.5 °F (36.9 °C)] 98.4 °F (36.9 °C)  Pulse:  [] 109  Resp:  [16-20] 18  SpO2:  [82 %-90 %] 82 %  BP: (100-120)/(72-76) 107/73     Weight: 59 kg (130 lb 1.1 oz)  Body mass index is 22.33 kg/m².    SpO2: (!) 82 % (patient didn't want to put NC on at this time)         Intake/Output Summary (Last 24 hours) at 2025 1348  Last data filed at 2025  Gross per 24 hour   Intake 315 ml   Output 625 ml   Net -310 ml       Lines/Drains/Airways       Peripheral Intravenous Line  Duration                  Peripheral IV - Single Lumen 06/10/25 1630 22 G Anterior;Proximal;Right Forearm 1 day                     Physical Exam  Constitutional:       Appearance: He is normal weight. He is ill-appearing.   HENT:      Head: Normocephalic and atraumatic.      Nose: Nose normal. No congestion or rhinorrhea.      Mouth/Throat:      Mouth: Mucous membranes are moist.      Pharynx: Oropharynx is clear.   Eyes:      Extraocular  Movements: Extraocular movements intact.      Pupils: Pupils are equal, round, and reactive to light.   Cardiovascular:      Rate and Rhythm: Normal rate and regular rhythm.      Pulses: Normal pulses.      Heart sounds: Murmur heard.      Comments: regular rate and rhythm  S1 with single S2  2/6 systolic murmur heard best at the apex  Pulmonary:      Effort: Pulmonary effort is normal. No respiratory distress.      Breath sounds: Normal breath sounds.   Abdominal:      General: There is distension.      Tenderness: There is abdominal tenderness.      Comments: Mild distension, improved since yesterday   Musculoskeletal:         General: No swelling. Normal range of motion.      Cervical back: Normal range of motion. No rigidity.      Right lower leg: Edema present.      Left lower leg: Edema present.      Comments: Mild lower extremity edema   Lymphadenopathy:      Cervical: No cervical adenopathy.   Skin:     General: Skin is warm.      Capillary Refill: Capillary refill takes 2 to 3 seconds.      Findings: No rash.   Neurological:      General: No focal deficit present.      Mental Status: He is alert and oriented to person, place, and time.   Psychiatric:         Mood and Affect: Mood normal.         Behavior: Behavior normal.          Significant Labs:     CMP  Sodium   Date Value Ref Range Status   06/12/2025 133 (L) 136 - 145 mmol/L Final   03/20/2025 139 136 - 145 mmol/L Final     Potassium   Date Value Ref Range Status   06/12/2025 3.3 (L) 3.5 - 5.1 mmol/L Final   03/20/2025 3.5 3.5 - 5.1 mmol/L Final     Chloride   Date Value Ref Range Status   06/12/2025 97 95 - 110 mmol/L Final   03/20/2025 104 95 - 110 mmol/L Final     CO2   Date Value Ref Range Status   06/12/2025 23 23 - 29 mmol/L Final   03/20/2025 23 23 - 29 mmol/L Final     Glucose   Date Value Ref Range Status   06/12/2025 86 70 - 110 mg/dL Final   03/20/2025 94 70 - 110 mg/dL Final     BUN   Date Value Ref Range Status   06/12/2025 10 6 - 20  mg/dL Final     Creatinine   Date Value Ref Range Status   06/12/2025 0.9 0.5 - 1.4 mg/dL Final     Calcium   Date Value Ref Range Status   06/12/2025 8.2 (L) 8.7 - 10.5 mg/dL Final   03/20/2025 8.9 8.7 - 10.5 mg/dL Final     Protein Total   Date Value Ref Range Status   06/12/2025 6.3 6.0 - 8.4 gm/dL Final     Total Protein   Date Value Ref Range Status   02/09/2025 6.4 6.0 - 8.4 g/dL Final     Albumin   Date Value Ref Range Status   06/12/2025 3.2 (L) 3.5 - 5.2 g/dL Final   02/09/2025 3.4 (L) 3.5 - 5.2 g/dL Final     Total Bilirubin   Date Value Ref Range Status   02/09/2025 1.6 (H) 0.1 - 1.0 mg/dL Final     Comment:     For infants and newborns, interpretation of results should be based  on gestational age, weight and in agreement with clinical  observations.    Premature Infant recommended reference ranges:  Up to 24 hours.............<8.0 mg/dL  Up to 48 hours............<12.0 mg/dL  3-5 days..................<15.0 mg/dL  6-29 days.................<15.0 mg/dL       Bilirubin Total   Date Value Ref Range Status   06/12/2025 3.4 (H) 0.1 - 1.0 mg/dL Final     Comment:     For infants and newborns, interpretation of results should be based   on gestational age, weight and in agreement with clinical   observations.    Premature Infant recommended reference ranges:   0-24 hours:  <8.0 mg/dL   24-48 hours: <12.0 mg/dL   3-5 days:    <15.0 mg/dL   6-29 days:   <15.0 mg/dL     Alkaline Phosphatase   Date Value Ref Range Status   02/09/2025 40 40 - 150 U/L Final     ALP   Date Value Ref Range Status   06/12/2025 53 40 - 150 unit/L Final     AST   Date Value Ref Range Status   06/12/2025 15 11 - 45 unit/L Final   02/09/2025 16 10 - 40 U/L Final     ALT   Date Value Ref Range Status   06/12/2025 19 10 - 44 unit/L Final   02/09/2025 20 10 - 44 U/L Final     Anion Gap   Date Value Ref Range Status   06/12/2025 13 8 - 16 mmol/L Final     eGFR   Date Value Ref Range Status   06/12/2025 >60 >60 mL/min/1.73/m2 Final     Comment:      Estimated GFR calculated using the CKD-EPI creatinine (2021) equation.   03/20/2025 >60.0 >60 mL/min/1.73 m^2 Final      Lab Results   Component Value Date    WBC 7.74 06/12/2025    HGB 14.0 06/12/2025    HCT 43.2 06/12/2025    MCV 84 06/12/2025     (L) 06/12/2025     Significant Imaging:     X-Ray Abdomen 6/10/25:  Nonspecific, nonobstructive appearing bowel gas pattern.     CXR 6/10/25:  The lungs are clear.   No pleural effusion.     Echo 3/31/25  Imaging consistent with diagnosis of tricuspid atresia S/P lateral tunnel Fontan - study remarkable for decreased systolic function of the single ventricle worsened since ACHD study 10/26/2023:  Very difficult to demonstrate pulmonary circulation with significant chest deformity due to median sternotomy.  Mildly dilated inferior vena cava connecting to lateral for completion of Fontan physiology with no obvious obstruction or thrombus.  Right superior vena cava identified with laminar flow and no obvious stenosis.  No evidence of obstruction at Fontan or Jesus anastomosis to the pulmonary arteries in very limited imaging.  Pulmonary confluence and LPA unobstructed in very limited imaging.  RPA not demonstrated..  At least two pulmonary veins demonstrated returning to the left atrium with no evidence for obstruction.  There is a small right atrium with no obvious obstruction of right atrial flow to the left atrium.  There is no obvious tricuspid valve tissue present and no right ventricular cavity could be demonstrated.  There is no evidence for pulmonary valve.  At least mild dilation of the left atrium.  Mildly dilated mitral valve annulus with color Doppler demonstrating at least moderate insufficiency.  Single ventricle consistent with left ventricular morphology.  There is at least moderate dilation of the left ventricle with prominently trabeculated apical myocardium.  Left ventricular systolic function qualitatively severely compromised with ejection  fraction estimated 20 -25%.  Global left ventricular longitudinal strain abnormal - peak average measured -3.6.  There is a large aortic annulus with trileaflet aortic valve, no stenosis and trivial central jet of insufficiency.  Aortic dimensions:  Sinuses of Valsalva   = 49 mm.  ST junction               = 39 mm.  Ascending aorta       = not well demonstrated.  Qualitative impression of at least mild dilation of the ascending aorta.  No evidence for coarctation.  No obvious pericardial effusion.     Holter Monitor 3/21/25:    - The predominant rhythm was sinus rhythm, with heart rate ranges within normal limits for age.   - There were no episodes of supraventricular tachycardia. There was no significant supraventricular ectopy seen.  - There was no significant ventricular ectopy noted.  - There were no abnormal pauses or episodes of heart block.   - Patient triggered events correlated with sinus rhythm and sinus tachycardia     CONCLUSION:  - Normal Holter monitor      Cath 9/3/20:  IMPRESSION:  1) Tricuspid atresia s/p intra-atrial Fontan  2) Normal Fontan pressures (11mmHg) and wedge pressure (8mmHg).  3) Normal cardiac output and vascular resistance calculations  4) Small atrial baffle leak  5) No significant aorta-pulmonary or veno-venous collaterals  6) Occlusion of right common femoral artery        Cardiac MRI 1/2/2020  Conclusion:   SVC to RPA unobstructed (Jesus)  Extracardiac Fontan with low flow but no evidence of thrombus.  Unobstructed ASD  Tricuspid Atresia  No obstruction to the right or left PA with Q flow of 21cc through each.  Increased LV mass and volume with systemic LVEF of 42%  Dilated sinus of Valsalva 49mm  Trace AI with regurgitation fraction of 8%

## 2025-06-12 NOTE — PROGRESS NOTES
[facility] - Cardiology Stepdown  Cardiology  Progress Note    Patient Name: Juani Reilly Jr.  MRN: [id number]  Admission Date: 6/10/2025  Hospital Length of Stay: 0 days  Code Status: Full Code   Attending Physician: Keren Toure MD   Primary Care Physician: Jaqueline Pardo MD  Expected Discharge Date: 6/12/2025  Principal Problem:Acute decompensated heart failure    Subjective:     Hospital Course: Juani presented on 6/11/25 for a scheduled cardiac cath procedure. Was then admitted directly from cath procedure room after developing cough and abdominal pain (unable to undergo cath). Trial on oxygen overnight with improvement in sats and has been approved for home oxygen. Hemoptysis overnight as well so treating for bronchitis.     Interval History: Trial overnight of oxygen to see if improvement. Patient reports that he was able to sleep flat on his back. Hemoptysis today with ongoing cough. Reports left sided abdominal pain.    Past Medical History:   Diagnosis Date    Acute decompensated heart failure 02/06/2025    History of heart surgery     Other cirrhosis of liver 11/12/2020    SVT (supraventricular tachycardia)     WPW syndrome      Past Surgical History:   Procedure Laterality Date    ANGIOGRAPHY OF AORTIC ARCH N/A 9/3/2020    Procedure: AORTOGRAM, AORTIC ARCH;  Surgeon: Stephania Crump MD;  Location: [facility] CATH LAB;  Service: Cardiology;  Laterality: N/A;    FONTAN PROCEDURE, EXTRACARDIAC      LEFT HEART CATHETERIZATION Left 9/3/2020    Procedure: Left heart cath;  Surgeon: Stephania Crump MD;  Location: [facility] CATH LAB;  Service: Cardiology;  Laterality: Left;    RIGHT HEART CATHETERIZATION FOR CONGENITAL HEART DEFECT N/A 9/3/2020    Procedure: CATHETERIZATION, HEART, RIGHT, FOR CONGENITAL HEART DEFECT;  Surgeon: Stephania Crump MD;  Location: [facility] CATH LAB;  Service: Cardiology;  Laterality: N/A;  [facility] - needs covid test morning of. Extenuating circumstances     TRANSESOPHAGEAL ECHOCARDIOGRAPHY N/A 11/10/2022    Procedure: ECHOCARDIOGRAM, TRANSESOPHAGEAL;  Surgeon: Emeterio Hall MD;  Location: [facility] EP LAB;  Service: Cardiology;  Laterality: N/A;    TREATMENT OF CARDIAC ARRHYTHMIA N/A 2021    Procedure: CARDIOVERSION;  Surgeon: Jim Mcginnis MD;  Location: [facility] CATH LAB;  Service: Cardiology;  Laterality: N/A;     Review of patient's allergies indicates:  No Known Allergies    No current facility-administered medications on file prior to encounter.     Current Outpatient Medications on File Prior to Encounter   Medication Sig    cetirizine (ZYRTEC) 10 MG tablet Take 1 tablet (10 mg total) by mouth once daily.    famotidine (PEPCID) 20 MG tablet Take 1 tablet (20 mg total) by mouth 2 (two) times daily.     Family History       Problem Relation (Age of Onset)    Heart disease Maternal Grandfather    No Known Problems Mother, Father, Sister, Brother, Maternal Aunt, Maternal Uncle, Paternal Aunt, Paternal Uncle, Maternal Grandmother, Paternal Grandmother, Paternal Grandfather          Tobacco Use    Smoking status: Former     Current packs/day: 0.00     Types: Cigarettes     Quit date:      Years since quittin.4     Passive exposure: Past    Smokeless tobacco: Never    Tobacco comments:     3-4 months    Substance and Sexual Activity    Alcohol use: No    Drug use: Yes     Types: Marijuana     Comment: Odalys today- pt reports a while back    Sexual activity: Yes     Objective:     Vital Signs (Most Recent):  Temp: 98.4 °F (36.9 °C) (25 1130)  Pulse: 109 (25 1130)  Resp: 18 (25 1130)  BP: 107/73 (25 1130)  SpO2: (!) 82 % (patient didn't want to put NC on at this time) (25 1130) Vital Signs (24h Range):  Temp:  [97.4 °F (36.3 °C)-98.5 °F (36.9 °C)] 98.4 °F (36.9 °C)  Pulse:  [] 109  Resp:  [16-20] 18  SpO2:  [82 %-90 %] 82 %  BP: (100-120)/(72-76) 107/73     Weight: 59 kg (130 lb 1.1 oz)  Body mass index is 22.33  kg/m².    SpO2: (!) 82 % (patient didn't want to put NC on at this time)     Intake/Output Summary (Last 24 hours) at 6/12/2025 1348  Last data filed at 6/11/2025 2025  Gross per 24 hour   Intake 315 ml   Output 625 ml   Net -310 ml       Lines/Drains/Airways       Peripheral Intravenous Line  Duration                  Peripheral IV - Single Lumen 06/10/25 1630 22 G Anterior;Proximal;Right Forearm 1 day                  Physical Exam  Constitutional:       Appearance: He is normal weight. He is ill-appearing.   HENT:      Head: Normocephalic and atraumatic.      Nose: Nose normal. No congestion or rhinorrhea.      Mouth/Throat:      Mouth: Mucous membranes are moist.      Pharynx: Oropharynx is clear.   Eyes:      Extraocular Movements: Extraocular movements intact.      Pupils: Pupils are equal, round, and reactive to light.   Cardiovascular:      Rate and Rhythm: Normal rate and regular rhythm.      Pulses: Normal pulses.      Heart sounds: Murmur heard.      Comments: regular rate and rhythm  S1 with single S2  2/6 systolic murmur heard best at the apex  Pulmonary:      Effort: Pulmonary effort is normal. No respiratory distress.      Breath sounds: Normal breath sounds.   Abdominal:      General: There is distension.      Tenderness: There is abdominal tenderness.      Comments: Mild distension, improved since yesterday   Musculoskeletal:         General: No swelling. Normal range of motion.      Cervical back: Normal range of motion. No rigidity.      Right lower leg: Edema present.      Left lower leg: Edema present.      Comments: Mild lower extremity edema   Lymphadenopathy:      Cervical: No cervical adenopathy.   Skin:     General: Skin is warm.      Capillary Refill: Capillary refill takes 2 to 3 seconds.      Findings: No rash.   Neurological:      General: No focal deficit present.      Mental Status: He is alert and oriented to person, place, and time.   Psychiatric:         Mood and Affect: Mood  normal.         Behavior: Behavior normal.          Significant Labs:     CMP  Sodium   Date Value Ref Range Status   06/12/2025 133 (L) 136 - 145 mmol/L Final   03/20/2025 139 136 - 145 mmol/L Final     Potassium   Date Value Ref Range Status   06/12/2025 3.3 (L) 3.5 - 5.1 mmol/L Final   03/20/2025 3.5 3.5 - 5.1 mmol/L Final     Chloride   Date Value Ref Range Status   06/12/2025 97 95 - 110 mmol/L Final   03/20/2025 104 95 - 110 mmol/L Final     CO2   Date Value Ref Range Status   06/12/2025 23 23 - 29 mmol/L Final   03/20/2025 23 23 - 29 mmol/L Final     Glucose   Date Value Ref Range Status   06/12/2025 86 70 - 110 mg/dL Final   03/20/2025 94 70 - 110 mg/dL Final     BUN   Date Value Ref Range Status   06/12/2025 10 6 - 20 mg/dL Final     Creatinine   Date Value Ref Range Status   06/12/2025 0.9 0.5 - 1.4 mg/dL Final     Calcium   Date Value Ref Range Status   06/12/2025 8.2 (L) 8.7 - 10.5 mg/dL Final   03/20/2025 8.9 8.7 - 10.5 mg/dL Final     Protein Total   Date Value Ref Range Status   06/12/2025 6.3 6.0 - 8.4 gm/dL Final     Total Protein   Date Value Ref Range Status   02/09/2025 6.4 6.0 - 8.4 g/dL Final     Albumin   Date Value Ref Range Status   06/12/2025 3.2 (L) 3.5 - 5.2 g/dL Final   02/09/2025 3.4 (L) 3.5 - 5.2 g/dL Final     Total Bilirubin   Date Value Ref Range Status   02/09/2025 1.6 (H) 0.1 - 1.0 mg/dL Final     Comment:     For infants and newborns, interpretation of results should be based  on gestational age, weight and in agreement with clinical  observations.    Premature Infant recommended reference ranges:  Up to 24 hours.............<8.0 mg/dL  Up to 48 hours............<12.0 mg/dL  3-5 days..................<15.0 mg/dL  6-29 days.................<15.0 mg/dL       Bilirubin Total   Date Value Ref Range Status   06/12/2025 3.4 (H) 0.1 - 1.0 mg/dL Final     Comment:     For infants and newborns, interpretation of results should be based   on gestational age, weight and in agreement with  clinical   observations.    Premature Infant recommended reference ranges:   0-24 hours:  <8.0 mg/dL   24-48 hours: <12.0 mg/dL   3-5 days:    <15.0 mg/dL   6-29 days:   <15.0 mg/dL     Alkaline Phosphatase   Date Value Ref Range Status   02/09/2025 40 40 - 150 U/L Final     ALP   Date Value Ref Range Status   06/12/2025 53 40 - 150 unit/L Final     AST   Date Value Ref Range Status   06/12/2025 15 11 - 45 unit/L Final   02/09/2025 16 10 - 40 U/L Final     ALT   Date Value Ref Range Status   06/12/2025 19 10 - 44 unit/L Final   02/09/2025 20 10 - 44 U/L Final     Anion Gap   Date Value Ref Range Status   06/12/2025 13 8 - 16 mmol/L Final     eGFR   Date Value Ref Range Status   06/12/2025 >60 >60 mL/min/1.73/m2 Final     Comment:     Estimated GFR calculated using the CKD-EPI creatinine (2021) equation.   03/20/2025 >60.0 >60 mL/min/1.73 m^2 Final      Lab Results   Component Value Date    WBC 7.74 06/12/2025    HGB 14.0 06/12/2025    HCT 43.2 06/12/2025    MCV 84 06/12/2025     (L) 06/12/2025     Significant Imaging:     X-Ray Abdomen 6/10/25:  Nonspecific, nonobstructive appearing bowel gas pattern.     CXR 6/10/25:  The lungs are clear.   No pleural effusion.     Echo 3/31/25  Imaging consistent with diagnosis of tricuspid atresia S/P lateral tunnel Fontan - study remarkable for decreased systolic function of the single ventricle worsened since ACHD study 10/26/2023:  Very difficult to demonstrate pulmonary circulation with significant chest deformity due to median sternotomy.  Mildly dilated inferior vena cava connecting to lateral for completion of Fontan physiology with no obvious obstruction or thrombus.  Right superior vena cava identified with laminar flow and no obvious stenosis.  No evidence of obstruction at Fontan or Jesus anastomosis to the pulmonary arteries in very limited imaging.  Pulmonary confluence and LPA unobstructed in very limited imaging.  RPA not demonstrated..  At least two  pulmonary veins demonstrated returning to the left atrium with no evidence for obstruction.  There is a small right atrium with no obvious obstruction of right atrial flow to the left atrium.  There is no obvious tricuspid valve tissue present and no right ventricular cavity could be demonstrated.  There is no evidence for pulmonary valve.  At least mild dilation of the left atrium.  Mildly dilated mitral valve annulus with color Doppler demonstrating at least moderate insufficiency.  Single ventricle consistent with left ventricular morphology.  There is at least moderate dilation of the left ventricle with prominently trabeculated apical myocardium.  Left ventricular systolic function qualitatively severely compromised with ejection fraction estimated 20 -25%.  Global left ventricular longitudinal strain abnormal - peak average measured -3.6.  There is a large aortic annulus with trileaflet aortic valve, no stenosis and trivial central jet of insufficiency.  Aortic dimensions:  Sinuses of Valsalva   = 49 mm.  ST junction               = 39 mm.  Ascending aorta       = not well demonstrated.  Qualitative impression of at least mild dilation of the ascending aorta.  No evidence for coarctation.  No obvious pericardial effusion.     Holter Monitor 3/21/25:    - The predominant rhythm was sinus rhythm, with heart rate ranges within normal limits for age.   - There were no episodes of supraventricular tachycardia. There was no significant supraventricular ectopy seen.  - There was no significant ventricular ectopy noted.  - There were no abnormal pauses or episodes of heart block.   - Patient triggered events correlated with sinus rhythm and sinus tachycardia     CONCLUSION:  - Normal Holter monitor      Cath 9/3/20:  IMPRESSION:  1) Tricuspid atresia s/p intra-atrial Fontan  2) Normal Fontan pressures (11mmHg) and wedge pressure (8mmHg).  3) Normal cardiac output and vascular resistance calculations  4) Small atrial  baffle leak  5) No significant aorta-pulmonary or veno-venous collaterals  6) Occlusion of right common femoral artery        Cardiac MRI 1/2/2020  Conclusion:   SVC to RPA unobstructed (Jesus)  Extracardiac Fontan with low flow but no evidence of thrombus.  Unobstructed ASD  Tricuspid Atresia  No obstruction to the right or left PA with Q flow of 21cc through each.  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 He has the following diagnoses:  1. Tricuspid and pulmonary atresia initially palliated with a systemic to pulmonary artery shunt followed by a bidirectional Jesus and subsequent lateral tunnel Fontan procedure   - small atrial baffle leak along with new Fontan baffle puncture for EP study  - s/p EP study with trans-septal puncture November 2022  - reassuring hemodynamics on 2020 catheterization  2. Decreased left ventricular function  - no evidence of PLE  3. History of [id number], SVT  - status post successful ablation of a left lateral accessory pathway November 2022 by Dr. Ferguson  4. Cirrhosis  - last seen by hepatology May 2023    Recommendations:  - Appreciate heart failure team working on optimization of his heart failure meds  - Post-discharge will follow-up with Princess in 1-2 weeks in ACHD clinic.  - did better on oxygen overnight and has qualified for home oxygen   - Will then consider rescheduling cath to reassess hemodynamics and potentially intervene (e.g., baffle leak, possible pulmonary AVMs)    * Acute decompensated heart failure  -Improved post dose of  IVP Lasix.   -GDMT: Toprol 50mg daily, Spironolactone 25mg, Losartan 100mg daily, Dapa 10mg daily  - CXR yesterday with clear lungs and no sign of pleural effusion.   - Needs follow-up post-discharge with heart failure out-patient    Epigastric pain  - X-ray abdomen yesterday with nonspecific, nonobstructive appearing bowel gas pattern  - continue simethicone  - potentially pulled muscle with cough   - could consider CXR for ribs or CT if continues to complain but not likely required (his most recent CT scan showed no signs of having developed pulmonary AVMs over the last few months)    Atrial flutter  -Currently NSR.    Hemoptysis:  - Discontinued rivaroxaban  - likely bronchitis, recommend treating with antibiotics (azithromycin a good option)  Will start tessalon perles  - CXR looks good this afternoon and ready for discharge    Cirrhosis:  Ordered AFP (as he's not had one previously)  Needs to follow up with hepatology (last seen 5/16/2023)        VTE Risk Mitigation (From admission, onward)      None          Princess Montesinos PA-C  Adult Congenital Heart Disease

## 2025-06-12 NOTE — PROGRESS NOTES
Admit Note     Attempted to meet with pt to complete admit note. Pt was able to answer a few questions and then requested SW to call his mother to complete note due to pain. Called pt's mother, Amita, ([phone]) to assess pt's needs due to c/o pain. Patient is a 30 y.o. single male admitted for Tricuspid atresia, S/p Fontan procedure, Acute decompensated HF, and Adult congenital HD.      Patient admitted on 6/10/2025. At this time, patient presents as distressed. At this time, patients caregiver presents as alert and oriented x 4, pleasant, communicative, cooperative, and asking and answering questions appropriately.      Household/Family Systems (as reported by patients caregiver)     Patient resides with patient's grandmother Elaina. Support system includes pt's mother and grandmother. Patient does not have dependents that are need of being cared for.    Pt's phone number: [phone]    Pt's home address: [address] [place] [address]     Patients primary caregiver is self with support from his mother and grandmother as indicated. Pt's grandmother is not employed, and pt's mother is employed at [employer] as a . Confirmed patients emergency contacts information as follows:  Amita Reilly, mother, [phone]  Elaina Epstein, grandmother, [phone]    During admission, patient's caregiver plans to stay at home. Confirmed patient and patients caregivers do have access to reliable transportation.    Cognitive Status/Learning     Patients caregiver reports patients reading ability as 11th grade and states patient does not have difficulty with reading, writing, seeing, hearing, comprehension, learning, and memory. Patients caregiver reports patient learns best by visual and hands-on.  Needed: No.   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     Adherence     Patients caregiver reports patient has a medium level of adherence to patients health care regimen. Adherence counseling and education provided. Patient's caregiver verbalizes understanding.    Substance Use    Patients caregiver reports patients substance usage as the following:    Tobacco: none, patient denies any use.  Alcohol: none, patient denies any use.  Illicit Drugs/Non-prescribed Medications: Pt's mother reports pt smokes marijuana but uncertain of last use and frequency.  Patients caregiver states clear understanding of the potential impact of substance use.  Substance abstinence/cessation counseling, education and resources provided and reviewed.     Services Utilizing/ADLS (as reported by patients caregiver)    Infusion Service: Prior to admission, patient utilizing? no  Home Health: Prior to admission, patient utilizing? no  DME: Prior to admission, no O2 (portable and concentrator) arranged prior to dc with [facility] ([phone], [phone]/F)  Pulmonary/Cardiac Rehab: Prior to admission, no  Dialysis:  Prior to admission, no  Transplant Specialty Pharmacy:  Prior to admission, no.    Prior to admission, patients caregiver reports patient was independent with ADLS and was driving. Per pt's mother, pt uses his grandmother's car. Pt ambulates without assist and no falls pta. Patients caregiver reports patient is able to care for self at this time. Patients caregiver reports patient indicates a willingness to care for self once medically cleared to do so.    Insurance/Medications    Insured by   Payer/Plan Subscr  Sex Relation Sub. Ins. ID Effective Group Num   1. [place] - [place]* BRIAN VIRAMONTES* 1995 Male Self [id number]* [id number]                                   [address]      Primary Insurance (for UNOS reporting): Public Insurance -  Medicaid  Secondary Insurance (for UNOS reporting): None    Patients caregiver reports patient is able to obtain and afford medications at this time and at time of discharge.    Living Will/Healthcare Power of     Patients caregiver reports patient does not have a LW and/or HCPA.   provided education regarding LW and HCPA and the completion of forms.    Coping/Mental Health (as reported by patients caregiver)    Patient is not coping well due to hospital admit. Pt's mother reports pt is ready to dc home. General support and education on medical stability provided. Patients caregiver is coping well.    Discharge Planning (as reported by patients caregiver)    At time of discharge, patient plans to return to his home under the care of self with support from family (mother and grandmother) as indicated.  Patients mother will transport patient. Per rounds today, expected discharge date is 6/12/25. Patients caretaker verbalizes understanding and is involved in treatment planning and discharge process.    Additional Concerns    Patient's caretaker denies additional needs and/or concerns at this time. Patient is being followed for needs, education, resources, information, emotional support, supportive counseling, and for supportive and skilled discharge plan of care.  providing ongoing psychosocial support, education, resources and d/c planning as needed.  SW remains available. Patient's caregiver verbalizes understanding and agreement with information reviewed,  availability and how to access available resources as needed.

## 2025-06-12 NOTE — SUBJECTIVE & OBJECTIVE
Interval History: NAeN. Sats improved with addition of 2L O2.     Scheduled Meds:   cetirizine  10 mg Oral Daily    dapagliflozin propanediol  10 mg Oral Daily    famotidine  20 mg Oral BID    furosemide  40 mg Oral Daily    LIDOcaine  1 patch Transdermal Q24H    losartan  100 mg Oral Daily    metoprolol succinate  50 mg Oral Daily    mupirocin   Nasal BID    potassium chloride  40 mEq Oral Once    rivaroxaban  20 mg Oral Daily with dinner    simethicone  1 tablet Oral QID (PC + HS)    spironolactone  25 mg Oral Daily     PRN Meds:  Current Facility-Administered Medications:     acetaminophen, 650 mg, Oral, Q6H PRN    benzonatate, 100 mg, Oral, TID PRN    methocarbamoL, 500 mg, Oral, QID PRN    sodium chloride 0.9%, 10 mL, Intravenous, PRN    traMADoL, 50 mg, Oral, Q6H PRN    Review of patient's allergies indicates:  No Known Allergies  Objective:     Vital Signs (Most Recent):  Temp: 98.4 °F (36.9 °C) (06/12/25 0730)  Pulse: 93 (06/12/25 0730)  Resp: 18 (06/12/25 0730)  BP: 120/76 (06/12/25 0730)  SpO2: (!) 88 % (06/12/25 0730) Vital Signs (24h Range):  Temp:  [97.4 °F (36.3 °C)-98.5 °F (36.9 °C)] 98.4 °F (36.9 °C)  Pulse:  [89-99] 93  Resp:  [16-20] 18  SpO2:  [79 %-90 %] 88 %  BP: (100-120)/(70-76) 120/76     Patient Vitals for the past 72 hrs (Last 3 readings):   Weight   06/11/25 0613 59 kg (130 lb 1.1 oz)   06/10/25 1541 64.4 kg (141 lb 15.6 oz)   06/10/25 1007 63.5 kg (140 lb)     Body mass index is 22.33 kg/m².      Intake/Output Summary (Last 24 hours) at 6/12/2025 0914  Last data filed at 6/11/2025 2025  Gross per 24 hour   Intake 675 ml   Output 1475 ml   Net -800 ml          Physical Exam  Vitals and nursing note reviewed.   Constitutional:       Appearance: He is well-developed.   HENT:      Head: Normocephalic.   Eyes:      Pupils: Pupils are equal, round, and reactive to light.   Neck:      Comments: +JVD  Cardiovascular:      Rate and Rhythm: Normal rate and regular rhythm.      Heart sounds: Murmur  "heard.   Pulmonary:      Effort: Pulmonary effort is normal.      Breath sounds: Normal breath sounds.   Abdominal:      General: Bowel sounds are normal.      Palpations: Abdomen is soft.   Musculoskeletal:         General: Normal range of motion.      Cervical back: Normal range of motion and neck supple.      Right lower leg: Edema present.      Left lower leg: Edema present.   Skin:     General: Skin is warm and dry.   Neurological:      Mental Status: He is alert and oriented to person, place, and time.   Psychiatric:         Behavior: Behavior normal.          Significant Labs:  CBC:  Recent Labs   Lab 06/10/25  1022 06/11/25  0228   WBC 5.12 6.03   RBC 5.26 5.23   HGB 14.3 14.0   HCT 45.1 43.8   * 130*   MCV 86 84   MCH 27.2 26.8*   MCHC 31.7* 32.0     BNP:  No results for input(s): "BNP" in the last 168 hours.    Invalid input(s): "BNPTRIAGELBLO"  CMP:  Recent Labs   Lab 06/10/25  1022 06/11/25  0227 06/12/25  0355   GLU 98 103 86   CALCIUM 8.5* 8.8 8.2*   ALBUMIN  --  3.8 3.2*   PROT  --  6.9 6.3    138 133*   K 4.4 3.6 3.3*   CO2 20* 22* 23    103 97   BUN 7 7 10   CREATININE 1.0 1.0 0.9   ALKPHOS  --  60 53   ALT  --  25 19   AST  --  21 15   BILITOT  --  3.4* 3.4*      Coagulation:   Recent Labs   Lab 06/10/25  1026   INR 1.6*     LDH:  No results for input(s): "LDH" in the last 72 hours.  Microbiology:  Microbiology Results (last 7 days)       ** No results found for the last 168 hours. **          I have reviewed all pertinent labs within the past 24 hours.    Estimated Creatinine Clearance: 99.2 mL/min (based on SCr of 0.9 mg/dL).    Diagnostic Results:  I have reviewed all pertinent imaging results/findings within the past 24 hours.  "

## 2025-06-12 NOTE — ASSESSMENT & PLAN NOTE
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 followed in the with the following diagnoses:  1. Tricuspid and pulmonary atresia initially palliated with a systemic to pulmonary artery shunt followed by a bidirectional Jesus and subsequent lateral tunnel Fontan procedure   - small atrial baffle leak along with new Fontan baffle puncture for EP study  - s/p EP study with trans-septal puncture November 2022  - reassuring hemodynamics on 2020 catheterization  2. Decreased left ventricular function  - no evidence of PLE  3. History of Xfwym-[id number]-Mlypj, SVT  - status post successful ablation of a left lateral accessory pathway November 2022 by Dr. Ferguson  4. Cirrhosis  - last seen by hepatology May 2023    Recommendations:  - Appreciate heart failure team working on optimization of his heart failure meds  - Post-discharge will follow-up with [facility] in 1-2 weeks in [facility] clinic.  - did better on oxygen overnight and has qualified for home oxygen   - Will then consider rescheduling cath to reassess hemodynamics and potentially intervene (e.g., baffle leak, possible pulmonary AVMs)    * Acute decompensated heart failure  -Improved post dose of IVP Lasix.   -GDMT: Toprol 50mg daily, Spironolactone 25mg, Losartan 100mg daily, Dapa 10mg daily  - CXR yesterday with clear lungs and no sign of pleural effusion.   - Needs follow-up post-discharge with heart failure out-patient    Epigastric pain  - X-ray abdomen yesterday with nonspecific, nonobstructive appearing bowel gas pattern  - continue simethicone  - potentially pulled muscle with cough   - could consider CXR for ribs or CT if continues to complain but not likely required (his most recent  CT scan showed no signs of having developed pulmonary AVMs over the last few months)    Atrial flutter  -Currently NSR.    Hemoptysis:  - Discontinued rivaroxaban  - likely bronchitis, recommend treating with antibiotics (azithromycin a good option)  Will start tessalon perles  - CXR  - not ready for discharge today due to hemoptysis     Cirrhosis:  Ordered AFP (as he's not had one previously)  Needs to follow up with hepatology (last seen 5/16/2023)

## 2025-06-12 NOTE — HOSPITAL COURSE
Juani presented on 6/11/25 for a scheduled cardiac cath procedure. Was then admitted directly from cath procedure room after developing cough and abdominal pain (unable to undergo cath). Trial on oxygen overnight with improvement in sats and has been approved for home oxygen. Hemoptysis overnight as well so treating for bronchitis.

## 2025-06-12 NOTE — PLAN OF CARE
Plan of care discussed with patient. Patient ambulating independently, fall precautions in place. Patient pain managed with PRN medication, see MAR. Discussed medications and care. Patient has no questions at this time. Call light and personal belongings within reach. On 2L NC, SpO2 >88, baseline 75-85%.       Problem: Adult Inpatient Plan of Care  Goal: Plan of Care Review  Outcome: Progressing  Goal: Patient-Specific Goal (Individualized)  Outcome: Progressing  Goal: Absence of Hospital-Acquired Illness or Injury  Outcome: Progressing  Goal: Optimal Comfort and Wellbeing  Outcome: Progressing  Goal: Readiness for Transition of Care  Outcome: Progressing     Problem: Pain Acute  Goal: Optimal Pain Control and Function  Outcome: Progressing

## 2025-06-13 ENCOUNTER — TELEPHONE (OUTPATIENT)
Dept: FAMILY MEDICINE | Facility: HOSPITAL | Age: 30
End: 2025-06-13
Payer: MEDICAID

## 2025-06-13 NOTE — TELEPHONE ENCOUNTER
Copied from CRM #[id number]. Topic: Medications - New Medication Request  >> Jun 13, 2025 11:01 AM Melany wrote:  Would like to receive medical advice.    Would they like a call back or a response via [facility]:  call back    Additional information:  Mom is calling to say the pt was in the hospital and was sent medications to the pharmacy but the pharmacy did not have PEPCID in stock and mom would like to request if the provider can please send a medication that will be covered by the pt's insurance. Mom states she would like to discuss the medications with the provider as well. Please call mom back for advice    [id number] - [place], [place] - [address] AT [facility] [address]  [address] [facility] [address]  Phone: [phone] Fax: [phone]    [facility] Call back number: [phone]

## 2025-06-14 ENCOUNTER — NURSE TRIAGE (OUTPATIENT)
Dept: ADMINISTRATIVE | Facility: CLINIC | Age: 30
End: 2025-06-14
Payer: MEDICAID

## 2025-06-14 LAB
ABO + RH BLD: NORMAL
ABO + RH BLD: NORMAL
BLD PROD TYP BPU: NORMAL
BLD PROD TYP BPU: NORMAL
BLOOD UNIT EXPIRATION DATE: NORMAL
BLOOD UNIT EXPIRATION DATE: NORMAL
BLOOD UNIT TYPE CODE: 8400
BLOOD UNIT TYPE CODE: 8400
CROSSMATCH INTERPRETATION: NORMAL
CROSSMATCH INTERPRETATION: NORMAL
DISPENSE STATUS: NORMAL
DISPENSE STATUS: NORMAL
UNIT NUMBER: NORMAL
UNIT NUMBER: NORMAL

## 2025-06-14 NOTE — TELEPHONE ENCOUNTER
This pt was just D/C'd and some of his meds were filled at [facility] and they are in [place]. Looking to see if ok for me to call them in to local pharmacy. Pepcid and metoprolol. Another question was Besjaleesaflores castillo isnt covered by insurance any other replacement for this   I called Dr Ovalle on call for [facility] since they saw him in the hospital and he doesn't know of any other cough medicine that would be appropriate and all other meds are available at main campus pharmacy. I called back for pt several times and no answer and left VM on grandmothers number as no answer as well. Unable to leave vm on pts phone. Left message to call us back if any other needs and will route to provider for Monday                Reason for Disposition   [1] Prescription refill request for ESSENTIAL medicine (i.e., likelihood of harm to patient if not taken) AND [2] triager unable to refill per department policy    Protocols used: Medication Refill and Renewal Call-A-

## 2025-06-16 NOTE — DISCHARGE SUMMARY
[facility] - Cardiology Stepdown  Heart Transplant  Discharge Summary      Patient Name: Juani Reilly Jr.  MRN: [id number]  Admission Date: 6/10/2025  Hospital Length of Stay: 1 days  Discharge Date and Time: 06/12/2025 11:24 AM  Attending Physician: No att. 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 Ferguson Nov 2022, decreased LV function (EF 30-35%), cirrhosis who was admitted directly from procedure room after developing cough and abdominal pain(unable to undergo cath). He states the cough started this am as well as the abdominal pain. He describes the pain as central over his abdomen and feel crampy. Cough is intermittent and nonproductive. He states that he feels more dyspneic since recent hospital stay. Describes worsening orthopnea over last week. Denies CP, palpitations, syncope, presyncope, fevers, n/v/d.     Hospital Course: Pt was admitted and gently diuresed with dose of IVP Lasix to which he responded well. He experienced some abdominal pain which was attributed to gas and improved with simethicone. He also complained of chest pain post coughing spell which improved with diuresis and prn analgesic. He was subsequently discharged home on higher diuretic dosage and will f/u with the congenital team regarding plans for trying the catheterization again once feeling better.     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] 10/01/2019 Yes     Chronic      Problems Resolved During this Admission:      Discharged Condition: fair    Disposition: Home or Self Care    Follow Up:    Patient Instructions:      OXYGEN FOR HOME USE     Order Specific Question Answer Comments   Liter Flow 2    Duration Continuous    Qualifying Test Performed at: Rest    Oxygen saturation: 82    Portable mode: continuous    Route nasal cannula    Device: home concentrator with portable tanks    Length of need (in months): 99 mos    Patient condition with qualifying saturation Other - List qualifying diagnosis and code    Select a diagnosis & list the code in the comments Pulmonary atresia [117138]    Height: 5' 4" (1.626 m)    Weight: 59 kg (130 lb 1.1 oz)    Alternative treatment measures have been tried or considered and deemed clinically ineffective. Yes      Medications:  Reconciled Home Medications:      Medication List        START taking these medications      benzonatate 100 MG capsule  Commonly known as: TESSALON  Take 1 capsule (100 mg total) by mouth 3 (three) times daily as needed for Cough.            CHANGE how you take these medications      furosemide 20 MG tablet  Commonly known as: LASIX  Take 2 tablets (40 mg total) by mouth once daily.  What changed: how much to take            CONTINUE taking these medications      cetirizine 10 MG tablet  Commonly known as: ZYRTEC  Take 1 tablet (10 mg total) by mouth once daily.     dapagliflozin propanediol 10 mg tablet  Commonly known as: Farxiga  Take 1 tablet (10 mg total) by mouth once daily.     famotidine 20 MG tablet  Commonly known as: PEPCID  Take 1 tablet (20 mg total) by mouth 2 (two) times daily.     losartan 100 MG tablet  Commonly known as: " COZAAR  Take 1 tablet (100 mg total) by mouth once daily.     metoprolol succinate 50 MG 24 hr tablet  Commonly known as: TOPROL-XL  Take 1 tablet (50 mg total) by mouth once daily.     spironolactone 25 MG tablet  Commonly known as: ALDACTONE  Take 1 tablet (25 mg total) by mouth once daily.     XARELTO 20 mg Tab  Generic drug: rivaroxaban  Take 1 tablet (20 mg total) by mouth daily with dinner or evening meal.              Nazario Thompson NP  Heart Transplant  [facility] - Cardiology Stepdown

## 2025-06-18 ENCOUNTER — TELEPHONE (OUTPATIENT)
Dept: TRANSPLANT | Facility: CLINIC | Age: 30
End: 2025-06-18
Payer: MEDICAID

## 2025-06-18 NOTE — PHYSICIAN QUERY
Please specify the diagnosis associated with the clinical findings.  Acute on chronic systolic heart failure (HFrEF or HFmrEF)     Expiration Date (Optional): 05/2023

## 2025-06-19 ENCOUNTER — TELEPHONE (OUTPATIENT)
Dept: CARDIOLOGY | Facility: CLINIC | Age: 30
End: 2025-06-19
Payer: MEDICAID

## 2025-06-25 ENCOUNTER — OFFICE VISIT (OUTPATIENT)
Dept: CARDIOLOGY | Facility: CLINIC | Age: 30
End: 2025-06-25
Payer: MEDICAID

## 2025-06-25 ENCOUNTER — LAB VISIT (OUTPATIENT)
Dept: LAB | Facility: HOSPITAL | Age: 30
End: 2025-06-25
Payer: MEDICAID

## 2025-06-25 VITALS
BODY MASS INDEX: 20.24 KG/M2 | HEIGHT: 69 IN | HEART RATE: 116 BPM | SYSTOLIC BLOOD PRESSURE: 119 MMHG | OXYGEN SATURATION: 79 % | DIASTOLIC BLOOD PRESSURE: 78 MMHG | WEIGHT: 136.69 LBS

## 2025-06-25 DIAGNOSIS — Q24.9 ADULT CONGENITAL HEART DISEASE: ICD-10-CM

## 2025-06-25 DIAGNOSIS — Z87.74 S/P FONTAN PROCEDURE: ICD-10-CM

## 2025-06-25 DIAGNOSIS — I50.20 HEART FAILURE WITH REDUCED EJECTION FRACTION: Primary | ICD-10-CM

## 2025-06-25 DIAGNOSIS — Q22.4 TRICUSPID ATRESIA: ICD-10-CM

## 2025-06-25 DIAGNOSIS — I50.20 HEART FAILURE WITH REDUCED EJECTION FRACTION: ICD-10-CM

## 2025-06-25 LAB
ALBUMIN SERPL BCP-MCNC: 3.7 G/DL (ref 3.5–5.2)
ALP SERPL-CCNC: 75 UNIT/L (ref 40–150)
ALT SERPL W/O P-5'-P-CCNC: 28 UNIT/L (ref 10–44)
ANION GAP (OHS): 14 MMOL/L (ref 8–16)
AST SERPL-CCNC: 24 UNIT/L (ref 11–45)
BILIRUB SERPL-MCNC: 2.3 MG/DL (ref 0.1–1)
BUN SERPL-MCNC: 12 MG/DL (ref 6–20)
CALCIUM SERPL-MCNC: 8.6 MG/DL (ref 8.7–10.5)
CHLORIDE SERPL-SCNC: 96 MMOL/L (ref 95–110)
CO2 SERPL-SCNC: 24 MMOL/L (ref 23–29)
CREAT SERPL-MCNC: 0.9 MG/DL (ref 0.5–1.4)
GFR SERPLBLD CREATININE-BSD FMLA CKD-EPI: >60 ML/MIN/1.73/M2
GLUCOSE SERPL-MCNC: 103 MG/DL (ref 70–110)
MAGNESIUM SERPL-MCNC: 1.9 MG/DL (ref 1.6–2.6)
POTASSIUM SERPL-SCNC: 3.2 MMOL/L (ref 3.5–5.1)
PROT SERPL-MCNC: 7.8 GM/DL (ref 6–8.4)
SODIUM SERPL-SCNC: 134 MMOL/L (ref 136–145)

## 2025-06-25 PROCEDURE — 80053 COMPREHEN METABOLIC PANEL: CPT

## 2025-06-25 PROCEDURE — 36415 COLL VENOUS BLD VENIPUNCTURE: CPT

## 2025-06-25 PROCEDURE — 1159F MED LIST DOCD IN RCRD: CPT | Mod: CPTII,,,

## 2025-06-25 PROCEDURE — 99213 OFFICE O/P EST LOW 20 MIN: CPT | Mod: PBBFAC

## 2025-06-25 PROCEDURE — 99999 PR PBB SHADOW E&M-EST. PATIENT-LVL III: CPT | Mod: PBBFAC,,,

## 2025-06-25 PROCEDURE — 99214 OFFICE O/P EST MOD 30 MIN: CPT | Mod: S$PBB,,,

## 2025-06-25 PROCEDURE — 3074F SYST BP LT 130 MM HG: CPT | Mod: CPTII,,,

## 2025-06-25 PROCEDURE — 83735 ASSAY OF MAGNESIUM: CPT

## 2025-06-25 PROCEDURE — 3078F DIAST BP <80 MM HG: CPT | Mod: CPTII,,,

## 2025-06-25 PROCEDURE — 1111F DSCHRG MED/CURRENT MED MERGE: CPT | Mod: CPTII,,,

## 2025-06-25 PROCEDURE — 4010F ACE/ARB THERAPY RXD/TAKEN: CPT | Mod: CPTII,,,

## 2025-06-25 PROCEDURE — 3008F BODY MASS INDEX DOCD: CPT | Mod: CPTII,,,

## 2025-06-25 NOTE — PROGRESS NOTES
2025     re:Juani Reilly Jr.  :1995    Dr. Abebe Ferguson    [facility]     Dear Dr. Ferguson:    Juani Reilly Jr. is a 30 y.o. male seen in the [facility] clinic today for vitals blood pressure check and as 2 week post-discharge follow up. In the recent months, he has been hospitalized three times, with his most recent discharge on 25. To summarize his diagnoses are as follow:  1. 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 Decreased left ventricular function  - no evidence of PLE  3. History of Hyhkq-[id number]-Ilixq, SVT  - status post successful ablation of a left lateral accessory pathway 2022 by Dr. Ferguson  4. Cirrhosis  - last seen by hepatology May 2023 (normal AFP )    To summarize, my recommendations are as follows:  SBE prophylaxis is definitely indicated  Recommended he continue on all GDMT, although not certain he is/will: losartan 100 mg daily, metoprolol 50 mg daily, Farxiga 10 mg, xarelto 20 mg, spironalactone 25 mg. He reports he's not taking any of these meds, however, except lasix.  He says he has been taking his 40 mg lasix daily to help with swelling. Considered increasing to 80 mg today (but some concern as his potassium is low on today's labs and not sure he's taking spironalactone). Will hold off with increase for now.  non-compliant with meds in the past, concerns today with continued non-compliance  Will need to schedule follow up with hepatology at a future visit (last visit was ). Holding off for now. His AFP was normal on 25  We referred him in the past to out-patient heart failure  "and he has a CHF consult coming up with Dr. Reis on 7/8   Holding off for now on rescheduling him for a diagnostic cath (as he's not been taking his medicine and is fluid overloaded today)  At a minimum, schedule follow up in [facility] clinic in a month with labs and vitals    Discussion:  He had been lost to care after his last clinic visit in 2023. Over the past few months, he's been hospitalized multiple times for heart failure and hypoxia. we had worked on optimizing his medical management and his BP had improved (141/99 to 119/79 today).  In the recent months we have focused on medical management, hoping we can get him feeling better with more aggressive goal-directed medical therapy. We referred him recently for a cardiac catheterization to reassess his Fontan hemodynamics and for any potential interventions. He was coughing a good deal when he arrived, however, and so he was admitted in-patient and monitored for 3 days and was discharged on home oxygen.   Concerned today that he is not taking his medications. Says he's not a "meds donta" and only wants to take them if he feels poorly but not if he feels well. He also reports that "all" if his meds make him cough. He reports only taking his lasix when he notices swelling.  He also has been inconsistent with oxygen use. Says this makes him feel like his "stomach and rib is hurting" if he uses it for "too long". Makes it "hard to breathe". He's hesitant to use the oxygen for extended periods of time, although he also shares that this helps him to sleep flat at night.     History of present illness:  He is frustrated in clinic again today. Clarified with him that a catheterization likely won't "fix him" and he requires medical management. He was insistent that he's not going to take any meds other than lasix.     His swelling is worse today in both lower extremities and he is up 3 kilometers. Says this gets worse when he's standing and improves when he lays down. He had " "considerable lower extremity edema in clinic today. Although both legs were equally swollen, he says that the right is worse and "hurting him". Also says that he's been coughing up some blood. Does not think that he had pneumonia/bronchitis and instead attributes this to medication side effects. Suggested to him that we try a trial of him off of losartan (but continue others) but he is insistent that the metoprolol also makes him cough. Magnesium okay on today's labs. Potassium low, however.    The review of systems is as noted above. It is otherwise negative for other symptoms related to the general, neurological, psychiatric, endocrine, gastrointestinal, genitourinary, respiratory, dermatologic, musculoskeletal, hematologic, and immunologic systems.    Past Medical History:   Diagnosis Date    Acute decompensated heart failure 02/06/2025    History of heart surgery     Other cirrhosis of liver 11/12/2020    SVT (supraventricular tachycardia)     WPW syndrome      Past Surgical History:   Procedure Laterality Date    ANGIOGRAPHY OF AORTIC ARCH N/A 9/3/2020    Procedure: AORTOGRAM, AORTIC ARCH;  Surgeon: Stephania Crump MD;  Location: [facility] CATH LAB;  Service: Cardiology;  Laterality: N/A;    FONTAN PROCEDURE, EXTRACARDIAC      LEFT HEART CATHETERIZATION Left 9/3/2020    Procedure: Left heart cath;  Surgeon: Stephania Crump MD;  Location: [facility] CATH LAB;  Service: Cardiology;  Laterality: Left;    RIGHT HEART CATHETERIZATION FOR CONGENITAL HEART DEFECT N/A 9/3/2020    Procedure: CATHETERIZATION, HEART, RIGHT, FOR CONGENITAL HEART DEFECT;  Surgeon: Stephania Crump MD;  Location: [facility] CATH LAB;  Service: Cardiology;  Laterality: N/A;  [facility] - needs covid test morning of. Extenuating circumstances    TRANSESOPHAGEAL ECHOCARDIOGRAPHY N/A 11/10/2022    Procedure: ECHOCARDIOGRAM, TRANSESOPHAGEAL;  Surgeon: Emeterio Hall MD;  Location: [facility] EP LAB;  Service: Cardiology;  Laterality: N/A;    " TREATMENT OF CARDIAC ARRHYTHMIA N/A 2021    Procedure: CARDIOVERSION;  Surgeon: Jim Mcginnis MD;  Location: [facility] CATH LAB;  Service: Cardiology;  Laterality: N/A;     Family History   Problem Relation Name Age of Onset    No Known Problems Mother      No Known Problems Father      No Known Problems Sister 3     No Known Problems Brother      No Known Problems Maternal Aunt      No Known Problems Maternal Uncle      No Known Problems Paternal Aunt      No Known Problems Paternal Uncle      No Known Problems Maternal Grandmother      Heart disease Maternal Grandfather      No Known Problems Paternal Grandmother      No Known Problems Paternal Grandfather      Anemia Neg Hx      Arrhythmia Neg Hx      Asthma Neg Hx      Clotting disorder Neg Hx      Fainting Neg Hx      Heart attack Neg Hx      Heart failure Neg Hx      Hyperlipidemia Neg Hx      Hypertension Neg Hx      Stroke Neg Hx      Atrial Septal Defect Neg Hx       Social History     Socioeconomic History    Marital status: Single   Tobacco Use    Smoking status: Former     Current packs/day: 0.00     Types: Cigarettes     Quit date:      Years since quittin.4     Passive exposure: Past    Smokeless tobacco: Never    Tobacco comments:     3-4 months    Substance and Sexual Activity    Alcohol use: No    Drug use: Yes     Types: Marijuana     Comment: Mojo today- pt reports a while back    Sexual activity: Yes     Social Drivers of Health     Financial Resource Strain: Low Risk  (6/10/2025)    Overall Financial Resource Strain (CARDIA)     Difficulty of Paying Living Expenses: Not hard at all   Food Insecurity: No Food Insecurity (6/10/2025)    Hunger Vital Sign     Worried About Running Out of Food in the Last Year: Never true     Ran Out of Food in the Last Year: Never true   Transportation Needs: No Transportation Needs (6/10/2025)    PRAPARE - Transportation     Lack of Transportation (Medical): No     Lack of Transportation (Non-Medical): No    Physical Activity: Inactive (2/9/2025)    Exercise Vital Sign     Days of Exercise per Week: 0 days     Minutes of Exercise per Session: 0 min   Stress: No Stress Concern Present (6/10/2025)    [employer] - Occupational Stress Questionnaire     Feeling of Stress : Not at all   Housing Stability: Low Risk  (6/10/2025)    Housing Stability Vital Sign     Unable to Pay for Housing in the Last Year: No     Homeless in the Last Year: No       Current Outpatient Medications:     benzonatate (TESSALON) 100 MG capsule, Take 1 capsule (100 mg total) by mouth 3 (three) times daily as needed for Cough., Disp: 30 capsule, Rfl: 3    cetirizine (ZYRTEC) 10 MG tablet, Take 1 tablet (10 mg total) by mouth once daily., Disp: 90 tablet, Rfl: 3    dapagliflozin propanediol (FARXIGA) 10 mg tablet, Take 1 tablet (10 mg total) by mouth once daily., Disp: 90 tablet, Rfl: 3    furosemide (LASIX) 20 MG tablet, Take 2 tablets (40 mg total) by mouth once daily., Disp: 180 tablet, Rfl: 3    losartan (COZAAR) 100 MG tablet, Take 1 tablet (100 mg total) by mouth once daily., Disp: 90 tablet, Rfl: 3    metoprolol succinate (TOPROL-XL) 50 MG 24 hr tablet, Take 1 tablet (50 mg total) by mouth once daily., Disp: 90 tablet, Rfl: 3    rivaroxaban (XARELTO) 20 mg Tab, Take 1 tablet (20 mg total) by mouth daily with dinner or evening meal., Disp: 180 tablet, Rfl: 1    spironolactone (ALDACTONE) 25 MG tablet, Take 1 tablet (25 mg total) by mouth once daily., Disp: 90 tablet, Rfl: 3      Review of patient's allergies indicates:  No Known Allergies     There were no vitals filed for this visit.    GENERAL: Awake, well-developed well-nourished, no apparent distress. Non-cyanotic.  HEENT: Mucous membranes moist and pink, normocephalic atraumatic, no cranial or carotid bruits, sclera anicteric, EOMI  NECK: No jugular venous distention, no thyromegaly, no lymphadenopathy  CHEST: Good air movement, clear to auscultation  bilaterally. Well healed sternotomy.  CARDIOVASCULAR: Quiet precordium, regular rate and rhythm, S1 with single S2, 2/6 systolic murmur heard best at the apex  ABDOMEN: Soft, nontender nondistended, no hepatosplenomegaly, no aortic bruits.  No tenderness.  EXTREMITIES: Warm well perfused, 2+ radial/femoral/pedal pulses, capillary refill 2 seconds, no cyanosis or edema  NEURO: Alert and oriented, cooperative with exam, face symmetric, moves all extremities well.    EKG 4/24/25:  Normal sinus rhythm 96 BPM    LVH  with QRS widening and repolarization abnormality    When compared with ECG of 04-Apr-2025 12:06,  ST less depressed in Inferior leads  ST is less depressed in  Anterior-lateral leads  T wave inversion less evident in Lateral leads     Echo 3/31/25    Interpretation Summary  CONGENITAL CARDIAC HISTORY:  Tricuspid atresia, pulmonary atresia  and WPW.  S/P Systemic to pulmonary artery shunt - Shaun.  S/P Bidirectional Jesus -- Shaun.  S/P Lateral tunnel Fontan procedure - Shaun.  S/P EP study with trans-septal puncture November 2022  S/P Ablation of a left lateral accessory pathway November 2022 by Dr. Ferguson    SEGMENTAL CARDIAC CONNECTIONS (previously demonstrated):  Abdominal situs is solitus.  Atrial situs is normal.  Imaging consistent with tricuspid atresia.  Tricuspid atresia.  Mitral valve appears normal in structure.  LV dilation.  Ventriculoarterial concordance.  Ventricular loop: D-loop.  Cardiac position is levocardia.  Left aortic arch branching.    IMPRESSION:  Imaging consistent with diagnosis of tricuspid atresia S/P lateral tunnel Fontan - study remarkable for decreased systolic function of the single ventricle worsened since [facility] study 10/26/2023:  Very difficult to demonstrate pulmonary circulation with significant chest deformity due to median sternotomy.  Mildly dilated inferior vena cava connecting to lateral for completion of Fontan physiology with no obvious obstruction or  thrombus.  Right superior vena cava identified with laminar flow and no obvious stenosis.  No evidence of obstruction at Fontan or Jesus anastomosis to the pulmonary arteries in very limited imaging.  Pulmonary confluence and LPA unobstructed in very limited imaging.  RPA not demonstrated..  At least two pulmonary veins demonstrated returning to the left atrium with no evidence for obstruction.  There is a small right atrium with no obvious obstruction of right atrial flow to the left atrium.  There is no obvious tricuspid valve tissue present and no right ventricular cavity could be demonstrated.  There is no evidence for pulmonary valve.  At least mild dilation of the left atrium.  Mildly dilated mitral valve annulus with color Doppler demonstrating at least moderate insufficiency.  Single ventricle consistent with left ventricular morphology.  There is at least moderate dilation of the left ventricle with prominently trabeculated apical myocardium.  Left ventricular systolic function qualitatively severely compromised with ejection fraction estimated 20 -25%.  Global left ventricular longitudinal strain abnormal - peak average measured -3.6.  There is a large aortic annulus with trileaflet aortic valve, no stenosis and trivial central jet of insufficiency.  Aortic dimensions:  Sinuses of Valsalva   = 49 mm.  ST junction               = 39 mm.  Ascending aorta       = not well demonstrated.  Qualitative impression of at least mild dilation of the ascending aorta.  No evidence for coarctation.  No obvious pericardial effusion.     Cath 9/3/20:  IMPRESSION:  1) Tricuspid atresia s/p intra-atrial Fontan  2) Normal Fontan pressures (11mmHg) and wedge pressure (8mmHg).  3) Normal cardiac output and vascular resistance calculations  4) Small atrial baffle leak  5) No significant aorta-pulmonary or veno-venous collaterals  6) Occlusion of right common femoral artery       Cardiac MRI 1/2/2020  Conclusion:   SVC to RPA  unobstructed (Jesus)  Extracardiac Fontan with low flow but no evidence of thrombus.  Unobstructed ASD  Tricuspid Atresia  No obstruction to the right or left PA with Q flow of 21cc through each.  Increased LV mass and volume with systemic LVEF of 42%  Dilated sinus of Valsalva 49mm  Trace AI with regurgitation fraction of 8%     Lab Results   Component Value Date    WBC 7.74 06/12/2025    HGB 14.0 06/12/2025    HCT 43.2 06/12/2025    MCV 84 06/12/2025     (L) 06/12/2025       CMP  Sodium   Date Value Ref Range Status   06/12/2025 133 (L) 136 - 145 mmol/L Final   03/20/2025 139 136 - 145 mmol/L Final     Potassium   Date Value Ref Range Status   06/12/2025 3.3 (L) 3.5 - 5.1 mmol/L Final   03/20/2025 3.5 3.5 - 5.1 mmol/L Final     Chloride   Date Value Ref Range Status   06/12/2025 97 95 - 110 mmol/L Final   03/20/2025 104 95 - 110 mmol/L Final     CO2   Date Value Ref Range Status   06/12/2025 23 23 - 29 mmol/L Final   03/20/2025 23 23 - 29 mmol/L Final     Glucose   Date Value Ref Range Status   06/12/2025 86 70 - 110 mg/dL Final   03/20/2025 94 70 - 110 mg/dL Final     BUN   Date Value Ref Range Status   06/12/2025 10 6 - 20 mg/dL Final     Creatinine   Date Value Ref Range Status   06/12/2025 0.9 0.5 - 1.4 mg/dL Final     Calcium   Date Value Ref Range Status   06/12/2025 8.2 (L) 8.7 - 10.5 mg/dL Final   03/20/2025 8.9 8.7 - 10.5 mg/dL Final     Protein Total   Date Value Ref Range Status   06/12/2025 6.3 6.0 - 8.4 gm/dL Final     Total Protein   Date Value Ref Range Status   02/09/2025 6.4 6.0 - 8.4 g/dL Final     Albumin   Date Value Ref Range Status   06/12/2025 3.2 (L) 3.5 - 5.2 g/dL Final   02/09/2025 3.4 (L) 3.5 - 5.2 g/dL Final     Total Bilirubin   Date Value Ref Range Status   02/09/2025 1.6 (H) 0.1 - 1.0 mg/dL Final     Comment:     For infants and newborns, interpretation of results should be based  on gestational age, weight and in agreement with clinical  observations.    Premature Infant  recommended reference ranges:  Up to 24 hours.............<8.0 mg/dL  Up to 48 hours............<12.0 mg/dL  3-5 days..................<15.0 mg/dL  6-29 days.................<15.0 mg/dL       Bilirubin Total   Date Value Ref Range Status   06/12/2025 3.4 (H) 0.1 - 1.0 mg/dL Final     Comment:     For infants and newborns, interpretation of results should be based   on gestational age, weight and in agreement with clinical   observations.    Premature Infant recommended reference ranges:   0-24 hours:  <8.0 mg/dL   24-48 hours: <12.0 mg/dL   3-5 days:    <15.0 mg/dL   6-29 days:   <15.0 mg/dL     Alkaline Phosphatase   Date Value Ref Range Status   02/09/2025 40 40 - 150 U/L Final     ALP   Date Value Ref Range Status   06/12/2025 53 40 - 150 unit/L Final     AST   Date Value Ref Range Status   06/12/2025 15 11 - 45 unit/L Final   02/09/2025 16 10 - 40 U/L Final     ALT   Date Value Ref Range Status   06/12/2025 19 10 - 44 unit/L Final   02/09/2025 20 10 - 44 U/L Final     Anion Gap   Date Value Ref Range Status   06/12/2025 13 8 - 16 mmol/L Final     eGFR   Date Value Ref Range Status   06/12/2025 >60 >60 mL/min/1.73/m2 Final     Comment:     Estimated GFR calculated using the CKD-EPI creatinine (2021) equation.   03/20/2025 >60.0 >60 mL/min/1.73 m^2 Final     Lab Results   Component Value Date    INR 1.6 (H) 06/10/2025    INR 1.7 (H) 04/04/2025    INR 1.4 (H) 04/03/2025    PROTIME 16.7 (H) 06/10/2025    PROTIME 17.8 (H) 04/04/2025    PROTIME 15.2 (H) 04/03/2025     Lab Results   Component Value Date    AFP 4.5 06/11/2025      Thank you for referring this patient to our clinic.  Please call with any questions.    Sincerely,      Princess Montesinos PA-C  Pediatric Cardiology  Adult Congenital Heart Disease  [facility] [address] [place]  [address]  [phone]

## 2025-06-26 RX ORDER — FUROSEMIDE 40 MG/1
40 TABLET ORAL 2 TIMES DAILY
Qty: 180 TABLET | Refills: 3 | Status: SHIPPED | OUTPATIENT
Start: 2025-06-26

## 2025-06-26 RX ORDER — BENZONATATE 100 MG/1
100 CAPSULE ORAL 3 TIMES DAILY PRN
Qty: 30 CAPSULE | Refills: 3 | Status: SHIPPED | OUTPATIENT
Start: 2025-06-26

## 2025-06-26 RX ORDER — SPIRONOLACTONE 25 MG/1
25 TABLET ORAL DAILY
Qty: 90 TABLET | Refills: 3 | Status: SHIPPED | OUTPATIENT
Start: 2025-06-26 | End: 2026-06-26

## 2025-07-02 ENCOUNTER — TELEPHONE (OUTPATIENT)
Dept: CARDIOLOGY | Facility: CLINIC | Age: 30
End: 2025-07-02
Payer: MEDICAID

## 2025-07-02 NOTE — TELEPHONE ENCOUNTER
"Called and spoke to [place] on the phone this afternoon. He has an appt with me last week at which he said he wasn't taking any of his heart failure meds other than lasix. Says that they make him cough and he's not a "med donta". His recent labs came back also with his potassium low. Called to encourage him to take his meds and also to see how he's feeling.    He is not feeling well. His swelling has worsened in his lower extremities since last week. Says that this happens whenever he stands up, then when he puts his feet up it improved (but he's noticed it improving less than it had.) Experiencing other symptoms similar to those he had in clinic, particularly SOB and CP, which he thinks has worsened.    Patient education provided about the importance of heart failure meds for his Fontan circulation and he expressed an understanding of how these are helping his heart function. He agreed to take all heart failure meds. Below are the instructions I provided him:    I told him to at the least take his lasix 40 mg BID (he has been). But also emphasized the importance of also taking his spironolactone since his potassium levels have dropped.   Told him to take all other heart meds, including farxiga, metoprolol, and losartan. Last week he complained that these make him cough, but encouraged him to take them all and if he feels the coughing is too much, he could hold the losartan. He expressed understanding and agreement.  Told him to hold the xarelto for now. He says that he is still coughing up some blood.   Encouraged him highly to attend his appointment with heart failure next week with Dr. Reis on 7/8. Explained that his best option with his Fontan circulation at present is med management with heart failure. He understood.  Told him to keep using his oxygen as needed and at night to help him sleep. Last week he said that the oxygen upset his stomach (or made it feel "full") but he said he has been using this and it " has gotten better and he's able to sleep flat.   [facility] and vitals.  Told him that if his SOB or swelling gets worse then he should go to the emergency department at [facility] and to let the providers know that he has a Fontan so that they can consult our team.    Princess Montesinos PA-C  Adult Congenital Heart Disease

## 2025-07-07 ENCOUNTER — NURSE TRIAGE (OUTPATIENT)
Dept: ADMINISTRATIVE | Facility: CLINIC | Age: 30
End: 2025-07-07
Payer: MEDICAID

## 2025-07-08 ENCOUNTER — OFFICE VISIT (OUTPATIENT)
Dept: TRANSPLANT | Facility: CLINIC | Age: 30
End: 2025-07-08
Payer: MEDICAID

## 2025-07-08 VITALS
BODY MASS INDEX: 21.76 KG/M2 | DIASTOLIC BLOOD PRESSURE: 42 MMHG | HEART RATE: 105 BPM | HEIGHT: 66 IN | SYSTOLIC BLOOD PRESSURE: 66 MMHG | WEIGHT: 135.38 LBS

## 2025-07-08 DIAGNOSIS — I50.20 HEART FAILURE WITH REDUCED EJECTION FRACTION: ICD-10-CM

## 2025-07-08 DIAGNOSIS — Z87.74 S/P FONTAN PROCEDURE: ICD-10-CM

## 2025-07-08 DIAGNOSIS — Q24.9 ADULT CONGENITAL HEART DISEASE: Primary | ICD-10-CM

## 2025-07-08 DIAGNOSIS — Q22.4 SINGLE VENTRICLE WITH TRICUSPID ATRESIA: ICD-10-CM

## 2025-07-08 DIAGNOSIS — I10 ESSENTIAL HYPERTENSION: ICD-10-CM

## 2025-07-08 DIAGNOSIS — Q20.4 SINGLE VENTRICLE WITH TRICUSPID ATRESIA: ICD-10-CM

## 2025-07-08 DIAGNOSIS — I45.6 WPW SYNDROME: Chronic | ICD-10-CM

## 2025-07-08 DIAGNOSIS — I50.23 ACUTE ON CHRONIC SYSTOLIC CONGESTIVE HEART FAILURE: ICD-10-CM

## 2025-07-08 PROCEDURE — 4010F ACE/ARB THERAPY RXD/TAKEN: CPT | Mod: CPTII,,, | Performed by: INTERNAL MEDICINE

## 2025-07-08 PROCEDURE — 3074F SYST BP LT 130 MM HG: CPT | Mod: CPTII,,, | Performed by: INTERNAL MEDICINE

## 2025-07-08 PROCEDURE — 1159F MED LIST DOCD IN RCRD: CPT | Mod: CPTII,,, | Performed by: INTERNAL MEDICINE

## 2025-07-08 PROCEDURE — 99999 PR PBB SHADOW E&M-EST. PATIENT-LVL III: CPT | Mod: PBBFAC,,, | Performed by: INTERNAL MEDICINE

## 2025-07-08 PROCEDURE — 1160F RVW MEDS BY RX/DR IN RCRD: CPT | Mod: CPTII,,, | Performed by: INTERNAL MEDICINE

## 2025-07-08 PROCEDURE — 99213 OFFICE O/P EST LOW 20 MIN: CPT | Mod: PBBFAC | Performed by: INTERNAL MEDICINE

## 2025-07-08 PROCEDURE — 3078F DIAST BP <80 MM HG: CPT | Mod: CPTII,,, | Performed by: INTERNAL MEDICINE

## 2025-07-08 PROCEDURE — 1111F DSCHRG MED/CURRENT MED MERGE: CPT | Mod: CPTII,,, | Performed by: INTERNAL MEDICINE

## 2025-07-08 PROCEDURE — 99215 OFFICE O/P EST HI 40 MIN: CPT | Mod: S$PBB,,, | Performed by: INTERNAL MEDICINE

## 2025-07-08 PROCEDURE — 3008F BODY MASS INDEX DOCD: CPT | Mod: CPTII,,, | Performed by: INTERNAL MEDICINE

## 2025-07-08 RX ORDER — BUMETANIDE 2 MG/1
2 TABLET ORAL 2 TIMES DAILY
Qty: 180 TABLET | Refills: 3 | Status: ON HOLD | OUTPATIENT
Start: 2025-07-08

## 2025-07-08 RX ORDER — DAPAGLIFLOZIN 10 MG/1
10 TABLET, FILM COATED ORAL DAILY
Qty: 90 TABLET | Refills: 3 | Status: ON HOLD | OUTPATIENT
Start: 2025-07-08

## 2025-07-08 NOTE — PROGRESS NOTES
Subjective:       HPI:  Mr. Tommie Higginbotham is a 30 year-old male with history of tricuspid and pulmonary atresia initially palliated with a systemic to pulmonary artery shunt followed by a bidirectional Jesus and subsequent lateral tunnel Fontan procedure, WPW s/p EPS & RFA left lateral AP with Dr. Ferguson Nov 2022, decreased LV function (EF 30-35%), cirrhosis who was recently discharged from our service after being treated for ADHF. This was his 2nd HF admission in the last month. Today he come for a follow-up visit. Reports NYHA class II-III symptoms, leg swelling and PND. He states that his UO has been suboptimal with lasix 80 mg BID. For some reason, he was not aware that he was discharged on an SGLT2i.      Summary of his congenital heart disease per our adult congential heart team.   1. Tricuspid and pulmonary atresia initially palliated with a systemic to pulmonary artery shunt followed by a bidirectional Jesus and subsequent lateral tunnel Fontan procedure   - small atrial baffle leak along with new Fontan baffle puncture for EP study  - s/p EP study with trans-septal puncture November 2022  - reassuring hemodynamics on 2020 catheterization  2. Decreased left ventricular function  - no evidence of PLE  3. History of Ekguq-Dthbqbzpe-Npykz, SVT  - status post successful ablation of a left lateral accessory pathway November 2022 by Dr. Ferguson  4. Cirrhosis  - last seen by hepatology May 2023 (normal AFP 6/11)       Past Medical History:   Diagnosis Date    Acute decompensated heart failure 02/06/2025    History of heart surgery     Other cirrhosis of liver 11/12/2020    SVT (supraventricular tachycardia)     WPW syndrome      Past Surgical History:   Procedure Laterality Date    ANGIOGRAPHY OF AORTIC ARCH N/A 9/3/2020    Procedure: AORTOGRAM, AORTIC ARCH;  Surgeon: Stephania Crump MD;  Location: [facility] CATH LAB;  Service: Cardiology;  Laterality: N/A;    FONTAN PROCEDURE, EXTRACARDIAC      LEFT HEART  "CATHETERIZATION Left 9/3/2020    Procedure: Left heart cath;  Surgeon: Stephania Crump MD;  Location: [facility] CATH LAB;  Service: Cardiology;  Laterality: Left;    RIGHT HEART CATHETERIZATION FOR CONGENITAL HEART DEFECT N/A 9/3/2020    Procedure: CATHETERIZATION, HEART, RIGHT, FOR CONGENITAL HEART DEFECT;  Surgeon: Stephania Crump MD;  Location: [facility] CATH LAB;  Service: Cardiology;  Laterality: N/A;  [facility] - needs covid test morning of. Extenuating circumstances    TRANSESOPHAGEAL ECHOCARDIOGRAPHY N/A 11/10/2022    Procedure: ECHOCARDIOGRAM, TRANSESOPHAGEAL;  Surgeon: Emeterio Hall MD;  Location: [facility] EP LAB;  Service: Cardiology;  Laterality: N/A;    TREATMENT OF CARDIAC ARRHYTHMIA N/A 1/9/2021    Procedure: CARDIOVERSION;  Surgeon: Jim Mcginnis MD;  Location: [facility] CATH LAB;  Service: Cardiology;  Laterality: N/A;       Review of Systems   Constitutional: Positive for malaise/fatigue. Negative for chills, decreased appetite, diaphoresis, fever, night sweats, weight gain and weight loss.   Eyes: Negative.    Cardiovascular:  Positive for dyspnea on exertion, leg swelling, orthopnea and paroxysmal nocturnal dyspnea. Negative for chest pain, claudication, cyanosis, irregular heartbeat, near-syncope, palpitations and syncope.   Respiratory:  Negative for cough, hemoptysis and shortness of breath.    Endocrine: Negative.    Hematologic/Lymphatic: Negative.    Skin:  Negative for color change, dry skin and nail changes.   Musculoskeletal: Negative.    Gastrointestinal: Negative.    Genitourinary: Negative.    Neurological:  Negative for weakness.       Objective:   Blood pressure (!) 66/42, pulse 105, height 5' 6" (1.676 m), weight 61.4 kg (135 lb 5.8 oz).body mass index is 21.85 kg/m².  Physical Exam  Vitals reviewed.   Constitutional:       Appearance: He is well-developed.      Comments: BP (!) 66/42 (BP Location: Left arm, Patient Position: Sitting)   Pulse 105   Ht 5' 6" (1.676 m)   Wt 61.4 " kg (135 lb 5.8 oz)   BMI 21.85 kg/m²      HENT:      Head: Normocephalic.   Neck:      Vascular: No carotid bruit or JVD.   Cardiovascular:      Rate and Rhythm: Regular rhythm. Tachycardia present.      Chest Wall: PMI is displaced.      Pulses: Normal pulses.      Heart sounds: Normal heart sounds. No murmur heard.  Pulmonary:      Effort: Pulmonary effort is normal.      Breath sounds: Normal breath sounds.   Abdominal:      General: Bowel sounds are normal.      Palpations: Abdomen is soft.   Musculoskeletal:      Right lower leg: Edema present.      Left lower leg: Edema present.   Skin:     General: Skin is warm.   Neurological:      Mental Status: He is alert.         Labs:    Chemistry        Component Value Date/Time     (L) 06/25/2025 1252     03/20/2025 1453    K 3.2 (L) 06/25/2025 1252    K 3.5 03/20/2025 1453    CL 96 06/25/2025 1252     03/20/2025 1453    CO2 24 06/25/2025 1252    CO2 23 03/20/2025 1453    BUN 12 06/25/2025 1252    CREATININE 0.9 06/25/2025 1252     06/25/2025 1252    GLU 94 03/20/2025 1453        Component Value Date/Time    CALCIUM 8.6 (L) 06/25/2025 1252    CALCIUM 8.9 03/20/2025 1453    ALKPHOS 75 06/25/2025 1252    ALKPHOS 40 02/09/2025 0626    AST 24 06/25/2025 1252    AST 16 02/09/2025 0626    ALT 28 06/25/2025 1252    ALT 20 02/09/2025 0626    BILITOT 2.3 (H) 06/25/2025 1252    BILITOT 1.6 (H) 02/09/2025 0626    ESTGFRAFRICA >60.0 03/18/2021 0511    EGFRNONAA >60.0 03/18/2021 0511          Magnesium    Date Value Ref Range Status   06/25/2025 1.9 1.6 - 2.6 mg/dL Final     Lab Results   Component Value Date    WBC 7.74 06/12/2025    HGB 14.0 06/12/2025    HCT 43.2 06/12/2025     (L) 06/12/2025     Lab Results   Component Value Date    INR 1.6 (H) 06/10/2025    INR 1.7 (H) 04/04/2025    INR 1.4 (H) 04/03/2025    PROTIME 16.7 (H) 06/10/2025    PROTIME 17.8 (H) 04/04/2025    PROTIME 15.2 (H) 04/03/2025     BNP   Date Value Ref Range Status    03/31/2025 1,317 (H) 0 - 99 pg/mL Final     Comment:     Values of less than 100 pg/ml are consistent with non-CHF populations.    03/20/2025 1,053 (H) 0 - 99 pg/mL Final     Comment:     Values of less than 100 pg/ml are consistent with non-CHF populations.   02/20/2025 956 (H) 0 - 99 pg/mL Final     Comment:     Values of less than 100 pg/ml are consistent with non-CHF populations.   02/08/2025 379 (H) 0 - 99 pg/mL Final     Comment:     Values of less than 100 pg/ml are consistent with non-CHF populations.         Assessment:      1. Adult congenital heart disease    2. S/P Fontan procedure    3. Heart failure with reduced ejection fraction    4. WPW syndrome    5. Essential hypertension    6. Acute on chronic systolic congestive heart failure    7. Single ventricle with tricuspid atresia        Plan:   Very complex congenital heart disease (see above) with recent ADHF. He is at a very high risk for a HF readmission. Recommend the following;  DC lasix and start Bumex 2 mg twice daily.  Start Dapagliflozin 10 mg daily  Continue current doses of Losartan and metoprolol succinate    Recommend 2 gram sodium restriction and 1500cc fluid restriction.  Encourage physical activity with graded exercise program.  Requested patient to weigh themselves daily, and to notify us if their weight increases by more than 3 lbs in 1 day or 5 lbs in 1 week.   RTC in 1 month     Solitario Reis MD

## 2025-07-08 NOTE — TELEPHONE ENCOUNTER
Pt grandmother calling, states with pt, asking what time is pt appt tomorrow. Per chart, pt has lab work at 12:30 and appt at 1:30. Grandmother verbalizes understanding.   Reason for Disposition   Question about upcoming scheduled surgery, procedure or test, no triage required, and triager able to answer question    Protocols used: Information Only Call - No Triage-A-

## 2025-07-12 PROBLEM — I46.9 CARDIAC ARREST: Status: ACTIVE | Noted: 2025-01-01

## 2025-07-12 PROBLEM — J96.01 ACUTE HYPOXEMIC RESPIRATORY FAILURE: Status: ACTIVE | Noted: 2025-01-01

## 2025-07-12 PROBLEM — A41.9 SEPSIS: Status: ACTIVE | Noted: 2025-01-01

## 2025-07-12 PROBLEM — I26.99 PULMONARY THROMBOEMBOLISM: Status: ACTIVE | Noted: 2025-01-01

## 2025-07-12 NOTE — ASSESSMENT & PLAN NOTE
Patient with PEA arrest. 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    - CTA Chest: Bilateral acute pulmonary thromboemboli identified in segmental branches of the bilateral lower lobe pulmonary arteries. Cardiomegaly with morphology suggesting single dilated left ventricle. There appears to be communication between the right and left atria suggesting large ASD.  Pulmonary venous congestion with bibasilar pulmonary edema.    Recommendations  - Start Heparin drip   - Continue with VTE protocol   - Monitor Oxygen sat.   - Pul consulted and recs appreciated.

## 2025-07-12 NOTE — EICU
"Virtual ICU Admission    Admit Date: 2025  LOS: 0  Code Status: Full Code   : 1995  Bed: [id number] A:     Diagnosis: <principal problem not specified>    Patient  has a past medical history of Acute decompensated heart failure, History of heart surgery, Other cirrhosis of liver, SVT (supraventricular tachycardia), and WPW syndrome.    Last VS: /60   Pulse (!) 114   Temp 96.3 °F (35.7 °C)   Resp (!) 24   Ht 5' 6" (1.676 m)   Wt 65.8 kg (145 lb)   SpO2 (!) 94% Comment: ABG confirmed  BMI 23.40 kg/m²       VICU Review    VICU nurse assessment :  [place] completed, LDA documentation reconciliation completed, and VTE prophylaxis review                  "

## 2025-07-12 NOTE — CONSULTS
[facility] Pulmonary & Critical Care Medicine Consult Note    Primary Attending Physician: Dr. Gutierrez  Consultant Attending: Dr. Logan  Consultant Fellow: Amanda Watson      Reason for Consult:     Refractory hypoxemia    Subjective:      History of Present Illness:  Juani Reilly Jr. is a 30 y.o.  male who  has a past medical history of Acute decompensated heart failure (02/06/2025), History of heart surgery, Other cirrhosis of liver (11/12/2020), SVT (supraventricular tachycardia), and WPW syndrome.. The patient presented to the [facility] ED on 7/12/2025 with a primary complaint of Transfer (Transfer sent from [place] for CICU consult for post cardiac arrest and bilateral PE. Pt arrives on prop @35mcg/kg/hr, Heparin 18units/kg/hr, and Fentanyl @50mcg/kg/hr. 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 Per family, patient was talking to his relative and suddenly complained of some shortness of breath and then became unresponsive. CPR was initiated by family and when EMS arrived, PEA was noted and 3 rounds of epi were administered with ROSC. Patient was intubated but c/b refractory hypoxemia so pulmonology was consulted.     At [place], Vent 500, rate 20, PEEP 10, 100% FiO2 with Abg of 7.22, pCO2 32, pO2 53, sat 73%. He was transferred to [facility] for Heart Transplant Service/Congenital Cardiology evaluation.     Pulmonology consulted in the context of persistent hypoxemia with sats in the 70s.      Past Medical History:  Past Medical History:   Diagnosis Date    Acute decompensated heart failure 02/06/2025    History of heart surgery     Other cirrhosis of liver 11/12/2020    SVT (supraventricular tachycardia)     WPW syndrome        Past Surgical History:  Past Surgical History:    Procedure Laterality Date    ANGIOGRAPHY OF AORTIC ARCH N/A 09/03/2020    Procedure: AORTOGRAM, AORTIC ARCH;  Surgeon: Stephania Crump MD;  Location: [facility] CATH LAB;  Service: Cardiology;  Laterality: N/A;    FONTAN PROCEDURE, EXTRACARDIAC      LEFT HEART CATHETERIZATION Left 09/03/2020    Procedure: Left heart cath;  Surgeon: Stephania Crump MD;  Location: [facility] CATH LAB;  Service: Cardiology;  Laterality: Left;    RIGHT HEART CATHETERIZATION FOR CONGENITAL HEART DEFECT N/A 09/03/2020    Procedure: CATHETERIZATION, HEART, RIGHT, FOR CONGENITAL HEART DEFECT;  Surgeon: Stephania Crump MD;  Location: [facility] CATH LAB;  Service: Cardiology;  Laterality: N/A;  [facility] - needs covid test morning of. Extenuating circumstances    TRANSESOPHAGEAL ECHOCARDIOGRAPHY N/A 11/10/2022    Procedure: ECHOCARDIOGRAM, TRANSESOPHAGEAL;  Surgeon: Emeterio Hall MD;  Location: [facility] EP LAB;  Service: Cardiology;  Laterality: N/A;    TREATMENT OF CARDIAC ARRHYTHMIA N/A 01/09/2021    Procedure: CARDIOVERSION;  Surgeon: Jim Mcginnis MD;  Location: [facility] CATH LAB;  Service: Cardiology;  Laterality: N/A;       Allergies:  Review of patient's allergies indicates:  No Known Allergies    Medications:   In-Hospital Scheduled Medications:   heparin (PORCINE)  80 Units/kg Intravenous Once    piperacillin-tazobactam (Zosyn) IV (PEDS and ADULTS) (extended infusion is not appropriate)  4.5 g Intravenous Q8H    vancomycin (VANCOCIN) IV (PEDS and ADULTS)  20 mg/kg Intravenous Once    white petrolatum-mineral oiL   Both Eyes Q8H      In-Hospital PRN Medications:    Current Facility-Administered Medications:     heparin (PORCINE), 60 Units/kg, Intravenous, PRN    heparin (PORCINE), 30 Units/kg, Intravenous, PRN    sodium chloride 0.9%, 10 mL, Intravenous, PRN    Pharmacy to dose Vancomycin consult, , , Once **AND** vancomycin - pharmacy to dose, , Intravenous, pharmacy to manage frequency   In-Hospital IV Infusion Medications:    fentanyl  0-250 mcg/hr Intravenous Continuous 5 mL/hr at 07/12/25 0836 50 mcg/hr at 07/12/25 0836    furosemide (Lasix) 500 mg in 50 mL infusion (conc: 10 mg/mL)  20 mg/hr Intravenous Continuous 1 mL/hr at 07/12/25 0957 10 mg/hr at 07/12/25 0957    heparin (porcine) in D5W  0-40 Units/kg/hr Intravenous Continuous   Paused at 07/12/25 1049    propofoL  0-50 mcg/kg/min Intravenous Continuous 7.9 mL/hr at 07/12/25 1051 20 mcg/kg/min at 07/12/25 1051    rocuronium 500 mg in 50 mL infusion (conc 10 mg/mL)  0-16 mcg/kg/min Intravenous Continuous          Home Medications:  Prior to Admission medications    Medication Sig Start Date End Date Taking? Authorizing Provider   benzonatate (TESSALON) 100 MG capsule Take 1 capsule (100 mg total) by mouth 3 (three) times daily as needed for Cough. 6/26/25   Princess Montesinos PA-C   bumetanide (BUMEX) 2 MG tablet Take 1 tablet (2 mg total) by mouth 2 (two) times a day. 7/8/25   Solitario Reis MD   cetirizine (ZYRTEC) 10 MG tablet Take 1 tablet (10 mg total) by mouth once daily. 3/17/25 3/17/26  Jaqueline Pardo MD   dapagliflozin propanediol (FARXIGA) 10 mg tablet Take 1 tablet (10 mg total) by mouth once daily. 7/8/25   Solitario Reis MD   losartan (COZAAR) 100 MG tablet Take 1 tablet (100 mg total) by mouth once daily. 6/11/25 6/11/26  Nazario Thompson NP   metoprolol succinate (TOPROL-XL) 50 MG 24 hr tablet Take 1 tablet (50 mg total) by mouth once daily. 6/11/25 6/11/26  Nazario Thompson NP   rivaroxaban (XARELTO) 20 mg Tab Take 1 tablet (20 mg total) by mouth daily with dinner or evening meal.  Patient not taking: Reported on 7/8/2025 6/11/25   Nazario Thompson, NP   spironolactone (ALDACTONE) 25 MG tablet Take 1 tablet (25 mg total) by mouth once daily. 6/26/25 6/26/26  Princess Montesinos PA-C       Family History:  Family History   Problem Relation Name Age of Onset    No Known Problems Mother      No Known Problems Father      No Known Problems Sister 3     No Known  "Problems Brother      No Known Problems Maternal Aunt      No Known Problems Maternal Uncle      No Known Problems Paternal Aunt      No Known Problems Paternal Uncle      No Known Problems Maternal Grandmother      Heart disease Maternal Grandfather      No Known Problems Paternal Grandmother      No Known Problems Paternal Grandfather      Anemia Neg Hx      Arrhythmia Neg Hx      Asthma Neg Hx      Clotting disorder Neg Hx      Fainting Neg Hx      Heart attack Neg Hx      Heart failure Neg Hx      Hyperlipidemia Neg Hx      Hypertension Neg Hx      Stroke Neg Hx      Atrial Septal Defect Neg Hx         Social History:  Social History[1]    Review of Systems:  Unable to obtain, patient intubated     Objective:   Last 24 Hour Vital Signs:  BP  Min: 87/56  Max: 141/110  Temp  Av °F (34.4 °C)  Min: 92 °F (33.3 °C)  Max: 96.4 °F (35.8 °C)  Pulse  Av.3  Min: 109  Max: 137  Resp  Av.5  Min: 18  Max: 27  SpO2  Av.1 %  Min: 65 %  Max: 88 %  Height  Av' 6" (167.6 cm)  Min: 5' 6" (167.6 cm)  Max: 5' 6" (167.6 cm)  Weight  Av.8 kg (145 lb)  Min: 65.8 kg (145 lb)  Max: 65.8 kg (145 lb)  No intake/output data recorded.    Physical Examination:  Gen: intubated, sedated   HEENT: NC/AT, PERRLA, anicteric sclerae, TMs clear, nasal mucosa pink, OP clear   Neck: Supple, no LAD, no thyromegaly   CV: tachycardic, regular rhythm. pulses 2+ symmetric   Resp: CTA bilat, no wheezes/r/r   Abd: Soft, NT/ND, BS present, no HSM   MSK: FROM, no deformities, no tenderness   Neuro: A&Ox3, CN II-XII intact, 5/5 strength, reflexes 2+, sensation intact Skin: Warm, dry, no r/l        Laboratory:  Trended Lab Data:  Recent Labs     25  0254 25  0322 25  0329 25  0348 25  0730 25  0955   WBC 8.56  --   --  9.73 14.97*  --    HGB 12.6*  --   --  13.4* 13.3*  --    HCT 40.6  --  41.0 41.5 44.3  --    *  --   --  130* 131*  --    *  --   --   --   --   --    K 3.8  --   --   " --   --   --    CL 99  --   --   --   --   --    CO2 12*  --   --   --   --   --    BUN 14  --   --   --   --   --    CREATININE 1.2  --   --   --   --   --    *  --   --   --   --   --    BILITOT 2.5*  --   --   --   --   --    AST 44  --   --   --   --   --    ALT 45*  --   --   --   --   --    ALKPHOS 81  --   --   --   --   --    CALCIUM 8.1*  --   --   --   --   --    ALBUMIN 3.2*  --   --   --   --   --    PROT 7.5  --   --   --   --   --    MG  --  2.2  --   --  2.5  --    PHOS  --   --   --   --  6.1*  --    INR 1.7*  --   --   --   --  2.3*       Cardiac:   Recent Labs   Lab 07/12/25 0220 07/12/25 0730   TROPONINI 0.150*  --    BNP 2,461* 2,903*       Urinalysis:   Lab Results   Component Value Date    COLORU Yellow 07/12/2025    SPECGRAV 1.010 07/12/2025    NITRITE Negative 07/12/2025    KETONESU Negative 02/05/2025    UROBILINOGEN Negative 07/12/2025       Microbiology:  Microbiology Results (last 7 days)       Procedure Component Value Units Date/Time    Blood culture [[id number]] Collected: 07/12/25 0954    Order Status: Sent Specimen: Blood from Peripheral, Forearm, Left Updated: 07/12/25 1007    Blood culture [[id number]] Collected: 07/12/25 0954    Order Status: Sent Specimen: Blood from Peripheral, Upper Arm, Right Updated: 07/12/25 1006    Blood culture [[id number]] Collected: 07/12/25 0954    Order Status: Sent Specimen: Blood from Peripheral, Lower Arm, Right             Radiology:  Reviewed CT images personally    I have personally reviewed the above labs and imaging.    Current Medications:     Infusions:   fentanyl  0-250 mcg/hr Intravenous Continuous 5 mL/hr at 07/12/25 0836 50 mcg/hr at 07/12/25 0836    furosemide (Lasix) 500 mg in 50 mL infusion (conc: 10 mg/mL)  20 mg/hr Intravenous Continuous 1 mL/hr at 07/12/25 0957 10 mg/hr at 07/12/25 0957    heparin (porcine) in D5W  0-40 Units/kg/hr Intravenous Continuous   Paused at 07/12/25 1049    propofoL  0-50 mcg/kg/min Intravenous  Continuous 7.9 mL/hr at 07/12/25 1051 20 mcg/kg/min at 07/12/25 1051    rocuronium 500 mg in 50 mL infusion (conc 10 mg/mL)  0-16 mcg/kg/min Intravenous Continuous            Scheduled:   heparin (PORCINE)  80 Units/kg Intravenous Once    piperacillin-tazobactam (Zosyn) IV (PEDS and ADULTS) (extended infusion is not appropriate)  4.5 g Intravenous Q8H    vancomycin (VANCOCIN) IV (PEDS and ADULTS)  20 mg/kg Intravenous Once    white petrolatum-mineral oiL   Both Eyes Q8H        PRN:    Current Facility-Administered Medications:     heparin (PORCINE), 60 Units/kg, Intravenous, PRN    heparin (PORCINE), 30 Units/kg, Intravenous, PRN    sodium chloride 0.9%, 10 mL, Intravenous, PRN    Pharmacy to dose Vancomycin consult, , , Once **AND** vancomycin - pharmacy to dose, , Intravenous, pharmacy to manage frequency     Assessment:     Juani Emeterio Reilly  is a 30 y.o. male with:  Patient Active Problem List    Diagnosis Date Noted    Pulmonary thromboembolism 07/12/2025    Acute hypoxemic respiratory failure 07/12/2025    Cardiac arrest 07/12/2025    Epigastric pain 06/10/2025    Atrial flutter 02/08/2025    Hypoxia 02/07/2025    Elevated bilirubin 02/07/2025    Respiratory alkalosis 02/07/2025    Acute decompensated heart failure 02/06/2025    SOB (shortness of breath) 02/06/2025    Hypokalemia     Tachycardia 01/09/2021    Other cirrhosis of liver 11/12/2020    Essential hypertension 01/04/2020    S/P Fontan procedure 10/03/2019    Adult congenital heart disease 10/03/2019    Single ventricle with tricuspid atresia 10/03/2019    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 Patient's complicated heart anatomy, shunting is likely factoring into hypoxemia. Per family, patient usually satting at low 80s at home. This is  worsened in the setting of cardiac arrest and PE.     Patient has a complex cardiopulmonary physiology and several contributors to the acute decompensation. As it pertains to the refractory hypoxemia, pulmonology recommends:   - deeper sedation with RASS goal -4 to -5 and vent synchrony using a fentanyl gtt with propofol.   - After trial of push dose of rocuronium in the ED, patient's saturations immediately went from low 70s to ~88-90%, indicating that a significant portion of the hypoxemia was the vent dysynchrony. Patient was outstripping the vent likely in the setting of lactic acidosis.  - Recommend starting Nimbex drip  - Given PE and dense airspace disease, consider lower PEEP strategy   - Agree with starting ABX in the setting of LLL changes on CT  - Follow up formal Echo report  - Neuroprognostication per primary team.  - trend ABGs.      Thank you for allowing us to participate in the care of this patient. Please contact me if you have any questions regarding this consult.    Amanda Watson MD  Pulmonary & Critical Care Medicine Fellow         [1]   Social History  Tobacco Use    Smoking status: Former     Current packs/day: 0.00     Types: Cigarettes     Quit date:      Years since quittin.5     Passive exposure: Past    Smokeless tobacco: Never    Tobacco comments:     3-4 months    Substance Use Topics    Alcohol use: No    Drug use: Yes     Types: Marijuana     Comment: Odalys today- pt reports a while back

## 2025-07-12 NOTE — CARE UPDATE
HTS Care Update Note    Hemodynamics:    SVO2 CO CI SVR   60 4.33 2.5 1145     Body surface area is 1.75 meters squared.    I/Os     Intake/Output Summary (Last 24 hours) at 7/12/2025 1744  Last data filed at 7/12/2025 1700  Gross per 24 hour   Intake 719.96 ml   Output 915 ml   Net -195.04 ml     Continuous Infusions:      CISatracurium 200 mg in 0.9% NaCl 100 mL infusion  0-10 mcg/kg/min Intravenous Continuous        DOBUTamine IV infusion (non-titrating)  5 mcg/kg/min Intravenous Continuous 4.9 mL/hr at 07/12/25 1700 5 mcg/kg/min at 07/12/25 1700    EPINEPHrine  0.04 mcg/kg/min Intravenous Continuous        fentanyl  0-250 mcg/hr Intravenous Continuous 15 mL/hr at 07/12/25 1700 150 mcg/hr at 07/12/25 1700    furosemide (Lasix) 500 mg in 50 mL infusion (conc: 10 mg/mL)  20 mg/hr Intravenous Continuous 2 mL/hr at 07/12/25 1600 20 mg/hr at 07/12/25 1600    heparin (porcine) in D5W  0-40 Units/kg/hr Intravenous Continuous 7.9 mL/hr at 07/12/25 1700 12 Units/kg/hr at 07/12/25 1700    midazolam  0-5 mg/hr Intravenous Continuous        NORepinephrine bitartrate-D5W  0-3 mcg/kg/min Intravenous Continuous 19.7 mL/hr at 07/12/25 1700 0.08 mcg/kg/min at 07/12/25 1700    propofoL  0-50 mcg/kg/min Intravenous Continuous 19.7 mL/hr at 07/12/25 1700 50 mcg/kg/min at 07/12/25 1700    vasopressin  0.04 Units/min Intravenous Continuous 12 mL/hr at 07/12/25 1700 0.04 Units/min at 07/12/25 1700       Plan:  - EPI started for inotropic support. 0.04   - Plan discussed with attending     Glen KWAN Obi, MD - PGY- V  Department of Cardiovascular disease   [facility]

## 2025-07-12 NOTE — ED PROVIDER NOTES
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  Per EMS, patient was found unresponsive at home.  CPR was initiated by family members and the fire department. On EMS arrival,  patient was reportedly in PEA CPR was initiated and patient was given ACLS doses of epinephrine.  Roughly 3 doses of epinephrine were administered with ROSC. Pt was administered push dose amount of epinephrine with improvement of initially hypotensive blood pressure from the 80s to 140 systolic.  Patient remained like this on arrival.  Patient was initially intubated but secondary some to malfunction and I gel was placed.  On arrival patient with blood pressure 140/100 heart rate of 120 respiratory rate of 27. Total down time estimated to be between 10-15 minutes but not exactly clear.       Review of patient's allergies indicates:  No Known Allergies  Past Medical History:   Diagnosis Date    Acute decompensated heart failure 02/06/2025    History of heart surgery     Other cirrhosis of liver 11/12/2020    SVT (supraventricular tachycardia)     WPW syndrome      Past Surgical History:   Procedure Laterality Date    ANGIOGRAPHY OF AORTIC ARCH N/A 09/03/2020    Procedure: AORTOGRAM, AORTIC ARCH;  Surgeon: Stephania Crump MD;  Location: [facility] CATH LAB;  Service: Cardiology;  Laterality: N/A;    FONTAN PROCEDURE, EXTRACARDIAC      LEFT HEART CATHETERIZATION Left 09/03/2020    Procedure: Left heart cath;  Surgeon: Stephania Crump MD;  Location: [facility] CATH LAB;  Service: Cardiology;  Laterality: Left;    RIGHT  HEART CATHETERIZATION FOR CONGENITAL HEART DEFECT N/A 09/03/2020    Procedure: CATHETERIZATION, HEART, RIGHT, FOR CONGENITAL HEART DEFECT;  Surgeon: Stephania Crump MD;  Location: [facility] CATH LAB;  Service: Cardiology;  Laterality: N/A;  [facility] - needs covid test morning of. Extenuating circumstances    TRANSESOPHAGEAL ECHOCARDIOGRAPHY N/A 11/10/2022    Procedure: ECHOCARDIOGRAM, TRANSESOPHAGEAL;  Surgeon: Emeterio Hall MD;  Location: [facility] EP LAB;  Service: Cardiology;  Laterality: N/A;    TREATMENT OF CARDIAC ARRHYTHMIA N/A 01/09/2021    Procedure: CARDIOVERSION;  Surgeon: Jim Mcginnis MD;  Location: [facility] [place] CATH LAB;  Service: Cardiology;  Laterality: N/A;     Family History   Problem Relation Name Age of Onset    No Known Problems Mother      No Known Problems Father      No Known Problems Sister 3     No Known Problems Brother      No Known Problems Maternal Aunt      No Known Problems Maternal Uncle      No Known Problems Paternal Aunt      No Known Problems Paternal Uncle      No Known Problems Maternal Grandmother      Heart disease Maternal Grandfather      No Known Problems Paternal Grandmother      No Known Problems Paternal Grandfather      Anemia Neg Hx      Arrhythmia Neg Hx      Asthma Neg Hx      Clotting disorder Neg Hx      Fainting Neg Hx      Heart attack Neg Hx      Heart failure Neg Hx      Hyperlipidemia Neg Hx      Hypertension Neg Hx      Stroke Neg Hx      Atrial Septal Defect Neg Hx       Social History[1]  Review of Systems   Unable to perform ROS: Patient unresponsive       Physical Exam     Initial Vitals [07/12/25 0129]   BP Pulse Resp Temp SpO2   (!) 141/100 (!) 122 (!) 27 (!) 92 °F (33.3 °C) (!) 83 %      MAP       --         Physical Exam    Nursing note and vitals reviewed.  Constitutional: He appears well-developed. He appears distressed.   HENT:   Head: Normocephalic and atraumatic.   Right Ear: External ear normal.   Left Ear: External ear normal.   No overt signs of  head trauma noted.    Eyes:   Pupils reactive to light bilaterally   Cardiovascular:            Murmur heard.  Pulmonary/Chest:   Large midline scar    Abdominal: Abdomen is soft. Bowel sounds are normal.   Musculoskeletal:         General: No tenderness or edema.     Neurological: He is unresponsive. GCS eye subscore is 1. GCS verbal subscore is 1. GCS motor subscore is 1.   GCS 3     Supraglottic airway in  place (pt not truly intubated on arrival to the ED).    Skin: Skin is warm. No erythema.       ED Course   Intubation    Date/Time: 7/12/2025 5:25 AM  Location procedure was performed: [facility] EMERGENCY DEPARTMENT    Performed by: Abimael Clemens MD  Authorized by: Abimael Clemens MD  Consent Done: Emergent Situation  Indications: respiratory distress and airway protection  Patient status: unconscious  Preoxygenation: nonrebreather mask  Paralytic: none  Laryngoscope size: Mac 4  Tube size: 7.5 mm  Tube type: cuffed  Number of attempts: 1  Cricoid pressure: no  Cords visualized: yes  Post-procedure assessment: CO2 detector and chest rise  Breath sounds: clear  ETT to lip: 25 cm  Tube secured with: adhesive tape and ETT haywood  Chest x-ray interpreted by me and radiologist.  Chest x-ray findings: endotracheal tube in appropriate position      Critical Care    Date/Time: 7/12/2025 5:29 AM    Performed by: Abimael Clemens MD  Authorized by: Abimael Clemens MD  Direct patient critical care time: 60 minutes  Additional history critical care time: 10 minutes  Ordering / reviewing critical care time: 20 minutes  Documentation critical care time: 10 minutes  Consulting other physicians critical care time: 15 minutes  Consult with family critical care time: 10 minutes  Other critical care time: 5 minutes  Total critical care time (exclusive of procedural time) : 130 minutes  Critical care was necessary to treat or prevent imminent or life-threatening deterioration of the following conditions: cardiac  failure and respiratory failure.  Critical care was time spent personally by me on the following activities: discussions with consultants, discussions with primary provider, examination of patient, obtaining history from patient or surrogate, ordering and performing treatments and interventions, ordering and review of laboratory studies, ordering and review of radiographic studies, pulse oximetry, re-evaluation of patient's condition, review of old charts, ventilator management, evaluation of patient's response to treatment, development of treatment plan with patient or surrogate and blood draw for specimens.      Labs Reviewed   LACTIC ACID, PLASMA - Abnormal       Result Value    Lactic Acid Level 8.8 (*)     Narrative:     Falsely low lactic acid results can be found in samples containing >=13.0 mg/dL total bilirubin and/or >=3.5 mg/dL direct bilirubin.    CBC WITH DIFFERENTIAL - Abnormal    WBC 9.47      RBC 0.94 (*)     HGB 13.6 (*)     HCT 9.2 (*)     MCV 98      .7 (*)     MCHC >40.0 (*)     RDW        Platelet Count 140 (*)     MPV 12.2      Nucleated RBC 11 (*)     Neut % 53.8      Lymph % 36.3      Mono % 6.9      Eos % 0.1      Basophil % 0.7      Imm Grans % 2.2 (*)     Neut # 5.09      Lymph # 3.44      Mono # 0.65      Eos # 0.01      Baso # 0.07      Imm Grans # 0.21 (*)    TROPONIN I - Abnormal    Troponin-I 0.150 (*)    B-TYPE NATRIURETIC PEPTIDE - Abnormal    BNP 2,461 (*)    URINALYSIS, REFLEX TO URINE CULTURE - Abnormal    Color, UA Yellow      Appearance, UA Clear      pH, UA 6.0      Spec Grav UA 1.010      Protein, UA Trace (*)     Glucose, UA Negative      Ketones, UA Negative      Bilirubin, UA Negative      Blood, UA Trace (*)     Nitrites, UA Negative      Urobilinogen, UA Negative      Leukocyte Esterase, UA Negative     PROTIME-INR - Abnormal    PT 17.4 (*)     INR 1.7 (*)    MORPHOLOGY - Abnormal    Platelet Estimate Decreased (*)     Anisocytosis Slight      Agglutination  Present     COMPREHENSIVE METABOLIC PANEL - Abnormal    Sodium 132 (*)     Potassium 3.8      Chloride 99      CO2 12 (*)     Glucose 121 (*)     BUN 14      Creatinine 1.2      Calcium 8.1 (*)     Protein Total 7.5      Albumin 3.2 (*)     Bilirubin Total 2.5 (*)     ALP 81      AST 44      ALT 45 (*)     Anion Gap 21 (*)     eGFR >60     CBC WITH DIFFERENTIAL - Abnormal    WBC 8.56      RBC 4.44 (*)     HGB 12.6 (*)     HCT 40.6      MCV 91      MCH 28.4      MCHC 31.0 (*)     RDW 19.9 (*)     Platelet Count 111 (*)     MPV 11.6      Nucleated RBC 2 (*)     Neut % 66.7      Lymph % 24.4      Mono % 5.4      Eos % 0.0      Basophil % 0.2      Imm Grans % 3.3 (*)     Neut # 5.71      Lymph # 2.09      Mono # 0.46      Eos # 0.00      Baso # 0.02      Imm Grans # 0.28 (*)    CBC WITH DIFFERENTIAL - Abnormal    WBC 9.73      RBC 4.53 (*)     HGB 13.4 (*)     HCT 41.5      MCV 92      MCH 29.6      MCHC 32.3      RDW 20.1 (*)     Platelet Count 130 (*)     MPV 11.6      Nucleated RBC 2 (*)     Neut % 74.6 (*)     Lymph % 17.4 (*)     Mono % 5.7      Eos % 0.1      Basophil % 0.1      Imm Grans % 2.1 (*)     Neut # 7.27      Lymph # 1.69      Mono # 0.55      Eos # 0.01      Baso # 0.01      Imm Grans # 0.20 (*)    POCT GLUCOSE - Abnormal    POCT Glucose 154 (*)    DRUG SCREEN PANEL, URINE EMERGENCY - Normal    Benzodiazepine, Urine Negative      Methadone, Urine Negative      Cocaine, Urine Negative      Opiates, Urine Negative      Barbiturates, Urine Negative      Amphetamines, Urine Negative      THC Negative      Phencyclidine, Urine Negative      Urine Creatinine 244.1      Narrative:     This screen includes the following classes of drugs at the listed cut-off:     Benzodiazepines:        200 ng/ml   Methadone:              300 ng/ml   Cocaine metabolite:     300 ng/ml   Opiates:                300 ng/ml   Barbiturates:           200 ng/ml   Amphetamines:           1000 ng/ml   Marijuana metabs (THC): 50 ng/ml  "  Phencyclidine (PCP):    25 ng/ml     This is a screening test. If results do not correlate with clinical presentation, then a confirmatory send out test is advised.    This report is intended for use in clinical monitoring and management of patients. It is not intended for use in employment related drug testing."   AMMONIA - Normal    Ammonia 48     CBC W/ AUTO DIFFERENTIAL    Narrative:     The following orders were created for panel order CBC auto differential.  Procedure                               Abnormality         Status                     ---------                               -----------         ------                     CBC with Differential[[id number]]       Abnormal            Final result                 Please view results for these tests on the individual orders.   CBC W/ AUTO DIFFERENTIAL    Narrative:     The following orders were created for panel order CBC auto differential.  Procedure                               Abnormality         Status                     ---------                               -----------         ------                     CBC with Differential[[id number]]       Abnormal            Final result                 Please view results for these tests on the individual orders.   CBC W/ AUTO DIFFERENTIAL    Narrative:     The following orders were created for panel order CBC auto differential.  Procedure                               Abnormality         Status                     ---------                               -----------         ------                     CBC with Differential[[id number]]       Abnormal            Final result                 Please view results for these tests on the individual orders.   D DIMER, QUANTITATIVE   MAGNESIUM   CK   LACTIC ACID, PLASMA   LACTIC ACID, PLASMA   APTT   CBC W/ AUTO DIFFERENTIAL    Narrative:     The following orders were created for panel order CBC auto differential.  Procedure                               Abnormality    "      Status                     ---------                               -----------         ------                     CBC with Differential[[id number]]                                                        Please view results for these tests on the individual orders.   CBC WITH DIFFERENTIAL   ISTAT CREATININE    POC Creatinine 1.1      Sample VENOUS     POCT GLUCOSE MONITORING CONTINUOUS          Imaging Results               CTA Chest Non-Coronary (PE Studies) (Final result)  Result time 07/12/25 03:26:20      Final result by Gómez Moser MD (07/12/25 03:26:20)                   Impression:      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    Bilateral acute pulmonary thromboemboli identified in segmental branches of the bilateral lower lobe pulmonary arteries.    Cardiomegaly with morphology suggesting single dilated left ventricle. There appears to be communication between the right and left atria suggesting large ASD.  Pulmonary venous congestion with bibasilar pulmonary edema.    Extracardiac Fontan shunt, between the IVC and pulmonary artery, opacifies with contrast and appears patent.    Atelectatic changes at the left lung base with significant contrast retention suggesting impaired pulmonary venous drainage. Peripheral zones of absent pulmonary enhancement at both lung bases potentially representing pulmonary infarcts, in light of bilateral pulmonary emboli, or additional areas of atelectasis.    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    Hepatomegaly with hepatic venous enlargement suggesting congestive hepatopathy.    This report was flagged in Epic as abnormal.    COMMUNICATION  This critical result was discovered/received on 7/12/2025 at 03:00.    The critical information above was relayed directly by Gómez Moser MD via epic secure chat to Abimael Clemens on 7/12/2025 at  03:22.        Electronically signed by: Gómez Moser MD  Date:    07/12/2025  Time:    03:26               Narrative:    EXAMINATION:  CTA CHEST NON CORONARY (PE STUDIES)    CLINICAL HISTORY:  Pulmonary embolism (PE) suspected, high prob;    TECHNIQUE:  Low dose axial images, sagittal and coronal reformations were obtained from the thoracic inlet to the lung bases following the IV administration of 100 mL of Omnipaque 350.  Contrast timing was optimized to evaluate the pulmonary arteries.  MIP images were performed.    COMPARISON:  CT chest abdomen pelvis 02/05/2025.    FINDINGS:  History of tricuspid atresia and prior Jesus shunt procedure resulting in cavopulmonary shunt.  There also appears to be and extracardiac Fontan shunt between the IVC and pulmonary artery.    Cardiomegaly with morphology suggesting single dilated left ventricle.  There appears to be communication between the right and left atria suggesting large ASD.  Pulmonary venous congestion with bibasilar pulmonary edema.      Good contrast bolus opacification of the pulmonary arteries. Bilateral acute pulmonary thromboemboli identified in segmental branches of the bilateral lower lobe pulmonary arteries.  Extracardiac Fontan shunt opacifies with contrast and appears patent.  Aneurysmal enlargement of the aortic root up to 5.2 cm, similar compared to prior.  No hilar or mediastinal mass or lymphadenopathy. No pleural or pericardial effusion. Atelectatic changes at the left lung base with significant contrast retention suggesting impaired pulmonary venous drainage.  Peripheral zones of absent pulmonary enhancement at both lung bases potentially representing pulmonary infarcts, in light of bilateral pulmonary emboli, or additional areas of atelectasis.  Limited images through the upper abdomen demonstrate hepatomegaly and hepatic venous enlargement.                                       CT Head Without Contrast (Final result)  Result time 07/12/25  02:53:47      Final result by Gómez Moser MD (07/12/25 02:53:47)                   Impression:      No acute intracranial abnormalities.      Electronically signed by: Gómez Moser MD  Date:    07/12/2025  Time:    02:53               Narrative:    EXAMINATION:  CT HEAD WITHOUT CONTRAST    CLINICAL HISTORY:  Facial trauma, blunt;    TECHNIQUE:  Low dose axial images were obtained through the head.  Coronal and sagittal reformations were also performed. Contrast was not administered.    COMPARISON:  09/12/2011.    FINDINGS:  The brain parenchyma appears normal for age with good corticomedullary differentiation.  There is no evidence of acute infarct, hemorrhage, or mass.  The ventricular system is normal in size.  No mass-effect or midline shift.  There are no abnormal extra-axial fluid collections.  Mucous retention cyst left maxillary antrum.  The remaining visualized paranasal sinuses and mastoid air cells are clear.  The calvarium appears intact.  .                                       X-Ray Chest AP Portable (Final result)  Result time 07/12/25 02:31:03      Final result by Gómez Moser MD (07/12/25 02:31:03)                   Impression:      ET tube tip 4 cm above the óscar.    Artifact from the patient's hair obscures the pulmonary apices and upper lung fields.  External pad obscures the some central lower chest including the right heart border. Coiled wires overlie the lower lung left hemithorax partially obscuring.  These findings result in limited evaluation.    Patient has a known shunt extending from the IVC and SVC to the right pulmonary artery, as seen on prior CT 02/05/2025 and prior MRI 01/02/2020, projecting posterior to the right atrium, similar to prior exams.  This is partially obscured by the external pad.    Remainder of the chest, otherwise, appears similar to 06/12/2025, allowing for exam limitations as above.      Electronically signed by: Gómez Moser  MD  Date:    07/12/2025  Time:    02:31               Narrative:    EXAMINATION:  XR CHEST AP PORTABLE    CLINICAL HISTORY:  Cardiac arrest, cause unspecified    TECHNIQUE:  Single frontal view of the chest was performed.    COMPARISON:  06/12/2025.    FINDINGS:  ET tube tip 4 cm above the óscar.  Artifact from the patient's hair obscures the pulmonary apices and upper lung fields.  External pad obscures the some central lower chest including the right heart border.  Patient has a known shunt extending from the IVC and SVC to the right pulmonary artery, as seen on prior CT 02/05/2025 and prior MRI 01/02/2020, projecting posterior to the right atrium, similar to prior exams.  This is partially obscured by the external pad.  Coiled wires overlie the lower lung left hemithorax partially obscuring.  These findings result in limited evaluation.    Allowing for the artifacts and limitations above, heart and lungs otherwise appear similar when allowing for differences in technique and positioning.                                       Medications   etomidate injection 19.8 mg (19.8 mg Intravenous Not Given 7/12/25 0130)   rocuronium injection 66 mg (66 mg Intravenous Not Given 7/12/25 0130)   propofol (DIPRIVAN) 10 mg/mL infusion (15 mcg/kg/min × 65.8 kg Intravenous Rate/Dose Change 7/12/25 0200)   heparin 25,000 units in dextrose 5% 250 mL (100 units/mL) infusion HIGH INTENSITY nomogram - [facility] (18 Units/kg/hr × 65.8 kg Intravenous New Bag 7/12/25 0349)   heparin 25,000 units in dextrose 5% (100 units/ml) IV bolus from bag HIGH INTENSITY nomogram - [facility] (has no administration in time range)   heparin 25,000 units in dextrose 5% (100 units/ml) IV bolus from bag HIGH INTENSITY nomogram - [facility] (has no administration in time range)   fentaNYL 2500 mcg in 0.9% sodium chloride 250 mL infusion premix (50 mcg/hr Intravenous Rate/Dose Change 7/12/25 7724)   iohexoL (OMNIPAQUE 350) injection 100 mL (100 mLs Intravenous Given 7/12/25  0233)   fentaNYL 50 mcg/mL injection 50 mcg (50 mcg Intravenous Given 7/12/25 0341)   heparin 25,000 units in dextrose 5% (100 units/ml) IV bolus from bag HIGH INTENSITY nomogram - [facility] (5,264 Units Intravenous Bolus from Bag 7/12/25 0350)     Medical Decision Making  Amount and/or Complexity of Data Reviewed  Labs: ordered. Decision-making details documented in ED Course.  Radiology: ordered. Decision-making details documented in ED Course.    Risk  Prescription drug management.               ED Course as of 07/12/25 2116   Sat Jul 12, 2025   0242 POC PH(!!): 7.222 [LC]   0242 POC PO2(!!): 53.2 [LC]   0300 Patient initially accepted for transfer by cardiologist, Dr. Krause, but when I updated the transfer center regarding findings of bilateral acute pulmonary thromboemboli identified in the segmental branches of the bilateral lower lobe pulmonary arteries he reportedly told the transfer center that he would prefer the patient be admitted to the MICU. [LC]   0304 CT Head Without Contrast  No acute intracranial abnormality per final radiology read.  [LC]   0306 Troponin I(!): 0.150 [LC]   0341 Patient with bilateral acute pulmonary thromboemboli in the segmental branches of the bilateral lower lobe pulmonary arteries.  I reach back out to the transfer center and spoke to the initial cardiologist who stated he did not wish to accept the patient and that the patient should be admitted by the Medical ICU as he currently has multiple PEs.  I discussed the case with the on-call cardiologist here, Dr. Jordan, who did not feel that the patient should get thrombolytics therapy at this time but should have heparin PE protocol initiated. [LC]   0404 The MICU physician (Dr. Birmingham) was contacted by the transfer center team.  Per transfer center, Dr. Plasencia did not feel that the patient's cardiac arrest was secondary to the aforementioned PEs and he requested the patient to be admitted to the CCU,  [LC]   0405 The pulmonology  fellow on-call was contacted and consulted for guidance.  He is in agreement with the plan for IV heparin administration light of the pulmonary emboli that were identified in the CTA.  He stated that he would come see the patient in the ED prior to patient being transferred. [LC]   0508 Patient well be an ED-to-ED transfer per transfer center.  [LC]   0508 Pt's family  updated regarding presumptive diagnosis of pulmonary emboli. They are comfortable with plan for ED-to-ED transfer in light of pt's critical condition and need for higher level of care at [facility].  [LC]   0532 WBC: 9.73 [LC]   0532 RBC(!): 4.53 [LC]   0532 Hemoglobin(!): 13.4 [LC]   0532 Hematocrit: 41.5 [LC]   0532 POC PH(!!): 7.208 [LC]   0532 POC PCO2: 37.2 [LC]   0532 POC PO2(!!): 47.8 [LC]   0532 PT(!): 17.4 [LC]   0532 INR(!): 1.7 [LC]   0532 Sodium(!): 132 [LC]   0532 Potassium: 3.8 [LC]   0532 Chloride: 99 [LC]   0532 Glucose(!): 121 [LC]   0532 CO2(!): 12 [LC]   0532 Creatinine: 1.2 [LC]   0532 Calcium(!): 8.1 [LC]   0532 PROTEIN TOTAL: 7.5 [LC]   0532 BILIRUBIN TOTAL(!): 2.5 [LC]   0532 Albumin(!): 3.2 [LC]   0532 ALT(!): 45 [LC]   0532 Anion Gap(!): 21 [LC]   0532 Lactic Acid Level(!!): 8.8 [LC]   0532 BNP(!): 2,461 [LC]   0532 Troponin I(!): 0.150 [LC]   0532 Platelet Estimate(!): Decreased [LC]   0532 Sample: VENOUS [LC]   0532 Benzodiazepine, Urine: Negative [LC]   0532 Methadone Screen, Urine: Negative [LC]   0532 Cocaine, Urine: Negative [LC]   0532 Opiates, Urine: Negative [LC]   0532 Barbituates, Urine: Negative [LC]   0532 Amphetamines, Urine: Negative [LC]   0532 Marijuana (THC) Metabolite: Negative [LC]   0532 Urine Creatinine: 244.1 [LC]      ED Course User Index  [LC] Abimael Clemens MD                               Clinical Impression:  Final diagnoses:  [I46.9] Cardiac arrest  [I26.99] Acute pulmonary embolism, unspecified pulmonary embolism type, unspecified whether acute cor pulmonale present (Primary)          ED  Disposition Condition    Transfer to Another Facility Stable                      [1]   Social History  Tobacco Use    Smoking status: Former     Current packs/day: 0.00     Types: Cigarettes     Quit date:      Years since quittin.5     Passive exposure: Past    Smokeless tobacco: Never    Tobacco comments:     3-4 months    Substance Use Topics    Alcohol use: No    Drug use: Yes     Types: Marijuana     Comment: Odalys pérez- pt reports a while back        Abimael Clemens MD  [phone]

## 2025-07-12 NOTE — CARE UPDATE
Attempted using ambu bag to increase patients spo2, no changes patient sats remained 79%, md made aware.

## 2025-07-12 NOTE — PLAN OF CARE
Contacted by [facility] fellow Dr. Ramirez who is currently on his way in to evaluate the patient. Discussed via telephone.    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 He presented from home for cardiac arrest. Intubated in the ED complicated by refractory hypoxemia so pulmonology consulted.     Recommendations:   - With refractory hypoxemic recommend deep sedation with RASS goal -4 to -5 and vent synchrony. Recommend utilizing more hemodynamically neutral agents (fentanyl gtt) in addition to the ordered propofol in the setting of acute PE. With continued refractory hypoxemia can consider neuromuscular blockade once deeply sedated   - Acute BL PE noted on CTA chest - typically in the setting of cardiac arrest and acute PE I would recommend systemic lytics. However, in this patient would single ventricle anatomy I worry about CVA. Agree with anticoagulation - consideration of thrombectomy given inability to give systemic thrombolytics in the setting of massive PE (cardiac arrest). They are segmental PE but given his anatomy may be more sensitive. Would also workup other etiologies of cardiac arrest. Could be have just been hypoxia driven 2/2 V/Q mismatch from PE and pulmonary infiltrate, volume overload, arrhythmia.   - With this patient's extremely complicated anatomy and cardiac history, I think transfer to [facility] CCU is most appropriate   - Patient with dense airspace disease in LLL most concerning for atelectasis and possible pulmonary infarct, but would recommend CAP abx for possible component of aspiration   - In the setting of acute PE and dense airspace disease I would expect patient not to be PEEP responsive - consider lower PEEP strategy    - May consider pulmonary vasodilators if continued refractory hypoxemia, but would need transfer to main [facility] for that   - With his anatomy, I would suspect shunt is factoring into hypoxemia    Mariana Chao MD  [facility] Attending  Cell: [phone]  07/12/2025 3:53 AM

## 2025-07-12 NOTE — ED NOTES
I assumed care of this patient at this time. Report received from HILLARY Bañuelos + Nita, PATRICKP. Pt is resting in ED stretcher. Pt arrives to ED intubated. Pt remains on continuous cardiac monitor, pulse ox, and BP cuff to cycle. Bed low and locked; side rails up x2; call light within reach.

## 2025-07-12 NOTE — SUBJECTIVE & OBJECTIVE
Past Medical History:   Diagnosis Date    Acute decompensated heart failure 2025    History of heart surgery     Other cirrhosis of liver 2020    SVT (supraventricular tachycardia)     WPW syndrome        Past Surgical History:   Procedure Laterality Date    ANGIOGRAPHY OF AORTIC ARCH N/A 2020    Procedure: AORTOGRAM, AORTIC ARCH;  Surgeon: Stephania Crump MD;  Location: [facility] CATH LAB;  Service: Cardiology;  Laterality: N/A;    FONTAN PROCEDURE, EXTRACARDIAC      LEFT HEART CATHETERIZATION Left 2020    Procedure: Left heart cath;  Surgeon: Stephania Crump MD;  Location: [facility] CATH LAB;  Service: Cardiology;  Laterality: Left;    RIGHT HEART CATHETERIZATION FOR CONGENITAL HEART DEFECT N/A 2020    Procedure: CATHETERIZATION, HEART, RIGHT, FOR CONGENITAL HEART DEFECT;  Surgeon: Stephania Crump MD;  Location: [facility] CATH LAB;  Service: Cardiology;  Laterality: N/A;  [facility] - needs covid test morning of. Extenuating circumstances    TRANSESOPHAGEAL ECHOCARDIOGRAPHY N/A 11/10/2022    Procedure: ECHOCARDIOGRAM, TRANSESOPHAGEAL;  Surgeon: Emeterio Hall MD;  Location: [facility] EP LAB;  Service: Cardiology;  Laterality: N/A;    TREATMENT OF CARDIAC ARRHYTHMIA N/A 2021    Procedure: CARDIOVERSION;  Surgeon: Jim Mcginnis MD;  Location: [facility] CATH LAB;  Service: Cardiology;  Laterality: N/A;       Review of patient's allergies indicates:  No Known Allergies    Family History       Problem Relation (Age of Onset)    Heart disease Maternal Grandfather    No Known Problems Mother, Father, Sister, Brother, Maternal Aunt, Maternal Uncle, Paternal Aunt, Paternal Uncle, Maternal Grandmother, Paternal Grandmother, Paternal Grandfather          Tobacco Use    Smoking status: Former     Current packs/day: 0.00     Types: Cigarettes     Quit date:      Years since quittin.5     Passive exposure: Past    Smokeless tobacco: Never    Tobacco comments:     3-4 months     Substance and Sexual Activity    Alcohol use: No    Drug use: Yes     Types: Marijuana     Comment: Odalys today- pt reports a while back    Sexual activity: Yes         Review of Systems   Unable to perform ROS: Intubated     Objective:     Vital Signs (Most Recent):  Temp: (!) 95.2 °F (35.1 °C) (07/12/25 0432)  Pulse: (!) 123 (07/12/25 0432)  Resp: 20 (07/12/25 0432)  BP: (!) 134/90 (07/12/25 0432)  SpO2: (!) 72 % (07/12/25 0502) Vital Signs (24h Range):  Temp:  [92 °F (33.3 °C)-96.4 °F (35.8 °C)] 95.2 °F (35.1 °C)  Pulse:  [122-126] 123  Resp:  [18-27] 20  SpO2:  [69 %-83 %] 72 %  BP: (130-141)/() 134/90     Weight: 65.8 kg (145 lb)  Body mass index is 23.4 kg/m².    No intake or output data in the 24 hours ending 07/12/25 0506     Physical Exam  Vitals and nursing note reviewed.   Constitutional:       Comments: Intubated and sedated   HENT:      Head: Normocephalic and atraumatic.      Right Ear: External ear normal.      Left Ear: External ear normal.      Mouth/Throat:      Pharynx: No oropharyngeal exudate or posterior oropharyngeal erythema.   Eyes:      General: Scleral icterus present.   Cardiovascular:      Rate and Rhythm: Tachycardia present.      Heart sounds: Murmur heard.   Pulmonary:      Comments: intubated  Abdominal:      General: Abdomen is flat. There is no distension.   Musculoskeletal:         General: Swelling present.      Right lower leg: Edema present.      Left lower leg: Edema present.   Skin:     General: Skin is dry.   Neurological:      Comments: sedated   Psychiatric:      Comments: sedated          Vents:  Vent Mode: A/CMV-VC (07/12/25 0336)  Ventilator Initiated: Yes (07/12/25 0129)  Set Rate: 20 BPM (07/12/25 0336)  Vt Set: 500 mL (07/12/25 0336)  PEEP/CPAP: (S) 8 cmH20 (07/12/25 0432)  Oxygen Concentration (%): 100 (07/12/25 0336)  Peak Airway Pressure: 15.3 cmH20 (07/12/25 0336)  Plateau Pressure: 15.2 cmH20 (07/12/25 0129)  Total Ve: 10.4 L/m (07/12/25 0336)  F/VT  Ratio<105 (RSBI): (!) 43.75 (07/12/25 0336)    Lines/Drains/Airways       Airway  Duration                  Airway - Non-Surgical 07/12/25 0130 <1 day              Peripheral Intravenous Line  Duration             Peripheral IV 18 G Anterior;Distal;Left Upper Arm -- days    Peripheral IV 07/12/25 0159 20 G Anterior;Distal;Right Forearm <1 day    Peripheral IV 07/12/25 0340 18 G Anterior;Distal;Right Upper Arm <1 day                    Significant Labs:    CBC/Anemia Profile:  Recent Labs   Lab 07/12/25 0137 07/12/25  0254 07/12/25  0329 07/12/25  0348   WBC 9.47 8.56  --  9.73   HGB 13.6* 12.6*  --  13.4*   HCT 9.2* 40.6 41.0 41.5   * 111*  --  130*   MCV 98 91  --  92   RDW  --  19.9*  --  20.1*        Chemistries:  Recent Labs   Lab 07/12/25 0254   *   K 3.8   CL 99   CO2 12*   BUN 14   CREATININE 1.2   CALCIUM 8.1*   ALBUMIN 3.2*   PROT 7.5   BILITOT 2.5*   ALKPHOS 81   ALT 45*   AST 44       All pertinent labs within the past 24 hours have been reviewed.    Significant Imaging:   I have reviewed all pertinent imaging results/findings within the past 24 hours.

## 2025-07-12 NOTE — PROCEDURES
"Juani Reilly Jr. is a 30 y.o. male patient.    Temp: (!) 95.5 °F (35.3 °C) (07/12/25 1216)  Pulse: 110 (07/12/25 1216)  Resp: (!) 24 (07/12/25 1216)  BP: 115/76 (07/12/25 1200)  SpO2: (!) 82 % (07/12/25 1216)  Weight: 65.8 kg (145 lb) (07/12/25 0830)  Height: 5' 6" (167.6 cm) (07/12/25 0830)    Central Line    Date/Time: 7/12/2025 12:57 PM    Performed by: Glen Leary MD  Authorized by: Glen Leary MD    Location procedure was performed:  [facility]PL[facility]  Indications:  Hemodynamic monitoring  Preparation:  Skin prepped with ChloraPrep  Skin prep agent dried: Skin prep agent completely dried prior to procedure    Sterile barriers: All five maximal sterile barriers used - gloves, gown, cap, mask and large sterile sheet    Hand hygiene: Hand hygiene performed immediately prior to central venous catheter insertion    Location:  Right internal jugular  Catheter type:  Triple lumen  Catheter size:  7 Fr  Manometry: Yes    Number of attempts:  1  Securement:  Line sutured, chlorhexidine patch, sterile dressing applied and blood return through all ports  Complications: No    XRay:  Placement verified by x-ray  Adverse Events:  None           No data to display                  7/12/2025  Glen KWAN Obi, MD - PGY- V  Department of Cardiovascular disease   [facility]     "

## 2025-07-12 NOTE — ED NOTES
MD Gibran at bedside obtaining consent for central line + arterial line placement. Family at bedside.

## 2025-07-12 NOTE — CONSULTS
[facility] - Emergency Dept  Pulmonology  Consult Note    Patient Name: Juani Reilly Jr.  MRN: [id number]  Admission Date: 7/12/2025  Hospital Length of Stay: 0 days  Code Status: Prior  Attending Physician: Abimael Clemens MD  Primary Care Provider: Jaqueline Pardo MD   Principal Problem: <principal problem not specified>    Inpatient consult to Pulmonology  Consult performed by: Jaylen Ramirez MD  Consult ordered by: Abimael Clemens MD        Subjective:     HPI:  This is a 30 year old gentleman with tricuspid and pulmonary atresia s/p bidirectional Jesus and subsequent Fontan, WPW s/p ablation, follows with advanced heart failure clinic with plans for possible transplant, and cirrhosis presented with cardiac arrest. Per family, patient was talking to his relative and suddenly complained of some shortness of breath and then became unresponsive. CPR was initiated by family and when EMS arrived, PEA was noted and 3 rounds of epi were administered with ROSC. Patient was intubated but c/b refractory hypoxemia so pulmonology was consulted. Vent 500, rate 20, PEEP 10, 100% FiO2 with Abg of 7.22, pCO2 32, pO2 53, sat 73%. Patient is sedated with propofol.       Past Medical History:   Diagnosis Date    Acute decompensated heart failure 02/06/2025    History of heart surgery     Other cirrhosis of liver 11/12/2020    SVT (supraventricular tachycardia)     WPW syndrome        Past Surgical History:   Procedure Laterality Date    ANGIOGRAPHY OF AORTIC ARCH N/A 09/03/2020    Procedure: AORTOGRAM, AORTIC ARCH;  Surgeon: Stephania Crump MD;  Location: [facility] CATH LAB;  Service: Cardiology;  Laterality: N/A;    FONTAN PROCEDURE, EXTRACARDIAC      LEFT HEART CATHETERIZATION Left 09/03/2020    Procedure: Left heart cath;  Surgeon: Stephania Crump MD;  Location: [facility] CATH LAB;  Service: Cardiology;  Laterality: Left;    RIGHT HEART CATHETERIZATION FOR CONGENITAL HEART DEFECT N/A 09/03/2020    Procedure:  CATHETERIZATION, HEART, RIGHT, FOR CONGENITAL HEART DEFECT;  Surgeon: Stephania Crump MD;  Location: [facility] CATH LAB;  Service: Cardiology;  Laterality: N/A;  [facility] - needs covid test morning of. Extenuating circumstances    TRANSESOPHAGEAL ECHOCARDIOGRAPHY N/A 11/10/2022    Procedure: ECHOCARDIOGRAM, TRANSESOPHAGEAL;  Surgeon: Emeterio Hall MD;  Location: [facility] EP LAB;  Service: Cardiology;  Laterality: N/A;    TREATMENT OF CARDIAC ARRHYTHMIA N/A 2021    Procedure: CARDIOVERSION;  Surgeon: Jim Mcginnis MD;  Location: [facility] CATH LAB;  Service: Cardiology;  Laterality: N/A;       Review of patient's allergies indicates:  No Known Allergies    Family History       Problem Relation (Age of Onset)    Heart disease Maternal Grandfather    No Known Problems Mother, Father, Sister, Brother, Maternal Aunt, Maternal Uncle, Paternal Aunt, Paternal Uncle, Maternal Grandmother, Paternal Grandmother, Paternal Grandfather          Tobacco Use    Smoking status: Former     Current packs/day: 0.00     Types: Cigarettes     Quit date:      Years since quittin.5     Passive exposure: Past    Smokeless tobacco: Never    Tobacco comments:     3-4 months    Substance and Sexual Activity    Alcohol use: No    Drug use: Yes     Types: Marijuana     Comment: Mojo today- pt reports a while back    Sexual activity: Yes         Review of Systems   Unable to perform ROS: Intubated     Objective:     Vital Signs (Most Recent):  Temp: (!) 95.2 °F (35.1 °C) (25 043)  Pulse: (!) 123 (25)  Resp: 20 (25)  BP: (!) 134/90 (25 043)  SpO2: (!) 72 % (25 0502) Vital Signs (24h Range):  Temp:  [92 °F (33.3 °C)-96.4 °F (35.8 °C)] 95.2 °F (35.1 °C)  Pulse:  [122-126] 123  Resp:  [18-27] 20  SpO2:  [69 %-83 %] 72 %  BP: (130-141)/() 134/90     Weight: 65.8 kg (145 lb)  Body mass index is 23.4 kg/m².    No intake or output data in the 24 hours ending 25 3956     Physical  Exam  Vitals and nursing note reviewed.   Constitutional:       Comments: Intubated and sedated   HENT:      Head: Normocephalic and atraumatic.      Right Ear: External ear normal.      Left Ear: External ear normal.      Mouth/Throat:      Pharynx: No oropharyngeal exudate or posterior oropharyngeal erythema.   Eyes:      General: Scleral icterus present.   Cardiovascular:      Rate and Rhythm: Tachycardia present.      Heart sounds: Murmur heard.   Pulmonary:      Comments: intubated  Abdominal:      General: Abdomen is flat. There is no distension.   Musculoskeletal:         General: Swelling present.      Right lower leg: Edema present.      Left lower leg: Edema present.   Skin:     General: Skin is dry.   Neurological:      Comments: sedated   Psychiatric:      Comments: sedated          Vents:  Vent Mode: A/CMV-VC (07/12/25 0336)  Ventilator Initiated: Yes (07/12/25 0129)  Set Rate: 20 BPM (07/12/25 0336)  Vt Set: 500 mL (07/12/25 0336)  PEEP/CPAP: (S) 8 cmH20 (07/12/25 0432)  Oxygen Concentration (%): 100 (07/12/25 0336)  Peak Airway Pressure: 15.3 cmH20 (07/12/25 0336)  Plateau Pressure: 15.2 cmH20 (07/12/25 0129)  Total Ve: 10.4 L/m (07/12/25 0336)  F/VT Ratio<105 (RSBI): (!) 43.75 (07/12/25 0336)    Lines/Drains/Airways       Airway  Duration                  Airway - Non-Surgical 07/12/25 0130 <1 day              Peripheral Intravenous Line  Duration             Peripheral IV 18 G Anterior;Distal;Left Upper Arm -- days    Peripheral IV 07/12/25 0159 20 G Anterior;Distal;Right Forearm <1 day    Peripheral IV 07/12/25 0340 18 G Anterior;Distal;Right Upper Arm <1 day                    Significant Labs:    CBC/Anemia Profile:  Recent Labs   Lab 07/12/25 0137 07/12/25  0254 07/12/25  0329 07/12/25 0348   WBC 9.47 8.56  --  9.73   HGB 13.6* 12.6*  --  13.4*   HCT 9.2* 40.6 41.0 41.5   * 111*  --  130*   MCV 98 91  --  92   RDW  --  19.9*  --  20.1*        Chemistries:  Recent Labs   Lab  07/12/25  0254   *   K 3.8   CL 99   CO2 12*   BUN 14   CREATININE 1.2   CALCIUM 8.1*   ALBUMIN 3.2*   PROT 7.5   BILITOT 2.5*   ALKPHOS 81   ALT 45*   AST 44       All pertinent labs within the past 24 hours have been reviewed.    Significant Imaging:   I have reviewed all pertinent imaging results/findings within the past 24 hours.  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 Pulmonology consulted for refractory hypoxemia after intubation. Current Vent settings are:     Vent Mode: A/CMV-VC  Oxygen Concentration (%):  [100] 100  Resp Rate Total:  [21 br/min-30 br/min] 21 br/min  Vt Set:  [425 mL-500 mL] 500 mL  PEEP/CPAP:  [0 cmH20-10.4 cmH20] 8 cmH20    CTA chest showed concerns for bilateral PE and some dense changes in the left lung base. Patient's complicated heart anatomy, shunting is likely factoring into hypoxemia. Per family, patient usually satting at low 80s at home. This is worsened in the setting of cardiac arrest and PE. Discussed [facility] attending and recommend:     - recommend deeper sedation with RASS goal -4 to -5 and vent synchrony using a fentanyl gtt with propofol. Can consider adding a paralytic after deeper sedation is achieved.  - Given PE and dense airspace disease, consider lower PEEP strategy after deeper sedation is achieved.   - with the LLL changes, this could be a possible pulmonary infarct in the setting of PE but consider adding CAP coverage  - Given bilateral PE noted on CT, agree with anticoagulation. Lytic therapy could be considered however after discussion with staff, there is a concern for CVA.  - Agree with transfer to [facility]. Can consider pulmonary vasodilatory if patient continues to have refractory hypoxemia.           Please  see staff attestation for any further recommendations. Additionally please see Dr. More's POC from today for any additional details.     Thank you for your consult. I will sign off. Please contact us if you have any additional questions.     Jaylen Ramirez MD  Pulmonology  [facility] - Emergency Dept

## 2025-07-12 NOTE — H&P
[facility] - Cardiac Intensive Care  Heart Transplant  H&P    Patient Name: Juani Reilly Jr.  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 Ferguson Nov 2022, decreased LV function 20-25%, follows with advanced heart failure clinic (Dr. Reis) with plans for possible transplant, and cirrhosis transferred from [facility] in [place] for advance option evaluation and evaluation from Adult congenital heart disease. Upon encounter pt was intubates and sedated. Family at bedside reported that pt has been having progressive SOB leading to cardiac arrest. As per chart review pt has had recurrent HF admission. Was seen by Dr Reis 7/8/25 and he reported sob with NYHA II-III. Adjustment to GDMT was made, Lasix discontinued and Bumex 2mg BID start. Family reported that pt was in the bedroom with his grandmother when he became unresponsive. Sister rushed at bedside and CPR was started. Reported that she did CPR for 5 mins until EMS arrived. PEA was noted and 3 rounds of epi were administered with ROSC. Patient was intubated but c/b refractory hypoxemia so pulmonology was consulted. Vent 500, rate 20, PEEP 10, 100% FiO2 with Abg of 7.22, pCO2 32, pO2 53, sat 73%. Patient is sedated with propofol.   - CTA-PE: Notable for BP PE: Heparin initiated   - Bedside ECHO: No visible RV, moderate MR/TR. IVC 1.7 cm     In the ED lactic acid was 9.1-->10. BNP 2900, Lasix 80 given and infusion 20 mg/hr started.   Hemodynamics:    SVO2 CO CI SVR   49 2.5 1.4 2494      Pulmonary and Adult congenital Heart disease consulted and recommendations appreciated.   Dr Santoyo recommendations appreciated. Pt is not a  candidate for advance options or MCS.  started.   Pulmonary recommended adequate sedation and paralytic for better oxygenation        Past Medical History:   Diagnosis Date    Acute decompensated heart failure 02/06/2025    History of heart surgery     Other cirrhosis of liver 11/12/2020    SVT (supraventricular tachycardia)     WPW syndrome        Past Surgical History:   Procedure Laterality Date    ANGIOGRAPHY OF AORTIC ARCH N/A 09/03/2020    Procedure: AORTOGRAM, AORTIC ARCH;  Surgeon: Stephania Crump MD;  Location: [facility] CATH LAB;  Service: Cardiology;  Laterality: N/A;    FONTAN PROCEDURE, EXTRACARDIAC      LEFT HEART CATHETERIZATION Left 09/03/2020    Procedure: Left heart cath;  Surgeon: Stephania Crump MD;  Location: [facility] CATH LAB;  Service: Cardiology;  Laterality: Left;    RIGHT HEART CATHETERIZATION FOR CONGENITAL HEART DEFECT N/A 09/03/2020    Procedure: CATHETERIZATION, HEART, RIGHT, FOR CONGENITAL HEART DEFECT;  Surgeon: Stephania Crump MD;  Location: [facility] CATH LAB;  Service: Cardiology;  Laterality: N/A;  [facility] - needs covid test morning of. Extenuating circumstances    TRANSESOPHAGEAL ECHOCARDIOGRAPHY N/A 11/10/2022    Procedure: ECHOCARDIOGRAM, TRANSESOPHAGEAL;  Surgeon: Emeterio Hall MD;  Location: [facility] EP LAB;  Service: Cardiology;  Laterality: N/A;    TREATMENT OF CARDIAC ARRHYTHMIA N/A 01/09/2021    Procedure: CARDIOVERSION;  Surgeon: Jim Mcginnis MD;  Location: [facility] CATH LAB;  Service: Cardiology;  Laterality: N/A;       Review of patient's allergies indicates:  No Known Allergies    Current Facility-Administered Medications   Medication    DOBUtamine 1000 mg in D5W 250 mL infusion    fentaNYL 2500 mcg in 0.9% sodium chloride 250 mL infusion premix    furosemide (Lasix) 500 mg in 50 mL infusion (conc: 10 mg/mL)    heparin 25,000 units in dextrose 5% (100 units/ml) IV bolus from bag HIGH INTENSITY nomogram - [facility]    heparin 25,000 units in dextrose 5% (100  units/ml) IV bolus from bag HIGH INTENSITY nomogram - [facility]    heparin 25,000 units in dextrose 5% (100 units/ml) IV bolus from bag HIGH INTENSITY nomogram - [facility]    heparin 25,000 units in dextrose 5% 250 mL (100 units/mL) infusion HIGH INTENSITY nomogram - [facility]    mupirocin 2 % ointment    piperacillin-tazobactam (ZOSYN) 4.5 g in D5W 100 mL IVPB (MB+)    propofol (DIPRIVAN) 10 mg/mL infusion    rocuronium 500 mg in 50 mL infusion (conc 10 mg/mL)    sodium chloride 0.9% flush 10 mL    vancomycin - pharmacy to dose    white petrolatum-mineral oil (LUBIFRESH P.M.) ophthalmic ointment     Family History       Problem Relation (Age of Onset)    Heart disease Maternal Grandfather    No Known Problems Mother, Father, Sister, Brother, Maternal Aunt, Maternal Uncle, Paternal Aunt, Paternal Uncle, Maternal Grandmother, Paternal Grandmother, Paternal Grandfather          Tobacco Use    Smoking status: Former     Current packs/day: 0.00     Types: Cigarettes     Quit date:      Years since quittin.5     Passive exposure: Past    Smokeless tobacco: Never    Tobacco comments:     3-4 months    Substance and Sexual Activity    Alcohol use: No    Drug use: Yes     Types: Marijuana     Comment: Mojo today- pt reports a while back    Sexual activity: Yes     Review of Systems   Reason unable to perform ROS: Intubated and sedated.     Objective:     Vital Signs (Most Recent):  Temp: 96.3 °F (35.7 °C) (25 1300)  Pulse: 107 (25 1300)  Resp: (!) 24 (25 1230)  BP: 115/75 (25 1300)  SpO2: (!) 84 % (25 1300) Vital Signs (24h Range):  Temp:  [92 °F (33.3 °C)-96.4 °F (35.8 °C)] 96.3 °F (35.7 °C)  Pulse:  [107-137] 107  Resp:  [18-27] 24  SpO2:  [65 %-88 %] 84 %  BP: ()/() 115/75  Arterial Line BP: (-9)/(-9) -9/-9     Patient Vitals for the past 72 hrs (Last 3 readings):   Weight   25 0830 65.8 kg (145 lb)   25 0657 65.8 kg (145 lb)     Body mass index is 23.4  "kg/m².      Intake/Output Summary (Last 24 hours) at 7/12/2025 1329  Last data filed at 7/12/2025 1236  Gross per 24 hour   Intake --   Output 280 ml   Net -280 ml          Physical Exam  Constitutional:       Appearance: He is ill-appearing.   HENT:      Head: Atraumatic.   Cardiovascular:      Rate and Rhythm: Regular rhythm. Tachycardia present.      Heart sounds: Murmur heard.   Pulmonary:      Comments: Vent Mode: A/C  Oxygen Concentration (%):  [100] 100  Resp Rate Total:  [21 br/min-30 br/min] 24 br/min  Vt Set:  [420 mL-500 mL] 470 mL  PEEP/CPAP:  [0 cmH20-10.4 cmH20] 8 cmH20  Mean Airway Pressure:  [10 ymS35-76 cmH20] 11 cmH20      Musculoskeletal:      Right lower leg: Edema present.      Left lower leg: Edema present.            Significant Labs:  CBC:  Recent Labs   Lab 07/12/25  0254 07/12/25  0329 07/12/25  0348 07/12/25  0730   WBC 8.56  --  9.73 14.97*   RBC 4.44*  --  4.53* 4.72   HGB 12.6*  --  13.4* 13.3*   HCT 40.6 41.0 41.5 44.3   *  --  130* 131*   MCV 91  --  92 94   MCH 28.4  --  29.6 28.2   MCHC 31.0*  --  32.3 30.0*     BNP:  Recent Labs   Lab 07/08/25  1308 07/12/25  0220 07/12/25  0730   BNP 1,802* 2,461* 2,903*     CMP:  Recent Labs   Lab 07/08/25  1308 07/12/25  0254   * 121*   CALCIUM 9.2 8.1*   ALBUMIN 3.7 3.2*   PROT 8.0 7.5    132*   K 3.7 3.8   CO2 26 12*   CL 99 99   BUN 13 14   CREATININE 0.9 1.2   ALKPHOS 79 81   ALT 26 45*   AST 23 44   BILITOT 3.2* 2.5*      Coagulation:   Recent Labs   Lab 07/12/25  0254 07/12/25  0955   INR 1.7* 2.3*   APTT  --  >150.0*     LDH:  No results for input(s): "LDH" in the last 72 hours.  Microbiology:  Microbiology Results (last 7 days)       Procedure Component Value Units Date/Time    Blood culture [[id number]] Collected: 07/12/25 0954    Order Status: Sent Specimen: Blood from Peripheral, Forearm, Left Updated: 07/12/25 1007    Blood culture [[id number]] Collected: 07/12/25 0954    Order Status: Sent Specimen: Blood from " Peripheral, Upper Arm, Right Updated: 07/12/25 1006    Blood culture [[id number]] Collected: 07/12/25 0954    Order Status: Sent Specimen: Blood from Peripheral, Lower Arm, Right             I have reviewed all pertinent labs within the past 24 hours.    Diagnostic Results: - Reviewed     Assessment/Plan:     Sepsis  Patient with leukocytosis left shift.   CT Chest ; Notable for pulmonary edema and infiltrates - likely PNA   Pending Blood cx   Continue with vancomycin and zosyn       Cardiac arrest  Patient with Hx of Tricuspid and pulmonary atresia, s/p systemic to pulmonary artery shunt followed by a bidirectional Jesus and subsequent lateral tunnel Fontan procedure. Recent EF 20-25%. Recurrent hospital admission for ADHF exacerbation. Had cardiac arrest at home. CPR  by sister for 5 mins.   Rhythm: PEA - 3 rounds of epi were administered with ROSC   CTA-PE: Notable for BP PE     Hemodynamics:    SVO2 CO CI SVR   49 2.5 1.4 2494     Patient in cardiogenic shock.     Recommendations   - Adult congential heart disease consulted: recs apperciated   - Start Dobutamine 5 mcg/hr   - STAT lasix 80 IVP given; infusion 20 started   - Monitor urine output I/O   - Monitor electrolytes and supplement K>4, Phos> 3 Mg >2   - Hemodynamics SVO2 q 8hr      Acute hypoxemic respiratory failure  Pt transferred for [facility] for advance options and adult congenital evaluation. Pt with systemic -pulmonary shunt. Presented with refractory hypoxemia while intubated.    - Recommendation deep sedation with RASS goal -4 to -5 and vent synchrony.   - Continue with fentanyl and propofol   - Start neuromuscular blockade once deeply sedated   - Pulmonary on board.     Pulmonary thromboembolism  Patient with PEA arrest. 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    - CTA Chest: Bilateral acute  pulmonary thromboemboli identified in segmental branches of the bilateral lower lobe pulmonary arteries. Cardiomegaly with morphology suggesting single dilated left ventricle. There appears to be communication between the right and left atria suggesting large ASD.  Pulmonary venous congestion with bibasilar pulmonary edema.    Recommendations  - Start Heparin drip   - Continue with VTE protocol   - Monitor Oxygen sat.   - Pul consulted and recs appreciated.           Glen Leary MD  Heart Transplant  [facility] - Cardiac Intensive Care

## 2025-07-12 NOTE — SUBJECTIVE & OBJECTIVE
Past Medical History:   Diagnosis Date    Acute decompensated heart failure 02/06/2025    History of heart surgery     Other cirrhosis of liver 11/12/2020    SVT (supraventricular tachycardia)     WPW syndrome        Past Surgical History:   Procedure Laterality Date    ANGIOGRAPHY OF AORTIC ARCH N/A 09/03/2020    Procedure: AORTOGRAM, AORTIC ARCH;  Surgeon: Stephania Crump MD;  Location: [facility] CATH LAB;  Service: Cardiology;  Laterality: N/A;    FONTAN PROCEDURE, EXTRACARDIAC      LEFT HEART CATHETERIZATION Left 09/03/2020    Procedure: Left heart cath;  Surgeon: Stephania Crump MD;  Location: [facility] CATH LAB;  Service: Cardiology;  Laterality: Left;    RIGHT HEART CATHETERIZATION FOR CONGENITAL HEART DEFECT N/A 09/03/2020    Procedure: CATHETERIZATION, HEART, RIGHT, FOR CONGENITAL HEART DEFECT;  Surgeon: Stephania Crump MD;  Location: [facility] CATH LAB;  Service: Cardiology;  Laterality: N/A;  [facility] - needs covid test morning of. Extenuating circumstances    TRANSESOPHAGEAL ECHOCARDIOGRAPHY N/A 11/10/2022    Procedure: ECHOCARDIOGRAM, TRANSESOPHAGEAL;  Surgeon: Emeterio Hall MD;  Location: [facility] EP LAB;  Service: Cardiology;  Laterality: N/A;    TREATMENT OF CARDIAC ARRHYTHMIA N/A 01/09/2021    Procedure: CARDIOVERSION;  Surgeon: Jim Mcginnis MD;  [place]: [facility] [place] CATH LAB;  Service: Cardiology;  Laterality: N/A;       Review of patient's allergies indicates:  No Known Allergies    Current Facility-Administered Medications   Medication    DOBUtamine 1000 mg in D5W 250 mL infusion    fentaNYL 2500 mcg in 0.9% sodium chloride 250 mL infusion premix    furosemide (Lasix) 500 mg in 50 mL infusion (conc: 10 mg/mL)    heparin 25,000 units in dextrose 5% (100 units/ml) IV bolus from bag HIGH INTENSITY nomogram - [facility]    heparin 25,000 units in dextrose 5% (100 units/ml) IV bolus from bag HIGH INTENSITY nomogram - [facility]    heparin 25,000 units in dextrose 5% (100 units/ml) IV bolus from bag HIGH  INTENSITY nomogram - [facility]    heparin 25,000 units in dextrose 5% 250 mL (100 units/mL) infusion HIGH INTENSITY nomogram - [facility]    mupirocin 2 % ointment    piperacillin-tazobactam (ZOSYN) 4.5 g in D5W 100 mL IVPB (MB+)    propofol (DIPRIVAN) 10 mg/mL infusion    rocuronium 500 mg in 50 mL infusion (conc 10 mg/mL)    sodium chloride 0.9% flush 10 mL    vancomycin - pharmacy to dose    white petrolatum-mineral oil (LUBIFRESH P.M.) ophthalmic ointment     Family History       Problem Relation (Age of Onset)    Heart disease Maternal Grandfather    No Known Problems Mother, Father, Sister, Brother, Maternal Aunt, Maternal Uncle, Paternal Aunt, Paternal Uncle, Maternal Grandmother, Paternal Grandmother, Paternal Grandfather          Tobacco Use    Smoking status: Former     Current packs/day: 0.00     Types: Cigarettes     Quit date:      Years since quittin.5     Passive exposure: Past    Smokeless tobacco: Never    Tobacco comments:     3-4 months    Substance and Sexual Activity    Alcohol use: No    Drug use: Yes     Types: Marijuana     Comment: Mojo today- pt reports a while back    Sexual activity: Yes     Review of Systems   Reason unable to perform ROS: Intubated and sedated.     Objective:     Vital Signs (Most Recent):  Temp: 96.3 °F (35.7 °C) (25 1300)  Pulse: 107 (25 1300)  Resp: (!) 24 (25 1230)  BP: 115/75 (25 1300)  SpO2: (!) 84 % (25 1300) Vital Signs (24h Range):  Temp:  [92 °F (33.3 °C)-96.4 °F (35.8 °C)] 96.3 °F (35.7 °C)  Pulse:  [107-137] 107  Resp:  [18-27] 24  SpO2:  [65 %-88 %] 84 %  BP: ()/() 115/75  Arterial Line BP: (-9)/(-9) -9/-9     Patient Vitals for the past 72 hrs (Last 3 readings):   Weight   25 0830 65.8 kg (145 lb)   25 0657 65.8 kg (145 lb)     Body mass index is 23.4 kg/m².      Intake/Output Summary (Last 24 hours) at 2025 1329  Last data filed at 2025 1236  Gross per 24 hour   Intake --   Output 280 ml   Net  "-280 ml          Physical Exam  Constitutional:       Appearance: He is ill-appearing.   HENT:      Head: Atraumatic.   Cardiovascular:      Rate and Rhythm: Regular rhythm. Tachycardia present.      Heart sounds: Murmur heard.   Pulmonary:      Comments: Vent Mode: A/C  Oxygen Concentration (%):  [100] 100  Resp Rate Total:  [21 br/min-30 br/min] 24 br/min  Vt Set:  [420 mL-500 mL] 470 mL  PEEP/CPAP:  [0 cmH20-10.4 cmH20] 8 cmH20  Mean Airway Pressure:  [10 aoC34-65 cmH20] 11 cmH20      Musculoskeletal:      Right lower leg: Edema present.      Left lower leg: Edema present.            Significant Labs:  CBC:  Recent Labs   Lab 07/12/25  0254 07/12/25  0329 07/12/25  0348 07/12/25  0730   WBC 8.56  --  9.73 14.97*   RBC 4.44*  --  4.53* 4.72   HGB 12.6*  --  13.4* 13.3*   HCT 40.6 41.0 41.5 44.3   *  --  130* 131*   MCV 91  --  92 94   MCH 28.4  --  29.6 28.2   MCHC 31.0*  --  32.3 30.0*     BNP:  Recent Labs   Lab 07/08/25  1308 07/12/25  0220 07/12/25  0730   BNP 1,802* 2,461* 2,903*     CMP:  Recent Labs   Lab 07/08/25  1308 07/12/25  0254   * 121*   CALCIUM 9.2 8.1*   ALBUMIN 3.7 3.2*   PROT 8.0 7.5    132*   K 3.7 3.8   CO2 26 12*   CL 99 99   BUN 13 14   CREATININE 0.9 1.2   ALKPHOS 79 81   ALT 26 45*   AST 23 44   BILITOT 3.2* 2.5*      Coagulation:   Recent Labs   Lab 07/12/25  0254 07/12/25  0955   INR 1.7* 2.3*   APTT  --  >150.0*     LDH:  No results for input(s): "LDH" in the last 72 hours.  Microbiology:  Microbiology Results (last 7 days)       Procedure Component Value Units Date/Time    Blood culture [[id number]] Collected: 07/12/25 0954    Order Status: Sent Specimen: Blood from Peripheral, Forearm, Left Updated: 07/12/25 1007    Blood culture [[id number]] Collected: 07/12/25 0954    Order Status: Sent Specimen: Blood from Peripheral, Upper Arm, Right Updated: 07/12/25 1006    Blood culture [[id number]] Collected: 07/12/25 0954    Order Status: Sent Specimen: Blood from " Peripheral, Lower Arm, Right             I have reviewed all pertinent labs within the past 24 hours.    Diagnostic Results: - Reviewed

## 2025-07-12 NOTE — HPI
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 Per family, patient was talking to his relative and suddenly complained of some shortness of breath and then became unresponsive. CPR was initiated by family and when EMS arrived, PEA was noted and 3 rounds of epi were administered with ROSC. Patient was intubated but c/b refractory hypoxemia so pulmonology was consulted. Vent 500, rate 20, PEEP 10, 100% FiO2 with Abg of 7.22, pCO2 32, pO2 53, sat 73%. Patient is sedated with propofol.

## 2025-07-12 NOTE — HPI
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 Ferguson Nov 2022, decreased LV function 20-25%, follows with advanced heart failure clinic (Dr. Reis) with plans for possible transplant, and cirrhosis transferred from [facility] in [place] for advance option evaluation and evaluation from Adult congenital heart disease. Upon encounter pt was intubates and sedated. Family at bedside reported that pt has been having progressive SOB leading to cardiac arrest. As per chart review pt has had recurrent HF admission. Was seen by Dr Reis 7/8/25 and he reported sob with NYHA II-III. Adjustment to GDMT was made, Lasix discontinued and Bumex 2mg BID start. Family reported that pt was in the bedroom with his grandmother when he became unresponsive. Sister rushed at bedside and CPR was started. Reported that she did CPR for 5 mins until EMS arrived. PEA was noted and 3 rounds of epi were administered with ROSC. Patient was intubated but c/b refractory hypoxemia so pulmonology was consulted. Vent 500, rate 20, PEEP 10, 100% FiO2 with Abg of 7.22, pCO2 32, pO2 53, sat 73%. Patient is sedated with propofol.   - CTA-PE: Notable for BP PE: Heparin initiated   - Bedside ECHO: No visible RV, moderate MR/TR. IVC 1.7 cm     In the ED lactic acid was 9.1-->10. BNP 2900, Lasix 80 given and infusion 20 mg/hr started.   Hemodynamics:    SVO2 CO CI SVR   49 2.5 1.4 7471      Pulmonary and Adult congenital Heart disease consulted and recommendations appreciated.   Dr Santoyo recommendations appreciated. Pt is not a candidate for advance options or MCS.  started.   Pulmonary recommended adequate sedation and paralytic for better oxygenation

## 2025-07-12 NOTE — PROGRESS NOTES
"Pharmacokinetic Assessment Follow Up: IV Vancomycin    Vancomycin serum concentration assessment(s):  Vancomycin loading dose administered this AM at 1250mg, to be followed by 1000mg IV Q 12 hours.   Trough goal 15-20 ordered prior to fourth dose on 7.13 at 2130.   Will continue to follow.     Princess Wilson, PharmD, [facility]  Heart Transplant Clinical Specialist   Spectralink: [id number]    Drug levels (last 3 results):  No results for input(s): "VANCOMYCINRA", "VANCOMYCINPE", "VANCOMYCINTR", "VANCOTROUGH" in the last 72 hours.     Patient brief summary:  Juani Reilly Jr. is a 30 y.o. male initiated on antimicrobial therapy with IV Vancomycin for treatment of sepsis    Drug Allergies:   Review of patient's allergies indicates:  No Known Allergies    Actual Body Weight:   65.8kg    Renal Function:   Estimated Creatinine Clearance: 80.5 mL/min (based on SCr of 1.2 mg/dL).,     Dialysis Method (if applicable):  N/A    CBC (last 72 hours):  Recent Labs   Lab Result Units 07/12/25  0137 07/12/25  0254 07/12/25  0348 07/12/25  0730   WBC K/uL 9.47 8.56 9.73 14.97*   HGB gm/dL 13.6* 12.6* 13.4* 13.3*   HCT % 9.2* 40.6 41.5 44.3   Platelet Count K/uL 140* 111* 130* 131*   Lymph % % 36.3 24.4 17.4* 7.9*   Mono % % 6.9 5.4 5.7 5.9   Eos % % 0.1 0.0 0.1 0.0   Basophil % % 0.7 0.2 0.1 0.2       Metabolic Panel (last 72 hours):  Recent Labs   Lab Result Units 07/12/25  0157 07/12/25  0254 07/12/25  0322 07/12/25  0730   Sodium mmol/L  --  132*  --   --    Potassium mmol/L  --  3.8  --   --    Chloride mmol/L  --  99  --   --    CO2 mmol/L  --  12*  --   --    Glucose mg/dL  --  121*  --   --    Glucose, UA  Negative  --   --   --    BUN mg/dL  --  14  --   --    Creatinine mg/dL  --  1.2  --   --    Urine Creatinine mg/dL 244.1  --   --   --    Albumin g/dL  --  3.2*  --   --    Bilirubin Total mg/dL  --  2.5*  --   --    ALP unit/L  --  81  --   --    AST unit/L  --  44  --   --    ALT unit/L  --  45*  --   --    Magnesium  " mg/dL  --   --  2.2 2.5   Phosphorus Level mg/dL  --   --   --  6.1*       Vancomycin Administrations:  vancomycin given in the last 96 hours                     vancomycin 1,250 mg in 0.9% NaCl 250 mL IVPB (admixture device) (mg) 1,250 mg New Bag 07/12/25 1054                    Microbiologic Results:  Microbiology Results (last 7 days)       Procedure Component Value Units Date/Time    Blood culture [[id number]] Collected: 07/12/25 0954    Order Status: Sent Specimen: Blood from Peripheral, Forearm, Left Updated: 07/12/25 1007    Blood culture [[id number]] Collected: 07/12/25 0954    Order Status: Sent Specimen: Blood from Peripheral, Upper Arm, Right Updated: 07/12/25 1006    Blood culture [[id number]] Collected: 07/12/25 0954    Order Status: Sent Specimen: Blood from Peripheral, Lower Arm, Right

## 2025-07-12 NOTE — CARE UPDATE
Heart Transplant Care Update Note:    Hemodynamics@11pm:  SCVO2 30   CVP 15   MAP 75   Cardiac Output 2.07   Cardiac Index 1.18   SVR 2320      0.34           Continuous Infusions:   CISatracurium 200 mg in 0.9% NaCl 100 mL infusion  0-10 mcg/kg/min Intravenous Continuous        DOBUTamine IV infusion (non-titrating)  5 mcg/kg/min Intravenous Continuous 4.9 mL/hr at 07/12/25 1600 5 mcg/kg/min at 07/12/25 1600    EPINEPHrine  0.04 mcg/kg/min Intravenous Continuous        fentanyl  0-250 mcg/hr Intravenous Continuous 15 mL/hr at 07/12/25 1600 150 mcg/hr at 07/12/25 1600    furosemide (Lasix) 500 mg in 50 mL infusion (conc: 10 mg/mL)  20 mg/hr Intravenous Continuous 2 mL/hr at 07/12/25 1600 20 mg/hr at 07/12/25 1600    heparin (porcine) in D5W  0-40 Units/kg/hr Intravenous Continuous 7.9 mL/hr at 07/12/25 1600 12 Units/kg/hr at 07/12/25 1600    midazolam  0-5 mg/hr Intravenous Continuous        NORepinephrine bitartrate-D5W  0-3 mcg/kg/min Intravenous Continuous 29.6 mL/hr at 07/12/25 1600 0.12 mcg/kg/min at 07/12/25 1600    propofoL  0-50 mcg/kg/min Intravenous Continuous 19.7 mL/hr at 07/12/25 1600 50 mcg/kg/min at 07/12/25 1600    vasopressin  0.04 Units/min Intravenous Continuous 12 mL/hr at 07/12/25 1600 0.04 Units/min at 07/12/25 1600       Intake/Output Summary (Last 24 hours) at 7/12/2025 1657  Last data filed at 7/12/2025 1600  Gross per 24 hour   Intake 633.3 ml   Output 815 ml   Net -181.7 ml           Plan:  No changes. Continue current management       Discussed with HTS Attending

## 2025-07-12 NOTE — ASSESSMENT & PLAN NOTE
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 Pulmonology consulted for refractory hypoxemia after intubation. Current Vent settings are:     Vent Mode: A/CMV-VC  Oxygen Concentration (%):  [100] 100  Resp Rate Total:  [21 br/min-30 br/min] 21 br/min  Vt Set:  [425 mL-500 mL] 500 mL  PEEP/CPAP:  [0 cmH20-10.4 cmH20] 8 cmH20    CTA chest showed concerns for bilateral PE and some dense changes in the left lung base. Patient's complicated heart anatomy, shunting is likely factoring into hypoxemia. Per family, patient usually satting at low 80s at home. This is worsened in the setting of cardiac arrest and PE. Discussed [facility] attending and recommend:     - recommend deeper sedation with RASS goal -4 to -5 and vent synchrony using a fentanyl gtt with propofol. Can consider adding a paralytic after deeper sedation is achieved.  - Given PE and dense airspace disease, consider lower PEEP strategy after deeper sedation is achieved.   - with the LLL changes, this could be a possible pulmonary infarct in the setting of PE but consider adding CAP coverage  - Given bilateral PE noted on CT, agree with anticoagulation. Lytic therapy could be considered however after discussion with staff, there is a concern for CVA.  - Agree with transfer to [facility]. Can consider pulmonary vasodilatory if patient continues to have refractory hypoxemia.

## 2025-07-12 NOTE — ASSESSMENT & PLAN NOTE
Pt transferred for [facility] for advance options and adult congenital evaluation. Pt with systemic -pulmonary shunt. Presented with refractory hypoxemia while intubated.    - Recommendation deep sedation with RASS goal -4 to -5 and vent synchrony.   - Continue with fentanyl and propofol   - Start neuromuscular blockade once deeply sedated   - Pulmonary on board.

## 2025-07-12 NOTE — ASSESSMENT & PLAN NOTE
Patient with leukocytosis left shift.   CT Chest ; Notable for pulmonary edema and infiltrates - likely PNA   Pending Blood cx   Continue with vancomycin and zosyn

## 2025-07-12 NOTE — ED PROVIDER NOTES
Encounter Date: 07/12/2025       Chief Complaint   Transfer (Transfer sent from [place] for CICU consult for post cardiac arrest and bilateral PE. Pt arrives on prop @35mcg/kg/hr, Heparin 18units/kg/hr, and Fentanyl @50mcg/kg/hr. Pt arrives intubated.)      History Of Present Illness     Juani Reilly Jr. is a 30 y.o. 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 Patient presents to the ER as a transfer for HTS evaluation, b/l PE, cardiac arrest. He was found unresponsive at home - CPR was initiated by family members and the fire department. On EMS arrival, patient was reportedly in PEA CPR was initiated and patient was given ACLS doses of epinephrine. Roughly 3 doses of epinephrine were administered with ROSC. Patient intubated at outside facility prior to transfer.  CT head prior to transfer w/o evidence of significant intracranial pathology.  Patient arrives on heparin, fentanyl, propofol. Per report O2 sats usually hovers around 80% at home.     Past History   As per HPI and below:  Past Medical History[1]  Past Surgical History[2]  Medications Ordered Prior to Encounter[3]    Social History[4]  family history includes Heart disease in his maternal grandfather; No Known Problems in his brother, father, maternal aunt, maternal grandmother, maternal uncle, mother, paternal aunt, paternal grandfather, paternal grandmother, paternal uncle, and sister.   Review of patient's allergies indicates:  No Known Allergies    Physical Exam     Vitals:    07/12/25 1216 07/12/25 1230 07/12/25 1300 07/12/25 1330   BP:  116/79 115/75 117/60   Patient Position:  Lying     Pulse: 110 109 107 (!) 114   Resp: (!) 24 (!) 24  (!) 24   Temp: (!) 95.5 °F (35.3 °C) (!) 95.7 °F (35.4 °C) 96.3 °F (35.7 °C)     TempSrc:       SpO2: (!) 82% (!) 83% (!) 84% (!) 94%  Comment: ABG confirmed   Weight:       Height:         Physical Exam  Vitals and nursing note reviewed.   Constitutional:       General: He is not in acute distress.     Appearance: He is ill-appearing and toxic-appearing. He is not diaphoretic.   HENT:      Head: Normocephalic and atraumatic.      Nose: Nose normal.      Mouth/Throat:      Comments: Endotracheal tube in place  Eyes:      Conjunctiva/sclera: Conjunctivae normal.   Cardiovascular:      Rate and Rhythm: Tachycardia present.   Pulmonary:      Breath sounds: Normal breath sounds.      Comments: Intubated    Vent Mode: A/C  Oxygen Concentration (%):  [100] 100  Resp Rate Total:  [21 br/min-30 br/min] 24 br/min  Vt Set:  [420 mL-500 mL] 470 mL  PEEP/CPAP:  [0 cmH20-10.4 cmH20] 8 cmH20  Mean Airway Pressure:  [10 ptZ90-35 cmH20] 13 cmH20      Abdominal:      General: Abdomen is flat.      Palpations: Abdomen is soft.   Musculoskeletal:         General: No swelling, deformity or signs of injury.   Skin:     General: Skin is warm and dry.   Neurological:      Comments: GCS 3 T            Medications Given     Medications   sodium chloride 0.9% flush 10 mL (has no administration in time range)   furosemide (Lasix) 500 mg in 50 mL infusion (conc: 10 mg/mL) (20 mg/hr Intravenous Verify Only 7/12/25 1400)   heparin 25,000 units in dextrose 5% (100 units/ml) IV bolus from bag HIGH INTENSITY nomogram - [facility] (5,264 Units Intravenous Not Given 7/12/25 0745)   heparin 25,000 units in dextrose 5% 250 mL (100 units/mL) infusion HIGH INTENSITY nomogram - [facility] (12 Units/kg/hr × 65.8 kg Intravenous Rate/Dose Change 7/12/25 1453)   heparin 25,000 units in dextrose 5% (100 units/ml) IV bolus from bag HIGH INTENSITY nomogram - [facility] (has no administration in time range)   heparin 25,000 units in dextrose 5% (100 units/ml) IV bolus from bag HIGH INTENSITY nomogram - [facility] (has no administration in time range)   fentaNYL 2500  mcg in 0.9% sodium chloride 250 mL infusion premix (100 mcg/hr Intravenous Verify Only 7/12/25 1400)   vancomycin - pharmacy to dose (has no administration in time range)   piperacillin-tazobactam (ZOSYN) 4.5 g in D5W 100 mL IVPB (MB+) (0 g Intravenous Stopped 7/12/25 1401)   white petrolatum-mineral oil (LUBIFRESH P.M.) ophthalmic ointment (has no administration in time range)   rocuronium 500 mg in 50 mL infusion (conc 10 mg/mL) (5 mcg/kg/min × 65.8 kg Intravenous New Bag 7/12/25 1518)   propofol (DIPRIVAN) 10 mg/mL infusion (50 mcg/kg/min × 65.8 kg Intravenous New Bag 7/12/25 1522)   DOBUtamine 1000 mg in D5W 250 mL infusion (5 mcg/kg/min × 65.8 kg Intravenous Verify Only 7/12/25 1400)   mupirocin 2 % ointment (has no administration in time range)   vancomycin (VANCOCIN) 1,000 mg in 0.9% NaCl 250 mL IVPB (admixture device) (1,000 mg Intravenous Trough Due As Scheduled Before Dose 7/13/25 2130)   NORepinephrine 4 mg in dextrose 5% 250 mL infusion (premix) (0.06 mcg/kg/min × 65.8 kg Intravenous New Bag 7/12/25 1412)   midazolam (PF) in 0.9 % NaCl 1 mg/mL infusion (has no administration in time range)   vasopressin (PITRESSIN) 0.2 Units/mL in 0.9% NaCl 100 mL infusion (0.04 Units/min Intravenous New Bag 7/12/25 1456)   vasopressin (PITRESSIN) 20 unit/mL injection (has no administration in time range)   furosemide injection 80 mg (80 mg Intravenous Given 7/12/25 0815)   vancomycin 1,250 mg in 0.9% NaCl 250 mL IVPB (admixture device) (0 mg Intravenous Stopped 7/12/25 1224)   etomidate injection 15 mg (15 mg Intravenous Given 7/12/25 0914)   rocuronium injection 70 mg (70 mg Intravenous Given 7/12/25 0915)       Labs & Imaging     Labs Reviewed   PHOSPHORUS - Abnormal       Result Value    Phosphorus Level 6.1 (*)    B-TYPE NATRIURETIC PEPTIDE - Abnormal    BNP 2,903 (*)    PROTIME-INR - Abnormal    PT 23.4 (*)     INR 2.3 (*)    APTT - Abnormal    PTT >150.0 (*)    CBC WITH DIFFERENTIAL - Abnormal    WBC 14.97 (*)      RBC 4.72      HGB 13.3 (*)     HCT 44.3      MCV 94      MCH 28.2      MCHC 30.0 (*)     RDW 20.2 (*)     Platelet Count 131 (*)     MPV 11.4      Nucleated RBC 1 (*)     Neut % 84.8 (*)     Lymph % 7.9 (*)     Mono % 5.9      Eos % 0.0      Basophil % 0.2      Imm Grans % 1.2 (*)     Neut # 12.69 (*)     Lymph # 1.18      Mono # 0.89      Eos # 0.00      Baso # 0.03      Imm Grans # 0.18 (*)    LACTIC ACID, PLASMA - Abnormal    Lactic Acid Level 10.9 (*)     Narrative:     Falsely low lactic acid results can be found in samples containing >=13.0 mg/dL total bilirubin and/or >=3.5 mg/dL direct bilirubin.    MAGNESIUM - Normal    Magnesium  2.5     CULTURE, BLOOD   CULTURE, BLOOD   CULTURE, BLOOD   CBC W/ AUTO DIFFERENTIAL    Narrative:     The following orders were created for panel order CBC auto differential.                  Procedure                               Abnormality         Status                                     ---------                               -----------         ------                                     CBC with Differential[[id number]]       Abnormal            Final result                                                 Please view results for these tests on the individual orders.       Imaging Results              CT Head Without Contrast (Final result)  Result time 07/12/25 13:59:01      Final result by Serge Thomas MD (07/12/25 13:59:01)                   Impression:      No acute intracranial process.      Electronically signed by: Serge Thomas  Date:    07/12/2025  Time:    13:59               Narrative:    EXAMINATION:  CT HEAD WITHOUT CONTRAST    CLINICAL HISTORY:  cardiac arrest;    TECHNIQUE:  Low dose axial images were obtained through the head.  Coronal and sagittal reformations were also performed. Contrast was not administered.    COMPARISON:  CT head 07/12/2025, 9-    FINDINGS:  There is no evidence of hydrocephalus mass effect gross intracranial hemorrhage or  acute territorial infarct.    The brain parenchyma maintains normal attenuation.    Retained contrast from recent CTA.    Left maxillary retention cyst with adjacent and endodontal disease.                                       X-Ray Chest AP Portable (Final result)  Result time 07/12/25 13:52:45      Final result by Grupo Dave MD (07/12/25 13:52:45)                   Impression:      As above.      Electronically signed by: Grupo Dave  Date:    07/12/2025  Time:    13:52               Narrative:    EXAMINATION:  XR CHEST AP PORTABLE    CLINICAL HISTORY:  Unspecified complication of cardiac and vascular prosthetic device, implant and graft, initial encounter    TECHNIQUE:  Single frontal view of the chest was performed.    COMPARISON:  Chest radiograph performed 07/12/2025, 07:20 hours.    FINDINGS:  Monitoring/support apparatus overlying the chest limits assessment.  Tip of endotracheal tube appears between thoracic inlet and óscar.  The tip of right internal jugular approach central venous catheter is somewhat obscured, but fever at or near caval junction.    Similar cardiomediastinal contours, again noting enlargement of the cardiac silhouette.    No acute detrimental change in appearance of the lung fields compared to earlier same day imaging.  No definite pneumothorax or large pleural effusion.    Remainder examination is not substantially changed.                                       X-ray chest AP portable (Final result)  Result time 07/12/25 08:21:50      Final result by Grupo Dave MD (07/12/25 08:21:50)                   Impression:      As above.      Electronically signed by: Grupo Dave  Date:    07/12/2025  Time:    08:21               Narrative:    EXAMINATION:  XR CHEST AP PORTABLE    CLINICAL HISTORY:  Intubated;    TECHNIQUE:  Single frontal view of the chest was performed.    COMPARISON:  Chest radiograph 07/12/2025, 02:10 hours    FINDINGS:  Tip of endotracheal tube  approximately 43 mm above the level the óscar.  Monitoring leads are seen.  Additional support/monitoring apparatus somewhat obscures underlying anatomy.    Grossly unchanged cardiac and mediastinal contours.  Previously demonstrated shunt extending from the IVC and SVC to the right pulmonary arteries better assessed by prior cross-sectional imaging.    No change in appearance of the lung fields compared to 07/12/2025, 02:10 hours    No definite pneumothorax or large pleural effusion.    Remainder examination is not substantially changed.                                      HEYDI Reilly Jr. is a 30 y.o. male who presents to the emergency department for multiple concerns as detailed in HPI. On exam, patient intubated and sedated. VS show hypoxia, tachycardia, hypothermia (intentional).  Bilateral breath sounds present.  No other obvious abnl physical exam findings. Per consultants, temp goal 36° C; will apply Chantel Hugger.  Vancomycin started.  Patient admitted to [facility] service at this time.  Normothermia achieved and VS remained relatively stable throughout his ED course.     Labs (independently interpreted by myself):  CBC w/ leukocytosis, likely reactive or potentially indicative of of acute infectious process of unknown source.  CMP relatively unremarkable and consistent w/ previous labs.  VBG highly concerning for acute lactic/metabolic acidosis, likely also complicated by respiratory acidosis prior to arrival given prolonged downtime and difficulty maintaining airway prior to intubation.  Coags consistent w/ heparin use.  BNP markedly elevated compared to previous labs which I suspect is due to acute increase in cardiac demand especially in the setting of significant cardiopulmonary disease.    Imaging (independently interpreted by myself):  CXR demonstrates appropriate endotracheal tube placement.  No clear evidence of consolidation, effusion, or edema. Imaging demonstrates known shunt  extending from the IVC and SVC     Ddx including, but not limited to: Fontan circulation failure and low output heart failure v. malignant arrhythmia v. massive bilateral PE v. hypoxemic respiratory failure v. sepsis v. ACS v. severe metabolic or electrolyte derangement v. tamponade v. tension pneumothorax v. ICH     Most likely Dx:  Diagnosis likely multifactorial.  I do suspect that patient's course at outside facility and in [facility] [place] ED as likely 2/2 low output heart failure w/ concomitant bilateral PEs resulting in cardiac arrest.    Disposition: Patient will be admitted. Discussed reason for admission and plan with patient and/or caregiver who expressed understanding and agreement.    Please see HPI, physical exam, ED course for additional details.     Procedures   Final diagnoses:  [I46.9] Cardiac arrest  [T82.9XXA] Central line complication      ED Disposition Condition    Admit             Gee Miranda MD         [1]   Past Medical History:  Diagnosis Date    Acute decompensated heart failure 02/06/2025    History of heart surgery     Other cirrhosis of liver 11/12/2020    SVT (supraventricular tachycardia)     WPW syndrome    [2]   Past Surgical History:  Procedure Laterality Date    ANGIOGRAPHY OF AORTIC ARCH N/A 09/03/2020    Procedure: AORTOGRAM, AORTIC ARCH;  Surgeon: Stephania Crump MD;  Location: [facility] CATH LAB;  Service: Cardiology;  Laterality: N/A;    FONTAN PROCEDURE, EXTRACARDIAC      LEFT HEART CATHETERIZATION Left 09/03/2020    Procedure: Left heart cath;  Surgeon: Stephania Crump MD;  Location: [facility] CATH LAB;  Service: Cardiology;  Laterality: Left;    RIGHT HEART CATHETERIZATION FOR CONGENITAL HEART DEFECT N/A 09/03/2020    Procedure: CATHETERIZATION, HEART, RIGHT, FOR CONGENITAL HEART DEFECT;  Surgeon: Stephania Crump MD;  Location: [facility] CATH LAB;  Service: Cardiology;  Laterality: N/A;  [facility] - needs covid test morning of. Extenuating circumstances    TRANSESOPHAGEAL  ECHOCARDIOGRAPHY N/A 11/10/2022    Procedure: ECHOCARDIOGRAM, TRANSESOPHAGEAL;  Surgeon: Emeterio Hall MD;  Location: [facility] EP LAB;  Service: Cardiology;  Laterality: N/A;    TREATMENT OF CARDIAC ARRHYTHMIA N/A 01/09/2021    Procedure: CARDIOVERSION;  Surgeon: Jim Mcginnis MD;  Location: [facility] CATH LAB;  Service: Cardiology;  Laterality: N/A;   [3]   Current Facility-Administered Medications on File Prior to Encounter   Medication Dose Route Frequency Provider Last Rate Last Admin    [COMPLETED] fentaNYL 50 mcg/mL injection 50 mcg  50 mcg Intravenous ED 1 Time Abimael Clemens MD   50 mcg at 07/12/25 0341    [COMPLETED] heparin 25,000 units in dextrose 5% (100 units/ml) IV bolus from bag HIGH INTENSITY nomogram - [facility]  80 Units/kg Intravenous Once Abimael Clemens MD   5,264 Units at 07/12/25 0350    [COMPLETED] iohexoL (OMNIPAQUE 350) injection 100 mL  100 mL Intravenous ONCE PRN Abimael Clemens MD   100 mL at 07/12/25 0233    [DISCONTINUED] 0.9% NaCl infusion  1,000 mL Intravenous ED 1 Time Abimael Clemens MD   Held at 07/12/25 0145    [DISCONTINUED] etomidate injection 19.8 mg  0.3 mg/kg Intravenous ED 1 Time Abimael Clemens MD        [DISCONTINUED] fentaNYL 2500 mcg in 0.9% sodium chloride 250 mL infusion premix  0-250 mcg/hr Intravenous Continuous Abimael Clemens MD 5 mL/hr at 07/12/25 0525 50 mcg/hr at 07/12/25 0525    [DISCONTINUED] heparin 25,000 units in dextrose 5% (100 units/ml) IV bolus from bag HIGH INTENSITY nomogram - [facility]  60 Units/kg Intravenous PRN Abimael Clemens MD        [DISCONTINUED] heparin 25,000 units in dextrose 5% (100 units/ml) IV bolus from bag HIGH INTENSITY nomogram - [facility]  30 Units/kg Intravenous PRN Abimael Clemens MD        [DISCONTINUED] heparin 25,000 units in dextrose 5% 250 mL (100 units/mL) infusion HIGH INTENSITY nomogram - [facility]  0-40 Units/kg/hr Intravenous Continuous Abimael Clemens MD 11.8 mL/hr at 07/12/25 0349 18 Units/kg/hr at  25 0349    [DISCONTINUED] propofol (DIPRIVAN) 10 mg/mL infusion  0-50 mcg/kg/min Intravenous Continuous Abimael Clemens MD 5.9 mL/hr at 25 0200 15 mcg/kg/min at 25 0200    [DISCONTINUED] rocuronium injection 66 mg  1 mg/kg Intravenous ED 1 Time Abimael Clemens MD         Current Outpatient Medications on File Prior to Encounter   Medication Sig Dispense Refill    benzonatate (TESSALON) 100 MG capsule Take 1 capsule (100 mg total) by mouth 3 (three) times daily as needed for Cough. 30 capsule 3    bumetanide (BUMEX) 2 MG tablet Take 1 tablet (2 mg total) by mouth 2 (two) times a day. 180 tablet 3    cetirizine (ZYRTEC) 10 MG tablet Take 1 tablet (10 mg total) by mouth once daily. 90 tablet 3    dapagliflozin propanediol (FARXIGA) 10 mg tablet Take 1 tablet (10 mg total) by mouth once daily. 90 tablet 3    losartan (COZAAR) 100 MG tablet Take 1 tablet (100 mg total) by mouth once daily. 90 tablet 3    metoprolol succinate (TOPROL-XL) 50 MG 24 hr tablet Take 1 tablet (50 mg total) by mouth once daily. 90 tablet 3    rivaroxaban (XARELTO) 20 mg Tab Take 1 tablet (20 mg total) by mouth daily with dinner or evening meal. (Patient not taking: Reported on 2025) 180 tablet 1    spironolactone (ALDACTONE) 25 MG tablet Take 1 tablet (25 mg total) by mouth once daily. 90 tablet 3   [4]   Social History  Tobacco Use    Smoking status: Former     Current packs/day: 0.00     Types: Cigarettes     Quit date:      Years since quittin.5     Passive exposure: Past    Smokeless tobacco: Never    Tobacco comments:     3-4 months    Substance Use Topics    Alcohol use: No    Drug use: Yes     Types: Marijuana     Comment: Odalys today- pt reports a while back        Gee Miranda MD  Resident  [phone]

## 2025-07-12 NOTE — ASSESSMENT & PLAN NOTE
Patient with Hx of Tricuspid and pulmonary atresia, s/p systemic to pulmonary artery shunt followed by a bidirectional Jesus and subsequent lateral tunnel Fontan procedure. Recent EF 20-25%. Recurrent hospital admission for ADHF exacerbation. Had cardiac arrest at home. CPR  by sister for 5 mins.   Rhythm: PEA - 3 rounds of epi were administered with ROSC   CTA-PE: Notable for BP PE     Hemodynamics:    SVO2 CO CI SVR   49 2.5 1.4 2494     Patient in cardiogenic shock.     Recommendations   - Adult congential heart disease consulted: recs apperciated   - Start Dobutamine 5 mcg/hr   - STAT lasix 80 IVP given; infusion 20 started   - Monitor urine output I/O   - Monitor electrolytes and supplement K>4, Phos> 3 Mg >2   - Hemodynamics SVO2 q 8hr

## 2025-07-13 NOTE — ASSESSMENT & PLAN NOTE
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 Recent EF 20-25%. Recurrent hospital admission for ADHF exacerbation. Had cardiac arrest at home. CPR  by sister for 5 mins.   Rhythm: PEA - 3 rounds of epi were administered with ROSC   CTA-PE: Notable for BP PE   Initial hemodynamics: CI 1.4, SVR 2494  - Adult congential heart disease consulted: recs apperciated    - Goal CVP 15-18 to maintain adequate preload  - Cont Dobutamine 5 mcg/hr   - Cont Levo, Vaso, Epi. Wean as tolerated  - Holding Lasix  - TTM protocol. Plan to rewarm to 36 deg celsius at 6pm (24h after achieving goal)  - Monitor urine output I/O   - Monitor electrolytes and supplement K>4, Phos> 3 Mg >2   - Hemodynamics SVO2 q 8hr

## 2025-07-13 NOTE — PLAN OF CARE
Cardiac ICU Care Plan    POC reviewed with Juani Reilly Jr. and family. Questions and concerns addressed. No acute events today. Pt progressing toward goals. Will continue to monitor. See below and flowsheets for full assessment and VS info.       Neuro:  Pickerel Coma Scale  Best Eye Response: 1-->(E1) none  Best Motor Response: 1-->(M1) none  Best Verbal Response: 1-->(V1) none  Pickerel Coma Scale Score: 3  Assessment Qualifiers: Patient chemically sedated or paralyzed, Patient intubated, No eye obstruction present  Pupil PERRLA: no    24 hr Temp:  [89.8 °F (32.1 °C)-97.5 °F (36.4 °C)]      CV:  Rhythm: normal sinus rhythm   DVT prophylaxis: VTE Core Measure: Pharmacological prophylaxis initiated/maintained    CVP (mean): 12 mmHg (07/13/25 0400)       SVO2 (%): 30 % (07/12/25 2030)               Pulses  Right Radial Pulse: 1+ (weak)  Left Radial Pulse: 1+ (weak)  Right Dorsalis Pedis Pulse: Doppler  Left Dorsalis Pedis Pulse: Doppler  Right Posterior Tibial Pulse: 0 (absent)  Left Posterior Tibial Pulse: 0 (absent)    Resp:     Vent Mode: A/C  Set Rate: 24 BPM  Oxygen Concentration (%): 70  Vt Set: 470 mL  PEEP/CPAP: 6 cmH20    GI/:  GI prophylaxis: no  Diet/Nutrition Received: NPO     Voiding Characteristics: urethral catheter (bladder)       Urethral Catheter 07/12/25 0706 Temperature probe 14 Fr.-Reason for Continuing Urinary Catheterization: Required immobilization, Critically ill in ICU and requiring hourly monitoring of intake/output   Intake/Output Summary (Last 24 hours) at 7/13/2025 0651  Last data filed at 7/13/2025 0600  Gross per 24 hour   Intake 2292.62 ml   Output 5115 ml   Net -2822.38 ml            Labs/Accuchecks:  Recent Labs   Lab 07/12/25  0730 07/12/25  1413 07/12/25 2016 07/13/25  0407   WBC 14.97* 15.04*  --  10.66   RBC 4.72 4.37*  --  4.33*   HGB 13.3* 12.4*  --  12.1*   HCT 44.3 40.0 42 37.1*   * 110*  --  114*      Recent Labs   Lab 07/12/25  0254 07/12/25  0955  07/12/25  1409 07/12/25  1805 07/13/25  0008 07/13/25  0407   INR 1.7* 2.3*  --   --   --  1.9*   APTT  --  >150.0*   < > 44.3* 55.9* 55.2*    < > = values in this interval not displayed.      Recent Labs     07/13/25  0407   *   K 4.1   CO2 20*   CL 97   BUN 22*   CREATININE 1.2   ALKPHOS 74   *   *   BILITOT 3.0*       Recent Labs   Lab 07/12/25  0220 07/12/25  0322 07/12/25  1445   CPK  --  299* 405*   TROPONINI 0.150*  --   --       Recent Labs     07/12/25  1530 07/12/25 2016 07/12/25 2026   PH 7.363 7.406 7.441   PCO2 32.6* 28.0* 31.4*   PO2 32* 51* 18*   HCO3 18.5* 17.5* 21.4*   POCSATURATED 60 87 30   BE -7* -7* -3*       Electrolytes: Electrolytes replaced  Accuchecks: Q4H    Gtts/LDAs:   0.9% NaCl   Intravenous Continuous 5 mL/hr at 07/13/25 0600 Rate Verify at 07/13/25 0600    CISatracurium 200 mg in 0.9% NaCl 100 mL infusion  0-10 mcg/kg/min Intravenous Continuous 2 mL/hr at 07/13/25 0600 1 mcg/kg/min at 07/13/25 0600    DOBUTamine IV infusion (non-titrating)  5 mcg/kg/min Intravenous Continuous 4.9 mL/hr at 07/13/25 0600 5 mcg/kg/min at 07/13/25 0600    EPINEPHrine  0.04 mcg/kg/min Intravenous Continuous 7.9 mL/hr at 07/13/25 0600 0.04 mcg/kg/min at 07/13/25 0600    fentanyl  0-250 mcg/hr Intravenous Continuous 10.5 mL/hr at 07/13/25 0600 105 mcg/hr at 07/13/25 0600    heparin (porcine) in D5W  0-40 Units/kg/hr Intravenous Continuous 7.9 mL/hr at 07/13/25 0600 12 Units/kg/hr at 07/13/25 0600    midazolam  0-5 mg/hr Intravenous Continuous   Held at 07/12/25 1530    NORepinephrine bitartrate-D5W  0-3 mcg/kg/min Intravenous Continuous 11.1 mL/hr at 07/13/25 0600 0.045 mcg/kg/min at 07/13/25 0600    propofoL  0-50 mcg/kg/min Intravenous Continuous 19.7 mL/hr at 07/13/25 0600 50 mcg/kg/min at 07/13/25 0600    vasopressin  0.04 Units/min Intravenous Continuous 12 mL/hr at 07/13/25 0602 0.04 Units/min at 07/13/25 0602       Lines/Drains/Airways       Central Venous Catheter Line  Duration              Percutaneous Central Line Triple Lumen 07/12/25 1227 Internal Jugular Right <1 day              Drain  Duration                  Urethral Catheter 07/12/25 0706 Temperature probe 14 Fr. <1 day              Airway  Duration                  Airway - Non-Surgical 07/12/25 0130 1 day              Arterial Line  Duration             Arterial Line 07/12/25 1912 Left Radial <1 day              Peripheral Intravenous Line  Duration                  External Jugular IV 07/12/25 0704 <1 day    Peripheral IV Single Lumen 07/12/25 0703 18 G Anterior;Left Forearm <1 day    Peripheral IV Single Lumen 07/12/25 0704 18 G Right Antecubital <1 day    Peripheral IV Single Lumen 07/12/25 0704 20 G Anterior;Right Forearm <1 day    Peripheral IV Single Lumen 07/12/25 1000 20 G Right Hand <1 day                    Skin/Wounds  Bathing/Skin Care: bath, complete;back care;incontinence care;linen changed;dressed/undressed (07/12/25 1500)  Wounds: No  Wound care consulted: No

## 2025-07-13 NOTE — PLAN OF CARE
CICU DAILY GOALS       A: Awake    RASS: Goal -    Actual - RASS (Alvarez Agitation-Sedation Scale): unarousable   Restraint necessity:    B: Breath   SBT: Not attempted   C: Coordinate A & B, analgesics/sedatives   Pain: managed    SAT: Not attempted  D: Delirium   CAM-ICU:    E: Early(intubated/ Progressive (non-intubated) Mobility   MOVE Screen: Fail   Activity: Activity Management: Patient unable to perform activities  FAS: Feeding/Nutrition   Diet order: Diet/Nutrition Received: NPO,   Fluid restriction:     Nutritional Supplement Intake: Quantity NA, Type: NA  T: Thrombus   DVT prophylaxis: VTE Core Measure: Pharmacological prophylaxis initiated/maintained  H: HOB Elevation   Head of Bed (HOB) Positioning: HOB elevated  U: Ulcer Prophylaxis   GI: no  G: Glucose control   managed Glycemic Management: blood glucose monitored  S: Skin   Bathing/Skin Care: bath, complete;back care;linen changed;dressed/undressed (07/13/25 1501)  Wounds: No  Wound care consulted: No  B: Bowel Function   Unknown    I: Indwelling Catheters   Parry necessity:      Urethral Catheter 07/12/25 0706 Temperature probe 14 Fr.-Reason for Continuing Urinary Catheterization: Critically ill in ICU and requiring hourly monitoring of intake/output, Required immobilization   CVC necessity: Yes  D: De-escalation Antibx   Yes  Plan for the day   Weaned patient off levophed and decreased peep to 5.   Family/Goals of care/Code Status   Code Status: Full Code     No acute events throughout day, VS and assessment per flow sheet, patient progressing towards goals as tolerated, plan of care reviewed with Juani Reilly Jr. and family, all concerns addressed, will continue to monitor.

## 2025-07-13 NOTE — ASSESSMENT & PLAN NOTE
Patient with PEA arrest. 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    - CTA Chest: Bilateral acute pulmonary thromboemboli identified in segmental branches of the bilateral lower lobe pulmonary arteries. Cardiomegaly with morphology suggesting single dilated left ventricle. There appears to be communication between the right and left atria suggesting large ASD.  Pulmonary venous congestion with bibasilar pulmonary edema.  - Cont Heparin drip   - Continue with VTE protocol   - Monitor Oxygen sat.   - Pul consulted and recs appreciated.

## 2025-07-13 NOTE — NURSING
Discussed with team about exact time to initiate rewarming. Since patient reached cooling goal of 33 degrees celsius yesterday at 1845, will start re-warming goal of 36 degrees celsius today at 1845.     Replacing potassium now, but will stop at 1445 - 4 hours prior to rewarming. MD Florencio aware.

## 2025-07-13 NOTE — PROGRESS NOTES
Notified HTS fellow, Dr. Monroy, of CVP 14/15,ScvO2 30%,Lactic acid down to 4.8 and pt -300 ml/hr.  No new orders, will continue to monitor.

## 2025-07-13 NOTE — CONSULTS
[facility]  Pediatric Cardiology  Consult Note    Patient Name: Juani Reilly Jr.  MRN: [id number]  Admission Date: 7/12/2025  Hospital Length of Stay: 1 days  Code Status: Full Code   Attending Provider: Arpan Will, *   Consulting Provider: Dorian Santoyo MD  Primary Care Physician: Jaqueline Pardo MD  Principal Problem:<principal problem not specified>    Consults  Subjective:     Chief Complaint:  complex congenital heart disase     HPI:   Patient is well known to me.  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  He has had several recent admissions and has been followed closely in clinic.  However, he has been quite noncompliant with medications recently and over the years.  Patient was admitted to the ICU yesterday.  Family reported that pt was in the bedroom with his grandmother when he became unresponsive. Sister rushed at bedside and CPR was started. Reported that she did CPR for 5 mins until EMS arrived. PEA was noted and 3 rounds of epi were administered with ROSC.     Yesterday patient diuresed which actually seemed to worsen SVO2.  Initially was on PEEP 8 and 100% due to hypoxia.  CVP 12 this morning.    Past Medical History:   Diagnosis Date    Acute decompensated heart failure 02/06/2025    History of heart surgery     Other cirrhosis of liver 11/12/2020    SVT (supraventricular tachycardia)     WPW syndrome        Past Surgical History:   Procedure Laterality Date    ANGIOGRAPHY OF AORTIC ARCH N/A 09/03/2020    Procedure: AORTOGRAM, AORTIC ARCH;  Surgeon: Stephania Crump MD;  Location: [facility] CATH LAB;  Service: Cardiology;  Laterality: N/A;    FONTAN PROCEDURE, EXTRACARDIAC      LEFT HEART CATHETERIZATION Left 09/03/2020    Procedure: Left heart cath;  Surgeon: Stephania Crump MD;  Location: [facility] CATH LAB;  Service: Cardiology;  Laterality: Left;    RIGHT HEART  CATHETERIZATION FOR CONGENITAL HEART DEFECT N/A 09/03/2020    Procedure: CATHETERIZATION, HEART, RIGHT, FOR CONGENITAL HEART DEFECT;  Surgeon: Stephania Crump MD;  Location: [facility] CATH LAB;  Service: Cardiology;  Laterality: N/A;  [facility]edi Heart - needs covid test morning of. Extenuating circumstances    TRANSESOPHAGEAL ECHOCARDIOGRAPHY N/A 11/10/2022    Procedure: ECHOCARDIOGRAM, TRANSESOPHAGEAL;  Surgeon: Emeterio Hall MD;  Location: [facility] EP LAB;  Service: Cardiology;  Laterality: N/A;    TREATMENT OF CARDIAC ARRHYTHMIA N/A 01/09/2021    Procedure: CARDIOVERSION;  Surgeon: Jim Mcginnis MD;  Location: [facility] CATH LAB;  Service: Cardiology;  Laterality: N/A;       Review of patient's allergies indicates:  No Known Allergies    No current facility-administered medications on file prior to encounter.     Current Outpatient Medications on File Prior to Encounter   Medication Sig    benzonatate (TESSALON) 100 MG capsule Take 1 capsule (100 mg total) by mouth 3 (three) times daily as needed for Cough.    bumetanide (BUMEX) 2 MG tablet Take 1 tablet (2 mg total) by mouth 2 (two) times a day.    cetirizine (ZYRTEC) 10 MG tablet Take 1 tablet (10 mg total) by mouth once daily.    dapagliflozin propanediol (FARXIGA) 10 mg tablet Take 1 tablet (10 mg total) by mouth once daily.    losartan (COZAAR) 100 MG tablet Take 1 tablet (100 mg total) by mouth once daily.    metoprolol succinate (TOPROL-XL) 50 MG 24 hr tablet Take 1 tablet (50 mg total) by mouth once daily.    rivaroxaban (XARELTO) 20 mg Tab Take 1 tablet (20 mg total) by mouth daily with dinner or evening meal. (Patient not taking: Reported on 7/8/2025)    spironolactone (ALDACTONE) 25 MG tablet Take 1 tablet (25 mg total) by mouth once daily.     Family History       Problem Relation (Age of Onset)    Heart disease Maternal Grandfather    No Known Problems Mother, Father, Sister, Brother, Maternal Aunt, Maternal Uncle, Paternal Aunt, Paternal Uncle, Maternal  Grandmother, Paternal Grandmother, Paternal Grandfather          Social History     Social History Narrative    Not on file     Scheduled Meds:   heparin (PORCINE)  80 Units/kg Intravenous Once    mupirocin   Nasal BID    piperacillin-tazobactam (Zosyn) IV (PEDS and ADULTS) (extended infusion is not appropriate)  4.5 g Intravenous Q8H    vancomycin (VANCOCIN) IV (PEDS and ADULTS)  1,000 mg Intravenous Q12H    white petrolatum-mineral oiL   Both Eyes Q8H     Continuous Infusions:   0.9% NaCl   Intravenous Continuous 5 mL/hr at 07/13/25 1001 Rate Verify at 07/13/25 1001    CISatracurium 200 mg in 0.9% NaCl 100 mL infusion  0-10 mcg/kg/min Intravenous Continuous 2 mL/hr at 07/13/25 1001 1 mcg/kg/min at 07/13/25 1001    DOBUTamine IV infusion (non-titrating)  5 mcg/kg/min Intravenous Continuous 4.9 mL/hr at 07/13/25 1001 5 mcg/kg/min at 07/13/25 1001    EPINEPHrine  0.04 mcg/kg/min Intravenous Continuous 7.9 mL/hr at 07/13/25 1001 0.04 mcg/kg/min at 07/13/25 1001    fentanyl  0-250 mcg/hr Intravenous Continuous 10.5 mL/hr at 07/13/25 1001 105 mcg/hr at 07/13/25 1001    heparin (porcine) in D5W  0-40 Units/kg/hr Intravenous Continuous 7.9 mL/hr at 07/13/25 1001 12 Units/kg/hr at 07/13/25 1001    NORepinephrine bitartrate-D5W  0-3 mcg/kg/min Intravenous Continuous 4.9 mL/hr at 07/13/25 1001 0.02 mcg/kg/min at 07/13/25 1001    propofoL  0-50 mcg/kg/min Intravenous Continuous 19.7 mL/hr at 07/13/25 1001 50 mcg/kg/min at 07/13/25 1001    vasopressin  0.04 Units/min Intravenous Continuous 12 mL/hr at 07/13/25 1001 0.04 Units/min at 07/13/25 1001     PRN Meds:.  Current Facility-Administered Medications:     heparin (PORCINE), 60 Units/kg, Intravenous, PRN    heparin (PORCINE), 30 Units/kg, Intravenous, PRN    sodium chloride 0.9%, 10 mL, Intravenous, PRN    Pharmacy to dose Vancomycin consult, , , Once **AND** vancomycin - pharmacy to dose, , Intravenous, pharmacy to manage frequency   Review of Systems  Unable to get review  of systems.  Patient intubated and sedated.  Objective:     Vital Signs (Most Recent):  Temp: (!) 91.2 °F (32.9 °C) (07/13/25 1001)  Pulse: 96 (07/13/25 1001)  Resp: (!) 24 (07/13/25 1001)  BP: 98/69 (07/13/25 1001)  SpO2: (!) 93 % (07/13/25 1001) Vital Signs (24h Range):  Temp:  [89.8 °F (32.1 °C)-97.5 °F (36.4 °C)] 91.2 °F (32.9 °C)  Pulse:  [] 96  Resp:  [23-84] 24  SpO2:  [72 %-97 %] 93 %  BP: ()/(46-80) 98/69  Arterial Line BP: (-9-105)/(-9-66) 95/58     Telemetry reviewed.  Sinus rhythm, occasional premature ventricular contractions.  No significant arrhythmias noted.  Weight: 62.1 kg (136 lb 14.5 oz)  Body mass index is 22.1 kg/m².    SpO2: (!) 93 %         Intake/Output Summary (Last 24 hours) at 7/13/2025 1004  Last data filed at 7/13/2025 1001  Gross per 24 hour   Intake 2693.67 ml   Output 5965 ml   Net -3271.33 ml       Lines/Drains/Airways       Central Venous Catheter Line  Duration             Percutaneous Central Line Triple Lumen 07/12/25 1227 Internal Jugular Right <1 day              Drain  Duration                  Urethral Catheter 07/12/25 0706 Temperature probe 14 Fr. 1 day              Airway  Duration                  Airway - Non-Surgical 07/12/25 0130 1 day              Arterial Line  Duration             Arterial Line 07/12/25 1912 Left Radial <1 day              Peripheral Intravenous Line  Duration                  External Jugular IV 07/12/25 0704 1 day    Peripheral IV Single Lumen 07/12/25 0703 18 G Anterior;Left Forearm 1 day    Peripheral IV Single Lumen 07/12/25 0704 18 G Right Antecubital 1 day    Peripheral IV Single Lumen 07/12/25 0704 20 G Anterior;Right Forearm 1 day    Peripheral IV Single Lumen 07/12/25 1000 20 G Right Hand 1 day                       Physical Exam     GENERAL:  Intubated, sedated.  ETT in place.  HEENT: Mucous membranes moist and pink, normocephalic atraumatic, no cranial or carotid bruits, sclera anicteric  NECK: No obvious jugular venous  distention with patient flat, no thyromegaly, no lymphadenopathy  CHEST:  Mildly coarse breath sounds bilaterally.  Well-healed sternotomy.  CARDIOVASCULAR: Quiet precordium, regular rate and rhythm, S1 with single S2, no murmurs rubs or gallops  ABDOMEN: Soft, nontender nondistended, no hepatosplenomegaly, no aortic bruits.  No tenderness.  EXTREMITIES: Warm well perfused, 1+ radial/femoral/pedal pulses, capillary refill 2-3 seconds, no cyanosis or edema  NEURO:  Intubated, sedated, paralyzed.    ABG  Recent Labs   Lab 07/13/25  0846   PH 7.524*   PO2 57*   PCO2 28.0*   HCO3 23.1*   BE 0     Lab Results   Component Value Date    WBC 10.66 07/13/2025    HGB 12.1 (L) 07/13/2025    HCT 37.1 (L) 07/13/2025    MCV 86 07/13/2025     (L) 07/13/2025       CMP  Sodium   Date Value Ref Range Status   07/13/2025 131 (L) 136 - 145 mmol/L Final   03/20/2025 139 136 - 145 mmol/L Final     Potassium   Date Value Ref Range Status   07/13/2025 4.1 3.5 - 5.1 mmol/L Final   03/20/2025 3.5 3.5 - 5.1 mmol/L Final     Chloride   Date Value Ref Range Status   07/13/2025 97 95 - 110 mmol/L Final   03/20/2025 104 95 - 110 mmol/L Final     CO2   Date Value Ref Range Status   07/13/2025 20 (L) 23 - 29 mmol/L Final   03/20/2025 23 23 - 29 mmol/L Final     Glucose   Date Value Ref Range Status   07/13/2025 230 (H) 70 - 110 mg/dL Final   03/20/2025 94 70 - 110 mg/dL Final     BUN   Date Value Ref Range Status   07/13/2025 22 (H) 6 - 20 mg/dL Final     Creatinine   Date Value Ref Range Status   07/13/2025 1.2 0.5 - 1.4 mg/dL Final     Calcium   Date Value Ref Range Status   07/13/2025 8.3 (L) 8.7 - 10.5 mg/dL Final   03/20/2025 8.9 8.7 - 10.5 mg/dL Final     Protein Total   Date Value Ref Range Status   07/13/2025 6.5 6.0 - 8.4 gm/dL Final     Total Protein   Date Value Ref Range Status   02/09/2025 6.4 6.0 - 8.4 g/dL Final     Albumin   Date Value Ref Range Status   07/13/2025 2.9 (L) 3.5 - 5.2 g/dL Final   02/09/2025 3.4 (L) 3.5 - 5.2  g/dL Final     Total Bilirubin   Date Value Ref Range Status   02/09/2025 1.6 (H) 0.1 - 1.0 mg/dL Final     Comment:     For infants and newborns, interpretation of results should be based  on gestational age, weight and in agreement with clinical  observations.    Premature Infant recommended reference ranges:  Up to 24 hours.............<8.0 mg/dL  Up to 48 hours............<12.0 mg/dL  3-5 days..................<15.0 mg/dL  6-29 days.................<15.0 mg/dL       Bilirubin Total   Date Value Ref Range Status   07/13/2025 3.0 (H) 0.1 - 1.0 mg/dL Final     Comment:     For infants and newborns, interpretation of results should be based   on gestational age, weight and in agreement with clinical   observations.    Premature Infant recommended reference ranges:   0-24 hours:  <8.0 mg/dL   24-48 hours: <12.0 mg/dL   3-5 days:    <15.0 mg/dL   6-29 days:   <15.0 mg/dL     Alkaline Phosphatase   Date Value Ref Range Status   02/09/2025 40 40 - 150 U/L Final     ALP   Date Value Ref Range Status   07/13/2025 74 40 - 150 unit/L Final     AST   Date Value Ref Range Status   07/13/2025 249 (H) 11 - 45 unit/L Final   02/09/2025 16 10 - 40 U/L Final     ALT   Date Value Ref Range Status   07/13/2025 174 (H) 10 - 44 unit/L Final   02/09/2025 20 10 - 44 U/L Final     Anion Gap   Date Value Ref Range Status   07/13/2025 14 8 - 16 mmol/L Final     eGFR   Date Value Ref Range Status   07/13/2025 >60 >60 mL/min/1.73/m2 Final     Comment:     Estimated GFR calculated using the CKD-EPI creatinine (2021) equation.   03/20/2025 >60.0 >60 mL/min/1.73 m^2 Final     Microbiology Results (last 7 days)       Procedure Component Value Units Date/Time    Culture, Respiratory with Gram Stain [[id number]]     Order Status: Sent Specimen: Respiratory from Sputum     Blood culture [[id number]] Collected: 07/12/25 0954    Order Status: Sent Specimen: Blood from Peripheral, Forearm, Left Updated: 07/12/25 1007    Blood culture [[id number]]  Collected: 07/12/25 0954    Order Status: Sent Specimen: Blood from Peripheral, Upper Arm, Right Updated: 07/12/25 1006    Blood culture [[id number]] Collected: 07/12/25 0954    Order Status: Sent Specimen: Blood from Peripheral, Lower Arm, Right            Latest Reference Range & Units 06/11/25 14:51   AFP <=8.4 ng/mL 4.5     7/12/25 Chest CTA:  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  Bilateral acute pulmonary thromboemboli identified in segmental branches of the bilateral lower lobe pulmonary arteries.  Cardiomegaly with morphology suggesting single dilated left ventricle. There appears to be communication between the right and left atria suggesting large ASD.  Pulmonary venous congestion with bibasilar pulmonary edema.  Extracardiac Fontan shunt, between the IVC and pulmonary artery, opacifies with contrast and appears patent.  Atelectatic changes at the left lung base with significant contrast retention suggesting impaired pulmonary venous drainage. Peripheral zones of absent pulmonary enhancement at both lung bases potentially representing pulmonary infarcts, in light of bilateral pulmonary emboli, or additional areas of atelectasis.  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  Hepatomegaly with hepatic venous enlargement suggesting congestive hepatopathy.    I reviewed that study in detail.  I agree with the findings.  The proximal pulmonary arteries and Fontan circuit are free of obstruction, but there do appear to be thrombi in the segmental branches of the bilateral lower lobe pulmonary arteries.     COMMUNICATION  This critical result was discovered/received on 7/12/2025 at 03:00.    7/12/25 Head CT:  Limited right upper quadrant ultrasound performed. The liver measures 17 cm in length and is homogeneous in echogenicity. Trace fluid seen along the falciform ligament. Common bile duct is within  normal limits at 2.2 mm.. Mild gallbladder wall thickening. Trace pericholecystic fluid. Negative sonographic Disla sign. Pancreas is obscured by bowel gas. IVC within normal limits. The right kidney is normal in length measuring 10.4 cm. No right sided hydronephrosis or renal masses identified.     Liver US 2/6/25:  Limited right upper quadrant ultrasound performed. The liver measures 17 cm in length and is homogeneous in echogenicity. Trace fluid seen along the falciform ligament. Common bile duct is within normal limits at 2.2 mm.. Mild gallbladder wall thickening. Trace pericholecystic fluid. Negative sonographic Disla sign. Pancreas is obscured by bowel gas. IVC within normal limits. The right kidney is normal in length measuring 10.4 cm. No right sided hydronephrosis or renal masses identified.     Results for orders placed during the hospital encounter of 03/31/25    Echo    Interpretation Summary  CONGENITAL CARDIAC HISTORY:  Tricuspid atresia, pulmonary atresia  and WPW.  S/P Systemic to pulmonary artery shunt - Shaun.  S/P Bidirectional Jesus -- Shaun.  S/P Lateral tunnel Fontan procedure - Shaun.  S/P EP study with trans-septal puncture November 2022  S/P Ablation of a left lateral accessory pathway November 2022 by Dr. Ferguson      SEGMENTAL CARDIAC CONNECTIONS (previously demonstrated):  Abdominal situs is solitus.  Atrial situs is normal.  Imaging consistent with tricuspid atresia.  Tricuspid atresia.  Mitral valve appears normal in structure.  LV dilation.  Ventriculoarterial concordance.  Ventricular loop: D-loop.  Cardiac position is levocardia.  Left aortic arch branching.      IMPRESSION:  Imaging consistent with diagnosis of tricuspid atresia S/P lateral tunnel Fontan - study remarkable for decreased systolic function of the single ventricle worsened since ACHD study 10/26/2023:  Very difficult to demonstrate pulmonary circulation with significant chest deformity due to median  sternotomy.  Mildly dilated inferior vena cava connecting to lateral for completion of Fontan physiology with no obvious obstruction or thrombus.  Right superior vena cava identified with laminar flow and no obvious stenosis.  No evidence of obstruction at Fontan or Jesus anastomosis to the pulmonary arteries in very limited imaging.  Pulmonary confluence and LPA unobstructed in very limited imaging.  RPA not demonstrated..  At least two pulmonary veins demonstrated returning to the left atrium with no evidence for obstruction.  There is a small right atrium with no obvious obstruction of right atrial flow to the left atrium.  There is no obvious tricuspid valve tissue present and no right ventricular cavity could be demonstrated.  There is no evidence for pulmonary valve.  At least mild dilation of the left atrium.  Mildly dilated mitral valve annulus with color Doppler demonstrating at least moderate insufficiency.  Single ventricle consistent with left ventricular morphology.  There is at least moderate dilation of the left ventricle with prominently trabeculated apical myocardium.  Left ventricular systolic function qualitatively severely compromised with ejection fraction estimated 20 -25%.  Global left ventricular longitudinal strain abnormal - peak average measured -3.6.  There is a large aortic annulus with trileaflet aortic valve, no stenosis and trivial central jet of insufficiency.  Aortic dimensions:  Sinuses of Valsalva   = 49 mm.  ST junction               = 39 mm.  Ascending aorta       = not well demonstrated.  Qualitative impression of at least mild dilation of the ascending aorta.  No evidence for coarctation.  No obvious pericardial effusion.    Cath 9/3/20:  IMPRESSION:  1) Tricuspid atresia s/p intra-atrial Fontan  2) Normal Fontan pressures (11mmHg) and wedge pressure (8mmHg).  3) Normal cardiac output and vascular resistance calculations  4) Small atrial baffle leak  5) No significant  aorta-pulmonary or veno-venous collaterals  6) Occlusion of right common femoral artery   Assessment and Plan:     Cardiac/Vascular  S/P Fontan procedure  1. 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 Severely decreased left ventricular function  3. History of [id number], SVT  - status post successful ablation of a left lateral accessory pathway November 2022 by Dr. Ferguson  4. Cirrhosis  - last seen by hepatology May 2023 (normal AFP 6/11)  5. Arrested at home late night July 11, 2025  - unclear etiology, but CT scan suggestive of pulmonary emboli which may be chronic or acute  6. Long history of noncompliance with medication  7. Worsening hypoxemia over the past year of unclear etiology    Discussion:   He is critically ill with a very bad combination of single ventricle Fontan physiology and poor heart function.    His central venous pressure is 12 today, which is basically his baseline and very good for a Fontan.  This would suggest that he currently is not fluid overloaded.  He is 2 L negative.  I completely agree with backing off on diuretics.  Positive pressure ventilation is bad for Fontan physiology.  It decreases venous return through the lungs and hence decreases preload to the systemic ventricle.  Some progress has been made weaning his PEEP from 8-6.  I would recommend weaning further to 5.  He has developed worsening saturations over the past year or so.  We would plan to do a cardiac catheterization to investigate this further.  He does have a small atrial baffle leak which allows right-to-left shunting, but this is not new and his saturations at baseline were in the low  90s a few years ago.  It is possible that leak has gotten larger although there was nothing obvious on the CT scan.  Venovenous collaterals can definitely develop in these patients.  Pulmonary AV fistula related to liver disease can also develop.  I also wonder if he has had some chronic thromboembolic phenomenon to his lungs which could contribute to hypoxemia given the recent CT findings and his noncompliance with his Xarelto and history of hemoptysis.  Either way, I would accept saturations 85% or greater when trying to wean his ventilation and decreases positive pressure.  CT scan suggests bilateral pulmonary emboli.  I was initially skeptical of the reading because CT scan is notorious for false-positive readings of pulmonary emboli in Fontan patients due to preferential streaming of blood to either the right or left lung dependent on whether diet as injected in an upper extremity or lower extremity.  However, the proximal branch pulmonary arteries both light up very well on the CT scan, and the thrombus is more distal.  I do think there are true pulmonary emboli which could contribute to his current presentation or could be more chronic.  He is at increased risk for pulmonary emboli given the hypercoagulable state associated with Fontan physiology.  I am unclear as to the etiology of his current presentation.  I do not think this is purely worsening heart failure given a very reassuring looking chest x-ray.  Under arrhythmias certainly possible.  Collapse from pulmonary emboli with poor cardiac output also possible.  What is abundantly clear is that he is not a heart transplant candidate.  His compliance is extremely poor with medications, and we have struggled quite a bit to get him to agree to take any medications.  He is not a mechanical circulatory support candidate in my viewpoint, and I would not consider him a candidate for ECMO.    Recommendations:   Agree with therapeutic heparin for pulmonary  emboli  Back off on PEEP to 5  Would accept saturations 85% or greater  Agree with holding diuretics.  Would you for a CVP in the 10-15 range.  His prognosis is extremely poor.  By report, he had no gag reflex before paralytics were started.  I would recommend backing off on sedation and paralytics to allow better neurologic exam.  I would support withdrawal of care if the neurologic exam suggested significant injury.  I feel very strongly that he is not a candidate for ECMO, other mechanical circulatory support, or heart transplant.    I greatly appreciate the care from the heart failure team.        Thank you for your consult. I will follow-up with patient. Please contact us if you have any additional questions.    Dorian Santoyo MD  Pediatric Cardiology   [facility]

## 2025-07-13 NOTE — CARE UPDATE
Spoke with [facility] Dr. Santoyo regarding hypoxia on ventilator. Suspect developing fistulas and AVMs contributing to persistent worsening hypoxia. He recommended SpO2 goal >85% based on clinic readings and congenital history. Also recommended goal CVP 15-18 for adequate pre-load and low PEEP strategy.

## 2025-07-13 NOTE — EICU
Virtual ICU Quality Rounds    Admit Date: 7/12/2025  Hospital Day: 1    ICU Day: 1d 4h    24H Vital Sign Range:  Temp:  [89.8 °F (32.1 °C)-91.8 °F (33.2 °C)]   Pulse:  [88-98]   Resp:  [24]   BP: ()/(53-71)   SpO2:  [86 %-95 %]   Arterial Line BP: ()/(2-66)     VICU Surveillance Screening    Daily review of  line necessity(optional): Central line(s) in place and Urinary catheter in place    Central line type (optional): Triple lumen catheter    Central line indications : Vasopressors (in past 24/hrs) and Inotropes    [place] Indications : Critically ill in the intensive care unit requiring hourly urine output monitoring     LDA reconciliation : Yes                           leg/back/shoulder

## 2025-07-13 NOTE — SUBJECTIVE & OBJECTIVE
Interval History:   Overnight lasix drip stopped after discussion with Dr. Santoyo with goal CVP 15-18 established. Oxygenation improved with ventilator adjustments and deep sedation/paralytic. Still requiring multiple vasopressors. Cooling protocol in place. Unable to neuro prognosticate at this time.     UO 5100, negative 2800    Hemodynamic Parameters:     SCVO2 60   CVP 12   MAP 68   Cardiac Output 4.32   Cardiac Index 2.55   SVR 1038       Continuous Infusions:   0.9% NaCl   Intravenous Continuous   Stopped at 07/13/25 1337    CISatracurium 200 mg in 0.9% NaCl 100 mL infusion  0-10 mcg/kg/min Intravenous Continuous 2 mL/hr at 07/13/25 1401 1 mcg/kg/min at 07/13/25 1401    DOBUTamine IV infusion (non-titrating)  5 mcg/kg/min Intravenous Continuous 4.9 mL/hr at 07/13/25 1401 5 mcg/kg/min at 07/13/25 1401    EPINEPHrine  0.04 mcg/kg/min Intravenous Continuous 7.9 mL/hr at 07/13/25 1401 0.04 mcg/kg/min at 07/13/25 1401    fentanyl  0-250 mcg/hr Intravenous Continuous 10.5 mL/hr at 07/13/25 1401 105 mcg/hr at 07/13/25 1401    heparin (porcine) in D5W  0-40 Units/kg/hr Intravenous Continuous 7.9 mL/hr at 07/13/25 1401 12 Units/kg/hr at 07/13/25 1401    NORepinephrine bitartrate-D5W  0-3 mcg/kg/min Intravenous Continuous   Stopped at 07/13/25 1146    propofoL  0-50 mcg/kg/min Intravenous Continuous 19.7 mL/hr at 07/13/25 1401 50 mcg/kg/min at 07/13/25 1401    vasopressin  0.04 Units/min Intravenous Continuous 12 mL/hr at 07/13/25 1401 0.04 Units/min at 07/13/25 1401     Scheduled Meds:   heparin (PORCINE)  80 Units/kg Intravenous Once    mupirocin   Nasal BID    piperacillin-tazobactam (Zosyn) IV (PEDS and ADULTS) (extended infusion is not appropriate)  4.5 g Intravenous Q8H    vancomycin (VANCOCIN) IV (PEDS and ADULTS)  1,000 mg Intravenous Q12H    white petrolatum-mineral oiL   Both Eyes Q8H     PRN Meds:  Current Facility-Administered Medications:     heparin (PORCINE), 60 Units/kg, Intravenous, PRN    heparin  (PORCINE), 30 Units/kg, Intravenous, PRN    sodium chloride 0.9%, 10 mL, Intravenous, PRN    Pharmacy to dose Vancomycin consult, , , Once **AND** vancomycin - pharmacy to dose, , Intravenous, pharmacy to manage frequency    Review of patient's allergies indicates:  No Known Allergies  Objective:     Vital Signs (Most Recent):  Temp: (!) 91.6 °F (33.1 °C) (07/13/25 1401)  Pulse: 93 (07/13/25 1401)  Resp: (!) 24 (07/13/25 1401)  BP: 92/63 (07/13/25 1401)  SpO2: (!) 93 % (07/13/25 1401) Vital Signs (24h Range):  Temp:  [89.8 °F (32.1 °C)-97.2 °F (36.2 °C)] 91.6 °F (33.1 °C)  Pulse:  [] 93  Resp:  [23-84] 24  SpO2:  [86 %-97 %] 93 %  BP: ()/(46-71) 92/63  Arterial Line BP: ()/(2-66) 89/52     Patient Vitals for the past 72 hrs (Last 3 readings):   Weight   07/13/25 0508 62.1 kg (136 lb 14.5 oz)   07/12/25 0830 65.8 kg (145 lb)   07/12/25 0657 65.8 kg (145 lb)     Body mass index is 22.1 kg/m².      Intake/Output Summary (Last 24 hours) at 7/13/2025 1421  Last data filed at 7/13/2025 1401  Gross per 24 hour   Intake 2828.4 ml   Output 7195 ml   Net -4366.6 ml         Telemetry: Reviewed       Physical Exam  Constitutional:       Comments: Sedated and paralyzed on mechanical ventilator   HENT:      Head: Atraumatic.      Mouth/Throat:      Comments: ETT in place  Eyes:      Conjunctiva/sclera: Conjunctivae normal.   Cardiovascular:      Rate and Rhythm: Normal rate and regular rhythm.      Pulses: Normal pulses.   Pulmonary:      Breath sounds: Normal breath sounds. No wheezing or rales.   Abdominal:      General: Abdomen is flat.      Palpations: Abdomen is soft.   Musculoskeletal:      Right lower leg: No edema.      Left lower leg: No edema.   Skin:     General: Skin is warm and dry.   Neurological:      Comments: Sedated deeply   Psychiatric:      Comments: Unable to assess            Significant Labs:  CBC:  Recent Labs   Lab 07/12/25  0730 07/12/25  1413 07/12/25 2016 07/13/25  0407   WBC 14.97*  "15.04*  --  10.66   RBC 4.72 4.37*  --  4.33*   HGB 13.3* 12.4*  --  12.1*   HCT 44.3 40.0 42 37.1*   * 110*  --  114*   MCV 94 92  --  86   MCH 28.2 28.4  --  27.9   MCHC 30.0* 31.0*  --  32.6     BNP:  Recent Labs   Lab 07/08/25  1308 07/12/25  0220 07/12/25  0730   BNP 1,802* 2,461* 2,903*     CMP:  Recent Labs   Lab 07/12/25  1409 07/12/25  1445 07/12/25  1657 07/13/25  0407 07/13/25  0842 07/13/25  1210   GLU 86 102   < > 230* 164* 162*   CALCIUM 8.5* 8.4*   < > 8.3* 8.3* 8.3*   ALBUMIN 3.1* 3.0*  --  2.9*  --   --    PROT 7.0 6.8  --  6.5  --   --     135*   < > 131* 134* 132*   K 4.4 4.2   < > 4.1 3.8 3.2*   CO2 13* 16*   < > 20* 24 25    100   < > 97 97 95   BUN 21* 22*   < > 22* 23* 22*   CREATININE 1.3 1.3   < > 1.2 1.1 1.1   ALKPHOS 81 78  --  74  --   --    ALT 87* 100*  --  174*  --   --    * 157*  --  249*  --   --    BILITOT 3.6* 3.5*  --  3.0*  --   --     < > = values in this interval not displayed.      Coagulation:   Recent Labs   Lab 07/12/25  0254 07/12/25  0955 07/12/25  1409 07/12/25  1805 07/13/25  0008 07/13/25  0407   INR 1.7* 2.3*  --   --   --  1.9*   APTT  --  >150.0*   < > 44.3* 55.9* 55.2*    < > = values in this interval not displayed.     LDH:  No results for input(s): "LDH" in the last 72 hours.  Microbiology:  Microbiology Results (last 7 days)       Procedure Component Value Units Date/Time    Culture, Respiratory with Gram Stain [[id number]]     Order Status: Sent Specimen: Respiratory from Sputum     Blood culture [[id number]] Collected: 07/12/25 0954    Order Status: Resulted Specimen: Blood from Peripheral, Forearm, Left Updated: 07/12/25 1007    Blood culture [[id number]] Collected: 07/12/25 0954    Order Status: Resulted Specimen: Blood from Peripheral, Upper Arm, Right Updated: 07/12/25 1006    Blood culture [[id number]] Collected: 07/12/25 0954    Order Status: Sent Specimen: Blood from Peripheral, Lower Arm, Right             I have reviewed all " pertinent labs within the past 24 hours.    Estimated Creatinine Clearance: 85.5 mL/min (based on SCr of 1.1 mg/dL).    Diagnostic Results:  I have reviewed all pertinent imaging results/findings within the past 24 hours.

## 2025-07-13 NOTE — PROCEDURES
"Juani Reilly Jr. is a 30 y.o. male patient.    Temp: (!) 91.2 °F (32.9 °C) (25)  Pulse: 98 (25)  Resp: (!) 24 (25)  BP: 105/71 (25)  SpO2: (!) 92 % (25)  Weight: 65.8 kg (145 lb) (25)  Height: 5' 6" (167.6 cm) (25)       Arterial Line    Date/Time: 2025 7:30 PM  Location procedure was performed: [facility]    Performed by: Pearl Monroy MD  Authorized by: Pearl Monroy MD  Consent Done: Yes  Consent: Written consent obtained  Risks and benefits: risks, benefits and alternatives were discussed  Patient identity confirmed: MRN,  and name  Time out: Immediately prior to procedure a "time out" was called to verify the correct patient, procedure, equipment, support staff and site/side marked as required.  Indications: hemodynamic monitoring  Location: left radial    Patient sedated: yes  Rolando's test normal: yes  Seldinger technique: Seldinger technique used  Number of attempts: 1  Complications: No  Post-procedure: line sutured and dressing applied  Post-procedure CMS: unchanged  Patient tolerance: Patient tolerated the procedure well with no immediate complications          2025    "

## 2025-07-13 NOTE — SUBJECTIVE & OBJECTIVE
Past Medical History:   Diagnosis Date    Acute decompensated heart failure 02/06/2025    History of heart surgery     Other cirrhosis of liver 11/12/2020    SVT (supraventricular tachycardia)     WPW syndrome        Past Surgical History:   Procedure Laterality Date    ANGIOGRAPHY OF AORTIC ARCH N/A 09/03/2020    Procedure: AORTOGRAM, AORTIC ARCH;  Surgeon: Stephania Crump MD;  Location: [facility] CATH LAB;  Service: Cardiology;  Laterality: N/A;    FONTAN PROCEDURE, EXTRACARDIAC      LEFT HEART CATHETERIZATION Left 09/03/2020    Procedure: Left heart cath;  Surgeon: Stephania Crump MD;  Location: [facility] CATH LAB;  Service: Cardiology;  Laterality: Left;    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 Extenuating circumstances    TRANSESOPHAGEAL ECHOCARDIOGRAPHY N/A 11/10/2022    Procedure: ECHOCARDIOGRAM, TRANSESOPHAGEAL;  Surgeon: Emeterio Hall MD;  Location: [facility] EP LAB;  Service: Cardiology;  Laterality: N/A;    TREATMENT OF CARDIAC ARRHYTHMIA N/A 01/09/2021    Procedure: CARDIOVERSION;  Surgeon: Jim Mcginnis MD;  Location: [facility] CATH LAB;  Service: Cardiology;  Laterality: N/A;       Review of patient's allergies indicates:  No Known Allergies    No current facility-administered medications on file prior to encounter.     Current Outpatient Medications on File Prior to Encounter   Medication Sig    benzonatate (TESSALON) 100 MG capsule Take 1 capsule (100 mg total) by mouth 3 (three) times daily as needed for Cough.    bumetanide (BUMEX) 2 MG tablet Take 1 tablet (2 mg total) by mouth 2 (two) times a day.    cetirizine (ZYRTEC) 10 MG tablet Take 1 tablet (10 mg total) by mouth once daily.    dapagliflozin propanediol (FARXIGA) 10 mg tablet Take 1 tablet  (10 mg total) by mouth once daily.    losartan (COZAAR) 100 MG tablet Take 1 tablet (100 mg total) by mouth once daily.    metoprolol succinate (TOPROL-XL) 50 MG 24 hr tablet Take 1 tablet (50 mg total) by mouth once daily.    rivaroxaban (XARELTO) 20 mg Tab Take 1 tablet (20 mg total) by mouth daily with dinner or evening meal. (Patient not taking: Reported on 7/8/2025)    spironolactone (ALDACTONE) 25 MG tablet Take 1 tablet (25 mg total) by mouth once daily.     Family History       Problem Relation (Age of Onset)    Heart disease Maternal Grandfather    No Known Problems Mother, Father, Sister, Brother, Maternal Aunt, Maternal Uncle, Paternal Aunt, Paternal Uncle, Maternal Grandmother, Paternal Grandmother, Paternal Grandfather          Social History     Social History Narrative    Not on file     Scheduled Meds:   heparin (PORCINE)  80 Units/kg Intravenous Once    mupirocin   Nasal BID    piperacillin-tazobactam (Zosyn) IV (PEDS and ADULTS) (extended infusion is not appropriate)  4.5 g Intravenous Q8H    vancomycin (VANCOCIN) IV (PEDS and ADULTS)  1,000 mg Intravenous Q12H    white petrolatum-mineral oiL   Both Eyes Q8H     Continuous Infusions:   0.9% NaCl   Intravenous Continuous 5 mL/hr at 07/13/25 1001 Rate Verify at 07/13/25 1001    CISatracurium 200 mg in 0.9% NaCl 100 mL infusion  0-10 mcg/kg/min Intravenous Continuous 2 mL/hr at 07/13/25 1001 1 mcg/kg/min at 07/13/25 1001    DOBUTamine IV infusion (non-titrating)  5 mcg/kg/min Intravenous Continuous 4.9 mL/hr at 07/13/25 1001 5 mcg/kg/min at 07/13/25 1001    EPINEPHrine  0.04 mcg/kg/min Intravenous Continuous 7.9 mL/hr at 07/13/25 1001 0.04 mcg/kg/min at 07/13/25 1001    fentanyl  0-250 mcg/hr Intravenous Continuous 10.5 mL/hr at 07/13/25 1001 105 mcg/hr at 07/13/25 1001    heparin (porcine) in D5W  0-40 Units/kg/hr Intravenous Continuous 7.9 mL/hr at 07/13/25 1001 12 Units/kg/hr at 07/13/25 1001    NORepinephrine bitartrate-D5W  0-3 mcg/kg/min  Intravenous Continuous 4.9 mL/hr at 07/13/25 1001 0.02 mcg/kg/min at 07/13/25 1001    propofoL  0-50 mcg/kg/min Intravenous Continuous 19.7 mL/hr at 07/13/25 1001 50 mcg/kg/min at 07/13/25 1001    vasopressin  0.04 Units/min Intravenous Continuous 12 mL/hr at 07/13/25 1001 0.04 Units/min at 07/13/25 1001     PRN Meds:.  Current Facility-Administered Medications:     heparin (PORCINE), 60 Units/kg, Intravenous, PRN    heparin (PORCINE), 30 Units/kg, Intravenous, PRN    sodium chloride 0.9%, 10 mL, Intravenous, PRN    Pharmacy to dose Vancomycin consult, , , Once **AND** vancomycin - pharmacy to dose, , Intravenous, pharmacy to manage frequency   Review of Systems  Unable to get review of systems.  Patient intubated and sedated.  Objective:     Vital Signs (Most Recent):  Temp: (!) 91.2 °F (32.9 °C) (07/13/25 1001)  Pulse: 96 (07/13/25 1001)  Resp: (!) 24 (07/13/25 1001)  BP: 98/69 (07/13/25 1001)  SpO2: (!) 93 % (07/13/25 1001) Vital Signs (24h Range):  Temp:  [89.8 °F (32.1 °C)-97.5 °F (36.4 °C)] 91.2 °F (32.9 °C)  Pulse:  [] 96  Resp:  [23-84] 24  SpO2:  [72 %-97 %] 93 %  BP: ()/(46-80) 98/69  Arterial Line BP: (-9-105)/(-9-66) 95/58     Telemetry reviewed.  Sinus rhythm, occasional premature ventricular contractions.  No significant arrhythmias noted.  Weight: 62.1 kg (136 lb 14.5 oz)  Body mass index is 22.1 kg/m².    SpO2: (!) 93 %         Intake/Output Summary (Last 24 hours) at 7/13/2025 1004  Last data filed at 7/13/2025 1001  Gross per 24 hour   Intake 2693.67 ml   Output 5965 ml   Net -3271.33 ml       Lines/Drains/Airways       Central Venous Catheter Line  Duration             Percutaneous Central Line Triple Lumen 07/12/25 1227 Internal Jugular Right <1 day              Drain  Duration                  Urethral Catheter 07/12/25 0706 Temperature probe 14 Fr. 1 day              Airway  Duration                  Airway - Non-Surgical 07/12/25 0130 1 day              Arterial Line  Duration              Arterial Line 07/12/25 1912 Left Radial <1 day              Peripheral Intravenous Line  Duration                  External Jugular IV 07/12/25 0704 1 day    Peripheral IV Single Lumen 07/12/25 0703 18 G Anterior;Left Forearm 1 day    Peripheral IV Single Lumen 07/12/25 0704 18 G Right Antecubital 1 day    Peripheral IV Single Lumen 07/12/25 0704 20 G Anterior;Right Forearm 1 day    Peripheral IV Single Lumen 07/12/25 1000 20 G Right Hand 1 day                       Physical Exam     GENERAL:  Intubated, sedated.  ETT in place.  HEENT: Mucous membranes moist and pink, normocephalic atraumatic, no cranial or carotid bruits, sclera anicteric  NECK: No obvious jugular venous distention with patient flat, no thyromegaly, no lymphadenopathy  CHEST:  Mildly coarse breath sounds bilaterally.  Well-healed sternotomy.  CARDIOVASCULAR: Quiet precordium, regular rate and rhythm, S1 with single S2, no murmurs rubs or gallops  ABDOMEN: Soft, nontender nondistended, no hepatosplenomegaly, no aortic bruits.  No tenderness.  EXTREMITIES: Warm well perfused, 1+ radial/femoral/pedal pulses, capillary refill 2-3 seconds, no cyanosis or edema  NEURO:  Intubated, sedated, paralyzed.    ABG  Recent Labs   Lab 07/13/25  0846   PH 7.524*   PO2 57*   PCO2 28.0*   HCO3 23.1*   BE 0     Lab Results   Component Value Date    WBC 10.66 07/13/2025    HGB 12.1 (L) 07/13/2025    HCT 37.1 (L) 07/13/2025    MCV 86 07/13/2025     (L) 07/13/2025       CMP  Sodium   Date Value Ref Range Status   07/13/2025 131 (L) 136 - 145 mmol/L Final   03/20/2025 139 136 - 145 mmol/L Final     Potassium   Date Value Ref Range Status   07/13/2025 4.1 3.5 - 5.1 mmol/L Final   03/20/2025 3.5 3.5 - 5.1 mmol/L Final     Chloride   Date Value Ref Range Status   07/13/2025 97 95 - 110 mmol/L Final   03/20/2025 104 95 - 110 mmol/L Final     CO2   Date Value Ref Range Status   07/13/2025 20 (L) 23 - 29 mmol/L Final   03/20/2025 23 23 - 29 mmol/L Final      Glucose   Date Value Ref Range Status   07/13/2025 230 (H) 70 - 110 mg/dL Final   03/20/2025 94 70 - 110 mg/dL Final     BUN   Date Value Ref Range Status   07/13/2025 22 (H) 6 - 20 mg/dL Final     Creatinine   Date Value Ref Range Status   07/13/2025 1.2 0.5 - 1.4 mg/dL Final     Calcium   Date Value Ref Range Status   07/13/2025 8.3 (L) 8.7 - 10.5 mg/dL Final   03/20/2025 8.9 8.7 - 10.5 mg/dL Final     Protein Total   Date Value Ref Range Status   07/13/2025 6.5 6.0 - 8.4 gm/dL Final     Total Protein   Date Value Ref Range Status   02/09/2025 6.4 6.0 - 8.4 g/dL Final     Albumin   Date Value Ref Range Status   07/13/2025 2.9 (L) 3.5 - 5.2 g/dL Final   02/09/2025 3.4 (L) 3.5 - 5.2 g/dL Final     Total Bilirubin   Date Value Ref Range Status   02/09/2025 1.6 (H) 0.1 - 1.0 mg/dL Final     Comment:     For infants and newborns, interpretation of results should be based  on gestational age, weight and in agreement with clinical  observations.    Premature Infant recommended reference ranges:  Up to 24 hours.............<8.0 mg/dL  Up to 48 hours............<12.0 mg/dL  3-5 days..................<15.0 mg/dL  6-29 days.................<15.0 mg/dL       Bilirubin Total   Date Value Ref Range Status   07/13/2025 3.0 (H) 0.1 - 1.0 mg/dL Final     Comment:     For infants and newborns, interpretation of results should be based   on gestational age, weight and in agreement with clinical   observations.    Premature Infant recommended reference ranges:   0-24 hours:  <8.0 mg/dL   24-48 hours: <12.0 mg/dL   3-5 days:    <15.0 mg/dL   6-29 days:   <15.0 mg/dL     Alkaline Phosphatase   Date Value Ref Range Status   02/09/2025 40 40 - 150 U/L Final     ALP   Date Value Ref Range Status   07/13/2025 74 40 - 150 unit/L Final     AST   Date Value Ref Range Status   07/13/2025 249 (H) 11 - 45 unit/L Final   02/09/2025 16 10 - 40 U/L Final     ALT   Date Value Ref Range Status   07/13/2025 174 (H) 10 - 44 unit/L Final    02/09/2025 20 10 - 44 U/L Final     Anion Gap   Date Value Ref Range Status   07/13/2025 14 8 - 16 mmol/L Final     eGFR   Date Value Ref Range Status   07/13/2025 >60 >60 mL/min/1.73/m2 Final     Comment:     Estimated GFR calculated using the CKD-EPI creatinine (2021) equation.   03/20/2025 >60.0 >60 mL/min/1.73 m^2 Final     Microbiology Results (last 7 days)       Procedure Component Value Units Date/Time    Culture, Respiratory with Gram Stain [[id number]]     Order Status: Sent Specimen: Respiratory from Sputum     Blood culture [[id number]] Collected: 07/12/25 0954    Order Status: Sent Specimen: Blood from Peripheral, Forearm, Left Updated: 07/12/25 1007    Blood culture [[id number]] Collected: 07/12/25 0954    Order Status: Sent Specimen: Blood from Peripheral, Upper Arm, Right Updated: 07/12/25 1006    Blood culture [[id number]] Collected: 07/12/25 0954    Order Status: Sent Specimen: Blood from Peripheral, Lower Arm, Right            Latest Reference Range & Units 06/11/25 14:51   AFP <=8.4 ng/mL 4.5     7/12/25 Chest CTA:  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  Bilateral acute pulmonary thromboemboli identified in segmental branches of the bilateral lower lobe pulmonary arteries.  Cardiomegaly with morphology suggesting single dilated left ventricle. There appears to be communication between the right and left atria suggesting large ASD.  Pulmonary venous congestion with bibasilar pulmonary edema.  Extracardiac Fontan shunt, between the IVC and pulmonary artery, opacifies with contrast and appears patent.  Atelectatic changes at the left lung base with significant contrast retention suggesting impaired pulmonary venous drainage. Peripheral zones of absent pulmonary enhancement at both lung bases potentially representing pulmonary infarcts, in light of bilateral pulmonary emboli, or additional areas of atelectasis.  Aneurysmal enlargement of the  aortic root up to 5.2 cm, similar compared to prior 02/05/2025.  Hepatomegaly with hepatic venous enlargement suggesting congestive hepatopathy.    I reviewed that study in detail.  I agree with the findings.  The proximal pulmonary arteries and Fontan circuit are free of obstruction, but there do appear to be thrombi in the segmental branches of the bilateral lower lobe pulmonary arteries.     COMMUNICATION  This critical result was discovered/received on 7/12/2025 at 03:00.    7/12/25 Head CT:  Limited right upper quadrant ultrasound performed. The liver measures 17 cm in length and is homogeneous in echogenicity. Trace fluid seen along the falciform ligament. Common bile duct is within normal limits at 2.2 mm.. Mild gallbladder wall thickening. Trace pericholecystic fluid. Negative sonographic Disla sign. Pancreas is obscured by bowel gas. IVC within normal limits. The right kidney is normal in length measuring 10.4 cm. No right sided hydronephrosis or renal masses identified.     Liver US 2/6/25:  Limited right upper quadrant ultrasound performed. The liver measures 17 cm in length and is homogeneous in echogenicity. Trace fluid seen along the falciform ligament. Common bile duct is within normal limits at 2.2 mm.. Mild gallbladder wall thickening. Trace pericholecystic fluid. Negative sonographic Disla sign. Pancreas is obscured by bowel gas. IVC within normal limits. The right kidney is normal in length measuring 10.4 cm. No right sided hydronephrosis or renal masses identified.     Results for orders placed during the hospital encounter of 03/31/25    Echo    Interpretation Summary  CONGENITAL CARDIAC HISTORY:  Tricuspid atresia, pulmonary atresia  and WPW.  S/P Systemic to pulmonary artery shunt - [place].  S/P Bidirectional Jesus -- [place].  S/P Lateral tunnel Fontan procedure - [place].  S/P EP study with trans-septal puncture November 2022  S/P Ablation of a left lateral accessory pathway November 2022 by  Dr. Ferguson      SEGMENTAL CARDIAC CONNECTIONS (previously demonstrated):  Abdominal situs is solitus.  Atrial situs is normal.  Imaging consistent with tricuspid atresia.  Tricuspid atresia.  Mitral valve appears normal in structure.  LV dilation.  Ventriculoarterial concordance.  Ventricular loop: D-loop.  Cardiac position is levocardia.  Left aortic arch branching.      IMPRESSION:  Imaging consistent with diagnosis of tricuspid atresia S/P lateral tunnel Fontan - study remarkable for decreased systolic function of the single ventricle worsened since ACHD study 10/26/2023:  Very difficult to demonstrate pulmonary circulation with significant chest deformity due to median sternotomy.  Mildly dilated inferior vena cava connecting to lateral for completion of Fontan physiology with no obvious obstruction or thrombus.  Right superior vena cava identified with laminar flow and no obvious stenosis.  No evidence of obstruction at Fontan or Jesus anastomosis to the pulmonary arteries in very limited imaging.  Pulmonary confluence and LPA unobstructed in very limited imaging.  RPA not demonstrated..  At least two pulmonary veins demonstrated returning to the left atrium with no evidence for obstruction.  There is a small right atrium with no obvious obstruction of right atrial flow to the left atrium.  There is no obvious tricuspid valve tissue present and no right ventricular cavity could be demonstrated.  There is no evidence for pulmonary valve.  At least mild dilation of the left atrium.  Mildly dilated mitral valve annulus with color Doppler demonstrating at least moderate insufficiency.  Single ventricle consistent with left ventricular morphology.  There is at least moderate dilation of the left ventricle with prominently trabeculated apical myocardium.  Left ventricular systolic function qualitatively severely compromised with ejection fraction estimated 20 -25%.  Global left ventricular longitudinal strain  abnormal - peak average measured -3.6.  There is a large aortic annulus with trileaflet aortic valve, no stenosis and trivial central jet of insufficiency.  Aortic dimensions:  Sinuses of Valsalva   = 49 mm.  ST junction               = 39 mm.  Ascending aorta       = not well demonstrated.  Qualitative impression of at least mild dilation of the ascending aorta.  No evidence for coarctation.  No obvious pericardial effusion.    Cath 9/3/20:  IMPRESSION:  1) Tricuspid atresia s/p intra-atrial Fontan  2) Normal Fontan pressures (11mmHg) and wedge pressure (8mmHg).  3) Normal cardiac output and vascular resistance calculations  4) Small atrial baffle leak  5) No significant aorta-pulmonary or veno-venous collaterals  6) Occlusion of right common femoral artery

## 2025-07-13 NOTE — EICU
Intervention Initiated From:  COR / FARSHADU    Alden intervened regarding:  Rounding (Video assessment)  VICU Night Rounds Checklist  24H Vital Sign Range:  Temp:  [89.8 °F (32.1 °C)-97.5 °F (36.4 °C)]   Pulse:  []   Resp:  [18-84]   BP: ()/()   SpO2:  [65 %-97 %]   Arterial Line BP: (-9-86)/(-9-51)     Video rounds

## 2025-07-13 NOTE — PROGRESS NOTES
[facility] - Cardiac [facility]  Heart Transplant  Progress Note    Patient Name: Juani Reilly Jr.  MRN: [id number]  Admission Date: 7/12/2025  Hospital Length of Stay: 1 days  Attending Physician: Arpan Will, *  Primary Care Provider: Jaqueline Pardo MD  Principal Problem:Cardiac arrest    Subjective:   Interval History:   Overnight lasix drip stopped after discussion with Dr. Santoyo with goal CVP 15-18 established. Oxygenation improved with ventilator adjustments and deep sedation/paralytic. Still requiring multiple vasopressors. Cooling protocol in place. Unable to neuro prognosticate at this time.     UO 5100, negative 2800    Hemodynamic Parameters:     SCVO2 60   CVP 12   MAP 68   Cardiac Output 4.32   Cardiac Index 2.55   SVR 1038       Continuous Infusions:   0.9% NaCl   Intravenous Continuous   Stopped at 07/13/25 1337    CISatracurium 200 mg in 0.9% NaCl 100 mL infusion  0-10 mcg/kg/min Intravenous Continuous 2 mL/hr at 07/13/25 1401 1 mcg/kg/min at 07/13/25 1401    DOBUTamine IV infusion (non-titrating)  5 mcg/kg/min Intravenous Continuous 4.9 mL/hr at 07/13/25 1401 5 mcg/kg/min at 07/13/25 1401    EPINEPHrine  0.04 mcg/kg/min Intravenous Continuous 7.9 mL/hr at 07/13/25 1401 0.04 mcg/kg/min at 07/13/25 1401    fentanyl  0-250 mcg/hr Intravenous Continuous 10.5 mL/hr at 07/13/25 1401 105 mcg/hr at 07/13/25 1401    heparin (porcine) in D5W  0-40 Units/kg/hr Intravenous Continuous 7.9 mL/hr at 07/13/25 1401 12 Units/kg/hr at 07/13/25 1401    NORepinephrine bitartrate-D5W  0-3 mcg/kg/min Intravenous Continuous   Stopped at 07/13/25 1146    propofoL  0-50 mcg/kg/min Intravenous Continuous 19.7 mL/hr at 07/13/25 1401 50 mcg/kg/min at 07/13/25 1401    vasopressin  0.04 Units/min Intravenous Continuous 12 mL/hr at 07/13/25 1401 0.04 Units/min at 07/13/25 1401     Scheduled Meds:   heparin (PORCINE)  80 Units/kg Intravenous Once    mupirocin   Nasal BID    piperacillin-tazobactam (Zosyn) IV  (PEDS and ADULTS) (extended infusion is not appropriate)  4.5 g Intravenous Q8H    vancomycin (VANCOCIN) IV (PEDS and ADULTS)  1,000 mg Intravenous Q12H    white petrolatum-mineral oiL   Both Eyes Q8H     PRN Meds:  Current Facility-Administered Medications:     heparin (PORCINE), 60 Units/kg, Intravenous, PRN    heparin (PORCINE), 30 Units/kg, Intravenous, PRN    sodium chloride 0.9%, 10 mL, Intravenous, PRN    Pharmacy to dose Vancomycin consult, , , Once **AND** vancomycin - pharmacy to dose, , Intravenous, pharmacy to manage frequency    Review of patient's allergies indicates:  No Known Allergies  Objective:     Vital Signs (Most Recent):  Temp: (!) 91.6 °F (33.1 °C) (07/13/25 1401)  Pulse: 93 (07/13/25 1401)  Resp: (!) 24 (07/13/25 1401)  BP: 92/63 (07/13/25 1401)  SpO2: (!) 93 % (07/13/25 1401) Vital Signs (24h Range):  Temp:  [89.8 °F (32.1 °C)-97.2 °F (36.2 °C)] 91.6 °F (33.1 °C)  Pulse:  [] 93  Resp:  [23-84] 24  SpO2:  [86 %-97 %] 93 %  BP: ()/(46-71) 92/63  Arterial Line BP: ()/(2-66) 89/52     Patient Vitals for the past 72 hrs (Last 3 readings):   Weight   07/13/25 0508 62.1 kg (136 lb 14.5 oz)   07/12/25 0830 65.8 kg (145 lb)   07/12/25 0657 65.8 kg (145 lb)     Body mass index is 22.1 kg/m².      Intake/Output Summary (Last 24 hours) at 7/13/2025 1421  Last data filed at 7/13/2025 1401  Gross per 24 hour   Intake 2828.4 ml   Output 7195 ml   Net -4366.6 ml         Telemetry: Reviewed       Physical Exam  Constitutional:       Comments: Sedated and paralyzed on mechanical ventilator   HENT:      Head: Atraumatic.      Mouth/Throat:      Comments: ETT in place  Eyes:      Conjunctiva/sclera: Conjunctivae normal.   Cardiovascular:      Rate and Rhythm: Normal rate and regular rhythm.      Pulses: Normal pulses.   Pulmonary:      Breath sounds: Normal breath sounds. No wheezing or rales.   Abdominal:      General: Abdomen is flat.      Palpations: Abdomen is soft.   Musculoskeletal:      " Right lower leg: No edema.      Left lower leg: No edema.   Skin:     General: Skin is warm and dry.   Neurological:      Comments: Sedated deeply   Psychiatric:      Comments: Unable to assess            Significant Labs:  CBC:  Recent Labs   Lab 07/12/25  0730 07/12/25  1413 07/12/25  2016 07/13/25  0407   WBC 14.97* 15.04*  --  10.66   RBC 4.72 4.37*  --  4.33*   HGB 13.3* 12.4*  --  12.1*   HCT 44.3 40.0 42 37.1*   * 110*  --  114*   MCV 94 92  --  86   MCH 28.2 28.4  --  27.9   MCHC 30.0* 31.0*  --  32.6     BNP:  Recent Labs   Lab 07/08/25  1308 07/12/25  0220 07/12/25  0730   BNP 1,802* 2,461* 2,903*     CMP:  Recent Labs   Lab 07/12/25  1409 07/12/25  1445 07/12/25  1657 07/13/25  0407 07/13/25  0842 07/13/25  1210   GLU 86 102   < > 230* 164* 162*   CALCIUM 8.5* 8.4*   < > 8.3* 8.3* 8.3*   ALBUMIN 3.1* 3.0*  --  2.9*  --   --    PROT 7.0 6.8  --  6.5  --   --     135*   < > 131* 134* 132*   K 4.4 4.2   < > 4.1 3.8 3.2*   CO2 13* 16*   < > 20* 24 25    100   < > 97 97 95   BUN 21* 22*   < > 22* 23* 22*   CREATININE 1.3 1.3   < > 1.2 1.1 1.1   ALKPHOS 81 78  --  74  --   --    ALT 87* 100*  --  174*  --   --    * 157*  --  249*  --   --    BILITOT 3.6* 3.5*  --  3.0*  --   --     < > = values in this interval not displayed.      Coagulation:   Recent Labs   Lab 07/12/25  0254 07/12/25  0955 07/12/25  1409 07/12/25  1805 07/13/25  0008 07/13/25  0407   INR 1.7* 2.3*  --   --   --  1.9*   APTT  --  >150.0*   < > 44.3* 55.9* 55.2*    < > = values in this interval not displayed.     LDH:  No results for input(s): "LDH" in the last 72 hours.  Microbiology:  Microbiology Results (last 7 days)       Procedure Component Value Units Date/Time    Culture, Respiratory with Gram Stain [[id number]]     Order Status: Sent Specimen: Respiratory from Sputum     Blood culture [[id number]] Collected: 07/12/25 0954    Order Status: Resulted Specimen: Blood from Peripheral, Forearm, Left Updated: " 07/12/25 1007    Blood culture [[id number]] Collected: 07/12/25 0954    Order Status: Resulted Specimen: Blood from Peripheral, Upper Arm, Right Updated: 07/12/25 1006    Blood culture [[id number]] Collected: 07/12/25 0954    Order Status: Sent Specimen: Blood from Peripheral, Lower Arm, Right             I have reviewed all pertinent labs within the past 24 hours.    Estimated Creatinine Clearance: 85.5 mL/min (based on SCr of 1.1 mg/dL).    Diagnostic Results:  I have reviewed all pertinent imaging results/findings within the past 24 hours.  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 Ferguson Nov 2022, decreased LV function 20-25%, follows with advanced heart failure clinic (Dr. Reis) with plans for possible transplant, and cirrhosis transferred from [facility] in [place] for advance option evaluation and evaluation from Adult congenital heart disease. Upon encounter pt was intubates and sedated. Family at bedside reported that pt has been having progressive SOB leading to cardiac arrest. As per chart review pt has had recurrent HF admission. Was seen by Dr Reis 7/8/25 and he reported sob with NYHA II-III. Adjustment to GDMT was made, Lasix discontinued and Bumex 2mg BID start. Family reported that pt was in the bedroom with his grandmother when he became unresponsive. Sister rushed at bedside and CPR was started. Reported that she did CPR for 5 mins until EMS arrived. PEA was noted and 3 rounds of epi were administered with ROSC. Patient was intubated but c/b refractory hypoxemia so pulmonology was consulted. Vent 500, rate 20, PEEP 10, 100% FiO2 with Abg of 7.22, pCO2 32, pO2 53, sat 73%. Patient is sedated with propofol.   - CTA-PE: Notable for BP PE: Heparin initiated   - Bedside ECHO: No visible RV, moderate MR/TR. IVC 1.7 cm     In the ED lactic acid was 9.1-->10. BNP 2900, Lasix 80  given and infusion 20 mg/hr started.   Hemodynamics:    SVO2 CO CI SVR   49 2.5 1.4 2494      Pulmonary and Adult congenital Heart disease consulted and recommendations appreciated.   Dr Santoyo recommendations appreciated. Pt is not a candidate for advance options or MCS.  started.   Pulmonary recommended adequate sedation and paralytic for better oxygenation        * Cardiac arrest  #Cardiogenic Shock  Patient with Hx of Tricuspid and pulmonary atresia, s/p systemic to pulmonary artery shunt followed by a bidirectional Jesus and subsequent lateral tunnel Fontan procedure. Recent EF 20-25%. Recurrent hospital admission for ADHF exacerbation. Had cardiac arrest at home. CPR  by sister for 5 mins.   Rhythm: PEA - 3 rounds of epi were administered with ROSC   CTA-PE: Notable for BP PE   Initial hemodynamics: CI 1.4, SVR 2494  - Adult congential heart disease consulted: recs apperciated    - Goal CVP 15-18 to maintain adequate preload  - Cont Dobutamine 5 mcg/hr   - Cont Levo, Vaso, Epi. Wean as tolerated  - Holding Lasix  - TTM protocol. Plan to rewarm to 36 deg celsius at 6pm (24h after achieving goal)  - Monitor urine output I/O   - Monitor electrolytes and supplement K>4, Phos> 3 Mg >2   - Hemodynamics SVO2 q 8hr      Sepsis  Patient with leukocytosis left shift.   CT Chest ; Notable for pulmonary edema and infiltrates - likely PNA   Pending Blood cx   Continue with vancomycin and zosyn       Acute hypoxemic respiratory failure  Pt transferred for [facility] for advance options and adult congenital evaluation. Pt with systemic  - Pulmonary shunt. Presented with refractory hypoxemia while intubated.    - Pulmonology following, appreciate recs  - Low PEEP strategy  - Goal SPO2 > 85%  - Deep sedation with RASS goal -4 to -5 and vent synchrony.   - Continue with fentanyl and propofol   - Continue Nimbex      Pulmonary thromboembolism  Patient with PEA arrest. With congenital heart disease. Tricuspid and pulmonary  atresia, s/p systemic to pulmonary artery shunt followed by a bidirectional Jesus and subsequent lateral tunnel Fontan procedure.    - CTA Chest: Bilateral acute pulmonary thromboemboli identified in segmental branches of the bilateral lower lobe pulmonary arteries. Cardiomegaly with morphology suggesting single dilated left ventricle. There appears to be communication between the right and left atria suggesting large ASD.  Pulmonary venous congestion with bibasilar pulmonary edema.  - Cont Heparin drip   - Continue with VTE protocol   - Monitor Oxygen sat.   - Pul consulted and recs appreciated.           Gustavo Matias MD  Heart Transplant  [facility] - [facility]

## 2025-07-13 NOTE — PROGRESS NOTES
Admit Note     LCSW met with Pt's Mother Mrs. Levi and Grandmother, Mrs. Epstein to complete admit due to Pt being on a ventilator.  Patient is a 30 y.o. single male admitted for complex congenital heart disease and cardiac arrest.      Patient admitted on 7/12/2025. At this time, patient presents as intubated. At this time, patients caregiver presents as alert and oriented x 4, pleasant, communicative, cooperative, and asking and answering questions appropriately.      Household/Family Systems (as reported by patients caregiver)     Patient resides with patient's grandmother Elaina. Support system includes pt's mother and grandmother. Patient does not have dependents that are need of being cared for.    Pt's phone number: [phone]    Pt's home address: [address] [place] [address]     Patients primary caregiver is self with support from his mother and grandmother as indicated. Pt's grandmother is not employed, and pt's mother is employed at [employer] as a . Confirmed patients emergency contacts information as follows:  Amita Reilly, mother, [phone]  Elaina Epstein, grandmother, [phone]  Mahesh Whitehead, grandfather [phone]    During admission, patient's caregiver plans to stay at home. Confirmed patient and patients caregivers do have access to reliable transportation.    Cognitive Status/Learning     Patients caregiver reports patients reading ability as 11th grade and states patient does not have difficulty with reading, writing, seeing, hearing, comprehension, learning, and memory. Patients caregiver reports patient learns best by visual and hands-on.  Needed: No.   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 Pt's Grandmother  asking if there is support to complete SS paperwork. LCSW will forward to [facility] Social Workers.     Adherence     Patients caregiver reports patient has a medium level of adherence to patients health care regimen. Adherence counseling and education provided. Patient's caregiver verbalizes understanding.    Substance Use    Patients caregiver reports patients substance usage as the following:    Tobacco: none, patient denies any use.  Alcohol: none, patient denies any use.  Illicit Drugs/Non-prescribed Medications: Pt's mother reports pt smokes marijuana but uncertain of last use and frequency.  Patients caregiver states clear understanding of the potential impact of substance use.  Substance abstinence/cessation counseling, education and resources provided and reviewed.     Services Utilizing/ADLS (as reported by patients caregiver)    Infusion Service: Prior to admission, patient utilizing? no  Home Health: Prior to admission, patient utilizing? no  DME: Prior to admission, no O2 (portable and concentrator) arranged prior to dc with [facility] ([phone], [phone]/F)  Pulmonary/Cardiac Rehab: Prior to admission, no  Dialysis:  Prior to admission, no  Transplant Specialty Pharmacy:  Prior to admission, no.    Prior to admission, patients caregiver reports patient was independent with ADLS and was driving. Per pt's mother, pt uses his grandmother's car. Patients caregiver reports patient is not able to care for self at this time due to compromised medical condition (as documented in medical record) and physical weakness. Patients caregiver reports patient indicates a willingness to care for self once medically cleared to do so.    Insurance/Medications    Insured by   Payer/Plan Subscr  Sex Relation Sub. Ins. ID Effective Group Num   1. [place] - [place]* BRIAN VIRAMONTES* 1995 Male Self [id number]* [id number]                                   [address]      Primary Insurance (for UNOS  reporting): Public Insurance - Medicaid  Secondary Insurance (for UNOS reporting): None    Patients caregiver reports patient is able to obtain and afford medications at this time and at time of discharge.    Living Will/Healthcare Power of     Patients caregiver reports patient does not have a LW and/or HCPA.   provided education regarding LW and HCPA and the completion of forms.    Coping/Mental Health (as reported by patients caregiver)    Pt's Grandmother states Pt has been adjusting the best he can with his current health conditions. General support and education on medical stability provided. Patients caregiver is coping well. Pt's caregivers requested a visit from spiritual care, LCSW placed request.     Discharge Planning (as reported by patients caregiver)    At time of discharge, patient plans to return to his home under the care of self with support from family (mother and grandmother) as indicated.  Patients family will transport patient. Per rounds today, expected discharge date has not been medically determined at this time. Patients caretaker verbalizes understanding and is involved in treatment planning and discharge process.    Additional Concerns    Patient's caretaker denies additional needs and/or concerns at this time. Patient is being followed for needs, education, resources, information, emotional support, supportive counseling, and for supportive and skilled discharge plan of care.  providing ongoing psychosocial support, education, resources and d/c planning as needed.  SW remains available.  remains available. Patient's caregiver verbalizes understanding and agreement with information reviewed,  availability and how to access available resources as needed.

## 2025-07-13 NOTE — PROGRESS NOTES
[facility] Pulmonary & Critical Care Medicine Progress Note    Primary Attending Physician: Dr. Gutierrez  Consultant Attending: Dr. Logan  Consultant Fellow: Amanda Watson      Reason for Consult:     Refractory hypoxemia    Subjective:      History of Present Illness:  Juani Reilly Jr. is a 30 y.o.  male who  has a past medical history of Acute decompensated heart failure (02/06/2025), History of heart surgery, Other cirrhosis of liver (11/12/2020), SVT (supraventricular tachycardia), and WPW syndrome.. The patient presented to the [facility] ED on 7/12/2025 with a primary complaint of Transfer (Transfer sent from [place] for CICU consult for post cardiac arrest and bilateral PE. Pt arrives on prop @35mcg/kg/hr, Heparin 18units/kg/hr, and Fentanyl @50mcg/kg/hr. 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 Per family, patient was talking to his relative and suddenly complained of some shortness of breath and then became unresponsive. CPR was initiated by family and when EMS arrived, PEA was noted and 3 rounds of epi were administered with ROSC. Patient was intubated but c/b refractory hypoxemia so pulmonology was consulted.     At [place], Vent 500, rate 20, PEEP 10, 100% FiO2 with Abg of 7.22, pCO2 32, pO2 53, sat 73%. He was transferred to [facility] for Heart Transplant Service/Congenital Cardiology evaluation.     Pulmonology consulted in the context of persistent hypoxemia with sats in the 70s.      Interval History: No acute events. Pt remains paralyzed and sedated with RASS-4 to -5. He's on a dobutamine infusion, as well as vasopressor support with vasopressin and levophed. On Fent and prop for sedation. FiO2 down to 70% with PaO2 of 57 on last gas.     Past Medical History:  Past  Medical History:   Diagnosis Date    Acute decompensated heart failure 02/06/2025    History of heart surgery     Other cirrhosis of liver 11/12/2020    SVT (supraventricular tachycardia)     WPW syndrome        Past Surgical History:  Past Surgical History:   Procedure Laterality Date    ANGIOGRAPHY OF AORTIC ARCH N/A 09/03/2020    Procedure: AORTOGRAM, AORTIC ARCH;  Surgeon: Stephania Crump MD;  Location: [facility] CATH LAB;  Service: Cardiology;  Laterality: N/A;    FONTAN PROCEDURE, EXTRACARDIAC      LEFT HEART CATHETERIZATION Left 09/03/2020    Procedure: Left heart cath;  Surgeon: Stephania Crump MD;  Location: [facility] CATH LAB;  Service: Cardiology;  Laterality: Left;    RIGHT HEART CATHETERIZATION FOR CONGENITAL HEART DEFECT N/A 09/03/2020    Procedure: CATHETERIZATION, HEART, RIGHT, FOR CONGENITAL HEART DEFECT;  Surgeon: Stephania Crump MD;  Location: [facility] CATH LAB;  Service: Cardiology;  Laterality: N/A;  [facility] - needs covid test morning of. Extenuating circumstances    TRANSESOPHAGEAL ECHOCARDIOGRAPHY N/A 11/10/2022    Procedure: ECHOCARDIOGRAM, TRANSESOPHAGEAL;  Surgeon: Emeterio Hall MD;  Location: [facility] EP LAB;  Service: Cardiology;  Laterality: N/A;    TREATMENT OF CARDIAC ARRHYTHMIA N/A 01/09/2021    Procedure: CARDIOVERSION;  Surgeon: Jim Mcginnis MD;  Location: [facility] CATH LAB;  Service: Cardiology;  Laterality: N/A;       Allergies:  Review of patient's allergies indicates:  No Known Allergies    Medications:   In-Hospital Scheduled Medications:   heparin (PORCINE)  80 Units/kg Intravenous Once    mupirocin   Nasal BID    piperacillin-tazobactam (Zosyn) IV (PEDS and ADULTS) (extended infusion is not appropriate)  4.5 g Intravenous Q8H    vancomycin (VANCOCIN) IV (PEDS and ADULTS)  1,000 mg Intravenous Q12H    white petrolatum-mineral oiL   Both Eyes Q8H      In-Hospital PRN Medications:    Current Facility-Administered Medications:     heparin (PORCINE), 60 Units/kg,  Intravenous, PRN    heparin (PORCINE), 30 Units/kg, Intravenous, PRN    sodium chloride 0.9%, 10 mL, Intravenous, PRN    Pharmacy to dose Vancomycin consult, , , Once **AND** vancomycin - pharmacy to dose, , Intravenous, pharmacy to manage frequency   In-Hospital IV Infusion Medications:   0.9% NaCl   Intravenous Continuous   Stopped at 07/13/25 0609    CISatracurium 200 mg in 0.9% NaCl 100 mL infusion  0-10 mcg/kg/min Intravenous Continuous 2 mL/hr at 07/13/25 0801 1 mcg/kg/min at 07/13/25 0801    DOBUTamine IV infusion (non-titrating)  5 mcg/kg/min Intravenous Continuous 4.9 mL/hr at 07/13/25 0801 5 mcg/kg/min at 07/13/25 0801    EPINEPHrine  0.04 mcg/kg/min Intravenous Continuous 7.9 mL/hr at 07/13/25 0801 0.04 mcg/kg/min at 07/13/25 0801    fentanyl  0-250 mcg/hr Intravenous Continuous 10.5 mL/hr at 07/13/25 0801 105 mcg/hr at 07/13/25 0801    heparin (porcine) in D5W  0-40 Units/kg/hr Intravenous Continuous 7.9 mL/hr at 07/13/25 0801 12 Units/kg/hr at 07/13/25 0801    midazolam  0-5 mg/hr Intravenous Continuous   Held at 07/12/25 1530    NORepinephrine bitartrate-D5W  0-3 mcg/kg/min Intravenous Continuous 11.1 mL/hr at 07/13/25 0801 0.045 mcg/kg/min at 07/13/25 0801    propofoL  0-50 mcg/kg/min Intravenous Continuous 19.7 mL/hr at 07/13/25 0801 50 mcg/kg/min at 07/13/25 0801    vasopressin  0.04 Units/min Intravenous Continuous 12 mL/hr at 07/13/25 0801 0.04 Units/min at 07/13/25 0801      Home Medications:  Prior to Admission medications    Medication Sig Start Date End Date Taking? Authorizing Provider   benzonatate (TESSALON) 100 MG capsule Take 1 capsule (100 mg total) by mouth 3 (three) times daily as needed for Cough. 6/26/25   Muntean, Princess, PA-C   bumetanide (BUMEX) 2 MG tablet Take 1 tablet (2 mg total) by mouth 2 (two) times a day. 7/8/25   Solitario Reis MD   cetirizine (ZYRTEC) 10 MG tablet Take 1 tablet (10 mg total) by mouth once daily. 3/17/25 3/17/26  Jaqueline Pardo MD   dapagliflozin  "propanediol (FARXIGA) 10 mg tablet Take 1 tablet (10 mg total) by mouth once daily. 25   Solitario Reis MD   losartan (COZAAR) 100 MG tablet Take 1 tablet (100 mg total) by mouth once daily. 25  Nazario Thompson NP   metoprolol succinate (TOPROL-XL) 50 MG 24 hr tablet Take 1 tablet (50 mg total) by mouth once daily. 25  Nazario Thompson NP   rivaroxaban (XARELTO) 20 mg Tab Take 1 tablet (20 mg total) by mouth daily with dinner or evening meal.  Patient not taking: Reported on 2025   Nazario Thompson NP   spironolactone (ALDACTONE) 25 MG tablet Take 1 tablet (25 mg total) by mouth once daily. 25  Pricness Montesinos PA-C       Family History:  Family History   Problem Relation Name Age of Onset    No Known Problems Mother      No Known Problems Father      No Known Problems Sister 3     No Known Problems Brother      No Known Problems Maternal Aunt      No Known Problems Maternal Uncle      No Known Problems Paternal Aunt      No Known Problems Paternal Uncle      No Known Problems Maternal Grandmother      Heart disease Maternal Grandfather      No Known Problems Paternal Grandmother      No Known Problems Paternal Grandfather      Anemia Neg Hx      Arrhythmia Neg Hx      Asthma Neg Hx      Clotting disorder Neg Hx      Fainting Neg Hx      Heart attack Neg Hx      Heart failure Neg Hx      Hyperlipidemia Neg Hx      Hypertension Neg Hx      Stroke Neg Hx      Atrial Septal Defect Neg Hx         Social History:  Social History[1]    Review of Systems:  Unable to obtain, patient intubated     Objective:   Last 24 Hour Vital Signs:  BP  Min: 76/46  Max: 129/87  Temp  Av.6 °F (33.7 °C)  Min: 89.8 °F (32.1 °C)  Max: 97.5 °F (36.4 °C)  Pulse  Av.8  Min: 88  Max: 123  Resp  Av.9  Min: 23  Max: 84  SpO2  Av.9 %  Min: 69 %  Max: 97 %  Height  Av' 6" (167.6 cm)  Min: 5' 6" (167.6 cm)  Max: 5' 6" (167.6 cm)  Weight  Av.9 kg (140 lb 15.2 " oz)  Min: 62.1 kg (136 lb 14.5 oz)  Max: 65.8 kg (145 lb)  I/O last 3 completed shifts:  In: 2292.6 [I.V.:1376.9; IV Piggyback:915.7]  Out: 5115 [Urine:5115]    Physical Examination:  Gen: intubated, sedated   HEENT: NC/AT, PERRLA, anicteric sclerae, TMs clear, nasal mucosa pink, OP clear   Neck: Supple, no LAD, no thyromegaly   CV: tachycardic, regular rhythm. pulses 2+ symmetric   Resp: CTA bilat, no wheezes/r/r   Abd: Soft, NT/ND, BS present, no HSM   MSK: FROM, no deformities, no tenderness   Neuro: A&Ox3, CN II-XII intact, 5/5 strength, reflexes 2+, sensation intact Skin: Warm, dry, no r/l        Laboratory:  Trended Lab Data:  Recent Labs     07/12/25  0254 07/12/25  0322 07/12/25  0730 07/12/25  0955 07/12/25  1409 07/12/25  1413 07/12/25  1445 07/12/25  1657 07/12/25  2015 07/12/25  2016 07/13/25  0008 07/13/25  0407   WBC 8.56   < > 14.97*  --   --  15.04*  --   --   --   --   --  10.66   HGB 12.6*   < > 13.3*  --   --  12.4*  --   --   --   --   --  12.1*   HCT 40.6   < > 44.3  --   --  40.0  --   --   --  42  --  37.1*   *   < > 131*  --   --  110*  --   --   --   --   --  114*   *  --   --   --  136  --  135*   < > 133*  --  133* 131*   K 3.8  --   --   --  4.4  --  4.2   < > 3.7  --  3.5 4.1   CL 99  --   --   --  100  --  100   < > 98  --  98 97   CO2 12*  --   --   --  13*  --  16*   < > 16*  --  17* 20*   BUN 14  --   --   --  21*  --  22*   < > 22*  --  23* 22*   CREATININE 1.2  --   --   --  1.3  --  1.3   < > 1.4  --  1.3 1.2   *  --   --   --  86  --  102   < > 217*  --  253* 230*   BILITOT 2.5*  --   --   --  3.6*  --  3.5*  --   --   --   --  3.0*   AST 44  --   --   --  131*  --  157*  --   --   --   --  249*   ALT 45*  --   --   --  87*  --  100*  --   --   --   --  174*   ALKPHOS 81  --   --   --  81  --  78  --   --   --   --  74   CALCIUM 8.1*  --   --   --  8.5*  --  8.4*   < > 8.5*  --  8.4* 8.3*   ALBUMIN 3.2*  --   --   --  3.1*  --  3.0*  --   --   --   --  2.9*    PROT 7.5  --   --   --  7.0  --  6.8  --   --   --   --  6.5   MG  --    < > 2.5  --  2.0  --  2.1   < > 2.0  --  1.9 1.8   PHOS  --    < > 6.1*  --  6.1*  --  5.9*   < > 5.2*  --  5.0* 4.4   INR 1.7*  --   --  2.3*  --   --   --   --   --   --   --  1.9*    < > = values in this interval not displayed.       Cardiac:   Recent Labs   Lab 07/12/25  0220 07/12/25  0730   TROPONINI 0.150*  --    BNP 2,461* 2,903*       Urinalysis:   Lab Results   Component Value Date    COLORU Yellow 07/12/2025    SPECGRAV 1.020 07/12/2025    NITRITE Negative 07/12/2025    KETONESU Negative 02/05/2025    UROBILINOGEN Negative 07/12/2025       Microbiology:  Microbiology Results (last 7 days)       Procedure Component Value Units Date/Time    Culture, Respiratory with Gram Stain [[id number]]     Order Status: Sent Specimen: Respiratory from Sputum     Blood culture [[id number]] Collected: 07/12/25 0954    Order Status: Sent Specimen: Blood from Peripheral, Forearm, Left Updated: 07/12/25 1007    Blood culture [[id number]] Collected: 07/12/25 0954    Order Status: Sent Specimen: Blood from Peripheral, Upper Arm, Right Updated: 07/12/25 1006    Blood culture [[id number]] Collected: 07/12/25 0954    Order Status: Sent Specimen: Blood from Peripheral, Lower Arm, Right             Radiology:  Reviewed CT images personally    I have personally reviewed the above labs and imaging.    Current Medications:     Infusions:   0.9% NaCl   Intravenous Continuous   Stopped at 07/13/25 0609    CISatracurium 200 mg in 0.9% NaCl 100 mL infusion  0-10 mcg/kg/min Intravenous Continuous 2 mL/hr at 07/13/25 0801 1 mcg/kg/min at 07/13/25 0801    DOBUTamine IV infusion (non-titrating)  5 mcg/kg/min Intravenous Continuous 4.9 mL/hr at 07/13/25 0801 5 mcg/kg/min at 07/13/25 0801    EPINEPHrine  0.04 mcg/kg/min Intravenous Continuous 7.9 mL/hr at 07/13/25 0801 0.04 mcg/kg/min at 07/13/25 0801    fentanyl  0-250 mcg/hr Intravenous Continuous 10.5 mL/hr at  07/13/25 0801 105 mcg/hr at 07/13/25 0801    heparin (porcine) in D5W  0-40 Units/kg/hr Intravenous Continuous 7.9 mL/hr at 07/13/25 0801 12 Units/kg/hr at 07/13/25 0801    midazolam  0-5 mg/hr Intravenous Continuous   Held at 07/12/25 1530    NORepinephrine bitartrate-D5W  0-3 mcg/kg/min Intravenous Continuous 11.1 mL/hr at 07/13/25 0801 0.045 mcg/kg/min at 07/13/25 0801    propofoL  0-50 mcg/kg/min Intravenous Continuous 19.7 mL/hr at 07/13/25 0801 50 mcg/kg/min at 07/13/25 0801    vasopressin  0.04 Units/min Intravenous Continuous 12 mL/hr at 07/13/25 0801 0.04 Units/min at 07/13/25 0801        Scheduled:   heparin (PORCINE)  80 Units/kg Intravenous Once    mupirocin   Nasal BID    piperacillin-tazobactam (Zosyn) IV (PEDS and ADULTS) (extended infusion is not appropriate)  4.5 g Intravenous Q8H    vancomycin (VANCOCIN) IV (PEDS and ADULTS)  1,000 mg Intravenous Q12H    white petrolatum-mineral oiL   Both Eyes Q8H        PRN:    Current Facility-Administered Medications:     heparin (PORCINE), 60 Units/kg, Intravenous, PRN    heparin (PORCINE), 30 Units/kg, Intravenous, PRN    sodium chloride 0.9%, 10 mL, Intravenous, PRN    Pharmacy to dose Vancomycin consult, , , Once **AND** vancomycin - pharmacy to dose, , Intravenous, pharmacy to manage frequency     Assessment:     Juani Reilly Jr. is a 30 y.o. male with:  Patient Active Problem List    Diagnosis Date Noted    Pulmonary thromboembolism 07/12/2025    Acute hypoxemic respiratory failure 07/12/2025    Cardiac arrest 07/12/2025    Sepsis 07/12/2025    Epigastric pain 06/10/2025    Atrial flutter 02/08/2025    Hypoxia 02/07/2025    Elevated bilirubin 02/07/2025    Respiratory alkalosis 02/07/2025    Acute decompensated heart failure 02/06/2025    SOB (shortness of breath) 02/06/2025    Hypokalemia     Tachycardia 01/09/2021    Other cirrhosis of liver 11/12/2020    Essential hypertension 01/04/2020    S/P Fontan procedure 10/03/2019    Adult  congenital heart disease 10/03/2019    Single ventricle with tricuspid atresia 10/03/2019    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 Patient's complicated heart anatomy, shunting is likely factoring into hypoxemia. Per family, patient usually satting at low 80s at home. This is worsened in the setting of cardiac arrest and PE.     Patient has a complex cardiopulmonary physiology and several contributors to the acute decompensation. As it pertains to the refractory hypoxemia, pulmonology recommends:   - deeper sedation with RASS goal -4 to -5 and vent synchrony using a fentanyl gtt with propofol.   - After trial of push dose of rocuronium in the ED, patient's saturations immediately went from low 70s to ~88-90%, indicating that a significant portion of the hypoxemia was the vent dysynchrony. Patient was outstripping the vent likely in the setting of lactic acidosis.  - Continue Nimbex drip for now.   - Recommend LPV with low tidal volumes and Low PEEP strategy to avoid compromising venous return.  - Driving Pressure is acceptable today at 13 so lung compliance does not appear to be a concern currently and no need for proning.  - Continue to wean FiO2  - Neuroprognostication per primary team.  - trend ABGs.      Thank you for allowing us to participate in the care of this patient. Please contact me if you have any questions regarding this consult.    Amanda Watson MD  Pulmonary & Critical Care Medicine Fellow         [1]   Social History  Tobacco Use    Smoking status: Former     Current packs/day: 0.00     Types: Cigarettes     Quit date:      Years since quittin.5     Passive exposure: Past    Smokeless tobacco: Never    Tobacco comments:     3-4 months    Substance Use  Topics    Alcohol use: No    Drug use: Yes     Types: Marijuana     Comment: Odalys today- pt reports a while back

## 2025-07-13 NOTE — ASSESSMENT & PLAN NOTE
1. 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 Severely decreased left ventricular function  3. History of Nknni-Qgbjesrbc-Qxgex, SVT  - status post successful ablation of a left lateral accessory pathway November 2022 by Dr. Ferguson  4. Cirrhosis  - last seen by hepatology May 2023 (normal AFP 6/11)  5. Arrested at home late night July 11, 2025  - unclear etiology, but CT scan suggestive of pulmonary emboli which may be chronic or acute  6. Long history of noncompliance with medication  7. Worsening hypoxemia over the past year of unclear etiology    Discussion:   He is critically ill with a very bad combination of single ventricle Fontan physiology and poor heart function.    His central venous pressure is 12 today, which is basically his baseline and very good for a Fontan.  This would suggest that he currently is not fluid overloaded.  He is 2 L negative.  I completely agree with backing off on diuretics.  Positive pressure ventilation is bad for Fontan physiology.  It decreases venous return through the lungs and hence decreases preload to the systemic ventricle.  Some progress has been made weaning his PEEP from 8-6.  I would recommend weaning further to 5.  He has developed worsening saturations over the past year or so.  We would plan to do a cardiac catheterization to investigate this further.  He does have a small atrial baffle leak which allows right-to-left shunting, but this is not new and his saturations at baseline were in the low 90s a few years ago.  It is possible that leak has gotten larger although there was nothing obvious on the CT scan.  Venovenous collaterals can definitely  develop in these patients.  Pulmonary AV fistula related to liver disease can also develop.  I also wonder if he has had some chronic thromboembolic phenomenon to his lungs which could contribute to hypoxemia given the recent CT findings and his noncompliance with his Xarelto and history of hemoptysis.  Either way, I would accept saturations 85% or greater when trying to wean his ventilation and decreases positive pressure.  CT scan suggests bilateral pulmonary emboli.  I was initially skeptical of the reading because CT scan is notorious for false-positive readings of pulmonary emboli in Fontan patients due to preferential streaming of blood to either the right or left lung dependent on whether diet as injected in an upper extremity or lower extremity.  However, the proximal branch pulmonary arteries both light up very well on the CT scan, and the thrombus is more distal.  I do think there are true pulmonary emboli which could contribute to his current presentation or could be more chronic.  He is at increased risk for pulmonary emboli given the hypercoagulable state associated with Fontan physiology.  I am unclear as to the etiology of his current presentation.  I do not think this is purely worsening heart failure given a very reassuring looking chest x-ray.  Under arrhythmias certainly possible.  Collapse from pulmonary emboli with poor cardiac output also possible.  What is abundantly clear is that he is not a heart transplant candidate.  His compliance is extremely poor with medications, and we have struggled quite a bit to get him to agree to take any medications.  He is not a mechanical circulatory support candidate in my viewpoint, and I would not consider him a candidate for ECMO.    Recommendations:   Agree with therapeutic heparin for pulmonary emboli  Back off on PEEP to 5  Would accept saturations 85% or greater  Agree with holding diuretics.  Would you for a CVP in the 10-15 range.  His prognosis is  extremely poor.  By report, he had no gag reflex before paralytics were started.  I would recommend backing off on sedation and paralytics to allow better neurologic exam.  I would support withdrawal of care if the neurologic exam suggested significant injury.  I feel very strongly that he is not a candidate for ECMO, other mechanical circulatory support, or heart transplant.    I greatly appreciate the care from the heart failure team.

## 2025-07-13 NOTE — HPI
Patient is well known to me.  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  He has had several recent admissions and has been followed closely in clinic.  However, he has been quite noncompliant with medications recently and over the years.  Patient was admitted to the ICU yesterday.  Family reported that pt was in the bedroom with his grandmother when he became unresponsive. Sister rushed at bedside and CPR was started. Reported that she did CPR for 5 mins until EMS arrived. PEA was noted and 3 rounds of epi were administered with ROSC.     Yesterday patient diuresed which actually seemed to worsen SVO2.  Initially was on PEEP 8 and 100% due to hypoxia.  CVP 12 this morning.

## 2025-07-13 NOTE — PLAN OF CARE
Pt transferred from ED to CICU. Patient sedated and paralyzed in ED - baseline TOF not obtained. TTM initiated per orders at 1530. Bilateral PT pulses unable to be found. MD Leary notified. Bilateral Leg US ordered. SV02 60. Lactic 9.2. Art line placed.

## 2025-07-13 NOTE — ASSESSMENT & PLAN NOTE
Pt transferred for [facility] for advance options and adult congenital evaluation. Pt with systemic  - Pulmonary shunt. Presented with refractory hypoxemia while intubated.    - Pulmonology following, appreciate recs  - Low PEEP strategy  - Goal SPO2 > 85%  - Deep sedation with RASS goal -4 to -5 and vent synchrony.   - Continue with fentanyl and propofol   - Continue Nimbex

## 2025-07-14 PROBLEM — Z51.5 PALLIATIVE CARE ENCOUNTER: Status: ACTIVE | Noted: 2025-01-01

## 2025-07-14 NOTE — HPI
29 yo male with complex congenital cardiac disease including TV/pulmonary atresia s/p Fontan procedure, WPW s/p ablation 2022, and cirrhosis admitted for PEA cardiac arrest in setting of acute PE. ID consulted for abx assistance. Current admission notable for blood cx that is so far no growth to date. He remains critically ill, intubated and on pressors in cardiogenic shock. He is on vancomycin and zosyn. CTA with bilateral acute PE. TTE pending.

## 2025-07-14 NOTE — SUBJECTIVE & OBJECTIVE
Interval History: Chart reviewed including 24h medication use. Patient remains critically ill, sedated, paralyzed, intubated on vasopressors. Now normothermic. Unable to assess cognition due to sedation/paralysis, ongoing concerns for vent dyssynchrony. Mother, grandmother, and other family at bedside during visit. Supportive discussion below.      Past Medical History:   Diagnosis Date    Acute decompensated heart failure 02/06/2025    History of heart surgery     Other cirrhosis of liver 11/12/2020    Palliative care encounter 7/14/2025    SVT (supraventricular tachycardia)     WPW syndrome        Past Surgical History:   Procedure Laterality Date    ANGIOGRAPHY OF AORTIC ARCH N/A 09/03/2020    Procedure: AORTOGRAM, AORTIC ARCH;  Surgeon: Stephania Crump MD;  Location: [facility] CATH LAB;  Service: Cardiology;  Laterality: N/A;    FONTAN PROCEDURE, EXTRACARDIAC      LEFT HEART CATHETERIZATION Left 09/03/2020    Procedure: Left heart cath;  Surgeon: Stephania Crump MD;  Location: [facility] CATH LAB;  Service: Cardiology;  Laterality: Left;    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 Extenuating circumstances    TRANSESOPHAGEAL ECHOCARDIOGRAPHY N/A 11/10/2022    Procedure: ECHOCARDIOGRAM, TRANSESOPHAGEAL;  Surgeon: Emeterio Hall MD;  Location: [facility] EP LAB;  Service: Cardiology;  Laterality: N/A;    TREATMENT OF CARDIAC ARRHYTHMIA N/A 01/09/2021    Procedure: CARDIOVERSION;  Surgeon: Jim Mcginnis MD;  Location: [facility] CATH LAB;  Service: Cardiology;  Laterality: N/A;       Review of patient's allergies indicates:  No Known Allergies    Medications:  Continuous Infusions:   0.9% NaCl   Intravenous Continuous   Stopped at 07/14/25 1603    CISatracurium 200 mg in  0.9% NaCl 100 mL infusion  0-10 mcg/kg/min Intravenous Continuous 3.9 mL/hr at 07/14/25 1801 2 mcg/kg/min at 07/14/25 1801    DOBUTamine IV infusion (non-titrating)  5 mcg/kg/min Intravenous Continuous 4.9 mL/hr at 07/14/25 1801 5 mcg/kg/min at 07/14/25 1801    EPINEPHrine  0.05 mcg/kg/min Intravenous Continuous 9.9 mL/hr at 07/14/25 1801 0.05 mcg/kg/min at 07/14/25 1801    fentanyl  0-250 mcg/hr Intravenous Continuous 17.5 mL/hr at 07/14/25 1801 175 mcg/hr at 07/14/25 1801    heparin (porcine) in D5W  0-40 Units/kg/hr Intravenous Continuous 5.9 mL/hr at 07/14/25 1801 9 Units/kg/hr at 07/14/25 1801    nitric oxide gas  10 ppm Inhalation Continuous   10 ppm at 07/14/25 1421    NORepinephrine bitartrate-D5W  0-3 mcg/kg/min Intravenous Continuous 8.6 mL/hr at 07/14/25 1801 0.28 mcg/kg/min at 07/14/25 1801    propofoL  0-50 mcg/kg/min Intravenous Continuous 19.7 mL/hr at 07/14/25 1801 50 mcg/kg/min at 07/14/25 1801    vasopressin  0.04 Units/min Intravenous Continuous 12 mL/hr at 07/14/25 1801 0.04 Units/min at 07/14/25 1801     Scheduled Meds:   heparin (PORCINE)  80 Units/kg Intravenous Once    mupirocin   Nasal BID    pantoprazole  40 mg Intravenous Daily    piperacillin-tazobactam (Zosyn) IV (PEDS and ADULTS) (extended infusion is not appropriate)  4.5 g Intravenous Q8H    potassium chloride in water  40 mEq Intravenous Once    white petrolatum-mineral oiL   Both Eyes Q8H     PRN Meds:  Current Facility-Administered Medications:     heparin (PORCINE), 60 Units/kg, Intravenous, PRN    heparin (PORCINE), 30 Units/kg, Intravenous, PRN    sodium chloride 0.9%, 10 mL, Intravenous, PRN    Pharmacy to dose Vancomycin consult, , , Once **AND** vancomycin - pharmacy to dose, , Intravenous, pharmacy to manage frequency    Family History       Problem Relation (Age of Onset)    Heart disease Maternal Grandfather    No Known Problems Mother, Father, Sister, Brother, Maternal Aunt, Maternal Uncle, Paternal Aunt, Paternal Uncle,  "Maternal Grandmother, Paternal Grandmother, Paternal Grandfather          Tobacco Use    Smoking status: Former     Current packs/day: 0.00     Types: Cigarettes     Quit date:      Years since quittin.5     Passive exposure: Past    Smokeless tobacco: Never    Tobacco comments:     3-4 months    Substance and Sexual Activity    Alcohol use: No    Drug use: Yes     Types: Marijuana     Comment: Momicky today- pt reports a while back    Sexual activity: Yes       Review of Systems   Unable to perform ROS: Intubated     Objective:     Vital Signs (Most Recent):  Temp: 98.6 °F (37 °C) (25)  Pulse: (!) 119 (25)  Resp: 20 (25)  BP: 105/70 (25)  SpO2: (!) 86 % (25) Vital Signs (24h Range):  Temp:  [91.6 °F (33.1 °C)-99.1 °F (37.3 °C)] 98.6 °F (37 °C)  Pulse:  [] 119  Resp:  [20-24] 20  SpO2:  [73 %-95 %] 86 %  BP: ()/(50-72) 105/70  Arterial Line BP: (82-99)/(46-63) 98/61     Weight: 62.3 kg (137 lb 5.6 oz)  Body mass index is 22.17 kg/m².       Physical Exam  Vitals and nursing note reviewed.   Constitutional:       Appearance: He is ill-appearing.      Comments: Younger, critically ill-appearing male lying in bed, intubated, sedated and paralyzed   Neurological:      Comments: Unable to assess reflexes/cognition due to sedation and paralysis                Advance Care Planning   Advance Directives:   Goals of Care: I engaged the patient's mother and other family members at that side in a voluntary discussion regarding substituted values for patient in the setting of severe critical illness and cardiac arrest. Family with very limited insight and prognostic awareness regarding the patient's current condition and overall outcome. I emphasized that "Dwight" is extremely sick right now on multiple forms of life support and remains clinically unstable, despite everything that is being done to attempt to help him through this current illness. Family is " very clear about their values, sharing that they would like everything to be done and our prayerfully hoping that Dwight makes a meaningful recovery from this devastating illness. I expressed that I shared the same hope and that there are many things that we will consider over the coming days including diagnostic tools and potential treatment interventions that might help Dwight survive. I also shared that there are some things that we probably shouldn't do and likely won't ultimately help Dwight have a meaningful outcome, specifically CPR in the event of another cardiac arrest. I shared that, if Dwight suffers another arrest, his likelihood of having any kind of meaningful outcome becomes profoundly unlikely, and I would worry that putting him through that again would only add suffering to the very end of his life. His mother and family understand why I make this recommendation, but they clearly state that they are  unable to make any kind of decisions about limits on his care at this time. I strongly encouraged them to discuss what Dwight's wishes would be if he were able to speak with us now. They are open to continued support from my team, as we gather more prognostic information. All other questions and concerns answered up this time.            CBC:   Recent Labs   Lab 07/14/25  0415   WBC 8.62   HGB 12.1*   HCT 35.1*   MCV 82   *     BMP:  Recent Labs   Lab 07/14/25  1306 07/14/25  1559 07/14/25  1756   GLU  --    < > 124*   NA  --    < > 131*   K  --    < > 4.7   CL  --    < > 95   CO2  --    < > 26   BUN  --    < > 22*   CREATININE  --    < > 1.3   CALCIUM  --    < > 8.1*   MG 1.9  --   --     < > = values in this interval not displayed.     LFT:  Lab Results   Component Value Date     (H) 07/14/2025    ALKPHOS 69 07/14/2025    BILITOT 2.5 (H) 07/14/2025     Albumin:   Albumin   Date Value Ref Range Status   07/14/2025 2.5 (L) 3.5 - 5.2 g/dL Final   02/09/2025 3.4 (L) 3.5 - 5.2 g/dL Final     Protein:    Protein Total   Date Value Ref Range Status   07/14/2025 5.9 (L) 6.0 - 8.4 gm/dL Final     Total Protein   Date Value Ref Range Status   02/09/2025 6.4 6.0 - 8.4 g/dL Final     Lactic acid:   Lab Results   Component Value Date    LACTATE 1.8 07/14/2025    LACTATE 1.7 07/13/2025       Reviewed CBC with anemia and thrombocytopenia, CMP with stable renal function. ABG with PaO2 44 concerning for hypoxemia of mixed clinical etiology

## 2025-07-14 NOTE — ASSESSMENT & PLAN NOTE
See ACP 7/14.     Insight/goals of care- limited insight and prognostic awareness. Some substituted values of functional independence. Clear values from family of accepting any and all potentially beneficial interventions. Low willingness to engage regarding about limits on care or avoiding non-helpful interventions (CPR)    Social support- supportive mother, Amita, and grandmother, Elaina. Reportedly, patient raised mostly by grandmother    Psychological- limited coping, appropriate grief    Spiritual- Confucianism, emphasized importance of prayer    Symptom management- no active needs    Recommendations  -recommended DNR, family unable to accept this recommendation at this time but open to continued discussions  -continue current level of care  -will continue to assist aligning values with care recommendations as course unfolds, negrito pending neuroprognostication

## 2025-07-14 NOTE — PLAN OF CARE
CICU DAILY GOALS       A: Awake    RASS: Goal -    Actual - RASS (Alvarez Agitation-Sedation Scale): unarousable   Restraint necessity:    B: Breath   SBT: Not Attempted  C: Coordinate A & B, analgesics/sedatives   Pain: managed    SAT: Not Attempted  D: Delirium   CAM-ICU:    E: Early(intubated/ Progressive (non-intubated) Mobility   MOVE Screen: Fail   Activity: Activity Management: Patient unable to perform activities  FAS: Feeding/Nutrition   Diet order: Diet/Nutrition Received: NPO,   Fluid restriction:     Nutritional Supplement Intake: Quantity NA, Type: NA  T: Thrombus   DVT prophylaxis: VTE Core Measure: Pharmacological prophylaxis initiated/maintained  H: HOB Elevation   Head of Bed (HOB) Positioning: HOB at 15 degrees  U: Ulcer Prophylaxis   GI: yes  G: Glucose control   managed Glycemic Management: blood glucose monitored  S: Skin   Bathing/Skin Care: bath, complete;linen changed;back care;incontinence care (07/14/25 1701)  Wounds: No  Wound care consulted: No  B: Bowel Function   constipation   I: Indwelling Catheters   Parry necessity:      Urethral Catheter 07/12/25 0706 Temperature probe 14 Fr.-Reason for Continuing Urinary Catheterization: Required immobilization, Critically ill in ICU and requiring hourly monitoring of intake/output   CVC necessity: Yes  D: De-escalation Antibx   Yes  Plan for the day   Titrate pressers, monitor oxygen saturation, goals of care discussion with family  Family/Goals of care/Code Status   Code Status: DNR     VS and assessment per flow sheet, plan of care reviewed with Juani Reilly Jr. and family, all concerns addressed, will continue to monitor.

## 2025-07-14 NOTE — HPI
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 Intubated, sedated, and paralyzed (for vent dyssynchrony) in the ICU, now s/p TTM. Palliative medicine consulted to assist with goals of care.   "

## 2025-07-14 NOTE — SUBJECTIVE & OBJECTIVE
Past Medical History:   Diagnosis Date    Acute decompensated heart failure 02/06/2025    History of heart surgery     Other cirrhosis of liver 11/12/2020    SVT (supraventricular tachycardia)     WPW syndrome        Past Surgical History:   Procedure Laterality Date    ANGIOGRAPHY OF AORTIC ARCH N/A 09/03/2020    Procedure: AORTOGRAM, AORTIC ARCH;  Surgeon: Stephania Crump MD;  Location: [facility] CATH LAB;  Service: Cardiology;  Laterality: N/A;    FONTAN PROCEDURE, EXTRACARDIAC      LEFT HEART CATHETERIZATION Left 09/03/2020    Procedure: Left heart cath;  Surgeon: Stephania Crump MD;  Location: [facility] CATH LAB;  Service: Cardiology;  Laterality: Left;    RIGHT HEART CATHETERIZATION FOR CONGENITAL HEART DEFECT N/A 09/03/2020    Procedure: CATHETERIZATION, HEART, RIGHT, FOR CONGENITAL HEART DEFECT;  Surgeon: Stephania Crump MD;  Location: [facility] CATH LAB;  Service: Cardiology;  Laterality: N/A;  [facility] - needs covid test morning of. Extenuating circumstances    TRANSESOPHAGEAL ECHOCARDIOGRAPHY N/A 11/10/2022    Procedure: ECHOCARDIOGRAM, TRANSESOPHAGEAL;  Surgeon: Emeterio Hall MD;  Location: [facility] EP LAB;  Service: Cardiology;  Laterality: N/A;    TREATMENT OF CARDIAC ARRHYTHMIA N/A 01/09/2021    Procedure: CARDIOVERSION;  Surgeon: Jim Mcginnis MD;  Location: [facility] CATH LAB;  Service: Cardiology;  Laterality: N/A;       Review of patient's allergies indicates:  No Known Allergies    Medications:  Medications Prior to Admission   Medication Sig    benzonatate (TESSALON) 100 MG capsule Take 1 capsule (100 mg total) by mouth 3 (three) times daily as needed for Cough.    bumetanide (BUMEX) 2 MG tablet Take 1 tablet (2 mg total) by mouth 2 (two) times a day.    cetirizine (ZYRTEC) 10 MG tablet Take 1 tablet (10 mg total) by mouth once daily.    dapagliflozin propanediol (FARXIGA) 10 mg tablet Take 1 tablet (10 mg total) by mouth once daily.    losartan (COZAAR) 100 MG tablet Take 1 tablet (100 mg  "total) by mouth once daily.    metoprolol succinate (TOPROL-XL) 50 MG 24 hr tablet Take 1 tablet (50 mg total) by mouth once daily.    rivaroxaban (XARELTO) 20 mg Tab Take 1 tablet (20 mg total) by mouth daily with dinner or evening meal. (Patient not taking: Reported on 2025)    spironolactone (ALDACTONE) 25 MG tablet Take 1 tablet (25 mg total) by mouth once daily.     Antibiotics (From admission, onward)      Start     Stop Route Frequency Ordered    25 2100  mupirocin 2 % ointment         25 Nasl 2 times daily 25 1324    25 0913  vancomycin - pharmacy to dose  (vancomycin IVPB (PEDS and ADULTS))        Placed in "And" Linked Group    -- IV pharmacy to manage frequency 25 0813    25 0900  piperacillin-tazobactam (ZOSYN) 4.5 g in D5W 100 mL IVPB (MB+)         -- IV Every 8 hours (non-standard times) 25 0813          Antifungals (From admission, onward)      None          Antivirals (From admission, onward)      None             There is no immunization history for the selected administration types on file for this patient.    Family History       Problem Relation (Age of Onset)    Heart disease Maternal Grandfather    No Known Problems Mother, Father, Sister, Brother, Maternal Aunt, Maternal Uncle, Paternal Aunt, Paternal Uncle, Maternal Grandmother, Paternal Grandmother, Paternal Grandfather          Social History     Socioeconomic History    Marital status: Single   Tobacco Use    Smoking status: Former     Current packs/day: 0.00     Types: Cigarettes     Quit date:      Years since quittin.5     Passive exposure: Past    Smokeless tobacco: Never    Tobacco comments:     3-4 months    Substance and Sexual Activity    Alcohol use: No    Drug use: Yes     Types: Marijuana     Comment: Mojo today- pt reports a while back    Sexual activity: Yes     Social Drivers of Health     Financial Resource Strain: Low Risk  (6/10/2025)    Overall Financial Resource " Strain (CARDIA)     Difficulty of Paying Living Expenses: Not hard at all   Food Insecurity: No Food Insecurity (6/10/2025)    Hunger Vital Sign     Worried About Running Out of Food in the Last Year: Never true     Ran Out of Food in the Last Year: Never true   Transportation Needs: No Transportation Needs (6/10/2025)    PRAPARE - Transportation     Lack of Transportation (Medical): No     Lack of Transportation (Non-Medical): No   Physical Activity: Inactive (2/9/2025)    Exercise Vital Sign     Days of Exercise per Week: 0 days     Minutes of Exercise per Session: 0 min   Stress: No Stress Concern Present (6/10/2025)    [employer] - Occupational Stress Questionnaire     Feeling of Stress : Not at all   Housing Stability: Low Risk  (6/10/2025)    Housing Stability Vital Sign     Unable to Pay for Housing in the Last Year: No     Homeless in the Last Year: No     Review of Systems   Unable to perform ROS: Intubated     Objective:     Vital Signs (Most Recent):  Temp: 98.6 °F (37 °C) (07/14/25 1330)  Pulse: (!) 118 (07/14/25 1330)  Resp: 20 (07/14/25 1330)  BP: (!) 97/58 (07/14/25 1301)  SpO2: (!) 73 % (07/14/25 1330) Vital Signs (24h Range):  Temp:  [91.4 °F (33 °C)-98.6 °F (37 °C)] 98.6 °F (37 °C)  Pulse:  [] 118  Resp:  [20-24] 20  SpO2:  [73 %-95 %] 73 %  BP: ()/(50-77) 97/58  Arterial Line BP: ()/(46-66) 83/51     Weight: 62.3 kg (137 lb 5.6 oz)  Body mass index is 22.17 kg/m².    Estimated Creatinine Clearance: 78.6 mL/min (based on SCr of 1.2 mg/dL).     Physical Exam  Constitutional:       General: He is not in acute distress.     Appearance: He is ill-appearing.   HENT:      Mouth/Throat:      Comments: ETT  Cardiovascular:      Rate and Rhythm: Regular rhythm. Tachycardia present.   Pulmonary:      Effort: Pulmonary effort is normal. No respiratory distress.   Genitourinary:     Comments: tyrese  Musculoskeletal:      Right lower leg: No edema.      Left lower  leg: No edema.   Skin:     Comments: RIJOSE   Neurological:      Comments: Sedated/paralyzed          Significant Labs:   Microbiology Results (last 7 days)       Procedure Component Value Units Date/Time    Blood culture [[id number]]  (Normal) Collected: 07/12/25 0954    Order Status: Completed Specimen: Blood from Peripheral, Forearm, Left Updated: 07/14/25 1401     Blood Culture No Growth After 24 Hours    Blood culture [[id number]]  (Normal) Collected: 07/12/25 0954    Order Status: Completed Specimen: Blood from Peripheral, Upper Arm, Right Updated: 07/14/25 1401     Blood Culture No Growth After 24 Hours    Culture, Respiratory with Gram Stain [[id number]]     Order Status: Canceled Specimen: Respiratory from Sputum     Blood culture [[id number]] Collected: 07/12/25 0954    Order Status: Sent Specimen: Blood from Peripheral, Lower Arm, Right             Significant Imaging: I have reviewed all pertinent imaging results/findings within the past 24 hours.

## 2025-07-14 NOTE — PROGRESS NOTES
[facility] - Cardiac Intensive Care  Pulmonology  Progress Note    Patient Name: Juani Reilly Jr.  MRN: [id number]  Admission Date: 7/12/2025  Hospital Length of Stay: 2 days  Code Status: Full Code  Attending Provider: Solitario Reis MD  Primary Care Provider: Jaqueline Pardo MD   Principal Problem: Cardiac arrest    Subjective:     Interval History:   - patient remained on neuromuscular blockade this morning  - net negative 1L in the last 24 hours  - oxygen saturations decreased during the morning requiring FiO2 and initiation of nitric oxide  - increasing vasopressor requirements       Objective:     Vital Signs (Most Recent):  Temp: 99.1 °F (37.3 °C) (07/14/25 1601)  Pulse: (!) 120 (07/14/25 1601)  Resp: 20 (07/14/25 1601)  BP: (!) 97/58 (07/14/25 1301)  SpO2: (!) 83 % (07/14/25 1421) Vital Signs (24h Range):  Temp:  [91.6 °F (33.1 °C)-99.1 °F (37.3 °C)] 99.1 °F (37.3 °C)  Pulse:  [] 120  Resp:  [20-24] 20  SpO2:  [73 %-95 %] 83 %  BP: ()/(50-77) 97/58  Arterial Line BP: ()/(46-66) 83/51     Weight: 62.3 kg (137 lb 5.6 oz)  Body mass index is 22.17 kg/m².      Intake/Output Summary (Last 24 hours) at 7/14/2025 1649  Last data filed at 7/14/2025 1601  Gross per 24 hour   Intake 2509.41 ml   Output 1150 ml   Net 1359.41 ml        Physical Exam   GEN: young man, resting in bed, intubated, sedated, on mechanical ventilation   HEENT: ETT in place,   CV: regular rhythm, tachycardic   PULM: CTAB, no wheezing, no crackles  Abd: non-distended  Ext: no LE edema bilaterally  MSK: no spontaneous movement of extremities  Skin: no visible rashes or skin lesions  Neuro: sedated and on neuromuscular blockade     Vents:  Vent Mode: A/C (07/14/25 1421)  Ventilator Initiated: Yes (07/12/25 0701)  Set Rate: 18 BPM (07/14/25 1421)  Vt Set: 450 mL (07/14/25 1421)  PEEP/CPAP: 5 cmH20 (07/14/25 1421)  Oxygen Concentration (%): 100 (07/14/25 1421)  Peak Airway Pressure: 0 cmH20 (07/14/25 1421)  Plateau  Pressure: 0 cmH20 (07/14/25 1421)  Total Ve: 0 L/m (07/14/25 1421)  Negative Inspiratory Force (cm H2O): 0 (07/14/25 1421)  F/VT Ratio<105 (RSBI): (!) 45.98 (07/14/25 1052)    Significant Labs:    CBC/Anemia Profile:  Lab Results   Component Value Date    WBC 8.62 07/14/2025    HGB 12.1 (L) 07/14/2025    HCT 35.1 (L) 07/14/2025    MCV 82 07/14/2025     (L) 07/14/2025        Chemistries:  Recent Labs   Lab 07/14/25  0415 07/14/25  0805 07/14/25  1559   * 133* 129*   K 3.3* 3.5 3.9   CL 95 95 94*   CO2 25 27 25   BUN 21* 20 24*   CREATININE 1.1 1.1 1.4   CALCIUM 7.8* 7.8* 7.9*   ALBUMIN 2.5*  --   --    PROT 5.9*  --   --    BILITOT 2.5*  --   --    ALKPHOS 69  --   --    *  --   --    *  --   --    MG 1.9 2.0  --    PHOS 3.4 3.3 4.4    < > = values in this interval not displayed.     ABG  Recent Labs   Lab 07/14/25  1300   PH 7.453*   PO2 44*   PCO2 39.6   HCO3 27.7   BE 4*       Significant Imaging:  I have reviewed all pertinent imaging results/findings within the past 24 hours.  Assessment/Plan:     Pulmonary  Acute hypoxemic respiratory failure  # PEA Cardiac Arrest  # Bilateral PE  # History of Fontan Procedure    CTA chest showed concerns for bilateral PE and some dense changes in the left lung base. He has acute on chronic hypoxemic respiratory failure, initially improved on 7/13 with deep sedation and neuromuscular blockade to improve vent synchrony. However, on 7/14/25 had refractory hypoxemia. He has  complicated heart anatomy with history of Fontan procedure and have been minimizing PEEP to allow adequate venous return with assistance from congenital heart team. Suspect some shunting physiology given his refractory hypoxemia despite 100% FiO2. He oxygenation did improve with initiation of inhaled nitric oxide.     Patient has complex cardiopulmonary physiology and several potential contributors to the acute decompensation. He has good respiratory mechanics on the ventilator with  a driving pressure of 14 and plateau pressure ~20. He does not have evidence of ARDS that would explain his hypoxemia.     - maintain deep sedation with RASS goal -4 to -5  - continue Nimbex drip  - continue lung protective ventilation with low tidal volume and PEEP of 5, of note during episode of worsening hypoxemia his oxygenation did not improve with trial of higher PEEP and given his cardiac anatomy will aim to maintain a PEEP of 5  - continue nitric oxide at 10ppm  - eventually will need to discontinue neuromuscular blockade and wean sedation to help with neuroprognostication, will re-evaluate safety of holding Nimebx gtt on 7/15  - recommend TTE with bubble study to evaluate for shunt         Pulmonary consults will continue to follow. Please reach out with any additional questions.        Yanelis Valladares MD  Pulmonology  [facility] - Cardiac Intensive Care

## 2025-07-14 NOTE — CARE UPDATE
There was a family discussion this afternoon with Mr. Reilly's mother, grandparents, siblings, and myself. We discussed the severity of his illness and overall prognosis. His mother made a joint decision with the family that it would be best fitting with his goals and desires to continue all life-supportive care at the moment but change code status to DNR/DNI. They would like to continue processing and discussing his situation prior deciding whether to pursue comfort level care.     Gustavo Matias MD  Cardiovascular Disease, PGY4

## 2025-07-14 NOTE — EICU
Intervention Initiated From:  [facility] / EICU    Alden intervened regarding:  Rounding (Video assessment)    VICU Night Rounds Checklist  24H Vital Sign Range:  Temp:  [90.9 °F (32.7 °C)-93.2 °F (34 °C)]   Pulse:  []   Resp:  [24]   BP: ()/(56-77)   SpO2:  [86 %-95 %]   Arterial Line BP: ()/(2-66)     Video rounds and LDA reconciliation        Nursing orders placed : IP NADIA Central Line Care and IP NADIA Peripheral IV Access

## 2025-07-14 NOTE — PLAN OF CARE
Cardiac ICU Care Plan    POC reviewed with Juani Reilly Jr. and family. Questions and concerns addressed. No acute events today. Pt progressing toward goals. Will continue to monitor. See below and flowsheets for full assessment and VS info. Norepinephrine restarted to keep map greater than 60. CVP 12/13/14. ScvO2 69%      Neuro:  [place] Coma Scale  Best Eye Response: 1-->(E1) none  Best Motor Response: 1-->(M1) none  Best Verbal Response: 1-->(V1) none  D[place]to Coma Scale Score: 3  Assessment Qualifiers: Patient chemically sedated or paralyzed, Patient intubated, No eye obstruction present  Pupil PERRLA: no    24 hr Temp:  [91.2 °F (32.9 °C)-96.3 °F (35.7 °C)]      CV:  Rhythm: sinus tachycardia   DVT prophylaxis: VTE Core Measure: Pharmacological prophylaxis initiated/maintained    CVP (mean): 14 mmHg (07/14/25 0400)       SVO2 (%): 69 % (07/13/25 2017)               Pulses  Right Radial Pulse: 1+ (weak)  Left Radial Pulse: 1+ (weak)  Right Dorsalis Pedis Pulse: Doppler  Left Dorsalis Pedis Pulse: Doppler  Right Posterior Tibial Pulse: 0 (absent)  Left Posterior Tibial Pulse: 0 (absent)    Resp:     Vent Mode: A/C  Set Rate: 24 BPM  Oxygen Concentration (%): 60  Vt Set: 400 mL  PEEP/CPAP: 5 cmH20    GI/:  GI prophylaxis: yes  Diet/Nutrition Received: NPO  Last Bowel Movement:  (unknown)  Voiding Characteristics: urethral catheter (bladder)       Urethral Catheter 07/12/25 0706 Temperature probe 14 Fr.-Reason for Continuing Urinary Catheterization: Required immobilization, Critically ill in ICU and requiring hourly monitoring of intake/output   Intake/Output Summary (Last 24 hours) at 7/14/2025 0644  Last data filed at 7/14/2025 0600  Gross per 24 hour   Intake 2607.57 ml   Output 3680 ml   Net -1072.43 ml              Labs/Accuchecks:  Recent Labs   Lab 07/12/25  1413 07/12/25 2016 07/13/25  0407 07/14/25  0415   WBC 15.04*  --  10.66 8.62   RBC 4.37*  --  4.33* 4.26*   HGB 12.4*  --  12.1* 12.1*    HCT 40.0 42 37.1* 35.1*   *  --  114* 100*      Recent Labs   Lab 07/12/25  0955 07/12/25  1409 07/13/25  0008 07/13/25  0407 07/14/25  0415   INR 2.3*  --   --  1.9* 1.6*   APTT >150.0*   < > 55.9* 55.2* 79.7*    < > = values in this interval not displayed.      Recent Labs     07/14/25  0415   *   K 3.3*   CO2 25   CL 95   BUN 21*   CREATININE 1.1   ALKPHOS 69   *   *   BILITOT 2.5*       Recent Labs   Lab 07/12/25  0220 07/12/25  0322 07/12/25  1445   CPK  --  299* 405*   TROPONINI 0.150*  --   --       Recent Labs     07/13/25  0842 07/13/25  0846 07/13/25 2016   PH 7.529* 7.524* 7.546*   PCO2 31.4* 28.0* 33.6*   PO2 27* 57* 31*   HCO3 26.2 23.1* 29.1*   POCSATURATED 60 93 69   BE 3* 0 7*       Electrolytes: Contraindicated  Accuchecks: Q4H/ q 2H    Gtts/LDAs:   0.9% NaCl   Intravenous Continuous 5 mL/hr at 07/14/25 0600 Rate Verify at 07/14/25 0600    CISatracurium 200 mg in 0.9% NaCl 100 mL infusion  0-10 mcg/kg/min Intravenous Continuous 2 mL/hr at 07/14/25 0600 1 mcg/kg/min at 07/14/25 0600    DOBUTamine IV infusion (non-titrating)  5 mcg/kg/min Intravenous Continuous 4.9 mL/hr at 07/14/25 0600 5 mcg/kg/min at 07/14/25 0600    EPINEPHrine  0.02 mcg/kg/min Intravenous Continuous 3.9 mL/hr at 07/14/25 0600 0.02 mcg/kg/min at 07/14/25 0600    fentanyl  0-250 mcg/hr Intravenous Continuous 17.5 mL/hr at 07/14/25 0600 175 mcg/hr at 07/14/25 0600    heparin (porcine) in D5W  0-40 Units/kg/hr Intravenous Continuous   Paused at 07/14/25 0607    NORepinephrine bitartrate-D5W  0-3 mcg/kg/min Intravenous Continuous 14.8 mL/hr at 07/14/25 0600 0.06 mcg/kg/min at 07/14/25 0600    propofoL  0-50 mcg/kg/min Intravenous Continuous 19.7 mL/hr at 07/14/25 0600 50 mcg/kg/min at 07/14/25 0600    vasopressin  0.04 Units/min Intravenous Continuous 12 mL/hr at 07/14/25 0611 0.04 Units/min at 07/14/25 0611       Lines/Drains/Airways       Central Venous Catheter Line  Duration             Percutaneous  Central Line Triple Lumen 07/12/25 1227 Internal Jugular Right 1 day              Drain  Duration                  NG/OG Tube 07/12/25 1645 Center mouth 1 day         Urethral Catheter 07/12/25 0706 Temperature probe 14 Fr. 1 day              Airway  Duration                  Airway - Non-Surgical 07/12/25 0130 2 days              Arterial Line  Duration             Arterial Line 07/12/25 1912 Left Radial 1 day              Peripheral Intravenous Line  Duration                  External Jugular IV 07/12/25 0704 1 day    Peripheral IV Single Lumen 07/12/25 0703 18 G Anterior;Left Forearm 1 day    Peripheral IV Single Lumen 07/12/25 0704 18 G Right Antecubital 1 day    Peripheral IV Single Lumen 07/12/25 0704 20 G Anterior;Right Forearm 1 day    Peripheral IV Single Lumen 07/12/25 1000 20 G Right Hand 1 day                    Skin/Wounds  Bathing/Skin Care: bath, complete;back care;linen changed;dressed/undressed (07/13/25 1501)  Wounds: No  Wound care consulted: No

## 2025-07-14 NOTE — ASSESSMENT & PLAN NOTE
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     -s/p rewarming  -will need to tolerate discontinuation of NM blockade for neuro exam  -may benefit from MRI and SSEP pending above

## 2025-07-14 NOTE — ASSESSMENT & PLAN NOTE
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 Recent EF 20-25%. Recurrent hospital admission for ADHF exacerbation. Had cardiac arrest at home. CPR  by sister for 5 mins.   Rhythm: PEA - 3 rounds of epi were administered with ROSC   CTA-PE: Notable for BP PE   Initial hemodynamics: CI 1.4, SVR 2494  - Adult congential heart disease consulted: recs apperciated    - Goal CVP 10-15 to maintain adequate preload  - Cont Dobutamine 5 mcg/hr   - Cont Levo, Vaso, Epi. Wean as tolerated  - Start Shanthi 10 ppm  - Holding Lasix at this time (CVP 14)  - S/p TTM protocol. Now normothermic  - Monitor urine output I/O   - Monitor electrolytes and supplement K>4, Phos> 3 Mg >2   - Hemodynamics SVO2 q 8hr    - I discussed goals of care with family today now. They are understanding that we are providing maximum support at this time and despite this he is continuing to decline. His mother would like him to remain full code with full therapeutic measures at this time while she is on her way back to the hospital.   - Palliative care consulted, appreciate their assistance

## 2025-07-14 NOTE — PROGRESS NOTES
Pharmacokinetic Assessment Follow Up: IV Vancomycin    Vancomycin serum concentration assessment(s):    The random level was drawn correctly and can be used to guide therapy at this time. The measurement is above the desired definitive target range of 15 to 20 mcg/mL.    Vancomycin Regimen Plan:    Discontinue the scheduled vancomycin regimen and re-dose when the random level is less than 20 mcg/mL, next level to be drawn at 2200 on 07/14.    Drug levels (last 3 results):  Recent Labs   Lab Result Units 07/13/25  2130   Vancomycin Trough ug/ml 29.2*       Pharmacy will continue to follow and monitor vancomycin.    Please contact pharmacy at extension [phone] for questions regarding this assessment.    Thank you for the consult,   Valencia Camarillo       Patient brief summary:  Juani Reilly Jr. is a 30 y.o. male initiated on antimicrobial therapy with IV Vancomycin for treatment of sepsis    The patient's current regimen is Vancomycin 1000mg q12h    Drug Allergies:   Review of patient's allergies indicates:  No Known Allergies    Actual Body Weight:   66    Renal Function:   Estimated Creatinine Clearance: 94 mL/min (based on SCr of 1 mg/dL).,     Dialysis Method (if applicable):  N/A    CBC (last 72 hours):  Recent Labs   Lab Result Units 07/12/25  0137 07/12/25  0254 07/12/25  0348 07/12/25  0730 07/12/25  1413 07/13/25  0407   WBC K/uL 9.47 8.56 9.73 14.97* 15.04* 10.66   HGB gm/dL 13.6* 12.6* 13.4* 13.3* 12.4* 12.1*   HCT % 9.2* 40.6 41.5 44.3 40.0 37.1*   Platelet Count K/uL 140* 111* 130* 131* 110* 114*   Lymph % % 36.3 24.4 17.4* 7.9* 5.6* 7.3*   Mono % % 6.9 5.4 5.7 5.9 4.5 2.4*   Eos % % 0.1 0.0 0.1 0.0 0.0 0.1   Basophil % % 0.7 0.2 0.1 0.2 0.1 0.1       Metabolic Panel (last 72 hours):  Recent Labs   Lab Result Units 07/12/25  0157 07/12/25  0254 07/12/25  0322 07/12/25  0730 07/12/25  1409 07/12/25  1445 07/12/25  1515 07/12/25  1657 07/12/25  1805 07/12/25  2015 07/13/25  0008 07/13/25  0407  07/13/25  0842 07/13/25  1210 07/13/25  1503 07/13/25  1928   Sodium mmol/L  --  132*  --   --  136 135*  --   --  135* 133* 133* 131* 134* 132* 133* 133*   Potassium mmol/L  --  3.8  --   --  4.4 4.2  --   --  3.9 3.7 3.5 4.1 3.8 3.2* 3.4* 3.2*   Chloride mmol/L  --  99  --   --  100 100  --   --  99 98 98 97 97 95 95 96   CO2 mmol/L  --  12*  --   --  13* 16*  --   --  16* 16* 17* 20* 24 25 27 22*   Glucose mg/dL  --  121*  --   --  86 102  --  156* 178* 217* 253* 230* 164* 162* 143* 138*   Glucose, UA  Negative  --   --   --   --   --  Negative  --   --   --   --   --   --   --   --   --    BUN mg/dL  --  14  --   --  21* 22*  --   --  23* 22* 23* 22* 23* 22* 20 20   Creatinine mg/dL  --  1.2  --   --  1.3 1.3  --   --  1.3 1.4 1.3 1.2 1.1 1.1 1.0 1.0   Urine Creatinine mg/dL 244.1  --   --   --   --   --   --   --   --   --   --   --   --   --   --   --    Albumin g/dL  --  3.2*  --   --  3.1* 3.0*  --   --   --   --   --  2.9*  --   --   --   --    Bilirubin Total mg/dL  --  2.5*  --   --  3.6* 3.5*  --   --   --   --   --  3.0*  --   --   --   --    ALP unit/L  --  81  --   --  81 78  --   --   --   --   --  74  --   --   --   --    AST unit/L  --  44  --   --  131* 157*  --   --   --   --   --  249*  --   --   --   --    ALT unit/L  --  45*  --   --  87* 100*  --   --   --   --   --  174*  --   --   --   --    Magnesium  mg/dL  --   --  2.2 2.5 2.0 2.1  --  2.0  --  2.0 1.9 1.8 2.7* 2.5 2.3 2.1   Phosphorus Level mg/dL  --   --   --  6.1* 6.1* 5.9*  --  5.2*  --  5.2* 5.0* 4.4 3.9 3.5 3.6 3.6       Vancomycin Administrations:  vancomycin given in the last 96 hours                     vancomycin (VANCOCIN) 1,000 mg in 0.9% NaCl 250 mL IVPB (admixture device) (mg) 1,000 mg New Bag 07/13/25 1027     1,000 mg New Bag 07/12/25 2210    vancomycin 1,250 mg in 0.9% NaCl 250 mL IVPB (admixture device) (mg) 1,250 mg New Bag 07/12/25 1054                    Microbiologic Results:  Microbiology Results (last 7 days)        Procedure Component Value Units Date/Time    Blood culture [[id number]]  (Normal) Collected: 07/12/25 0954    Order Status: Completed Specimen: Blood from Peripheral, Forearm, Left Updated: 07/13/25 2002     Blood Culture No Growth After 6 Hours    Blood culture [[id number]]  (Normal) Collected: 07/12/25 0954    Order Status: Completed Specimen: Blood from Peripheral, Upper Arm, Right Updated: 07/13/25 2002     Blood Culture No Growth After 6 Hours    Culture, Respiratory with Gram Stain [[id number]]     Order Status: Sent Specimen: Respiratory from Sputum     Blood culture [[id number]] Collected: 07/12/25 0954    Order Status: Sent Specimen: Blood from Peripheral, Lower Arm, Right

## 2025-07-14 NOTE — CARE UPDATE
Heart Transplant Care Update Note:    Hemodynamics@8pm:  CVP: 12  SVO2: 69  CO: 5.82  CI: 3.43  SVR: 770      Continuous Infusions:   0.9% NaCl   Intravenous Continuous 5 mL/hr at 07/13/25 2300 Rate Verify at 07/13/25 2300    CISatracurium 200 mg in 0.9% NaCl 100 mL infusion  0-10 mcg/kg/min Intravenous Continuous 2 mL/hr at 07/13/25 2300 1 mcg/kg/min at 07/13/25 2300    DOBUTamine IV infusion (non-titrating)  5 mcg/kg/min Intravenous Continuous 4.9 mL/hr at 07/13/25 2300 5 mcg/kg/min at 07/13/25 2300    EPINEPHrine  0.02 mcg/kg/min Intravenous Continuous 3.9 mL/hr at 07/13/25 2300 0.02 mcg/kg/min at 07/13/25 2300    fentanyl  0-250 mcg/hr Intravenous Continuous 17.5 mL/hr at 07/13/25 2300 175 mcg/hr at 07/13/25 2300    heparin (porcine) in D5W  0-40 Units/kg/hr Intravenous Continuous 7.9 mL/hr at 07/13/25 2300 12 Units/kg/hr at 07/13/25 2300    NORepinephrine bitartrate-D5W  0-3 mcg/kg/min Intravenous Continuous 9.9 mL/hr at 07/13/25 2300 0.04 mcg/kg/min at 07/13/25 2300    propofoL  0-50 mcg/kg/min Intravenous Continuous 19.7 mL/hr at 07/13/25 2300 50 mcg/kg/min at 07/13/25 2300    vasopressin  0.04 Units/min Intravenous Continuous 12 mL/hr at 07/13/25 2300 0.04 Units/min at 07/13/25 2300       Intake/Output Summary (Last 24 hours) at 7/13/2025 2333  Last data filed at 7/13/2025 2300  Gross per 24 hour   Intake 2749.04 ml   Output 5710 ml   Net -2960.96 ml       MCS: None    Plan:  No changes. Continue current management       Discussed with [facility] [place] Attending      Dinesh Figueroa MD  Cardiovascular Disease Fellow  [facility], [place]

## 2025-07-14 NOTE — ASSESSMENT & PLAN NOTE
Pt transferred for [facility] for advance options and adult congenital evaluation. Pt with systemic  - Pulmonary shunt. Presented with refractory hypoxemia while intubated.    - Pulmonology following, appreciate recs  - Low PEEP strategy  - Goal SPO2 > 85%  - Deep sedation with RASS goal -4 to -5 and vent synchrony.   - Continue with fentanyl and propofol   - Continue Nimbex  - He has continue refractory hypoxemia today despite efforts at maximizing ventilator support with the assistance of pulmonology.   - CXR stable  - Start Shanthi at 10 ppm

## 2025-07-14 NOTE — PROGRESS NOTES
[facility] - Cardiac [facility]ensive [facility]  Heart Transplant  Progress Note    Patient Name: Juani Reilly Jr.  MRN: [id number]  Admission Date: 7/12/2025  Hospital Length of Stay: 2 days  Attending Physician: Solitario Reis MD  Primary Care Provider: Jaqueline Pardo MD  Principal Problem:Cardiac arrest    Subjective:   Interval History:   Pt rewarmed to normothermia in the evening. This morning his oxygen saturations have continued to drop despite increases in fio2 up to 100%. He remains on vaso, levo, epi, dobutamine. He is deeply sedated to promote vent synchrony.      UO 3700, negative 1000    Hemodynamic Parameters:  SCVO2 56   CVP 14   MAP 63   Cardiac Output 4.61   Cardiac Index 2.72          Continuous Infusions:   0.9% NaCl   Intravenous Continuous 5 mL/hr at 07/14/25 1301 Rate Verify at 07/14/25 1301    CISatracurium 200 mg in 0.9% NaCl 100 mL infusion  0-10 mcg/kg/min Intravenous Continuous 2 mL/hr at 07/14/25 1301 1 mcg/kg/min at 07/14/25 1301    DOBUTamine IV infusion (non-titrating)  5 mcg/kg/min Intravenous Continuous 4.9 mL/hr at 07/14/25 1301 5 mcg/kg/min at 07/14/25 1301    EPINEPHrine  0.05 mcg/kg/min Intravenous Continuous 9.9 mL/hr at 07/14/25 1351 0.05 mcg/kg/min at 07/14/25 1351    fentanyl  0-250 mcg/hr Intravenous Continuous 17.5 mL/hr at 07/14/25 1301 175 mcg/hr at 07/14/25 1301    heparin (porcine) in D5W  0-40 Units/kg/hr Intravenous Continuous 5.9 mL/hr at 07/14/25 1301 9 Units/kg/hr at 07/14/25 1301    nitric oxide gas  10 ppm Inhalation Continuous        NORepinephrine bitartrate-D5W  0-3 mcg/kg/min Intravenous Continuous        propofoL  0-50 mcg/kg/min Intravenous Continuous 19.7 mL/hr at 07/14/25 1301 50 mcg/kg/min at 07/14/25 1301    vasopressin  0.04 Units/min Intravenous Continuous 12 mL/hr at 07/14/25 1344 0.04 Units/min at 07/14/25 1344     Scheduled Meds:   heparin (PORCINE)  80 Units/kg Intravenous Once    mupirocin   Nasal BID    pantoprazole  40 mg Intravenous Daily     piperacillin-tazobactam (Zosyn) IV (PEDS and ADULTS) (extended infusion is not appropriate)  4.5 g Intravenous Q8H    potassium chloride in water  40 mEq Intravenous Once    white petrolatum-mineral oiL   Both Eyes Q8H     PRN Meds:  Current Facility-Administered Medications:     heparin (PORCINE), 60 Units/kg, Intravenous, PRN    heparin (PORCINE), 30 Units/kg, Intravenous, PRN    sodium chloride 0.9%, 10 mL, Intravenous, PRN    Pharmacy to dose Vancomycin consult, , , Once **AND** vancomycin - pharmacy to dose, , Intravenous, pharmacy to manage frequency    Review of patient's allergies indicates:  No Known Allergies  Objective:     Vital Signs (Most Recent):  Temp: 98.6 °F (37 °C) (07/14/25 1330)  Pulse: (!) 118 (07/14/25 1330)  Resp: 20 (07/14/25 1330)  BP: (!) 97/58 (07/14/25 1301)  SpO2: (!) 73 % (07/14/25 1330) Vital Signs (24h Range):  Temp:  [91.4 °F (33 °C)-98.6 °F (37 °C)] 98.6 °F (37 °C)  Pulse:  [] 118  Resp:  [20-24] 20  SpO2:  [73 %-95 %] 73 %  BP: ()/(50-77) 97/58  Arterial Line BP: ()/(46-66) 83/51     Patient Vitals for the past 72 hrs (Last 3 readings):   Weight   07/14/25 0619 62.3 kg (137 lb 5.6 oz)   07/13/25 0508 62.1 kg (136 lb 14.5 oz)   07/12/25 0830 65.8 kg (145 lb)     Body mass index is 22.17 kg/m².      Intake/Output Summary (Last 24 hours) at 7/14/2025 1412  Last data filed at 7/14/2025 1301  Gross per 24 hour   Intake 2297.95 ml   Output 1505 ml   Net 792.95 ml         Telemetry: Reviewed       Physical Exam  Constitutional:       Comments: Sedated and paralyzed on mechanical ventilator   HENT:      Head: Atraumatic.      Mouth/Throat:      Comments: ETT in place  Eyes:      Conjunctiva/sclera: Conjunctivae normal.   Cardiovascular:      Rate and Rhythm: Regular rhythm. Tachycardia present.      Pulses: Normal pulses.   Pulmonary:      Breath sounds: Normal breath sounds. No wheezing or rales.   Abdominal:      General: Abdomen is flat.      Palpations: Abdomen is  "soft.   Musculoskeletal:      Right lower leg: No edema.      Left lower leg: No edema.   Skin:     General: Skin is warm and dry.   Neurological:      Comments: Sedated deeply   Psychiatric:      Comments: Unable to assess            Significant Labs:  CBC:  Recent Labs   Lab 07/12/25  1413 07/12/25  2016 07/13/25  0407 07/14/25  0415   WBC 15.04*  --  10.66 8.62   RBC 4.37*  --  4.33* 4.26*   HGB 12.4*  --  12.1* 12.1*   HCT 40.0 42 37.1* 35.1*   *  --  114* 100*   MCV 92  --  86 82   MCH 28.4  --  27.9 28.4   MCHC 31.0*  --  32.6 34.5     BNP:  Recent Labs   Lab 07/08/25  1308 07/12/25  0220 07/12/25  0730   BNP 1,802* 2,461* 2,903*     CMP:  Recent Labs   Lab 07/12/25  1445 07/12/25  1657 07/13/25  0407 07/13/25  0842 07/14/25  0415 07/14/25  0805 07/14/25  1051      < > 230*   < > 122* 111* 121*  121*   CALCIUM 8.4*   < > 8.3*   < > 7.8* 7.8* 7.8*   ALBUMIN 3.0*  --  2.9*  --  2.5*  --   --    PROT 6.8  --  6.5  --  5.9*  --   --    *   < > 131*   < > 131* 133* 131*   K 4.2   < > 4.1   < > 3.3* 3.5 3.5   CO2 16*   < > 20*   < > 25 27 24      < > 97   < > 95 95 96   BUN 22*   < > 22*   < > 21* 20 22*   CREATININE 1.3   < > 1.2   < > 1.1 1.1 1.2   ALKPHOS 78  --  74  --  69  --   --    *  --  174*  --  174*  --   --    *  --  249*  --  161*  --   --    BILITOT 3.5*  --  3.0*  --  2.5*  --   --     < > = values in this interval not displayed.      Coagulation:   Recent Labs   Lab 07/12/25  0955 07/12/25  1409 07/13/25  0407 07/14/25  0415 07/14/25  1306   INR 2.3*  --  1.9* 1.6*  --    APTT >150.0*   < > 55.2* 79.7* 48.7*    < > = values in this interval not displayed.     LDH:  No results for input(s): "LDH" in the last 72 hours.  Microbiology:  Microbiology Results (last 7 days)       Procedure Component Value Units Date/Time    Blood culture [[id number]]  (Normal) Collected: 07/12/25 0954    Order Status: Completed Specimen: Blood from Peripheral, Forearm, Left Updated: " 07/14/25 1401     Blood Culture No Growth After 24 Hours    Blood culture [[id number]]  (Normal) Collected: 07/12/25 0954    Order Status: Completed Specimen: Blood from Peripheral, Upper Arm, Right Updated: 07/14/25 1401     Blood Culture No Growth After 24 Hours    Culture, Respiratory with Gram Stain [[id number]]     Order Status: Canceled Specimen: Respiratory from Sputum     Blood culture [[id number]] Collected: 07/12/25 0954    Order Status: Sent Specimen: Blood from Peripheral, Lower Arm, Right             I have reviewed all pertinent labs within the past 24 hours.    Estimated Creatinine Clearance: 78.6 mL/min (based on SCr of 1.2 mg/dL).    Diagnostic Results:  I have reviewed all pertinent imaging results/findings within the past 24 hours.  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 Ferguson Nov 2022, decreased LV function 20-25%, follows with advanced heart failure clinic (Dr. Reis) with plans for possible transplant, and cirrhosis transferred from [facility] in [place] for advance option evaluation and evaluation from Adult congenital heart disease. Upon encounter pt was intubates and sedated. Family at bedside reported that pt has been having progressive SOB leading to cardiac arrest. As per chart review pt has had recurrent HF admission. Was seen by Dr Reis 7/8/25 and he reported sob with NYHA II-III. Adjustment to GDMT was made, Lasix discontinued and Bumex 2mg BID start. Family reported that pt was in the bedroom with his grandmother when he became unresponsive. Sister rushed at bedside and CPR was started. Reported that she did CPR for 5 mins until EMS arrived. PEA was noted and 3 rounds of epi were administered with ROSC. Patient was intubated but c/b refractory hypoxemia so pulmonology was consulted. Vent 500, rate 20, PEEP 10, 100% FiO2 with Abg of 7.22, pCO2 32, pO2 53,  sat 73%. Patient is sedated with propofol.   - CTA-PE: Notable for BP PE: Heparin initiated   - Bedside ECHO: No visible RV, moderate MR/TR. IVC 1.7 cm     In the ED lactic acid was 9.1-->10. BNP 2900, Lasix 80 given and infusion 20 mg/hr started.   Hemodynamics:    SVO2 CO CI SVR   49 2.5 1.4 2494      Pulmonary and Adult congenital Heart disease consulted and recommendations appreciated.   Dr Santoyo recommendations appreciated. Pt is not a candidate for advance options or MCS.  started.   Pulmonary recommended adequate sedation and paralytic for better oxygenation        * Cardiac arrest  #Cardiogenic Shock  Patient with Hx of Tricuspid and pulmonary atresia, s/p systemic to pulmonary artery shunt followed by a bidirectional Jesus and subsequent lateral tunnel Fontan procedure. Recent EF 20-25%. Recurrent hospital admission for ADHF exacerbation. Had cardiac arrest at home. CPR  by sister for 5 mins.   Rhythm: PEA - 3 rounds of epi were administered with ROSC   CTA-PE: Notable for BP PE   Initial hemodynamics: CI 1.4, SVR 2494  - Adult congential heart disease consulted: recs apperciated    - Goal CVP 10-15 to maintain adequate preload  - Cont Dobutamine 5 mcg/hr   - Cont Levo, Vaso, Epi. Wean as tolerated  - Start Shanthi 10 ppm  - Holding Lasix at this time (CVP 14)  - S/p TTM protocol. Now normothermic  - Monitor urine output I/O   - Monitor electrolytes and supplement K>4, Phos> 3 Mg >2   - Hemodynamics SVO2 q 8hr    - I discussed goals of care with family today now. They are understanding that we are providing maximum support at this time and despite this he is continuing to decline. His mother would like him to remain full code with full therapeutic measures at this time while she is on her way back to the hospital.   - Palliative care consulted, appreciate their assistance     Sepsis  Patient with leukocytosis left shift.   CT Chest ; Notable for pulmonary edema and infiltrates - likely PNA   Pending Blood cx    Continue with vancomycin and zosyn       Acute hypoxemic respiratory failure  Pt transferred for [facility] for advance options and adult congenital evaluation. Pt with systemic  - Pulmonary shunt. Presented with refractory hypoxemia while intubated.    - Pulmonology following, appreciate recs  - Low PEEP strategy  - Goal SPO2 > 85%  - Deep sedation with RASS goal -4 to -5 and vent synchrony.   - Continue with fentanyl and propofol   - Continue Nimbex  - He has continue refractory hypoxemia today despite efforts at maximizing ventilator support with the assistance of pulmonology.   - CXR stable  - Start Shanthi at 10 ppm      Pulmonary thromboembolism  Patient with PEA arrest. 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    - CTA Chest: Bilateral acute pulmonary thromboemboli identified in segmental branches of the bilateral lower lobe pulmonary arteries. Cardiomegaly with morphology suggesting single dilated left ventricle. There appears to be communication between the right and left atria suggesting large ASD.  Pulmonary venous congestion with bibasilar pulmonary edema.  - Cont Heparin drip   - Continue with VTE protocol   - Monitor Oxygen sat.   - Pul consulted and recs appreciated.           Gustavo Matias MD  Heart Transplant  [facility]

## 2025-07-14 NOTE — CONSULTS
[facility] - [facility]iac [facility]  Infectious Disease  Consult Note    Patient Name: Juani Reilly Jr.  MRN: [id number]  Admission Date: 7/12/2025  Hospital Length of Stay: 2 days  Attending Physician: Solitario Reis MD  Primary Care Provider: Jaqueline Pardo MD     Isolation Status: No active isolations    Patient information was obtained from past medical records and ER records.      Inpatient consult to Infectious Diseases  Consult performed by: Melanie Branch MD  Consult ordered by: Gustavo Matias MD        Assessment/Plan:     Cardiac/Vascular  * Cardiac arrest  31 yo male with complex congenital cardiac disease including TV/pulmonary atresia s/p Fontan procedure, WPW s/p ablation 2022, and cirrhosis admitted for PEA cardiac arrest in setting of acute PE. Current admission notable for blood cx that is so far no growth to date. He remains critically ill, intubated and on high pressor support.  CTA with bilateral acute PE. TTE pending.     Recommendations:  -continue empiric vancomycin pharm to dose and zosyn pending cx data  -if blood cx remain no growth for staph tomorrow, favor stopping vancomycin  -follow up cx data           Thank you for your consult. I will follow-up with patient. Please contact us if you have any additional questions. Above d/w primary team.       Melanie Branch MD  Infectious Disease  [facility]    Critical care time: 35 minutes   I personally spent critical care time on the following: evaluating this patient's organ dysfunction, development of treatment plan, discussing treatment plan with patient or surrogate and bedside caregivers, discussions with critical care service and/or consultants, evaluation of patient's response to treatment, physical examination of patient, ordering and review of treatments interventions, laboratory studies, and radiographic studies, re-evaluation of patient's condition. This critical care time did not overlap  with that of any other provider of the same specialty or involve time for procedures.       Subjective:     Principal Problem: Cardiac arrest    HPI: 31 yo male with complex congenital cardiac disease including TV/pulmonary atresia s/p Fontan procedure, WPW s/p ablation 2022, and cirrhosis admitted for PEA cardiac arrest in setting of acute PE. ID consulted for abx assistance. Current admission notable for blood cx that is so far no growth to date. He remains critically ill, intubated and on pressors in cardiogenic shock. He is on vancomycin and zosyn. CTA with bilateral acute PE. TTE pending.          Past Medical History:   Diagnosis Date    Acute decompensated heart failure 02/06/2025    History of heart surgery     Other cirrhosis of liver 11/12/2020    SVT (supraventricular tachycardia)     WPW syndrome        Past Surgical History:   Procedure Laterality Date    ANGIOGRAPHY OF AORTIC ARCH N/A 09/03/2020    Procedure: AORTOGRAM, AORTIC ARCH;  Surgeon: Stephania Crump MD;  Location: [facility] CATH LAB;  Service: Cardiology;  Laterality: N/A;    FONTAN PROCEDURE, EXTRACARDIAC      LEFT HEART CATHETERIZATION Left 09/03/2020    Procedure: Left heart cath;  Surgeon: Stephania Crump MD;  Location: [facility] CATH LAB;  Service: Cardiology;  Laterality: Left;    RIGHT HEART CATHETERIZATION FOR CONGENITAL HEART DEFECT N/A 09/03/2020    Procedure: CATHETERIZATION, HEART, RIGHT, FOR CONGENITAL HEART DEFECT;  Surgeon: Stephania Crump MD;  Location: [facility] CATH LAB;  Service: Cardiology;  Laterality: N/A;  [facility] - needs covid test morning of. Extenuating circumstances    TRANSESOPHAGEAL ECHOCARDIOGRAPHY N/A 11/10/2022    Procedure: ECHOCARDIOGRAM, TRANSESOPHAGEAL;  Surgeon: Emeterio Hall MD;  Location: [facility] EP LAB;  Service: Cardiology;  Laterality: N/A;    TREATMENT OF CARDIAC ARRHYTHMIA N/A 01/09/2021    Procedure: CARDIOVERSION;  Surgeon: Jim Mcginnis MD;  Location: [facility] CATH LAB;  Service:  "Cardiology;  Laterality: N/A;       Review of patient's allergies indicates:  No Known Allergies    Medications:  Medications Prior to Admission   Medication Sig    benzonatate (TESSALON) 100 MG capsule Take 1 capsule (100 mg total) by mouth 3 (three) times daily as needed for Cough.    bumetanide (BUMEX) 2 MG tablet Take 1 tablet (2 mg total) by mouth 2 (two) times a day.    cetirizine (ZYRTEC) 10 MG tablet Take 1 tablet (10 mg total) by mouth once daily.    dapagliflozin propanediol (FARXIGA) 10 mg tablet Take 1 tablet (10 mg total) by mouth once daily.    losartan (COZAAR) 100 MG tablet Take 1 tablet (100 mg total) by mouth once daily.    metoprolol succinate (TOPROL-XL) 50 MG 24 hr tablet Take 1 tablet (50 mg total) by mouth once daily.    rivaroxaban (XARELTO) 20 mg Tab Take 1 tablet (20 mg total) by mouth daily with dinner or evening meal. (Patient not taking: Reported on 7/8/2025)    spironolactone (ALDACTONE) 25 MG tablet Take 1 tablet (25 mg total) by mouth once daily.     Antibiotics (From admission, onward)      Start     Stop Route Frequency Ordered    07/12/25 2100  mupirocin 2 % ointment         07/17/25 2059 Nasl 2 times daily 07/12/25 1324    07/12/25 0913  vancomycin - pharmacy to dose  (vancomycin IVPB (PEDS and ADULTS))        Placed in "And" Linked Group    -- IV pharmacy to manage frequency 07/12/25 0813    07/12/25 0900  piperacillin-tazobactam (ZOSYN) 4.5 g in D5W 100 mL IVPB (MB+)         -- IV Every 8 hours (non-standard times) 07/12/25 0813          Antifungals (From admission, onward)      None          Antivirals (From admission, onward)      None             There is no immunization history for the selected administration types on file for this patient.    Family History       Problem Relation (Age of Onset)    Heart disease Maternal Grandfather    No Known Problems Mother, Father, Sister, Brother, Maternal Aunt, Maternal Uncle, Paternal Aunt, Paternal Uncle, Maternal Grandmother, " Paternal Grandmother, Paternal Grandfather          Social History     Socioeconomic History    Marital status: Single   Tobacco Use    Smoking status: Former     Current packs/day: 0.00     Types: Cigarettes     Quit date:      Years since quittin.5     Passive exposure: Past    Smokeless tobacco: Never    Tobacco comments:     3-4 months    Substance and Sexual Activity    Alcohol use: No    Drug use: Yes     Types: Marijuana     Comment: Odalys today- pt reports a while back    Sexual activity: Yes     Social Drivers of Health     Financial Resource Strain: Low Risk  (6/10/2025)    Overall Financial Resource Strain (CARDIA)     Difficulty of Paying Living Expenses: Not hard at all   Food Insecurity: No Food Insecurity (6/10/2025)    Hunger Vital Sign     Worried About Running Out of Food in the Last Year: Never true     Ran Out of Food in the Last Year: Never true   Transportation Needs: No Transportation Needs (6/10/2025)    PRAPARE - Transportation     Lack of Transportation (Medical): No     Lack of Transportation (Non-Medical): No   Physical Activity: Inactive (2025)    Exercise Vital Sign     Days of Exercise per Week: 0 days     Minutes of Exercise per Session: 0 min   Stress: No Stress Concern Present (6/10/2025)    [employer] - Occupational Stress Questionnaire     Feeling of Stress : Not at all   Housing Stability: Low Risk  (6/10/2025)    Housing Stability Vital Sign     Unable to Pay for Housing in the Last Year: No     Homeless in the Last Year: No     Review of Systems   Unable to perform ROS: Intubated     Objective:     Vital Signs (Most Recent):  Temp: 98.6 °F (37 °C) (25 1330)  Pulse: (!) 118 (25 1330)  Resp: 20 (25 1330)  BP: (!) 97/58 (25 1301)  SpO2: (!) 73 % (25 1330) Vital Signs (24h Range):  Temp:  [91.4 °F (33 °C)-98.6 °F (37 °C)] 98.6 °F (37 °C)  Pulse:  [] 118  Resp:  [20-24] 20  SpO2:  [73 %-95 %] 73 %  BP:  ()/(50-77) 97/58  Arterial Line BP: ()/(46-66) 83/51     Weight: 62.3 kg (137 lb 5.6 oz)  Body mass index is 22.17 kg/m².    Estimated Creatinine Clearance: 78.6 mL/min (based on SCr of 1.2 mg/dL).     Physical Exam  Constitutional:       General: He is not in acute distress.     Appearance: He is ill-appearing.   HENT:      Mouth/Throat:      Comments: ETT  Cardiovascular:      Rate and Rhythm: Regular rhythm. Tachycardia present.   Pulmonary:      Effort: Pulmonary effort is normal. No respiratory distress.   Genitourinary:     Comments: tyrese  Musculoskeletal:      Right lower leg: No edema.      Left lower leg: No edema.   Skin:     Comments: JULIA   Neurological:      Comments: Sedated/paralyzed          Significant Labs:   Microbiology Results (last 7 days)       Procedure Component Value Units Date/Time    Blood culture [[id number]]  (Normal) Collected: 07/12/25 0954    Order Status: Completed Specimen: Blood from Peripheral, Forearm, Left Updated: 07/14/25 1401     Blood Culture No Growth After 24 Hours    Blood culture [[id number]]  (Normal) Collected: 07/12/25 0954    Order Status: Completed Specimen: Blood from Peripheral, Upper Arm, Right Updated: 07/14/25 1401     Blood Culture No Growth After 24 Hours    Culture, Respiratory with Gram Stain [[id number]]     Order Status: Canceled Specimen: Respiratory from Sputum     Blood culture [[id number]] Collected: 07/12/25 0954    Order Status: Sent Specimen: Blood from Peripheral, Lower Arm, Right             Significant Imaging: I have reviewed all pertinent imaging results/findings within the past 24 hours.

## 2025-07-14 NOTE — ASSESSMENT & PLAN NOTE
29 yo male with complex congenital cardiac disease including TV/pulmonary atresia s/p Fontan procedure, WPW s/p ablation 2022, and cirrhosis admitted for PEA cardiac arrest in setting of acute PE. Current admission notable for blood cx that is so far no growth to date. He remains critically ill, intubated and on high pressor support.  CTA with bilateral acute PE. TTE pending.     Recommendations:  -continue empiric vancomycin pharm to dose and zosyn pending cx data  -if blood cx remain no growth for staph tomorrow, favor stopping vancomycin  -follow up cx data

## 2025-07-14 NOTE — ASSESSMENT & PLAN NOTE
# PEA Cardiac Arrest  # Bilateral PE  # History of Fontan Procedure    CTA chest showed concerns for bilateral PE and some dense changes in the left lung base. He has acute on chronic hypoxemic respiratory failure, initially improved on 7/13 with deep sedation and neuromuscular blockade to improve vent synchrony. However, on 7/14/25 had refractory hypoxemia. He has  complicated heart anatomy with history of Fontan procedure and have been minimizing PEEP to allow adequate venous return with assistance from congenital heart team. Suspect some shunting physiology given his refractory hypoxemia despite 100% FiO2. He oxygenation did improve with initiation of inhaled nitric oxide.     Patient has complex cardiopulmonary physiology and several potential contributors to the acute decompensation. He has good respiratory mechanics on the ventilator with a driving pressure of 14 and plateau pressure ~20. He does not have evidence of ARDS that would explain his hypoxemia.     - maintain deep sedation with RASS goal -4 to -5  - continue Nimbex drip  - continue lung protective ventilation with low tidal volume and PEEP of 5, of note during episode of worsening hypoxemia his oxygenation did not improve with trial of higher PEEP and given his cardiac anatomy will aim to maintain a PEEP of 5  - continue nitric oxide at 10ppm  - will need to discontinue neuromuscular blockade and wean sedation to help with neuroprognostication, will re-evaluate safety of SAT and holding Nimebx gtt on 7/15  - recommend TTE with bubble study to evaluate for shunt

## 2025-07-14 NOTE — EICU
Virtual ICU Quality Rounds    Admit Date: 7/12/2025  Hospital Day: 2    ICU Day: 1d 18h    24H Vital Sign Range:  Temp:  [91.2 °F (32.9 °C)-96.6 °F (35.9 °C)]   Pulse:  []   Resp:  [24]   BP: ()/(56-77)   SpO2:  [88 %-95 %]   Arterial Line BP: ()/(46-66)     VICU Surveillance Screening  Daily review of  line necessity(optional): Central line(s) in place and Urinary catheter in place  Central line type (optional): Triple lumen catheter  Central line indications : Vasopressors (in past 24/hrs), Inotropes, Multiple infusions, Incompatible infusions, and NMBA  Xiong Indications : Critically ill in the intensive care unit requiring hourly urine output monitoring   LDA reconciliation : Yes  Xiong best practices : Nurse driven xiong removal protocol ordered

## 2025-07-14 NOTE — SUBJECTIVE & OBJECTIVE
Interval History:   Pt rewarmed to normothermia in the evening. This morning his oxygen saturations have continued to drop despite increases in fio2 up to 100%. He remains on vaso, levo, epi, dobutamine. He is deeply sedated to promote vent synchrony.      UO 3700, negative 1000    Hemodynamic Parameters:  SCVO2 56   CVP 14   MAP 63   Cardiac Output 4.61   Cardiac Index 2.72          Continuous Infusions:   0.9% NaCl   Intravenous Continuous 5 mL/hr at 07/14/25 1301 Rate Verify at 07/14/25 1301    CISatracurium 200 mg in 0.9% NaCl 100 mL infusion  0-10 mcg/kg/min Intravenous Continuous 2 mL/hr at 07/14/25 1301 1 mcg/kg/min at 07/14/25 1301    DOBUTamine IV infusion (non-titrating)  5 mcg/kg/min Intravenous Continuous 4.9 mL/hr at 07/14/25 1301 5 mcg/kg/min at 07/14/25 1301    EPINEPHrine  0.05 mcg/kg/min Intravenous Continuous 9.9 mL/hr at 07/14/25 1351 0.05 mcg/kg/min at 07/14/25 1351    fentanyl  0-250 mcg/hr Intravenous Continuous 17.5 mL/hr at 07/14/25 1301 175 mcg/hr at 07/14/25 1301    heparin (porcine) in D5W  0-40 Units/kg/hr Intravenous Continuous 5.9 mL/hr at 07/14/25 1301 9 Units/kg/hr at 07/14/25 1301    nitric oxide gas  10 ppm Inhalation Continuous        NORepinephrine bitartrate-D5W  0-3 mcg/kg/min Intravenous Continuous        propofoL  0-50 mcg/kg/min Intravenous Continuous 19.7 mL/hr at 07/14/25 1301 50 mcg/kg/min at 07/14/25 1301    vasopressin  0.04 Units/min Intravenous Continuous 12 mL/hr at 07/14/25 1344 0.04 Units/min at 07/14/25 1344     Scheduled Meds:   heparin (PORCINE)  80 Units/kg Intravenous Once    mupirocin   Nasal BID    pantoprazole  40 mg Intravenous Daily    piperacillin-tazobactam (Zosyn) IV (PEDS and ADULTS) (extended infusion is not appropriate)  4.5 g Intravenous Q8H    potassium chloride in water  40 mEq Intravenous Once    white petrolatum-mineral oiL   Both Eyes Q8H     PRN Meds:  Current Facility-Administered Medications:     heparin (PORCINE), 60 Units/kg,  Intravenous, PRN    heparin (PORCINE), 30 Units/kg, Intravenous, PRN    sodium chloride 0.9%, 10 mL, Intravenous, PRN    Pharmacy to dose Vancomycin consult, , , Once **AND** vancomycin - pharmacy to dose, , Intravenous, pharmacy to manage frequency    Review of patient's allergies indicates:  No Known Allergies  Objective:     Vital Signs (Most Recent):  Temp: 98.6 °F (37 °C) (07/14/25 1330)  Pulse: (!) 118 (07/14/25 1330)  Resp: 20 (07/14/25 1330)  BP: (!) 97/58 (07/14/25 1301)  SpO2: (!) 73 % (07/14/25 1330) Vital Signs (24h Range):  Temp:  [91.4 °F (33 °C)-98.6 °F (37 °C)] 98.6 °F (37 °C)  Pulse:  [] 118  Resp:  [20-24] 20  SpO2:  [73 %-95 %] 73 %  BP: ()/(50-77) 97/58  Arterial Line BP: ()/(46-66) 83/51     Patient Vitals for the past 72 hrs (Last 3 readings):   Weight   07/14/25 0619 62.3 kg (137 lb 5.6 oz)   07/13/25 0508 62.1 kg (136 lb 14.5 oz)   07/12/25 0830 65.8 kg (145 lb)     Body mass index is 22.17 kg/m².      Intake/Output Summary (Last 24 hours) at 7/14/2025 1412  Last data filed at 7/14/2025 1301  Gross per 24 hour   Intake 2297.95 ml   Output 1505 ml   Net 792.95 ml         Telemetry: Reviewed       Physical Exam  Constitutional:       Comments: Sedated and paralyzed on mechanical ventilator   HENT:      Head: Atraumatic.      Mouth/Throat:      Comments: ETT in place  Eyes:      Conjunctiva/sclera: Conjunctivae normal.   Cardiovascular:      Rate and Rhythm: Regular rhythm. Tachycardia present.      Pulses: Normal pulses.   Pulmonary:      Breath sounds: Normal breath sounds. No wheezing or rales.   Abdominal:      General: Abdomen is flat.      Palpations: Abdomen is soft.   Musculoskeletal:      Right lower leg: No edema.      Left lower leg: No edema.   Skin:     General: Skin is warm and dry.   Neurological:      Comments: Sedated deeply   Psychiatric:      Comments: Unable to assess            Significant Labs:  CBC:  Recent Labs   Lab 07/12/25  1413 07/12/25 2016  "07/13/25  0407 07/14/25  0415   WBC 15.04*  --  10.66 8.62   RBC 4.37*  --  4.33* 4.26*   HGB 12.4*  --  12.1* 12.1*   HCT 40.0 42 37.1* 35.1*   *  --  114* 100*   MCV 92  --  86 82   MCH 28.4  --  27.9 28.4   MCHC 31.0*  --  32.6 34.5     BNP:  Recent Labs   Lab 07/08/25  1308 07/12/25  0220 07/12/25  0730   BNP 1,802* 2,461* 2,903*     CMP:  Recent Labs   Lab 07/12/25  1445 07/12/25  1657 07/13/25  0407 07/13/25  0842 07/14/25  0415 07/14/25  0805 07/14/25  1051      < > 230*   < > 122* 111* 121*  121*   CALCIUM 8.4*   < > 8.3*   < > 7.8* 7.8* 7.8*   ALBUMIN 3.0*  --  2.9*  --  2.5*  --   --    PROT 6.8  --  6.5  --  5.9*  --   --    *   < > 131*   < > 131* 133* 131*   K 4.2   < > 4.1   < > 3.3* 3.5 3.5   CO2 16*   < > 20*   < > 25 27 24      < > 97   < > 95 95 96   BUN 22*   < > 22*   < > 21* 20 22*   CREATININE 1.3   < > 1.2   < > 1.1 1.1 1.2   ALKPHOS 78  --  74  --  69  --   --    *  --  174*  --  174*  --   --    *  --  249*  --  161*  --   --    BILITOT 3.5*  --  3.0*  --  2.5*  --   --     < > = values in this interval not displayed.      Coagulation:   Recent Labs   Lab 07/12/25  0955 07/12/25  1409 07/13/25  0407 07/14/25  0415 07/14/25  1306   INR 2.3*  --  1.9* 1.6*  --    APTT >150.0*   < > 55.2* 79.7* 48.7*    < > = values in this interval not displayed.     LDH:  No results for input(s): "LDH" in the last 72 hours.  Microbiology:  Microbiology Results (last 7 days)       Procedure Component Value Units Date/Time    Blood culture [[id number]]  (Normal) Collected: 07/12/25 0954    Order Status: Completed Specimen: Blood from Peripheral, Forearm, Left Updated: 07/14/25 1401     Blood Culture No Growth After 24 Hours    Blood culture [[id number]]  (Normal) Collected: 07/12/25 0954    Order Status: Completed Specimen: Blood from Peripheral, Upper Arm, Right Updated: 07/14/25 1401     Blood Culture No Growth After 24 Hours    Culture, Respiratory with Gram Stain " [[id number]]     Order Status: Canceled Specimen: Respiratory from Sputum     Blood culture [[id number]] Collected: 07/12/25 0954    Order Status: Sent Specimen: Blood from Peripheral, Lower Arm, Right             I have reviewed all pertinent labs within the past 24 hours.    Estimated Creatinine Clearance: 78.6 mL/min (based on SCr of 1.2 mg/dL).    Diagnostic Results:  I have reviewed all pertinent imaging results/findings within the past 24 hours.

## 2025-07-14 NOTE — SUBJECTIVE & OBJECTIVE
Interval History:   - patient remained on neuromuscular blockade this morning  - net negative 1L in the last 24 hours  - oxygen saturations decreased during the morning requiring FiO2 and initiation of nitric oxide  - increasing vasopressor requirements       Objective:     Vital Signs (Most Recent):  Temp: 99.1 °F (37.3 °C) (07/14/25 1601)  Pulse: (!) 120 (07/14/25 1601)  Resp: 20 (07/14/25 1601)  BP: (!) 97/58 (07/14/25 1301)  SpO2: (!) 83 % (07/14/25 1421) Vital Signs (24h Range):  Temp:  [91.6 °F (33.1 °C)-99.1 °F (37.3 °C)] 99.1 °F (37.3 °C)  Pulse:  [] 120  Resp:  [20-24] 20  SpO2:  [73 %-95 %] 83 %  BP: ()/(50-77) 97/58  Arterial Line BP: ()/(46-66) 83/51     Weight: 62.3 kg (137 lb 5.6 oz)  Body mass index is 22.17 kg/m².      Intake/Output Summary (Last 24 hours) at 7/14/2025 1649  Last data filed at 7/14/2025 1601  Gross per 24 hour   Intake 2509.41 ml   Output 1150 ml   Net 1359.41 ml        Physical Exam   GEN: young man, resting in bed, intubated, sedated, on mechanical ventilation   HEENT: ETT in place,   CV: regular rhythm, tachycardic   PULM: CTAB, no wheezing, no crackles  Abd: non-distended  Ext: no LE edema bilaterally  MSK: no spontaneous movement of extremities  Skin: no visible rashes or skin lesions  Neuro: sedated and on neuromuscular blockade     Vents:  Vent Mode: A/C (07/14/25 1421)  Ventilator Initiated: Yes (07/12/25 0701)  Set Rate: 18 BPM (07/14/25 1421)  Vt Set: 450 mL (07/14/25 1421)  PEEP/CPAP: 5 cmH20 (07/14/25 1421)  Oxygen Concentration (%): 100 (07/14/25 1421)  Peak Airway Pressure: 0 cmH20 (07/14/25 1421)  Plateau Pressure: 0 cmH20 (07/14/25 1421)  Total Ve: 0 L/m (07/14/25 1421)  Negative Inspiratory Force (cm H2O): 0 (07/14/25 1421)  F/VT Ratio<105 (RSBI): (!) 45.98 (07/14/25 1052)    Significant Labs:    CBC/Anemia Profile:  Lab Results   Component Value Date    WBC 8.62 07/14/2025    HGB 12.1 (L) 07/14/2025    HCT 35.1 (L) 07/14/2025    MCV 82 07/14/2025      (L) 07/14/2025        Chemistries:  Recent Labs   Lab 07/14/25  0415 07/14/25  0805 07/14/25  1559   * 133* 129*   K 3.3* 3.5 3.9   CL 95 95 94*   CO2 25 27 25   BUN 21* 20 24*   CREATININE 1.1 1.1 1.4   CALCIUM 7.8* 7.8* 7.9*   ALBUMIN 2.5*  --   --    PROT 5.9*  --   --    BILITOT 2.5*  --   --    ALKPHOS 69  --   --    *  --   --    *  --   --    MG 1.9 2.0  --    PHOS 3.4 3.3 4.4    < > = values in this interval not displayed.     ABG  Recent Labs   Lab 07/14/25  1300   PH 7.453*   PO2 44*   PCO2 39.6   HCO3 27.7   BE 4*       Significant Imaging:  I have reviewed all pertinent imaging results/findings within the past 24 hours.

## 2025-07-15 NOTE — PROGRESS NOTES
Pharmacokinetic Assessment Follow Up: IV Vancomycin    Vancomycin serum concentration assessment(s):    The random level was drawn correctly and can be used to guide therapy at this time. The measurement is within the desired definitive target range of 15 to 20 mcg/mL.    Vancomycin Regimen Plan:    Pulse dose Vancomycin 750 mg IV once with next serum trough concentration measured at 2300 on 7/15    Drug levels (last 3 results):  Recent Labs   Lab Result Units 07/13/25  2130 07/14/25  2121   Vancomycin Random ug/ml  --  14.3   Vancomycin Trough ug/ml 29.2*  --        Pharmacy will continue to follow and monitor vancomycin.    Please contact pharmacy at extension [phone] for questions regarding this assessment.    Thank you for the consult,   Liseth Adasm       Patient brief summary:  Juani Reilly Jr. is a 30 y.o. male initiated on antimicrobial therapy with IV Vancomycin for treatment of sepsis    The patient's current regimen is pulse dosing    Drug Allergies:   Review of patient's allergies indicates:  No Known Allergies    Actual Body Weight:   62.3kg    Renal Function:   Estimated Creatinine Clearance: 67.4 mL/min (based on SCr of 1.4 mg/dL).,     Dialysis Method (if applicable):  N/A    CBC (last 72 hours):  Recent Labs   Lab Result Units 07/12/25  0137 07/12/25  0254 07/12/25  0348 07/12/25  0730 07/12/25  1413 07/13/25  0407 07/14/25  0415   WBC K/uL 9.47 8.56 9.73 14.97* 15.04* 10.66 8.62   HGB gm/dL 13.6* 12.6* 13.4* 13.3* 12.4* 12.1* 12.1*   HCT % 9.2* 40.6 41.5 44.3 40.0 37.1* 35.1*   Platelet Count K/uL 140* 111* 130* 131* 110* 114* 100*   Lymph % % 36.3 24.4 17.4* 7.9* 5.6* 7.3* 11.7*   Mono % % 6.9 5.4 5.7 5.9 4.5 2.4* 2.9*   Eos % % 0.1 0.0 0.1 0.0 0.0 0.1 0.3   Basophil % % 0.7 0.2 0.1 0.2 0.1 0.1 0.2       Metabolic Panel (last 72 hours):  Recent Labs   Lab Result Units 07/12/25  0157 07/12/25  0254 07/12/25  0322 07/12/25  0730 07/12/25  1409 07/12/25  1445 07/12/25  1515 07/12/25  1657  07/12/25  1805 07/12/25  2015 07/13/25  0008 07/13/25  0407 07/13/25  0842 07/13/25  1210 07/13/25  1503 07/13/25  1928 07/13/25  2355 07/14/25  0415 07/14/25  0805 07/14/25  1051 07/14/25  1306 07/14/25  1559 07/14/25  1756 07/14/25  2121   Sodium mmol/L  --  132*  --   --  136 135*  --   --  135* 133* 133* 131* 134* 132* 133* 133* 133* 131* 133* 131*  --  129* 131* 128*   Potassium mmol/L  --  3.8  --   --  4.4 4.2  --   --  3.9 3.7 3.5 4.1 3.8 3.2* 3.4* 3.2* 3.2* 3.3* 3.5 3.5  --  3.9 4.7 4.7   Chloride mmol/L  --  99  --   --  100 100  --   --  99 98 98 97 97 95 95 96 96 95 95 96  --  94* 95 97   CO2 mmol/L  --  12*  --   --  13* 16*  --   --  16* 16* 17* 20* 24 25 27 22* 24 25 27 24  --  25 26 20*   Glucose mg/dL  --  121*  --   --  86 102  --  156* 178* 217* 253* 230* 164* 162* 143* 138* 115* 122* 111* 121*  121*  --  127* 124* 95   Glucose, UA  Negative  --   --   --   --   --  Negative  --   --   --   --   --   --   --   --   --   --   --   --   --   --   --   --   --    BUN mg/dL  --  14  --   --  21* 22*  --   --  23* 22* 23* 22* 23* 22* 20 20 20 21* 20 22*  --  24* 22* 22*   Creatinine mg/dL  --  1.2  --   --  1.3 1.3  --   --  1.3 1.4 1.3 1.2 1.1 1.1 1.0 1.0 1.0 1.1 1.1 1.2  --  1.4 1.3 1.4   Urine Creatinine mg/dL 244.1  --   --   --   --   --   --   --   --   --   --   --   --   --   --   --   --   --   --   --   --   --   --   --    Albumin g/dL  --  3.2*  --   --  3.1* 3.0*  --   --   --   --   --  2.9*  --   --   --   --   --  2.5*  --   --   --   --   --   --    Bilirubin Total mg/dL  --  2.5*  --   --  3.6* 3.5*  --   --   --   --   --  3.0*  --   --   --   --   --  2.5*  --   --   --   --   --   --    ALP unit/L  --  81  --   --  81 78  --   --   --   --   --  74  --   --   --   --   --  69  --   --   --   --   --   --    AST unit/L  --  44  --   --  131* 157*  --   --   --   --   --  249*  --   --   --   --   --  161*  --   --   --   --   --   --    ALT unit/L  --  45*  --   --  87* 100*  --    --   --   --   --  174*  --   --   --   --   --  174*  --   --   --   --   --   --    Magnesium  mg/dL  --   --  2.2 2.5 2.0 2.1  --  2.0  --  2.0 1.9 1.8 2.7* 2.5 2.3 2.1 2.0 1.9 2.0  --  1.9  --   --  1.9   Phosphorus Level mg/dL  --   --   --  6.1* 6.1* 5.9*  --  5.2*  --  5.2* 5.0* 4.4 3.9 3.5 3.6 3.6 3.3 3.4 3.3 3.3  --  4.4  --  5.2*       Vancomycin Administrations:  vancomycin given in the last 96 hours                     vancomycin (VANCOCIN) 1,000 mg in 0.9% NaCl 250 mL IVPB (admixture device) (mg) 1,000 mg New Bag 07/13/25 1027     1,000 mg New Bag 07/12/25 2210    vancomycin 1,250 mg in 0.9% NaCl 250 mL IVPB (admixture device) (mg) 1,250 mg New Bag 07/12/25 1054                    Microbiologic Results:  Microbiology Results (last 7 days)       Procedure Component Value Units Date/Time    Blood culture [[id number]] Collected: 07/12/25 0954    Order Status: Sent Specimen: Blood from Peripheral, Lower Arm, Right Updated: 07/14/25 1635    Blood culture [[id number]]  (Normal) Collected: 07/12/25 0954    Order Status: Completed Specimen: Blood from Peripheral, Forearm, Left Updated: 07/14/25 1401     Blood Culture No Growth After 24 Hours    Blood culture [[id number]]  (Normal) Collected: 07/12/25 0954    Order Status: Completed Specimen: Blood from Peripheral, Upper Arm, Right Updated: 07/14/25 1401     Blood Culture No Growth After 24 Hours    Culture, Respiratory with Gram Stain [[id number]]     Order Status: Canceled Specimen: Respiratory from Sputum

## 2025-07-15 NOTE — ASSESSMENT & PLAN NOTE
S/P Fontan procedure  1. 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 Severely decreased left ventricular function  3. History of [id number], SVT  - status post successful ablation of a left lateral accessory pathway November 2022 by Dr. Ferguson  4. Cirrhosis  - last seen by hepatology May 2023 (normal AFP 6/11)  5. Arrested at home late night July 11, 2025  - unclear etiology, but CT scan suggestive of pulmonary emboli which may be chronic or acute  6. Long history of noncompliance with medication  7. Worsening hypoxemia over the past year of unclear etiology    Recommendations:   Saturations 85% or greater  CVP goal in the 10-15 range.  His prognosis is extremely poor.  By report, he had no gag reflex before paralytics were started. Would support withdrawal of care if the neurologic exam suggested significant injury.  Not a candidate for ECMO, other mechanical circulatory support, or heart transplant.    Appreciate consult to palliative care team and have encouraged Juani's family to continue working with them on goals of care for [place].      The [facility] team will continue to follow along with Hugoton's care. Please contact us if you have any additional questions.

## 2025-07-15 NOTE — PLAN OF CARE
Cardiac ICU Care Plan     CVP at bedside handoff 21, svo2 31. 80 IVP lasix given and gtts initiated at 10 mg per MD Campos. Following CVPs 20, 19, 16.  Pt remains medically paralyzed on 2 of nimbex.   Nimbex,prop, fent, levo, vaso, epi, , lasix, hep infusing  Normothermic overnight   O2 sats maintained >85%   Pt transferred to [facility]     Neuro:  [place] Coma Scale  Best Eye Response: 1-->(E1) none  Best Motor Response: 1-->(M1) none  Best Verbal Response: 1-->(V1) none  [place] Coma Scale Score: 3  Assessment Qualifiers: Patient chemically sedated or paralyzed, Patient intubated  Pupil PERRLA: no    24 hr Temp:  [95.7 °F (35.4 °C)-99.3 °F (37.4 °C)]      CV:  Rhythm: sinus tachycardia   DVT prophylaxis: VTE Core Measure: Pharmacological prophylaxis initiated/maintained    CVP (mean): 16 mmHg (07/15/25 0301)       SVO2 (%): 31 % (07/14/25 1901)               Pulses  Right Radial Pulse: Doppler  Left Radial Pulse: Doppler  Right Dorsalis Pedis Pulse: Doppler  Left Dorsalis Pedis Pulse: Doppler  Right Posterior Tibial Pulse: Doppler  Left Posterior Tibial Pulse: 0 (absent)    Resp:     Vent Mode: A/C  Set Rate: 20 BPM  Oxygen Concentration (%): 100  Vt Set: 400 mL  PEEP/CPAP: 5 cmH20    GI/:  GI prophylaxis: yes  Diet/Nutrition Received: NPO  Last Bowel Movement:  (Unknown)  Voiding Characteristics: urethral catheter (bladder)       Urethral Catheter 07/12/25 0706 Temperature probe 14 Fr.-Reason for Continuing Urinary Catheterization: Critically ill in ICU and requiring hourly monitoring of intake/output   Intake/Output Summary (Last 24 hours) at 7/15/2025 0516  Last data filed at 7/15/2025 0501  Gross per 24 hour   Intake 2934.79 ml   Output 2531 ml   Net 403.79 ml        Nutritional Supplement Intake: Quantity 0, Type: NPO    Labs/Accuchecks:  Recent Labs   Lab 07/13/25  0407 07/14/25  0415 07/15/25  0259   WBC 10.66 8.62 9.82   RBC 4.33* 4.26* 4.30*   HGB 12.1* 12.1* 12.1*   HCT 37.1* 35.1* 36.6*   *  100* 99*      Recent Labs   Lab 07/13/25  0407 07/14/25  0415 07/14/25  1306 07/14/25  1917 07/15/25  0259   INR 1.9* 1.6*  --   --  1.2   APTT 55.2* 79.7* 48.7* 45.0* 45.3*      Recent Labs     07/15/25  0259   *   K 4.4   CO2 21*   CL 94*   BUN 22*   CREATININE 1.4   ALKPHOS 80   *   *   BILITOT 3.0*       Recent Labs   Lab 07/12/25  0220 07/12/25  0322 07/12/25  1445   CPK  --  299* 405*   TROPONINI 0.150*  --   --       Recent Labs     07/14/25  1052 07/14/25  1300 07/14/25  1936 07/14/25  1937   PH 7.471* 7.453* 7.409  --    PCO2 36.4 39.6 40.7  --    PO2 48* 44* 55* 20*   HCO3 26.5 27.7 25.7  --    POCSATURATED 86 82 88 31   BE 3* 4* 1  --        Electrolytes: Electrolytes replaced  Accuchecks: none    Gtts/LDAs:   0.9% NaCl   Intravenous Continuous 5 mL/hr at 07/15/25 0501 Rate Verify at 07/15/25 0501    CISatracurium 200 mg in 0.9% NaCl 100 mL infusion  0-10 mcg/kg/min Intravenous Continuous 3.9 mL/hr at 07/15/25 0501 2 mcg/kg/min at 07/15/25 0501    DOBUTamine IV infusion (non-titrating)  5 mcg/kg/min Intravenous Continuous 4.9 mL/hr at 07/15/25 0501 5 mcg/kg/min at 07/15/25 0501    EPINEPHrine  0.05 mcg/kg/min Intravenous Continuous 9.9 mL/hr at 07/15/25 0501 0.05 mcg/kg/min at 07/15/25 0501    fentanyl  0-250 mcg/hr Intravenous Continuous 15 mL/hr at 07/15/25 0501 150 mcg/hr at 07/15/25 0501    furosemide (Lasix) 500 mg in 50 mL infusion (conc: 10 mg/mL)  10 mg/hr Intravenous Continuous 1 mL/hr at 07/15/25 0501 10 mg/hr at 07/15/25 0501    heparin (porcine) in D5W  0-40 Units/kg/hr Intravenous Continuous 5.9 mL/hr at 07/15/25 0501 9 Units/kg/hr at 07/15/25 0501    nitric oxide gas  10 ppm Inhalation Continuous   10 ppm at 07/14/25 1421    NORepinephrine bitartrate-D5W  0-3 mcg/kg/min Intravenous Continuous 8 mL/hr at 07/15/25 0501 0.26 mcg/kg/min at 07/15/25 0501    propofoL  0-50 mcg/kg/min Intravenous Continuous 19.7 mL/hr at 07/15/25 0501 50 mcg/kg/min at 07/15/25 0501     vasopressin  0.04 Units/min Intravenous Continuous 12 mL/hr at 07/15/25 0501 0.04 Units/min at 07/15/25 0501       Lines/Drains/Airways       Central Venous Catheter Line  Duration             Percutaneous Central Line Triple Lumen 07/12/25 1227 Internal Jugular Right 2 days              Drain  Duration                  NG/OG Tube 07/12/25 1645 Center mouth 2 days         Urethral Catheter 07/12/25 0706 Temperature probe 14 Fr. 2 days              Airway  Duration                  Airway - Non-Surgical 07/12/25 0130 3 days              Arterial Line  Duration             Arterial Line 07/12/25 1912 Left Radial 2 days              Peripheral Intravenous Line  Duration                  External Jugular IV 07/12/25 0704 2 days    Peripheral IV Single Lumen 07/12/25 0703 18 G Anterior;Left Forearm 2 days    Peripheral IV Single Lumen 07/12/25 0704 18 G Right Antecubital 2 days    Peripheral IV Single Lumen 07/12/25 0704 20 G Anterior;Right Forearm 2 days    Peripheral IV Single Lumen 07/12/25 1000 20 G Right Hand 2 days                    Skin/Wounds  Bathing/Skin Care: bath, complete;linen changed;back care;incontinence care (07/14/25 1701)  Wounds: No  Wound care consulted: No     Problem: Adult Inpatient Plan of Care  Goal: Plan of Care Review  Outcome: Progressing

## 2025-07-15 NOTE — SUBJECTIVE & OBJECTIVE
Interval History: Remains critically ill, intubated and on pressors. Off paralytics today.     Review of Systems   Unable to perform ROS: Intubated     Objective:     Vital Signs (Most Recent):  Temp: 98.1 °F (36.7 °C) (07/15/25 1202)  Pulse: 103 (07/15/25 1202)  Resp: 20 (07/15/25 1202)  BP: 93/61 (07/15/25 1202)  SpO2: (!) 90 % (07/15/25 1202) Vital Signs (24h Range):  Temp:  [97.9 °F (36.6 °C)-99.3 °F (37.4 °C)] 98.1 °F (36.7 °C)  Pulse:  [102-123] 103  Resp:  [13-20] 20  SpO2:  [73 %-91 %] 90 %  BP: ()/(58-75) 93/61  Arterial Line BP: (80-99)/(51-67) 88/60     Weight: (S) 62.1 kg (137 lb)  Body mass index is 22.11 kg/m².    Estimated Creatinine Clearance: 67.2 mL/min (based on SCr of 1.4 mg/dL).     Physical Exam  Constitutional:       General: He is not in acute distress.     Appearance: He is ill-appearing.   HENT:      Mouth/Throat:      Comments: ETT  Pulmonary:      Effort: Pulmonary effort is normal. No respiratory distress.   Abdominal:      General: There is no distension.      Palpations: Abdomen is soft.   Genitourinary:     Comments: xiong  Musculoskeletal:      Right lower leg: Edema present.      Left lower leg: Edema present.   Skin:     General: Skin is warm and dry.      Comments: RIJ          Significant Labs: All pertinent labs within the past 24 hours have been reviewed.    Significant Imaging: I have reviewed all pertinent imaging results/findings within the past 24 hours.

## 2025-07-15 NOTE — HPI
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 He is followed by Dr. Reis in the advanced HF clinic and was last seen on 7/8/25 for NYHA class II-III symptoms. At that time, diuretics were adjusted (Lasix discontinued, Bumex 2 mg BID started).    Per family, patient developed progressive SOB over the past week and became unresponsive at home. CPR was initiated by his sister (~5 min) after he collapsed in his bedroom. EMS arrived to find PEA arrest; ROSC achieved after 3 rounds of epinephrine. He was intubated in the field and transferred to this facility.    Pulmonology consulted for ventilator management in the setting of refractory hypoxemia.

## 2025-07-15 NOTE — ASSESSMENT & PLAN NOTE
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 Current admission notable for blood cx that is so far no growth to date. He remains critically ill, intubated and on high pressor support.  CTA with bilateral acute PE. TTE with worsening systolic dysfunction. GOC discussions ongoing, guarded prognosis.     Recommendations:  -agree with stopping vancomycin  -if cx data remains negative, favor stopping zosyn

## 2025-07-15 NOTE — EICU
VICU Night Rounds Checklist  24H Vital Sign Range:  Temp:  [93.7 °F (34.3 °C)-99.3 °F (37.4 °C)]   Pulse:  [103-123]   Resp:  [20-24]   BP: ()/(50-72)   SpO2:  [73 %-93 %]   Arterial Line BP: (80-99)/(46-67)     Video rounds and LDA reconciliation

## 2025-07-15 NOTE — PROGRESS NOTES
[facility] - [facility]  Infectious Disease  Progress Note    Patient Name: Juani Reilly Jr.  MRN: [id number]  Admission Date: 7/12/2025  Length of Stay: 3 days  Attending Physician: Arpan Will, *  Primary Care Provider: Jaqueline Pardo MD    Isolation Status: No active isolations  Assessment/Plan:      Cardiac/Vascular  * Cardiac arrest  31 yo male with complex congenital cardiac disease including TV/pulmonary atresia s/p Fontan procedure, WPW s/p ablation 2022, and cirrhosis admitted for PEA cardiac arrest in setting of acute PE. Current admission notable for blood cx that is so far no growth to date. He remains critically ill, intubated and on high pressor support.  CTA with bilateral acute PE. TTE with worsening systolic dysfunction. GOC discussions ongoing, guarded prognosis.     Recommendations:  -agree with stopping vancomycin  -if cx data remains negative, favor stopping zosyn           Thank you for your consult. Will sign off, please call with questions, new culture data or clinical changes. Above d/w primary team.       Melanie Branch MD  Infectious Disease  [facility] - [facility]    Critical care time: 35 minutes   I personally spent critical care time on the following: evaluating this patient's organ dysfunction, development of treatment plan, discussing treatment plan with patient or surrogate and bedside caregivers, discussions with critical care service and/or consultants, evaluation of patient's response to treatment, physical examination of patient, ordering and review of treatments interventions, laboratory studies, and radiographic studies, re-evaluation of patient's condition. This critical care time did not overlap with that of any other provider of the same specialty or involve time for procedures.       Subjective:     Principal Problem:Cardiac arrest    HPI: 31 yo male with complex congenital cardiac disease including TV/pulmonary atresia s/p  Fontan procedure, WPW s/p ablation 2022, and cirrhosis admitted for PEA cardiac arrest in setting of acute PE. ID consulted for abx assistance. Current admission notable for blood cx that is so far no growth to date. He remains critically ill, intubated and on pressors in cardiogenic shock. He is on vancomycin and zosyn. CTA with bilateral acute PE. TTE pending.        Interval History: Remains critically ill, intubated and on pressors. Off paralytics today.     Review of Systems   Unable to perform ROS: Intubated     Objective:     Vital Signs (Most Recent):  Temp: 98.1 °F (36.7 °C) (07/15/25 1202)  Pulse: 103 (07/15/25 1202)  Resp: 20 (07/15/25 1202)  BP: 93/61 (07/15/25 1202)  SpO2: (!) 90 % (07/15/25 1202) Vital Signs (24h Range):  Temp:  [97.9 °F (36.6 °C)-99.3 °F (37.4 °C)] 98.1 °F (36.7 °C)  Pulse:  [102-123] 103  Resp:  [13-20] 20  SpO2:  [73 %-91 %] 90 %  BP: ()/(58-75) 93/61  Arterial Line BP: (80-99)/(51-67) 88/60     Weight: (S) 62.1 kg (137 lb)  Body mass index is 22.11 kg/m².    Estimated Creatinine Clearance: 67.2 mL/min (based on SCr of 1.4 mg/dL).     Physical Exam  Constitutional:       General: He is not in acute distress.     Appearance: He is ill-appearing.   HENT:      Mouth/Throat:      Comments: ETT  Pulmonary:      Effort: Pulmonary effort is normal. No respiratory distress.   Abdominal:      General: There is no distension.      Palpations: Abdomen is soft.   Genitourinary:     Comments: tyrese  Musculoskeletal:      Right lower leg: Edema present.      Left lower leg: Edema present.   Skin:     General: Skin is warm and dry.      Comments: JULIA          Significant Labs: All pertinent labs within the past 24 hours have been reviewed.    Significant Imaging: I have reviewed all pertinent imaging results/findings within the past 24 hours.

## 2025-07-15 NOTE — CONSULTS
[facility] - [facility]  Palliative Medicine  Consult Note    Patient Name: Juani Reilly Jr.  MRN: [id number]  Admission Date: 7/12/2025  Hospital Length of Stay: 2 days  Code Status: DNR   Attending Provider: Solitario Reis MD  Consulting Provider: Parish Wakefield MD  Primary Care Physician: Jaqueline Pardo MD  Principal Problem:Cardiac arrest    Patient information was obtained from parent, relative(s), past medical records, and primary team.      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     -s/p rewarming  -will need to tolerate discontinuation of NM blockade for neuro exam  -may benefit from MRI and SSEP pending above    Palliative Care  Palliative care encounter  See ACP 7/14.     Insight/goals of care- limited insight and prognostic awareness. Some substituted values of functional independence. Clear values from family of accepting any and all potentially beneficial interventions. Low willingness to engage regarding about limits on care or avoiding non-helpful interventions (CPR)    Social support- supportive mother, Amita, and grandmother, Elaina. Reportedly, patient raised mostly by grandmother    Psychological- limited coping, appropriate grief    Spiritual- Jew, emphasized importance of prayer    Symptom management- no active needs    Recommendations  -recommended DNR, family unable to accept this recommendation at this time but open to continued discussions  -continue current level of care  -will continue to assist aligning values with care recommendations as course unfolds, negrito pending neuroprognostication          Thank you for your consult. I will follow-up with patient. Please contact us if you  "have any additional questions.    Subjective:     HPI:   Mr "Vinny Reilly is a 30 year old male with congential heart disease (tricuspid and pulmonary atresia), WPW with remote history of surgical interventions for both, now with HFrEF admitted to CCU after out of hospital cardiac arrest and subsequent development of cardiogenic shock and respiratory failure. Intubated, sedated, and paralyzed (for vent dyssynchrony) in the ICU, now s/p TTM. Palliative medicine consulted to assist with goals of care.     Hospital Course:  No notes on file    Interval History: Chart reviewed including 24h medication use. Patient remains critically ill, sedated, paralyzed, intubated on vasopressors. Now normothermic. Unable to assess cognition due to sedation/paralysis, ongoing concerns for vent dyssynchrony. Mother, grandmother, and other family at bedside during visit. Supportive discussion below.      Past Medical History:   Diagnosis Date    Acute decompensated heart failure 02/06/2025    History of heart surgery     Other cirrhosis of liver 11/12/2020    Palliative care encounter 7/14/2025    SVT (supraventricular tachycardia)     WPW syndrome        Past Surgical History:   Procedure Laterality Date    ANGIOGRAPHY OF AORTIC ARCH N/A 09/03/2020    Procedure: AORTOGRAM, AORTIC ARCH;  Surgeon: Stephania Crump MD;  Location: [facility] CATH LAB;  Service: Cardiology;  Laterality: N/A;    FONTAN PROCEDURE, EXTRACARDIAC      LEFT HEART CATHETERIZATION Left 09/03/2020    Procedure: Left heart cath;  Surgeon: Stephania Crump MD;  Location: [facility] CATH LAB;  Service: Cardiology;  Laterality: Left;    RIGHT HEART CATHETERIZATION FOR CONGENITAL HEART DEFECT N/A 09/03/2020    Procedure: CATHETERIZATION, HEART, RIGHT, FOR CONGENITAL HEART DEFECT;  Surgeon: Stephania Crump MD;  Location: [facility] CATH LAB;  Service: Cardiology;  Laterality: N/A;  [facility] - needs covid test morning of. Extenuating circumstances    TRANSESOPHAGEAL " ECHOCARDIOGRAPHY N/A 11/10/2022    Procedure: ECHOCARDIOGRAM, TRANSESOPHAGEAL;  Surgeon: Emeterio Hall MD;  Location: [facility] EP LAB;  Service: Cardiology;  Laterality: N/A;    TREATMENT OF CARDIAC ARRHYTHMIA N/A 01/09/2021    Procedure: CARDIOVERSION;  Surgeon: Jim Mcginnis MD;  Location: [facility] CATH LAB;  Service: Cardiology;  Laterality: N/A;       Review of patient's allergies indicates:  No Known Allergies    Medications:  Continuous Infusions:   0.9% NaCl   Intravenous Continuous   Stopped at 07/14/25 1603    CISatracurium 200 mg in 0.9% NaCl 100 mL infusion  0-10 mcg/kg/min Intravenous Continuous 3.9 mL/hr at 07/14/25 1801 2 mcg/kg/min at 07/14/25 1801    DOBUTamine IV infusion (non-titrating)  5 mcg/kg/min Intravenous Continuous 4.9 mL/hr at 07/14/25 1801 5 mcg/kg/min at 07/14/25 1801    EPINEPHrine  0.05 mcg/kg/min Intravenous Continuous 9.9 mL/hr at 07/14/25 1801 0.05 mcg/kg/min at 07/14/25 1801    fentanyl  0-250 mcg/hr Intravenous Continuous 17.5 mL/hr at 07/14/25 1801 175 mcg/hr at 07/14/25 1801    heparin (porcine) in D5W  0-40 Units/kg/hr Intravenous Continuous 5.9 mL/hr at 07/14/25 1801 9 Units/kg/hr at 07/14/25 1801    nitric oxide gas  10 ppm Inhalation Continuous   10 ppm at 07/14/25 1421    NORepinephrine bitartrate-D5W  0-3 mcg/kg/min Intravenous Continuous 8.6 mL/hr at 07/14/25 1801 0.28 mcg/kg/min at 07/14/25 1801    propofoL  0-50 mcg/kg/min Intravenous Continuous 19.7 mL/hr at 07/14/25 1801 50 mcg/kg/min at 07/14/25 1801    vasopressin  0.04 Units/min Intravenous Continuous 12 mL/hr at 07/14/25 1801 0.04 Units/min at 07/14/25 1801     Scheduled Meds:   heparin (PORCINE)  80 Units/kg Intravenous Once    mupirocin   Nasal BID    pantoprazole  40 mg Intravenous Daily    piperacillin-tazobactam (Zosyn) IV (PEDS and ADULTS) (extended infusion is not appropriate)  4.5 g Intravenous Q8H    potassium chloride in water  40 mEq Intravenous Once    white petrolatum-mineral oiL   Both Eyes Q8H     PRN  Meds:  Current Facility-Administered Medications:     heparin (PORCINE), 60 Units/kg, Intravenous, PRN    heparin (PORCINE), 30 Units/kg, Intravenous, PRN    sodium chloride 0.9%, 10 mL, Intravenous, PRN    Pharmacy to dose Vancomycin consult, , , Once **AND** vancomycin - pharmacy to dose, , Intravenous, pharmacy to manage frequency    Family History       Problem Relation (Age of Onset)    Heart disease Maternal Grandfather    No Known Problems Mother, Father, Sister, Brother, Maternal Aunt, Maternal Uncle, Paternal Aunt, Paternal Uncle, Maternal Grandmother, Paternal Grandmother, Paternal Grandfather          Tobacco Use    Smoking status: Former     Current packs/day: 0.00     Types: Cigarettes     Quit date:      Years since quittin.5     Passive exposure: Past    Smokeless tobacco: Never    Tobacco comments:     3-4 months    Substance and Sexual Activity    Alcohol use: No    Drug use: Yes     Types: Marijuana     Comment: Odalys today- pt reports a while back    Sexual activity: Yes       Review of Systems   Unable to perform ROS: Intubated     Objective:     Vital Signs (Most Recent):  Temp: 98.6 °F (37 °C) (25)  Pulse: (!) 119 (25)  Resp: 20 (25)  BP: 105/70 (25)  SpO2: (!) 86 % (25) Vital Signs (24h Range):  Temp:  [91.6 °F (33.1 °C)-99.1 °F (37.3 °C)] 98.6 °F (37 °C)  Pulse:  [] 119  Resp:  [20-24] 20  SpO2:  [73 %-95 %] 86 %  BP: ()/(50-72) 105/70  Arterial Line BP: (82-99)/(46-63) 98/61     Weight: 62.3 kg (137 lb 5.6 oz)  Body mass index is 22.17 kg/m².       Physical Exam  Vitals and nursing note reviewed.   Constitutional:       Appearance: He is ill-appearing.      Comments: Younger, critically ill-appearing male lying in bed, intubated, sedated and paralyzed   Neurological:      Comments: Unable to assess reflexes/cognition due to sedation and paralysis                Advance Care Planning  Advance Directives:   Goals of  "Care: I engaged the patient's mother and other family members at that side in a voluntary discussion regarding substituted values for patient in the setting of severe critical illness and cardiac arrest. Family with very limited insight and prognostic awareness regarding the patient's current condition and overall outcome. I emphasized that "Dwight" is extremely sick right now on multiple forms of life support and remains clinically unstable, despite everything that is being done to attempt to help him through this current illness. Family is very clear about their values, sharing that they would like everything to be done and our prayerfully hoping that Dwight makes a meaningful recovery from this devastating illness. I expressed that I shared the same hope and that there are many things that we will consider over the coming days including diagnostic tools and potential treatment interventions that might help Dwight survive. I also shared that there are some things that we probably shouldn't do and likely won't ultimately help Dwight have a meaningful outcome, specifically CPR in the event of another cardiac arrest. I shared that, if Dwight suffers another arrest, his likelihood of having any kind of meaningful outcome becomes profoundly unlikely, and I would worry that putting him through that again would only add suffering to the very end of his life. His mother and family understand why I make this recommendation, but they clearly state that they are  unable to make any kind of decisions about limits on his care at this time. I strongly encouraged them to discuss what Dwight's wishes would be if he were able to speak with us now. They are open to continued support from my team, as we gather more prognostic information. All other questions and concerns answered up this time.            CBC:   Recent Labs   Lab 07/14/25  0415   WBC 8.62   HGB 12.1*   HCT 35.1*   MCV 82   *     BMP:  Recent Labs   Lab 07/14/25  1306 " 07/14/25  1559 07/14/25  1756   GLU  --    < > 124*   NA  --    < > 131*   K  --    < > 4.7   CL  --    < > 95   CO2  --    < > 26   BUN  --    < > 22*   CREATININE  --    < > 1.3   CALCIUM  --    < > 8.1*   MG 1.9  --   --     < > = values in this interval not displayed.     LFT:  Lab Results   Component Value Date     (H) 07/14/2025    ALKPHOS 69 07/14/2025    BILITOT 2.5 (H) 07/14/2025     Albumin:   Albumin   Date Value Ref Range Status   07/14/2025 2.5 (L) 3.5 - 5.2 g/dL Final   02/09/2025 3.4 (L) 3.5 - 5.2 g/dL Final     Protein:   Protein Total   Date Value Ref Range Status   07/14/2025 5.9 (L) 6.0 - 8.4 gm/dL Final     Total Protein   Date Value Ref Range Status   02/09/2025 6.4 6.0 - 8.4 g/dL Final     Lactic acid:   Lab Results   Component Value Date    LACTATE 1.8 07/14/2025    LACTATE 1.7 07/13/2025       Reviewed CBC with anemia and thrombocytopenia, CMP with stable renal function. ABG with PaO2 44 concerning for hypoxemia of mixed clinical etiology      In my care of this patient with acute on chronic severe illness with threat to life and/or bodily function, I am recommending goal-concordant care as noted above. I spent a significant amount of time reviewing external records/ recommendations of other providers (HTS), reviewing recent test results (CBC, CMP, ABG), and discussed care with other subspecialists involved (HTS)    In addition to above, I spent 20 minutes specifically discussing advance care planning and goals of care with patient's family at bedside.       The above recommendations communicated directly to primary team on 7/14      Parish Wakefield MD  Palliative Medicine  [facility] - Cardiac Intensive Care

## 2025-07-15 NOTE — PROGRESS NOTES
Update     Pt is intubated. SW met with pt's family (mother Amita, grandmother Elaina, and cousin Ivan) in room to introduce self and role. Pt's family reports coping adequately and denies further needs, questions, concerns at this time and none indicated. Providing ongoing psychosocial and counseling support, education, resources, assistance and discharge planning as indicated. Following and available.

## 2025-07-15 NOTE — PROGRESS NOTES
[facility]  Heart Transplant  Progress Note    Patient Name: Juani Reilly Jr.  MRN: [id number]  Admission Date: 7/12/2025  Hospital Length of Stay: 3 days  Attending Physician: Arpan Will, *  Primary Care Provider: Jaqueline Pardo MD  Principal Problem:Cardiac arrest    Subjective:   Interval History:   CVP chun in the evening. Lasix 80 IV x 1 and gtt started at 10mg/h. Remains on multiple vasopressors and high level of oxygen/ventilatory support.     UO 2800, + 146    Hemodynamic Parameters:  SCVO2 46   CVP 19   MAP 71   Cardiac Output 3.07   Cardiac Index 1.81   SVR 1356       Continuous Infusions:   0.9% NaCl   Intravenous Continuous 5 mL/hr at 07/15/25 1100 Rate Verify at 07/15/25 1100    CISatracurium 200 mg in 0.9% NaCl 100 mL infusion  0-10 mcg/kg/min Intravenous Continuous 3 mL/hr at 07/15/25 1100 1.5 mcg/kg/min at 07/15/25 1100    DOBUTamine IV infusion (non-titrating)  5 mcg/kg/min Intravenous Continuous 4.9 mL/hr at 07/15/25 1100 5 mcg/kg/min at 07/15/25 1100    EPINEPHrine  0.05 mcg/kg/min Intravenous Continuous 9.9 mL/hr at 07/15/25 1100 0.05 mcg/kg/min at 07/15/25 1100    fentanyl  0-250 mcg/hr Intravenous Continuous 20 mL/hr at 07/15/25 1100 200 mcg/hr at 07/15/25 1100    furosemide (Lasix) 500 mg in 50 mL infusion (conc: 10 mg/mL)  20 mg/hr Intravenous Continuous 2 mL/hr at 07/15/25 1100 20 mg/hr at 07/15/25 1100    heparin (porcine) in D5W  0-40 Units/kg/hr Intravenous Continuous 5.9 mL/hr at 07/15/25 1100 9 Units/kg/hr at 07/15/25 1100    nitric oxide gas  10 ppm Inhalation Continuous   10 ppm at 07/14/25 1421    NORepinephrine bitartrate-D5W  0-3 mcg/kg/min Intravenous Continuous 6.2 mL/hr at 07/15/25 1100 0.2 mcg/kg/min at 07/15/25 1100    propofoL  0-50 mcg/kg/min Intravenous Continuous 19.7 mL/hr at 07/15/25 1100 50 mcg/kg/min at 07/15/25 1100    vasopressin  0.04 Units/min Intravenous Continuous 12 mL/hr at 07/15/25 1100 0.04 Units/min at 07/15/25  1100     Scheduled Meds:   heparin (PORCINE)  80 Units/kg Intravenous Once    mupirocin   Nasal BID    pantoprazole  40 mg Intravenous Daily    piperacillin-tazobactam (Zosyn) IV (PEDS and ADULTS) (extended infusion is not appropriate)  4.5 g Intravenous Q8H    potassium chloride in water  40 mEq Intravenous Once    white petrolatum-mineral oiL   Both Eyes Q8H     PRN Meds:  Current Facility-Administered Medications:     heparin (PORCINE), 60 Units/kg, Intravenous, PRN    heparin (PORCINE), 30 Units/kg, Intravenous, PRN    sodium chloride 0.9%, 10 mL, Intravenous, PRN    Review of patient's allergies indicates:  No Known Allergies  Objective:     Vital Signs (Most Recent):  Temp: 98.4 °F (36.9 °C) (07/15/25 1000)  Pulse: 105 (07/15/25 1000)  Resp: 20 (07/15/25 1000)  BP: 106/67 (07/15/25 1000)  SpO2: (!) 90 % (07/15/25 1000) Vital Signs (24h Range):  Temp:  [97.7 °F (36.5 °C)-99.3 °F (37.4 °C)] 98.4 °F (36.9 °C)  Pulse:  [102-123] 105  Resp:  [13-20] 20  SpO2:  [73 %-91 %] 90 %  BP: ()/(58-75) 106/67  Arterial Line BP: (80-99)/(50-67) 83/58     Patient Vitals for the past 72 hrs (Last 3 readings):   Weight   07/15/25 0601 62.1 kg (137 lb)   07/14/25 0619 62.3 kg (137 lb 5.6 oz)   07/13/25 0508 62.1 kg (136 lb 14.5 oz)     Body mass index is 22.11 kg/m².      Intake/Output Summary (Last 24 hours) at 7/15/2025 1109  Last data filed at 7/15/2025 1100  Gross per 24 hour   Intake 2923.37 ml   Output 3631 ml   Net -707.63 ml         Telemetry: Reviewed       Physical Exam  Constitutional:       Comments: Deeply sedated on mechanical ventilator   HENT:      Head: Atraumatic.      Mouth/Throat:      Comments: ETT in place  Eyes:      Conjunctiva/sclera: Conjunctivae normal.   Cardiovascular:      Rate and Rhythm: Regular rhythm. Tachycardia present.      Pulses: Normal pulses.   Pulmonary:      Breath sounds: Normal breath sounds. No wheezing or rales.   Abdominal:      General: Abdomen is flat.      Palpations:  "Abdomen is soft.   Musculoskeletal:      Right lower leg: No edema.      Left lower leg: No edema.   Skin:     General: Skin is warm and dry.   Neurological:      Comments: Sedated deeply   Psychiatric:      Comments: Unable to assess            Significant Labs:  CBC:  Recent Labs   Lab 07/13/25  0407 07/14/25  0415 07/15/25  0259   WBC 10.66 8.62 9.82   RBC 4.33* 4.26* 4.30*   HGB 12.1* 12.1* 12.1*   HCT 37.1* 35.1* 36.6*   * 100* 99*   MCV 86 82 85   MCH 27.9 28.4 28.1   MCHC 32.6 34.5 33.1     BNP:  Recent Labs   Lab 07/08/25  1308 07/12/25  0220 07/12/25  0730   BNP 1,802* 2,461* 2,903*     CMP:  Recent Labs   Lab 07/13/25  0407 07/13/25  0842 07/14/25  0415 07/14/25  0805 07/14/25 2121 07/15/25  0259 07/15/25  0752   *   < > 122*   < > 95 101 95   CALCIUM 8.3*   < > 7.8*   < > 8.0* 8.3* 8.5*   ALBUMIN 2.9*  --  2.5*  --   --  2.8*  --    PROT 6.5  --  5.9*  --   --  7.1  --    *   < > 131*   < > 128* 130* 130*   K 4.1   < > 3.3*   < > 4.7 4.4 4.1   CO2 20*   < > 25   < > 20* 21* 25   CL 97   < > 95   < > 97 94* 93*   BUN 22*   < > 21*   < > 22* 22* 21*   CREATININE 1.2   < > 1.1   < > 1.4 1.4 1.4   ALKPHOS 74  --  69  --   --  80  --    *  --  174*  --   --  180*  --    *  --  161*  --   --  143*  --    BILITOT 3.0*  --  2.5*  --   --  3.0*  --     < > = values in this interval not displayed.      Coagulation:   Recent Labs   Lab 07/13/25  0407 07/14/25  0415 07/14/25  1306 07/14/25  1917 07/15/25  0259   INR 1.9* 1.6*  --   --  1.2   APTT 55.2* 79.7* 48.7* 45.0* 45.3*     LDH:  No results for input(s): "LDH" in the last 72 hours.  Microbiology:  Microbiology Results (last 7 days)       Procedure Component Value Units Date/Time    Blood culture [[id number]]  (Normal) Collected: 07/12/25 0954    Order Status: Completed Specimen: Blood from Peripheral, Forearm, Left Updated: 07/15/25 0205     Blood Culture No Growth After 36 Hours    Blood culture [[id number]]  (Normal) " Collected: 07/12/25 0954    Order Status: Completed Specimen: Blood from Peripheral, Upper Arm, Right Updated: 07/15/25 0205     Blood Culture No Growth After 36 Hours    Blood culture [[id number]] Collected: 07/12/25 0954    Order Status: Sent Specimen: Blood from Peripheral, Lower Arm, Right Updated: 07/14/25 1635    Culture, Respiratory with Gram Stain [[id number]]     Order Status: Canceled Specimen: Respiratory from Sputum             I have reviewed all pertinent labs within the past 24 hours.    Estimated Creatinine Clearance: 67.2 mL/min (based on SCr of 1.4 mg/dL).    Diagnostic Results:  I have reviewed all pertinent imaging results/findings within the past 24 hours.  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 Ferguson Nov 2022, decreased LV function 20-25%, follows with advanced heart failure clinic (Dr. Reis) with plans for possible transplant, and cirrhosis transferred from [facility] in [place] for advance option evaluation and evaluation from Adult congenital heart disease. Upon encounter pt was intubates and sedated. Family at bedside reported that pt has been having progressive SOB leading to cardiac arrest. As per chart review pt has had recurrent HF admission. Was seen by Dr Reis 7/8/25 and he reported sob with NYHA II-III. Adjustment to GDMT was made, Lasix discontinued and Bumex 2mg BID start. Family reported that pt was in the bedroom with his grandmother when he became unresponsive. Sister rushed at bedside and CPR was started. Reported that she did CPR for 5 mins until EMS arrived. PEA was noted and 3 rounds of epi were administered with ROSC. Patient was intubated but c/b refractory hypoxemia so pulmonology was consulted. Vent 500, rate 20, PEEP 10, 100% FiO2 with Abg of 7.22, pCO2 32, pO2 53, sat 73%. Patient is sedated with propofol.   - CTA-PE: Notable for BP PE:  Heparin initiated   - Bedside ECHO: No visible RV, moderate MR/TR. IVC 1.7 cm     In the ED lactic acid was 9.1-->10. BNP 2900, Lasix 80 given and infusion 20 mg/hr started.   Hemodynamics:    SVO2 CO CI SVR   49 2.5 1.4 2494      Pulmonary and Adult congenital Heart disease consulted and recommendations appreciated.   Dr Santoyo recommendations appreciated. Pt is not a candidate for advance options or MCS.  started.   Pulmonary recommended adequate sedation and paralytic for better oxygenation        * Cardiac arrest  #Cardiogenic Shock  Patient with Hx of Tricuspid and pulmonary atresia, s/p systemic to pulmonary artery shunt followed by a bidirectional Jesus and subsequent lateral tunnel Fontan procedure. Recent EF 20-25%. Recurrent hospital admission for ADHF exacerbation. Had cardiac arrest at home. CPR  by sister for 5 mins.   Rhythm: PEA - 3 rounds of epi were administered with ROSC   CTA-PE: Notable for BP PE   Initial hemodynamics: CI 1.4, SVR 2494  - Adult congential heart disease consulted: recs apperciated    - Goal CVP 10-15 to maintain adequate preload  - Cont Dobutamine 5 mcg/hr   - Cont Levo, Vaso, Epi. Wean as tolerated  - Cont Shanthi 10 ppm  - Lasix 80 IV x 1 and increase gtt to 20mg/h  - S/p TTM protocol. Now normothermic  - Monitor urine output I/O   - Monitor electrolytes and supplement K>4, Phos> 3 Mg >2   - Hemodynamics SVO2 q 8hr    - Ongoing GOC discussion with family. At this time he is DNR/DNI but continuing aggressive life-sustaining measures  - Palliative care consulted, appreciate their assistance     Sepsis  Patient with leukocytosis left shift; now resolved. CT Chest ; Notable for pulmonary edema and infiltrates indicating possible PNA. CXR w/ LLL infiltrates improving.  - ID consulted, appreciate recs  - Blood cultures NGTD x 36h  - Continue Zosyn   - Stop Vancomycin    Acute hypoxemic respiratory failure  Pt transferred for [facility] for advance options and adult congenital  evaluation. Pt with systemic  - Pulmonary shunt. Presented with refractory hypoxemia while intubated.    - Pulmonology following, appreciate recs  - Low PEEP strategy  - Goal SPO2 > 85%  - Deep sedation with RASS goal -4 to -5 to promote vent synchrony.   - Continue with fentanyl and propofol   - Will attempt to wean off nimbex today per pulmonology recs  - Cont Shanthi at 10 ppm, helped overcome refractory hypoxemia  - CXR stable  - TTE with bubble study pending for shunt evaluation      Pulmonary thromboembolism  Patient with PEA arrest. 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    - CTA Chest: Bilateral acute pulmonary thromboemboli identified in segmental branches of the bilateral lower lobe pulmonary arteries. Cardiomegaly with morphology suggesting single dilated left ventricle. There appears to be communication between the right and left atria suggesting large ASD.  Pulmonary venous congestion with bibasilar pulmonary edema.  - Cont Heparin drip   - Continue with VTE protocol   - Monitor Oxygen sat.   - Pulm consulted, recs appreciated.           Gustavo Matias MD  Heart Transplant  [facility]

## 2025-07-15 NOTE — SUBJECTIVE & OBJECTIVE
Interval History:  ACHD team has spent time the past two days talking with Juani's family and providing patient education as to his prognosis. His grandmother (Elaina) has a solid understanding and mom (Amita) is making progress with this process. They did decide last night to make him DNR.    Past Medical History:   Diagnosis Date    Acute decompensated heart failure 02/06/2025    History of heart surgery     Other cirrhosis of liver 11/12/2020    Palliative care encounter 7/14/2025    SVT (supraventricular tachycardia)     WPW syndrome        Past Surgical History:   Procedure Laterality Date    ANGIOGRAPHY OF AORTIC ARCH N/A 09/03/2020    Procedure: AORTOGRAM, AORTIC ARCH;  Surgeon: Stephania Crump MD;  Location: [facility] CATH LAB;  Service: Cardiology;  Laterality: N/A;    FONTAN PROCEDURE, EXTRACARDIAC      LEFT HEART CATHETERIZATION Left 09/03/2020    Procedure: Left heart cath;  Surgeon: Stephania Crump MD;  Location: [facility] CATH LAB;  Service: Cardiology;  Laterality: Left;    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 Extenuating circumstances    TRANSESOPHAGEAL ECHOCARDIOGRAPHY N/A 11/10/2022    Procedure: ECHOCARDIOGRAM, TRANSESOPHAGEAL;  Surgeon: Emeterio Hall MD;  Location: [facility] EP LAB;  Service: Cardiology;  Laterality: N/A;    TREATMENT OF CARDIAC ARRHYTHMIA N/A 01/09/2021    Procedure: CARDIOVERSION;  Surgeon: Jim Mcginnis MD;  Location: [facility] CATH LAB;  Service: Cardiology;  Laterality: N/A;       Review of patient's allergies indicates:  No Known Allergies    No current facility-administered medications on file prior to encounter.     Current Outpatient Medications on File Prior to Encounter   Medication Sig    benzonatate (TESSALON)  100 MG capsule Take 1 capsule (100 mg total) by mouth 3 (three) times daily as needed for Cough.    bumetanide (BUMEX) 2 MG tablet Take 1 tablet (2 mg total) by mouth 2 (two) times a day.    cetirizine (ZYRTEC) 10 MG tablet Take 1 tablet (10 mg total) by mouth once daily.    dapagliflozin propanediol (FARXIGA) 10 mg tablet Take 1 tablet (10 mg total) by mouth once daily.    losartan (COZAAR) 100 MG tablet Take 1 tablet (100 mg total) by mouth once daily.    metoprolol succinate (TOPROL-XL) 50 MG 24 hr tablet Take 1 tablet (50 mg total) by mouth once daily.    rivaroxaban (XARELTO) 20 mg Tab Take 1 tablet (20 mg total) by mouth daily with dinner or evening meal. (Patient not taking: Reported on 7/8/2025)    spironolactone (ALDACTONE) 25 MG tablet Take 1 tablet (25 mg total) by mouth once daily.     Family History       Problem Relation (Age of Onset)    Heart disease Maternal Grandfather    No Known Problems Mother, Father, Sister, Brother, Maternal Aunt, Maternal Uncle, Paternal Aunt, Paternal Uncle, Maternal Grandmother, Paternal Grandmother, Paternal Grandfather          Social History     Social History Narrative    Not on file     Scheduled Meds:   heparin (PORCINE)  80 Units/kg Intravenous Once    mupirocin   Nasal BID    pantoprazole  40 mg Intravenous Daily    piperacillin-tazobactam (Zosyn) IV (PEDS and ADULTS) (extended infusion is not appropriate)  4.5 g Intravenous Q8H    potassium chloride in water  40 mEq Intravenous Once    white petrolatum-mineral oiL   Both Eyes Q8H     Continuous Infusions:   0.9% NaCl   Intravenous Continuous 5 mL/hr at 07/15/25 1300 Rate Verify at 07/15/25 1300    CISatracurium 200 mg in 0.9% NaCl 100 mL infusion  0-10 mcg/kg/min Intravenous Continuous   Stopped at 07/15/25 1229    DOBUTamine IV infusion (non-titrating)  5 mcg/kg/min Intravenous Continuous 4.9 mL/hr at 07/15/25 1300 5 mcg/kg/min at 07/15/25 1300    EPINEPHrine  0.05 mcg/kg/min Intravenous Continuous 9.9 mL/hr at  07/15/25 1300 0.05 mcg/kg/min at 07/15/25 1300    fentanyl  0-250 mcg/hr Intravenous Continuous 17 mL/hr at 07/15/25 1300 170 mcg/hr at 07/15/25 1300    furosemide (Lasix) 500 mg in 50 mL infusion (conc: 10 mg/mL)  20 mg/hr Intravenous Continuous 2 mL/hr at 07/15/25 1300 20 mg/hr at 07/15/25 1300    heparin (porcine) in D5W  0-40 Units/kg/hr Intravenous Continuous 5.9 mL/hr at 07/15/25 1300 9 Units/kg/hr at 07/15/25 1300    nitric oxide gas  10 ppm Inhalation Continuous   10 ppm at 07/14/25 1421    NORepinephrine bitartrate-D5W  0-3 mcg/kg/min Intravenous Continuous 5.6 mL/hr at 07/15/25 1300 0.18 mcg/kg/min at 07/15/25 1300    propofoL  0-50 mcg/kg/min Intravenous Continuous 19.7 mL/hr at 07/15/25 1324 50 mcg/kg/min at 07/15/25 1324    vasopressin  0.04 Units/min Intravenous Continuous 12 mL/hr at 07/15/25 1300 0.04 Units/min at 07/15/25 1300     PRN Meds:.  Current Facility-Administered Medications:     heparin (PORCINE), 60 Units/kg, Intravenous, PRN    heparin (PORCINE), 30 Units/kg, Intravenous, PRN    sodium chloride 0.9%, 10 mL, Intravenous, PRN     Unable to get review of systems.  Patient intubated and sedated.  Objective:     Vital Signs (Most Recent):  Temp: 98.2 °F (36.8 °C) (07/15/25 1300)  Pulse: 102 (07/15/25 1300)  Resp: 20 (07/15/25 1300)  BP: (!) 88/59 (07/15/25 1300)  SpO2: (!) 88 % (07/15/25 1300) Vital Signs (24h Range):  Temp:  [97.9 °F (36.6 °C)-99.3 °F (37.4 °C)] 98.2 °F (36.8 °C)  Pulse:  [102-123] 102  Resp:  [13-20] 20  SpO2:  [86 %-91 %] 88 %  BP: ()/(59-75) 88/59  Arterial Line BP: (80-99)/(52-67) 83/56     Telemetry reviewed.  Sinus rhythm, occasional premature ventricular contractions.  No significant arrhythmias noted.  Weight: (S) 62.1 kg (137 lb)  Body mass index is 22.11 kg/m².    SpO2: (!) 88 %         Intake/Output Summary (Last 24 hours) at 7/15/2025 1439  Last data filed at 7/15/2025 1300  Gross per 24 hour   Intake 2769.99 ml   Output 4334 ml   Net -1564.01 ml        Lines/Drains/Airways       Central Venous Catheter Line  Duration             Percutaneous Central Line Triple Lumen 07/12/25 1227 Internal Jugular Right 3 days              Drain  Duration                  Urethral Catheter 07/12/25 0706 Temperature probe 14 Fr. 3 days         NG/OG Tube 07/12/25 1645 Center mouth 2 days              Airway  Duration                  Airway - Non-Surgical 07/12/25 0130 3 days              Arterial Line  Duration             Arterial Line 07/12/25 1912 Left Radial 2 days              Peripheral Intravenous Line  Duration                  External Jugular IV 07/12/25 0704 3 days    Peripheral IV Single Lumen 07/12/25 0703 18 G Anterior;Left Forearm 3 days    Peripheral IV Single Lumen 07/12/25 0704 18 G Right Antecubital 3 days    Peripheral IV Single Lumen 07/12/25 0704 20 G Anterior;Right Forearm 3 days    Peripheral IV Single Lumen 07/12/25 1000 20 G Right Hand 3 days                       Physical Exam  GENERAL:  Intubated, sedated.  ETT in place.  HEENT: Mucous membranes moist and pink, normocephalic atraumatic, no cranial or carotid bruits, sclera anicteric  NECK: No obvious jugular venous distention with patient flat, no thyromegaly, no lymphadenopathy  CHEST:  Mildly coarse breath sounds bilaterally.  Well-healed sternotomy.  CARDIOVASCULAR: Tachycardia, Quiet precordium, regular rate and rhythm, S1 with single S2, no murmurs rubs or gallops  NEURO:  Intubated, sedated, paralyzed.    ABG  Recent Labs   Lab 07/15/25  0733 07/15/25  1414   PH 7.458*  --    PO2 55* 24*   PCO2 37.7  --    HCO3 26.7  --    BE 3*  --      Lab Results   Component Value Date    WBC 9.82 07/15/2025    HGB 12.1 (L) 07/15/2025    HCT 36.6 (L) 07/15/2025    MCV 85 07/15/2025    PLT 99 (L) 07/15/2025       CMP  Sodium   Date Value Ref Range Status   07/15/2025 130 (L) 136 - 145 mmol/L Final   03/20/2025 139 136 - 145 mmol/L Final     Potassium   Date Value Ref Range Status   07/15/2025 4.1 3.5 - 5.1  mmol/L Final   03/20/2025 3.5 3.5 - 5.1 mmol/L Final     Chloride   Date Value Ref Range Status   07/15/2025 93 (L) 95 - 110 mmol/L Final   03/20/2025 104 95 - 110 mmol/L Final     CO2   Date Value Ref Range Status   07/15/2025 25 23 - 29 mmol/L Final   03/20/2025 23 23 - 29 mmol/L Final     Glucose   Date Value Ref Range Status   07/15/2025 95 70 - 110 mg/dL Final   03/20/2025 94 70 - 110 mg/dL Final     BUN   Date Value Ref Range Status   07/15/2025 21 (H) 6 - 20 mg/dL Final     Creatinine   Date Value Ref Range Status   07/15/2025 1.4 0.5 - 1.4 mg/dL Final     Calcium   Date Value Ref Range Status   07/15/2025 8.5 (L) 8.7 - 10.5 mg/dL Final   03/20/2025 8.9 8.7 - 10.5 mg/dL Final     Protein Total   Date Value Ref Range Status   07/15/2025 7.1 6.0 - 8.4 gm/dL Final     Total Protein   Date Value Ref Range Status   02/09/2025 6.4 6.0 - 8.4 g/dL Final     Albumin   Date Value Ref Range Status   07/15/2025 2.8 (L) 3.5 - 5.2 g/dL Final   02/09/2025 3.4 (L) 3.5 - 5.2 g/dL Final     Total Bilirubin   Date Value Ref Range Status   02/09/2025 1.6 (H) 0.1 - 1.0 mg/dL Final     Comment:     For infants and newborns, interpretation of results should be based  on gestational age, weight and in agreement with clinical  observations.    Premature Infant recommended reference ranges:  Up to 24 hours.............<8.0 mg/dL  Up to 48 hours............<12.0 mg/dL  3-5 days..................<15.0 mg/dL  6-29 days.................<15.0 mg/dL       Bilirubin Total   Date Value Ref Range Status   07/15/2025 3.0 (H) 0.1 - 1.0 mg/dL Final     Comment:     For infants and newborns, interpretation of results should be based   on gestational age, weight and in agreement with clinical   observations.    Premature Infant recommended reference ranges:   0-24 hours:  <8.0 mg/dL   24-48 hours: <12.0 mg/dL   3-5 days:    <15.0 mg/dL   6-29 days:   <15.0 mg/dL     Alkaline Phosphatase   Date Value Ref Range Status   02/09/2025 40 40 - 150 U/L  Final     ALP   Date Value Ref Range Status   07/15/2025 80 40 - 150 unit/L Final     AST   Date Value Ref Range Status   07/15/2025 143 (H) 11 - 45 unit/L Final   02/09/2025 16 10 - 40 U/L Final     ALT   Date Value Ref Range Status   07/15/2025 180 (H) 10 - 44 unit/L Final   02/09/2025 20 10 - 44 U/L Final     Anion Gap   Date Value Ref Range Status   07/15/2025 12 8 - 16 mmol/L Final     eGFR   Date Value Ref Range Status   07/15/2025 >60 >60 mL/min/1.73/m2 Final     Comment:     Estimated GFR calculated using the CKD-EPI creatinine (2021) equation.   03/20/2025 >60.0 >60 mL/min/1.73 m^2 Final     Microbiology Results (last 7 days)       Procedure Component Value Units Date/Time    Blood culture [[id number]]  (Normal) Collected: 07/12/25 0954    Order Status: Completed Specimen: Blood from Peripheral, Forearm, Left Updated: 07/15/25 1402     Blood Culture No Growth After 48 Hours    Blood culture [[id number]]  (Normal) Collected: 07/12/25 0954    Order Status: Completed Specimen: Blood from Peripheral, Upper Arm, Right Updated: 07/15/25 1402     Blood Culture No Growth After 48 Hours    Blood culture [[id number]] Collected: 07/12/25 0954    Order Status: Sent Specimen: Blood from Peripheral, Lower Arm, Right Updated: 07/14/25 1635    Culture, Respiratory with Gram Stain [[id number]]     Order Status: Canceled Specimen: Respiratory from Sputum            Latest Reference Range & Units 06/11/25 14:51   AFP <=8.4 ng/mL 4.5     7/12/25 Chest CTA:  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  Bilateral acute pulmonary thromboemboli identified in segmental branches of the bilateral lower lobe pulmonary arteries.  Cardiomegaly with morphology suggesting single dilated left ventricle. There appears to be communication between the right and left atria suggesting large ASD.  Pulmonary venous congestion with bibasilar pulmonary edema.  Extracardiac Fontan shunt, between  the IVC and pulmonary artery, opacifies with contrast and appears patent.  Atelectatic changes at the left lung base with significant contrast retention suggesting impaired pulmonary venous drainage. Peripheral zones of absent pulmonary enhancement at both lung bases potentially representing pulmonary infarcts, in light of bilateral pulmonary emboli, or additional areas of atelectasis.  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  Hepatomegaly with hepatic venous enlargement suggesting congestive hepatopathy.    I reviewed that study in detail.  I agree with the findings.  The proximal pulmonary arteries and Fontan circuit are free of obstruction, but there do appear to be thrombi in the segmental branches of the bilateral lower lobe pulmonary arteries.     COMMUNICATION  This critical result was discovered/received on 7/12/2025 at 03:00.    7/12/25 Head CT:  Limited right upper quadrant ultrasound performed. The liver measures 17 cm in length and is homogeneous in echogenicity. Trace fluid seen along the falciform ligament. Common bile duct is within normal limits at 2.2 mm.. Mild gallbladder wall thickening. Trace pericholecystic fluid. Negative sonographic Disla sign. Pancreas is obscured by bowel gas. IVC within normal limits. The right kidney is normal in length measuring 10.4 cm. No right sided hydronephrosis or renal masses identified.     Liver US 2/6/25:  Limited right upper quadrant ultrasound performed. The liver measures 17 cm in length and is homogeneous in echogenicity. Trace fluid seen along the falciform ligament. Common bile duct is within normal limits at 2.2 mm.. Mild gallbladder wall thickening. Trace pericholecystic fluid. Negative sonographic Disla sign. Pancreas is obscured by bowel gas. IVC within normal limits. The right kidney is normal in length measuring 10.4 cm. No right sided hydronephrosis or renal masses identified.     Results for  orders placed during the hospital encounter of 03/31/25    Echo    Interpretation Summary  CONGENITAL CARDIAC HISTORY:  Tricuspid atresia, pulmonary atresia  and WPW.  S/P Systemic to pulmonary artery shunt - [place].  S/P Bidirectional Jesus -- [facility].  S/P Lateral tunnel Fontan procedure - [facility].  S/P EP study with trans-septal puncture November 2022  S/P Ablation of a left lateral accessory pathway November 2022 by Dr. Ferguson      SEGMENTAL CARDIAC CONNECTIONS (previously demonstrated):  Abdominal situs is solitus.  Atrial situs is normal.  Imaging consistent with tricuspid atresia.  Tricuspid atresia.  Mitral valve appears normal in structure.  LV dilation.  Ventriculoarterial concordance.  Ventricular loop: D-loop.  Cardiac position is levocardia.  Left aortic arch branching.      IMPRESSION:  Imaging consistent with diagnosis of tricuspid atresia S/P lateral tunnel Fontan - study remarkable for decreased systolic function of the single ventricle worsened since ACHD study 10/26/2023:  Very difficult to demonstrate pulmonary circulation with significant chest deformity due to median sternotomy.  Mildly dilated inferior vena cava connecting to lateral for completion of Fontan physiology with no obvious obstruction or thrombus.  Right superior vena cava identified with laminar flow and no obvious stenosis.  No evidence of obstruction at Fontan or Jesus anastomosis to the pulmonary arteries in very limited imaging.  Pulmonary confluence and LPA unobstructed in very limited imaging.  RPA not demonstrated..  At least two pulmonary veins demonstrated returning to the left atrium with no evidence for obstruction.  There is a small right atrium with no obvious obstruction of right atrial flow to the left atrium.  There is no obvious tricuspid valve tissue present and no right ventricular cavity could be demonstrated.  There is no evidence for pulmonary valve.  At least mild dilation of the left atrium.  Mildly dilated  mitral valve annulus with color Doppler demonstrating at least moderate insufficiency.  Single ventricle consistent with left ventricular morphology.  There is at least moderate dilation of the left ventricle with prominently trabeculated apical myocardium.  Left ventricular systolic function qualitatively severely compromised with ejection fraction estimated 20 -25%.  Global left ventricular longitudinal strain abnormal - peak average measured -3.6.  There is a large aortic annulus with trileaflet aortic valve, no stenosis and trivial central jet of insufficiency.  Aortic dimensions:  Sinuses of Valsalva   = 49 mm.  ST junction               = 39 mm.  Ascending aorta       = not well demonstrated.  Qualitative impression of at least mild dilation of the ascending aorta.  No evidence for coarctation.  No obvious pericardial effusion.    Cath 9/3/20:  IMPRESSION:  1) Tricuspid atresia s/p intra-atrial Fontan  2) Normal Fontan pressures (11mmHg) and wedge pressure (8mmHg).  3) Normal cardiac output and vascular resistance calculations  4) Small atrial baffle leak  5) No significant aorta-pulmonary or veno-venous collaterals  6) Occlusion of right common femoral artery

## 2025-07-15 NOTE — SUBJECTIVE & OBJECTIVE
Interval History:   CVP chun in the evening. Lasix 80 IV x 1 and gtt started at 10mg/h. Remains on multiple vasopressors and high level of oxygen/ventilatory support.     UO 2800, + 146    Hemodynamic Parameters:  SCVO2 46   CVP 19   MAP 71   Cardiac Output 3.07   Cardiac Index 1.81   SVR 1356       Continuous Infusions:   0.9% NaCl   Intravenous Continuous 5 mL/hr at 07/15/25 1100 Rate Verify at 07/15/25 1100    CISatracurium 200 mg in 0.9% NaCl 100 mL infusion  0-10 mcg/kg/min Intravenous Continuous 3 mL/hr at 07/15/25 1100 1.5 mcg/kg/min at 07/15/25 1100    DOBUTamine IV infusion (non-titrating)  5 mcg/kg/min Intravenous Continuous 4.9 mL/hr at 07/15/25 1100 5 mcg/kg/min at 07/15/25 1100    EPINEPHrine  0.05 mcg/kg/min Intravenous Continuous 9.9 mL/hr at 07/15/25 1100 0.05 mcg/kg/min at 07/15/25 1100    fentanyl  0-250 mcg/hr Intravenous Continuous 20 mL/hr at 07/15/25 1100 200 mcg/hr at 07/15/25 1100    furosemide (Lasix) 500 mg in 50 mL infusion (conc: 10 mg/mL)  20 mg/hr Intravenous Continuous 2 mL/hr at 07/15/25 1100 20 mg/hr at 07/15/25 1100    heparin (porcine) in D5W  0-40 Units/kg/hr Intravenous Continuous 5.9 mL/hr at 07/15/25 1100 9 Units/kg/hr at 07/15/25 1100    nitric oxide gas  10 ppm Inhalation Continuous   10 ppm at 07/14/25 1421    NORepinephrine bitartrate-D5W  0-3 mcg/kg/min Intravenous Continuous 6.2 mL/hr at 07/15/25 1100 0.2 mcg/kg/min at 07/15/25 1100    propofoL  0-50 mcg/kg/min Intravenous Continuous 19.7 mL/hr at 07/15/25 1100 50 mcg/kg/min at 07/15/25 1100    vasopressin  0.04 Units/min Intravenous Continuous 12 mL/hr at 07/15/25 1100 0.04 Units/min at 07/15/25 1100     Scheduled Meds:   heparin (PORCINE)  80 Units/kg Intravenous Once    mupirocin   Nasal BID    pantoprazole  40 mg Intravenous Daily    piperacillin-tazobactam (Zosyn) IV (PEDS and ADULTS) (extended infusion is not appropriate)  4.5 g Intravenous Q8H    potassium chloride in water  40 mEq Intravenous Once    white  petrolatum-mineral oiL   Both Eyes Q8H     PRN Meds:  Current Facility-Administered Medications:     heparin (PORCINE), 60 Units/kg, Intravenous, PRN    heparin (PORCINE), 30 Units/kg, Intravenous, PRN    sodium chloride 0.9%, 10 mL, Intravenous, PRN    Review of patient's allergies indicates:  No Known Allergies  Objective:     Vital Signs (Most Recent):  Temp: 98.4 °F (36.9 °C) (07/15/25 1000)  Pulse: 105 (07/15/25 1000)  Resp: 20 (07/15/25 1000)  BP: 106/67 (07/15/25 1000)  SpO2: (!) 90 % (07/15/25 1000) Vital Signs (24h Range):  Temp:  [97.7 °F (36.5 °C)-99.3 °F (37.4 °C)] 98.4 °F (36.9 °C)  Pulse:  [102-123] 105  Resp:  [13-20] 20  SpO2:  [73 %-91 %] 90 %  BP: ()/(58-75) 106/67  Arterial Line BP: (80-99)/(50-67) 83/58     Patient Vitals for the past 72 hrs (Last 3 readings):   Weight   07/15/25 0601 62.1 kg (137 lb)   07/14/25 0619 62.3 kg (137 lb 5.6 oz)   07/13/25 0508 62.1 kg (136 lb 14.5 oz)     Body mass index is 22.11 kg/m².      Intake/Output Summary (Last 24 hours) at 7/15/2025 1109  Last data filed at 7/15/2025 1100  Gross per 24 hour   Intake 2923.37 ml   Output 3631 ml   Net -707.63 ml         Telemetry: Reviewed       Physical Exam  Constitutional:       Comments: Deeply sedated on mechanical ventilator   HENT:      Head: Atraumatic.      Mouth/Throat:      Comments: ETT in place  Eyes:      Conjunctiva/sclera: Conjunctivae normal.   Cardiovascular:      Rate and Rhythm: Regular rhythm. Tachycardia present.      Pulses: Normal pulses.   Pulmonary:      Breath sounds: Normal breath sounds. No wheezing or rales.   Abdominal:      General: Abdomen is flat.      Palpations: Abdomen is soft.   Musculoskeletal:      Right lower leg: No edema.      Left lower leg: No edema.   Skin:     General: Skin is warm and dry.   Neurological:      Comments: Sedated deeply   Psychiatric:      Comments: Unable to assess            Significant Labs:  CBC:  Recent Labs   Lab 07/13/25  0407 07/14/25  2073  "07/15/25  0259   WBC 10.66 8.62 9.82   RBC 4.33* 4.26* 4.30*   HGB 12.1* 12.1* 12.1*   HCT 37.1* 35.1* 36.6*   * 100* 99*   MCV 86 82 85   MCH 27.9 28.4 28.1   MCHC 32.6 34.5 33.1     BNP:  Recent Labs   Lab 07/08/25  1308 07/12/25  0220 07/12/25  0730   BNP 1,802* 2,461* 2,903*     CMP:  Recent Labs   Lab 07/13/25  0407 07/13/25  0842 07/14/25  0415 07/14/25  0805 07/14/25  2121 07/15/25  0259 07/15/25  0752   *   < > 122*   < > 95 101 95   CALCIUM 8.3*   < > 7.8*   < > 8.0* 8.3* 8.5*   ALBUMIN 2.9*  --  2.5*  --   --  2.8*  --    PROT 6.5  --  5.9*  --   --  7.1  --    *   < > 131*   < > 128* 130* 130*   K 4.1   < > 3.3*   < > 4.7 4.4 4.1   CO2 20*   < > 25   < > 20* 21* 25   CL 97   < > 95   < > 97 94* 93*   BUN 22*   < > 21*   < > 22* 22* 21*   CREATININE 1.2   < > 1.1   < > 1.4 1.4 1.4   ALKPHOS 74  --  69  --   --  80  --    *  --  174*  --   --  180*  --    *  --  161*  --   --  143*  --    BILITOT 3.0*  --  2.5*  --   --  3.0*  --     < > = values in this interval not displayed.      Coagulation:   Recent Labs   Lab 07/13/25  0407 07/14/25  0415 07/14/25  1306 07/14/25  1917 07/15/25  0259   INR 1.9* 1.6*  --   --  1.2   APTT 55.2* 79.7* 48.7* 45.0* 45.3*     LDH:  No results for input(s): "LDH" in the last 72 hours.  Microbiology:  Microbiology Results (last 7 days)       Procedure Component Value Units Date/Time    Blood culture [[id number]]  (Normal) Collected: 07/12/25 0954    Order Status: Completed Specimen: Blood from Peripheral, Forearm, Left Updated: 07/15/25 0205     Blood Culture No Growth After 36 Hours    Blood culture [[id number]]  (Normal) Collected: 07/12/25 0954    Order Status: Completed Specimen: Blood from Peripheral, Upper Arm, Right Updated: 07/15/25 0205     Blood Culture No Growth After 36 Hours    Blood culture [[id number]] Collected: 07/12/25 0954    Order Status: Sent Specimen: Blood from Peripheral, Lower Arm, Right Updated: 07/14/25 1635    " Culture, Respiratory with Gram Stain [[id number]]     Order Status: Canceled Specimen: Respiratory from Sputum             I have reviewed all pertinent labs within the past 24 hours.    Estimated Creatinine Clearance: 67.2 mL/min (based on SCr of 1.4 mg/dL).    Diagnostic Results:  I have reviewed all pertinent imaging results/findings within the past 24 hours.

## 2025-07-15 NOTE — SUBJECTIVE & OBJECTIVE
Interval History:   - NMB now off and weaning sedation  - Not currently breathing over vent  - Unable to wean FiO2      Objective:     Vital Signs (Most Recent):  Temp: 98.1 °F (36.7 °C) (07/15/25 1555)  Pulse: 104 (07/15/25 1555)  Resp: 20 (07/15/25 1555)  BP: (!) 88/59 (07/15/25 1300)  SpO2: (!) 88 % (07/15/25 1555) Vital Signs (24h Range):  Temp:  [97.9 °F (36.6 °C)-99.3 °F (37.4 °C)] 98.1 °F (36.7 °C)  Pulse:  [102-123] 104  Resp:  [13-20] 20  SpO2:  [86 %-91 %] 88 %  BP: ()/(59-75) 88/59  Arterial Line BP: (80-99)/(52-67) 83/56     Weight: (S) 62.1 kg (137 lb)  Body mass index is 22.11 kg/m².      Intake/Output Summary (Last 24 hours) at 7/15/2025 1641  Last data filed at 7/15/2025 1300  Gross per 24 hour   Intake 2548.99 ml   Output 4299 ml   Net -1750.01 ml        Physical Exam  Vitals reviewed.   Constitutional:       Interventions: He is sedated, chemically paralyzed and intubated.   HENT:      Head: Atraumatic.   Eyes:      Conjunctiva/sclera: Conjunctivae normal.   Cardiovascular:      Rate and Rhythm: Regular rhythm. Tachycardia present.      Pulses: Normal pulses.   Pulmonary:      Effort: He is intubated.      Breath sounds: Normal breath sounds. No wheezing or rales.   Abdominal:      General: Abdomen is flat.      Palpations: Abdomen is soft.   Musculoskeletal:      Right lower leg: No edema.      Left lower leg: No edema.   Skin:     General: Skin is warm and dry.             Vents:  Vent Mode: A/C (07/15/25 1555)  Ventilator Initiated: Yes (07/12/25 0701)  Set Rate: 20 BPM (07/15/25 1555)  Vt Set: 400 mL (07/15/25 1555)  PEEP/CPAP: 5 cmH20 (07/15/25 1555)  Oxygen Concentration (%): 100 (07/15/25 1555)  Peak Airway Pressure: 22 cmH20 (07/15/25 1555)  Plateau Pressure: 18 cmH20 (07/15/25 1555)  Total Ve: 8.59 L/m (07/15/25 1555)  Negative Inspiratory Force (cm H2O): 0 (07/15/25 1555)  F/VT Ratio<105 (RSBI): (!) 46.4 (07/15/25 1555)    Lines/Drains/Airways       Central Venous Catheter Line   Duration             Percutaneous Central Line Triple Lumen 07/12/25 1227 Internal Jugular Right 3 days              Drain  Duration                  Urethral Catheter 07/12/25 0706 Temperature probe 14 Fr. 3 days         NG/OG Tube 07/12/25 1645 Center mouth 2 days              Airway  Duration                  Airway - Non-Surgical 07/12/25 0130 3 days              Arterial Line  Duration             Arterial Line 07/12/25 1912 Left Radial 2 days              Peripheral Intravenous Line  Duration                  External Jugular IV 07/12/25 0704 3 days    Peripheral IV Single Lumen 07/12/25 0703 18 G Anterior;Left Forearm 3 days    Peripheral IV Single Lumen 07/12/25 0704 18 G Right Antecubital 3 days    Peripheral IV Single Lumen 07/12/25 0704 20 G Anterior;Right Forearm 3 days    Peripheral IV Single Lumen 07/12/25 1000 20 G Right Hand 3 days                    Significant Labs:    CBC/Anemia Profile:  Recent Labs   Lab 07/14/25  0415 07/15/25  0259   WBC 8.62 9.82   HGB 12.1* 12.1*   HCT 35.1* 36.6*   * 99*   MCV 82 85   RDW 19.0* 19.3*        Chemistries:  Recent Labs   Lab 07/14/25  0415 07/14/25  0805 07/15/25  0259 07/15/25  0752 07/15/25  1414   *   < > 130* 130* 130*   K 3.3*   < > 4.4 4.1 4.3   CL 95   < > 94* 93* 91*   CO2 25   < > 21* 25 28   BUN 21*   < > 22* 21* 21*   CREATININE 1.1   < > 1.4 1.4 1.5*   CALCIUM 7.8*   < > 8.3* 8.5* 8.5*   ALBUMIN 2.5*  --  2.8*  --   --    PROT 5.9*  --  7.1  --   --    BILITOT 2.5*  --  3.0*  --   --    ALKPHOS 69  --  80  --   --    *  --  180*  --   --    *  --  143*  --   --    MG 1.9   < > 2.4 2.2 2.1   PHOS 3.4   < > 5.1* 5.4* 5.5*    < > = values in this interval not displayed.       All pertinent labs within the past 24 hours have been reviewed.    Significant Imaging:  I have reviewed all pertinent imaging results/findings within the past 24 hours.

## 2025-07-15 NOTE — ASSESSMENT & PLAN NOTE
Pt transferred for [facility] for advance options and adult congenital evaluation. Pt with systemic  - Pulmonary shunt. Presented with refractory hypoxemia while intubated.    - Pulmonology following, appreciate recs  - Low PEEP strategy  - Goal SPO2 > 85%  - Deep sedation with RASS goal -4 to -5 to promote vent synchrony.   - Continue with fentanyl and propofol   - Will attempt to wean off nimbex today per pulmonology recs  - Cont Shanthi at 10 ppm, helped overcome refractory hypoxemia  - CXR stable  - TTE with bubble study pending for shunt evaluation

## 2025-07-15 NOTE — PROGRESS NOTES
[facility] - Cardiac Intensive Care  Pulmonology  Progress Note    Patient Name: Juani Reilly Jr.  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 He is followed by Dr. Reis in the advanced HF clinic and was last seen on 7/8/25 for NYHA class II-III symptoms. At that time, diuretics were adjusted (Lasix discontinued, Bumex 2 mg BID started).    Per family, patient developed progressive SOB over the past week and became unresponsive at home. CPR was initiated by his sister (~5 min) after he collapsed in his bedroom. EMS arrived to find PEA arrest; ROSC achieved after 3 rounds of epinephrine. He was intubated in the field and transferred to this facility.    Pulmonology consulted for ventilator management in the setting of refractory hypoxemia. Patient remains intubated and sedated. Family is at bedside.    Overview/Hospital Course:  No notes on file    Interval History:   - NMB now off and weaning sedation  - Not currently breathing over vent  - Unable to wean FiO2      Objective:     Vital Signs (Most Recent):  Temp: 98.1 °F (36.7 °C) (07/15/25 1555)  Pulse: 104 (07/15/25 1555)  Resp: 20 (07/15/25 1555)  BP: (!) 88/59 (07/15/25 1300)  SpO2: (!) 88 % (07/15/25 1555) Vital Signs (24h Range):  Temp:  [97.9 °F (36.6 °C)-99.3 °F (37.4 °C)] 98.1 °F (36.7 °C)  Pulse:  [102-123] 104  Resp:  [13-20] 20  SpO2:  [86 %-91 %] 88 %  BP: ()/(59-75) 88/59  Arterial Line BP:  (80-99)/(52-67) 83/56     Weight: (S) 62.1 kg (137 lb)  Body mass index is 22.11 kg/m².      Intake/Output Summary (Last 24 hours) at 7/15/2025 1641  Last data filed at 7/15/2025 1300  Gross per 24 hour   Intake 2548.99 ml   Output 4299 ml   Net -1750.01 ml        Physical Exam  Vitals reviewed.   Constitutional:       Interventions: He is sedated, chemically paralyzed and intubated.   HENT:      Head: Atraumatic.   Eyes:      Conjunctiva/sclera: Conjunctivae normal.   Cardiovascular:      Rate and Rhythm: Regular rhythm. Tachycardia present.      Pulses: Normal pulses.   Pulmonary:      Effort: He is intubated.      Breath sounds: Normal breath sounds. No wheezing or rales.   Abdominal:      General: Abdomen is flat.      Palpations: Abdomen is soft.   Musculoskeletal:      Right lower leg: No edema.      Left lower leg: No edema.   Skin:     General: Skin is warm and dry.             Vents:  Vent Mode: A/C (07/15/25 1555)  Ventilator Initiated: Yes (07/12/25 0701)  Set Rate: 20 BPM (07/15/25 1555)  Vt Set: 400 mL (07/15/25 1555)  PEEP/CPAP: 5 cmH20 (07/15/25 1555)  Oxygen Concentration (%): 100 (07/15/25 1555)  Peak Airway Pressure: 22 cmH20 (07/15/25 1555)  Plateau Pressure: 18 cmH20 (07/15/25 1555)  Total Ve: 8.59 L/m (07/15/25 1555)  Negative Inspiratory Force (cm H2O): 0 (07/15/25 1555)  F/VT Ratio<105 (RSBI): (!) 46.4 (07/15/25 1555)    Lines/Drains/Airways       Central Venous Catheter Line  Duration             Percutaneous Central Line Triple Lumen 07/12/25 1227 Internal Jugular Right 3 days              Drain  Duration                  Urethral Catheter 07/12/25 0706 Temperature probe 14 Fr. 3 days         NG/OG Tube 07/12/25 1645 Center mouth 2 days              Airway  Duration                  Airway - Non-Surgical 07/12/25 0130 3 days              Arterial Line  Duration             Arterial Line 07/12/25 1912 Left Radial 2 days              Peripheral Intravenous Line  Duration                   External Jugular IV 07/12/25 0704 3 days    Peripheral IV Single Lumen 07/12/25 0703 18 G Anterior;Left Forearm 3 days    Peripheral IV Single Lumen 07/12/25 0704 18 G Right Antecubital 3 days    Peripheral IV Single Lumen 07/12/25 0704 20 G Anterior;Right Forearm 3 days    Peripheral IV Single Lumen 07/12/25 1000 20 G Right Hand 3 days                    Significant Labs:    CBC/Anemia Profile:  Recent Labs   Lab 07/14/25  0415 07/15/25  0259   WBC 8.62 9.82   HGB 12.1* 12.1*   HCT 35.1* 36.6*   * 99*   MCV 82 85   RDW 19.0* 19.3*        Chemistries:  Recent Labs   Lab 07/14/25 0415 07/14/25  0805 07/15/25  0259 07/15/25  0752 07/15/25  1414   *   < > 130* 130* 130*   K 3.3*   < > 4.4 4.1 4.3   CL 95   < > 94* 93* 91*   CO2 25   < > 21* 25 28   BUN 21*   < > 22* 21* 21*   CREATININE 1.1   < > 1.4 1.4 1.5*   CALCIUM 7.8*   < > 8.3* 8.5* 8.5*   ALBUMIN 2.5*  --  2.8*  --   --    PROT 5.9*  --  7.1  --   --    BILITOT 2.5*  --  3.0*  --   --    ALKPHOS 69  --  80  --   --    *  --  180*  --   --    *  --  143*  --   --    MG 1.9   < > 2.4 2.2 2.1   PHOS 3.4   < > 5.1* 5.4* 5.5*    < > = values in this interval not displayed.       All pertinent labs within the past 24 hours have been reviewed.    Significant Imaging:  I have reviewed all pertinent imaging results/findings within the past 24 hours.  Assessment/Plan:     Pulmonary  Acute hypoxemic respiratory failure  # PEA Cardiac Arrest  # Bilateral PE  # History of Fontan Procedure    CTA chest showed concerns for bilateral PE and some dense changes in the left lung base. He has acute on chronic hypoxemic respiratory failure, initially improved on 7/13 with deep sedation and neuromuscular blockade to improve vent synchrony. However, on 7/14/25 had refractory hypoxemia. He has  complicated heart anatomy with history of Fontan procedure and have been minimizing PEEP to allow adequate venous return with assistance from congenital heart team.  Suspect some shunting physiology given his refractory hypoxemia despite 100% FiO2. He oxygenation did improve with initiation of inhaled nitric oxide.     Patient has complex cardiopulmonary physiology and several potential contributors to the acute decompensation. He has good respiratory mechanics on the ventilator with a driving pressure of 14 and plateau pressure ~20. He does not have evidence of ARDS that would explain his hypoxemia.     - Nimbex off  - Wean sedation  - continue lung protective ventilation with low tidal volume and PEEP of 5, of note during episode of worsening hypoxemia his oxygenation did not improve with trial of higher PEEP and given his cardiac anatomy will aim to maintain a PEEP of 5  - Leave FiO2 at 100%  - continue nitric oxide at 10ppm  - Neurology consulted and plan for neuroprognostication in AM  - recommend TTE with bubble study to evaluate for shunt to be performed in AM                  Osiel Lobato MD  Pulmonology  [facility]

## 2025-07-15 NOTE — SUBJECTIVE & OBJECTIVE
Interval History: Chart reviewed including 24h medication use. Patient remains critically ill, sedated, paralyzed, intubated on vasopressors. Now normothermic. Weaning nimbex today to assess mentation. Grandmother at bedside during visit. Supportive discussion below.      Past Medical History:   Diagnosis Date    Acute decompensated heart failure 02/06/2025    History of heart surgery     Other cirrhosis of liver 11/12/2020    Palliative care encounter 7/14/2025    SVT (supraventricular tachycardia)     WPW syndrome        Past Surgical History:   Procedure Laterality Date    ANGIOGRAPHY OF AORTIC ARCH N/A 09/03/2020    Procedure: AORTOGRAM, AORTIC ARCH;  Surgeon: Stephania Crump MD;  Location: [facility] CATH LAB;  Service: Cardiology;  Laterality: N/A;    FONTAN PROCEDURE, EXTRACARDIAC      LEFT HEART CATHETERIZATION Left 09/03/2020    Procedure: Left heart cath;  Surgeon: Stephania Crump MD;  Location: [facility] CATH LAB;  Service: Cardiology;  Laterality: Left;    RIGHT HEART CATHETERIZATION FOR CONGENITAL HEART DEFECT N/A 09/03/2020    Procedure: CATHETERIZATION, HEART, RIGHT, FOR CONGENITAL HEART DEFECT;  Surgeon: Stephania Crump MD;  Location: [facility] CATH LAB;  Service: Cardiology;  Laterality: N/A;  [facility] - needs covid test morning of. Extenuating circumstances    TRANSESOPHAGEAL ECHOCARDIOGRAPHY N/A 11/10/2022    Procedure: ECHOCARDIOGRAM, TRANSESOPHAGEAL;  Surgeon: Emeterio Hall MD;  Location: [facility] EP LAB;  Service: Cardiology;  Laterality: N/A;    TREATMENT OF CARDIAC ARRHYTHMIA N/A 01/09/2021    Procedure: CARDIOVERSION;  Surgeon: Jim Mcginnis MD;  Location: [facility] CATH LAB;  Service: Cardiology;  Laterality: N/A;       Review of patient's allergies indicates:  No Known Allergies    Medications:  Continuous Infusions:   0.9% NaCl   Intravenous Continuous 5 mL/hr at 07/15/25 1300 Rate Verify at 07/15/25 1300    CISatracurium 200 mg in 0.9% NaCl 100 mL infusion  0-10 mcg/kg/min  Intravenous Continuous   Stopped at 07/15/25 1229    DOBUTamine IV infusion (non-titrating)  5 mcg/kg/min Intravenous Continuous 4.9 mL/hr at 07/15/25 1300 5 mcg/kg/min at 07/15/25 1300    EPINEPHrine  0.05 mcg/kg/min Intravenous Continuous 9.9 mL/hr at 07/15/25 1300 0.05 mcg/kg/min at 07/15/25 1300    fentanyl  0-250 mcg/hr Intravenous Continuous 17 mL/hr at 07/15/25 1300 170 mcg/hr at 07/15/25 1300    furosemide (Lasix) 500 mg in 50 mL infusion (conc: 10 mg/mL)  20 mg/hr Intravenous Continuous 2 mL/hr at 07/15/25 1300 20 mg/hr at 07/15/25 1300    heparin (porcine) in D5W  0-40 Units/kg/hr Intravenous Continuous 5.9 mL/hr at 07/15/25 1300 9 Units/kg/hr at 07/15/25 1300    nitric oxide gas  10 ppm Inhalation Continuous   10 ppm at 07/14/25 1421    NORepinephrine bitartrate-D5W  0-3 mcg/kg/min Intravenous Continuous 5.6 mL/hr at 07/15/25 1300 0.18 mcg/kg/min at 07/15/25 1300    propofoL  0-50 mcg/kg/min Intravenous Continuous 19.7 mL/hr at 07/15/25 1324 50 mcg/kg/min at 07/15/25 1324    vasopressin  0.04 Units/min Intravenous Continuous 12 mL/hr at 07/15/25 1300 0.04 Units/min at 07/15/25 1300     Scheduled Meds:   heparin (PORCINE)  80 Units/kg Intravenous Once    mupirocin   Nasal BID    pantoprazole  40 mg Intravenous Daily    piperacillin-tazobactam (Zosyn) IV (PEDS and ADULTS) (extended infusion is not appropriate)  4.5 g Intravenous Q8H    potassium chloride in water  40 mEq Intravenous Once    white petrolatum-mineral oiL   Both Eyes Q8H     PRN Meds:  Current Facility-Administered Medications:     heparin (PORCINE), 60 Units/kg, Intravenous, PRN    heparin (PORCINE), 30 Units/kg, Intravenous, PRN    sodium chloride 0.9%, 10 mL, Intravenous, PRN    Family History       Problem Relation (Age of Onset)    Heart disease Maternal Grandfather    No Known Problems Mother, Father, Sister, Brother, Maternal Aunt, Maternal Uncle, Paternal Aunt, Paternal Uncle, Maternal Grandmother, Paternal Grandmother, Paternal  Grandfather          Tobacco Use    Smoking status: Former     Current packs/day: 0.00     Types: Cigarettes     Quit date:      Years since quittin.5     Passive exposure: Past    Smokeless tobacco: Never    Tobacco comments:     3-4 months    Substance and Sexual Activity    Alcohol use: No    Drug use: Yes     Types: Marijuana     Comment: Odalys today- pt reports a while back    Sexual activity: Yes       Review of Systems   Unable to perform ROS: Intubated     Objective:     Vital Signs (Most Recent):  Temp: 98.2 °F (36.8 °C) (07/15/25 1300)  Pulse: 102 (07/15/25 1300)  Resp: 20 (07/15/25 1300)  BP: (!) 88/59 (07/15/25 1300)  SpO2: (!) 88 % (07/15/25 1300) Vital Signs (24h Range):  Temp:  [97.9 °F (36.6 °C)-99.3 °F (37.4 °C)] 98.2 °F (36.8 °C)  Pulse:  [102-123] 102  Resp:  [13-20] 20  SpO2:  [86 %-91 %] 88 %  BP: ()/(59-75) 88/59  Arterial Line BP: (80-99)/(52-67) 83/56     Weight: (S) 62.1 kg (137 lb)  Body mass index is 22.11 kg/m².       Physical Exam  Vitals and nursing note reviewed.   Constitutional:       Appearance: He is ill-appearing.      Comments: Younger, critically ill-appearing male lying in bed, intubated, sedated and paralyzed   Pulmonary:      Comments: intubated  Neurological:      Comments: Unable to assess reflexes/cognition due to sedation and paralysis                Advance Care Planning   Advance Directives:   Goals of Care: I engaged the patient's grandmother in continued voluntary and supportive discussion regarding substituted values for patient in the setting of severe critical illness and cardiac arrest. Elaina with fair insight and prognostic awareness, understanding that Dwight is critically ill and very unlikely to make a meaningful recovery. She remains hopeful for a miracle, but also very clear that she would not subject Dwight to additional suffering with CPR in the event he arrests again. I reflected on our conversation yesterday, highlighting that it seems like  Dwight is a tough person who strongly values his independence. She very much agrees. I added that, in the even Dwight does not improve over the coming days to short weeks, it sounds like he would not find it acceptable to remain on longer term life support or receive care in a facility. Elaina clearly confirms this is the truth. I emphasized our continued support and advocacy for her grandson and her family. All other questions and concerns answered up this time.            CBC:   Recent Labs   Lab 07/15/25  0259   WBC 9.82   HGB 12.1*   HCT 36.6*   MCV 85   PLT 99*     BMP:  Recent Labs   Lab 07/15/25  1414   GLU 99   *   K 4.3   CL 91*   CO2 28   BUN 21*   CREATININE 1.5*   CALCIUM 8.5*   MG 2.1     LFT:  Lab Results   Component Value Date     (H) 07/15/2025    ALKPHOS 80 07/15/2025    BILITOT 3.0 (H) 07/15/2025     Albumin:   Albumin   Date Value Ref Range Status   07/15/2025 2.8 (L) 3.5 - 5.2 g/dL Final   02/09/2025 3.4 (L) 3.5 - 5.2 g/dL Final     Protein:   Protein Total   Date Value Ref Range Status   07/15/2025 7.1 6.0 - 8.4 gm/dL Final     Total Protein   Date Value Ref Range Status   02/09/2025 6.4 6.0 - 8.4 g/dL Final     Lactic acid:   Lab Results   Component Value Date    LACTATE 1.8 07/14/2025    LACTATE 1.7 07/13/2025       Reviewed CBC with anemia and thrombocytopenia, CMP with somewhat worsening renal function. ABG with PaO2 somewhat improved to 50s today

## 2025-07-15 NOTE — CARE UPDATE
Care Update Note:    Hemodynamics@8pm:  CVP: 21  SVO2: 31  CO: 2.43  CI: 1.43  SVR: 2038      Continuous Infusions:   0.9% NaCl   Intravenous Continuous 5 mL/hr at 07/14/25 2101 Rate Verify at 07/14/25 2101    CISatracurium 200 mg in 0.9% NaCl 100 mL infusion  0-10 mcg/kg/min Intravenous Continuous 3.9 mL/hr at 07/14/25 2101 2 mcg/kg/min at 07/14/25 2101    DOBUTamine IV infusion (non-titrating)  5 mcg/kg/min Intravenous Continuous 4.9 mL/hr at 07/14/25 2101 5 mcg/kg/min at 07/14/25 2101    EPINEPHrine  0.05 mcg/kg/min Intravenous Continuous 9.9 mL/hr at 07/14/25 2101 0.05 mcg/kg/min at 07/14/25 2101    fentanyl  0-250 mcg/hr Intravenous Continuous 15 mL/hr at 07/14/25 2101 150 mcg/hr at 07/14/25 2101    furosemide (Lasix) 500 mg in 50 mL infusion (conc: 10 mg/mL)  10 mg/hr Intravenous Continuous        heparin (porcine) in D5W  0-40 Units/kg/hr Intravenous Continuous 5.9 mL/hr at 07/14/25 2101 9 Units/kg/hr at 07/14/25 2101    nitric oxide gas  10 ppm Inhalation Continuous   10 ppm at 07/14/25 1421    NORepinephrine bitartrate-D5W  0-3 mcg/kg/min Intravenous Continuous 9.9 mL/hr at 07/14/25 2101 0.32 mcg/kg/min at 07/14/25 2101    propofoL  0-50 mcg/kg/min Intravenous Continuous 19.7 mL/hr at 07/14/25 2101 50 mcg/kg/min at 07/14/25 2101    vasopressin  0.04 Units/min Intravenous Continuous 12 mL/hr at 07/14/25 2101 0.04 Units/min at 07/14/25 2101       Intake/Output Summary (Last 24 hours) at 7/14/2025 2108  Last data filed at 7/14/2025 2101  Gross per 24 hour   Intake 2688.36 ml   Output 942 ml   Net 1746.36 ml         Plan:  - Lasix 80mg IVP and start gtt at 10 mg/hr    Discussed with attending.    SABINE Campos  Cardiovascular Disease fellow, PGY-5  [facility]

## 2025-07-15 NOTE — EICU
Virtual ICU Quality Rounds    Admit Date: 7/12/2025  Hospital Day: 3    ICU Day: 2d 18h    24H Vital Sign Range:  Temp:  [97 °F (36.1 °C)-99.3 °F (37.4 °C)]   Pulse:  [107-123]   Resp:  [13-24]   BP: ()/(50-75)   SpO2:  [73 %-91 %]   Arterial Line BP: (80-99)/(48-67)     VICU Surveillance Screening  Daily review of  line necessity(optional): Central line(s) in place and Urinary catheter in place  Central line type (optional): Triple lumen catheter  Central line indications : Vasopressors (in past 24/hrs), Inotropes, Multiple infusions, Incompatible infusions, and NMBA  Xiong Indications : Critically ill in the intensive care unit requiring hourly urine output monitoring   LDA reconciliation : Yes  Xiong best practices : Nurse driven xiong removal protocol ordered

## 2025-07-15 NOTE — ASSESSMENT & PLAN NOTE
"See ACP 7/14-7/15    Insight/goals of care- limited insight and prognostic awareness. Some substituted values of functional independence. Clear values from family of accepting any and all potentially beneficial interventions. Willingness to engage regarding avoiding non-helpful interventions (CPR), but remain hopeful for a "miracle"    Social support- supportive mother, Amita, and grandmother, Elaina. Reportedly, patient raised mostly by grandmother    Psychological- limited coping, appropriate grief    Spiritual- Faith, emphasized importance of prayer    Symptom management- no active needs    Recommendations  -DNR, continued best supportive care and ongoing prognostications efforts  -likely that patient's values would not align with long-term life support if no significant clinical improvement over the coming days to short weeks  -continue current level of care  -will continue to assist aligning values with care recommendations as course unfolds, negrito pending neuroprognostication    "

## 2025-07-15 NOTE — PLAN OF CARE
CICU DAILY GOALS       A: Awake    RASS: Goal -    Actual - RASS (Alvarez Agitation-Sedation Scale): unarousable   Restraint necessity: Clinical Justification: Treatment Interference  B: Breath   SBT: Not attempted   C: Coordinate A & B, analgesics/sedatives   Pain: managed    SAT: Not attempted  D: Delirium   CAM-ICU:    E: Early(intubated/ Progressive (non-intubated) Mobility   MOVE Screen: Fail   Activity: Activity Management: Patient unable to perform activities  FAS: Feeding/Nutrition   Diet order: Diet/Nutrition Received: NPO,   Fluid restriction:     Nutritional Supplement Intake: Quantity n/a, Type: n/a  T: Thrombus   DVT prophylaxis: VTE Core Measure: Pharmacological prophylaxis initiated/maintained  H: HOB Elevation   Head of Bed (HOB) Positioning: HOB at 30-45 degrees  U: Ulcer Prophylaxis   GI: no  G: Glucose control   managed Glycemic Management: blood glucose monitored  S: Skin   Bathing/Skin Care: bath, complete;linen changed;back care;incontinence care (07/14/25 1701)  Wounds: No  Wound care consulted: No  B: Bowel Function   Last BM unknown   I: Indwelling Catheters   Parry necessity:      Urethral Catheter 07/12/25 0706 Temperature probe 14 Fr.-Reason for Continuing Urinary Catheterization: Critically ill in ICU and requiring hourly monitoring of intake/output   CVC necessity: Yes  D: De-escalation Antibx   No  Plan for the day   Nimbex off, wean sedation and pressors, increase lasix cvp 19/17/15   Family/Goals of care/Code Status   Code Status: DNR     No acute events throughout day, VS and assessment per flow sheet, patient progressing towards goals as tolerated, plan of care reviewed with Juani Reilly Jr. and family, all concerns addressed, will continue to monitor.

## 2025-07-15 NOTE — ASSESSMENT & PLAN NOTE
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 Recent EF 20-25%. Recurrent hospital admission for ADHF exacerbation. Had cardiac arrest at home. CPR  by sister for 5 mins.   Rhythm: PEA - 3 rounds of epi were administered with ROSC   CTA-PE: Notable for BP PE   Initial hemodynamics: CI 1.4, SVR 2494  - Adult congential heart disease consulted: recs apperciated    - Goal CVP 10-15 to maintain adequate preload  - Cont Dobutamine 5 mcg/hr   - Cont Levo, Vaso, Epi. Wean as tolerated  - Cont Shanthi 10 ppm  - Lasix 80 IV x 1 and increase gtt to 20mg/h  - S/p TTM protocol. Now normothermic  - Monitor urine output I/O   - Monitor electrolytes and supplement K>4, Phos> 3 Mg >2   - Hemodynamics SVO2 q 8hr    - Ongoing GOC discussion with family. At this time he is DNR/DNI but continuing aggressive life-sustaining measures  - Palliative care consulted, appreciate their assistance

## 2025-07-15 NOTE — ASSESSMENT & PLAN NOTE
Patient with PEA arrest. 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    - CTA Chest: Bilateral acute pulmonary thromboemboli identified in segmental branches of the bilateral lower lobe pulmonary arteries. Cardiomegaly with morphology suggesting single dilated left ventricle. There appears to be communication between the right and left atria suggesting large ASD.  Pulmonary venous congestion with bibasilar pulmonary edema.  - Cont Heparin drip   - Continue with VTE protocol   - Monitor Oxygen sat.   - Pulm consulted, recs appreciated.

## 2025-07-15 NOTE — ASSESSMENT & PLAN NOTE
# PEA Cardiac Arrest  # Bilateral PE  # History of Fontan Procedure    CTA chest showed concerns for bilateral PE and some dense changes in the left lung base. He has acute on chronic hypoxemic respiratory failure, initially improved on 7/13 with deep sedation and neuromuscular blockade to improve vent synchrony. However, on 7/14/25 had refractory hypoxemia. He has  complicated heart anatomy with history of Fontan procedure and have been minimizing PEEP to allow adequate venous return with assistance from congenital heart team. Suspect some shunting physiology given his refractory hypoxemia despite 100% FiO2. He oxygenation did improve with initiation of inhaled nitric oxide.     Patient has complex cardiopulmonary physiology and several potential contributors to the acute decompensation. He has good respiratory mechanics on the ventilator with a driving pressure of 14 and plateau pressure ~20. He does not have evidence of ARDS that would explain his hypoxemia.     - Nimbex off  - Wean sedation  - continue lung protective ventilation with low tidal volume and PEEP of 5, of note during episode of worsening hypoxemia his oxygenation did not improve with trial of higher PEEP and given his cardiac anatomy will aim to maintain a PEEP of 5  - Leave FiO2 at 100%  - continue nitric oxide at 10ppm  - Neurology consulted and plan for neuroprognostication in AM  - recommend TTE with bubble study to evaluate for shunt to be performed in AM

## 2025-07-15 NOTE — PROGRESS NOTES
Therapy with Vancomycin complete and/or consult discontinued by provider.  Pharmacy will sign off, please re-consult as needed.    Jorge Delaney, PharmD.  Ext. [phone]

## 2025-07-15 NOTE — ASSESSMENT & PLAN NOTE
Patient with leukocytosis left shift; now resolved. CT Chest ; Notable for pulmonary edema and infiltrates indicating possible PNA. CXR w/ LLL infiltrates improving.  - ID consulted, appreciate recs  - Blood cultures NGTD x 36h  - Continue Zosyn   - Stop Vancomycin

## 2025-07-15 NOTE — PROGRESS NOTES
[facility] - Cardiac Intensive Care  Adult Congenital Heart Disease   Progress Note    Patient Name: Juani Reilly Jr.  MRN: [id number]  Admission Date: 7/12/2025  Hospital Length of Stay: 3 days  Code Status: DNR   Attending Physician: Solitario Reis MD   Primary Care Physician: Jaqueline Pardo MD  Expected Discharge Date: 8/1/2025  Principal Problem:Cardiac arrest    Subjective:     Interval History:  ACHD team has spent time the past few days talking with Juani's family and providing patient education as to his prognosis. His grandmother (Elaina) has a solid understanding and mom (Amita) is making progress with this process. They did decide last night to make him DNR.    Past Medical History:   Diagnosis Date    Acute decompensated heart failure 02/06/2025    History of heart surgery     Other cirrhosis of liver 11/12/2020    Palliative care encounter 7/14/2025    SVT (supraventricular tachycardia)     WPW syndrome        Past Surgical History:   Procedure Laterality Date    ANGIOGRAPHY OF AORTIC ARCH N/A 09/03/2020    Procedure: AORTOGRAM, AORTIC ARCH;  Surgeon: Stephania Crump MD;  Location: [facility] CATH LAB;  Service: Cardiology;  Laterality: N/A;    FONTAN PROCEDURE, EXTRACARDIAC      LEFT HEART CATHETERIZATION Left 09/03/2020    Procedure: Left heart cath;  Surgeon: Stephania Crump MD;  Location: [facility] CATH LAB;  Service: Cardiology;  Laterality: Left;    RIGHT HEART CATHETERIZATION FOR CONGENITAL HEART DEFECT N/A 09/03/2020    Procedure: CATHETERIZATION, HEART, RIGHT, FOR CONGENITAL HEART DEFECT;  Surgeon: Stephania Crump MD;  Location: [facility] CATH LAB;  Service: Cardiology;  Laterality: N/A;  [facility] - needs covid test morning of. Extenuating circumstances    TRANSESOPHAGEAL ECHOCARDIOGRAPHY N/A 11/10/2022    Procedure: ECHOCARDIOGRAM, TRANSESOPHAGEAL;  Surgeon: Emeterio Hall MD;  Location: [facility] EP LAB;  Service: Cardiology;  Laterality: N/A;    TREATMENT OF CARDIAC  ARRHYTHMIA N/A 01/09/2021    Procedure: CARDIOVERSION;  Surgeon: Jim Mcginnis MD;  Location: [facility] CATH LAB;  Service: Cardiology;  Laterality: N/A;       Review of patient's allergies indicates:  No Known Allergies    No current facility-administered medications on file prior to encounter.     Current Outpatient Medications on File Prior to Encounter   Medication Sig    benzonatate (TESSALON) 100 MG capsule Take 1 capsule (100 mg total) by mouth 3 (three) times daily as needed for Cough.    bumetanide (BUMEX) 2 MG tablet Take 1 tablet (2 mg total) by mouth 2 (two) times a day.    cetirizine (ZYRTEC) 10 MG tablet Take 1 tablet (10 mg total) by mouth once daily.    dapagliflozin propanediol (FARXIGA) 10 mg tablet Take 1 tablet (10 mg total) by mouth once daily.    losartan (COZAAR) 100 MG tablet Take 1 tablet (100 mg total) by mouth once daily.    metoprolol succinate (TOPROL-XL) 50 MG 24 hr tablet Take 1 tablet (50 mg total) by mouth once daily.    rivaroxaban (XARELTO) 20 mg Tab Take 1 tablet (20 mg total) by mouth daily with dinner or evening meal. (Patient not taking: Reported on 7/8/2025)    spironolactone (ALDACTONE) 25 MG tablet Take 1 tablet (25 mg total) by mouth once daily.     Family History       Problem Relation (Age of Onset)    Heart disease Maternal Grandfather    No Known Problems Mother, Father, Sister, Brother, Maternal Aunt, Maternal Uncle, Paternal Aunt, Paternal Uncle, Maternal Grandmother, Paternal Grandmother, Paternal Grandfather          Social History     Social History Narrative    Not on file     Scheduled Meds:   heparin (PORCINE)  80 Units/kg Intravenous Once    mupirocin   Nasal BID    pantoprazole  40 mg Intravenous Daily    piperacillin-tazobactam (Zosyn) IV (PEDS and ADULTS) (extended infusion is not appropriate)  4.5 g Intravenous Q8H    potassium chloride in water  40 mEq Intravenous Once    white petrolatum-mineral oiL   Both Eyes Q8H     Continuous Infusions:   0.9% NaCl    Intravenous Continuous 5 mL/hr at 07/15/25 1300 Rate Verify at 07/15/25 1300    CISatracurium 200 mg in 0.9% NaCl 100 mL infusion  0-10 mcg/kg/min Intravenous Continuous   Stopped at 07/15/25 1229    DOBUTamine IV infusion (non-titrating)  5 mcg/kg/min Intravenous Continuous 4.9 mL/hr at 07/15/25 1300 5 mcg/kg/min at 07/15/25 1300    EPINEPHrine  0.05 mcg/kg/min Intravenous Continuous 9.9 mL/hr at 07/15/25 1300 0.05 mcg/kg/min at 07/15/25 1300    fentanyl  0-250 mcg/hr Intravenous Continuous 17 mL/hr at 07/15/25 1300 170 mcg/hr at 07/15/25 1300    furosemide (Lasix) 500 mg in 50 mL infusion (conc: 10 mg/mL)  20 mg/hr Intravenous Continuous 2 mL/hr at 07/15/25 1300 20 mg/hr at 07/15/25 1300    heparin (porcine) in D5W  0-40 Units/kg/hr Intravenous Continuous 5.9 mL/hr at 07/15/25 1300 9 Units/kg/hr at 07/15/25 1300    nitric oxide gas  10 ppm Inhalation Continuous   10 ppm at 07/14/25 1421    NORepinephrine bitartrate-D5W  0-3 mcg/kg/min Intravenous Continuous 5.6 mL/hr at 07/15/25 1300 0.18 mcg/kg/min at 07/15/25 1300    propofoL  0-50 mcg/kg/min Intravenous Continuous 19.7 mL/hr at 07/15/25 1324 50 mcg/kg/min at 07/15/25 1324    vasopressin  0.04 Units/min Intravenous Continuous 12 mL/hr at 07/15/25 1300 0.04 Units/min at 07/15/25 1300     PRN Meds:.  Current Facility-Administered Medications:     heparin (PORCINE), 60 Units/kg, Intravenous, PRN    heparin (PORCINE), 30 Units/kg, Intravenous, PRN    sodium chloride 0.9%, 10 mL, Intravenous, PRN     Unable to get review of systems.  Patient intubated and sedated.  Objective:     Vital Signs (Most Recent):  Temp: 98.2 °F (36.8 °C) (07/15/25 1300)  Pulse: 102 (07/15/25 1300)  Resp: 20 (07/15/25 1300)  BP: (!) 88/59 (07/15/25 1300)  SpO2: (!) 88 % (07/15/25 1300) Vital Signs (24h Range):  Temp:  [97.9 °F (36.6 °C)-99.3 °F (37.4 °C)] 98.2 °F (36.8 °C)  Pulse:  [102-123] 102  Resp:  [13-20] 20  SpO2:  [86 %-91 %] 88 %  BP: ()/(59-75) 88/59  Arterial Line BP:  (80-99)/(52-67) 83/56     Telemetry reviewed.  Sinus rhythm, occasional premature ventricular contractions.  No significant arrhythmias noted.  Weight: (S) 62.1 kg (137 lb)  Body mass index is 22.11 kg/m².    SpO2: (!) 88 %         Intake/Output Summary (Last 24 hours) at 7/15/2025 1439  Last data filed at 7/15/2025 1300  Gross per 24 hour   Intake 2769.99 ml   Output 4334 ml   Net -1564.01 ml       Lines/Drains/Airways       Central Venous Catheter Line  Duration             Percutaneous Central Line Triple Lumen 07/12/25 1227 Internal Jugular Right 3 days              Drain  Duration                  Urethral Catheter 07/12/25 0706 Temperature probe 14 Fr. 3 days         NG/OG Tube 07/12/25 1645 Center mouth 2 days              Airway  Duration                  Airway - Non-Surgical 07/12/25 0130 3 days              Arterial Line  Duration             Arterial Line 07/12/25 1912 Left Radial 2 days              Peripheral Intravenous Line  Duration                  External Jugular IV 07/12/25 0704 3 days    Peripheral IV Single Lumen 07/12/25 0703 18 G Anterior;Left Forearm 3 days    Peripheral IV Single Lumen 07/12/25 0704 18 G Right Antecubital 3 days    Peripheral IV Single Lumen 07/12/25 0704 20 G Anterior;Right Forearm 3 days    Peripheral IV Single Lumen 07/12/25 1000 20 G Right Hand 3 days                       Physical Exam  GENERAL:  Intubated, sedated.  ETT in place.  HEENT: Mucous membranes moist and pink, normocephalic atraumatic, no cranial or carotid bruits, sclera anicteric  NECK: No obvious jugular venous distention with patient flat, no thyromegaly, no lymphadenopathy  CHEST:  Mildly coarse breath sounds bilaterally.  Well-healed sternotomy.  CARDIOVASCULAR: Tachycardia, Quiet precordium, regular rate and rhythm, S1 with single S2, no murmurs rubs or gallops  NEURO:  Intubated, sedated, paralyzed.    ABG  Recent Labs   Lab 07/15/25  0733 07/15/25  1414   PH 7.458*  --    PO2 55* 24*   PCO2 37.7   --    HCO3 26.7  --    BE 3*  --      Lab Results   Component Value Date    WBC 9.82 07/15/2025    HGB 12.1 (L) 07/15/2025    HCT 36.6 (L) 07/15/2025    MCV 85 07/15/2025    PLT 99 (L) 07/15/2025       CMP  Sodium   Date Value Ref Range Status   07/15/2025 130 (L) 136 - 145 mmol/L Final   03/20/2025 139 136 - 145 mmol/L Final     Potassium   Date Value Ref Range Status   07/15/2025 4.1 3.5 - 5.1 mmol/L Final   03/20/2025 3.5 3.5 - 5.1 mmol/L Final     Chloride   Date Value Ref Range Status   07/15/2025 93 (L) 95 - 110 mmol/L Final   03/20/2025 104 95 - 110 mmol/L Final     CO2   Date Value Ref Range Status   07/15/2025 25 23 - 29 mmol/L Final   03/20/2025 23 23 - 29 mmol/L Final     Glucose   Date Value Ref Range Status   07/15/2025 95 70 - 110 mg/dL Final   03/20/2025 94 70 - 110 mg/dL Final     BUN   Date Value Ref Range Status   07/15/2025 21 (H) 6 - 20 mg/dL Final     Creatinine   Date Value Ref Range Status   07/15/2025 1.4 0.5 - 1.4 mg/dL Final     Calcium   Date Value Ref Range Status   07/15/2025 8.5 (L) 8.7 - 10.5 mg/dL Final   03/20/2025 8.9 8.7 - 10.5 mg/dL Final     Protein Total   Date Value Ref Range Status   07/15/2025 7.1 6.0 - 8.4 gm/dL Final     Total Protein   Date Value Ref Range Status   02/09/2025 6.4 6.0 - 8.4 g/dL Final     Albumin   Date Value Ref Range Status   07/15/2025 2.8 (L) 3.5 - 5.2 g/dL Final   02/09/2025 3.4 (L) 3.5 - 5.2 g/dL Final     Total Bilirubin   Date Value Ref Range Status   02/09/2025 1.6 (H) 0.1 - 1.0 mg/dL Final     Comment:     For infants and newborns, interpretation of results should be based  on gestational age, weight and in agreement with clinical  observations.    Premature Infant recommended reference ranges:  Up to 24 hours.............<8.0 mg/dL  Up to 48 hours............<12.0 mg/dL  3-5 days..................<15.0 mg/dL  6-29 days.................<15.0 mg/dL       Bilirubin Total   Date Value Ref Range Status   07/15/2025 3.0 (H) 0.1 - 1.0 mg/dL Final      Comment:     For infants and newborns, interpretation of results should be based   on gestational age, weight and in agreement with clinical   observations.    Premature Infant recommended reference ranges:   0-24 hours:  <8.0 mg/dL   24-48 hours: <12.0 mg/dL   3-5 days:    <15.0 mg/dL   6-29 days:   <15.0 mg/dL     Alkaline Phosphatase   Date Value Ref Range Status   02/09/2025 40 40 - 150 U/L Final     ALP   Date Value Ref Range Status   07/15/2025 80 40 - 150 unit/L Final     AST   Date Value Ref Range Status   07/15/2025 143 (H) 11 - 45 unit/L Final   02/09/2025 16 10 - 40 U/L Final     ALT   Date Value Ref Range Status   07/15/2025 180 (H) 10 - 44 unit/L Final   02/09/2025 20 10 - 44 U/L Final     Anion Gap   Date Value Ref Range Status   07/15/2025 12 8 - 16 mmol/L Final     eGFR   Date Value Ref Range Status   07/15/2025 >60 >60 mL/min/1.73/m2 Final     Comment:     Estimated GFR calculated using the CKD-EPI creatinine (2021) equation.   03/20/2025 >60.0 >60 mL/min/1.73 m^2 Final     Microbiology Results (last 7 days)       Procedure Component Value Units Date/Time    Blood culture [[id number]]  (Normal) Collected: 07/12/25 0954    Order Status: Completed Specimen: Blood from Peripheral, Forearm, Left Updated: 07/15/25 1402     Blood Culture No Growth After 48 Hours    Blood culture [[id number]]  (Normal) Collected: 07/12/25 0954    Order Status: Completed Specimen: Blood from Peripheral, Upper Arm, Right Updated: 07/15/25 1402     Blood Culture No Growth After 48 Hours    Blood culture [[id number]] Collected: 07/12/25 0954    Order Status: Sent Specimen: Blood from Peripheral, Lower Arm, Right Updated: 07/14/25 1635    Culture, Respiratory with Gram Stain [[id number]]     Order Status: Canceled Specimen: Respiratory from Sputum            Latest Reference Range & Units 06/11/25 14:51   AFP <=8.4 ng/mL 4.5     7/12/25 Chest CTA:  History of tricuspid atresia and prior Jesus shunt procedure resulting in caval  pulmonary shunt.  Bilateral acute pulmonary thromboemboli identified in segmental branches of the bilateral lower lobe pulmonary arteries.  Cardiomegaly with morphology suggesting single dilated left ventricle. There appears to be communication between the right and left atria suggesting large ASD.  Pulmonary venous congestion with bibasilar pulmonary edema.  Extracardiac Fontan shunt, between the IVC and pulmonary artery, opacifies with contrast and appears patent.  Atelectatic changes at the left lung base with significant contrast retention suggesting impaired pulmonary venous drainage. Peripheral zones of absent pulmonary enhancement at both lung bases potentially representing pulmonary infarcts, in light of bilateral pulmonary emboli, or additional areas of atelectasis.  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  Hepatomegaly with hepatic venous enlargement suggesting congestive hepatopathy.    I reviewed that study in detail.  I agree with the findings.  The proximal pulmonary arteries and Fontan circuit are free of obstruction, but there do appear to be thrombi in the segmental branches of the bilateral lower lobe pulmonary arteries.     COMMUNICATION  This critical result was discovered/received on 7/12/2025 at 03:00.    7/12/25 Head CT:  Limited right upper quadrant ultrasound performed. The liver measures 17 cm in length and is homogeneous in echogenicity. Trace fluid seen along the falciform ligament. Common bile duct is within normal limits at 2.2 mm.. Mild gallbladder wall thickening. Trace pericholecystic fluid. Negative sonographic Disla sign. Pancreas is obscured by bowel gas. IVC within normal limits. The right kidney is normal in length measuring 10.4 cm. No right sided hydronephrosis or renal masses identified.     Liver US 2/6/25:  Limited right upper quadrant ultrasound performed. The liver measures 17 cm in length and is homogeneous in  echogenicity. Trace fluid seen along the falciform ligament. Common bile duct is within normal limits at 2.2 mm.. Mild gallbladder wall thickening. Trace pericholecystic fluid. Negative sonographic Disla sign. Pancreas is obscured by bowel gas. IVC within normal limits. The right kidney is normal in length measuring 10.4 cm. No right sided hydronephrosis or renal masses identified.     Results for orders placed during the hospital encounter of 03/31/25    Echo    Interpretation Summary  CONGENITAL CARDIAC HISTORY:  Tricuspid atresia, pulmonary atresia  and WPW.  S/P Systemic to pulmonary artery shunt - [facility].  S/P Bidirectional Jesus -- [facility].  S/P Lateral tunnel Fontan procedure - [facility].  S/P EP study with trans-septal puncture November 2022  S/P Ablation of a left lateral accessory pathway November 2022 by Dr. Ferguson      SEGMENTAL CARDIAC CONNECTIONS (previously demonstrated):  Abdominal situs is solitus.  Atrial situs is normal.  Imaging consistent with tricuspid atresia.  Tricuspid atresia.  Mitral valve appears normal in structure.  LV dilation.  Ventriculoarterial concordance.  Ventricular loop: D-loop.  Cardiac position is levocardia.  Left aortic arch branching.      IMPRESSION:  Imaging consistent with diagnosis of tricuspid atresia S/P lateral tunnel Fontan - study remarkable for decreased systolic function of the single ventricle worsened since ACHD study 10/26/2023:  Very difficult to demonstrate pulmonary circulation with significant chest deformity due to median sternotomy.  Mildly dilated inferior vena cava connecting to lateral for completion of Fontan physiology with no obvious obstruction or thrombus.  Right superior vena cava identified with laminar flow and no obvious stenosis.  No evidence of obstruction at Fontan or Jesus anastomosis to the pulmonary arteries in very limited imaging.  Pulmonary confluence and LPA unobstructed in very limited imaging.  RPA not demonstrated..  At least two  pulmonary veins demonstrated returning to the left atrium with no evidence for obstruction.  There is a small right atrium with no obvious obstruction of right atrial flow to the left atrium.  There is no obvious tricuspid valve tissue present and no right ventricular cavity could be demonstrated.  There is no evidence for pulmonary valve.  At least mild dilation of the left atrium.  Mildly dilated mitral valve annulus with color Doppler demonstrating at least moderate insufficiency.  Single ventricle consistent with left ventricular morphology.  There is at least moderate dilation of the left ventricle with prominently trabeculated apical myocardium.  Left ventricular systolic function qualitatively severely compromised with ejection fraction estimated 20 -25%.  Global left ventricular longitudinal strain abnormal - peak average measured -3.6.  There is a large aortic annulus with trileaflet aortic valve, no stenosis and trivial central jet of insufficiency.  Aortic dimensions:  Sinuses of Valsalva   = 49 mm.  ST junction               = 39 mm.  Ascending aorta       = not well demonstrated.  Qualitative impression of at least mild dilation of the ascending aorta.  No evidence for coarctation.  No obvious pericardial effusion.    Cath 9/3/20:  IMPRESSION:  1) Tricuspid atresia s/p intra-atrial Fontan  2) Normal Fontan pressures (11mmHg) and wedge pressure (8mmHg).  3) Normal cardiac output and vascular resistance calculations  4) Small atrial baffle leak  5) No significant aorta-pulmonary or veno-venous collaterals  6) Occlusion of right common femoral artery     Assessment and Plan:     S/P Fontan procedure  S/P Fontan procedure  1. Tricuspid and pulmonary atresia initially palliated with a systemic to pulmonary artery shunt followed by a bidirectional Jesus and subsequent lateral tunnel Fontan procedure   - small atrial baffle leak   - s/p EP study with trans-septal puncture November 2022  - reassuring  hemodynamics on 2020 catheterization with Fontan (CVP) pressure 11  - no evidence of PLE  2. Severely decreased left ventricular function  3. History of Cjpem-Iybniikjm-Eeiud, SVT  - status post successful ablation of a left lateral accessory pathway November 2022 by Dr. Ferguson  4. Cirrhosis  - last seen by hepatology May 2023 (normal AFP 6/11)  5. Arrested at home late night July 11, 2025  - unclear etiology, but CT scan suggestive of pulmonary emboli which may be chronic or acute  6. Long history of noncompliance with medication  7. Worsening hypoxemia over the past year of unclear etiology    Recommendations:   Saturations 85% or greater  CVP goal in the 10-15 range.  His prognosis is extremely poor.  By report, he had no gag reflex before paralytics were started. Would support withdrawal of care if the neurologic exam suggested significant injury.  Not a candidate for ECMO, other mechanical circulatory support, or heart transplant.    Appreciate consult to palliative care team and have encouraged Juani's family to continue working with them on goals of care for Juani.      The ACHD team will continue to follow along with Juani's care. Please contact us if you have any additional questions.        VTE Risk Mitigation (From admission, onward)           Ordered     Apply sequential compression device to lower extremities (if no contraindications). Medical venous thromboembolus prophylaxis is preferred.  Until discontinued         07/12/25 1021     heparin 25,000 units in dextrose 5% (100 units/ml) IV bolus from bag HIGH INTENSITY nomogram - [facility]  As needed (PRN)        Question:  Heparin Infusion Adjustment (DO NOT MODIFY ANSWER)  Answer:  [email or web address]\Images\Pharmacy\HeparinInfusions\heparin HIGH INTENSITY nomogram for [facility] [id number]    07/12/25 0738     heparin 25,000 units in dextrose 5% (100 units/ml) IV bolus from bag HIGH INTENSITY nomogram - [facility]  As needed (PRN)        Question:  Heparin  Infusion Adjustment (DO NOT MODIFY ANSWER)  Answer:  [email or web address]\Images\Pharmacy\HeparinInfusions\heparin HIGH INTENSITY nomogram for [facility] [id number]    07/12/25 0738     heparin 25,000 units in dextrose 5% (100 units/ml) IV bolus from bag HIGH INTENSITY nomogram - [facility]  Once        Question:  Heparin Infusion Adjustment (DO NOT MODIFY ANSWER)  Answer:  [email or web address]\Images\Pharmacy\HeparinInfusions\heparin HIGH INTENSITY nomogram for [facility] [id number]    07/12/25 0738     heparin 25,000 units in dextrose 5% 250 mL (100 units/mL) infusion HIGH INTENSITY nomogram - [facility]  Continuous        Question:  Begin at (units/kg/hr)  Answer:  18    07/12/25 0738     IP VTE HIGH RISK PATIENT  Once         07/12/25 0707     Place sequential compression device  Until discontinued         07/12/25 0707                    Princess Montesinos PA-C  Adult Congenital Heart Disease

## 2025-07-16 PROBLEM — R65.10 SIRS (SYSTEMIC INFLAMMATORY RESPONSE SYNDROME): Status: ACTIVE | Noted: 2025-01-01

## 2025-07-16 PROBLEM — G93.41 ACUTE METABOLIC ENCEPHALOPATHY: Status: ACTIVE | Noted: 2025-01-01

## 2025-07-16 PROBLEM — I46.9 CARDIAC ARREST: Status: RESOLVED | Noted: 2025-01-01 | Resolved: 2025-01-01

## 2025-07-16 PROBLEM — G93.40 ENCEPHALOPATHY: Status: ACTIVE | Noted: 2025-01-01

## 2025-07-16 NOTE — CONSULTS
[facility] - Cardiac Intensive Care  Neurology  Consult Note    Patient Name: Juani Reilly Jr.  MRN: [id number]  Admission Date: 7/12/2025  Hospital Length of Stay: 4 days  Code Status: DNR   Attending Provider: Solitario Reis MD   Consulting Provider: Luz Schaffer MD  Primary Care Physician: Jaqueline Pardo MD  Principal Problem:Cardiac arrest    Inpatient consult to General Neurology  Consult performed by: Luz Saleh MD  Consult ordered by: Gustavo Matias MD         Subjective:     Chief Complaint:  PEA s/p ROSC     HPI:   Juani Reilly Jr. 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 Ferguson Nov 2022, HF with decreased LV function 20-25% that is currently admitted to the Cardiac Intensive Care Unit following PEA Arrest s/p ROSC after 15 minutes. General neurology consulted for neuroprognostication.  Per chart review, patient with  recurrent HF exacerbation in the past year. Last seen by [facility] on 07/08 where  SOB with NYHA II-III was reported. Dates leading into the arrest, family members reported  patient would complains of increased SOB. On the day of the arrest, he  became unresponsive on 07/12 while in the bedroom with his grandmother. At the time, sister rushed to bedside and performed CPR for ~5 minutes until EMS arrived. Upon EMS arrival, PEA was noticed with subsequent 3 rounds of eipi with return of ROS after ~15 minutes. Intubated on arrival to the ED from an [facility]. Work up revealed BLE PE on CTA chest and was started on AC therapy as given patient's anatomy decision to ot give systemic lytics was made. Patient transferred to our facility for higher lever of care. Placed on Cooling protocol and admitted to the Cardiac ICU. Initial CTH on 07/12 with no acute intracranial abnormalities.  Repeat CTH on that date, as well with no acute intracranial abnormalities.     Since then,  hospital course has been complicated by severe Hypoxic Respiratory Failure due to vent dyssynchrony requiring paralytics and Nimbex with improved oxygenation; hypotension requiring vasopressor support.     He remained sedated on Fentanyl and propofol until 4:30 am on 07/16 for better neurological assessment.        Past Medical History:   Diagnosis Date    Acute decompensated heart failure 02/06/2025    History of heart surgery     Other cirrhosis of liver 11/12/2020    Palliative care encounter 7/14/2025    SVT (supraventricular tachycardia)     WPW syndrome        Past Surgical History:   Procedure Laterality Date    ANGIOGRAPHY OF AORTIC ARCH N/A 09/03/2020    Procedure: AORTOGRAM, AORTIC ARCH;  Surgeon: Stephania Crump MD;  Location: [facility] CATH LAB;  Service: Cardiology;  Laterality: N/A;    FONTAN PROCEDURE, EXTRACARDIAC      LEFT HEART CATHETERIZATION Left 09/03/2020    Procedure: Left heart cath;  Surgeon: Stephania Crump MD;  Location: [facility] CATH LAB;  Service: Cardiology;  Laterality: Left;    RIGHT HEART CATHETERIZATION FOR CONGENITAL HEART DEFECT N/A 09/03/2020    Procedure: CATHETERIZATION, HEART, RIGHT, FOR CONGENITAL HEART DEFECT;  Surgeon: Stephania Crump MD;  Location: [facility] CATH LAB;  Service: Cardiology;  Laterality: N/A;  [facility] - needs covid test morning of. Extenuating circumstances    TRANSESOPHAGEAL ECHOCARDIOGRAPHY N/A 11/10/2022    Procedure: ECHOCARDIOGRAM, TRANSESOPHAGEAL;  Surgeon: Emeterio Hall MD;  Location: [facility] EP LAB;  Service: Cardiology;  Laterality: N/A;    TREATMENT OF CARDIAC ARRHYTHMIA N/A 01/09/2021    Procedure: CARDIOVERSION;  Surgeon: Jim Mcginnis MD;  Location: [facility] CATH LAB;  Service: Cardiology;  Laterality: N/A;       Review of patient's allergies indicates:  No Known Allergies    Current Neurological Medications:     No current facility-administered medications on file prior to encounter.     Current Outpatient Medications on File Prior to  Encounter   Medication Sig    benzonatate (TESSALON) 100 MG capsule Take 1 capsule (100 mg total) by mouth 3 (three) times daily as needed for Cough.    bumetanide (BUMEX) 2 MG tablet Take 1 tablet (2 mg total) by mouth 2 (two) times a day.    cetirizine (ZYRTEC) 10 MG tablet Take 1 tablet (10 mg total) by mouth once daily.    dapagliflozin propanediol (FARXIGA) 10 mg tablet Take 1 tablet (10 mg total) by mouth once daily.    losartan (COZAAR) 100 MG tablet Take 1 tablet (100 mg total) by mouth once daily.    metoprolol succinate (TOPROL-XL) 50 MG 24 hr tablet Take 1 tablet (50 mg total) by mouth once daily.    rivaroxaban (XARELTO) 20 mg Tab Take 1 tablet (20 mg total) by mouth daily with dinner or evening meal. (Patient not taking: Reported on 2025)    spironolactone (ALDACTONE) 25 MG tablet Take 1 tablet (25 mg total) by mouth once daily.     Family History       Problem Relation (Age of Onset)    Heart disease Maternal Grandfather    No Known Problems Mother, Father, Sister, Brother, Maternal Aunt, Maternal Uncle, Paternal Aunt, Paternal Uncle, Maternal Grandmother, Paternal Grandmother, Paternal Grandfather          Tobacco Use    Smoking status: Former     Current packs/day: 0.00     Types: Cigarettes     Quit date:      Years since quittin.5     Passive exposure: Past    Smokeless tobacco: Never    Tobacco comments:     3-4 months    Substance and Sexual Activity    Alcohol use: No    Drug use: Yes     Types: Marijuana     Comment: Mojo today- pt reports a while back    Sexual activity: Yes     Review of Systems   Reason unable to perform ROS: Intubated.     Objective:     Vital Signs (Most Recent):  Temp: (!) 95.9 °F (35.5 °C) (25 1538)  Pulse: 96 (25 1538)  Resp: (!) 22 (25 153)  BP: 113/67 (25 1500)  SpO2: (!) 94 % (25 153) Vital Signs (24h Range):  Temp:  [93.6 °F (34.2 °C)-98.1 °F (36.7 °C)] 95.9 °F (35.5 °C)  Pulse:  [] 96  Resp:  [17-32] 22  SpO2:  [86  %-97 %] 94 %  BP: ()/(62-97) 113/67  Arterial Line BP: ()/(58-91) 100/64     Weight: (S) 60.3 kg (133 lb)  Body mass index is 21.47 kg/m².     Physical Exam  Pulmonary:      Comments: ET in place  Neurological:      Comments: GCS: E2 V (NT) M1  -Blinks frequently, does not correlate with corneal reflex testing   -Upward fix Gaze  -Oculocephalic reflex absent, Pupillary Light Reflex (-), Gag (-), Cough (-), Corneal's (equivocal)  -Intermittent orofacial movements at times verging on being rhythmic  but not consistently so  -No spontaneous movement,  no withdraw to noxious stimuli UE/LE, no posturing noted  -Babinski Absent                  Significant Labs: CBC:   Recent Labs   Lab 07/15/25  0259 07/16/25  0302 07/16/25  0722 07/16/25  0726   WBC 9.82 11.59  --   --    HGB 12.1* 11.8*  --   --    HCT 36.6* 35.4* 42 42   PLT 99* 116*  --   --      CMP:   Recent Labs   Lab 07/15/25  0259 07/15/25  0752 07/16/25  0302 07/16/25  0904 07/16/25  1358      < > 123* 128* 112*   *   < > 130* 132* 133*   K 4.4   < > 4.7 4.5 4.6   CL 94*   < > 90* 89* 89*   CO2 21*   < > 25 30* 31*   BUN 22*   < > 24* 24* 26*   CREATININE 1.4   < > 1.6* 1.6* 1.7*   CALCIUM 8.3*   < > 9.0 9.5 9.3   MG 2.4   < > 2.0 2.1 2.2   PROT 7.1  --  7.5  --   --    ALBUMIN 2.8*  --  2.8*  --   --    BILITOT 3.0*  --  5.5*  --   --    ALKPHOS 80  --  79  --   --    *  --  99*  --   --    *  --  144*  --   --    ANIONGAP 15   < > 15 13 13    < > = values in this interval not displayed.       Significant Imaging: I have reviewed all pertinent imaging results/findings within the past 24 hours.  Assessment and Plan:     Encephalopathy  [place] Emeterio Reilly Jr. 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 Ferguson Nov 2022, HF with decreased LV function 20-25% that is currently admitted to the Cardiac Intensive Care Unit on 07/12  following PEA Arrest s/p ROSC after 15 minutes s/p cooling protocol. General neurology consulted for neuro-prognostication.  Hospital course complicated by hypotension 2/2 sedative medications requiring vasopressors and Acute Hypoxic Respiratory Failure on Nimbex and s/p paralytics use. Sedation and Vasopressor support weaned off on 07/16 at 4:30 am. Patient now is s/p >96 hours of TTM.       ROSC obtained on 07/12 after 3 rounds of epinephrine and CPR (~15 minutes))  Was TTM used? Yes - patient reached normothermia on 7/13.   Last sedation: 07/16 at 4:30 am    Predictors thus far:  - Pupillary light response - Absent bilaterally - reliable predictor of poor functional outcome at 3 months or later  Should be obtained >72 hours following ROSC  - Motor exam - Absent motor response - not reliable for prediction of poor outcome  Should be obtained >72 hours following ROSC or reaching normothermia if TTM used  - CT - Head done on the day of Cardiac Arrest   Should be obtained >48 hours following ROSC  - MRI brain w/ DWI - Obtain after EEG   Should be obtained 2-7 days following ROSC  - EEG - Preliminary read with burst suppression  Should be obtained >72 hours following ROSC or reaching normothermia if TTM used; off sedation  - SSEP - N/A  Should be obtained >48 hours from ROSC    Will require further work up for further neuro-prognostication.     Recommendations:  -Preliminary read on EEG with burst suppression   -Load Keppra 3 g IV x1    -Start maintenance dose Keppra 1500 mg IV BID  -Obtain MRI Brain WO following EEG report  -Maintain Normothermia   -Neurology will continue to follow.            ------Of note, good functional outcome is defined as the ability to perform activities of daily living or work in a sheltered environment (Cerebral Performance Category (CPC) scale 1-2.) Poor functional outcome is defined as severe disability, a minimally conscious state, or a vegetative/unresponsive-wakeful state (CPC > 2.)       Reference: Heather Ashton et al. Guidelines for Neuroprognostication in Comatose Adult Survivors of Cardiac Arrest. Neurocritical care vol. 38,3 (2023): 533-563. doi:10.1007/k18995-073-30873-0  ---------        VTE Risk Mitigation (From admission, onward)           Ordered     Apply sequential compression device to lower extremities (if no contraindications). Medical venous thromboembolus prophylaxis is preferred.  Until discontinued         07/12/25 1021     heparin 25,000 units in dextrose 5% (100 units/ml) IV bolus from bag HIGH INTENSITY nomogram - [facility]  As needed (PRN)        Question:  Heparin Infusion Adjustment (DO NOT MODIFY ANSWER)  Answer:  [email or web address]\epic\Images\Pharmacy\HeparinInfusions\heparin HIGH INTENSITY nomogram for [facility] [id number]    07/12/25 0738     heparin 25,000 units in dextrose 5% (100 units/ml) IV bolus from bag HIGH INTENSITY nomogram - [facility]  As needed (PRN)        Question:  Heparin Infusion Adjustment (DO NOT MODIFY ANSWER)  Answer:  [email or web address]\epic\Images\Pharmacy\HeparinInfusions\heparin HIGH INTENSITY nomogram for [facility] [id number]    07/12/25 0738     heparin 25,000 units in dextrose 5% (100 units/ml) IV bolus from bag HIGH INTENSITY nomogram - [facility]  Once        Question:  Heparin Infusion Adjustment (DO NOT MODIFY ANSWER)  Answer:  [email or web address]\epic\Images\Pharmacy\HeparinInfusions\heparin HIGH INTENSITY nomogram for [facility] [id number].pdf    07/12/25 0738     heparin 25,000 units in dextrose 5% 250 mL (100 units/mL) infusion HIGH INTENSITY nomogram - [facility]  Continuous        Question:  Begin at (units/kg/hr)  Answer:  18    07/12/25 0738     IP VTE HIGH RISK PATIENT  Once         07/12/25 0707     Place sequential compression device  Until discontinued         07/12/25 0707                    Thank you for your consult. I will follow-up with patient. Please contact us if you have any additional questions.    Luz Schaffer MD  Neurology  [facility] - Cardiac Intensive  Care

## 2025-07-16 NOTE — ASSESSMENT & PLAN NOTE
Suspected hypoxic injury related to arrest and prolonged hypoxemic time. Initial CTH without acute intracranial pathology. Breathing over the vent with positive corneals but no cough or gag reflex.  - Off sedation this morning  - Neurology consulted, appreciate recs  - EEG ordered

## 2025-07-16 NOTE — ASSESSMENT & PLAN NOTE
Juani Reilly Jr. 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 Ferguson Nov 2022, HF with decreased LV function 20-25% that is currently admitted to the Cardiac Intensive Care Unit on 07/12 following PEA Arrest s/p ROSC after 15 minutes s/p cooling protocol. General neurology consulted for neuro-prognostication.  Hospital course complicated by hypotension 2/2 sedative medications requiring vasopressors and Acute Hypoxic Respiratory Failure on Nimbex and s/p paralytics use. Sedation and Vasopressor support weaned off on 07/16 at 4:30 am. Patient now is s/p >96 hours of TTM.       ROSC obtained on 07/12 after 3 rounds of epinephrine and CPR (~15 minutes))  Was TTM used? Yes - patient reached normothermia on 7/13.   Last sedation: 07/16 at 4:30 am    Predictors thus far:  - Pupillary light response - Absent bilaterally - reliable predictor of poor functional outcome at 3 months or later  Should be obtained >72 hours following ROSC  - Motor exam - Absent motor response - not reliable for prediction of poor outcome  Should be obtained >72 hours following ROSC or reaching normothermia if TTM used  - CT - N/A  Should be obtained >48 hours following ROSC  - MRI brain w/ DWI - Obtain after EEG   Should be obtained 2-7 days following ROSC  - EEG - Preliminary read with burst suppression  Should be obtained >72 hours following ROSC or reaching normothermia if TTM used; off sedation  - SSEP - N/A  Should be obtained >48 hours from ROSC    Will require further work up for further neuro-prognostication.     Recommendations:  -Preliminary read on EEG with burst suppression   -Load Keppra 3 g IV x1    -Start maintenance dose Keppra 1500 mg IV BID  -Obtain MRI Brain WO following EEG report  -Maintain Normothermia   -Neurology will continue to follow.            ------Of note, good functional outcome is defined as the ability to perform  activities of daily living or work in a sheltered environment (Cerebral Performance Category (CPC) scale 1-2.) Poor functional outcome is defined as severe disability, a minimally conscious state, or a vegetative/unresponsive-wakeful state (CPC > 2.)      Reference: Heather Ashton et al. Guidelines for Neuroprognostication in Comatose Adult Survivors of Cardiac Arrest. Neurocritical care vol. 38,3 2023): 533-563. doi:10.1007/e02639-756-96138-5  ---------

## 2025-07-16 NOTE — ASSESSMENT & PLAN NOTE
Patient with leukocytosis left shift; now resolved. CT Chest ; Notable for pulmonary edema and infiltrates indicating possible PNA. CXR w/ LLL infiltrates improving.  - ID consulted, appreciate recs  - Blood cultures NGTD x 48h  - Continue Zosyn   - S/p Vancomycin

## 2025-07-16 NOTE — ASSESSMENT & PLAN NOTE
Pt transferred for [facility] for advance options and adult congenital evaluation. Pt with systemic  - Pulmonary shunt. Presented with refractory hypoxemia while intubated.    - Pulmonology following, appreciate recs  - Low PEEP strategy  - Goal SPO2 > 85%  - Synchronous with vent after weaning sedation  - Cont Shanthi at 10 ppm, helped overcome refractory hypoxemia  - CXR stable

## 2025-07-16 NOTE — ASSESSMENT & PLAN NOTE
Kelsi Urbano is a 43 year old female presenting with   Chief Complaint   Patient presents with   • Gyn Exam     yearly      Patient would like communication of their results via:      Bety       S/P Fontan procedure  1. 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 Severely decreased left ventricular function  3. History of Gptot-[id number]-Emhfm, SVT  - status post successful ablation of a left lateral accessory pathway November 2022 by Dr. Ferguson  4. Cirrhosis  - last seen by hepatology May 2023 (normal AFP 6/11)  5. Arrested at home late night July 11, 2025  - unclear etiology, but CT scan suggestive of pulmonary emboli which may be chronic or acute  6. Long history of noncompliance with medication  7. Worsening hypoxemia over the past year of unclear etiology    Recommendations:   Saturations 85% or greater  CVP goal in the 10-15 range.  His prognosis is extremely poor.  By report, he had no gag reflex before paralytics were started. Would support withdrawal of care if the neurologic exam suggested significant injury.  Not a candidate for ECMO, other mechanical circulatory support, or heart transplant.    Appreciate consult to palliative care team and have encouraged Juani's family to continue working with them on goals of care for [place].      The [facility] team will continue to follow along with [place]'s care. Please contact us if you have any additional questions.

## 2025-07-16 NOTE — CONSULTS
[facility] - Cardiac Intensive Care  Adult Nutrition  Consult Note    SUMMARY     Recommendations    Interventions: Enteral nutrition management, Collaboration and referral of nutrition care    Recommendations:   1. Continuous TF recommendation: Impact Peptide 1.5 @ 45 mL/hr to provide 1080 mL total volume, 1620 kcal, 151 g CHO, 102 g protein, 0 g fiber, 834 mL free water    - flush per MD/provider    2. RD to monitor weight, labs, meds, intake, tolerance    Goal #1: Provide greater than or equal to 75% nutritional needs via nutrition support by RD follow up  Nutrition Goal Status #1: new  Goal #2: Maintain weight throughout hospitalization  Nutrition Goal Status #2: new  Communication of RD Recs:  (POC)    Nutrition Discharge Planning     Nutrition Discharge Planning: Too early to determine, pending clinical course    Assessment and Plan    Pending NFPE if medically warranted     Reason for Assessment    Reason For Assessment: consult (TF recs)  Diagnosis: cardiac disease (cardiac arrest)  General Information Comments: Pt admitted with cardiac arrest. PMHx: tricuspid and pulmonary atresia s/p bidirectional, decreased LV function, SVT, cirrhosis, Acute decompensated heart failure. No recent surgical hx. Pt transferred from another hospital for advance option evaluation and evaluation for congenital heart disease. Upon encounter pt was intubated and sedated. Family at bedside reported that pt has been having progressive SOB leading to cardiac arrest. Pt is still intubated and sedated - propofol running @ 0 ml/hr and total ve @ 7.85 L/m. 2+ edema. No wounds noted. No GI s/s - BM: unknown (hypoactive bowel sounds). NG/OG placed. TF were ordered yesterday afternoon @ 10 ml/hr and are running at this goal. Wt fluctuations over the last 10 months, suspected fluid issues, overall stable.    Nutrition/Diet History    Spiritual, Cultural Beliefs, Mandaeism Practices, Values that Affect Care: no  Food Allergies: NKFA  Factors  "Affecting Nutritional Intake: NPO, on mechanical ventilation  Nutrition-related SDOH: None Identified    Anthropometrics    Height: 5' 6" (167.6 cm)  Height (inches): 66 in  Height Method: Stated  Weight: (S) 60.3 kg (133 lb)  Weight (lb): 133 lb  Weight Method: (S) Bed Scale  Ideal Body Weight (IBW), Male: 142 lb  % Ideal Body Weight, Male (lb): 102.11 %  BMI (Calculated): 21.5  BMI Grade: 18.5-24.9 - normal    Lab/Procedures/Meds    Pertinent Labs Reviewed: reviewed  Pertinent Labs Comments: H/H 11.8/25.4 low, Na 130 low, Cl 90 low, BUN 24 high, creatinine 1.6 high, GFR 59 low, glucose 123 high, albumin 2.8 low, AST 99 high,  high  Pertinent Medications Reviewed: reviewed  Pertinent Medications Comments: heparin, pantoprazole, piperacillin, dobutamine, epinephrine, fentanyl, furosemide, norepinephrine, propofol, vasopressin    Estimated/Assessed Needs    Weight Used For Calorie Calculations: 60.3 kg (132 lb 15 oz)  Energy Calorie Requirements (kcal): 1651 kcal  Energy Need Method: [facility]  Protein Requirements:  g/pro (1.2-2.0 g/kg)  Weight Used For Protein Calculations: 60.3 kg (132 lb 15 oz)        RDA Method (mL): 1651    Nutrition Prescription Ordered    Current Diet Order: NPO  Current Nutrition Support Formula Ordered: Impact Peptide 1.5  Current Nutrition Support Rate Ordered: 10 (ml)  Current Nutrition Support Frequency Ordered: x 24 hrs    Evaluation of Received Nutrient/Fluid Intake    Enteral Calories (kcal): 360  Enteral Protein (gm): 23  Enteral (Free Water) Fluid (mL): 185  % Kcal Needs: 22  % Protein Needs: 33  Energy Calories Required: not meeting needs  Protein Required: not meeting needs  Fluid Required:  (per MD)  Tolerance: tolerating  % Intake of Estimated Energy Needs: Other: 20-50%  % Meal Intake: NPO    PES Statement    Inadequate enteral nutrition infusion related to Inadequate protein energy intake as evidenced by Intake <75% estimated needs  Status: New    Nutrition " Risk    Level of Risk/Frequency of Follow-up: high (2/week)     Monitor and Evaluation    Monitor and Evaluation: Energy intake, Protein intake, Carbohydrate intake, Diet order, Enteral and parenteral nutrition administration, Weight, Gastrointestinal profile, Electrolyte and renal panel, Glucose/endocrine profile, Inflammatory profile, Lipid profile, Nutrition focused physical findings, Skin     Nutrition Follow-Up    RD Follow-up?: Yes

## 2025-07-16 NOTE — PLAN OF CARE
Cardiac ICU Care Plan    CVP 14, 14, 15   Svo2 61%   Sedation off at 0420 for neuro assessment, corneal reflexes present. Negative cough and gag.  Levo titrated off. Epi and Vaso continue to infuse   Pt remains vented with no purposeful movement or response to stimuli. Pupils intermittently reactive per Npi.   Tube feeds remain at goal of 10mL/hr   >2.5 L UOP   Fluctuating temps overnight, MD Campos aware. See note for more information.   Bubble study scheduled for 0930.  , Hep, Lasix, Epi, Vaso continue to infuse    Neuro:  Dagoberto Coma Scale  Best Eye Response: 1-->(E1) none  Best Motor Response: 1-->(M1) none  Best Verbal Response: 1-->(V1) none  Dagoberto Coma Scale Score: 3  Assessment Qualifiers: Patient chemically sedated or paralyzed, Patient intubated  Pupil PERRLA: no    24 hr Temp:  [93.6 °F (34.2 °C)-98.8 °F (37.1 °C)]      CV:  Rhythm: normal sinus rhythm   DVT prophylaxis: VTE Core Measure: Pharmacological prophylaxis initiated/maintained    CVP (mean): 15 mmHg (07/16/25 0301)       SVO2 (%): 61 % (07/15/25 1901)               Pulses  Right Radial Pulse: Doppler  Left Radial Pulse: Doppler  Right Dorsalis Pedis Pulse: Doppler  Left Dorsalis Pedis Pulse: Doppler  Right Posterior Tibial Pulse: Doppler  Left Posterior Tibial Pulse: Doppler    Resp:     Vent Mode: A/C  Set Rate: 18 BPM  Oxygen Concentration (%): 100  Vt Set: 400 mL  PEEP/CPAP: 5 cmH20    GI/:  GI prophylaxis: yes  Diet/Nutrition Received: tube feeding  Last Bowel Movement:  (Unknown)  Voiding Characteristics: urethral catheter (bladder)       Urethral Catheter 07/12/25 0706 Temperature probe 14 Fr.-Reason for Continuing Urinary Catheterization: Critically ill in ICU and requiring hourly monitoring of intake/output   Intake/Output Summary (Last 24 hours) at 7/16/2025 0453  Last data filed at 7/16/2025 0401  Gross per 24 hour   Intake 2222.94 ml   Output 5960 ml   Net -3737.06 ml        Nutritional Supplement Intake: Quantity 0, Type: Tube  Feeds    Labs/Accuchecks:  Recent Labs   Lab 07/14/25  0415 07/15/25  0259 07/16/25  0302   WBC 8.62 9.82 11.59   RBC 4.26* 4.30* 4.23*   HGB 12.1* 12.1* 11.8*   HCT 35.1* 36.6* 35.4*   * 99* 116*      Recent Labs   Lab 07/14/25  0415 07/14/25  1306 07/14/25  1917 07/15/25  0259 07/16/25  0302   INR 1.6*  --   --  1.2 1.2   APTT 79.7*   < > 45.0* 45.3* 47.6*    < > = values in this interval not displayed.      Recent Labs     07/16/25  0302   *   K 4.7   CO2 25   CL 90*   BUN 24*   CREATININE 1.6*   ALKPHOS 79   *   AST 99*   BILITOT 5.5*       Recent Labs   Lab 07/12/25  0220 07/12/25  0322 07/12/25  1445   CPK  --  299* 405*   TROPONINI 0.150*  --   --       Recent Labs     07/14/25  1936 07/14/25  1937 07/15/25  0733 07/15/25  1414 07/15/25  1924 07/15/25  1926   PH 7.409  --  7.458*  --   --  7.434   PCO2 40.7  --  37.7  --   --  45.8*   PO2 55*   < > 55* 24* 32* 60*   HCO3 25.7  --  26.7  --   --  30.6*   POCSATURATED 88   < > 90 44 61 91   BE 1  --  3*  --   --  6*    < > = values in this interval not displayed.       Electrolytes: No replacement orders  Accuchecks: none    Gtts/LDAs:   0.9% NaCl   Intravenous Continuous 5 mL/hr at 07/16/25 0401 Rate Verify at 07/16/25 0401    CISatracurium 200 mg in 0.9% NaCl 100 mL infusion  0-10 mcg/kg/min Intravenous Continuous   Stopped at 07/15/25 1229    DOBUTamine IV infusion (non-titrating)  5 mcg/kg/min Intravenous Continuous 4.9 mL/hr at 07/16/25 0401 5 mcg/kg/min at 07/16/25 0401    EPINEPHrine  0.05 mcg/kg/min Intravenous Continuous 9.9 mL/hr at 07/16/25 0401 0.05 mcg/kg/min at 07/16/25 0401    fentanyl  0-250 mcg/hr Intravenous Continuous 2.5 mL/hr at 07/16/25 0401 25 mcg/hr at 07/16/25 0401    furosemide (Lasix) 500 mg in 50 mL infusion (conc: 10 mg/mL)  20 mg/hr Intravenous Continuous 2 mL/hr at 07/16/25 0401 20 mg/hr at 07/16/25 0401    heparin (porcine) in D5W  0-40 Units/kg/hr Intravenous Continuous 5.9 mL/hr at 07/16/25 0401 9  Units/kg/hr at 07/16/25 0401    nitric oxide gas  10 ppm Inhalation Continuous   10 ppm at 07/14/25 1421    NORepinephrine bitartrate-D5W  0-3 mcg/kg/min Intravenous Continuous 1.2 mL/hr at 07/16/25 0401 0.04 mcg/kg/min at 07/16/25 0401    propofoL  0-50 mcg/kg/min Intravenous Continuous 3.9 mL/hr at 07/16/25 0401 10 mcg/kg/min at 07/16/25 0401    vasopressin  0.04 Units/min Intravenous Continuous 12 mL/hr at 07/16/25 0401 0.04 Units/min at 07/16/25 0401       Lines/Drains/Airways       Central Venous Catheter Line  Duration             Percutaneous Central Line Triple Lumen 07/12/25 1227 Internal Jugular Right 3 days              Drain  Duration                  NG/OG Tube 07/12/25 1645 Center mouth 3 days         Urethral Catheter 07/12/25 0706 Temperature probe 14 Fr. 3 days              Airway  Duration                  Airway - Non-Surgical 07/12/25 0130 4 days              Arterial Line  Duration             Arterial Line 07/12/25 1912 Left Radial 3 days              Peripheral Intravenous Line  Duration                  External Jugular IV 07/12/25 0704 3 days    Peripheral IV Single Lumen 07/12/25 0703 18 G Anterior;Left Forearm 3 days    Peripheral IV Single Lumen 07/12/25 0704 18 G Right Antecubital 3 days    Peripheral IV Single Lumen 07/12/25 0704 20 G Anterior;Right Forearm 3 days    Peripheral IV Single Lumen 07/12/25 1000 20 G Right Hand 3 days                    Skin/Wounds  Bathing/Skin Care: bath, complete;linen changed;back care;incontinence care (07/14/25 1701)  Wounds: No  Wound care consulted: No     Problem: Adult Inpatient Plan of Care  Goal: Plan of Care Review  Outcome: Progressing

## 2025-07-16 NOTE — ASSESSMENT & PLAN NOTE
"See ACP 7/14-7/16    Insight/goals of care- improving insight and prognostic awareness. Some substituted values of functional independence. Clear values from family of accepting any and all potentially beneficial interventions. Willingness to engage regarding avoiding non-helpful interventions (CPR), but remain hopeful for a "miracle". Recognize Tank would not find long-term life support and LTACH acceptable.    Social support- supportive mother, Amita, and grandmother, Elaina. Reportedly, patient raised mostly by grandmother    Psychological- limited coping, appropriate grief    Spiritual- Jewish, emphasized importance of prayer    Symptom management- no active needs    Recommendations  -DNR, continued best supportive care and ongoing prognostications efforts  -likely that patient's values would not align with long-term life support if no significant clinical improvement over the coming days to short weeks   -anticipate introduction of option for liberation from life support and allowing natural death if neuroprognostic indicators remain poor  -continue current level of care  -will continue to assist aligning values with care recommendations as course unfolds, negrito pending neuroprognostication    "

## 2025-07-16 NOTE — HPI
Juani Reilly Jr. 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 Ferguson Nov 2022, HF with decreased LV function 20-25% that is currently admitted to the Cardiac Intensive Care Unit following PEA Arrest s/p ROSC after 15 minutes. General neurology consulted for neuroprognostication.  Per chart review, patient with  recurrent HF exacerbation in the past year. Last seen by [facility] on 07/08 where  SOB with NYHA II-III was reported. Dates leading into the arrest, family members reported  patient would complains of increased SOB. On the day of the arrest, he  became unresponsive on 07/12 while in the bedroom with his grandmother. At the time, sister rushed to bedside and performed CPR for ~5 minutes until EMS arrived. Upon EMS arrival, PEA was noticed with subsequent 3 rounds of eipi with return of ROS after ~15 minutes. Intubated on arrival to the ED from an O[facility]. Work up revealed BLE PE on CTA chest and was started on AC therapy as given patient's anatomy decision to ot give systemic lytics was made. Patient transferred to our facility for higher lever of care. Placed on Cooling protocol and admitted to the Cardiac ICU. Initial CTH on 07/12 with no acute intracranial abnormalities.  Repeat CTH on that date, as well with no acute intracranial abnormalities.     Since then, hospital course has been complicated by severe Hypoxic Respiratory Failure due to vent dyssynchrony requiring paralytics and Nimbex with improved oxygenation; hypotension requiring vasopressor support.     He remained sedated on Fentanyl and propofol until 4:30 am on 07/16 for better neurological assessment.

## 2025-07-16 NOTE — PLAN OF CARE
CICU DAILY GOALS       A: Awake    RASS: Goal -    Actual - RASS (Alvarez Agitation-Sedation Scale): unarousable   Restraint necessity: Clinical Justification: Removing medical devices, Treatment Interference  B: Breath   SBT: Fail   C: Coordinate A & B, analgesics/sedatives   Pain: managed    SAT: Not attempted  D: Delirium   CAM-ICU:    E: Early(intubated/ Progressive (non-intubated) Mobility   MOVE Screen: Fail   Activity: Activity Management: Patient unable to perform activities  FAS: Feeding/Nutrition   Diet order: Diet/Nutrition Received: tube feeding,   Fluid restriction:       T: Thrombus   DVT prophylaxis: VTE Core Measure: Pharmacological prophylaxis initiated/maintained  H: HOB Elevation   Head of Bed (HOB) Positioning: HOB at 15 degrees  U: Ulcer Prophylaxis   GI: yes  G: Glucose control   managed Glycemic Management: blood glucose monitored  S: Skin   Bathing/Skin Care: bath, complete;linen changed (07/16/25 1705)  Wounds: No  Wound care consulted: No  B: Bowel Function   no issues   I: Indwelling Catheters   Parry necessity:      Urethral Catheter 07/12/25 0706 Temperature probe 14 Fr.-Reason for Continuing Urinary Catheterization: Critically ill in ICU and requiring hourly monitoring of intake/output   CVC necessity: No  D: De-escalation Antibx   No  Plan for the day   Patient remains off sedation with minimal neuro responses. NCC following. EEG applied and Keppra started per neuro. Patient remains intubated on 70/5, does appear to be breathing over the vent. VSS no issues this shift. CVP 18/16/14, SvO2 59. Still remains with lasix, , and heparin infusing. Parry with roughly 200ml/hr. Tube feeds increased to 30ml/hr goal of 40. Mom and grandmother at bedside all day and updated on POC.   Family/Goals of care/Code Status   Code Status: DNR     No acute events throughout day, VS and assessment per flow sheet, patient progressing towards goals as tolerated, plan of care reviewed with Juani  Emeterio Reilly Jr. and family, all concerns addressed, will continue to monitor.    Problem: Adult Inpatient Plan of Care  Goal: Plan of Care Review  Outcome: Progressing     Problem: Heart Failure  Goal: Optimal Coping  Outcome: Progressing

## 2025-07-16 NOTE — SUBJECTIVE & OBJECTIVE
Interval History:   Juani had fluctuating temps overnight and episodes of profuse sweating. Sedation turned off earlier this AM to conduct neuro assessment. Corneal reflexes intermittently present, negative cough and gag. Dagoberto score of 3.    Juani's plan of care was discussed in the biweekly [facility] conference today. All team members were present, including [facility] cardiologists, congenital cardiac anesthesiologists, CV surgeons, interventional cardiologists and the [facility] , PA, and nurse navigator. He has a bubble echo scheduled for later this morning but this isn't a necessity from [facility] perspective.     Spent time this morning talking with his family and providing patient education. Mom (Amita) and family present have a solid understanding of Juani's prognosis.     Past Medical History:   Diagnosis Date    Acute decompensated heart failure 02/06/2025    History of heart surgery     Other cirrhosis of liver 11/12/2020    Palliative care encounter 7/14/2025    SVT (supraventricular tachycardia)     WPW syndrome        Past Surgical History:   Procedure Laterality Date    ANGIOGRAPHY OF AORTIC ARCH N/A 09/03/2020    Procedure: AORTOGRAM, AORTIC ARCH;  Surgeon: Stephania Crump MD;  Location: [facility] CATH LAB;  Service: Cardiology;  Laterality: N/A;    FONTAN PROCEDURE, EXTRACARDIAC      LEFT HEART CATHETERIZATION Left 09/03/2020    Procedure: Left heart cath;  Surgeon: Stephania Crump MD;  Location: [facility] CATH LAB;  Service: Cardiology;  Laterality: Left;    RIGHT HEART CATHETERIZATION FOR CONGENITAL HEART DEFECT N/A 09/03/2020    Procedure: CATHETERIZATION, HEART, RIGHT, FOR CONGENITAL HEART DEFECT;  Surgeon: Stephania Crump MD;  Location: [facility] CATH LAB;  Service: Cardiology;  Laterality: N/A;  [facility] - needs covid test morning of. Extenuating circumstances    TRANSESOPHAGEAL ECHOCARDIOGRAPHY N/A 11/10/2022    Procedure: ECHOCARDIOGRAM, TRANSESOPHAGEAL;  Surgeon: Emeterio  JAYANT Hall MD;  Location: [facility] EP LAB;  Service: Cardiology;  Laterality: N/A;    TREATMENT OF CARDIAC ARRHYTHMIA N/A 01/09/2021    Procedure: CARDIOVERSION;  Surgeon: Jim Mcginnis MD;  Location: [facility] CATH LAB;  Service: Cardiology;  Laterality: N/A;       Review of patient's allergies indicates:  No Known Allergies    No current facility-administered medications on file prior to encounter.     Current Outpatient Medications on File Prior to Encounter   Medication Sig    benzonatate (TESSALON) 100 MG capsule Take 1 capsule (100 mg total) by mouth 3 (three) times daily as needed for Cough.    bumetanide (BUMEX) 2 MG tablet Take 1 tablet (2 mg total) by mouth 2 (two) times a day.    cetirizine (ZYRTEC) 10 MG tablet Take 1 tablet (10 mg total) by mouth once daily.    dapagliflozin propanediol (FARXIGA) 10 mg tablet Take 1 tablet (10 mg total) by mouth once daily.    losartan (COZAAR) 100 MG tablet Take 1 tablet (100 mg total) by mouth once daily.    metoprolol succinate (TOPROL-XL) 50 MG 24 hr tablet Take 1 tablet (50 mg total) by mouth once daily.    rivaroxaban (XARELTO) 20 mg Tab Take 1 tablet (20 mg total) by mouth daily with dinner or evening meal. (Patient not taking: Reported on 7/8/2025)    spironolactone (ALDACTONE) 25 MG tablet Take 1 tablet (25 mg total) by mouth once daily.     Family History       Problem Relation (Age of Onset)    Heart disease Maternal Grandfather    No Known Problems Mother, Father, Sister, Brother, Maternal Aunt, Maternal Uncle, Paternal Aunt, Paternal Uncle, Maternal Grandmother, Paternal Grandmother, Paternal Grandfather          Social History     Social History Narrative    Not on file     Scheduled Meds:   heparin (PORCINE)  80 Units/kg Intravenous Once    mupirocin   Nasal BID    pantoprazole  40 mg Intravenous Daily    piperacillin-tazobactam (Zosyn) IV (PEDS and ADULTS) (extended infusion is not appropriate)  4.5 g Intravenous Q8H    potassium chloride in water  40 mEq  Intravenous Once    white petrolatum-mineral oiL   Both Eyes Q8H     Continuous Infusions:   0.9% NaCl   Intravenous Continuous 5 mL/hr at 07/16/25 0601 Rate Verify at 07/16/25 0601    DOBUTamine IV infusion (non-titrating)  5 mcg/kg/min Intravenous Continuous 4.9 mL/hr at 07/16/25 0601 5 mcg/kg/min at 07/16/25 0601    EPINEPHrine  0.05 mcg/kg/min Intravenous Continuous 9.9 mL/hr at 07/16/25 0601 0.05 mcg/kg/min at 07/16/25 0601    fentanyl  0-250 mcg/hr Intravenous Continuous   Stopped at 07/16/25 0412    furosemide (Lasix) 500 mg in 50 mL infusion (conc: 10 mg/mL)  20 mg/hr Intravenous Continuous 2 mL/hr at 07/16/25 0601 20 mg/hr at 07/16/25 0601    heparin (porcine) in D5W  0-40 Units/kg/hr Intravenous Continuous 5.9 mL/hr at 07/16/25 0601 9 Units/kg/hr at 07/16/25 0601    nitric oxide gas  10 ppm Inhalation Continuous   10 ppm at 07/14/25 1421    NORepinephrine bitartrate-D5W  0-3 mcg/kg/min Intravenous Continuous   Stopped at 07/16/25 0424    propofoL  0-50 mcg/kg/min Intravenous Continuous   Stopped at 07/16/25 0424    vasopressin  0.04 Units/min Intravenous Continuous 12 mL/hr at 07/16/25 0601 0.04 Units/min at 07/16/25 0601     PRN Meds:.  Current Facility-Administered Medications:     heparin (PORCINE), 60 Units/kg, Intravenous, PRN    heparin (PORCINE), 30 Units/kg, Intravenous, PRN    sodium chloride 0.9%, 10 mL, Intravenous, PRN     Unable to get review of systems.  Patient intubated and sedated.  Objective:     Vital Signs (Most Recent):  Temp: 96.6 °F (35.9 °C) (07/16/25 0800)  Pulse: 100 (07/16/25 0800)  Resp: 19 (07/16/25 0800)  BP: 113/73 (07/16/25 0800)  SpO2: (!) 92 % (07/16/25 0800) Vital Signs (24h Range):  Temp:  [93.6 °F (34.2 °C)-98.6 °F (37 °C)] 96.6 °F (35.9 °C)  Pulse:  [] 100  Resp:  [18-23] 19  SpO2:  [87 %-97 %] 92 %  BP: ()/(59-73) 113/73  Arterial Line BP: ()/(56-68) 96/64     Telemetry reviewed.  Sinus rhythm, occasional premature ventricular contractions.  No  significant arrhythmias noted.  Weight: (S) 60.3 kg (133 lb)  Body mass index is 21.47 kg/m².    SpO2: (!) 92 %         Intake/Output Summary (Last 24 hours) at 7/16/2025 0822  Last data filed at 7/16/2025 0601  Gross per 24 hour   Intake 1919.01 ml   Output 5455 ml   Net -3535.99 ml       Lines/Drains/Airways       Central Venous Catheter Line  Duration             Percutaneous Central Line Triple Lumen 07/12/25 1227 Internal Jugular Right 3 days              Drain  Duration                  Urethral Catheter 07/12/25 0706 Temperature probe 14 Fr. 4 days         NG/OG Tube 07/12/25 1645 Center mouth 3 days              Airway  Duration                  Airway - Non-Surgical 07/12/25 0130 4 days              Arterial Line  Duration             Arterial Line 07/12/25 1912 Left Radial 3 days              Peripheral Intravenous Line  Duration                  External Jugular IV 07/12/25 0704 4 days    Peripheral IV Single Lumen 07/12/25 0703 18 G Anterior;Left Forearm 4 days    Peripheral IV Single Lumen 07/12/25 0704 18 G Right Antecubital 4 days    Peripheral IV Single Lumen 07/12/25 0704 20 G Anterior;Right Forearm 4 days    Peripheral IV Single Lumen 07/12/25 1000 20 G Right Hand 3 days                       Physical Exam  GENERAL:  Intubated, sedated.  ETT in place.  HEENT: Mucous membranes moist and pink, normocephalic atraumatic, no cranial or carotid bruits, sclera anicteric  NECK: No obvious jugular venous distention with patient flat, no thyromegaly, no lymphadenopathy  CHEST:  Mildly coarse breath sounds bilaterally.  Well-healed sternotomy.  CARDIOVASCULAR: Tachycardia, Quiet precordium, regular rate and rhythm, S1 with single S2, no murmurs rubs or gallops  NEURO:  Intubated, sedated, paralyzed.    ABG  Recent Labs   Lab 07/16/25 0722   PH 7.475*   PO2 29*   PCO2 45.7*   HCO3 33.6*   BE 10*     Lab Results   Component Value Date    WBC 11.59 07/16/2025    HGB 11.8 (L) 07/16/2025    HCT 42 07/16/2025     MCV 84 07/16/2025     (L) 07/16/2025       CMP  Sodium   Date Value Ref Range Status   07/16/2025 130 (L) 136 - 145 mmol/L Final   03/20/2025 139 136 - 145 mmol/L Final     Potassium   Date Value Ref Range Status   07/16/2025 4.7 3.5 - 5.1 mmol/L Final   03/20/2025 3.5 3.5 - 5.1 mmol/L Final     Chloride   Date Value Ref Range Status   07/16/2025 90 (L) 95 - 110 mmol/L Final   03/20/2025 104 95 - 110 mmol/L Final     CO2   Date Value Ref Range Status   07/16/2025 25 23 - 29 mmol/L Final   03/20/2025 23 23 - 29 mmol/L Final     Glucose   Date Value Ref Range Status   07/16/2025 123 (H) 70 - 110 mg/dL Final   03/20/2025 94 70 - 110 mg/dL Final     BUN   Date Value Ref Range Status   07/16/2025 24 (H) 6 - 20 mg/dL Final     Creatinine   Date Value Ref Range Status   07/16/2025 1.6 (H) 0.5 - 1.4 mg/dL Final     Calcium   Date Value Ref Range Status   07/16/2025 9.0 8.7 - 10.5 mg/dL Final   03/20/2025 8.9 8.7 - 10.5 mg/dL Final     Protein Total   Date Value Ref Range Status   07/16/2025 7.5 6.0 - 8.4 gm/dL Final     Total Protein   Date Value Ref Range Status   02/09/2025 6.4 6.0 - 8.4 g/dL Final     Albumin   Date Value Ref Range Status   07/16/2025 2.8 (L) 3.5 - 5.2 g/dL Final   02/09/2025 3.4 (L) 3.5 - 5.2 g/dL Final     Total Bilirubin   Date Value Ref Range Status   02/09/2025 1.6 (H) 0.1 - 1.0 mg/dL Final     Comment:     For infants and newborns, interpretation of results should be based  on gestational age, weight and in agreement with clinical  observations.    Premature Infant recommended reference ranges:  Up to 24 hours.............<8.0 mg/dL  Up to 48 hours............<12.0 mg/dL  3-5 days..................<15.0 mg/dL  6-29 days.................<15.0 mg/dL       Bilirubin Total   Date Value Ref Range Status   07/16/2025 5.5 (H) 0.1 - 1.0 mg/dL Final     Comment:     For infants and newborns, interpretation of results should be based   on gestational age, weight and in agreement with clinical    observations.    Premature Infant recommended reference ranges:   0-24 hours:  <8.0 mg/dL   24-48 hours: <12.0 mg/dL   3-5 days:    <15.0 mg/dL   6-29 days:   <15.0 mg/dL     Alkaline Phosphatase   Date Value Ref Range Status   02/09/2025 40 40 - 150 U/L Final     ALP   Date Value Ref Range Status   07/16/2025 79 40 - 150 unit/L Final     AST   Date Value Ref Range Status   07/16/2025 99 (H) 11 - 45 unit/L Final   02/09/2025 16 10 - 40 U/L Final     ALT   Date Value Ref Range Status   07/16/2025 144 (H) 10 - 44 unit/L Final   02/09/2025 20 10 - 44 U/L Final     Anion Gap   Date Value Ref Range Status   07/16/2025 15 8 - 16 mmol/L Final     eGFR   Date Value Ref Range Status   07/16/2025 59 (L) >60 mL/min/1.73/m2 Final     Comment:     Estimated GFR calculated using the CKD-EPI creatinine (2021) equation.   03/20/2025 >60.0 >60 mL/min/1.73 m^2 Final     Microbiology Results (last 7 days)       Procedure Component Value Units Date/Time    Blood culture [[id number]]  (Normal) Collected: 07/12/25 0954    Order Status: Completed Specimen: Blood from Peripheral, Forearm, Left Updated: 07/15/25 1402     Blood Culture No Growth After 48 Hours    Blood culture [[id number]]  (Normal) Collected: 07/12/25 0954    Order Status: Completed Specimen: Blood from Peripheral, Upper Arm, Right Updated: 07/15/25 1402     Blood Culture No Growth After 48 Hours    Blood culture [[id number]] Collected: 07/12/25 0954    Order Status: Sent Specimen: Blood from Peripheral, Lower Arm, Right Updated: 07/14/25 1635    Culture, Respiratory with Gram Stain [[id number]]     Order Status: Canceled Specimen: Respiratory from Sputum            Latest Reference Range & Units 06/11/25 14:51   AFP <=8.4 ng/mL 4.5     Echo 7/12/25  Patient presenting following in-home cardiac arrest:  Imaging consistent with diagnosis of tricuspid atresia S/P lateral tunnel Fontan - study remarkable for decreased systolic function of the single ventricle worsened since  ACHD study 4/2025.  Fontan and Jesus circulation not well demonstrated in this study.  Color doppler consistent with small persistent shunt from Fontan circulation to RA.  At least two pulmonary veins demonstrated returning to the left atrium with no evidence for obstruction.  There is a small right atrium with no obvious obstruction of right atrial flow to the left atrium.  There is no obvious tricuspid valve tissue present and no right ventricular cavity could be demonstrated.  There is no evidence for pulmonary valve.  At least mild dilation of the left atrium.  Mildly dilated mitral valve annulus with color Doppler demonstrating moderate to severe insufficiency.  Single ventricle consistent with left ventricular morphology.  There is at least moderate dilation of the left ventricle with prominently trabeculated apical myocardium.  Left ventricular systolic function qualitatively severely compromised with ejection fraction estimated <15%.  Global left ventricular longitudinal strain abnormal - peak average measured -3.4%.  There is a large aortic annulus with trileaflet aortic valve, peak outflow velocity <0.5 m/sec and trivial central jet of insufficiency.  No obvious pericardial effusion.    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  Bilateral acute pulmonary thromboemboli identified in segmental branches of the bilateral lower lobe pulmonary arteries.  Cardiomegaly with morphology suggesting single dilated left ventricle. There appears to be communication between the right and left atria suggesting large ASD.  Pulmonary venous congestion with bibasilar pulmonary edema.  Extracardiac Fontan shunt, between the IVC and pulmonary artery, opacifies with contrast and appears patent.  Atelectatic changes at the left lung base with significant contrast retention suggesting impaired pulmonary venous drainage. Peripheral zones of absent pulmonary  enhancement at both lung bases potentially representing pulmonary infarcts, in light of bilateral pulmonary emboli, or additional areas of atelectasis.  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  Hepatomegaly with hepatic venous enlargement suggesting congestive hepatopathy.    I reviewed that study in detail.  I agree with the findings.  The proximal pulmonary arteries and Fontan circuit are free of obstruction, but there do appear to be thrombi in the segmental branches of the bilateral lower lobe pulmonary arteries.     COMMUNICATION  This critical result was discovered/received on 7/12/2025 at 03:00.    7/12/25 Head CT:  Limited right upper quadrant ultrasound performed. The liver measures 17 cm in length and is homogeneous in echogenicity. Trace fluid seen along the falciform ligament. Common bile duct is within normal limits at 2.2 mm.. Mild gallbladder wall thickening. Trace pericholecystic fluid. Negative sonographic Disla sign. Pancreas is obscured by bowel gas. IVC within normal limits. The right kidney is normal in length measuring 10.4 cm. No right sided hydronephrosis or renal masses identified.     Liver US 2/6/25:  Limited right upper quadrant ultrasound performed. The liver measures 17 cm in length and is homogeneous in echogenicity. Trace fluid seen along the falciform ligament. Common bile duct is within normal limits at 2.2 mm.. Mild gallbladder wall thickening. Trace pericholecystic fluid. Negative sonographic Disla sign. Pancreas is obscured by bowel gas. IVC within normal limits. The right kidney is normal in length measuring 10.4 cm. No right sided hydronephrosis or renal masses identified.     Cath 9/3/20:  IMPRESSION:  1) Tricuspid atresia s/p intra-atrial Fontan  2) Normal Fontan pressures (11mmHg) and wedge pressure (8mmHg).  3) Normal cardiac output and vascular resistance calculations  4) Small atrial baffle leak  5) No significant  aorta-pulmonary or veno-venous collaterals  6) Occlusion of right common femoral artery

## 2025-07-16 NOTE — PROGRESS NOTES
[facility]  Pulmonology  Progress Note    Patient Name: Juani Reilly Jr.  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 He is followed by Dr. Reis in the advanced HF clinic and was last seen on 7/8/25 for NYHA class II-III symptoms. At that time, diuretics were adjusted (Lasix discontinued, Bumex 2 mg BID started).    Per family, patient developed progressive SOB over the past week and became unresponsive at home. CPR was initiated by his sister (~5 min) after he collapsed in his bedroom. EMS arrived to find PEA arrest; ROSC achieved after 3 rounds of epinephrine. He was intubated in the field and transferred to this facility.    Pulmonology consulted for ventilator management in the setting of refractory hypoxemia.    Overview/Hospital Course:  Pulmonary consulted for refractory hypoxemia. Patient remains mechanically ventilated, breathing over the vent, with FiO? weaned to 70%. On inhaled nitric oxide and dobutamine. Sedation and paralytics weaned. Neurology consulted for neuroprognostication.    Interval History: Patient remains mechanically ventilated, breathing over the vent, with FiO? weaned to 70%. On inhaled nitric oxide and dobutamine. Sedation and paralytics weaned. Neurology consulted for neuroprognostication.      Objective:     Vital Signs (Most Recent):  Temp: 96.4 °F (35.8 °C) (07/16/25 1300)  Pulse: 99 (07/16/25 1300)  Resp: (!)  23 (07/16/25 1300)  BP: 104/65 (07/16/25 1300)  SpO2: (!) 93 % (07/16/25 1300) Vital Signs (24h Range):  Temp:  [93.6 °F (34.2 °C)-98.2 °F (36.8 °C)] 96.4 °F (35.8 °C)  Pulse:  [] 99  Resp:  [17-32] 23  SpO2:  [86 %-97 %] 93 %  BP: ()/(60-97) 104/65  Arterial Line BP: ()/(58-91) 88/59     Weight: (S) 60.3 kg (133 lb)  Body mass index is 21.47 kg/m².      Intake/Output Summary (Last 24 hours) at 7/16/2025 1413  Last data filed at 7/16/2025 1300  Gross per 24 hour   Intake 1592.1 ml   Output 5055 ml   Net -3462.9 ml        Physical Exam  Vitals reviewed.   Constitutional:       Interventions: He is intubated.   HENT:      Head: Atraumatic.   Eyes:      Conjunctiva/sclera: Conjunctivae normal.   Cardiovascular:      Rate and Rhythm: Regular rhythm. Tachycardia present.      Pulses: Normal pulses.   Pulmonary:      Effort: He is intubated.      Breath sounds: Normal breath sounds. No wheezing or rales.   Abdominal:      General: Abdomen is flat.      Palpations: Abdomen is soft.   Musculoskeletal:      Right lower leg: No edema.      Left lower leg: No edema.   Skin:     General: Skin is warm and dry.           Review of Systems    Vents:  Vent Mode: A/C (07/16/25 1200)  Ventilator Initiated: Yes (07/12/25 0701)  Set Rate: 16 BPM (07/16/25 1200)  Vt Set: 400 mL (07/16/25 1200)  PEEP/CPAP: 5 cmH20 (07/16/25 1200)  Oxygen Concentration (%): 70 (07/16/25 1300)  Peak Airway Pressure: 21 cmH20 (07/16/25 1200)  Plateau Pressure: 17 cmH20 (07/16/25 1200)  Total Ve: 9.46 L/m (07/16/25 1200)  Negative Inspiratory Force (cm H2O): 0 (07/16/25 1200)  F/VT Ratio<105 (RSBI): (!) 42.55 (07/16/25 1200)    Lines/Drains/Airways       Central Venous Catheter Line  Duration             Percutaneous Central Line Triple Lumen 07/12/25 1227 Internal Jugular Right 4 days              Drain  Duration                  Urethral Catheter 07/12/25 0706 Temperature probe 14 Fr. 4 days         NG/OG Tube 07/12/25 1645 Center mouth 3  days              Airway  Duration                  Airway - Non-Surgical 07/12/25 0130 4 days              Arterial Line  Duration             Arterial Line 07/12/25 1912 Left Radial 3 days              Peripheral Intravenous Line  Duration                  External Jugular IV 07/12/25 0704 4 days    Peripheral IV Single Lumen 07/12/25 0703 18 G Anterior;Left Forearm 4 days    Peripheral IV Single Lumen 07/12/25 0704 18 G Right Antecubital 4 days    Peripheral IV Single Lumen 07/12/25 0704 20 G Anterior;Right Forearm 4 days                    Significant Labs:    CBC/Anemia Profile:  Recent Labs   Lab 07/15/25  0259 07/16/25  0302 07/16/25  0722 07/16/25  0726   WBC 9.82 11.59  --   --    HGB 12.1* 11.8*  --   --    HCT 36.6* 35.4* 42 42   PLT 99* 116*  --   --    MCV 85 84  --   --    RDW 19.3* 19.3*  --   --         Chemistries:  Recent Labs   Lab 07/15/25  0259 07/15/25  0752 07/15/25  1924 07/16/25  0302 07/16/25  0904   *   < > 131* 130* 132*   K 4.4   < > 4.2 4.7 4.5   CL 94*   < > 91* 90* 89*   CO2 21*   < > 25 25 30*   BUN 22*   < > 21* 24* 24*   CREATININE 1.4   < > 1.5* 1.6* 1.6*   CALCIUM 8.3*   < > 8.7 9.0 9.5   ALBUMIN 2.8*  --   --  2.8*  --    PROT 7.1  --   --  7.5  --    BILITOT 3.0*  --   --  5.5*  --    ALKPHOS 80  --   --  79  --    *  --   --  144*  --    *  --   --  99*  --    MG 2.4   < > 2.0 2.0 2.1   PHOS 5.1*   < > 5.7* 6.1* 6.5*    < > = values in this interval not displayed.       All pertinent labs within the past 24 hours have been reviewed.    Significant Imaging:  I have reviewed all pertinent imaging results/findings within the past 24 hours.  Assessment/Plan:     Pulmonary  Acute hypoxemic respiratory failure  # PEA Cardiac Arrest   # Bilateral PE   # History of Fontan Procedure   CTA chest showed concerns for bilateral PE and some dense changes in the left lung base. He has acute on chronic hypoxemic respiratory failure, initially improved on 7/13 with deep  sedation and neuromuscular blockade to improve vent synchrony. However, on 7/14/25 had refractory hypoxemia. He has complicated heart anatomy with history of Fontan procedure and have been minimizing PEEP to allow adequate venous return with assistance from congenital heart team. Suspect some shunting physiology given his refractory hypoxemia despite 100% FiO2. He oxygenation did improve with initiation of inhaled nitric oxide.   Patient has complex cardiopulmonary physiology and several potential contributors to the acute decompensation. He has good respiratory mechanics on the ventilator with a driving pressure of 14 and plateau pressure ~20. He does not have evidence of ARDS that would explain his hypoxemia.     - Nimbex off   - Sedation off   - continue lung protective ventilation with low tidal volume and PEEP of 5, of note during episode of worsening hypoxemia his oxygenation did not improve with trial of higher PEEP and given his cardiac anatomy will aim to maintain a PEEP of 5   - Wean FiO2 as tolerated  - Discussed need for echo with bubble with Dr Leonard Santoyo, catheterization would be better to evaluate shunt physiology and may also assist with pulmonary AVM  - continue nitric oxide at 10ppm   - Neurology consulted and plan for neuroprognostication in AM                 Osiel Lobato MD  Pulmonology  [facility]

## 2025-07-16 NOTE — PROGRESS NOTES
[facility] - Cardiac Intensive Care  Adult Congenital Heart Disease   Progress Note    Patient Name: Juani Reilly Jr.  MRN: [id number]  Admission Date: 7/12/2025  Hospital Length of Stay: 4 days  Code Status: DNR   Attending Physician: Solitario Reis MD   Primary Care Physician: Jaqueline Pardo MD  Expected Discharge Date: 8/1/2025  Principal Problem:Cardiac arrest    Subjective:     Interval History:   Juani had fluctuating temps overnight and episodes of profuse sweating. Sedation turned off earlier this AM to conduct neuro assessment. Corneal reflexes intermittently present, negative cough and gag. Dagoberto score of 3.    Junai's plan of care was discussed in the biweekly [facility] conference today. All team members were present, including [facility] cardiologists, congenital cardiac anesthesiologists, CV surgeons, interventional cardiologists and the [facility] , PA, and nurse navigator. He has a bubble echo scheduled for later this morning but this isn't a necessity from [facility] perspective.     Spent time this morning talking with his family and providing patient education. Mom (Amita) and family present have a solid understanding of Juani's prognosis.     Past Medical History:   Diagnosis Date    Acute decompensated heart failure 02/06/2025    History of heart surgery     Other cirrhosis of liver 11/12/2020    Palliative care encounter 7/14/2025    SVT (supraventricular tachycardia)     WPW syndrome        Past Surgical History:   Procedure Laterality Date    ANGIOGRAPHY OF AORTIC ARCH N/A 09/03/2020    Procedure: AORTOGRAM, AORTIC ARCH;  Surgeon: Stephania Crump MD;  Location: [facility] CATH LAB;  Service: Cardiology;  Laterality: N/A;    FONTAN PROCEDURE, EXTRACARDIAC      LEFT HEART CATHETERIZATION Left 09/03/2020    Procedure: Left heart cath;  Surgeon: Stephania Crump MD;  Location: [facility] CATH LAB;  Service: Cardiology;  Laterality: Left;    RIGHT HEART CATHETERIZATION FOR  CONGENITAL HEART DEFECT N/A 09/03/2020    Procedure: CATHETERIZATION, HEART, RIGHT, FOR CONGENITAL HEART DEFECT;  Surgeon: Stephania Crump MD;  Location: [facility] CATH LAB;  Service: Cardiology;  Laterality: N/A;  [facility] - needs covid test morning of. Extenuating circumstances    TRANSESOPHAGEAL ECHOCARDIOGRAPHY N/A 11/10/2022    Procedure: ECHOCARDIOGRAM, TRANSESOPHAGEAL;  Surgeon: Emeterio Hall MD;  Location: [facility] EP LAB;  Service: Cardiology;  Laterality: N/A;    TREATMENT OF CARDIAC ARRHYTHMIA N/A 01/09/2021    Procedure: CARDIOVERSION;  Surgeon: Jim Mcginnis MD;  Location: [facility] CATH LAB;  Service: Cardiology;  Laterality: N/A;       Review of patient's allergies indicates:  No Known Allergies    No current facility-administered medications on file prior to encounter.     Current Outpatient Medications on File Prior to Encounter   Medication Sig    benzonatate (TESSALON) 100 MG capsule Take 1 capsule (100 mg total) by mouth 3 (three) times daily as needed for Cough.    bumetanide (BUMEX) 2 MG tablet Take 1 tablet (2 mg total) by mouth 2 (two) times a day.    cetirizine (ZYRTEC) 10 MG tablet Take 1 tablet (10 mg total) by mouth once daily.    dapagliflozin propanediol (FARXIGA) 10 mg tablet Take 1 tablet (10 mg total) by mouth once daily.    losartan (COZAAR) 100 MG tablet Take 1 tablet (100 mg total) by mouth once daily.    metoprolol succinate (TOPROL-XL) 50 MG 24 hr tablet Take 1 tablet (50 mg total) by mouth once daily.    rivaroxaban (XARELTO) 20 mg Tab Take 1 tablet (20 mg total) by mouth daily with dinner or evening meal. (Patient not taking: Reported on 7/8/2025)    spironolactone (ALDACTONE) 25 MG tablet Take 1 tablet (25 mg total) by mouth once daily.     Family History       Problem Relation (Age of Onset)    Heart disease Maternal Grandfather    No Known Problems Mother, Father, Sister, Brother, Maternal Aunt, Maternal Uncle, Paternal Aunt, Paternal Uncle, Maternal Grandmother, Paternal  Grandmother, Paternal Grandfather          Social History     Social History Narrative    Not on file     Scheduled Meds:   heparin (PORCINE)  80 Units/kg Intravenous Once    mupirocin   Nasal BID    pantoprazole  40 mg Intravenous Daily    piperacillin-tazobactam (Zosyn) IV (PEDS and ADULTS) (extended infusion is not appropriate)  4.5 g Intravenous Q8H    potassium chloride in water  40 mEq Intravenous Once    white petrolatum-mineral oiL   Both Eyes Q8H     Continuous Infusions:   0.9% NaCl   Intravenous Continuous 5 mL/hr at 07/16/25 0601 Rate Verify at 07/16/25 0601    DOBUTamine IV infusion (non-titrating)  5 mcg/kg/min Intravenous Continuous 4.9 mL/hr at 07/16/25 0601 5 mcg/kg/min at 07/16/25 0601    EPINEPHrine  0.05 mcg/kg/min Intravenous Continuous 9.9 mL/hr at 07/16/25 0601 0.05 mcg/kg/min at 07/16/25 0601    fentanyl  0-250 mcg/hr Intravenous Continuous   Stopped at 07/16/25 0412    furosemide (Lasix) 500 mg in 50 mL infusion (conc: 10 mg/mL)  20 mg/hr Intravenous Continuous 2 mL/hr at 07/16/25 0601 20 mg/hr at 07/16/25 0601    heparin (porcine) in D5W  0-40 Units/kg/hr Intravenous Continuous 5.9 mL/hr at 07/16/25 0601 9 Units/kg/hr at 07/16/25 0601    nitric oxide gas  10 ppm Inhalation Continuous   10 ppm at 07/14/25 1421    NORepinephrine bitartrate-D5W  0-3 mcg/kg/min Intravenous Continuous   Stopped at 07/16/25 0424    propofoL  0-50 mcg/kg/min Intravenous Continuous   Stopped at 07/16/25 0424    vasopressin  0.04 Units/min Intravenous Continuous 12 mL/hr at 07/16/25 0601 0.04 Units/min at 07/16/25 0601     PRN Meds:.  Current Facility-Administered Medications:     heparin (PORCINE), 60 Units/kg, Intravenous, PRN    heparin (PORCINE), 30 Units/kg, Intravenous, PRN    sodium chloride 0.9%, 10 mL, Intravenous, PRN     Unable to get review of systems.  Patient intubated and sedated.  Objective:     Vital Signs (Most Recent):  Temp: 96.6 °F (35.9 °C) (07/16/25 0800)  Pulse: 100 (07/16/25 0800)  Resp: 19  (07/16/25 0800)  BP: 113/73 (07/16/25 0800)  SpO2: (!) 92 % (07/16/25 0800) Vital Signs (24h Range):  Temp:  [93.6 °F (34.2 °C)-98.6 °F (37 °C)] 96.6 °F (35.9 °C)  Pulse:  [] 100  Resp:  [18-23] 19  SpO2:  [87 %-97 %] 92 %  BP: ()/(59-73) 113/73  Arterial Line BP: ()/(56-68) 96/64     Telemetry reviewed.  Sinus rhythm, occasional premature ventricular contractions.  No significant arrhythmias noted.  Weight: (S) 60.3 kg (133 lb)  Body mass index is 21.47 kg/m².    SpO2: (!) 92 %         Intake/Output Summary (Last 24 hours) at 7/16/2025 0822  Last data filed at 7/16/2025 0601  Gross per 24 hour   Intake 1919.01 ml   Output 5455 ml   Net -3535.99 ml       Lines/Drains/Airways       Central Venous Catheter Line  Duration             Percutaneous Central Line Triple Lumen 07/12/25 1227 Internal Jugular Right 3 days              Drain  Duration                  Urethral Catheter 07/12/25 0706 Temperature probe 14 Fr. 4 days         NG/OG Tube 07/12/25 1645 Center mouth 3 days              Airway  Duration                  Airway - Non-Surgical 07/12/25 0130 4 days              Arterial Line  Duration             Arterial Line 07/12/25 1912 Left Radial 3 days              Peripheral Intravenous Line  Duration                  External Jugular IV 07/12/25 0704 4 days    Peripheral IV Single Lumen 07/12/25 0703 18 G Anterior;Left Forearm 4 days    Peripheral IV Single Lumen 07/12/25 0704 18 G Right Antecubital 4 days    Peripheral IV Single Lumen 07/12/25 0704 20 G Anterior;Right Forearm 4 days    Peripheral IV Single Lumen 07/12/25 1000 20 G Right Hand 3 days                       Physical Exam  GENERAL:  Intubated, sedated.  ETT in place.  HEENT: Mucous membranes moist and pink, normocephalic atraumatic, no cranial or carotid bruits, sclera anicteric  NECK: No obvious jugular venous distention with patient flat, no thyromegaly, no lymphadenopathy  CHEST:  Mildly coarse breath sounds bilaterally.   Well-healed sternotomy.  CARDIOVASCULAR: Tachycardia, Quiet precordium, regular rate and rhythm, S1 with single S2, no murmurs rubs or gallops  NEURO:  Intubated, sedated, paralyzed.    ABG  Recent Labs   Lab 07/16/25  0722   PH 7.475*   PO2 29*   PCO2 45.7*   HCO3 33.6*   BE 10*     Lab Results   Component Value Date    WBC 11.59 07/16/2025    HGB 11.8 (L) 07/16/2025    HCT 42 07/16/2025    MCV 84 07/16/2025     (L) 07/16/2025       CMP  Sodium   Date Value Ref Range Status   07/16/2025 130 (L) 136 - 145 mmol/L Final   03/20/2025 139 136 - 145 mmol/L Final     Potassium   Date Value Ref Range Status   07/16/2025 4.7 3.5 - 5.1 mmol/L Final   03/20/2025 3.5 3.5 - 5.1 mmol/L Final     Chloride   Date Value Ref Range Status   07/16/2025 90 (L) 95 - 110 mmol/L Final   03/20/2025 104 95 - 110 mmol/L Final     CO2   Date Value Ref Range Status   07/16/2025 25 23 - 29 mmol/L Final   03/20/2025 23 23 - 29 mmol/L Final     Glucose   Date Value Ref Range Status   07/16/2025 123 (H) 70 - 110 mg/dL Final   03/20/2025 94 70 - 110 mg/dL Final     BUN   Date Value Ref Range Status   07/16/2025 24 (H) 6 - 20 mg/dL Final     Creatinine   Date Value Ref Range Status   07/16/2025 1.6 (H) 0.5 - 1.4 mg/dL Final     Calcium   Date Value Ref Range Status   07/16/2025 9.0 8.7 - 10.5 mg/dL Final   03/20/2025 8.9 8.7 - 10.5 mg/dL Final     Protein Total   Date Value Ref Range Status   07/16/2025 7.5 6.0 - 8.4 gm/dL Final     Total Protein   Date Value Ref Range Status   02/09/2025 6.4 6.0 - 8.4 g/dL Final     Albumin   Date Value Ref Range Status   07/16/2025 2.8 (L) 3.5 - 5.2 g/dL Final   02/09/2025 3.4 (L) 3.5 - 5.2 g/dL Final     Total Bilirubin   Date Value Ref Range Status   02/09/2025 1.6 (H) 0.1 - 1.0 mg/dL Final     Comment:     For infants and newborns, interpretation of results should be based  on gestational age, weight and in agreement with clinical  observations.    Premature Infant recommended reference ranges:  Up to 24  hours.............<8.0 mg/dL  Up to 48 hours............<12.0 mg/dL  3-5 days..................<15.0 mg/dL  6-29 days.................<15.0 mg/dL       Bilirubin Total   Date Value Ref Range Status   07/16/2025 5.5 (H) 0.1 - 1.0 mg/dL Final     Comment:     For infants and newborns, interpretation of results should be based   on gestational age, weight and in agreement with clinical   observations.    Premature Infant recommended reference ranges:   0-24 hours:  <8.0 mg/dL   24-48 hours: <12.0 mg/dL   3-5 days:    <15.0 mg/dL   6-29 days:   <15.0 mg/dL     Alkaline Phosphatase   Date Value Ref Range Status   02/09/2025 40 40 - 150 U/L Final     ALP   Date Value Ref Range Status   07/16/2025 79 40 - 150 unit/L Final     AST   Date Value Ref Range Status   07/16/2025 99 (H) 11 - 45 unit/L Final   02/09/2025 16 10 - 40 U/L Final     ALT   Date Value Ref Range Status   07/16/2025 144 (H) 10 - 44 unit/L Final   02/09/2025 20 10 - 44 U/L Final     Anion Gap   Date Value Ref Range Status   07/16/2025 15 8 - 16 mmol/L Final     eGFR   Date Value Ref Range Status   07/16/2025 59 (L) >60 mL/min/1.73/m2 Final     Comment:     Estimated GFR calculated using the CKD-EPI creatinine (2021) equation.   03/20/2025 >60.0 >60 mL/min/1.73 m^2 Final     Microbiology Results (last 7 days)       Procedure Component Value Units Date/Time    Blood culture [[id number]]  (Normal) Collected: 07/12/25 0954    Order Status: Completed Specimen: Blood from Peripheral, Forearm, Left Updated: 07/15/25 1402     Blood Culture No Growth After 48 Hours    Blood culture [[id number]]  (Normal) Collected: 07/12/25 0954    Order Status: Completed Specimen: Blood from Peripheral, Upper Arm, Right Updated: 07/15/25 1402     Blood Culture No Growth After 48 Hours    Blood culture [[id number]] Collected: 07/12/25 0954    Order Status: Sent Specimen: Blood from Peripheral, Lower Arm, Right Updated: 07/14/25 1635    Culture, Respiratory with Gram Stain  [[id number]]     Order Status: Canceled Specimen: Respiratory from Sputum            Latest Reference Range & Units 06/11/25 14:51   AFP <=8.4 ng/mL 4.5     Echo 7/12/25  Patient presenting following in-home cardiac arrest:  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  Fontan and Jesus circulation not well demonstrated in this study.  Color doppler consistent with small persistent shunt from Fontan circulation to RA.  At least two pulmonary veins demonstrated returning to the left atrium with no evidence for obstruction.  There is a small right atrium with no obvious obstruction of right atrial flow to the left atrium.  There is no obvious tricuspid valve tissue present and no right ventricular cavity could be demonstrated.  There is no evidence for pulmonary valve.  At least mild dilation of the left atrium.  Mildly dilated mitral valve annulus with color Doppler demonstrating moderate to severe insufficiency.  Single ventricle consistent with left ventricular morphology.  There is at least moderate dilation of the left ventricle with prominently trabeculated apical myocardium.  Left ventricular systolic function qualitatively severely compromised with ejection fraction estimated <15%.  Global left ventricular longitudinal strain abnormal - peak average measured -3.4%.  There is a large aortic annulus with trileaflet aortic valve, peak outflow velocity <0.5 m/sec and trivial central jet of insufficiency.  No obvious pericardial effusion.    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  Bilateral acute pulmonary thromboemboli identified in segmental branches of the bilateral lower lobe pulmonary arteries.  Cardiomegaly with morphology suggesting single dilated left ventricle. There appears to be  communication between the right and left atria suggesting large ASD.  Pulmonary venous congestion with bibasilar pulmonary edema.  Extracardiac Fontan shunt, between the IVC and pulmonary artery, opacifies with contrast and appears patent.  Atelectatic changes at the left lung base with significant contrast retention suggesting impaired pulmonary venous drainage. Peripheral zones of absent pulmonary enhancement at both lung bases potentially representing pulmonary infarcts, in light of bilateral pulmonary emboli, or additional areas of atelectasis.  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  Hepatomegaly with hepatic venous enlargement suggesting congestive hepatopathy.    I reviewed that study in detail.  I agree with the findings.  The proximal pulmonary arteries and Fontan circuit are free of obstruction, but there do appear to be thrombi in the segmental branches of the bilateral lower lobe pulmonary arteries.     COMMUNICATION  This critical result was discovered/received on 7/12/2025 at 03:00.    7/12/25 Head CT:  Limited right upper quadrant ultrasound performed. The liver measures 17 cm in length and is homogeneous in echogenicity. Trace fluid seen along the falciform ligament. Common bile duct is within normal limits at 2.2 mm.. Mild gallbladder wall thickening. Trace pericholecystic fluid. Negative sonographic Disla sign. Pancreas is obscured by bowel gas. IVC within normal limits. The right kidney is normal in length measuring 10.4 cm. No right sided hydronephrosis or renal masses identified.     Liver US 2/6/25:  Limited right upper quadrant ultrasound performed. The liver measures 17 cm in length and is homogeneous in echogenicity. Trace fluid seen along the falciform ligament. Common bile duct is within normal limits at 2.2 mm.. Mild gallbladder wall thickening. Trace pericholecystic fluid. Negative sonographic Disla sign. Pancreas is obscured by  bowel gas. IVC within normal limits. The right kidney is normal in length measuring 10.4 cm. No right sided hydronephrosis or renal masses identified.     Cath 9/3/20:  IMPRESSION:  1) Tricuspid atresia s/p intra-atrial Fontan  2) Normal Fontan pressures (11mmHg) and wedge pressure (8mmHg).  3) Normal cardiac output and vascular resistance calculations  4) Small atrial baffle leak  5) No significant aorta-pulmonary or veno-venous collaterals  6) Occlusion of right common femoral artery     Assessment and Plan:     S/P Fontan procedure  S/P Fontan procedure  1. 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 Severely decreased left ventricular function  3. History of [id number], SVT  - status post successful ablation of a left lateral accessory pathway November 2022 by Dr. Ferguson  4. Cirrhosis  - last seen by hepatology May 2023 (normal AFP 6/11)  5. Arrested at home late night July 11, 2025  - unclear etiology, but CT scan suggestive of pulmonary emboli which may be chronic or acute  6. Long history of noncompliance with medication  7. Worsening hypoxemia over the past year of unclear etiology    Recommendations:   Saturations 85% or greater  CVP goal in the 10-15 range.  His prognosis is extremely poor.  By report, he had no gag reflex before paralytics were started. Would support withdrawal of care if the neurologic exam suggested significant injury.  Not a candidate for ECMO, other mechanical circulatory support, or heart transplant.    Appreciate consult to palliative care team and have encouraged Juani's family to continue working with them on goals of care for Juani.      The  [facility] team will continue to follow along with Nikolai's care. Please contact us if you have any additional questions.        VTE Risk Mitigation (From admission, onward)           Ordered     Apply sequential compression device to lower extremities (if no contraindications). Medical venous thromboembolus prophylaxis is preferred.  Until discontinued         07/12/25 1021     heparin 25,000 units in dextrose 5% (100 units/ml) IV bolus from bag HIGH INTENSITY nomogram - [facility]  As needed (PRN)        Question:  Heparin Infusion Adjustment (DO NOT MODIFY ANSWER)  Answer:  [email or web address]\Images\Pharmacy\HeparinInfusions\heparin HIGH INTENSITY nomogram for [facility] [id number]    07/12/25 0738     heparin 25,000 units in dextrose 5% (100 units/ml) IV bolus from bag HIGH INTENSITY nomogram - [facility]  As needed (PRN)        Question:  Heparin Infusion Adjustment (DO NOT MODIFY ANSWER)  Answer:  [email or web address]\Images\Pharmacy\HeparinInfusions\heparin HIGH INTENSITY nomogram for [facility] [id number]    07/12/25 0738     heparin 25,000 units in dextrose 5% (100 units/ml) IV bolus from bag HIGH INTENSITY nomogram - [facility]  Once        Question:  Heparin Infusion Adjustment (DO NOT MODIFY ANSWER)  Answer:  [email or web address]\Images\Pharmacy\HeparinInfusions\heparin HIGH INTENSITY nomogram for [facility] [id number]    07/12/25 0738     heparin 25,000 units in dextrose 5% 250 mL (100 units/mL) infusion HIGH INTENSITY nomogram - [facility]  Continuous        Question:  Begin at (units/kg/hr)  Answer:  18    07/12/25 0738     IP VTE HIGH RISK PATIENT  Once         07/12/25 0707     Place sequential compression device  Until discontinued         07/12/25 0707                    Princess Montesinos PA-C  Adult Congenital Heart Disease

## 2025-07-16 NOTE — ASSESSMENT & PLAN NOTE
# PEA Cardiac Arrest   # Bilateral PE   # History of Fontan Procedure   CTA chest showed concerns for bilateral PE and some dense changes in the left lung base. He has acute on chronic hypoxemic respiratory failure, initially improved on 7/13 with deep sedation and neuromuscular blockade to improve vent synchrony. However, on 7/14/25 had refractory hypoxemia. He has complicated heart anatomy with history of Fontan procedure and have been minimizing PEEP to allow adequate venous return with assistance from congenital heart team. Suspect some shunting physiology given his refractory hypoxemia despite 100% FiO2. He oxygenation did improve with initiation of inhaled nitric oxide.   Patient has complex cardiopulmonary physiology and several potential contributors to the acute decompensation. He has good respiratory mechanics on the ventilator with a driving pressure of 14 and plateau pressure ~20. He does not have evidence of ARDS that would explain his hypoxemia.     - Nimbex off   - Sedation off   - continue lung protective ventilation with low tidal volume and PEEP of 5, of note during episode of worsening hypoxemia his oxygenation did not improve with trial of higher PEEP and given his cardiac anatomy will aim to maintain a PEEP of 5   - Wean FiO2 as tolerated  - Discussed need for echo with bubble with Dr Leonard Santoyo, catheterization would be better to evaluate shunt physiology and may also assist with pulmonary AVM  - continue nitric oxide at 10ppm   - Neurology consulted and plan for neuroprognostication in AM

## 2025-07-16 NOTE — SUBJECTIVE & OBJECTIVE
Past Medical History:   Diagnosis Date    Acute decompensated heart failure 02/06/2025    History of heart surgery     Other cirrhosis of liver 11/12/2020    Palliative care encounter 7/14/2025    SVT (supraventricular tachycardia)     WPW syndrome        Past Surgical History:   Procedure Laterality Date    ANGIOGRAPHY OF AORTIC ARCH N/A 09/03/2020    Procedure: AORTOGRAM, AORTIC ARCH;  Surgeon: Stephania Crump MD;  Location: [facility] CATH LAB;  Service: Cardiology;  Laterality: N/A;    FONTAN PROCEDURE, EXTRACARDIAC      LEFT HEART CATHETERIZATION Left 09/03/2020    Procedure: Left heart cath;  Surgeon: Stephania Crump MD;  Location: [facility] CATH LAB;  Service: Cardiology;  Laterality: Left;    RIGHT HEART CATHETERIZATION FOR CONGENITAL HEART DEFECT N/A 09/03/2020    Procedure: CATHETERIZATION, HEART, RIGHT, FOR CONGENITAL HEART DEFECT;  Surgeon: Stephania Crump MD;  Location: [facility] CATH LAB;  Service: Cardiology;  Laterality: N/A;  [facility] - needs covid test morning of. Extenuating circumstances    TRANSESOPHAGEAL ECHOCARDIOGRAPHY N/A 11/10/2022    Procedure: ECHOCARDIOGRAM, TRANSESOPHAGEAL;  Surgeon: Emeterio Hall MD;  Location: [facility] EP LAB;  Service: Cardiology;  Laterality: N/A;    TREATMENT OF CARDIAC ARRHYTHMIA N/A 01/09/2021    Procedure: CARDIOVERSION;  Surgeon: Jim Mcginnis MD;  Location: [facility] [place] CATH LAB;  Service: Cardiology;  Laterality: N/A;       Review of patient's allergies indicates:  No Known Allergies    Current Neurological Medications:     No current facility-administered medications on file prior to encounter.     Current Outpatient Medications on File Prior to Encounter   Medication Sig    benzonatate (TESSALON) 100 MG capsule Take 1 capsule (100 mg total) by mouth 3 (three) times daily as needed for Cough.    bumetanide (BUMEX) 2 MG tablet Take 1 tablet (2 mg total) by mouth 2 (two) times a day.    cetirizine (ZYRTEC) 10 MG tablet Take 1 tablet (10 mg total) by mouth  once daily.    dapagliflozin propanediol (FARXIGA) 10 mg tablet Take 1 tablet (10 mg total) by mouth once daily.    losartan (COZAAR) 100 MG tablet Take 1 tablet (100 mg total) by mouth once daily.    metoprolol succinate (TOPROL-XL) 50 MG 24 hr tablet Take 1 tablet (50 mg total) by mouth once daily.    rivaroxaban (XARELTO) 20 mg Tab Take 1 tablet (20 mg total) by mouth daily with dinner or evening meal. (Patient not taking: Reported on 2025)    spironolactone (ALDACTONE) 25 MG tablet Take 1 tablet (25 mg total) by mouth once daily.     Family History       Problem Relation (Age of Onset)    Heart disease Maternal Grandfather    No Known Problems Mother, Father, Sister, Brother, Maternal Aunt, Maternal Uncle, Paternal Aunt, Paternal Uncle, Maternal Grandmother, Paternal Grandmother, Paternal Grandfather          Tobacco Use    Smoking status: Former     Current packs/day: 0.00     Types: Cigarettes     Quit date:      Years since quittin.5     Passive exposure: Past    Smokeless tobacco: Never    Tobacco comments:     3-4 months    Substance and Sexual Activity    Alcohol use: No    Drug use: Yes     Types: Marijuana     Comment: Mojo today- pt reports a while back    Sexual activity: Yes     Review of Systems   Reason unable to perform ROS: Intubated.     Objective:     Vital Signs (Most Recent):  Temp: (!) 95.9 °F (35.5 °C) (25 1538)  Pulse: 96 (25 1538)  Resp: (!) 22 (25 1538)  BP: 113/67 (25 1500)  SpO2: (!) 94 % (25 1538) Vital Signs (24h Range):  Temp:  [93.6 °F (34.2 °C)-98.1 °F (36.7 °C)] 95.9 °F (35.5 °C)  Pulse:  [] 96  Resp:  [17-32] 22  SpO2:  [86 %-97 %] 94 %  BP: ()/(62-97) 113/67  Arterial Line BP: ()/(58-91) 100/64     Weight: (S) 60.3 kg (133 lb)  Body mass index is 21.47 kg/m².     Physical Exam  Pulmonary:      Comments: ET in place  Neurological:      Comments: GCS: E2 V (NT) M1  -Blinks frequently, does not correlate with corneal  reflex testing   -Upward fix Gaze  -Oculocephalic reflex absent, Pupillary Light Reflex (-), Gag (-), Cough (-), Corneal's (equivocal)  -Intermittent orofacial movements at times verging on being rhythmic  but not consistently so  -No spontaneous movement,  no withdraw to noxious stimuli UE/LE, no posturing noted  -Babinski Absent                  Significant Labs: CBC:   Recent Labs   Lab 07/15/25  0259 07/16/25  0302 07/16/25  0722 07/16/25  0726   WBC 9.82 11.59  --   --    HGB 12.1* 11.8*  --   --    HCT 36.6* 35.4* 42 42   PLT 99* 116*  --   --      CMP:   Recent Labs   Lab 07/15/25  0259 07/15/25  0752 07/16/25  0302 07/16/25  0904 07/16/25  1358      < > 123* 128* 112*   *   < > 130* 132* 133*   K 4.4   < > 4.7 4.5 4.6   CL 94*   < > 90* 89* 89*   CO2 21*   < > 25 30* 31*   BUN 22*   < > 24* 24* 26*   CREATININE 1.4   < > 1.6* 1.6* 1.7*   CALCIUM 8.3*   < > 9.0 9.5 9.3   MG 2.4   < > 2.0 2.1 2.2   PROT 7.1  --  7.5  --   --    ALBUMIN 2.8*  --  2.8*  --   --    BILITOT 3.0*  --  5.5*  --   --    ALKPHOS 80  --  79  --   --    *  --  99*  --   --    *  --  144*  --   --    ANIONGAP 15   < > 15 13 13    < > = values in this interval not displayed.       Significant Imaging: I have reviewed all pertinent imaging results/findings within the past 24 hours.

## 2025-07-16 NOTE — HOSPITAL COURSE
Pulmonary consulted for refractory hypoxemia. Patient remains mechanically ventilated, breathing over the vent, with FiO? weaned to 70%. On inhaled nitric oxide and dobutamine. Sedation and paralytics weaned. Neurology consulted for neuroprognostication.

## 2025-07-16 NOTE — PROGRESS NOTES
[facility] - Cardiac Intensive Care  Heart Transplant  Progress Note    Patient Name: Juani Reilly Jr.  MRN: [id number]  Admission Date: 7/12/2025  Hospital Length of Stay: 4 days  Attending Physician: Solitario Reis MD  Primary Care Provider: Jaqueline Pardo MD  Principal Problem:Cardiac arrest    Subjective:   Interval History:   Sedation weaned off overnight. Positive corneal but no cough, gag reflex. Neuro to assess this morning. Vasopressor requirement improved with weaning of sedation, now off levophed.    UO 5900, negative 3800    Hemodynamic Parameters:  SCVO2 59  CVP 17  MAP 75  Cardiac Output 3.92  Cardiac Index 2.31  SVR 1183      Continuous Infusions:   0.9% NaCl   Intravenous Continuous 5 mL/hr at 07/16/25 0601 Rate Verify at 07/16/25 0601    DOBUTamine IV infusion (non-titrating)  5 mcg/kg/min Intravenous Continuous 4.9 mL/hr at 07/16/25 0601 5 mcg/kg/min at 07/16/25 0601    EPINEPHrine  0.05 mcg/kg/min Intravenous Continuous 9.9 mL/hr at 07/16/25 0601 0.05 mcg/kg/min at 07/16/25 0601    fentanyl  0-250 mcg/hr Intravenous Continuous   Stopped at 07/16/25 0412    furosemide (Lasix) 500 mg in 50 mL infusion (conc: 10 mg/mL)  20 mg/hr Intravenous Continuous 2 mL/hr at 07/16/25 0601 20 mg/hr at 07/16/25 0601    heparin (porcine) in D5W  0-40 Units/kg/hr Intravenous Continuous 5.9 mL/hr at 07/16/25 0601 9 Units/kg/hr at 07/16/25 0601    nitric oxide gas  10 ppm Inhalation Continuous   10 ppm at 07/14/25 1421    NORepinephrine bitartrate-D5W  0-3 mcg/kg/min Intravenous Continuous   Stopped at 07/16/25 0424    propofoL  0-50 mcg/kg/min Intravenous Continuous   Stopped at 07/16/25 0424    vasopressin  0.04 Units/min Intravenous Continuous 12 mL/hr at 07/16/25 0601 0.04 Units/min at 07/16/25 0601     Scheduled Meds:   heparin (PORCINE)  80 Units/kg Intravenous Once    mupirocin   Nasal BID    pantoprazole  40 mg Intravenous Daily    piperacillin-tazobactam (Zosyn) IV (PEDS and ADULTS) (extended  infusion is not appropriate)  4.5 g Intravenous Q8H    potassium chloride in water  40 mEq Intravenous Once    white petrolatum-mineral oiL   Both Eyes Q8H     PRN Meds:  Current Facility-Administered Medications:     heparin (PORCINE), 60 Units/kg, Intravenous, PRN    heparin (PORCINE), 30 Units/kg, Intravenous, PRN    sodium chloride 0.9%, 10 mL, Intravenous, PRN    Review of patient's allergies indicates:  No Known Allergies  Objective:     Vital Signs (Most Recent):  Temp: 96.6 °F (35.9 °C) (07/16/25 0800)  Pulse: 100 (07/16/25 0800)  Resp: 19 (07/16/25 0800)  BP: 113/73 (07/16/25 0800)  SpO2: (!) 92 % (07/16/25 0800) Vital Signs (24h Range):  Temp:  [93.6 °F (34.2 °C)-98.6 °F (37 °C)] 96.6 °F (35.9 °C)  Pulse:  [] 100  Resp:  [18-23] 19  SpO2:  [87 %-97 %] 92 %  BP: ()/(59-73) 113/73  Arterial Line BP: ()/(56-68) 105/68     Patient Vitals for the past 72 hrs (Last 3 readings):   Weight   07/16/25 0501 60.3 kg (133 lb)   07/15/25 0601 62.1 kg (137 lb)   07/14/25 0619 62.3 kg (137 lb 5.6 oz)     Body mass index is 21.47 kg/m².      Intake/Output Summary (Last 24 hours) at 7/16/2025 0844  Last data filed at 7/16/2025 0800  Gross per 24 hour   Intake 1929.01 ml   Output 5905 ml   Net -3975.99 ml         Telemetry: Reviewed       Physical Exam  Constitutional:       Comments: Off sedation, unresponsive on mechanical ventilator   HENT:      Head: Atraumatic.      Mouth/Throat:      Comments: ETT in place  Eyes:      Conjunctiva/sclera: Conjunctivae normal.   Cardiovascular:      Rate and Rhythm: Regular rhythm. Tachycardia present.      Pulses: Normal pulses.   Pulmonary:      Breath sounds: Normal breath sounds. No wheezing or rales.   Abdominal:      General: Abdomen is flat.      Palpations: Abdomen is soft.   Musculoskeletal:      Right lower leg: Edema (1+ mid-tibia) present.      Left lower leg: Edema (1+ mid-tibia) present.   Skin:     General: Skin is warm and dry.   Neurological:       "Comments: Positive corneal reflex, negative cough/gag   Psychiatric:      Comments: Unable to assess            Significant Labs:  CBC:  Recent Labs   Lab 07/14/25  0415 07/15/25  0259 07/16/25  0302 07/16/25  0722 07/16/25  0726   WBC 8.62 9.82 11.59  --   --    RBC 4.26* 4.30* 4.23*  --   --    HGB 12.1* 12.1* 11.8*  --   --    HCT 35.1* 36.6* 35.4* 42 42   * 99* 116*  --   --    MCV 82 85 84  --   --    MCH 28.4 28.1 27.9  --   --    MCHC 34.5 33.1 33.3  --   --      BNP:  Recent Labs   Lab 07/12/25 0220 07/12/25  0730   BNP 2,461* 2,903*     CMP:  Recent Labs   Lab 07/14/25 0415 07/14/25  0805 07/15/25  0259 07/15/25  0752 07/15/25  1414 07/15/25  1924 07/16/25  0302   *   < > 101   < > 99 98 123*   CALCIUM 7.8*   < > 8.3*   < > 8.5* 8.7 9.0   ALBUMIN 2.5*  --  2.8*  --   --   --  2.8*   PROT 5.9*  --  7.1  --   --   --  7.5   *   < > 130*   < > 130* 131* 130*   K 3.3*   < > 4.4   < > 4.3 4.2 4.7   CO2 25   < > 21*   < > 28 25 25   CL 95   < > 94*   < > 91* 91* 90*   BUN 21*   < > 22*   < > 21* 21* 24*   CREATININE 1.1   < > 1.4   < > 1.5* 1.5* 1.6*   ALKPHOS 69  --  80  --   --   --  79   *  --  180*  --   --   --  144*   *  --  143*  --   --   --  99*   BILITOT 2.5*  --  3.0*  --   --   --  5.5*    < > = values in this interval not displayed.      Coagulation:   Recent Labs   Lab 07/14/25  0415 07/14/25  1306 07/14/25  1917 07/15/25  0259 07/16/25  0302   INR 1.6*  --   --  1.2 1.2   APTT 79.7*   < > 45.0* 45.3* 47.6*    < > = values in this interval not displayed.     LDH:  No results for input(s): "LDH" in the last 72 hours.  Microbiology:  Microbiology Results (last 7 days)       Procedure Component Value Units Date/Time    Blood culture [[id number]]  (Normal) Collected: 07/12/25 0954    Order Status: Completed Specimen: Blood from Peripheral, Forearm, Left Updated: 07/15/25 1402     Blood Culture No Growth After 48 Hours    Blood culture [[id number]]  (Normal) Collected: " 07/12/25 0954    Order Status: Completed Specimen: Blood from Peripheral, Upper Arm, Right Updated: 07/15/25 1402     Blood Culture No Growth After 48 Hours    Blood culture [[id number]] Collected: 07/12/25 0954    Order Status: Sent Specimen: Blood from Peripheral, Lower Arm, Right Updated: 07/14/25 1635    Culture, Respiratory with Gram Stain [[id number]]     Order Status: Canceled Specimen: Respiratory from Sputum             I have reviewed all pertinent labs within the past 24 hours.    Estimated Creatinine Clearance: 57.1 mL/min (A) (based on SCr of 1.6 mg/dL (H)).    Diagnostic Results:  I have reviewed all pertinent imaging results/findings within the past 24 hours.  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 Ferguson Nov 2022, decreased LV function 20-25%, follows with advanced heart failure clinic (Dr. Reis) with plans for possible transplant, and cirrhosis transferred from [facility] in [place] for advance option evaluation and evaluation from Adult congenital heart disease. Upon encounter pt was intubates and sedated. Family at bedside reported that pt has been having progressive SOB leading to cardiac arrest. As per chart review pt has had recurrent HF admission. Was seen by Dr Reis 7/8/25 and he reported sob with NYHA II-III. Adjustment to GDMT was made, Lasix discontinued and Bumex 2mg BID start. Family reported that pt was in the bedroom with his grandmother when he became unresponsive. Sister rushed at bedside and CPR was started. Reported that she did CPR for 5 mins until EMS arrived. PEA was noted and 3 rounds of epi were administered with ROSC. Patient was intubated but c/b refractory hypoxemia so pulmonology was consulted. Vent 500, rate 20, PEEP 10, 100% FiO2 with Abg of 7.22, pCO2 32, pO2 53, sat 73%. Patient is sedated with propofol.   - CTA-PE: Notable for BP PE:  Heparin initiated   - Bedside ECHO: No visible RV, moderate MR/TR. IVC 1.7 cm     In the ED lactic acid was 9.1-->10. BNP 2900, Lasix 80 given and infusion 20 mg/hr started.   Hemodynamics:    SVO2 CO CI SVR   49 2.5 1.4 2494      Pulmonary and Adult congenital Heart disease consulted and recommendations appreciated.   Dr Santoyo recommendations appreciated. Pt is not a candidate for advance options or MCS.  started.   Pulmonary recommended adequate sedation and paralytic for better oxygenation        * Cardiac arrest  #Cardiogenic Shock  Patient with Hx of Tricuspid and pulmonary atresia, s/p systemic to pulmonary artery shunt followed by a bidirectional Jesus and subsequent lateral tunnel Fontan procedure. Recent EF 20-25%. Recurrent hospital admission for ADHF exacerbation. Had cardiac arrest at home. CPR  by sister for 5 mins.   Rhythm: PEA - 3 rounds of epi were administered with ROSC   CTA-PE: Notable for BP PE   Initial hemodynamics: CI 1.4, SVR 2494  - Adult congential heart disease consulted: recs apperciated    - Goal CVP 10-15 to maintain adequate preload  - Cont Dobutamine 5 mcg/hr   - Wean off Levo, Vaso, Epi as tolerated (off today)  - Cont Shanthi 10 ppm  - Cont lasix gtt at 20mg/h  - S/p TTM protocol. Now normothermic  - Monitor urine output I/O   - Monitor electrolytes and supplement K>4, Phos> 3 Mg >2   - Hemodynamics SVO2 q 8hr    - Ongoing GOC discussion with family. At this time he is DNR/DNI but continuing aggressive life-sustaining measures  - Palliative care consulted, appreciate their assistance     Acute hypoxemic respiratory failure  Pt transferred for [facility] for advance options and adult congenital evaluation. Pt with systemic  - Pulmonary shunt. Presented with refractory hypoxemia while intubated.    - Pulmonology following, appreciate recs  - Low PEEP strategy  - Goal SPO2 > 85%  - Synchronous with vent after weaning sedation  - Cont Shanthi at 10 ppm, helped overcome refractory  hypoxemia  - CXR stable      Pulmonary thromboembolism  Patient with PEA arrest. 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    - CTA Chest: Bilateral acute pulmonary thromboemboli identified in segmental branches of the bilateral lower lobe pulmonary arteries. Cardiomegaly with morphology suggesting single dilated left ventricle. There appears to be communication between the right and left atria suggesting large ASD.  Pulmonary venous congestion with bibasilar pulmonary edema.  - Cont Heparin drip   - Continue with VTE protocol   - Monitor Oxygen sat.   - Pulm consulted, recs appreciated.       Encephalopathy  Suspected hypoxic injury related to arrest and prolonged hypoxemic time. Initial CTH without acute intracranial pathology. Breathing over the vent with positive corneals but no cough or gag reflex.  - Off sedation this morning  - Neurology consulted, appreciate recs  - EEG ordered    SIRS (systemic inflammatory response syndrome)  Patient with leukocytosis left shift; now resolved. CT Chest ; Notable for pulmonary edema and infiltrates indicating possible PNA. CXR w/ LLL infiltrates improving.  - ID consulted, appreciate recs  - Blood cultures NGTD x 48h  - Continue Zosyn   - S/p Vancomycin    Other cirrhosis of liver  #Transaminitis  #Hyperbilirubinemia  2/2 shock and congestive hepatopathy  - LFT monitoring  - Diuresis    Adult congenital heart disease  Hx of Tricuspid and pulmonary atresia, s/p systemic to pulmonary artery shunt followed by a bidirectional Jesus and subsequent lateral tunnel Fontan procedure     WPW syndrome  Status-post ablation (November 2022)           Gustavo Matias MD  Heart Transplant  [facility] - [facility]

## 2025-07-16 NOTE — ASSESSMENT & PLAN NOTE
#Transaminitis  #Hyperbilirubinemia  2/2 shock and congestive hepatopathy  - LFT monitoring  - Diuresis

## 2025-07-16 NOTE — ASSESSMENT & PLAN NOTE
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 Recent EF 20-25%. Recurrent hospital admission for ADHF exacerbation. Had cardiac arrest at home. CPR  by sister for 5 mins.   Rhythm: PEA - 3 rounds of epi were administered with ROSC   CTA-PE: Notable for BP PE   Initial hemodynamics: CI 1.4, SVR 2494  - Adult congential heart disease consulted: recs apperciated    - Goal CVP 10-15 to maintain adequate preload  - Cont Dobutamine 5 mcg/hr   - Wean off Levo, Vaso, Epi as tolerated (off today)  - Cont Shanthi 10 ppm  - Cont lasix gtt at 20mg/h  - S/p TTM protocol. Now normothermic  - Monitor urine output I/O   - Monitor electrolytes and supplement K>4, Phos> 3 Mg >2   - Hemodynamics SVO2 q 8hr    - Ongoing GOC discussion with family. At this time he is DNR/DNI but continuing aggressive life-sustaining measures  - Palliative care consulted, appreciate their assistance

## 2025-07-16 NOTE — SUBJECTIVE & OBJECTIVE
Interval History:   Sedation weaned off overnight. Positive corneal but no cough, gag reflex. Neuro to assess this morning. Vasopressor requirement improved with weaning of sedation, now off levophed.    UO 5900, negative 3800    Hemodynamic Parameters:  SCVO2 59  CVP 17  MAP 75  Cardiac Output 3.92  Cardiac Index 2.31  SVR 1183      Continuous Infusions:   0.9% NaCl   Intravenous Continuous 5 mL/hr at 07/16/25 0601 Rate Verify at 07/16/25 0601    DOBUTamine IV infusion (non-titrating)  5 mcg/kg/min Intravenous Continuous 4.9 mL/hr at 07/16/25 0601 5 mcg/kg/min at 07/16/25 0601    EPINEPHrine  0.05 mcg/kg/min Intravenous Continuous 9.9 mL/hr at 07/16/25 0601 0.05 mcg/kg/min at 07/16/25 0601    fentanyl  0-250 mcg/hr Intravenous Continuous   Stopped at 07/16/25 0412    furosemide (Lasix) 500 mg in 50 mL infusion (conc: 10 mg/mL)  20 mg/hr Intravenous Continuous 2 mL/hr at 07/16/25 0601 20 mg/hr at 07/16/25 0601    heparin (porcine) in D5W  0-40 Units/kg/hr Intravenous Continuous 5.9 mL/hr at 07/16/25 0601 9 Units/kg/hr at 07/16/25 0601    nitric oxide gas  10 ppm Inhalation Continuous   10 ppm at 07/14/25 1421    NORepinephrine bitartrate-D5W  0-3 mcg/kg/min Intravenous Continuous   Stopped at 07/16/25 0424    propofoL  0-50 mcg/kg/min Intravenous Continuous   Stopped at 07/16/25 0424    vasopressin  0.04 Units/min Intravenous Continuous 12 mL/hr at 07/16/25 0601 0.04 Units/min at 07/16/25 0601     Scheduled Meds:   heparin (PORCINE)  80 Units/kg Intravenous Once    mupirocin   Nasal BID    pantoprazole  40 mg Intravenous Daily    piperacillin-tazobactam (Zosyn) IV (PEDS and ADULTS) (extended infusion is not appropriate)  4.5 g Intravenous Q8H    potassium chloride in water  40 mEq Intravenous Once    white petrolatum-mineral oiL   Both Eyes Q8H     PRN Meds:  Current Facility-Administered Medications:     heparin (PORCINE), 60 Units/kg, Intravenous, PRN    heparin (PORCINE), 30 Units/kg, Intravenous, PRN    sodium  chloride 0.9%, 10 mL, Intravenous, PRN    Review of patient's allergies indicates:  No Known Allergies  Objective:     Vital Signs (Most Recent):  Temp: 96.6 °F (35.9 °C) (07/16/25 0800)  Pulse: 100 (07/16/25 0800)  Resp: 19 (07/16/25 0800)  BP: 113/73 (07/16/25 0800)  SpO2: (!) 92 % (07/16/25 0800) Vital Signs (24h Range):  Temp:  [93.6 °F (34.2 °C)-98.6 °F (37 °C)] 96.6 °F (35.9 °C)  Pulse:  [] 100  Resp:  [18-23] 19  SpO2:  [87 %-97 %] 92 %  BP: ()/(59-73) 113/73  Arterial Line BP: ()/(56-68) 105/68     Patient Vitals for the past 72 hrs (Last 3 readings):   Weight   07/16/25 0501 60.3 kg (133 lb)   07/15/25 0601 62.1 kg (137 lb)   07/14/25 0619 62.3 kg (137 lb 5.6 oz)     Body mass index is 21.47 kg/m².      Intake/Output Summary (Last 24 hours) at 7/16/2025 0844  Last data filed at 7/16/2025 0800  Gross per 24 hour   Intake 1929.01 ml   Output 5905 ml   Net -3975.99 ml         Telemetry: Reviewed       Physical Exam  Constitutional:       Comments: Off sedation, unresponsive on mechanical ventilator   HENT:      Head: Atraumatic.      Mouth/Throat:      Comments: ETT in place  Eyes:      Conjunctiva/sclera: Conjunctivae normal.   Cardiovascular:      Rate and Rhythm: Regular rhythm. Tachycardia present.      Pulses: Normal pulses.   Pulmonary:      Breath sounds: Normal breath sounds. No wheezing or rales.   Abdominal:      General: Abdomen is flat.      Palpations: Abdomen is soft.   Musculoskeletal:      Right lower leg: Edema (1+ mid-tibia) present.      Left lower leg: Edema (1+ mid-tibia) present.   Skin:     General: Skin is warm and dry.   Neurological:      Comments: Positive corneal reflex, negative cough/gag   Psychiatric:      Comments: Unable to assess            Significant Labs:  CBC:  Recent Labs   Lab 07/14/25  0415 07/15/25  0259 07/16/25  0302 07/16/25  0722 07/16/25  0726   WBC 8.62 9.82 11.59  --   --    RBC 4.26* 4.30* 4.23*  --   --    HGB 12.1* 12.1* 11.8*  --   --   "  HCT 35.1* 36.6* 35.4* 42 42   * 99* 116*  --   --    MCV 82 85 84  --   --    MCH 28.4 28.1 27.9  --   --    MCHC 34.5 33.1 33.3  --   --      BNP:  Recent Labs   Lab 07/12/25  0220 07/12/25  0730   BNP 2,461* 2,903*     CMP:  Recent Labs   Lab 07/14/25 0415 07/14/25  0805 07/15/25  0259 07/15/25  0752 07/15/25  1414 07/15/25  1924 07/16/25  0302   *   < > 101   < > 99 98 123*   CALCIUM 7.8*   < > 8.3*   < > 8.5* 8.7 9.0   ALBUMIN 2.5*  --  2.8*  --   --   --  2.8*   PROT 5.9*  --  7.1  --   --   --  7.5   *   < > 130*   < > 130* 131* 130*   K 3.3*   < > 4.4   < > 4.3 4.2 4.7   CO2 25   < > 21*   < > 28 25 25   CL 95   < > 94*   < > 91* 91* 90*   BUN 21*   < > 22*   < > 21* 21* 24*   CREATININE 1.1   < > 1.4   < > 1.5* 1.5* 1.6*   ALKPHOS 69  --  80  --   --   --  79   *  --  180*  --   --   --  144*   *  --  143*  --   --   --  99*   BILITOT 2.5*  --  3.0*  --   --   --  5.5*    < > = values in this interval not displayed.      Coagulation:   Recent Labs   Lab 07/14/25 0415 07/14/25  1306 07/14/25  1917 07/15/25  0259 07/16/25  0302   INR 1.6*  --   --  1.2 1.2   APTT 79.7*   < > 45.0* 45.3* 47.6*    < > = values in this interval not displayed.     LDH:  No results for input(s): "LDH" in the last 72 hours.  Microbiology:  Microbiology Results (last 7 days)       Procedure Component Value Units Date/Time    Blood culture [[id number]]  (Normal) Collected: 07/12/25 0954    Order Status: Completed Specimen: Blood from Peripheral, Forearm, Left Updated: 07/15/25 1402     Blood Culture No Growth After 48 Hours    Blood culture [[id number]]  (Normal) Collected: 07/12/25 0954    Order Status: Completed Specimen: Blood from Peripheral, Upper Arm, Right Updated: 07/15/25 1402     Blood Culture No Growth After 48 Hours    Blood culture [[id number]] Collected: 07/12/25 0954    Order Status: Sent Specimen: Blood from Peripheral, Lower Arm, Right Updated: 07/14/25 1635    Culture, " Respiratory with Gram Stain [[id number]]     Order Status: Canceled Specimen: Respiratory from Sputum             I have reviewed all pertinent labs within the past 24 hours.    Estimated Creatinine Clearance: 57.1 mL/min (A) (based on SCr of 1.6 mg/dL (H)).    Diagnostic Results:  I have reviewed all pertinent imaging results/findings within the past 24 hours.

## 2025-07-16 NOTE — EICU
Intervention Initiated From:  COR / FARSHADU    Alden intervened regarding:  Rounding (Video assessment)    VICU Night Rounds Checklist  24H Vital Sign Range:  Temp:  [95.5 °F (35.3 °C)-99.3 °F (37.4 °C)]   Pulse:  []   Resp:  [13-20]   BP: ()/(59-75)   SpO2:  [87 %-95 %]   Arterial Line BP: (82-99)/(56-67)     Video rounds and LDA reconciliation

## 2025-07-16 NOTE — PLAN OF CARE
Recommendations     Interventions: Enteral nutrition management, Collaboration and referral of nutrition care     Recommendations:   1. Continuous TF recommendation: Impact Peptide 1.5 @ 45 mL/hr to provide 1080 mL total volume, 1620 kcal, 151 g CHO, 102 g protein, 0 g fiber, 834 mL free water    - flush per MD/provider    2. RD to monitor weight, labs, meds, intake, tolerance     Goal #1: Provide greater than or equal to 75% nutritional needs via nutrition support by RD follow up  Nutrition Goal Status #1: new  Goal #2: Maintain weight throughout hospitalization  Nutrition Goal Status #2: new  Communication of RD Recs:  (POC)     Nutrition Discharge Planning      Nutrition Discharge Planning: Too early to determine, pending clinical course

## 2025-07-16 NOTE — ASSESSMENT & PLAN NOTE
Juani Reilly Jr. 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 Ferguson Nov 2022, HF with decreased LV function 20-25% that is currently admitted to the Cardiac Intensive Care Unit on 07/12 following PEA Arrest s/p ROSC after 15 minutes s/p cooling protocol. General neurology consulted for neuro-prognostication.  Hospital course complicated by hypotension 2/2 sedative medications requiring vasopressors and Acute Hypoxic Respiratory Failure on Nimbex and s/p paralytics use. Sedation and Vasopressor support weaned off on 07/16 at 4:30 am. Patient now is s/p >96 hours of TTM.       ROSC obtained on 07/12 after 3 rounds of epinephrine and CPR (~15 minutes))  Was TTM used? Yes - patient reached normothermia on 7/13.   Last sedation: 07/16 at 4:30 am    Predictors thus far:  - Pupillary light response - Absent bilaterally - reliable predictor of poor functional outcome at 3 months or later  Should be obtained >72 hours following ROSC  - Motor exam - Absent motor response - not reliable for prediction of poor outcome  Should be obtained >72 hours following ROSC or reaching normothermia if TTM used  - CT - Head done on the day of Cardiac Arrest   Should be obtained >48 hours following ROSC  - MRI brain w/ DWI - Obtain after EEG   Should be obtained 2-7 days following ROSC  - EEG - Preliminary read with burst suppression  Should be obtained >72 hours following ROSC or reaching normothermia if TTM used; off sedation  - SSEP - N/A  Should be obtained >48 hours from ROSC    Will require further work up for further neuro-prognostication.     Recommendations:  -Preliminary read on EEG with burst suppression   -Load Keppra 3 g IV x1    -Start maintenance dose Keppra 1500 mg IV BID  -Obtain MRI Brain WO following EEG report  -Maintain Normothermia   -Neurology will continue to follow.            ------Of note, good functional outcome  is defined as the ability to perform activities of daily living or work in a sheltered environment (Cerebral Performance Category (CPC) scale 1-2.) Poor functional outcome is defined as severe disability, a minimally conscious state, or a vegetative/unresponsive-wakeful state (CPC > 2.)      Reference: Heather Ashton et al. Guidelines for Neuroprognostication in Comatose Adult Survivors of Cardiac Arrest. Neurocritical care vol. 38,3 2024): 533-563. doi:10.1007/i36073-533-47287-1  ---------

## 2025-07-16 NOTE — SUBJECTIVE & OBJECTIVE
Interval History: Chart reviewed including 24h medication use. Patient remains critically ill, sedated, intubated on vasopressors. Several family members at bedside during visit. EEG revealing burst suppression, indicating likely poor neuroprognosis. FiO2 weaned to 70%. Supportive discussion below.      Past Medical History:   Diagnosis Date    Acute decompensated heart failure 02/06/2025    History of heart surgery     Other cirrhosis of liver 11/12/2020    Palliative care encounter 7/14/2025    SVT (supraventricular tachycardia)     WPW syndrome        Past Surgical History:   Procedure Laterality Date    ANGIOGRAPHY OF AORTIC ARCH N/A 09/03/2020    Procedure: AORTOGRAM, AORTIC ARCH;  Surgeon: Stephania Crump MD;  Location: [facility] CATH LAB;  Service: Cardiology;  Laterality: N/A;    FONTAN PROCEDURE, EXTRACARDIAC      LEFT HEART CATHETERIZATION Left 09/03/2020    Procedure: Left heart cath;  Surgeon: Stephania Crump MD;  Location: [facility] CATH LAB;  Service: Cardiology;  Laterality: Left;    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 Extenuating circumstances    TRANSESOPHAGEAL ECHOCARDIOGRAPHY N/A 11/10/2022    Procedure: ECHOCARDIOGRAM, TRANSESOPHAGEAL;  Surgeon: Emeterio Hall MD;  Location: [facility] EP LAB;  Service: Cardiology;  Laterality: N/A;    TREATMENT OF CARDIAC ARRHYTHMIA N/A 01/09/2021    Procedure: CARDIOVERSION;  Surgeon: Jim Mcginnis MD;  Location: [facility] CATH LAB;  Service: Cardiology;  Laterality: N/A;       Review of patient's allergies indicates:  No Known Allergies    Medications:  Continuous Infusions:   0.9% NaCl   Intravenous Continuous 5 mL/hr at 07/16/25 1800 Rate Verify at 07/16/25 1800    DOBUTamine IV infusion (non-titrating)  5  mcg/kg/min Intravenous Continuous 4.9 mL/hr at 25 1800 5 mcg/kg/min at 25 1800    EPINEPHrine  0.05 mcg/kg/min Intravenous Continuous   Stopped at 25 1000    fentanyl  0-250 mcg/hr Intravenous Continuous   Stopped at 25 0412    furosemide (Lasix) 500 mg in 50 mL infusion (conc: 10 mg/mL)  20 mg/hr Intravenous Continuous 2 mL/hr at 25 1800 20 mg/hr at 25 1800    heparin (porcine) in D5W  0-40 Units/kg/hr Intravenous Continuous 5.9 mL/hr at 25 1800 9 Units/kg/hr at 25 1800    nitric oxide gas  10 ppm Inhalation Continuous   10 ppm at 25 1421    NORepinephrine bitartrate-D5W  0-3 mcg/kg/min Intravenous Continuous   Stopped at 25 0424    propofoL  0-50 mcg/kg/min Intravenous Continuous   Stopped at 25 0424    vasopressin  0.04 Units/min Intravenous Continuous   Stopped at 25 0804     Scheduled Meds:   heparin (PORCINE)  80 Units/kg Intravenous Once    levETIRAcetam (Keppra) IV (PEDS and ADULTS)  1,500 mg Intravenous Q12H    mupirocin   Nasal BID    pantoprazole  40 mg Intravenous Daily    piperacillin-tazobactam (Zosyn) IV (PEDS and ADULTS) (extended infusion is not appropriate)  4.5 g Intravenous Q8H    potassium chloride in water  40 mEq Intravenous Once    white petrolatum-mineral oiL   Both Eyes Q8H     PRN Meds:  Current Facility-Administered Medications:     heparin (PORCINE), 60 Units/kg, Intravenous, PRN    heparin (PORCINE), 30 Units/kg, Intravenous, PRN    sodium chloride 0.9%, 10 mL, Intravenous, PRN    Family History       Problem Relation (Age of Onset)    Heart disease Maternal Grandfather    No Known Problems Mother, Father, Sister, Brother, Maternal Aunt, Maternal Uncle, Paternal Aunt, Paternal Uncle, Maternal Grandmother, Paternal Grandmother, Paternal Grandfather          Tobacco Use    Smoking status: Former     Current packs/day: 0.00     Types: Cigarettes     Quit date:      Years since quittin.5     Passive exposure:  Past    Smokeless tobacco: Never    Tobacco comments:     3-4 months    Substance and Sexual Activity    Alcohol use: No    Drug use: Yes     Types: Marijuana     Comment: Mojo today- pt reports a while back    Sexual activity: Yes       Review of Systems   Unable to perform ROS: Intubated     Objective:     Vital Signs (Most Recent):  Temp: 96.3 °F (35.7 °C) (07/16/25 1700)  Pulse: 98 (07/16/25 1800)  Resp: (!) 30 (07/16/25 1800)  BP: 101/60 (07/16/25 1800)  SpO2: (!) 91 % (07/16/25 1800) Vital Signs (24h Range):  Temp:  [93.6 °F (34.2 °C)-97 °F (36.1 °C)] 96.3 °F (35.7 °C)  Pulse:  [] 98  Resp:  [17-32] 30  SpO2:  [82 %-97 %] 91 %  BP: (100-141)/(60-97) 101/60  Arterial Line BP: ()/(56-91) 85/56     Weight: (S) 60.3 kg (133 lb)  Body mass index is 21.47 kg/m².       Physical Exam  Vitals and nursing note reviewed.   Constitutional:       Appearance: He is ill-appearing.      Comments: Younger, critically ill-appearing male lying in bed, intubated, sedated   Pulmonary:      Comments: intubated  Neurological:      Comments: Unable to assess reflexes/cognition due to sedation and paralysis                Advance Care Planning   Advance Directives:   Do Not Resuscitate Status: Yes      Decision Making:  Family answered questions and Patient unable to communicate due to disease severity/cognitive impairment  Goals of Care: Continued voluntary and supportive discussion regarding substituted values for patient in the setting of severe critical illness and cardiac arrest. Family with fair insight and prognostic awareness, understanding that Dwight is critically ill and very unlikely to make a meaningful recovery, especially in light of his absent reflexes and seizures on EEG. They remain hopeful for a miracle, but also very clear that they would not subject Dwight to additional suffering with CPR in the event he arrests again. Family emphasizes their gratitude for care teams and our efforts to treat Dwight with  dignity. Remain clear that long-term life support and long-term care are not acceptable outcomes. Interested in more information gathering and formal neuro prognostication per neuro, although they accept my worry that seizure activity at this stage is typically a very poor sign and an indicator that Tank may not make a meaningful recovery. All other questions and concerns answered at this time           CBC:   Recent Labs   Lab 07/16/25  0302 07/16/25  0722 07/16/25  0726   WBC 11.59  --   --    HGB 11.8*  --   --    HCT 35.4*   < > 42   MCV 84  --   --    *  --   --     < > = values in this interval not displayed.     BMP:  Recent Labs   Lab 07/16/25  1358   *   *   K 4.6   CL 89*   CO2 31*   BUN 26*   CREATININE 1.7*   CALCIUM 9.3   MG 2.2     LFT:  Lab Results   Component Value Date    AST 99 (H) 07/16/2025    ALKPHOS 79 07/16/2025    BILITOT 5.5 (H) 07/16/2025     Albumin:   Albumin   Date Value Ref Range Status   07/16/2025 2.8 (L) 3.5 - 5.2 g/dL Final   02/09/2025 3.4 (L) 3.5 - 5.2 g/dL Final     Protein:   Protein Total   Date Value Ref Range Status   07/16/2025 7.5 6.0 - 8.4 gm/dL Final     Total Protein   Date Value Ref Range Status   02/09/2025 6.4 6.0 - 8.4 g/dL Final     Lactic acid:   Lab Results   Component Value Date    LACTATE 1.8 07/14/2025    LACTATE 1.7 07/13/2025       Reviewed CBC with anemia and thrombocytopenia, CMP with somewhat worsening renal function. EEG with burst suppression

## 2025-07-16 NOTE — NURSING
Pt sweating profusely on forehead, arms, and chest. Saturating gown at temps of 97 F core bladder. Blankets removed and temps dropped to 95 F. MD Campos notified, goal temp of 96+

## 2025-07-16 NOTE — SUBJECTIVE & OBJECTIVE
Interval History: Patient remains mechanically ventilated, breathing over the vent, with FiO? weaned to 70%. On inhaled nitric oxide and dobutamine. Sedation and paralytics weaned. Neurology consulted for neuroprognostication.      Objective:     Vital Signs (Most Recent):  Temp: 96.4 °F (35.8 °C) (07/16/25 1300)  Pulse: 99 (07/16/25 1300)  Resp: (!) 23 (07/16/25 1300)  BP: 104/65 (07/16/25 1300)  SpO2: (!) 93 % (07/16/25 1300) Vital Signs (24h Range):  Temp:  [93.6 °F (34.2 °C)-98.2 °F (36.8 °C)] 96.4 °F (35.8 °C)  Pulse:  [] 99  Resp:  [17-32] 23  SpO2:  [86 %-97 %] 93 %  BP: ()/(60-97) 104/65  Arterial Line BP: ()/(58-91) 88/59     Weight: (S) 60.3 kg (133 lb)  Body mass index is 21.47 kg/m².      Intake/Output Summary (Last 24 hours) at 7/16/2025 1413  Last data filed at 7/16/2025 1300  Gross per 24 hour   Intake 1592.1 ml   Output 5055 ml   Net -3462.9 ml        Physical Exam  Vitals reviewed.   Constitutional:       Interventions: He is intubated.   HENT:      Head: Atraumatic.   Eyes:      Conjunctiva/sclera: Conjunctivae normal.   Cardiovascular:      Rate and Rhythm: Regular rhythm. Tachycardia present.      Pulses: Normal pulses.   Pulmonary:      Effort: He is intubated.      Breath sounds: Normal breath sounds. No wheezing or rales.   Abdominal:      General: Abdomen is flat.      Palpations: Abdomen is soft.   Musculoskeletal:      Right lower leg: No edema.      Left lower leg: No edema.   Skin:     General: Skin is warm and dry.           Review of Systems    Vents:  Vent Mode: A/C (07/16/25 1200)  Ventilator Initiated: Yes (07/12/25 0701)  Set Rate: 16 BPM (07/16/25 1200)  Vt Set: 400 mL (07/16/25 1200)  PEEP/CPAP: 5 cmH20 (07/16/25 1200)  Oxygen Concentration (%): 70 (07/16/25 1300)  Peak Airway Pressure: 21 cmH20 (07/16/25 1200)  Plateau Pressure: 17 cmH20 (07/16/25 1200)  Total Ve: 9.46 L/m (07/16/25 1200)  Negative Inspiratory Force (cm H2O): 0 (07/16/25 1200)  F/VT Ratio<105  (RSBI): (!) 42.55 (07/16/25 1200)    Lines/Drains/Airways       Central Venous Catheter Line  Duration             Percutaneous Central Line Triple Lumen 07/12/25 1227 Internal Jugular Right 4 days              Drain  Duration                  Urethral Catheter 07/12/25 0706 Temperature probe 14 Fr. 4 days         NG/OG Tube 07/12/25 1645 Center mouth 3 days              Airway  Duration                  Airway - Non-Surgical 07/12/25 0130 4 days              Arterial Line  Duration             Arterial Line 07/12/25 1912 Left Radial 3 days              Peripheral Intravenous Line  Duration                  External Jugular IV 07/12/25 0704 4 days    Peripheral IV Single Lumen 07/12/25 0703 18 G Anterior;Left Forearm 4 days    Peripheral IV Single Lumen 07/12/25 0704 18 G Right Antecubital 4 days    Peripheral IV Single Lumen 07/12/25 0704 20 G Anterior;Right Forearm 4 days                    Significant Labs:    CBC/Anemia Profile:  Recent Labs   Lab 07/15/25  0259 07/16/25  0302 07/16/25  0722 07/16/25  0726   WBC 9.82 11.59  --   --    HGB 12.1* 11.8*  --   --    HCT 36.6* 35.4* 42 42   PLT 99* 116*  --   --    MCV 85 84  --   --    RDW 19.3* 19.3*  --   --         Chemistries:  Recent Labs   Lab 07/15/25  0259 07/15/25  0752 07/15/25  1924 07/16/25  0302 07/16/25  0904   *   < > 131* 130* 132*   K 4.4   < > 4.2 4.7 4.5   CL 94*   < > 91* 90* 89*   CO2 21*   < > 25 25 30*   BUN 22*   < > 21* 24* 24*   CREATININE 1.4   < > 1.5* 1.6* 1.6*   CALCIUM 8.3*   < > 8.7 9.0 9.5   ALBUMIN 2.8*  --   --  2.8*  --    PROT 7.1  --   --  7.5  --    BILITOT 3.0*  --   --  5.5*  --    ALKPHOS 80  --   --  79  --    *  --   --  144*  --    *  --   --  99*  --    MG 2.4   < > 2.0 2.0 2.1   PHOS 5.1*   < > 5.7* 6.1* 6.5*    < > = values in this interval not displayed.       All pertinent labs within the past 24 hours have been reviewed.    Significant Imaging:  I have reviewed all pertinent imaging  results/findings within the past 24 hours.

## 2025-07-16 NOTE — PROCEDURES
Prelim EEG 11:29- 14:29  Spontaneous burst suppression (IBI 2.5 sec). Bursts comprise of generalize high Hz epileptiform discharges   Dr. Otto

## 2025-07-16 NOTE — ASSESSMENT & PLAN NOTE
Hx of Tricuspid and pulmonary atresia, s/p systemic to pulmonary artery shunt followed by a bidirectional Jesus and subsequent lateral tunnel Fontan procedure

## 2025-07-16 NOTE — PROGRESS NOTES
"[facility] - Cardiac Intensive Care  Palliative Medicine  Progress Note    Patient Name: Juani Reilly Jr.  MRN: [id number]  Admission Date: 7/12/2025  Hospital Length of Stay: 4 days  Code Status: DNR   Attending Provider: Solitario Reis MD  Consulting Provider: Parish Wakefield MD  Primary Care Physician: Jaqueline Pardo MD  Principal Problem:Cardiac arrest    Patient information was obtained from relative(s), past medical records, and primary team.      Assessment/Plan:     Palliative Care  Palliative care encounter  See ACP 7/14-7/16    Insight/goals of care- improving insight and prognostic awareness. Some substituted values of functional independence. Clear values from family of accepting any and all potentially beneficial interventions. Willingness to engage regarding avoiding non-helpful interventions (CPR), but remain hopeful for a "miracle". Recognize Tank would not find long-term life support and LTACH acceptable.    Social support- supportive mother, Amita, and grandmother, Elaina. Reportedly, patient raised mostly by grandmother    Psychological- limited coping, appropriate grief    Spiritual- Hinduism, emphasized importance of prayer    Symptom management- no active needs    Recommendations  -DNR, continued best supportive care and ongoing prognostications efforts  -likely that patient's values would not align with long-term life support if no significant clinical improvement over the coming days to short weeks   -anticipate introduction of option for liberation from life support and allowing natural death if neuroprognostic indicators remain poor  -continue current level of care  -will continue to assist aligning values with care recommendations as course unfolds, negrito pending neuroprognostication          I will follow-up with patient. Please contact us if you have any additional questions.    Subjective:     Chief Complaint:   Chief Complaint   Patient presents with    Transfer     " "Transfer sent from [facility] for CICU consult for post cardiac arrest and bilateral PE. Pt arrives on prop @35mcg/kg/hr, Heparin 18units/kg/hr, and Fentanyl @50mcg/kg/hr. Pt arrives intubated.       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 Intubated, sedated, and paralyzed (for vent dyssynchrony) in the ICU, now s/p TTM. Palliative medicine consulted to assist with goals of care.       Interval History: Chart reviewed including 24h medication use. Patient remains critically ill, sedated, intubated on vasopressors. Several family members at bedside during visit. EEG revealing burst suppression, indicating likely poor neuroprognosis. FiO2 weaned to 70%. Supportive discussion below.      Past Medical History:   Diagnosis Date    Acute decompensated heart failure 02/06/2025    History of heart surgery     Other cirrhosis of liver 11/12/2020    Palliative care encounter 7/14/2025    SVT (supraventricular tachycardia)     WPW syndrome        Past Surgical History:   Procedure Laterality Date    ANGIOGRAPHY OF AORTIC ARCH N/A 09/03/2020    Procedure: AORTOGRAM, AORTIC ARCH;  Surgeon: Stephania Crump MD;  Location: [facility] CATH LAB;  Service: Cardiology;  Laterality: N/A;    FONTAN PROCEDURE, EXTRACARDIAC      LEFT HEART CATHETERIZATION Left 09/03/2020    Procedure: Left heart cath;  Surgeon: Stephania Crump MD;  Location: [facility] CATH LAB;  Service: Cardiology;  Laterality: Left;    RIGHT HEART CATHETERIZATION FOR CONGENITAL HEART DEFECT N/A 09/03/2020    Procedure: CATHETERIZATION, HEART, RIGHT, FOR CONGENITAL HEART DEFECT;  Surgeon: Stephania Crump MD;  Location: [facility] CATH LAB;  Service: Cardiology;  Laterality: N/A;  [facility] - needs covid " test morning of. Extenuating circumstances    TRANSESOPHAGEAL ECHOCARDIOGRAPHY N/A 11/10/2022    Procedure: ECHOCARDIOGRAM, TRANSESOPHAGEAL;  Surgeon: Emeterio Hall MD;  Location: [facility] EP LAB;  Service: Cardiology;  Laterality: N/A;    TREATMENT OF CARDIAC ARRHYTHMIA N/A 01/09/2021    Procedure: CARDIOVERSION;  Surgeon: Jim Mcginnis MD;  Location: [facility] CATH LAB;  Service: Cardiology;  Laterality: N/A;       Review of patient's allergies indicates:  No Known Allergies    Medications:  Continuous Infusions:   0.9% NaCl   Intravenous Continuous 5 mL/hr at 07/16/25 1800 Rate Verify at 07/16/25 1800    DOBUTamine IV infusion (non-titrating)  5 mcg/kg/min Intravenous Continuous 4.9 mL/hr at 07/16/25 1800 5 mcg/kg/min at 07/16/25 1800    EPINEPHrine  0.05 mcg/kg/min Intravenous Continuous   Stopped at 07/16/25 1000    fentanyl  0-250 mcg/hr Intravenous Continuous   Stopped at 07/16/25 0412    furosemide (Lasix) 500 mg in 50 mL infusion (conc: 10 mg/mL)  20 mg/hr Intravenous Continuous 2 mL/hr at 07/16/25 1800 20 mg/hr at 07/16/25 1800    heparin (porcine) in D5W  0-40 Units/kg/hr Intravenous Continuous 5.9 mL/hr at 07/16/25 1800 9 Units/kg/hr at 07/16/25 1800    nitric oxide gas  10 ppm Inhalation Continuous   10 ppm at 07/14/25 1421    NORepinephrine bitartrate-D5W  0-3 mcg/kg/min Intravenous Continuous   Stopped at 07/16/25 0424    propofoL  0-50 mcg/kg/min Intravenous Continuous   Stopped at 07/16/25 0424    vasopressin  0.04 Units/min Intravenous Continuous   Stopped at 07/16/25 0804     Scheduled Meds:   heparin (PORCINE)  80 Units/kg Intravenous Once    levETIRAcetam (Keppra) IV (PEDS and ADULTS)  1,500 mg Intravenous Q12H    mupirocin   Nasal BID    pantoprazole  40 mg Intravenous Daily    piperacillin-tazobactam (Zosyn) IV (PEDS and ADULTS) (extended infusion is not appropriate)  4.5 g Intravenous Q8H    potassium chloride in water  40 mEq Intravenous Once    white petrolatum-mineral oiL   Both Eyes Q8H      PRN Meds:  Current Facility-Administered Medications:     heparin (PORCINE), 60 Units/kg, Intravenous, PRN    heparin (PORCINE), 30 Units/kg, Intravenous, PRN    sodium chloride 0.9%, 10 mL, Intravenous, PRN    Family History       Problem Relation (Age of Onset)    Heart disease Maternal Grandfather    No Known Problems Mother, Father, Sister, Brother, Maternal Aunt, Maternal Uncle, Paternal Aunt, Paternal Uncle, Maternal Grandmother, Paternal Grandmother, Paternal Grandfather          Tobacco Use    Smoking status: Former     Current packs/day: 0.00     Types: Cigarettes     Quit date:      Years since quittin.5     Passive exposure: Past    Smokeless tobacco: Never    Tobacco comments:     3-4 months    Substance and Sexual Activity    Alcohol use: No    Drug use: Yes     Types: Marijuana     Comment: Odlays today- pt reports a while back    Sexual activity: Yes       Review of Systems   Unable to perform ROS: Intubated     Objective:     Vital Signs (Most Recent):  Temp: 96.3 °F (35.7 °C) (25 1700)  Pulse: 98 (25 1800)  Resp: (!) 30 (25 1800)  BP: 101/60 (25 1800)  SpO2: (!) 91 % (25 1800) Vital Signs (24h Range):  Temp:  [93.6 °F (34.2 °C)-97 °F (36.1 °C)] 96.3 °F (35.7 °C)  Pulse:  [] 98  Resp:  [17-32] 30  SpO2:  [82 %-97 %] 91 %  BP: (100-141)/(60-97) 101/60  Arterial Line BP: ()/(56-91) 85/56     Weight: (S) 60.3 kg (133 lb)  Body mass index is 21.47 kg/m².       Physical Exam  Vitals and nursing note reviewed.   Constitutional:       Appearance: He is ill-appearing.      Comments: Younger, critically ill-appearing male lying in bed, intubated, sedated   Pulmonary:      Comments: intubated  Neurological:      Comments: Unable to assess reflexes/cognition due to sedation and paralysis                Advance Care Planning  Advance Directives:   Do Not Resuscitate Status: Yes      Decision Making:  Family answered questions and Patient unable to communicate  due to disease severity/cognitive impairment  Goals of Care: Continued voluntary and supportive discussion regarding substituted values for patient in the setting of severe critical illness and cardiac arrest. Family with fair insight and prognostic awareness, understanding that Dwight is critically ill and very unlikely to make a meaningful recovery, especially in light of his absent reflexes and seizures on EEG. They remain hopeful for a miracle, but also very clear that they would not subject Dwight to additional suffering with CPR in the event he arrests again. Family emphasizes their gratitude for care teams and our efforts to treat Dwight with dignity. Remain clear that long-term life support and long-term care are not acceptable outcomes. Interested in more information gathering and formal neuro prognostication per neuro, although they accept my worry that seizure activity at this stage is typically a very poor sign and an indicator that Dwight may not make a meaningful recovery. All other questions and concerns answered at this time           CBC:   Recent Labs   Lab 07/16/25  0302 07/16/25  0722 07/16/25  0726   WBC 11.59  --   --    HGB 11.8*  --   --    HCT 35.4*   < > 42   MCV 84  --   --    *  --   --     < > = values in this interval not displayed.     BMP:  Recent Labs   Lab 07/16/25  1358   *   *   K 4.6   CL 89*   CO2 31*   BUN 26*   CREATININE 1.7*   CALCIUM 9.3   MG 2.2     LFT:  Lab Results   Component Value Date    AST 99 (H) 07/16/2025    ALKPHOS 79 07/16/2025    BILITOT 5.5 (H) 07/16/2025     Albumin:   Albumin   Date Value Ref Range Status   07/16/2025 2.8 (L) 3.5 - 5.2 g/dL Final   02/09/2025 3.4 (L) 3.5 - 5.2 g/dL Final     Protein:   Protein Total   Date Value Ref Range Status   07/16/2025 7.5 6.0 - 8.4 gm/dL Final     Total Protein   Date Value Ref Range Status   02/09/2025 6.4 6.0 - 8.4 g/dL Final     Lactic acid:   Lab Results   Component Value Date    LACTATE 1.8 07/14/2025     LACTATE 1.7 07/13/2025       Reviewed CBC with anemia and thrombocytopenia, CMP with somewhat worsening renal function. EEG with burst suppression    In my care of this patient with acute on chronic severe illness with threat to life and/or bodily function, I am recommending goal-concordant care as noted above. I spent a significant amount of time reviewing external records/ recommendations of other providers (HTS, Neuro), reviewing recent test results (CBC, CMP, EEG), and discussed care with other subspecialists involved (HTS)    In addition to above, I spent 20 minutes specifically discussing advance care planning and goals of care with patient's family at bedside.         Parish Wakefield MD  Palliative Medicine  [facility] - Cardiac Intensive Care

## 2025-07-16 NOTE — CARE UPDATE
Care Update Note:    Hemodynamics@8pm:  CVP: 14  SVO2: 61  CO: 5.15  CI: 3.04  SVR: 948      Continuous Infusions:   0.9% NaCl   Intravenous Continuous 5 mL/hr at 07/15/25 2101 Rate Verify at 07/15/25 2101    CISatracurium 200 mg in 0.9% NaCl 100 mL infusion  0-10 mcg/kg/min Intravenous Continuous   Stopped at 07/15/25 1229    DOBUTamine IV infusion (non-titrating)  5 mcg/kg/min Intravenous Continuous 4.9 mL/hr at 07/15/25 2122 5 mcg/kg/min at 07/15/25 2122    EPINEPHrine  0.05 mcg/kg/min Intravenous Continuous 9.9 mL/hr at 07/15/25 2101 0.05 mcg/kg/min at 07/15/25 2101    fentanyl  0-250 mcg/hr Intravenous Continuous 13 mL/hr at 07/15/25 2116 130 mcg/hr at 07/15/25 2116    furosemide (Lasix) 500 mg in 50 mL infusion (conc: 10 mg/mL)  20 mg/hr Intravenous Continuous 2 mL/hr at 07/15/25 2101 20 mg/hr at 07/15/25 2101    heparin (porcine) in D5W  0-40 Units/kg/hr Intravenous Continuous 5.9 mL/hr at 07/15/25 2101 9 Units/kg/hr at 07/15/25 2101    nitric oxide gas  10 ppm Inhalation Continuous   10 ppm at 07/14/25 1421    NORepinephrine bitartrate-D5W  0-3 mcg/kg/min Intravenous Continuous 4.3 mL/hr at 07/15/25 2101 0.14 mcg/kg/min at 07/15/25 2101    propofoL  0-50 mcg/kg/min Intravenous Continuous 3.9 mL/hr at 07/15/25 2106 10 mcg/kg/min at 07/15/25 2106    vasopressin  0.04 Units/min Intravenous Continuous 12 mL/hr at 07/15/25 2101 0.04 Units/min at 07/15/25 2101       Intake/Output Summary (Last 24 hours) at 7/15/2025 2207  Last data filed at 7/15/2025 2101  Gross per 24 hour   Intake 2499.16 ml   Output 5845 ml   Net -3345.84 ml         Plan:  - Weaning sedation overnight for neurologic assessment in the AM    Discussed with attending.    SABINE Campos  Cardiovascular Disease fellow, PGY-5  [facility]

## 2025-07-16 NOTE — PROGRESS NOTES
"[facility] - Cardiac Intensive Care  Palliative Medicine  Progress Note    Patient Name: Juani Reilly Jr.  MRN: [id number]  Admission Date: 7/12/2025  Hospital Length of Stay: 4 days  Code Status: DNR   Attending Provider: Solitario Reis MD  Consulting Provider: Parish Wakefield MD  Primary Care Physician: Jaqueline Pardo MD  Principal Problem:Cardiac arrest    Patient information was obtained from relative(s), past medical records, and primary team.      Assessment/Plan:     Palliative Care  Palliative care encounter  See ACP 7/14-7/15    Insight/goals of care- limited insight and prognostic awareness. Some substituted values of functional independence. Clear values from family of accepting any and all potentially beneficial interventions. Willingness to engage regarding avoiding non-helpful interventions (CPR), but remain hopeful for a "miracle"    Social support- supportive mother, Amita, and grandmother, Elaina. Reportedly, patient raised mostly by grandmother    Psychological- limited coping, appropriate grief    Spiritual- Nondenominational, emphasized importance of prayer    Symptom management- no active needs    Recommendations  -DNR, continued best supportive care and ongoing prognostications efforts  -likely that patient's values would not align with long-term life support if no significant clinical improvement over the coming days to short weeks  -continue current level of care  -will continue to assist aligning values with care recommendations as course unfolds, negrito pending neuroprognostication          I will follow-up with patient. Please contact us if you have any additional questions.    Subjective:     Chief Complaint:   Chief Complaint   Patient presents with    Transfer     Transfer sent from [place] for CICU consult for post cardiac arrest and bilateral PE. Pt arrives on prop @35mcg/kg/hr, Heparin 18units/kg/hr, and Fentanyl @50mcg/kg/hr. Pt arrives intubated.       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 Intubated, sedated, and paralyzed (for vent dyssynchrony) in the ICU, now s/p TTM. Palliative medicine consulted to assist with goals of care.     Interval History: Chart reviewed including 24h medication use. Patient remains critically ill, sedated, paralyzed, intubated on vasopressors. Now normothermic. Weaning nimbex today to assess mentation. Grandmother at bedside during visit. Supportive discussion below.      Past Medical History:   Diagnosis Date    Acute decompensated heart failure 02/06/2025    History of heart surgery     Other cirrhosis of liver 11/12/2020    Palliative care encounter 7/14/2025    SVT (supraventricular tachycardia)     WPW syndrome        Past Surgical History:   Procedure Laterality Date    ANGIOGRAPHY OF AORTIC ARCH N/A 09/03/2020    Procedure: AORTOGRAM, AORTIC ARCH;  Surgeon: Stephania Crump MD;  Location: [facility] CATH LAB;  Service: Cardiology;  Laterality: N/A;    FONTAN PROCEDURE, EXTRACARDIAC      LEFT HEART CATHETERIZATION Left 09/03/2020    Procedure: Left heart cath;  Surgeon: Stephania Crump MD;  Location: [facility] CATH LAB;  Service: Cardiology;  Laterality: Left;    RIGHT HEART CATHETERIZATION FOR CONGENITAL HEART DEFECT N/A 09/03/2020    Procedure: CATHETERIZATION, HEART, RIGHT, FOR CONGENITAL HEART DEFECT;  Surgeon: Stephania Crump MD;  Location: [facility] CATH LAB;  Service: Cardiology;  Laterality: N/A;  [facility] Heart - needs covid test morning of. Extenuating circumstances    TRANSESOPHAGEAL ECHOCARDIOGRAPHY N/A 11/10/2022    Procedure: ECHOCARDIOGRAM, TRANSESOPHAGEAL;  Surgeon: Emeterio Hall MD;  Location: [facility] EP LAB;  Service: Cardiology;  Laterality: N/A;    TREATMENT OF  CARDIAC ARRHYTHMIA N/A 01/09/2021    Procedure: CARDIOVERSION;  Surgeon: Jim Mcginnis MD;  Location: [facility] CATH LAB;  Service: Cardiology;  Laterality: N/A;       Review of patient's allergies indicates:  No Known Allergies    Medications:  Continuous Infusions:   0.9% NaCl   Intravenous Continuous 5 mL/hr at 07/15/25 1300 Rate Verify at 07/15/25 1300    CISatracurium 200 mg in 0.9% NaCl 100 mL infusion  0-10 mcg/kg/min Intravenous Continuous   Stopped at 07/15/25 1229    DOBUTamine IV infusion (non-titrating)  5 mcg/kg/min Intravenous Continuous 4.9 mL/hr at 07/15/25 1300 5 mcg/kg/min at 07/15/25 1300    EPINEPHrine  0.05 mcg/kg/min Intravenous Continuous 9.9 mL/hr at 07/15/25 1300 0.05 mcg/kg/min at 07/15/25 1300    fentanyl  0-250 mcg/hr Intravenous Continuous 17 mL/hr at 07/15/25 1300 170 mcg/hr at 07/15/25 1300    furosemide (Lasix) 500 mg in 50 mL infusion (conc: 10 mg/mL)  20 mg/hr Intravenous Continuous 2 mL/hr at 07/15/25 1300 20 mg/hr at 07/15/25 1300    heparin (porcine) in D5W  0-40 Units/kg/hr Intravenous Continuous 5.9 mL/hr at 07/15/25 1300 9 Units/kg/hr at 07/15/25 1300    nitric oxide gas  10 ppm Inhalation Continuous   10 ppm at 07/14/25 1421    NORepinephrine bitartrate-D5W  0-3 mcg/kg/min Intravenous Continuous 5.6 mL/hr at 07/15/25 1300 0.18 mcg/kg/min at 07/15/25 1300    propofoL  0-50 mcg/kg/min Intravenous Continuous 19.7 mL/hr at 07/15/25 1324 50 mcg/kg/min at 07/15/25 1324    vasopressin  0.04 Units/min Intravenous Continuous 12 mL/hr at 07/15/25 1300 0.04 Units/min at 07/15/25 1300     Scheduled Meds:   heparin (PORCINE)  80 Units/kg Intravenous Once    mupirocin   Nasal BID    pantoprazole  40 mg Intravenous Daily    piperacillin-tazobactam (Zosyn) IV (PEDS and ADULTS) (extended infusion is not appropriate)  4.5 g Intravenous Q8H    potassium chloride in water  40 mEq Intravenous Once    white petrolatum-mineral oiL   Both Eyes Q8H     PRN Meds:  Current Facility-Administered  Medications:     heparin (PORCINE), 60 Units/kg, Intravenous, PRN    heparin (PORCINE), 30 Units/kg, Intravenous, PRN    sodium chloride 0.9%, 10 mL, Intravenous, PRN    Family History       Problem Relation (Age of Onset)    Heart disease Maternal Grandfather    No Known Problems Mother, Father, Sister, Brother, Maternal Aunt, Maternal Uncle, Paternal Aunt, Paternal Uncle, Maternal Grandmother, Paternal Grandmother, Paternal Grandfather          Tobacco Use    Smoking status: Former     Current packs/day: 0.00     Types: Cigarettes     Quit date:      Years since quittin.5     Passive exposure: Past    Smokeless tobacco: Never    Tobacco comments:     3-4 months    Substance and Sexual Activity    Alcohol use: No    Drug use: Yes     Types: Marijuana     Comment: Odalys today- pt reports a while back    Sexual activity: Yes       Review of Systems   Unable to perform ROS: Intubated     Objective:     Vital Signs (Most Recent):  Temp: 98.2 °F (36.8 °C) (07/15/25 1300)  Pulse: 102 (07/15/25 1300)  Resp: 20 (07/15/25 1300)  BP: (!) 88/59 (07/15/25 1300)  SpO2: (!) 88 % (07/15/25 1300) Vital Signs (24h Range):  Temp:  [97.9 °F (36.6 °C)-99.3 °F (37.4 °C)] 98.2 °F (36.8 °C)  Pulse:  [102-123] 102  Resp:  [13-20] 20  SpO2:  [86 %-91 %] 88 %  BP: ()/(59-75) 88/59  Arterial Line BP: (80-99)/(52-67) 83/56     Weight: (S) 62.1 kg (137 lb)  Body mass index is 22.11 kg/m².       Physical Exam  Vitals and nursing note reviewed.   Constitutional:       Appearance: He is ill-appearing.      Comments: Younger, critically ill-appearing male lying in bed, intubated, sedated and paralyzed   Pulmonary:      Comments: intubated  Neurological:      Comments: Unable to assess reflexes/cognition due to sedation and paralysis                Advance Care Planning  Advance Directives:   Goals of Care: I engaged the patient's grandmother in continued voluntary and supportive discussion regarding substituted values for patient in  the setting of severe critical illness and cardiac arrest. Elaina with fair insight and prognostic awareness, understanding that Dwight is critically ill and very unlikely to make a meaningful recovery. She remains hopeful for a miracle, but also very clear that she would not subject Dwight to additional suffering with CPR in the event he arrests again. I reflected on our conversation yesterday, highlighting that it seems like Dwight is a tough person who strongly values his independence. She very much agrees. I added that, in the even Dwight does not improve over the coming days to short weeks, it sounds like he would not find it acceptable to remain on longer term life support or receive care in a facility. Elaina clearly confirms this is the truth. I emphasized our continued support and advocacy for her grandson and her family. All other questions and concerns answered up this time.            CBC:   Recent Labs   Lab 07/15/25  0259   WBC 9.82   HGB 12.1*   HCT 36.6*   MCV 85   PLT 99*     BMP:  Recent Labs   Lab 07/15/25  1414   GLU 99   *   K 4.3   CL 91*   CO2 28   BUN 21*   CREATININE 1.5*   CALCIUM 8.5*   MG 2.1     LFT:  Lab Results   Component Value Date     (H) 07/15/2025    ALKPHOS 80 07/15/2025    BILITOT 3.0 (H) 07/15/2025     Albumin:   Albumin   Date Value Ref Range Status   07/15/2025 2.8 (L) 3.5 - 5.2 g/dL Final   02/09/2025 3.4 (L) 3.5 - 5.2 g/dL Final     Protein:   Protein Total   Date Value Ref Range Status   07/15/2025 7.1 6.0 - 8.4 gm/dL Final     Total Protein   Date Value Ref Range Status   02/09/2025 6.4 6.0 - 8.4 g/dL Final     Lactic acid:   Lab Results   Component Value Date    LACTATE 1.8 07/14/2025    LACTATE 1.7 07/13/2025       Reviewed CBC with anemia and thrombocytopenia, CMP with somewhat worsening renal function. ABG with PaO2 somewhat improved to 50s today    In my care of this patient with acute on chronic severe illness with threat to life and/or bodily function, I  am recommending goal-concordant care as noted above. I spent a significant amount of time reviewing external records/ recommendations of other providers (HTS), reviewing recent test results (CBC, CMP, ABG), and discussed care with other subspecialists involved (HTS)    In addition to above, I spent 20 minutes specifically discussing advance care planning and goals of care with patient's family at bedside.       The above recommendations communicated directly to primary team on 7/16      Parish Wakefield MD  Palliative Medicine  [facility] - Cardiac Intensive Care

## 2025-07-17 PROBLEM — R57.0 CARDIOGENIC SHOCK: Status: ACTIVE | Noted: 2025-01-01

## 2025-07-17 PROBLEM — Z51.5 COMFORT MEASURES ONLY STATUS: Status: ACTIVE | Noted: 2025-01-01

## 2025-07-17 NOTE — EICU
Virtual ICU Quality Rounds    Admit Date: 7/12/2025  Hospital Day: 5    ICU Day: 4d 18h    24H Vital Sign Range:  Temp:  [95.9 °F (35.5 °C)-97 °F (36.1 °C)]   Pulse:  []   Resp:  [16-35]   BP: (101-141)/(58-97)   SpO2:  [81 %-95 %]   Arterial Line BP: ()/(56-91)     VICU Surveillance Screening                                              26-Jul-2024 15:21

## 2025-07-17 NOTE — PLAN OF CARE
General Neurology Service-Plan of Care     Juani Reilly . 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 Ferguson Nov 2022, HF with decreased LV function 20-25% that is currently admitted to the Cardiac Intensive Care Unit on 07/12 following PEA Arrest s/p ROSC after 15 minutes s/p cooling protocol. General neurology consulted for neuro-prognostication.  aned off on 07/16 at 4:30 am. Patient now is s/p >96 hours of TTM.       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 S/p Keppra load and on Keppra 1500 BID. Recommend restarting sedation with propofol given EEG findings.     Pending MRI Brain WO for further Neuroprognostication.       Discussed with Dr. Figueroa.    Luz Wright  PGY-2  Neurology

## 2025-07-17 NOTE — ASSESSMENT & PLAN NOTE
Patient with leukocytosis left shift; now resolved. CT Chest ; Notable for pulmonary edema and infiltrates indicating possible PNA. CXR w/ LLL infiltrates improving.  - ID consulted, appreciate recs  - Blood cultures on admission remain NGTD  - Continue Zosyn for now, likely pull off in the next day or two with negative cultures  - S/p Vancomycin

## 2025-07-17 NOTE — PROCEDURES
EEG REPORT      Juani Reilly Jr.  [id number]  1995    DATE OF SERVICE: 7/16/2025     -1    METHODOLOGY      Extended electroencephalographic recording is made while the patient is ambulatory and continuing normal daily activities.  Electrodes are placed according to the International 10-20 placement system and included T1 and T2 electrode placement.  Twenty four (24) channels of digital signal (sampling rate of 512/sec) was simultaneously recorded from the scalp including EKG and eye monitors.  Recording band pass was 0.1 to 100 hz and all data was stored digitally on the recorder.  The patient is instructed to press an event button when clinical symptoms occur and write the symptoms into a diary. Activation procedures which include photic stimulation, hyperventilation and instructing patients to perform simple task are done in selected patients.        The EEG is displayed on a monitor screen and can be reformatted into different montages for evaluation.  The entire recoding is submitted for computer assisted analysis to detect spike and electrographic seizure activity.  The entire recording is visually reviewed and the times identified by computer analysis as being spikes or seizures are reviewed again.  Compresses spectral analysis (CSA) is also performed on the activity recorded from each individual channel.  This is displayed as a power display of frequencies from 0 to 30 Hz over time.   The CSA analysis is done and displayed continuously.  This is reviewed for asymmetries in power between homologous areas of the scalp and for presence of changes in power which canbe seen when seizures occur.  Sections of suspected abnormalities on the CSA is then compared with the original EEG recording.  .     [employer] software was also utilized in the review of this study.  This software suite analyzes the EEG recording in multiple domains.  Coherence and rhythmicity is computed to identify EEG sections  which may contain organized seizures.  Each channel undergoes analysis to detect presence of spike and sharp waves which have special and morphological characteristic of epileptic activity.  The routine EEG recording is converted from spacial into frequency domain.  This is then displayed comparing homologous areas to identify areas of significant asymmetry.  Algorithm to identify non-cortically generated artifact is used to separate eye movement, EMG and other artifact from the EEG     Recording Times  Start on 7/16/2025  Stop on 7/17/2025    A total of 19:28:11 hours of EEG was recorded.      EEG FINDINGS:  Background activity:   At onset there are continuous 1-2Hz generalized repetitive sharp complexes with intervening generalized suppressions consistent with non-convulsive status epilepticus.  In the latter half of the recording generalized suppressions are more prolonged at times lasting up to five seconds although without      Sleep:   No sleep transients or state change noted.    Activation procedures:   NA    IMPRESSION:   Abnormal EEG due to findings consistent with non-convulsive status epilepticus.      Rogelio Odonnell MD  Neurology-Epilepsy.  [facility].

## 2025-07-17 NOTE — PROGRESS NOTES
[facility] - Cardiac Intensive Care  Heart Transplant  Progress Note    Patient Name: Juani Reilly Jr.  MRN: [id number]  Admission Date: 7/12/2025  Hospital Length of Stay: 5 days  Attending Physician: Solitario Reis MD  Primary Care Provider: Jaqueline Pardo MD  Principal Problem:<principal problem not specified>    Subjective:   Interval History:   Sedation remains off and pt on EEG  revealing signs of hypoxic injury per Neuro. PO hydral 25 once early morning for hypertension. He is breathing over the vent but still not displaying brainstem reflexes at this time. Neurology awaiting MRI Brain for further neuroprognostication    UO 5000, negative 3300    Hemodynamic Parameters:  SCVO2 48   CVP 13   MAP 92   Cardiac Output 3.29   Cardiac Index 1.94   SVR 1920       Continuous Infusions:   0.9% NaCl   Intravenous Continuous 5 mL/hr at 07/17/25 0801 Rate Verify at 07/17/25 0801    DOBUTamine IV infusion (non-titrating)  5 mcg/kg/min Intravenous Continuous 4.9 mL/hr at 07/17/25 0801 5 mcg/kg/min at 07/17/25 0801    EPINEPHrine  0.05 mcg/kg/min Intravenous Continuous   Stopped at 07/16/25 1000    fentanyl  0-250 mcg/hr Intravenous Continuous   Stopped at 07/16/25 0412    furosemide (Lasix) 500 mg in 50 mL infusion (conc: 10 mg/mL)  20 mg/hr Intravenous Continuous 2 mL/hr at 07/17/25 0801 20 mg/hr at 07/17/25 0801    heparin (porcine) in D5W  0-40 Units/kg/hr Intravenous Continuous 7.2 mL/hr at 07/17/25 0801 11 Units/kg/hr at 07/17/25 0801    nitric oxide gas  10 ppm Inhalation Continuous   10 ppm at 07/14/25 1421    NORepinephrine bitartrate-D5W  0-3 mcg/kg/min Intravenous Continuous   Stopped at 07/16/25 0424    propofoL  0-50 mcg/kg/min Intravenous Continuous   Stopped at 07/16/25 0424    vasopressin  0.04 Units/min Intravenous Continuous   Stopped at 07/16/25 0804     Scheduled Meds:   heparin (PORCINE)  80 Units/kg Intravenous Once    levETIRAcetam (Keppra) IV (PEDS and ADULTS)  1,500 mg Intravenous Q12H     pantoprazole  40 mg Intravenous Daily    piperacillin-tazobactam (Zosyn) IV (PEDS and ADULTS) (extended infusion is not appropriate)  4.5 g Intravenous Q8H    potassium chloride in water  40 mEq Intravenous Once    white petrolatum-mineral oiL   Both Eyes Q8H     PRN Meds:  Current Facility-Administered Medications:     heparin (PORCINE), 60 Units/kg, Intravenous, PRN    heparin (PORCINE), 30 Units/kg, Intravenous, PRN    sodium chloride 0.9%, 10 mL, Intravenous, PRN    Review of patient's allergies indicates:  No Known Allergies  Objective:     Vital Signs (Most Recent):  Temp: 97.3 °F (36.3 °C) (07/17/25 0701)  Pulse: 101 (07/17/25 0801)  Resp: (!) 36 (07/17/25 0801)  BP: 112/71 (07/17/25 0801)  SpO2: (!) 86 % (07/17/25 0801) Vital Signs (24h Range):  Temp:  [95.9 °F (35.5 °C)-97.3 °F (36.3 °C)] 97.3 °F (36.3 °C)  Pulse:  [] 101  Resp:  [17-36] 36  SpO2:  [81 %-95 %] 86 %  BP: (101-141)/(58-97) 112/71  Arterial Line BP: ()/(56-91) 122/72     Patient Vitals for the past 72 hrs (Last 3 readings):   Weight   07/17/25 0501 55.8 kg (123 lb)   07/16/25 0501 60.3 kg (133 lb)   07/15/25 0601 62.1 kg (137 lb)     Body mass index is 19.85 kg/m².      Intake/Output Summary (Last 24 hours) at 7/17/2025 0844  Last data filed at 7/17/2025 0801  Gross per 24 hour   Intake 1619.22 ml   Output 4950 ml   Net -3330.78 ml         Telemetry: Reviewed       Physical Exam  Constitutional:       Comments: Off sedation, unresponsive on mechanical ventilator   HENT:      Head: Atraumatic.      Mouth/Throat:      Comments: ETT in place  Eyes:      Conjunctiva/sclera: Conjunctivae normal.      Comments: No pupillary light response   Cardiovascular:      Rate and Rhythm: Regular rhythm. Tachycardia present.      Pulses: Normal pulses.   Pulmonary:      Breath sounds: Normal breath sounds. No wheezing or rales.   Abdominal:      General: Abdomen is flat.      Palpations: Abdomen is soft.   Musculoskeletal:      Right lower leg:  "Edema (1+ mid-tibia) present.      Left lower leg: Edema (1+ mid-tibia) present.   Skin:     General: Skin is warm and dry.   Neurological:      Comments: Breathing over the vent. No cough reflex, no gag reflex   Psychiatric:      Comments: Unable to assess            Significant Labs:  CBC:  Recent Labs   Lab 07/15/25  0259 07/16/25  0302 07/16/25  0722 07/16/25  0726 07/17/25  0134   WBC 9.82 11.59  --   --  13.34*   RBC 4.30* 4.23*  --   --  4.35*   HGB 12.1* 11.8*  --   --  12.3*   HCT 36.6* 35.4* 42 42 35.6*   PLT 99* 116*  --   --  134*   MCV 85 84  --   --  82   MCH 28.1 27.9  --   --  28.3   MCHC 33.1 33.3  --   --  34.6     BNP:  Recent Labs   Lab 07/12/25  0220 07/12/25  0730   BNP 2,461* 2,903*     CMP:  Recent Labs   Lab 07/15/25  0259 07/15/25  0752 07/16/25  0302 07/16/25  0904 07/16/25  1358 07/16/25  1950 07/17/25  0134      < > 123*   < > 112* 160* 164*   CALCIUM 8.3*   < > 9.0   < > 9.3 9.3 9.6   ALBUMIN 2.8*  --  2.8*  --   --   --  2.9*   PROT 7.1  --  7.5  --   --   --  8.4   *   < > 130*   < > 133* 134* 135*   K 4.4   < > 4.7   < > 4.6 4.2 4.0   CO2 21*   < > 25   < > 31* 27 29   CL 94*   < > 90*   < > 89* 90* 90*   BUN 22*   < > 24*   < > 26* 29* 34*   CREATININE 1.4   < > 1.6*   < > 1.7* 1.7* 1.5*   ALKPHOS 80  --  79  --   --   --  95   *  --  144*  --   --   --  120*   *  --  99*  --   --   --  94*   BILITOT 3.0*  --  5.5*  --   --   --  4.8*    < > = values in this interval not displayed.      Coagulation:   Recent Labs   Lab 07/15/25  0259 07/16/25  0302 07/17/25  0134   INR 1.2 1.2 1.1   APTT 45.3* 47.6* 38.7*     LDH:  No results for input(s): "LDH" in the last 72 hours.  Microbiology:  Microbiology Results (last 7 days)       Procedure Component Value Units Date/Time    Blood culture [[id number]]  (Normal) Collected: 07/12/25 0954    Order Status: Completed Specimen: Blood from Peripheral, Forearm, Left Updated: 07/16/25 1402     Blood Culture No Growth After " 72 Hours    Blood culture [[id number]]  (Normal) Collected: 07/12/25 0954    Order Status: Completed Specimen: Blood from Peripheral, Upper Arm, Right Updated: 07/16/25 1402     Blood Culture No Growth After 72 Hours    Blood culture [[id number]] Collected: 07/12/25 0954    Order Status: Sent Specimen: Blood from Peripheral, Lower Arm, Right Updated: 07/14/25 1635    Culture, Respiratory with Gram Stain [[id number]]     Order Status: Canceled Specimen: Respiratory from Sputum             I have reviewed all pertinent labs within the past 24 hours.    Estimated Creatinine Clearance: 56.3 mL/min (A) (based on SCr of 1.5 mg/dL (H)).    Diagnostic Results:  I have reviewed all pertinent imaging results/findings within the past 24 hours.  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 Ferguson Nov 2022, decreased LV function 20-25%, follows with advanced heart failure clinic (Dr. Reis) with plans for possible transplant, and cirrhosis transferred from [facility] in [place] for advance option evaluation and evaluation from Adult congenital heart disease. Upon encounter pt was intubates and sedated. Family at bedside reported that pt has been having progressive SOB leading to cardiac arrest. As per chart review pt has had recurrent HF admission. Was seen by Dr Reis 7/8/25 and he reported sob with NYHA II-III. Adjustment to GDMT was made, Lasix discontinued and Bumex 2mg BID start. Family reported that pt was in the bedroom with his grandmother when he became unresponsive. Sister rushed at bedside and CPR was started. Reported that she did CPR for 5 mins until EMS arrived. PEA was noted and 3 rounds of epi were administered with ROSC. Patient was intubated but c/b refractory hypoxemia so pulmonology was consulted. Vent 500, rate 20, PEEP 10, 100% FiO2 with Abg of 7.22, pCO2 32, pO2 53, sat 73%. Patient  is sedated with propofol.   - CTA-PE: Notable for BP PE: Heparin initiated   - Bedside ECHO: No visible RV, moderate MR/TR. IVC 1.7 cm     In the ED lactic acid was 9.1-->10. BNP 2900, Lasix 80 given and infusion 20 mg/hr started.   Hemodynamics:    SVO2 CO CI SVR   49 2.5 1.4 2494      Pulmonary and Adult congenital Heart disease consulted and recommendations appreciated.   Dr Santoyo recommendations appreciated. Pt is not a candidate for advance options or MCS.  started.   Pulmonary recommended adequate sedation and paralytic for better oxygenation        Cardiac arrest  #Cardiogenic Shock  Patient with Hx of Tricuspid and pulmonary atresia, s/p systemic to pulmonary artery shunt followed by a bidirectional Jesus and subsequent lateral tunnel Fontan procedure. Recent EF 20-25%. Recurrent hospital admission for ADHF exacerbation. Had cardiac arrest at home. CPR  by sister for 5 mins.   Rhythm: PEA - 3 rounds of epi were administered with ROSC   CTA-PE: Notable for BP PE   Initial hemodynamics: CI 1.4, SVR 2494  - Adult congential heart disease consulted: recs apperciated    - Goal CVP 10-15 to maintain adequate preload  - Cont Dobutamine 5 mcg/hr   - Weaned off Levo, Vaso, Epi  - Cont Shanthi 10 ppm  - Cont lasix gtt at 20mg/h  - S/p TTM protocol. Now normothermic  - Monitor urine output I/O   - Monitor electrolytes and supplement K>4, Phos> 3 Mg >2   - Hemodynamics SVO2 q 8hr    - Ongoing GOC discussion with family. At this time he is DNR/DNI but continuing aggressive life-sustaining measures  - Palliative care consulted, appreciate their assistance     Acute hypoxemic respiratory failure  Pt transferred for [facility] for advance options and adult congenital evaluation. Pt with systemic  - Pulmonary shunt. Presented with refractory hypoxemia while intubated.    - Pulmonology following, appreciate recs  - Low PEEP strategy  - Goal SPO2 > 85%  - Synchronous with vent after weaning sedation  - Cont Shanthi at 10 ppm,  helped overcome refractory hypoxemia  - CXR stable      Pulmonary thromboembolism  Patient with PEA arrest. 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    - CTA Chest: Bilateral acute pulmonary thromboemboli identified in segmental branches of the bilateral lower lobe pulmonary arteries. Cardiomegaly with morphology suggesting single dilated left ventricle. There appears to be communication between the right and left atria suggesting large ASD.  Pulmonary venous congestion with bibasilar pulmonary edema.  - Cont Heparin drip   - Continue with VTE protocol   - Monitor Oxygen sat.   - Pulm consulted, recs appreciated.       Encephalopathy  Suspected hypoxic injury related to arrest and prolonged hypoxemic time. Initial CTH without acute intracranial pathology. Breathing over the vent with no cough or gag reflex.  - Neurology consulted, appreciate recs  - 24h EEG concerning for non-convulsive status epilepticus  - Keppra BID and Valproate BID per neurology  - Propofol for sedation   - Neuro awaiting MRI Brain to further prognosticate. Plan for this afternoon    SIRS (systemic inflammatory response syndrome)  Patient with leukocytosis left shift; now resolved. CT Chest ; Notable for pulmonary edema and infiltrates indicating possible PNA. CXR w/ LLL infiltrates improving.  - ID consulted, appreciate recs  - Blood cultures on admission remain NGTD  - Continue Zosyn for now, likely pull off in the next day or two with negative cultures  - S/p Vancomycin    Respiratory alkalosis  Pt breathing over the vent with sedation held  - Resume sedation with Propofol  - ABG monitoring    Other cirrhosis of liver  #Transaminitis  #Hyperbilirubinemia  2/2 shock and congestive hepatopathy  - LFT monitoring  - Diuresis    Adult congenital heart disease  Hx of Tricuspid and pulmonary atresia, s/p  systemic to pulmonary artery shunt followed by a bidirectional Jesus and subsequent lateral tunnel Fontan procedure     WPW syndrome  Status-post ablation (November 2022)           Gustavo Matias MD  Heart Transplant  [facility] - Cardiac Intensive Care

## 2025-07-17 NOTE — CARE UPDATE
Care Update Note:    Hemodynamics@8pm:  CVP: 12  SVO2: 56  CO: 3.92  CI: 2.31  SVR: 1265      Continuous Infusions:   0.9% NaCl   Intravenous Continuous 5 mL/hr at 07/16/25 2001 Rate Verify at 07/16/25 2001    DOBUTamine IV infusion (non-titrating)  5 mcg/kg/min Intravenous Continuous 4.9 mL/hr at 07/16/25 2001 5 mcg/kg/min at 07/16/25 2001    EPINEPHrine  0.05 mcg/kg/min Intravenous Continuous   Stopped at 07/16/25 1000    fentanyl  0-250 mcg/hr Intravenous Continuous   Stopped at 07/16/25 0412    furosemide (Lasix) 500 mg in 50 mL infusion (conc: 10 mg/mL)  20 mg/hr Intravenous Continuous 2 mL/hr at 07/16/25 2001 20 mg/hr at 07/16/25 2001    heparin (porcine) in D5W  0-40 Units/kg/hr Intravenous Continuous 5.9 mL/hr at 07/16/25 2001 9 Units/kg/hr at 07/16/25 2001    nitric oxide gas  10 ppm Inhalation Continuous   10 ppm at 07/14/25 1421    NORepinephrine bitartrate-D5W  0-3 mcg/kg/min Intravenous Continuous   Stopped at 07/16/25 0424    propofoL  0-50 mcg/kg/min Intravenous Continuous   Stopped at 07/16/25 0424    vasopressin  0.04 Units/min Intravenous Continuous   Stopped at 07/16/25 0804       Intake/Output Summary (Last 24 hours) at 7/16/2025 2118  Last data filed at 7/16/2025 2001  Gross per 24 hour   Intake 1588.76 ml   Output 5110 ml   Net -3521.24 ml         Plan:  No changes. Continue current management.    Discussed with attending.    SABINE Campos  Cardiovascular Disease fellow, PGY-5  [facility]

## 2025-07-17 NOTE — ASSESSMENT & PLAN NOTE
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 Recent EF 20-25%. Recurrent hospital admission for ADHF exacerbation. Had cardiac arrest at home. CPR  by sister for 5 mins.   Rhythm: PEA - 3 rounds of epi were administered with ROSC   CTA-PE: Notable for BP PE   Initial hemodynamics: CI 1.4, SVR 2494  - Adult congential heart disease consulted: recs apperciated    - Goal CVP 10-15 to maintain adequate preload  - Cont Dobutamine 5 mcg/hr   - Weaned off Levo, Vaso, Epi  - Cont Shanthi 10 ppm  - Cont lasix gtt at 20mg/h  - S/p TTM protocol. Now normothermic  - Monitor urine output I/O   - Monitor electrolytes and supplement K>4, Phos> 3 Mg >2   - Hemodynamics SVO2 q 8hr    - Ongoing GOC discussion with family. At this time he is DNR/DNI but continuing aggressive life-sustaining measures  - Palliative care consulted, appreciate their assistance

## 2025-07-17 NOTE — SUBJECTIVE & OBJECTIVE
Interval History: Chart reviewed including 24h medication use. Patient remains critically ill, sedated, intubated on vasopressors. Several family members at bedside during visit. Attempted MRI today aborted due to patient desaturation. Goals of care discussion below    Past Medical History:   Diagnosis Date    Acute decompensated heart failure 02/06/2025    History of heart surgery     Other cirrhosis of liver 11/12/2020    Palliative care encounter 7/14/2025    SVT (supraventricular tachycardia)     WPW syndrome        Past Surgical History:   Procedure Laterality Date    ANGIOGRAPHY OF AORTIC ARCH N/A 09/03/2020    Procedure: AORTOGRAM, AORTIC ARCH;  Surgeon: Stephania Crump MD;  Location: [facility] CATH LAB;  Service: Cardiology;  Laterality: N/A;    FONTAN PROCEDURE, EXTRACARDIAC      LEFT HEART CATHETERIZATION Left 09/03/2020    Procedure: Left heart cath;  Surgeon: Stephania Crump MD;  Location: [facility] CATH LAB;  Service: Cardiology;  Laterality: Left;    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 Extenuating circumstances    TRANSESOPHAGEAL ECHOCARDIOGRAPHY N/A 11/10/2022    Procedure: ECHOCARDIOGRAM, TRANSESOPHAGEAL;  Surgeon: Emeterio Hall MD;  Location: [facility] EP LAB;  Service: Cardiology;  Laterality: N/A;    TREATMENT OF CARDIAC ARRHYTHMIA N/A 01/09/2021    Procedure: CARDIOVERSION;  Surgeon: Jim Mcginnis MD;  Location: [facility] CATH LAB;  Service: Cardiology;  Laterality: N/A;       Review of patient's allergies indicates:  No Known Allergies    Medications:  Continuous Infusions:   0.9% NaCl   Intravenous Continuous   Stopped at 07/17/25 1414    DOBUTamine IV infusion (non-titrating)  5 mcg/kg/min Intravenous Continuous 4.9 mL/hr at 07/17/25 1810 5  mcg/kg/min at 25 1810    EPINEPHrine  0.05 mcg/kg/min Intravenous Continuous   Stopped at 25 1000    fentanyl  0-250 mcg/hr Intravenous Continuous   Stopped at 25 0412    furosemide (Lasix) 500 mg in 50 mL infusion (conc: 10 mg/mL)  20 mg/hr Intravenous Continuous 2 mL/hr at 25 1810 20 mg/hr at 25 181    NORepinephrine bitartrate-D5W  0-3 mcg/kg/min Intravenous Continuous   Stopped at 25 0424    propofoL  0-50 mcg/kg/min Intravenous Continuous   Stopped at 25 1807     Scheduled Meds:   levETIRAcetam (Keppra) IV (PEDS and ADULTS)  1,500 mg Intravenous Q12H    pantoprazole  40 mg Intravenous Daily    piperacillin-tazobactam (Zosyn) IV (PEDS and ADULTS) (extended infusion is not appropriate)  4.5 g Intravenous Q8H    potassium chloride in water  40 mEq Intravenous Once    valproate sodium (DEPACON) IVPB  750 mg Intravenous Q12H    white petrolatum-mineral oiL   Both Eyes Q8H     PRN Meds:  Current Facility-Administered Medications:     fentaNYL, 50 mcg, Intravenous, Q30 Min PRN    glycopyrrolate, 0.4 mg, Intravenous, Q4H PRN    midazolam, 2 mg, Intravenous, Q15 Min PRN    morphine, 4 mg, Intravenous, Q15 Min PRN    sodium chloride 0.9%, 10 mL, Intravenous, PRN    Family History       Problem Relation (Age of Onset)    Heart disease Maternal Grandfather    No Known Problems Mother, Father, Sister, Brother, Maternal Aunt, Maternal Uncle, Paternal Aunt, Paternal Uncle, Maternal Grandmother, Paternal Grandmother, Paternal Grandfather          Tobacco Use    Smoking status: Former     Current packs/day: 0.00     Types: Cigarettes     Quit date:      Years since quittin.5     Passive exposure: Past    Smokeless tobacco: Never    Tobacco comments:     3-4 months    Substance and Sexual Activity    Alcohol use: No    Drug use: Yes     Types: Marijuana     Comment: Mojo today- pt reports a while back    Sexual activity: Yes       Review of Systems   Unable to perform ROS:  "Intubated     Objective:     Vital Signs (Most Recent):  Temp: 99 °F (37.2 °C) (07/17/25 1701)  Pulse: 109 (07/17/25 1701)  Resp: (!) 26 (07/17/25 1812)  BP: (!) 85/54 (07/17/25 1601)  SpO2: (!) 89 % (07/17/25 1701) Vital Signs (24h Range):  Temp:  [96.3 °F (35.7 °C)-99 °F (37.2 °C)] 99 °F (37.2 °C)  Pulse:  [] 109  Resp:  [19-36] 26  SpO2:  [81 %-92 %] 89 %  BP: ()/(54-82) 85/54  Arterial Line BP: ()/(57-77) 88/58     Weight: (S) 55.8 kg (123 lb)  Body mass index is 19.85 kg/m².       Physical Exam  Vitals and nursing note reviewed.   Constitutional:       Appearance: He is ill-appearing.      Comments: Younger, critically ill-appearing male lying in bed, intubated, sedated   Pulmonary:      Comments: intubated  Neurological:      Comments: Minimally responsive                Advance Care Planning   Advance Directives:   Do Not Resuscitate Status: Yes      Decision Making:  Family answered questions and Patient unable to communicate due to disease severity/cognitive impairment  Goals of Care: Continued voluntary and supportive discussion regarding substituted values for patient in the setting of severe critical illness and cardiac arrest. Family with fair insight and prognostic awareness, understanding that Dwight is critically ill and very unlikely to make a meaningful recovery, especially in light of his absent reflexes and seizures on EEG. Following the failed attempt to get MRI today, family discussed Dwight's wishes and are in agreement that further testing is unlikely to help make decisions about his care, acknowledging that ANY care moving forward, even in the presence of some positive neuroprognostic indicators would likely require long-term life support and long-term care for at least the immediate forseeable future. Family in agreement that Dwight would not find that path acceptable in any way adding "he would be mad we let it go on this far" and "he would never want to be in the hospital". " They feel at peace with this reality. I offered multiple paths forward to free Tank from his current life support measures to allow him to pass with dignity and peace. Family in agreement with compassionate extubation. I expressed my shared sorrow for their situation as well as my admiration for their compassionate approach to their loved ones' care.            CBC:   Recent Labs   Lab 07/17/25  0134   WBC 13.34*   HGB 12.3*   HCT 35.6*   MCV 82   *     BMP:  Recent Labs   Lab 07/17/25  1641         K 4.7   CL 94*   CO2 30*   BUN 44*   CREATININE 1.7*   CALCIUM 9.5   MG 2.8*     LFT:  Lab Results   Component Value Date    AST 94 (H) 07/17/2025    ALKPHOS 95 07/17/2025    BILITOT 4.8 (H) 07/17/2025     Albumin:   Albumin   Date Value Ref Range Status   07/17/2025 2.9 (L) 3.5 - 5.2 g/dL Final   02/09/2025 3.4 (L) 3.5 - 5.2 g/dL Final     Protein:   Protein Total   Date Value Ref Range Status   07/17/2025 8.4 6.0 - 8.4 gm/dL Final     Total Protein   Date Value Ref Range Status   02/09/2025 6.4 6.0 - 8.4 g/dL Final     Lactic acid:   Lab Results   Component Value Date    LACTATE 1.8 07/14/2025    LACTATE 1.7 07/13/2025       Reviewed CBC with anemia and thrombocytopenia, CMP with stable OJSEPH. CXR without acute changes

## 2025-07-17 NOTE — PROGRESS NOTES
[facility]  Pediatric Cardiology  Progress Note    Patient Name: Juani Reilly Jr.  MRN: [id number]  Admission Date: 7/12/2025  Hospital Length of Stay: 5 days  Code Status: DNR   Attending Physician: Solitario Reis MD   Primary Care Physician: Jaqueline Pardo MD  Expected Discharge Date: 8/1/2025  Principal Problem:Cardiac arrest    Subjective:     Interval History:   No major change overnight.  24 EEG on.  Neurology and palliative care very involved.  SVO2 lower today.    CVP 13.  This AM, MVO2 48, Sat on ABG 89.      Scheduled Meds:   heparin (PORCINE)  80 Units/kg Intravenous Once    levETIRAcetam (Keppra) IV (PEDS and ADULTS)  1,500 mg Intravenous Q12H    mupirocin   Nasal BID    pantoprazole  40 mg Intravenous Daily    piperacillin-tazobactam (Zosyn) IV (PEDS and ADULTS) (extended infusion is not appropriate)  4.5 g Intravenous Q8H    potassium chloride in water  40 mEq Intravenous Once    white petrolatum-mineral oiL   Both Eyes Q8H     Continuous Infusions:   0.9% NaCl   Intravenous Continuous 5 mL/hr at 07/17/25 0801 Rate Verify at 07/17/25 0801    DOBUTamine IV infusion (non-titrating)  5 mcg/kg/min Intravenous Continuous 4.9 mL/hr at 07/17/25 0801 5 mcg/kg/min at 07/17/25 0801    EPINEPHrine  0.05 mcg/kg/min Intravenous Continuous   Stopped at 07/16/25 1000    fentanyl  0-250 mcg/hr Intravenous Continuous   Stopped at 07/16/25 0412    furosemide (Lasix) 500 mg in 50 mL infusion (conc: 10 mg/mL)  20 mg/hr Intravenous Continuous 2 mL/hr at 07/17/25 0801 20 mg/hr at 07/17/25 0801    heparin (porcine) in D5W  0-40 Units/kg/hr Intravenous Continuous 7.2 mL/hr at 07/17/25 0801 11 Units/kg/hr at 07/17/25 0801    nitric oxide gas  10 ppm Inhalation Continuous   10 ppm at 07/14/25 1421    NORepinephrine bitartrate-D5W  0-3 mcg/kg/min Intravenous Continuous   Stopped at 07/16/25 0424    propofoL  0-50 mcg/kg/min Intravenous Continuous   Stopped at 07/16/25 0424    vasopressin  0.04  Units/min Intravenous Continuous   Stopped at 07/16/25 0804     PRN Meds:.  Current Facility-Administered Medications:     heparin (PORCINE), 60 Units/kg, Intravenous, PRN    heparin (PORCINE), 30 Units/kg, Intravenous, PRN    sodium chloride 0.9%, 10 mL, Intravenous, PRN     Objective:     Vital Signs (Most Recent):  Temp: 97.3 °F (36.3 °C) (07/17/25 0701)  Pulse: 101 (07/17/25 0801)  Resp: (!) 36 (07/17/25 0801)  BP: 112/71 (07/17/25 0801)  SpO2: (!) 86 % (07/17/25 0801) Vital Signs (24h Range):  Temp:  [95.9 °F (35.5 °C)-97.3 °F (36.3 °C)] 97.3 °F (36.3 °C)  Pulse:  [] 101  Resp:  [17-36] 36  SpO2:  [81 %-95 %] 86 %  BP: (101-141)/(58-97) 112/71  Arterial Line BP: ()/(56-91) 122/72     Telemetry reviewed.  Sinus rhythm, occasional premature ventricular contractions.  No significant arrhythmias noted.  Weight: (S) 55.8 kg (123 lb)  Body mass index is 19.85 kg/m².    SpO2: (!) 86 %         Intake/Output Summary (Last 24 hours) at 7/17/2025 0818  Last data filed at 7/17/2025 0801  Gross per 24 hour   Intake 1619.22 ml   Output 4950 ml   Net -3330.78 ml       Lines/Drains/Airways       Central Venous Catheter Line  Duration             Percutaneous Central Line Triple Lumen 07/12/25 1227 Internal Jugular Right 4 days              Drain  Duration                  Urethral Catheter 07/12/25 0706 Temperature probe 14 Fr. 5 days         NG/OG Tube 07/12/25 1645 Center mouth 4 days              Airway  Duration                  Airway - Non-Surgical 07/12/25 0130 5 days              Arterial Line  Duration             Arterial Line 07/12/25 1912 Left Radial 4 days              Peripheral Intravenous Line  Duration                  External Jugular IV 07/12/25 0704 5 days    Peripheral IV Single Lumen 07/12/25 0703 18 G Anterior;Left Forearm 5 days    Peripheral IV Single Lumen 07/12/25 0704 18 G Right Antecubital 5 days    Peripheral IV Single Lumen 07/12/25 0704 20 G Anterior;Right Forearm 5 days                        Physical Exam  GENERAL:  Intubated, sedated.  ETT in place.  EEG leads in place.  HEENT: Mucous membranes moist and pink, sclera anicteric  NECK: no thyromegaly, no lymphadenopathy.  Unable to appreciate JVD.  CHEST:  Clear breath sounds bilaterally.  Well-healed sternotomy.  CARDIOVASCULAR: Regular rhythm, Quiet precordium, S1 with single S2, no murmurs rubs or gallops  2+ pulses, brisk CR.  No edema.  NEURO:  Intubated    ABG  Recent Labs   Lab 07/17/25  0639   PH 7.586*   PO2 48*   PCO2 34.6*   HCO3 32.9*   BE 11*     Lab Results   Component Value Date    WBC 13.34 (H) 07/17/2025    HGB 12.3 (L) 07/17/2025    HCT 35.6 (L) 07/17/2025    MCV 82 07/17/2025     (L) 07/17/2025       CMP  Sodium   Date Value Ref Range Status   07/17/2025 135 (L) 136 - 145 mmol/L Final   03/20/2025 139 136 - 145 mmol/L Final     Potassium   Date Value Ref Range Status   07/17/2025 4.0 3.5 - 5.1 mmol/L Final   03/20/2025 3.5 3.5 - 5.1 mmol/L Final     Chloride   Date Value Ref Range Status   07/17/2025 90 (L) 95 - 110 mmol/L Final   03/20/2025 104 95 - 110 mmol/L Final     CO2   Date Value Ref Range Status   07/17/2025 29 23 - 29 mmol/L Final   03/20/2025 23 23 - 29 mmol/L Final     Glucose   Date Value Ref Range Status   07/17/2025 164 (H) 70 - 110 mg/dL Final   03/20/2025 94 70 - 110 mg/dL Final     BUN   Date Value Ref Range Status   07/17/2025 34 (H) 6 - 20 mg/dL Final     Creatinine   Date Value Ref Range Status   07/17/2025 1.5 (H) 0.5 - 1.4 mg/dL Final     Calcium   Date Value Ref Range Status   07/17/2025 9.6 8.7 - 10.5 mg/dL Final   03/20/2025 8.9 8.7 - 10.5 mg/dL Final     Protein Total   Date Value Ref Range Status   07/17/2025 8.4 6.0 - 8.4 gm/dL Final     Total Protein   Date Value Ref Range Status   02/09/2025 6.4 6.0 - 8.4 g/dL Final     Albumin   Date Value Ref Range Status   07/17/2025 2.9 (L) 3.5 - 5.2 g/dL Final   02/09/2025 3.4 (L) 3.5 - 5.2 g/dL Final     Total Bilirubin   Date Value Ref Range Status    02/09/2025 1.6 (H) 0.1 - 1.0 mg/dL Final     Comment:     For infants and newborns, interpretation of results should be based  on gestational age, weight and in agreement with clinical  observations.    Premature Infant recommended reference ranges:  Up to 24 hours.............<8.0 mg/dL  Up to 48 hours............<12.0 mg/dL  3-5 days..................<15.0 mg/dL  6-29 days.................<15.0 mg/dL       Bilirubin Total   Date Value Ref Range Status   07/17/2025 4.8 (H) 0.1 - 1.0 mg/dL Final     Comment:     For infants and newborns, interpretation of results should be based   on gestational age, weight and in agreement with clinical   observations.    Premature Infant recommended reference ranges:   0-24 hours:  <8.0 mg/dL   24-48 hours: <12.0 mg/dL   3-5 days:    <15.0 mg/dL   6-29 days:   <15.0 mg/dL     Alkaline Phosphatase   Date Value Ref Range Status   02/09/2025 40 40 - 150 U/L Final     ALP   Date Value Ref Range Status   07/17/2025 95 40 - 150 unit/L Final     AST   Date Value Ref Range Status   07/17/2025 94 (H) 11 - 45 unit/L Final   02/09/2025 16 10 - 40 U/L Final     ALT   Date Value Ref Range Status   07/17/2025 120 (H) 10 - 44 unit/L Final   02/09/2025 20 10 - 44 U/L Final     Anion Gap   Date Value Ref Range Status   07/17/2025 16 8 - 16 mmol/L Final     eGFR   Date Value Ref Range Status   07/17/2025 >60 >60 mL/min/1.73/m2 Final     Comment:     Estimated GFR calculated using the CKD-EPI creatinine (2021) equation.   03/20/2025 >60.0 >60 mL/min/1.73 m^2 Final     Microbiology Results (last 7 days)       Procedure Component Value Units Date/Time    Blood culture [[id number]]  (Normal) Collected: 07/12/25 0993    Order Status: Completed Specimen: Blood from Peripheral, Forearm, Left Updated: 07/16/25 1402     Blood Culture No Growth After 72 Hours    Blood culture [[id number]]  (Normal) Collected: 07/12/25 0954    Order Status: Completed Specimen: Blood from Peripheral, Upper Arm, Right  Updated: 07/16/25 1402     Blood Culture No Growth After 72 Hours    Blood culture [[id number]] Collected: 07/12/25 0954    Order Status: Sent Specimen: Blood from Peripheral, Lower Arm, Right Updated: 07/14/25 1635    Culture, Respiratory with Gram Stain [[id number]]     Order Status: Canceled Specimen: Respiratory from Sputum            Latest Reference Range & Units 06/11/25 14:51   AFP <=8.4 ng/mL 4.5     Echo 7/12/25  Patient presenting following in-home cardiac arrest:  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  Fontan and Jesus circulation not well demonstrated in this study.  Color doppler consistent with small persistent shunt from Fontan circulation to RA.  At least two pulmonary veins demonstrated returning to the left atrium with no evidence for obstruction.  There is a small right atrium with no obvious obstruction of right atrial flow to the left atrium.  There is no obvious tricuspid valve tissue present and no right ventricular cavity could be demonstrated.  There is no evidence for pulmonary valve.  At least mild dilation of the left atrium.  Mildly dilated mitral valve annulus with color Doppler demonstrating moderate to severe insufficiency.  Single ventricle consistent with left ventricular morphology.  There is at least moderate dilation of the left ventricle with prominently trabeculated apical myocardium.  Left ventricular systolic function qualitatively severely compromised with ejection fraction estimated <15%.  Global left ventricular longitudinal strain abnormal - peak average measured -3.4%.  There is a large aortic annulus with trileaflet aortic valve, peak outflow velocity <0.5 m/sec and trivial central jet of insufficiency.  No obvious pericardial effusion.    7/12/25 Chest CTA:  History of tricuspid atresia and prior Jesus shunt procedure  resulting in caval pulmonary shunt.  Bilateral acute pulmonary thromboemboli identified in segmental branches of the bilateral lower lobe pulmonary arteries.  Cardiomegaly with morphology suggesting single dilated left ventricle. There appears to be communication between the right and left atria suggesting large ASD.  Pulmonary venous congestion with bibasilar pulmonary edema.  Extracardiac Fontan shunt, between the IVC and pulmonary artery, opacifies with contrast and appears patent.  Atelectatic changes at the left lung base with significant contrast retention suggesting impaired pulmonary venous drainage. Peripheral zones of absent pulmonary enhancement at both lung bases potentially representing pulmonary infarcts, in light of bilateral pulmonary emboli, or additional areas of atelectasis.  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  Hepatomegaly with hepatic venous enlargement suggesting congestive hepatopathy.    I reviewed that study in detail.  I agree with the findings.  The proximal pulmonary arteries and Fontan circuit are free of obstruction, but there do appear to be thrombi in the segmental branches of the bilateral lower lobe pulmonary arteries.     COMMUNICATION  This critical result was discovered/received on 7/12/2025 at 03:00.    7/12/25 Head CT:  Limited right upper quadrant ultrasound performed. The liver measures 17 cm in length and is homogeneous in echogenicity. Trace fluid seen along the falciform ligament. Common bile duct is within normal limits at 2.2 mm.. Mild gallbladder wall thickening. Trace pericholecystic fluid. Negative sonographic Disla sign. Pancreas is obscured by bowel gas. IVC within normal limits. The right kidney is normal in length measuring 10.4 cm. No right sided hydronephrosis or renal masses identified.     Liver US 2/6/25:  Limited right upper quadrant ultrasound performed. The liver measures 17 cm in length and is  homogeneous in echogenicity. Trace fluid seen along the falciform ligament. Common bile duct is within normal limits at 2.2 mm.. Mild gallbladder wall thickening. Trace pericholecystic fluid. Negative sonographic Disla sign. Pancreas is obscured by bowel gas. IVC within normal limits. The right kidney is normal in length measuring 10.4 cm. No right sided hydronephrosis or renal masses identified.     Cath 9/3/20:  IMPRESSION:  1) Tricuspid atresia s/p intra-atrial Fontan  2) Normal Fontan pressures (11mmHg) and wedge pressure (8mmHg).  3) Normal cardiac output and vascular resistance calculations  4) Small atrial baffle leak  5) No significant aorta-pulmonary or veno-venous collaterals  6) Occlusion of right common femoral artery       Assessment and Plan:     Cardiac/Vascular  S/P Fontan procedure  1. 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 Severely decreased left ventricular function  3. History of [id number], SVT  - status post successful ablation of a left lateral accessory pathway November 2022 by Dr. Ferguson  4. Cirrhosis  - last seen by hepatology May 2023 (normal AFP 6/11)  5. Arrested at home late night July 11, 2025  - significant neurologic injury  - unclear etiology, but CT scan suggestive of pulmonary emboli which may be chronic or acute  - PE are distal, CVP low and cardiac output seems stable  6. Long history of noncompliance with medication  7. Worsening hypoxemia over the past year of unclear etiolog7    Discussion:   He is critically ill with a very bad combination of single ventricle Fontan physiology and poor heart function.    His central venous pressure is 13  today, which is basically his baseline and very good for a Fontan.  This would suggest that he currently is not fluid overloaded, and his MVO2 is on the lower side.  Would back off on diuresis.  Positive pressure ventilation is bad for Fontan physiology.  It decreases venous return through the lungs and hence decreases preload to the systemic ventricle.  Team has done a great job getting PEEP down to 5.  He has developed worsening saturations over the past year or so.  We would plan to do a cardiac catheterization to investigate this further.  He does have a small atrial baffle leak which allows right-to-left shunting, but this is not new and his saturations at baseline were in the low 90s a few years ago.  It is possible that leak has gotten larger although there was nothing obvious on the CT scan.  Venovenous collaterals can definitely develop in these patients.  Pulmonary AV fistula related to liver disease can also develop.  I also wonder if he has had some chronic thromboembolic phenomenon to his lungs which could contribute to hypoxemia given the recent CT findings and his noncompliance with his Xarelto and history of hemoptysis.  Either way, I would accept saturations 75% or greater when trying to wean his ventilation.  No contraindication to oxygen, but I would not let sats greater than 75 delay vent wean.  CT scan suggests bilateral pulmonary emboli.  I was initially skeptical of the reading because CT scan is notorious for false-positive readings of pulmonary emboli in Fontan patients due to preferential streaming of blood to either the right or left lung dependent on whether diet as injected in an upper extremity or lower extremity.  However, the proximal branch pulmonary arteries both light up very well on the CT scan, and the thrombus is more distal.  I do think there are true pulmonary emboli which could contribute to his current presentation or could be more chronic.  He is at increased risk for  pulmonary emboli given the hypercoagulable state associated with Fontan physiology.  He is on heparin.  The pulmonary emboli are distal, his CVP is low for a Fontan, and his pulses are great.  I do not believe these pulmonary emboli are contributing to decreased cardiac output.  We know he has severe left ventricular dysfunction.  He was on Xarelto prior to this hospitalization, but it is highly likely he was noncompliant with that.  I did not and continue to not think that more aggressive treatment with tPA is indicated given the nature these emboli and his neurologic injury.  He has severely decreased left ventricular function and a very poor prognosis.  There is evidence of severe neurologic injury.  What is abundantly clear is that he is not a heart transplant candidate.  His compliance is extremely poor with medications, and we have struggled quite a bit to get him to agree to take any medications.  He is not a mechanical circulatory support candidate in my viewpoint, and I would not consider him a candidate for ECMO.    Recommendations:   Agree with therapeutic heparin for pulmonary emboli but would not recommend tPA  Would back off on diuretics given relatively low CVP for a Fontan, lower mixed venous saturation, and good pulses  Would accept saturations 75% or greater  Shoot for CVP in the 10-15 range.  His prognosis is extremely poor.  I would completely support withdrawal of care.  I feel very strongly that he is not a candidate for ECMO, other mechanical circulatory support, or heart transplant.    I had a discussion again today with the family.  I told them that I was quite certain that he was going to die.  I know him well, and I know he would not want to be on prolonged life support.  I would support withdrawal of care.    I greatly appreciate the care from the heart failure team, Neurology, and palliative care.        Dorian Santoyo MD  Pediatric Cardiology  [facility]

## 2025-07-17 NOTE — SUBJECTIVE & OBJECTIVE
Interval History:   No major change overnight.  24 EEG on.  Neurology and palliative care very involved.  SVO2 lower today.    CVP 13.  This AM, MVO2 48, Sat on ABG 89.      Scheduled Meds:   heparin (PORCINE)  80 Units/kg Intravenous Once    levETIRAcetam (Keppra) IV (PEDS and ADULTS)  1,500 mg Intravenous Q12H    mupirocin   Nasal BID    pantoprazole  40 mg Intravenous Daily    piperacillin-tazobactam (Zosyn) IV (PEDS and ADULTS) (extended infusion is not appropriate)  4.5 g Intravenous Q8H    potassium chloride in water  40 mEq Intravenous Once    white petrolatum-mineral oiL   Both Eyes Q8H     Continuous Infusions:   0.9% NaCl   Intravenous Continuous 5 mL/hr at 07/17/25 0801 Rate Verify at 07/17/25 0801    DOBUTamine IV infusion (non-titrating)  5 mcg/kg/min Intravenous Continuous 4.9 mL/hr at 07/17/25 0801 5 mcg/kg/min at 07/17/25 0801    EPINEPHrine  0.05 mcg/kg/min Intravenous Continuous   Stopped at 07/16/25 1000    fentanyl  0-250 mcg/hr Intravenous Continuous   Stopped at 07/16/25 0412    furosemide (Lasix) 500 mg in 50 mL infusion (conc: 10 mg/mL)  20 mg/hr Intravenous Continuous 2 mL/hr at 07/17/25 0801 20 mg/hr at 07/17/25 0801    heparin (porcine) in D5W  0-40 Units/kg/hr Intravenous Continuous 7.2 mL/hr at 07/17/25 0801 11 Units/kg/hr at 07/17/25 0801    nitric oxide gas  10 ppm Inhalation Continuous   10 ppm at 07/14/25 1421    NORepinephrine bitartrate-D5W  0-3 mcg/kg/min Intravenous Continuous   Stopped at 07/16/25 0424    propofoL  0-50 mcg/kg/min Intravenous Continuous   Stopped at 07/16/25 0424    vasopressin  0.04 Units/min Intravenous Continuous   Stopped at 07/16/25 0804     PRN Meds:.  Current Facility-Administered Medications:     heparin (PORCINE), 60 Units/kg, Intravenous, PRN    heparin (PORCINE), 30 Units/kg, Intravenous, PRN    sodium chloride 0.9%, 10 mL, Intravenous, PRN     Objective:     Vital Signs (Most Recent):  Temp: 97.3 °F (36.3 °C) (07/17/25 0701)  Pulse: 101 (07/17/25  0801)  Resp: (!) 36 (07/17/25 0801)  BP: 112/71 (07/17/25 0801)  SpO2: (!) 86 % (07/17/25 0801) Vital Signs (24h Range):  Temp:  [95.9 °F (35.5 °C)-97.3 °F (36.3 °C)] 97.3 °F (36.3 °C)  Pulse:  [] 101  Resp:  [17-36] 36  SpO2:  [81 %-95 %] 86 %  BP: (101-141)/(58-97) 112/71  Arterial Line BP: ()/(56-91) 122/72     Telemetry reviewed.  Sinus rhythm, occasional premature ventricular contractions.  No significant arrhythmias noted.  Weight: (S) 55.8 kg (123 lb)  Body mass index is 19.85 kg/m².    SpO2: (!) 86 %         Intake/Output Summary (Last 24 hours) at 7/17/2025 0818  Last data filed at 7/17/2025 0801  Gross per 24 hour   Intake 1619.22 ml   Output 4950 ml   Net -3330.78 ml       Lines/Drains/Airways       Central Venous Catheter Line  Duration             Percutaneous Central Line Triple Lumen 07/12/25 1227 Internal Jugular Right 4 days              Drain  Duration                  Urethral Catheter 07/12/25 0706 Temperature probe 14 Fr. 5 days         NG/OG Tube 07/12/25 1645 Center mouth 4 days              Airway  Duration                  Airway - Non-Surgical 07/12/25 0130 5 days              Arterial Line  Duration             Arterial Line 07/12/25 1912 Left Radial 4 days              Peripheral Intravenous Line  Duration                  External Jugular IV 07/12/25 0704 5 days    Peripheral IV Single Lumen 07/12/25 0703 18 G Anterior;Left Forearm 5 days    Peripheral IV Single Lumen 07/12/25 0704 18 G Right Antecubital 5 days    Peripheral IV Single Lumen 07/12/25 0704 20 G Anterior;Right Forearm 5 days                       Physical Exam  GENERAL:  Intubated, sedated.  ETT in place.  EEG leads in place.  HEENT: Mucous membranes moist and pink, sclera anicteric  NECK: no thyromegaly, no lymphadenopathy.  Unable to appreciate JVD.  CHEST:  Clear breath sounds bilaterally.  Well-healed sternotomy.  CARDIOVASCULAR: Regular rhythm, Quiet precordium, S1 with single S2, no murmurs rubs or  gallops  2+ pulses, brisk CR.  No edema.  NEURO:  Intubated    ABG  Recent Labs   Lab 07/17/25  0639   PH 7.586*   PO2 48*   PCO2 34.6*   HCO3 32.9*   BE 11*     Lab Results   Component Value Date    WBC 13.34 (H) 07/17/2025    HGB 12.3 (L) 07/17/2025    HCT 35.6 (L) 07/17/2025    MCV 82 07/17/2025     (L) 07/17/2025       CMP  Sodium   Date Value Ref Range Status   07/17/2025 135 (L) 136 - 145 mmol/L Final   03/20/2025 139 136 - 145 mmol/L Final     Potassium   Date Value Ref Range Status   07/17/2025 4.0 3.5 - 5.1 mmol/L Final   03/20/2025 3.5 3.5 - 5.1 mmol/L Final     Chloride   Date Value Ref Range Status   07/17/2025 90 (L) 95 - 110 mmol/L Final   03/20/2025 104 95 - 110 mmol/L Final     CO2   Date Value Ref Range Status   07/17/2025 29 23 - 29 mmol/L Final   03/20/2025 23 23 - 29 mmol/L Final     Glucose   Date Value Ref Range Status   07/17/2025 164 (H) 70 - 110 mg/dL Final   03/20/2025 94 70 - 110 mg/dL Final     BUN   Date Value Ref Range Status   07/17/2025 34 (H) 6 - 20 mg/dL Final     Creatinine   Date Value Ref Range Status   07/17/2025 1.5 (H) 0.5 - 1.4 mg/dL Final     Calcium   Date Value Ref Range Status   07/17/2025 9.6 8.7 - 10.5 mg/dL Final   03/20/2025 8.9 8.7 - 10.5 mg/dL Final     Protein Total   Date Value Ref Range Status   07/17/2025 8.4 6.0 - 8.4 gm/dL Final     Total Protein   Date Value Ref Range Status   02/09/2025 6.4 6.0 - 8.4 g/dL Final     Albumin   Date Value Ref Range Status   07/17/2025 2.9 (L) 3.5 - 5.2 g/dL Final   02/09/2025 3.4 (L) 3.5 - 5.2 g/dL Final     Total Bilirubin   Date Value Ref Range Status   02/09/2025 1.6 (H) 0.1 - 1.0 mg/dL Final     Comment:     For infants and newborns, interpretation of results should be based  on gestational age, weight and in agreement with clinical  observations.    Premature Infant recommended reference ranges:  Up to 24 hours.............<8.0 mg/dL  Up to 48 hours............<12.0 mg/dL  3-5 days..................<15.0 mg/dL  6-29  days.................<15.0 mg/dL       Bilirubin Total   Date Value Ref Range Status   07/17/2025 4.8 (H) 0.1 - 1.0 mg/dL Final     Comment:     For infants and newborns, interpretation of results should be based   on gestational age, weight and in agreement with clinical   observations.    Premature Infant recommended reference ranges:   0-24 hours:  <8.0 mg/dL   24-48 hours: <12.0 mg/dL   3-5 days:    <15.0 mg/dL   6-29 days:   <15.0 mg/dL     Alkaline Phosphatase   Date Value Ref Range Status   02/09/2025 40 40 - 150 U/L Final     ALP   Date Value Ref Range Status   07/17/2025 95 40 - 150 unit/L Final     AST   Date Value Ref Range Status   07/17/2025 94 (H) 11 - 45 unit/L Final   02/09/2025 16 10 - 40 U/L Final     ALT   Date Value Ref Range Status   07/17/2025 120 (H) 10 - 44 unit/L Final   02/09/2025 20 10 - 44 U/L Final     Anion Gap   Date Value Ref Range Status   07/17/2025 16 8 - 16 mmol/L Final     eGFR   Date Value Ref Range Status   07/17/2025 >60 >60 mL/min/1.73/m2 Final     Comment:     Estimated GFR calculated using the CKD-EPI creatinine (2021) equation.   03/20/2025 >60.0 >60 mL/min/1.73 m^2 Final     Microbiology Results (last 7 days)       Procedure Component Value Units Date/Time    Blood culture [[id number]]  (Normal) Collected: 07/12/25 0954    Order Status: Completed Specimen: Blood from Peripheral, Forearm, Left Updated: 07/16/25 1402     Blood Culture No Growth After 72 Hours    Blood culture [[id number]]  (Normal) Collected: 07/12/25 0954    Order Status: Completed Specimen: Blood from Peripheral, Upper Arm, Right Updated: 07/16/25 1402     Blood Culture No Growth After 72 Hours    Blood culture [[id number]] Collected: 07/12/25 0954    Order Status: Sent Specimen: Blood from Peripheral, Lower Arm, Right Updated: 07/14/25 1635    Culture, Respiratory with Gram Stain [[id number]]     Order Status: Canceled Specimen: Respiratory from Sputum            Latest Reference Range & Units 06/11/25  14:51   AFP <=8.4 ng/mL 4.5     Echo 7/12/25  Patient presenting following in-home cardiac arrest:  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  Fontan and Jesus circulation not well demonstrated in this study.  Color doppler consistent with small persistent shunt from Fontan circulation to RA.  At least two pulmonary veins demonstrated returning to the left atrium with no evidence for obstruction.  There is a small right atrium with no obvious obstruction of right atrial flow to the left atrium.  There is no obvious tricuspid valve tissue present and no right ventricular cavity could be demonstrated.  There is no evidence for pulmonary valve.  At least mild dilation of the left atrium.  Mildly dilated mitral valve annulus with color Doppler demonstrating moderate to severe insufficiency.  Single ventricle consistent with left ventricular morphology.  There is at least moderate dilation of the left ventricle with prominently trabeculated apical myocardium.  Left ventricular systolic function qualitatively severely compromised with ejection fraction estimated <15%.  Global left ventricular longitudinal strain abnormal - peak average measured -3.4%.  There is a large aortic annulus with trileaflet aortic valve, peak outflow velocity <0.5 m/sec and trivial central jet of insufficiency.  No obvious pericardial effusion.    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  Bilateral acute pulmonary thromboemboli identified in segmental branches of the bilateral lower lobe pulmonary arteries.  Cardiomegaly with morphology suggesting single dilated left ventricle. There appears to be communication between the right and left atria suggesting large ASD.  Pulmonary venous congestion with bibasilar pulmonary  edema.  Extracardiac Fontan shunt, between the IVC and pulmonary artery, opacifies with contrast and appears patent.  Atelectatic changes at the left lung base with significant contrast retention suggesting impaired pulmonary venous drainage. Peripheral zones of absent pulmonary enhancement at both lung bases potentially representing pulmonary infarcts, in light of bilateral pulmonary emboli, or additional areas of atelectasis.  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  Hepatomegaly with hepatic venous enlargement suggesting congestive hepatopathy.    I reviewed that study in detail.  I agree with the findings.  The proximal pulmonary arteries and Fontan circuit are free of obstruction, but there do appear to be thrombi in the segmental branches of the bilateral lower lobe pulmonary arteries.     COMMUNICATION  This critical result was discovered/received on 7/12/2025 at 03:00.    7/12/25 Head CT:  Limited right upper quadrant ultrasound performed. The liver measures 17 cm in length and is homogeneous in echogenicity. Trace fluid seen along the falciform ligament. Common bile duct is within normal limits at 2.2 mm.. Mild gallbladder wall thickening. Trace pericholecystic fluid. Negative sonographic Disla sign. Pancreas is obscured by bowel gas. IVC within normal limits. The right kidney is normal in length measuring 10.4 cm. No right sided hydronephrosis or renal masses identified.     Liver US 2/6/25:  Limited right upper quadrant ultrasound performed. The liver measures 17 cm in length and is homogeneous in echogenicity. Trace fluid seen along the falciform ligament. Common bile duct is within normal limits at 2.2 mm.. Mild gallbladder wall thickening. Trace pericholecystic fluid. Negative sonographic Disla sign. Pancreas is obscured by bowel gas. IVC within normal limits. The right kidney is normal in length measuring 10.4 cm. No right sided hydronephrosis or  renal masses identified.     Cath 9/3/20:  IMPRESSION:  1) Tricuspid atresia s/p intra-atrial Fontan  2) Normal Fontan pressures (11mmHg) and wedge pressure (8mmHg).  3) Normal cardiac output and vascular resistance calculations  4) Small atrial baffle leak  5) No significant aorta-pulmonary or veno-venous collaterals  6) Occlusion of right common femoral artery

## 2025-07-17 NOTE — SUBJECTIVE & OBJECTIVE
Interval History:   Sedation remains off and pt on EEG  revealing signs of hypoxic injury per Neuro. PO hydral 25 once early morning for hypertension. He is breathing over the vent but still not displaying brainstem reflexes at this time. Neurology awaiting MRI Brain for further neuroprognostication    UO 5000, negative 3300    Hemodynamic Parameters:  SCVO2 48   CVP 13   MAP 92   Cardiac Output 3.29   Cardiac Index 1.94   SVR 1920       Continuous Infusions:   0.9% NaCl   Intravenous Continuous 5 mL/hr at 07/17/25 0801 Rate Verify at 07/17/25 0801    DOBUTamine IV infusion (non-titrating)  5 mcg/kg/min Intravenous Continuous 4.9 mL/hr at 07/17/25 0801 5 mcg/kg/min at 07/17/25 0801    EPINEPHrine  0.05 mcg/kg/min Intravenous Continuous   Stopped at 07/16/25 1000    fentanyl  0-250 mcg/hr Intravenous Continuous   Stopped at 07/16/25 0412    furosemide (Lasix) 500 mg in 50 mL infusion (conc: 10 mg/mL)  20 mg/hr Intravenous Continuous 2 mL/hr at 07/17/25 0801 20 mg/hr at 07/17/25 0801    heparin (porcine) in D5W  0-40 Units/kg/hr Intravenous Continuous 7.2 mL/hr at 07/17/25 0801 11 Units/kg/hr at 07/17/25 0801    nitric oxide gas  10 ppm Inhalation Continuous   10 ppm at 07/14/25 1421    NORepinephrine bitartrate-D5W  0-3 mcg/kg/min Intravenous Continuous   Stopped at 07/16/25 0424    propofoL  0-50 mcg/kg/min Intravenous Continuous   Stopped at 07/16/25 0424    vasopressin  0.04 Units/min Intravenous Continuous   Stopped at 07/16/25 0804     Scheduled Meds:   heparin (PORCINE)  80 Units/kg Intravenous Once    levETIRAcetam (Keppra) IV (PEDS and ADULTS)  1,500 mg Intravenous Q12H    pantoprazole  40 mg Intravenous Daily    piperacillin-tazobactam (Zosyn) IV (PEDS and ADULTS) (extended infusion is not appropriate)  4.5 g Intravenous Q8H    potassium chloride in water  40 mEq Intravenous Once    white petrolatum-mineral oiL   Both Eyes Q8H     PRN Meds:  Current Facility-Administered Medications:     heparin (PORCINE),  60 Units/kg, Intravenous, PRN    heparin (PORCINE), 30 Units/kg, Intravenous, PRN    sodium chloride 0.9%, 10 mL, Intravenous, PRN    Review of patient's allergies indicates:  No Known Allergies  Objective:     Vital Signs (Most Recent):  Temp: 97.3 °F (36.3 °C) (07/17/25 0701)  Pulse: 101 (07/17/25 0801)  Resp: (!) 36 (07/17/25 0801)  BP: 112/71 (07/17/25 0801)  SpO2: (!) 86 % (07/17/25 0801) Vital Signs (24h Range):  Temp:  [95.9 °F (35.5 °C)-97.3 °F (36.3 °C)] 97.3 °F (36.3 °C)  Pulse:  [] 101  Resp:  [17-36] 36  SpO2:  [81 %-95 %] 86 %  BP: (101-141)/(58-97) 112/71  Arterial Line BP: ()/(56-91) 122/72     Patient Vitals for the past 72 hrs (Last 3 readings):   Weight   07/17/25 0501 55.8 kg (123 lb)   07/16/25 0501 60.3 kg (133 lb)   07/15/25 0601 62.1 kg (137 lb)     Body mass index is 19.85 kg/m².      Intake/Output Summary (Last 24 hours) at 7/17/2025 0844  Last data filed at 7/17/2025 0801  Gross per 24 hour   Intake 1619.22 ml   Output 4950 ml   Net -3330.78 ml         Telemetry: Reviewed       Physical Exam  Constitutional:       Comments: Off sedation, unresponsive on mechanical ventilator   HENT:      Head: Atraumatic.      Mouth/Throat:      Comments: ETT in place  Eyes:      Conjunctiva/sclera: Conjunctivae normal.      Comments: No pupillary light response   Cardiovascular:      Rate and Rhythm: Regular rhythm. Tachycardia present.      Pulses: Normal pulses.   Pulmonary:      Breath sounds: Normal breath sounds. No wheezing or rales.   Abdominal:      General: Abdomen is flat.      Palpations: Abdomen is soft.   Musculoskeletal:      Right lower leg: Edema (1+ mid-tibia) present.      Left lower leg: Edema (1+ mid-tibia) present.   Skin:     General: Skin is warm and dry.   Neurological:      Comments: Breathing over the vent. No cough reflex, no gag reflex   Psychiatric:      Comments: Unable to assess            Significant Labs:  CBC:  Recent Labs   Lab 07/15/25  0259 07/16/25  0302  "07/16/25 0722 07/16/25 0726 07/17/25 0134   WBC 9.82 11.59  --   --  13.34*   RBC 4.30* 4.23*  --   --  4.35*   HGB 12.1* 11.8*  --   --  12.3*   HCT 36.6* 35.4* 42 42 35.6*   PLT 99* 116*  --   --  134*   MCV 85 84  --   --  82   MCH 28.1 27.9  --   --  28.3   MCHC 33.1 33.3  --   --  34.6     BNP:  Recent Labs   Lab 07/12/25 0220 07/12/25 0730   BNP 2,461* 2,903*     CMP:  Recent Labs   Lab 07/15/25  0259 07/15/25  0752 07/16/25 0302 07/16/25  0904 07/16/25  1358 07/16/25  1950 07/17/25 0134      < > 123*   < > 112* 160* 164*   CALCIUM 8.3*   < > 9.0   < > 9.3 9.3 9.6   ALBUMIN 2.8*  --  2.8*  --   --   --  2.9*   PROT 7.1  --  7.5  --   --   --  8.4   *   < > 130*   < > 133* 134* 135*   K 4.4   < > 4.7   < > 4.6 4.2 4.0   CO2 21*   < > 25   < > 31* 27 29   CL 94*   < > 90*   < > 89* 90* 90*   BUN 22*   < > 24*   < > 26* 29* 34*   CREATININE 1.4   < > 1.6*   < > 1.7* 1.7* 1.5*   ALKPHOS 80  --  79  --   --   --  95   *  --  144*  --   --   --  120*   *  --  99*  --   --   --  94*   BILITOT 3.0*  --  5.5*  --   --   --  4.8*    < > = values in this interval not displayed.      Coagulation:   Recent Labs   Lab 07/15/25  0259 07/16/25  0302 07/17/25  0134   INR 1.2 1.2 1.1   APTT 45.3* 47.6* 38.7*     LDH:  No results for input(s): "LDH" in the last 72 hours.  Microbiology:  Microbiology Results (last 7 days)       Procedure Component Value Units Date/Time    Blood culture [[id number]]  (Normal) Collected: 07/12/25 0954    Order Status: Completed Specimen: Blood from Peripheral, Forearm, Left Updated: 07/16/25 1402     Blood Culture No Growth After 72 Hours    Blood culture [[id number]]  (Normal) Collected: 07/12/25 0954    Order Status: Completed Specimen: Blood from Peripheral, Upper Arm, Right Updated: 07/16/25 1402     Blood Culture No Growth After 72 Hours    Blood culture [[id number]] Collected: 07/12/25 0954    Order Status: Sent Specimen: Blood from Peripheral, Lower Arm, " Right Updated: 07/14/25 1635    Culture, Respiratory with Gram Stain [[id number]]     Order Status: Canceled Specimen: Respiratory from Sputum             I have reviewed all pertinent labs within the past 24 hours.    Estimated Creatinine Clearance: 56.3 mL/min (A) (based on SCr of 1.5 mg/dL (H)).    Diagnostic Results:  I have reviewed all pertinent imaging results/findings within the past 24 hours.

## 2025-07-17 NOTE — NURSING
1430 Attempted to bring pt to MRI. During transport, pt desatted to 70s and maintained. Attempted increasing fio2 and suctioning with no increase in oxygenation. MD Florencio called. Decision made to abort MRI d/t safety concerns.      Upon returning to room, EEG tech at bedside to put pt back on EEG. Mother at bedside, declining further EEG testing and expressing not wanting to put her son through more tests and discomfort. MD Florencio and Sarbjit Orourke MD at bedside for extensive discussion w family. Ultimately, pt's family persistent that he would not like to continue care given his current state, so palliative MD called to bedside for further discussion/ clarification of goals. Palliative/comfort orders placed by MD. Per family's request, drips turned off and pt extubated while they were in waiting room. Family then brought back to room to be present for patient's peaceful passing. Pt alarmed asystole shortly after, MD Andres notified by jens lewis RN.
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
## 2025-07-17 NOTE — PROGRESS NOTES
Rounds    SW attended MDT rounds with physicians, fellows, APPs, pre and post transplant coordinators, and VAD coordinators. Discussed plan of care and discharge planning.

## 2025-07-17 NOTE — CARE UPDATE
Family declined any further testing including EEG. They have decided to pursue comfort care measures. Will defer MRI Brain and EEG testing per families wishes.     Please contact us with any questions or concerns.     Luz Wright  PGY-2  Neurology

## 2025-07-17 NOTE — EICU
Intervention Initiated From:  COR / FARSHADU    Alden intervened regarding:  Rounding (Video assessment)    VICU Night Rounds Checklist  24H Vital Sign Range:  Temp:  [93.6 °F (34.2 °C)-97 °F (36.1 °C)]   Pulse:  []   Resp:  [17-32]   BP: (100-141)/(58-97)   SpO2:  [82 %-97 %]   Arterial Line BP: ()/(56-91)     Video rounds and LDA reconciliation

## 2025-07-17 NOTE — PLAN OF CARE
Cardiac ICU Care Plan    CVP 12, 15, 13  Svo2 56%   No response to stimuli, no purposeful movements, negative for cough, gag and corneals  Hep, , lasix infusing      Neuro:  [place] Coma Scale  Best Eye Response: 1-->(E1) none  Best Motor Response: 1-->(M1) none  Best Verbal Response: 1-->(V1) none  [place] Coma Scale Score: 3  Assessment Qualifiers: Patient intubated  Pupil PERRLA: no    24 hr Temp:  [95.9 °F (35.5 °C)-97 °F (36.1 °C)]      CV:  Rhythm: normal sinus rhythm   DVT prophylaxis: VTE Core Measure: Pharmacological prophylaxis initiated/maintained    CVP (mean): 13 mmHg (07/17/25 0301)       SVO2 (%): 56 % (07/16/25 1901)               Pulses  Right Radial Pulse: Doppler  Left Radial Pulse: Doppler  Right Dorsalis Pedis Pulse: Doppler  Left Dorsalis Pedis Pulse: Doppler  Right Posterior Tibial Pulse: Doppler  Left Posterior Tibial Pulse: Doppler    Resp:     Vent Mode: A/C  Set Rate: 16 BPM  Oxygen Concentration (%): 70  Vt Set: 400 mL  PEEP/CPAP: 5 cmH20    GI/:  GI prophylaxis: yes  Diet/Nutrition Received: tube feeding  Last Bowel Movement:  (Unknown)  Voiding Characteristics: urethral catheter (bladder)       Urethral Catheter 07/12/25 0706 Temperature probe 14 Fr.-Reason for Continuing Urinary Catheterization: Critically ill in ICU and requiring hourly monitoring of intake/output   Intake/Output Summary (Last 24 hours) at 7/17/2025 0609  Last data filed at 7/17/2025 0601  Gross per 24 hour   Intake 1679.39 ml   Output 5050 ml   Net -3370.61 ml        Nutritional Supplement Intake: Quantity 0, Type: none ordered     Labs/Accuchecks:  Recent Labs   Lab 07/15/25  0259 07/16/25  0302 07/16/25  0722 07/16/25  0726 07/17/25  0134   WBC 9.82 11.59  --   --  13.34*   RBC 4.30* 4.23*  --   --  4.35*   HGB 12.1* 11.8*  --   --  12.3*   HCT 36.6* 35.4* 42 42 35.6*   PLT 99* 116*  --   --  134*      Recent Labs   Lab 07/15/25  0259 07/16/25  0302 07/17/25  0134   INR 1.2 1.2 1.1   APTT 45.3* 47.6* 38.7*       Recent Labs     07/17/25  0134   *   K 4.0   CO2 29   CL 90*   BUN 34*   CREATININE 1.5*   ALKPHOS 95   *   AST 94*   BILITOT 4.8*       Recent Labs   Lab 07/12/25  0220 07/12/25  0322 07/12/25  1445   CPK  --  299* 405*   TROPONINI 0.150*  --   --       Recent Labs     07/16/25  0726 07/16/25  1950 07/16/25  1951   PH 7.474* 7.499* 7.519*   PCO2 42.8 46.4* 41.1   PO2 57* 27* 60*   HCO3 31.5* 36.1* 33.5*   POCSATURATED 91 56 93   BE 8* 13* 11*       Electrolytes: No replacement orders  Accuchecks: none    Gtts/LDAs:   0.9% NaCl   Intravenous Continuous 5 mL/hr at 07/17/25 0601 Rate Verify at 07/17/25 0601    DOBUTamine IV infusion (non-titrating)  5 mcg/kg/min Intravenous Continuous 4.9 mL/hr at 07/17/25 0601 5 mcg/kg/min at 07/17/25 0601    EPINEPHrine  0.05 mcg/kg/min Intravenous Continuous   Stopped at 07/16/25 1000    fentanyl  0-250 mcg/hr Intravenous Continuous   Stopped at 07/16/25 0412    furosemide (Lasix) 500 mg in 50 mL infusion (conc: 10 mg/mL)  20 mg/hr Intravenous Continuous 2 mL/hr at 07/17/25 0601 20 mg/hr at 07/17/25 0601    heparin (porcine) in D5W  0-40 Units/kg/hr Intravenous Continuous 7.2 mL/hr at 07/17/25 0601 11 Units/kg/hr at 07/17/25 0601    nitric oxide gas  10 ppm Inhalation Continuous   10 ppm at 07/14/25 1421    NORepinephrine bitartrate-D5W  0-3 mcg/kg/min Intravenous Continuous   Stopped at 07/16/25 0424    propofoL  0-50 mcg/kg/min Intravenous Continuous   Stopped at 07/16/25 0424    vasopressin  0.04 Units/min Intravenous Continuous   Stopped at 07/16/25 0804       Lines/Drains/Airways       Central Venous Catheter Line  Duration             Percutaneous Central Line Triple Lumen 07/12/25 1227 Internal Jugular Right 4 days              Drain  Duration                  NG/OG Tube 07/12/25 1645 Center mouth 4 days         Urethral Catheter 07/12/25 0706 Temperature probe 14 Fr. 4 days              Airway  Duration                  Airway - Non-Surgical 07/12/25 0130 5  days              Arterial Line  Duration             Arterial Line 07/12/25 1912 Left Radial 4 days              Peripheral Intravenous Line  Duration                  External Jugular IV 07/12/25 0704 4 days    Peripheral IV Single Lumen 07/12/25 0703 18 G Anterior;Left Forearm 4 days    Peripheral IV Single Lumen 07/12/25 0704 18 G Right Antecubital 4 days    Peripheral IV Single Lumen 07/12/25 0704 20 G Anterior;Right Forearm 4 days                    Skin/Wounds  Bathing/Skin Care: bath, complete;linen changed (07/16/25 7608)  Wounds: No  Wound care consulted: No   Problem: Adult Inpatient Plan of Care  Goal: Plan of Care Review  Outcome: Progressing

## 2025-07-17 NOTE — ASSESSMENT & PLAN NOTE
Suspected hypoxic injury related to arrest and prolonged hypoxemic time. Initial CTH without acute intracranial pathology. Breathing over the vent with no cough or gag reflex.  - Neurology consulted, appreciate recs  - 24h EEG concerning for non-convulsive status epilepticus  - Keppra BID and Valproate BID per neurology  - Propofol for sedation   - Neuro awaiting MRI Brain to further prognosticate. Plan for this afternoon

## 2025-07-17 NOTE — CARE UPDATE
I had another discussion with family including pts mother, Amita, this afternoon regarding current health status,future outlook, and patient's desires. Further neurologic workup was proposed by neurology to clarify the degree of neurologic dysfunction, however, family feels that given his overall poor prognosis and limited opportunity for making a full recovery back to his prior level of functioning, further testing and life-sustaining care are not fitting with his goals and desires. Palliative care assisted in bridging this conversation. Ultimately, patient's mother, in agreement with other family members, would like to pursue comfort care measures at this time. STACEY notified. Awaiting additional family visitation prior to withdrawal of care.     Gustavo Matias MD  Cardiovascular Disease, PGY-4

## 2025-07-18 NOTE — PROGRESS NOTES
Follow-up     Pt  on 25. SW called pt's mother, Amita, ([phone]) to extend condolences. Pt's mother stated she was with her mother (pt's grandmother Elaina). Requested pt's mother to inform her mother that SW sends her condolences. Pt's mother reported they were coping as best as they can. Empathetic listening and emotional support provided. Pt's mother was appreciative for call.

## 2025-07-18 NOTE — PLAN OF CARE
Pt TOD 1951, pronounced by MD Andres.  Family at bedside, belongings sent with family.  Prior to change of shift, palliative extubation and withdrawal of care.  STACEY notified at 2005, not eligible for donation d/t family request.   notified.        Problem: Adult Inpatient Plan of Care  Goal: Plan of Care Review  7/17/2025 2018 by Vicki Lozoya RN  Outcome: Met  7/17/2025 2018 by Vicki Lozoya RN  Outcome: Adequate for Care Transition  Goal: Patient-Specific Goal (Individualized)  7/17/2025 2018 by Vicki Lozoya RN  Outcome: Met  7/17/2025 2018 by Vicki Lozoya RN  Outcome: Adequate for Care Transition  Goal: Absence of Hospital-Acquired Illness or Injury  7/17/2025 2018 by Vicki Lozoya RN  Outcome: Met  7/17/2025 2018 by Vicki Lozoya RN  Outcome: Adequate for Care Transition  Goal: Optimal Comfort and Wellbeing  7/17/2025 2018 by Vicki Lozoya RN  Outcome: Met  7/17/2025 2018 by Vicki Lozoya RN  Outcome: Adequate for Care Transition  Goal: Readiness for Transition of Care  7/17/2025 2018 by Vicki Lozoya RN  Outcome: Met  7/17/2025 2018 by Vicki Lozoya RN  Outcome: Adequate for Care Transition     Problem: Infection  Goal: Absence of Infection Signs and Symptoms  7/17/2025 2018 by Vicki Lozoya RN  Outcome: Met  7/17/2025 2018 by Vicki Lozoya RN  Outcome: Adequate for Care Transition     Problem: Skin Injury Risk Increased  Goal: Skin Health and Integrity  7/17/2025 2018 by Vicki Lozoya RN  Outcome: Met  7/17/2025 2018 by Vciki Lozoya RN  Outcome: Adequate for Care Transition     Problem: Sepsis/Septic Shock  Goal: Optimal Coping  7/17/2025 2018 by Vicki Lozoya RN  Outcome: Met  7/17/2025 2018 by Vicki Lozoya RN  Outcome: Adequate for Care Transition  Goal: Absence of Bleeding  7/17/2025 2018 by Vicki Lozoya RN  Outcome: Met  7/17/2025 2018 by Vicki Lozoya RN  Outcome: Adequate for Care Transition  Goal: Blood Glucose Level Within Targeted Range  7/17/2025 2018  by Helms, Vicki, RN  Outcome: Met  7/17/2025 2018 by Vicki Lozoya RN  Outcome: Adequate for Care Transition  Goal: Absence of Infection Signs and Symptoms  7/17/2025 2018 by Vicki Lozoya RN  Outcome: Met  7/17/2025 2018 by Vicki Lozoya RN  Outcome: Adequate for Care Transition  Goal: Optimal Nutrition Intake  7/17/2025 2018 by Vicki Lozoya RN  Outcome: Met  7/17/2025 2018 by Vicki Lozoya RN  Outcome: Adequate for Care Transition     Problem: Delirium  Goal: Optimal Coping  7/17/2025 2018 by Vicki Lozoya RN  Outcome: Met  7/17/2025 2018 by Vicki Lozoya RN  Outcome: Adequate for Care Transition  Goal: Improved Behavioral Control  7/17/2025 2018 by Vicki Lozoya RN  Outcome: Met  7/17/2025 2018 by Vicki Lozoya RN  Outcome: Adequate for Care Transition  Goal: Improved Attention and Thought Clarity  7/17/2025 2018 by Vicki Lozoya RN  Outcome: Met  7/17/2025 2018 by Vicki Lozoya RN  Outcome: Adequate for Care Transition  Goal: Improved Sleep  7/17/2025 2018 by Vicki Lozoya RN  Outcome: Met  7/17/2025 2018 by Vicki Lozoya RN  Outcome: Adequate for Care Transition     Problem: Fall Injury Risk  Goal: Absence of Fall and Fall-Related Injury  7/17/2025 2018 by Vicki Lozoya RN  Outcome: Met  7/17/2025 2018 by Vicki Lozoya RN  Outcome: Adequate for Care Transition     Problem: Heart Failure  Goal: Optimal Coping  7/17/2025 2018 by Vicki Lozoya RN  Outcome: Met  7/17/2025 2018 by Vicki Lozoya RN  Outcome: Adequate for Care Transition  Goal: Optimal Cardiac Output  7/17/2025 2018 by Vicki Lozoya RN  Outcome: Met  7/17/2025 2018 by Vicki Lozoya RN  Outcome: Adequate for Care Transition  Goal: Stable Heart Rate and Rhythm  7/17/2025 2018 by Vicki Lozoya, RN  Outcome: Met  7/17/2025 2018 by Vicki Lozoya, RN  Outcome: Adequate for Care Transition  Goal: Optimal Functional Ability  7/17/2025 2018 by Vicki Lozoya, RN  Outcome: Met  7/17/2025 2018 by Vicki Lozoya,  RN  Outcome: Adequate for Care Transition  Goal: Fluid and Electrolyte Balance  7/17/2025 2018 by Vicki Lozoya RN  Outcome: Met  7/17/2025 2018 by Vicki Lozoya RN  Outcome: Adequate for Care Transition  Goal: Improved Oral Intake  7/17/2025 2018 by Vicki Lozoya RN  Outcome: Met  7/17/2025 2018 by Vicki Lozoya RN  Outcome: Adequate for Care Transition  Goal: Effective Oxygenation and Ventilation  7/17/2025 2018 by Vicki Lozoya RN  Outcome: Met  7/17/2025 2018 by Vicki Lozoya RN  Outcome: Adequate for Care Transition  Goal: Effective Breathing Pattern During Sleep  7/17/2025 2018 by Vicki Lozoya RN  Outcome: Met  7/17/2025 2018 by Vicki Lozoya RN  Outcome: Adequate for Care Transition     Problem: Coping Ineffective  Goal: Effective Coping  7/17/2025 2018 by Vicki Lozoya RN  Outcome: Met  7/17/2025 2018 by Vicki Lozoya RN  Outcome: Adequate for Care Transition     Problem: Restraint, Nonviolent  Goal: Absence of Harm or Injury  7/17/2025 2018 by Vicki Lozoya RN  Outcome: Met  7/17/2025 2018 by Vicki Lozoya RN  Outcome: Adequate for Care Transition     Problem: Palliative Care  Goal: Enhanced Quality of Life  7/17/2025 2018 by Vicki Lozoya RN  Outcome: Met  7/17/2025 2018 by Vicki Lozoya RN  Outcome: Adequate for Care Transition

## 2025-07-18 NOTE — PROGRESS NOTES
"[facility] - Cardiac Intensive Care  Palliative Medicine  Progress Note    Patient Name: Juani Reilly Jr.  MRN: [id number]  Admission Date: 7/12/2025  Hospital Length of Stay: 5 days  Code Status: DNR   Attending Provider: Solitario Reis MD  Consulting Provider: Parish Wakefield MD  Primary Care Physician: Jaqueline Pardo MD  Principal Problem:<principal problem not specified>    Patient information was obtained from parent, relative(s), past medical records, and primary team.      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     -transition to comfort-focused care and proceed with compassionate extubation when family ready    Palliative Care  Palliative care encounter  See ACP 7/14-7/17    Insight/goals of care- improving insight and prognostic awareness. Some substituted values of functional independence. Clear values from family of accepting any and all potentially beneficial interventions. Willingness to engage regarding avoiding non-helpful interventions (CPR), but remain hopeful for a "miracle". Recognize Tank would not find long-term life support and LTACH acceptable. 7/17 following goals of care, family in agreement with liberation from life support    Social support- supportive mother, Amita, and grandmother, Elaina. Reportedly, patient raised mostly by grandmother    Psychological- limited coping, appropriate grief    Spiritual- Sabianist, emphasized importance of prayer    Symptom management- no active needs    Recommendations  -DNR, proceed with compassionate extubation when family ready    Symptom management  -fentanyl 50 mcg (15 OME/dose) IV q1hr PRN for pain, dyspnea, or pain behaviors  -midazolam 2mg IV q15min PRN for seizure-like activity  -glycopyrrholate 0.4mg IV q6hr PRN for uncomfortable secretions                Subjective:     Chief Complaint: " "  Chief Complaint   Patient presents with    Transfer     Transfer sent from [place] for CICU consult for post cardiac arrest and bilateral PE. Pt arrives on prop @35mcg/kg/hr, Heparin 18units/kg/hr, and Fentanyl @50mcg/kg/hr. Pt arrives intubated.       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 Intubated, sedated, and paralyzed (for vent dyssynchrony) in the ICU, now s/p TTM. Palliative medicine consulted to assist with goals of care.       Interval History: Chart reviewed including 24h medication use. Patient remains critically ill, sedated, intubated on vasopressors. Several family members at bedside during visit. Attempted MRI today aborted due to patient desaturation. Goals of care discussion below    Past Medical History:   Diagnosis Date    Acute decompensated heart failure 02/06/2025    History of heart surgery     Other cirrhosis of liver 11/12/2020    Palliative care encounter 7/14/2025    SVT (supraventricular tachycardia)     WPW syndrome        Past Surgical History:   Procedure Laterality Date    ANGIOGRAPHY OF AORTIC ARCH N/A 09/03/2020    Procedure: AORTOGRAM, AORTIC ARCH;  Surgeon: Stephania Crump MD;  Location: [facility] CATH LAB;  Service: Cardiology;  Laterality: N/A;    FONTAN PROCEDURE, EXTRACARDIAC      LEFT HEART CATHETERIZATION Left 09/03/2020    Procedure: Left heart cath;  Surgeon: Stephania Crump MD;  Location: [facility] CATH LAB;  Service: Cardiology;  Laterality: Left;    RIGHT HEART CATHETERIZATION FOR CONGENITAL HEART DEFECT N/A 09/03/2020    Procedure: CATHETERIZATION, HEART, RIGHT, FOR CONGENITAL HEART DEFECT;  Surgeon: Stephania Crump MD;  Location: [facility] CATH LAB;  Service: Cardiology;  Laterality: N/A;  " [facility] - needs covid test morning of. Extenuating circumstances    TRANSESOPHAGEAL ECHOCARDIOGRAPHY N/A 11/10/2022    Procedure: ECHOCARDIOGRAM, TRANSESOPHAGEAL;  Surgeon: Emeterio Hall MD;  Location: [facility] EP LAB;  Service: Cardiology;  Laterality: N/A;    TREATMENT OF CARDIAC ARRHYTHMIA N/A 01/09/2021    Procedure: CARDIOVERSION;  Surgeon: Jim Mcginnis MD;  Location: [facility] CATH LAB;  Service: Cardiology;  Laterality: N/A;       Review of patient's allergies indicates:  No Known Allergies    Medications:  Continuous Infusions:   0.9% NaCl   Intravenous Continuous   Stopped at 07/17/25 1414    DOBUTamine IV infusion (non-titrating)  5 mcg/kg/min Intravenous Continuous 4.9 mL/hr at 07/17/25 1810 5 mcg/kg/min at 07/17/25 1810    EPINEPHrine  0.05 mcg/kg/min Intravenous Continuous   Stopped at 07/16/25 1000    fentanyl  0-250 mcg/hr Intravenous Continuous   Stopped at 07/16/25 0412    furosemide (Lasix) 500 mg in 50 mL infusion (conc: 10 mg/mL)  20 mg/hr Intravenous Continuous 2 mL/hr at 07/17/25 1810 20 mg/hr at 07/17/25 1810    NORepinephrine bitartrate-D5W  0-3 mcg/kg/min Intravenous Continuous   Stopped at 07/16/25 0424    propofoL  0-50 mcg/kg/min Intravenous Continuous   Stopped at 07/17/25 1807     Scheduled Meds:   levETIRAcetam (Keppra) IV (PEDS and ADULTS)  1,500 mg Intravenous Q12H    pantoprazole  40 mg Intravenous Daily    piperacillin-tazobactam (Zosyn) IV (PEDS and ADULTS) (extended infusion is not appropriate)  4.5 g Intravenous Q8H    potassium chloride in water  40 mEq Intravenous Once    valproate sodium (DEPACON) IVPB  750 mg Intravenous Q12H    white petrolatum-mineral oiL   Both Eyes Q8H     PRN Meds:  Current Facility-Administered Medications:     fentaNYL, 50 mcg, Intravenous, Q30 Min PRN    glycopyrrolate, 0.4 mg, Intravenous, Q4H PRN    midazolam, 2 mg, Intravenous, Q15 Min PRN    morphine, 4 mg, Intravenous, Q15 Min PRN    sodium chloride 0.9%, 10 mL, Intravenous, PRN    Family  History       Problem Relation (Age of Onset)    Heart disease Maternal Grandfather    No Known Problems Mother, Father, Sister, Brother, Maternal Aunt, Maternal Uncle, Paternal Aunt, Paternal Uncle, Maternal Grandmother, Paternal Grandmother, Paternal Grandfather          Tobacco Use    Smoking status: Former     Current packs/day: 0.00     Types: Cigarettes     Quit date:      Years since quittin.5     Passive exposure: Past    Smokeless tobacco: Never    Tobacco comments:     3-4 months    Substance and Sexual Activity    Alcohol use: No    Drug use: Yes     Types: Marijuana     Comment: Odalys today- pt reports a while back    Sexual activity: Yes       Review of Systems   Unable to perform ROS: Intubated     Objective:     Vital Signs (Most Recent):  Temp: 99 °F (37.2 °C) (25 170)  Pulse: 109 (25 170)  Resp: (!) 26 (25 181)  BP: (!) 85/54 (25 1601)  SpO2: (!) 89 % (25) Vital Signs (24h Range):  Temp:  [96.3 °F (35.7 °C)-99 °F (37.2 °C)] 99 °F (37.2 °C)  Pulse:  [] 109  Resp:  [19-36] 26  SpO2:  [81 %-92 %] 89 %  BP: ()/(54-82) 85/54  Arterial Line BP: ()/(57-77) 88/58     Weight: (S) 55.8 kg (123 lb)  Body mass index is 19.85 kg/m².       Physical Exam  Vitals and nursing note reviewed.   Constitutional:       Appearance: He is ill-appearing.      Comments: Younger, critically ill-appearing male lying in bed, intubated, sedated   Pulmonary:      Comments: intubated  Neurological:      Comments: Minimally responsive                Advance Care Planning  Advance Directives:   Do Not Resuscitate Status: Yes      Decision Making:  Family answered questions and Patient unable to communicate due to disease severity/cognitive impairment  Goals of Care: Continued voluntary and supportive discussion regarding substituted values for patient in the setting of severe critical illness and cardiac arrest. Family with fair insight and prognostic awareness,  "understanding that Dwight is critically ill and very unlikely to make a meaningful recovery, especially in light of his absent reflexes and seizures on EEG. Following the failed attempt to get MRI today, family discussed Dwight's wishes and are in agreement that further testing is unlikely to help make decisions about his care, acknowledging that ANY care moving forward, even in the presence of some positive neuroprognostic indicators would likely require long-term life support and long-term care for at least the immediate forseeable future. Family in agreement that Dwight would not find that path acceptable in any way adding "he would be mad we let it go on this far" and "he would never want to be in the hospital". They feel at peace with this reality. I offered multiple paths forward to free Dwight from his current life support measures to allow him to pass with dignity and peace. Family in agreement with compassionate extubation. I expressed my shared sorrow for their situation as well as my admiration for their compassionate approach to their loved ones' care.            CBC:   Recent Labs   Lab 07/17/25  0134   WBC 13.34*   HGB 12.3*   HCT 35.6*   MCV 82   *     BMP:  Recent Labs   Lab 07/17/25  1641         K 4.7   CL 94*   CO2 30*   BUN 44*   CREATININE 1.7*   CALCIUM 9.5   MG 2.8*     LFT:  Lab Results   Component Value Date    AST 94 (H) 07/17/2025    ALKPHOS 95 07/17/2025    BILITOT 4.8 (H) 07/17/2025     Albumin:   Albumin   Date Value Ref Range Status   07/17/2025 2.9 (L) 3.5 - 5.2 g/dL Final   02/09/2025 3.4 (L) 3.5 - 5.2 g/dL Final     Protein:   Protein Total   Date Value Ref Range Status   07/17/2025 8.4 6.0 - 8.4 gm/dL Final     Total Protein   Date Value Ref Range Status   02/09/2025 6.4 6.0 - 8.4 g/dL Final     Lactic acid:   Lab Results   Component Value Date    LACTATE 1.8 07/14/2025    LACTATE 1.7 07/13/2025       Reviewed CBC with anemia and thrombocytopenia, CMP with stable JOSEPH. " CXR without acute changes    In my care of this patient with acute on chronic severe illness with threat to life and/or bodily function, I am recommending goal-concordant care as noted above. I spent a significant amount of time reviewing external records/ recommendations of other providers (neuro, HTS), reviewing recent test results (CBC, CMP, CXR), and discussed care with other subspecialists involved (Neuro, HTS)    In addition to above, I spent 20 minutes specifically discussing advance care planning and goals of care with patient's family at bedside.       The above recommendations communicated directly to primary team on 7/17      Parish Wakefield MD  Palliative Medicine  [facility] - Cardiac Intensive Care

## 2025-07-18 NOTE — DISCHARGE SUMMARY
[facility] - Cardiac Int[facility]  Heart Transplant  Discharge Summary      Patient Name: Juani Reilly Jr.  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 Ferguson Nov 2022, decreased LV function 20-25%, follows with advanced heart failure clinic (Dr. Reis) with plans for possible transplant, and cirrhosis transferred from [facility] in [place] for advance option evaluation and evaluation from Adult congenital heart disease. Upon encounter pt was intubates and sedated. Family at bedside reported that pt has been having progressive SOB leading to cardiac arrest. As per chart review pt has had recurrent HF admission. Was seen by Dr Reis 7/8/25 and he reported sob with NYHA II-III. Adjustment to GDMT was made, Lasix discontinued and Bumex 2mg BID start. Family reported that pt was in the bedroom with his grandmother when he became unresponsive. Sister rushed at bedside and CPR was started. Reported that she did CPR for 5 mins until EMS arrived. PEA was noted and 3 rounds of epi were administered with ROSC. Patient was intubated but c/b refractory hypoxemia so pulmonology was consulted. Vent 500, rate 20, PEEP 10, 100% FiO2 with Abg of 7.22, pCO2 32, pO2 53, sat 73%. Patient is sedated with propofol.   - CTA-PE: Notable for BP PE: Heparin initiated   - Bedside ECHO: No visible RV, moderate MR/TR. IVC 1.7 cm     In the ED lactic acid was 9.1-->10. BNP 2900, Lasix 80 given and infusion 20 mg/hr started.   Hemodynamics:    SVO2 CO CI SVR   49 2.5 1.4 2494      Pulmonary and Adult congenital Heart disease consulted and recommendations appreciated.   Dr Santoyo  recommendations appreciated. Pt is not a candidate for advance options or MCS.  started.   Pulmonary recommended adequate sedation and paralytic for better oxygenation        * No surgery found *     Hospital Course: Patient transferred from OSH s/p cardiac arrest at home. He arrived intubated and sedated and was started on TTM. He was started on  and diuresis. CTA on admission noted bilateral acute pulmonary thromboemboli and he was started on a heparin gtt. He continued to decompensate and required max vent settings to maintain o2 sats. He also required addition of Vaso, Levo, Epi, and Shanthi, in addition to . He was started on empiric abx. Pulmonology consulted for assistance with ventilation. Palliative also consulted and decision made to transition patient to DNR while continuing aggressive medical care. Neurology consulted for neuroprognostication. Sedation weaned and EEG performed which was c/w status epilepticus. Neuro exam notable for absent brainstem reflexes. In the setting of neurologic findings and overall poor prognosis, the patient's mother decided to transition to comfort care. Comfort medications provided. Patient was palliatively extubated with family at bedside. TOD 1950.     Goals of Care Treatment Preferences:  Code Status: DNR      Consults (From admission, onward)          Status Ordering Provider     Inpatient consult to General Neurology  Once        Provider:  (Not yet assigned)    Completed TONYA REED     Inpatient consult to Registered Dietitian/Nutritionist  Once        Provider:  (Not yet assigned)    Completed TONYA REED     Inpatient consult to Palliative Care  Once        Provider:  (Not yet assigned)    Completed TONYA REED     Inpatient consult to Infectious Diseases  Once        Provider:  (Not yet assigned)    Completed TONYA REED     Inpatient consult to Adult Congenital Heart Disease  Once        Provider:  (Not yet assigned)    KIERA Boo OBI      Pulmonology  Once        Provider:  (Not yet assigned)    KIERA Boo OBI            Significant Diagnostic Studies: N/A    Pending Diagnostic Studies:       Procedure Component Value Units Date/Time    Glucose, random [[id number]] Collected: 25 1445    Order Status: Sent Lab Status: No result     Specimen: Blood     Neuron Specific Enolase, Serum [[id number]] Collected: 07/15/25 1125    Order Status: Sent Lab Status: In process Updated: 07/15/25 1133    Specimen: Blood     Phosphorus [[id number]] Collected: 07/15/25 0303    Order Status: Sent Lab Status: No result     Specimen: Blood           Final Active Diagnoses:    Diagnosis Date Noted POA    PRINCIPAL PROBLEM:  Cardiac arrest [I46.9] 2025 Yes    Cardiogenic shock [R57.0] 2025 Yes    Comfort measures only status [Z51.5] 2025 Not Applicable    Encephalopathy [G93.40] 2025 Yes    Palliative care encounter [Z51.5] 2025 Not Applicable    Pulmonary thromboembolism [I26.99] 2025 Yes    Acute hypoxemic respiratory failure [J96.01] 2025 Yes    SIRS (systemic inflammatory response syndrome) [R65.10] 2025 Yes    Respiratory alkalosis [E87.3] 2025 Yes    Other cirrhosis of liver [K74.69] 2020 Yes    S/P Fontan procedure [Z87.74] 10/03/2019 Not Applicable    Adult congenital heart disease [Q24.9] 10/03/2019 Not Applicable    Single ventricle with tricuspid atresia [Q20.4, Q22.4] 10/03/2019 Not Applicable    WPW syndrome [I45.6] 10/01/2019 Yes     Chronic      Problems Resolved During this Admission:      Discharged Condition:     Disposition:     Follow Up:    Patient Instructions:   No discharge procedures on file.  Medications:  None    Debra Campos MD  Heart Transplant  [facility] - Cardiac Intensive Care

## 2025-07-18 NOTE — HOSPITAL COURSE
Patient transferred from OSH s/p cardiac arrest at home. He arrived intubated and sedated and was started on TTM. He was started on  and diuresis. CTA on admission noted bilateral acute pulmonary thromboemboli and he was started on a heparin gtt. He continued to decompensate and required max vent settings to maintain o2 sats. He also required addition of Vaso, Levo, Epi, and Shanthi, in addition to . He was started on empiric abx. Pulmonology consulted for assistance with ventilation. Palliative also consulted and decision made to transition patient to DNR while continuing aggressive medical care. Neurology consulted for neuroprognostication. Sedation weaned and EEG performed which was c/w status epilepticus. Neuro exam notable for absent brainstem reflexes. In the setting of neurologic findings and overall poor prognosis, the patient's mother decided to transition to comfort care. Comfort medications provided. Patient was palliatively extubated with family at bedside. TOJAME Mathews.

## 2025-07-18 NOTE — ASSESSMENT & PLAN NOTE
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     -transition to comfort-focused care and proceed with compassionate extubation when family ready

## 2025-07-18 NOTE — ASSESSMENT & PLAN NOTE
"See ACP 7/14-7/17    Insight/goals of care- improving insight and prognostic awareness. Some substituted values of functional independence. Clear values from family of accepting any and all potentially beneficial interventions. Willingness to engage regarding avoiding non-helpful interventions (CPR), but remain hopeful for a "miracle". Recognize Tank would not find long-term life support and LTACH acceptable. 7/17 following goals of care, family in agreement with liberation from life support    Social support- supportive mother, Amita, and grandmother, Elaina. Reportedly, patient raised mostly by grandmother    Psychological- limited coping, appropriate grief    Spiritual- Gnosticist, emphasized importance of prayer    Symptom management- no active needs    Recommendations  -DNR, proceed with compassionate extubation when family ready    Symptom management  -fentanyl 50 mcg (15 OME/dose) IV q1hr PRN for pain, dyspnea, or pain behaviors  -midazolam 2mg IV q15min PRN for seizure-like activity  -glycopyrrholate 0.4mg IV q6hr PRN for uncomfortable secretions      "

## 2025-07-24 NOTE — PHYSICIAN QUERY
Please document your best medical opinion regarding the etiology of diagnosis:  Hypoplastic left heart syndrome

## (undated) DEVICE — KIT PROBE COVER WITH GEL

## (undated) DEVICE — CATH BLLN DORADO 6 X 4

## (undated) DEVICE — CATH INFINITI JUDKINS JR4

## (undated) DEVICE — LINE 60IN PRESSURE MON.

## (undated) DEVICE — COVER BAND BAG 40 X 40

## (undated) DEVICE — INTRODUCER HEMOSTASIS 6.5FR

## (undated) DEVICE — INFLATOR ENCORE 26 BLLN INFL

## (undated) DEVICE — SHEATH INTRODUCER 7FR 11CM

## (undated) DEVICE — PROTECTION STATION PLUS

## (undated) DEVICE — KIT ENSITE ELECTRODE SURFACE

## (undated) DEVICE — CONTRAST FLEXCIL INJECTION

## (undated) DEVICE — SYR MARK 7 ARTERION 150ML

## (undated) DEVICE — SHEATH INTRODUCER 6FR 11CM

## (undated) DEVICE — OMNIPAQUE 350 200ML

## (undated) DEVICE — GUIDEWIRE AMPLATZ .035X260 SS

## (undated) DEVICE — INTRODUCER HEMOSTASIS 7.5F

## (undated) DEVICE — SHEATH INTRODUCER 4FR 11CM

## (undated) DEVICE — STOPCOCK 3-WAY

## (undated) DEVICE — GUIDEWIRE EMERALD 150CM PTFE

## (undated) DEVICE — KIT COPILOT VALVE HEMO TOOL

## (undated) DEVICE — CATH TACTICATH ABLAT BIDIR D-F

## (undated) DEVICE — GUIDEWIRE TORAY INOUE

## (undated) DEVICE — CATH EMPULSE ANGLED 5FR PIGTAI

## (undated) DEVICE — INTRO FAST-CATH SL1 8.5FR 63CM

## (undated) DEVICE — R CATH RESPONS QPLR JSN 6F 120

## (undated) DEVICE — CATH WEDGE 7FRX110CM

## (undated) DEVICE — ELECTRODE REM PLYHSV RETURN 9

## (undated) DEVICE — GUIDE WIRE WHOLLY FLOPPY

## (undated) DEVICE — KIT MICROINTRO 4F .018X40X7CM

## (undated) DEVICE — SET TUBING COOL POINT IRR

## (undated) DEVICE — PACK PEDIATRIC ANGIOGRAPHY

## (undated) DEVICE — PACK EP DRAPE OMC

## (undated) DEVICE — NDL BROCKENBROUGH CRV 71CM

## (undated) DEVICE — CATH AUROUS CM SZ PIGTAIL 5F

## (undated) DEVICE — NDL TRNSSPTL BRK-1 18GA 98CM

## (undated) DEVICE — KIT CUSTOM MANIFOLD

## (undated) DEVICE — CATH ANG PIGTAIL 4FR INFINITY

## (undated) DEVICE — SPIKE CONTRAST CONTROLLER

## (undated) DEVICE — CATH MAP BI-D HD SENSOR ENABLE

## (undated) DEVICE — COVER BAND BAG 28 X 12

## (undated) DEVICE — R CATH TRICSPD HALO XP 7FRX110

## (undated) DEVICE — SHEATH HEMOSTASIS 8.5FR

## (undated) DEVICE — GUIDEWIRE ROADRUNNER .018 270

## (undated) DEVICE — GUIDEWIRE STF .035X180CM ANG

## (undated) DEVICE — INTRODUCER AGILIS LG CURL

## (undated) DEVICE — GUIDEWIRE AMPLATZ .035X260

## (undated) DEVICE — PAD DEFIB CADENCE ADULT R2